# Patient Record
Sex: MALE | Race: OTHER | NOT HISPANIC OR LATINO | URBAN - METROPOLITAN AREA
[De-identification: names, ages, dates, MRNs, and addresses within clinical notes are randomized per-mention and may not be internally consistent; named-entity substitution may affect disease eponyms.]

---

## 2017-01-01 ENCOUNTER — INPATIENT (INPATIENT)
Facility: HOSPITAL | Age: 62
LOS: 29 days | DRG: 28 | End: 2017-10-18
Attending: SURGERY | Admitting: NEUROLOGICAL SURGERY
Payer: COMMERCIAL

## 2017-01-01 ENCOUNTER — EMERGENCY (EMERGENCY)
Facility: HOSPITAL | Age: 62
LOS: 1 days | End: 2017-01-01
Attending: EMERGENCY MEDICINE | Admitting: EMERGENCY MEDICINE
Payer: COMMERCIAL

## 2017-01-01 VITALS
RESPIRATION RATE: 20 BRPM | TEMPERATURE: 98 F | OXYGEN SATURATION: 92 % | SYSTOLIC BLOOD PRESSURE: 76 MMHG | DIASTOLIC BLOOD PRESSURE: 46 MMHG | HEART RATE: 88 BPM

## 2017-01-01 VITALS
WEIGHT: 175.05 LBS | RESPIRATION RATE: 15 BRPM | HEIGHT: 66 IN | HEART RATE: 89 BPM | OXYGEN SATURATION: 98 % | SYSTOLIC BLOOD PRESSURE: 150 MMHG | DIASTOLIC BLOOD PRESSURE: 75 MMHG | TEMPERATURE: 98 F

## 2017-01-01 VITALS
RESPIRATION RATE: 16 BRPM | SYSTOLIC BLOOD PRESSURE: 141 MMHG | DIASTOLIC BLOOD PRESSURE: 85 MMHG | OXYGEN SATURATION: 98 % | HEART RATE: 74 BPM | TEMPERATURE: 98 F

## 2017-01-01 DIAGNOSIS — C79.9 SECONDARY MALIGNANT NEOPLASM OF UNSPECIFIED SITE: ICD-10-CM

## 2017-01-01 DIAGNOSIS — C34.02 MALIGNANT NEOPLASM OF LEFT MAIN BRONCHUS: ICD-10-CM

## 2017-01-01 DIAGNOSIS — G95.20 UNSPECIFIED CORD COMPRESSION: ICD-10-CM

## 2017-01-01 DIAGNOSIS — I82.3 EMBOLISM AND THROMBOSIS OF RENAL VEIN: ICD-10-CM

## 2017-01-01 DIAGNOSIS — J96.01 ACUTE RESPIRATORY FAILURE WITH HYPOXIA: ICD-10-CM

## 2017-01-01 DIAGNOSIS — J98.59 OTHER DISEASES OF MEDIASTINUM, NOT ELSEWHERE CLASSIFIED: ICD-10-CM

## 2017-01-01 DIAGNOSIS — K91.89 OTHER POSTPROCEDURAL COMPLICATIONS AND DISORDERS OF DIGESTIVE SYSTEM: ICD-10-CM

## 2017-01-01 DIAGNOSIS — K63.1 PERFORATION OF INTESTINE (NONTRAUMATIC): ICD-10-CM

## 2017-01-01 DIAGNOSIS — J96.90 RESPIRATORY FAILURE, UNSPECIFIED, UNSPECIFIED WHETHER WITH HYPOXIA OR HYPERCAPNIA: ICD-10-CM

## 2017-01-01 DIAGNOSIS — N17.9 ACUTE KIDNEY FAILURE, UNSPECIFIED: ICD-10-CM

## 2017-01-01 DIAGNOSIS — Z51.5 ENCOUNTER FOR PALLIATIVE CARE: ICD-10-CM

## 2017-01-01 DIAGNOSIS — I26.99 OTHER PULMONARY EMBOLISM WITHOUT ACUTE COR PULMONALE: ICD-10-CM

## 2017-01-01 DIAGNOSIS — R06.00 DYSPNEA, UNSPECIFIED: ICD-10-CM

## 2017-01-01 DIAGNOSIS — J90 PLEURAL EFFUSION, NOT ELSEWHERE CLASSIFIED: ICD-10-CM

## 2017-01-01 DIAGNOSIS — J98.11 ATELECTASIS: ICD-10-CM

## 2017-01-01 DIAGNOSIS — Z71.89 OTHER SPECIFIED COUNSELING: ICD-10-CM

## 2017-01-01 DIAGNOSIS — G89.3 NEOPLASM RELATED PAIN (ACUTE) (CHRONIC): ICD-10-CM

## 2017-01-01 LAB
-  AMIKACIN: SIGNIFICANT CHANGE UP
-  AMPICILLIN/SULBACTAM: SIGNIFICANT CHANGE UP
-  AMPICILLIN: SIGNIFICANT CHANGE UP
-  AZTREONAM: SIGNIFICANT CHANGE UP
-  CEFAZOLIN: SIGNIFICANT CHANGE UP
-  CEFEPIME: SIGNIFICANT CHANGE UP
-  CEFOXITIN: SIGNIFICANT CHANGE UP
-  CEFTAZIDIME: SIGNIFICANT CHANGE UP
-  CEFTRIAXONE: SIGNIFICANT CHANGE UP
-  CIPROFLOXACIN: SIGNIFICANT CHANGE UP
-  ERTAPENEM: SIGNIFICANT CHANGE UP
-  ERYTHROMYCIN: SIGNIFICANT CHANGE UP
-  ERYTHROMYCIN: SIGNIFICANT CHANGE UP
-  GENTAMICIN: SIGNIFICANT CHANGE UP
-  IMIPENEM: SIGNIFICANT CHANGE UP
-  LEVOFLOXACIN: SIGNIFICANT CHANGE UP
-  MEROPENEM: SIGNIFICANT CHANGE UP
-  PIPERACILLIN/TAZOBACTAM: SIGNIFICANT CHANGE UP
-  TETRACYCLINE: SIGNIFICANT CHANGE UP
-  TETRACYCLINE: SIGNIFICANT CHANGE UP
-  TOBRAMYCIN: SIGNIFICANT CHANGE UP
-  TRIMETHOPRIM/SULFAMETHOXAZOLE: SIGNIFICANT CHANGE UP
-  VANCOMYCIN: SIGNIFICANT CHANGE UP
-  VANCOMYCIN: SIGNIFICANT CHANGE UP
ALBUMIN FLD-MCNC: 1.2 G/DL — SIGNIFICANT CHANGE UP
ALBUMIN SERPL ELPH-MCNC: 1.7 G/DL — LOW (ref 3.3–5)
ALBUMIN SERPL ELPH-MCNC: 1.7 G/DL — LOW (ref 3.3–5)
ALBUMIN SERPL ELPH-MCNC: 1.8 G/DL — LOW (ref 3.3–5)
ALBUMIN SERPL ELPH-MCNC: 1.8 G/DL — LOW (ref 3.3–5)
ALBUMIN SERPL ELPH-MCNC: 1.9 G/DL — LOW (ref 3.3–5)
ALBUMIN SERPL ELPH-MCNC: 1.9 G/DL — LOW (ref 3.3–5)
ALBUMIN SERPL ELPH-MCNC: 2 G/DL — LOW (ref 3.3–5)
ALBUMIN SERPL ELPH-MCNC: 2.1 G/DL — LOW (ref 3.3–5)
ALBUMIN SERPL ELPH-MCNC: 2.1 G/DL — LOW (ref 3.3–5)
ALBUMIN SERPL ELPH-MCNC: 2.2 G/DL — LOW (ref 3.3–5)
ALBUMIN SERPL ELPH-MCNC: 2.3 G/DL — LOW (ref 3.3–5)
ALBUMIN SERPL ELPH-MCNC: 2.3 G/DL — LOW (ref 3.3–5)
ALBUMIN SERPL ELPH-MCNC: 2.4 G/DL — LOW (ref 3.3–5)
ALBUMIN SERPL ELPH-MCNC: 2.5 G/DL — LOW (ref 3.3–5)
ALBUMIN SERPL ELPH-MCNC: 2.5 G/DL — LOW (ref 3.3–5)
ALBUMIN SERPL ELPH-MCNC: 2.6 G/DL — LOW (ref 3.3–5)
ALBUMIN SERPL ELPH-MCNC: 2.7 G/DL — LOW (ref 3.3–5)
ALBUMIN SERPL ELPH-MCNC: 3.8 G/DL — SIGNIFICANT CHANGE UP (ref 3.3–5)
ALBUMIN SERPL ELPH-MCNC: 4.3 G/DL — SIGNIFICANT CHANGE UP (ref 3.3–5)
ALP SERPL-CCNC: 100 U/L — SIGNIFICANT CHANGE UP (ref 40–120)
ALP SERPL-CCNC: 103 U/L — SIGNIFICANT CHANGE UP (ref 40–120)
ALP SERPL-CCNC: 107 U/L — SIGNIFICANT CHANGE UP (ref 40–120)
ALP SERPL-CCNC: 111 U/L — SIGNIFICANT CHANGE UP (ref 40–120)
ALP SERPL-CCNC: 115 U/L — SIGNIFICANT CHANGE UP (ref 40–120)
ALP SERPL-CCNC: 117 U/L — SIGNIFICANT CHANGE UP (ref 40–120)
ALP SERPL-CCNC: 118 U/L — SIGNIFICANT CHANGE UP (ref 40–120)
ALP SERPL-CCNC: 121 U/L — HIGH (ref 40–120)
ALP SERPL-CCNC: 122 U/L — HIGH (ref 40–120)
ALP SERPL-CCNC: 128 U/L — HIGH (ref 40–120)
ALP SERPL-CCNC: 132 U/L — HIGH (ref 40–120)
ALP SERPL-CCNC: 137 U/L — HIGH (ref 40–120)
ALP SERPL-CCNC: 138 U/L — HIGH (ref 40–120)
ALP SERPL-CCNC: 155 U/L — HIGH (ref 40–120)
ALP SERPL-CCNC: 172 U/L — HIGH (ref 40–120)
ALP SERPL-CCNC: 46 U/L — SIGNIFICANT CHANGE UP (ref 40–120)
ALP SERPL-CCNC: 54 U/L — SIGNIFICANT CHANGE UP (ref 40–120)
ALP SERPL-CCNC: 60 U/L — SIGNIFICANT CHANGE UP (ref 40–120)
ALP SERPL-CCNC: 77 U/L — SIGNIFICANT CHANGE UP (ref 40–120)
ALP SERPL-CCNC: 77 U/L — SIGNIFICANT CHANGE UP (ref 40–120)
ALP SERPL-CCNC: 86 U/L — SIGNIFICANT CHANGE UP (ref 40–120)
ALP SERPL-CCNC: 87 U/L — SIGNIFICANT CHANGE UP (ref 30–120)
ALP SERPL-CCNC: 91 U/L — SIGNIFICANT CHANGE UP (ref 40–120)
ALP SERPL-CCNC: 96 U/L — SIGNIFICANT CHANGE UP (ref 40–120)
ALT FLD-CCNC: 10 U/L RC — SIGNIFICANT CHANGE UP (ref 10–45)
ALT FLD-CCNC: 11 U/L RC — SIGNIFICANT CHANGE UP (ref 10–45)
ALT FLD-CCNC: 11 U/L RC — SIGNIFICANT CHANGE UP (ref 10–45)
ALT FLD-CCNC: 18 U/L RC — SIGNIFICANT CHANGE UP (ref 10–45)
ALT FLD-CCNC: 19 U/L RC — SIGNIFICANT CHANGE UP (ref 10–45)
ALT FLD-CCNC: 19 U/L RC — SIGNIFICANT CHANGE UP (ref 10–45)
ALT FLD-CCNC: 30 U/L RC — SIGNIFICANT CHANGE UP (ref 10–45)
ALT FLD-CCNC: 31 U/L RC — SIGNIFICANT CHANGE UP (ref 10–45)
ALT FLD-CCNC: 32 U/L RC — SIGNIFICANT CHANGE UP (ref 10–45)
ALT FLD-CCNC: 33 U/L RC — SIGNIFICANT CHANGE UP (ref 10–45)
ALT FLD-CCNC: 34 U/L RC — SIGNIFICANT CHANGE UP (ref 10–45)
ALT FLD-CCNC: 34 U/L RC — SIGNIFICANT CHANGE UP (ref 10–45)
ALT FLD-CCNC: 37 U/L RC — SIGNIFICANT CHANGE UP (ref 10–45)
ALT FLD-CCNC: 38 U/L RC — SIGNIFICANT CHANGE UP (ref 10–45)
ALT FLD-CCNC: 41 U/L RC — SIGNIFICANT CHANGE UP (ref 10–45)
ALT FLD-CCNC: 45 U/L RC — SIGNIFICANT CHANGE UP (ref 10–45)
ALT FLD-CCNC: 46 U/L DA — SIGNIFICANT CHANGE UP (ref 10–60)
ALT FLD-CCNC: 46 U/L RC — HIGH (ref 10–45)
ALT FLD-CCNC: 56 U/L RC — HIGH (ref 10–45)
ALT FLD-CCNC: 58 U/L RC — HIGH (ref 10–45)
ALT FLD-CCNC: 60 U/L RC — HIGH (ref 10–45)
ALT FLD-CCNC: 62 U/L RC — HIGH (ref 10–45)
ALT FLD-CCNC: 7 U/L RC — LOW (ref 10–45)
ALT FLD-CCNC: 8 U/L RC — LOW (ref 10–45)
AMMONIA BLD-MCNC: 27 UMOL/L — SIGNIFICANT CHANGE UP (ref 11–55)
AMMONIA BLD-MCNC: 29 UMOL/L — SIGNIFICANT CHANGE UP (ref 11–55)
AMMONIA BLD-MCNC: 41 UMOL/L — SIGNIFICANT CHANGE UP (ref 11–55)
ANION GAP SERPL CALC-SCNC: 10 MMOL/L — SIGNIFICANT CHANGE UP (ref 5–17)
ANION GAP SERPL CALC-SCNC: 11 MMOL/L — SIGNIFICANT CHANGE UP (ref 5–17)
ANION GAP SERPL CALC-SCNC: 12 MMOL/L — SIGNIFICANT CHANGE UP (ref 5–17)
ANION GAP SERPL CALC-SCNC: 13 MMOL/L — SIGNIFICANT CHANGE UP (ref 5–17)
ANION GAP SERPL CALC-SCNC: 14 MMOL/L — SIGNIFICANT CHANGE UP (ref 5–17)
ANION GAP SERPL CALC-SCNC: 15 MMOL/L — SIGNIFICANT CHANGE UP (ref 5–17)
ANION GAP SERPL CALC-SCNC: 16 MMOL/L — SIGNIFICANT CHANGE UP (ref 5–17)
ANION GAP SERPL CALC-SCNC: 17 MMOL/L — SIGNIFICANT CHANGE UP (ref 5–17)
ANION GAP SERPL CALC-SCNC: 20 MMOL/L — HIGH (ref 5–17)
ANION GAP SERPL CALC-SCNC: 9 MMOL/L — SIGNIFICANT CHANGE UP (ref 5–17)
ANION GAP SERPL CALC-SCNC: 9 MMOL/L — SIGNIFICANT CHANGE UP (ref 5–17)
ANISOCYTOSIS BLD QL: SLIGHT — SIGNIFICANT CHANGE UP
ANISOCYTOSIS BLD QL: SLIGHT — SIGNIFICANT CHANGE UP
APPEARANCE UR: ABNORMAL
APPEARANCE UR: CLEAR — SIGNIFICANT CHANGE UP
APTT BLD: 24.4 SEC — LOW (ref 27.5–37.4)
APTT BLD: 25.8 SEC — LOW (ref 27.5–37.4)
APTT BLD: 26.6 SEC — LOW (ref 27.5–37.4)
APTT BLD: 26.8 SEC — LOW (ref 27.5–37.4)
APTT BLD: 27.1 SEC — LOW (ref 27.5–37.4)
APTT BLD: 27.4 SEC — LOW (ref 27.5–37.4)
APTT BLD: 27.7 SEC — SIGNIFICANT CHANGE UP (ref 27.5–37.4)
APTT BLD: 28.4 SEC — SIGNIFICANT CHANGE UP (ref 27.5–37.4)
APTT BLD: 29.8 SEC — SIGNIFICANT CHANGE UP (ref 27.5–37.4)
APTT BLD: 31.3 SEC — SIGNIFICANT CHANGE UP (ref 27.5–37.4)
APTT BLD: 31.7 SEC — SIGNIFICANT CHANGE UP (ref 27.5–37.4)
APTT BLD: 31.7 SEC — SIGNIFICANT CHANGE UP (ref 27.5–37.4)
APTT BLD: 31.8 SEC — SIGNIFICANT CHANGE UP (ref 27.5–37.4)
APTT BLD: 31.8 SEC — SIGNIFICANT CHANGE UP (ref 27.5–37.4)
APTT BLD: 31.9 SEC — SIGNIFICANT CHANGE UP (ref 27.5–37.4)
APTT BLD: 32.6 SEC — SIGNIFICANT CHANGE UP (ref 27.5–37.4)
APTT BLD: 32.7 SEC — SIGNIFICANT CHANGE UP (ref 27.5–37.4)
APTT BLD: 34.4 SEC — SIGNIFICANT CHANGE UP (ref 27.5–37.4)
APTT BLD: 36.2 SEC — SIGNIFICANT CHANGE UP (ref 27.5–37.4)
APTT BLD: 40 SEC — HIGH (ref 27.5–37.4)
APTT BLD: 51 SEC — HIGH (ref 27.5–37.4)
APTT BLD: 56.3 SEC — HIGH (ref 27.5–37.4)
APTT BLD: 56.5 SEC — HIGH (ref 27.5–37.4)
APTT BLD: 56.5 SEC — HIGH (ref 27.5–37.4)
APTT BLD: 56.8 SEC — HIGH (ref 27.5–37.4)
APTT BLD: 59.6 SEC — HIGH (ref 27.5–37.4)
APTT BLD: 59.9 SEC — HIGH (ref 27.5–37.4)
APTT BLD: 62 SEC — HIGH (ref 27.5–37.4)
APTT BLD: 63.3 SEC — HIGH (ref 27.5–37.4)
APTT BLD: 73.8 SEC — HIGH (ref 27.5–37.4)
APTT BLD: 97.7 SEC — HIGH (ref 27.5–37.4)
APTT BLD: 98.4 SEC — HIGH (ref 27.5–37.4)
AST SERPL-CCNC: 30 U/L — SIGNIFICANT CHANGE UP (ref 10–40)
AST SERPL-CCNC: 30 U/L — SIGNIFICANT CHANGE UP (ref 10–40)
AST SERPL-CCNC: 31 U/L — SIGNIFICANT CHANGE UP (ref 10–40)
AST SERPL-CCNC: 32 U/L — SIGNIFICANT CHANGE UP (ref 10–40)
AST SERPL-CCNC: 33 U/L — SIGNIFICANT CHANGE UP (ref 10–40)
AST SERPL-CCNC: 34 U/L — SIGNIFICANT CHANGE UP (ref 10–40)
AST SERPL-CCNC: 35 U/L — SIGNIFICANT CHANGE UP (ref 10–40)
AST SERPL-CCNC: 43 U/L — HIGH (ref 10–40)
AST SERPL-CCNC: 53 U/L — HIGH (ref 10–40)
AST SERPL-CCNC: 54 U/L — HIGH (ref 10–40)
AST SERPL-CCNC: 57 U/L — HIGH (ref 10–40)
AST SERPL-CCNC: 57 U/L — HIGH (ref 10–40)
AST SERPL-CCNC: 59 U/L — HIGH (ref 10–40)
AST SERPL-CCNC: 60 U/L — HIGH (ref 10–40)
AST SERPL-CCNC: 60 U/L — HIGH (ref 10–40)
AST SERPL-CCNC: 69 U/L — HIGH (ref 10–40)
AST SERPL-CCNC: 70 U/L — HIGH (ref 10–40)
AST SERPL-CCNC: 77 U/L — HIGH (ref 10–40)
AST SERPL-CCNC: 77 U/L — HIGH (ref 10–40)
AST SERPL-CCNC: 78 U/L — HIGH (ref 10–40)
AST SERPL-CCNC: 79 U/L — HIGH (ref 10–40)
AST SERPL-CCNC: 88 U/L — HIGH (ref 10–40)
AST SERPL-CCNC: 94 U/L — HIGH (ref 10–40)
AST SERPL-CCNC: 97 U/L — HIGH (ref 10–40)
B PERT IGG+IGM PNL SER: ABNORMAL
BACTERIA # UR AUTO: ABNORMAL /HPF
BASE EXCESS BLDA CALC-SCNC: -6.5 MMOL/L — LOW (ref -2–2)
BASE EXCESS BLDV CALC-SCNC: -4.1 MMOL/L — LOW (ref -2–2)
BASO STIPL BLD QL SMEAR: SLIGHT — SIGNIFICANT CHANGE UP
BASOPHILS # BLD AUTO: 0 K/UL — SIGNIFICANT CHANGE UP (ref 0–0.2)
BASOPHILS NFR BLD AUTO: 0.1 % — SIGNIFICANT CHANGE UP (ref 0–2)
BASOPHILS NFR BLD AUTO: 0.2 % — SIGNIFICANT CHANGE UP (ref 0–2)
BILIRUB DIRECT SERPL-MCNC: 0.5 MG/DL — HIGH (ref 0–0.2)
BILIRUB DIRECT SERPL-MCNC: 0.6 MG/DL — HIGH (ref 0–0.2)
BILIRUB DIRECT SERPL-MCNC: 1 MG/DL — HIGH (ref 0–0.2)
BILIRUB DIRECT SERPL-MCNC: 1.1 MG/DL — HIGH (ref 0–0.2)
BILIRUB DIRECT SERPL-MCNC: 1.1 MG/DL — HIGH (ref 0–0.2)
BILIRUB DIRECT SERPL-MCNC: 1.4 MG/DL — HIGH (ref 0–0.2)
BILIRUB DIRECT SERPL-MCNC: 1.4 MG/DL — HIGH (ref 0–0.2)
BILIRUB DIRECT SERPL-MCNC: 1.8 MG/DL — HIGH (ref 0–0.2)
BILIRUB DIRECT SERPL-MCNC: 2.1 MG/DL — HIGH (ref 0–0.2)
BILIRUB DIRECT SERPL-MCNC: 2.2 MG/DL — HIGH (ref 0–0.2)
BILIRUB DIRECT SERPL-MCNC: 2.4 MG/DL — HIGH (ref 0–0.2)
BILIRUB DIRECT SERPL-MCNC: 2.6 MG/DL — HIGH (ref 0–0.2)
BILIRUB DIRECT SERPL-MCNC: 3 MG/DL — HIGH (ref 0–0.2)
BILIRUB DIRECT SERPL-MCNC: 3.4 MG/DL — HIGH (ref 0–0.2)
BILIRUB DIRECT SERPL-MCNC: 4.9 MG/DL — HIGH (ref 0–0.2)
BILIRUB DIRECT SERPL-MCNC: 6.6 MG/DL — HIGH (ref 0–0.2)
BILIRUB INDIRECT FLD-MCNC: 0.3 MG/DL — SIGNIFICANT CHANGE UP (ref 0.2–1)
BILIRUB INDIRECT FLD-MCNC: 0.3 MG/DL — SIGNIFICANT CHANGE UP (ref 0.2–1)
BILIRUB INDIRECT FLD-MCNC: 0.4 MG/DL — SIGNIFICANT CHANGE UP (ref 0.2–1)
BILIRUB INDIRECT FLD-MCNC: 0.4 MG/DL — SIGNIFICANT CHANGE UP (ref 0.2–1)
BILIRUB INDIRECT FLD-MCNC: 0.5 MG/DL — SIGNIFICANT CHANGE UP (ref 0.2–1)
BILIRUB INDIRECT FLD-MCNC: 0.6 MG/DL — SIGNIFICANT CHANGE UP (ref 0.2–1)
BILIRUB INDIRECT FLD-MCNC: 0.8 MG/DL — SIGNIFICANT CHANGE UP (ref 0.2–1)
BILIRUB INDIRECT FLD-MCNC: 0.9 MG/DL — SIGNIFICANT CHANGE UP (ref 0.2–1)
BILIRUB INDIRECT FLD-MCNC: 1 MG/DL — SIGNIFICANT CHANGE UP (ref 0.2–1)
BILIRUB INDIRECT FLD-MCNC: 1.2 MG/DL — HIGH (ref 0.2–1)
BILIRUB INDIRECT FLD-MCNC: 1.2 MG/DL — HIGH (ref 0.2–1)
BILIRUB SERPL-MCNC: 0.6 MG/DL — SIGNIFICANT CHANGE UP (ref 0.2–1.2)
BILIRUB SERPL-MCNC: 0.6 MG/DL — SIGNIFICANT CHANGE UP (ref 0.2–1.2)
BILIRUB SERPL-MCNC: 0.8 MG/DL — SIGNIFICANT CHANGE UP (ref 0.2–1.2)
BILIRUB SERPL-MCNC: 0.9 MG/DL — SIGNIFICANT CHANGE UP (ref 0.2–1.2)
BILIRUB SERPL-MCNC: 1 MG/DL — SIGNIFICANT CHANGE UP (ref 0.2–1.2)
BILIRUB SERPL-MCNC: 1.5 MG/DL — HIGH (ref 0.2–1.2)
BILIRUB SERPL-MCNC: 1.5 MG/DL — HIGH (ref 0.2–1.2)
BILIRUB SERPL-MCNC: 1.6 MG/DL — HIGH (ref 0.2–1.2)
BILIRUB SERPL-MCNC: 1.7 MG/DL — HIGH (ref 0.2–1.2)
BILIRUB SERPL-MCNC: 1.8 MG/DL — HIGH (ref 0.2–1.2)
BILIRUB SERPL-MCNC: 1.8 MG/DL — HIGH (ref 0.2–1.2)
BILIRUB SERPL-MCNC: 1.9 MG/DL — HIGH (ref 0.2–1.2)
BILIRUB SERPL-MCNC: 2 MG/DL — HIGH (ref 0.2–1.2)
BILIRUB SERPL-MCNC: 2.2 MG/DL — HIGH (ref 0.2–1.2)
BILIRUB SERPL-MCNC: 2.3 MG/DL — HIGH (ref 0.2–1.2)
BILIRUB SERPL-MCNC: 2.7 MG/DL — HIGH (ref 0.2–1.2)
BILIRUB SERPL-MCNC: 3 MG/DL — HIGH (ref 0.2–1.2)
BILIRUB SERPL-MCNC: 3 MG/DL — HIGH (ref 0.2–1.2)
BILIRUB SERPL-MCNC: 3.1 MG/DL — HIGH (ref 0.2–1.2)
BILIRUB SERPL-MCNC: 3.4 MG/DL — HIGH (ref 0.2–1.2)
BILIRUB SERPL-MCNC: 3.6 MG/DL — HIGH (ref 0.2–1.2)
BILIRUB SERPL-MCNC: 4.3 MG/DL — HIGH (ref 0.2–1.2)
BILIRUB SERPL-MCNC: 5 MG/DL — HIGH (ref 0.2–1.2)
BILIRUB SERPL-MCNC: 5.8 MG/DL — HIGH (ref 0.2–1.2)
BILIRUB SERPL-MCNC: 6.8 MG/DL — HIGH (ref 0.2–1.2)
BILIRUB SERPL-MCNC: 7.8 MG/DL — HIGH (ref 0.2–1.2)
BILIRUB UR-MCNC: ABNORMAL
BILIRUB UR-MCNC: ABNORMAL
BILIRUB UR-MCNC: NEGATIVE — SIGNIFICANT CHANGE UP
BILIRUB UR-MCNC: NEGATIVE — SIGNIFICANT CHANGE UP
BLD GP AB SCN SERPL QL: NEGATIVE — SIGNIFICANT CHANGE UP
BUN SERPL-MCNC: 18 MG/DL — SIGNIFICANT CHANGE UP (ref 7–23)
BUN SERPL-MCNC: 18 MG/DL — SIGNIFICANT CHANGE UP (ref 7–23)
BUN SERPL-MCNC: 24 MG/DL — HIGH (ref 7–23)
BUN SERPL-MCNC: 27 MG/DL — HIGH (ref 7–23)
BUN SERPL-MCNC: 28 MG/DL — HIGH (ref 7–23)
BUN SERPL-MCNC: 29 MG/DL — HIGH (ref 7–23)
BUN SERPL-MCNC: 29 MG/DL — HIGH (ref 7–23)
BUN SERPL-MCNC: 32 MG/DL — HIGH (ref 7–23)
BUN SERPL-MCNC: 33 MG/DL — HIGH (ref 7–23)
BUN SERPL-MCNC: 33 MG/DL — HIGH (ref 7–23)
BUN SERPL-MCNC: 34 MG/DL — HIGH (ref 7–23)
BUN SERPL-MCNC: 38 MG/DL — HIGH (ref 7–23)
BUN SERPL-MCNC: 39 MG/DL — HIGH (ref 7–23)
BUN SERPL-MCNC: 40 MG/DL — HIGH (ref 7–23)
BUN SERPL-MCNC: 42 MG/DL — HIGH (ref 7–23)
BUN SERPL-MCNC: 44 MG/DL — HIGH (ref 7–23)
BUN SERPL-MCNC: 45 MG/DL — HIGH (ref 7–23)
BUN SERPL-MCNC: 46 MG/DL — HIGH (ref 7–23)
BUN SERPL-MCNC: 48 MG/DL — HIGH (ref 7–23)
BUN SERPL-MCNC: 49 MG/DL — HIGH (ref 7–23)
BUN SERPL-MCNC: 50 MG/DL — HIGH (ref 7–23)
BUN SERPL-MCNC: 50 MG/DL — HIGH (ref 7–23)
BUN SERPL-MCNC: 52 MG/DL — HIGH (ref 7–23)
BUN SERPL-MCNC: 52 MG/DL — HIGH (ref 7–23)
BUN SERPL-MCNC: 53 MG/DL — HIGH (ref 7–23)
BUN SERPL-MCNC: 54 MG/DL — HIGH (ref 7–23)
BUN SERPL-MCNC: 57 MG/DL — HIGH (ref 7–23)
BUN SERPL-MCNC: 57 MG/DL — HIGH (ref 7–23)
BUN SERPL-MCNC: 58 MG/DL — HIGH (ref 7–23)
BUN SERPL-MCNC: 59 MG/DL — HIGH (ref 7–23)
BUN SERPL-MCNC: 60 MG/DL — HIGH (ref 7–23)
BUN SERPL-MCNC: 62 MG/DL — HIGH (ref 7–23)
BUN SERPL-MCNC: 63 MG/DL — HIGH (ref 7–23)
CA-I BLD-SCNC: 1.09 MMOL/L — LOW (ref 1.12–1.3)
CA-I BLD-SCNC: 1.11 MMOL/L — LOW (ref 1.12–1.3)
CA-I BLD-SCNC: 1.12 MMOL/L — SIGNIFICANT CHANGE UP (ref 1.12–1.3)
CA-I BLD-SCNC: 1.13 MMOL/L — SIGNIFICANT CHANGE UP (ref 1.12–1.3)
CA-I BLD-SCNC: 1.13 MMOL/L — SIGNIFICANT CHANGE UP (ref 1.12–1.3)
CA-I BLD-SCNC: 1.14 MMOL/L — SIGNIFICANT CHANGE UP (ref 1.12–1.3)
CA-I BLD-SCNC: 1.15 MMOL/L — SIGNIFICANT CHANGE UP (ref 1.12–1.3)
CA-I BLD-SCNC: 1.15 MMOL/L — SIGNIFICANT CHANGE UP (ref 1.12–1.3)
CA-I BLD-SCNC: 1.16 MMOL/L — SIGNIFICANT CHANGE UP (ref 1.12–1.3)
CA-I BLD-SCNC: 1.17 MMOL/L — SIGNIFICANT CHANGE UP (ref 1.12–1.3)
CA-I SERPL-SCNC: 1.01 MMOL/L — LOW (ref 1.12–1.3)
CALCIUM SERPL-MCNC: 7 MG/DL — LOW (ref 8.4–10.5)
CALCIUM SERPL-MCNC: 7.2 MG/DL — LOW (ref 8.4–10.5)
CALCIUM SERPL-MCNC: 7.3 MG/DL — LOW (ref 8.4–10.5)
CALCIUM SERPL-MCNC: 7.3 MG/DL — LOW (ref 8.4–10.5)
CALCIUM SERPL-MCNC: 7.4 MG/DL — LOW (ref 8.4–10.5)
CALCIUM SERPL-MCNC: 7.5 MG/DL — LOW (ref 8.4–10.5)
CALCIUM SERPL-MCNC: 7.6 MG/DL — LOW (ref 8.4–10.5)
CALCIUM SERPL-MCNC: 7.7 MG/DL — LOW (ref 8.4–10.5)
CALCIUM SERPL-MCNC: 7.8 MG/DL — LOW (ref 8.4–10.5)
CALCIUM SERPL-MCNC: 7.9 MG/DL — LOW (ref 8.4–10.5)
CALCIUM SERPL-MCNC: 8 MG/DL — LOW (ref 8.4–10.5)
CALCIUM SERPL-MCNC: 8.1 MG/DL — LOW (ref 8.4–10.5)
CALCIUM SERPL-MCNC: 8.2 MG/DL — LOW (ref 8.4–10.5)
CALCIUM SERPL-MCNC: 8.3 MG/DL — LOW (ref 8.4–10.5)
CALCIUM SERPL-MCNC: 8.7 MG/DL — SIGNIFICANT CHANGE UP (ref 8.4–10.5)
CALCIUM SERPL-MCNC: 8.9 MG/DL — SIGNIFICANT CHANGE UP (ref 8.4–10.5)
CALCIUM SERPL-MCNC: 9.6 MG/DL — SIGNIFICANT CHANGE UP (ref 8.4–10.5)
CALCIUM SERPL-MCNC: 9.8 MG/DL — SIGNIFICANT CHANGE UP (ref 8.4–10.5)
CEA SERPL-MCNC: 7.8 NG/ML — HIGH (ref 0–3.8)
CHLORIDE BLDV-SCNC: 113 MMOL/L — HIGH (ref 96–108)
CHLORIDE SERPL-SCNC: 100 MMOL/L — SIGNIFICANT CHANGE UP (ref 96–108)
CHLORIDE SERPL-SCNC: 101 MMOL/L — SIGNIFICANT CHANGE UP (ref 96–108)
CHLORIDE SERPL-SCNC: 102 MMOL/L — SIGNIFICANT CHANGE UP (ref 96–108)
CHLORIDE SERPL-SCNC: 103 MMOL/L — SIGNIFICANT CHANGE UP (ref 96–108)
CHLORIDE SERPL-SCNC: 104 MMOL/L — SIGNIFICANT CHANGE UP (ref 96–108)
CHLORIDE SERPL-SCNC: 106 MMOL/L — SIGNIFICANT CHANGE UP (ref 96–108)
CHLORIDE SERPL-SCNC: 107 MMOL/L — SIGNIFICANT CHANGE UP (ref 96–108)
CHLORIDE SERPL-SCNC: 107 MMOL/L — SIGNIFICANT CHANGE UP (ref 96–108)
CHLORIDE SERPL-SCNC: 108 MMOL/L — SIGNIFICANT CHANGE UP (ref 96–108)
CHLORIDE SERPL-SCNC: 108 MMOL/L — SIGNIFICANT CHANGE UP (ref 96–108)
CHLORIDE SERPL-SCNC: 109 MMOL/L — HIGH (ref 96–108)
CHLORIDE SERPL-SCNC: 109 MMOL/L — HIGH (ref 96–108)
CHLORIDE SERPL-SCNC: 110 MMOL/L — HIGH (ref 96–108)
CHLORIDE SERPL-SCNC: 97 MMOL/L — SIGNIFICANT CHANGE UP (ref 96–108)
CHLORIDE SERPL-SCNC: 98 MMOL/L — SIGNIFICANT CHANGE UP (ref 96–108)
CHLORIDE SERPL-SCNC: 99 MMOL/L — SIGNIFICANT CHANGE UP (ref 96–108)
CHLORIDE SERPL-SCNC: 99 MMOL/L — SIGNIFICANT CHANGE UP (ref 96–108)
CK MB BLD-MCNC: 1.1 % — SIGNIFICANT CHANGE UP (ref 0–3.5)
CK MB BLD-MCNC: 1.4 % — SIGNIFICANT CHANGE UP (ref 0–3.5)
CK MB BLD-MCNC: 1.4 % — SIGNIFICANT CHANGE UP (ref 0–3.5)
CK MB BLD-MCNC: 1.5 % — SIGNIFICANT CHANGE UP (ref 0–3.5)
CK MB BLD-MCNC: 1.6 % — SIGNIFICANT CHANGE UP (ref 0–3.5)
CK MB BLD-MCNC: 1.9 % — SIGNIFICANT CHANGE UP (ref 0–3.5)
CK MB CFR SERPL CALC: 3.1 NG/ML — SIGNIFICANT CHANGE UP (ref 0–6.7)
CK MB CFR SERPL CALC: 3.2 NG/ML — SIGNIFICANT CHANGE UP (ref 0–6.7)
CK MB CFR SERPL CALC: 3.5 NG/ML — SIGNIFICANT CHANGE UP (ref 0–6.7)
CK MB CFR SERPL CALC: 3.6 NG/ML — SIGNIFICANT CHANGE UP (ref 0–6.7)
CK MB CFR SERPL CALC: 4.1 NG/ML — SIGNIFICANT CHANGE UP (ref 0–6.7)
CK MB CFR SERPL CALC: 4.6 NG/ML — SIGNIFICANT CHANGE UP (ref 0–6.7)
CK SERPL-CCNC: 102 U/L — SIGNIFICANT CHANGE UP (ref 30–200)
CK SERPL-CCNC: 185 U/L — SIGNIFICANT CHANGE UP (ref 30–200)
CK SERPL-CCNC: 201 U/L — HIGH (ref 30–200)
CK SERPL-CCNC: 237 U/L — HIGH (ref 30–200)
CK SERPL-CCNC: 247 U/L — HIGH (ref 30–200)
CK SERPL-CCNC: 286 U/L — HIGH (ref 30–200)
CK SERPL-CCNC: 374 U/L — HIGH (ref 30–200)
CO2 BLDA-SCNC: 19 MMOL/L — LOW (ref 22–30)
CO2 BLDV-SCNC: 22 MMOL/L — SIGNIFICANT CHANGE UP (ref 22–30)
CO2 SERPL-SCNC: 15 MMOL/L — LOW (ref 22–31)
CO2 SERPL-SCNC: 16 MMOL/L — LOW (ref 22–31)
CO2 SERPL-SCNC: 16 MMOL/L — LOW (ref 22–31)
CO2 SERPL-SCNC: 17 MMOL/L — LOW (ref 22–31)
CO2 SERPL-SCNC: 19 MMOL/L — LOW (ref 22–31)
CO2 SERPL-SCNC: 20 MMOL/L — LOW (ref 22–31)
CO2 SERPL-SCNC: 21 MMOL/L — LOW (ref 22–31)
CO2 SERPL-SCNC: 22 MMOL/L — SIGNIFICANT CHANGE UP (ref 22–31)
CO2 SERPL-SCNC: 24 MMOL/L — SIGNIFICANT CHANGE UP (ref 22–31)
CO2 SERPL-SCNC: 24 MMOL/L — SIGNIFICANT CHANGE UP (ref 22–31)
CO2 SERPL-SCNC: 25 MMOL/L — SIGNIFICANT CHANGE UP (ref 22–31)
CO2 SERPL-SCNC: 26 MMOL/L — SIGNIFICANT CHANGE UP (ref 22–31)
CO2 SERPL-SCNC: 27 MMOL/L — SIGNIFICANT CHANGE UP (ref 22–31)
CO2 SERPL-SCNC: 28 MMOL/L — SIGNIFICANT CHANGE UP (ref 22–31)
CO2 SERPL-SCNC: 28 MMOL/L — SIGNIFICANT CHANGE UP (ref 22–31)
COLOR FLD: SIGNIFICANT CHANGE UP
COLOR SPEC: YELLOW — SIGNIFICANT CHANGE UP
COMMENT - FLUIDS: SIGNIFICANT CHANGE UP
CREAT ?TM UR-MCNC: 101 MG/DL — SIGNIFICANT CHANGE UP
CREAT ?TM UR-MCNC: 110 MG/DL — SIGNIFICANT CHANGE UP
CREAT ?TM UR-MCNC: 112 MG/DL — SIGNIFICANT CHANGE UP
CREAT ?TM UR-MCNC: 84 MG/DL — SIGNIFICANT CHANGE UP
CREAT SERPL-MCNC: 0.85 MG/DL — SIGNIFICANT CHANGE UP (ref 0.5–1.3)
CREAT SERPL-MCNC: 0.85 MG/DL — SIGNIFICANT CHANGE UP (ref 0.5–1.3)
CREAT SERPL-MCNC: 0.94 MG/DL — SIGNIFICANT CHANGE UP (ref 0.5–1.3)
CREAT SERPL-MCNC: 0.96 MG/DL — SIGNIFICANT CHANGE UP (ref 0.5–1.3)
CREAT SERPL-MCNC: 0.97 MG/DL — SIGNIFICANT CHANGE UP (ref 0.5–1.3)
CREAT SERPL-MCNC: 0.99 MG/DL — SIGNIFICANT CHANGE UP (ref 0.5–1.3)
CREAT SERPL-MCNC: 1.03 MG/DL — SIGNIFICANT CHANGE UP (ref 0.5–1.3)
CREAT SERPL-MCNC: 1.04 MG/DL — SIGNIFICANT CHANGE UP (ref 0.5–1.3)
CREAT SERPL-MCNC: 1.06 MG/DL — SIGNIFICANT CHANGE UP (ref 0.5–1.3)
CREAT SERPL-MCNC: 1.07 MG/DL — SIGNIFICANT CHANGE UP (ref 0.5–1.3)
CREAT SERPL-MCNC: 1.08 MG/DL — SIGNIFICANT CHANGE UP (ref 0.5–1.3)
CREAT SERPL-MCNC: 1.09 MG/DL — SIGNIFICANT CHANGE UP (ref 0.5–1.3)
CREAT SERPL-MCNC: 1.12 MG/DL — SIGNIFICANT CHANGE UP (ref 0.5–1.3)
CREAT SERPL-MCNC: 1.14 MG/DL — SIGNIFICANT CHANGE UP (ref 0.5–1.3)
CREAT SERPL-MCNC: 1.18 MG/DL — SIGNIFICANT CHANGE UP (ref 0.5–1.3)
CREAT SERPL-MCNC: 1.2 MG/DL — SIGNIFICANT CHANGE UP (ref 0.5–1.3)
CREAT SERPL-MCNC: 1.21 MG/DL — SIGNIFICANT CHANGE UP (ref 0.5–1.3)
CREAT SERPL-MCNC: 1.23 MG/DL — SIGNIFICANT CHANGE UP (ref 0.5–1.3)
CREAT SERPL-MCNC: 1.24 MG/DL — SIGNIFICANT CHANGE UP (ref 0.5–1.3)
CREAT SERPL-MCNC: 1.26 MG/DL — SIGNIFICANT CHANGE UP (ref 0.5–1.3)
CREAT SERPL-MCNC: 1.28 MG/DL — SIGNIFICANT CHANGE UP (ref 0.5–1.3)
CREAT SERPL-MCNC: 1.46 MG/DL — HIGH (ref 0.5–1.3)
CREAT SERPL-MCNC: 1.5 MG/DL — HIGH (ref 0.5–1.3)
CREAT SERPL-MCNC: 1.78 MG/DL — HIGH (ref 0.5–1.3)
CREAT SERPL-MCNC: 1.79 MG/DL — HIGH (ref 0.5–1.3)
CREAT SERPL-MCNC: 1.82 MG/DL — HIGH (ref 0.5–1.3)
CREAT SERPL-MCNC: 1.83 MG/DL — HIGH (ref 0.5–1.3)
CREAT SERPL-MCNC: 1.88 MG/DL — HIGH (ref 0.5–1.3)
CREAT SERPL-MCNC: 2 MG/DL — HIGH (ref 0.5–1.3)
CREAT SERPL-MCNC: 2.24 MG/DL — HIGH (ref 0.5–1.3)
CREAT SERPL-MCNC: 2.47 MG/DL — HIGH (ref 0.5–1.3)
CREAT SERPL-MCNC: 2.66 MG/DL — HIGH (ref 0.5–1.3)
CREAT SERPL-MCNC: 3.05 MG/DL — HIGH (ref 0.5–1.3)
CREAT SERPL-MCNC: 3.4 MG/DL — HIGH (ref 0.5–1.3)
CREAT SERPL-MCNC: 3.48 MG/DL — HIGH (ref 0.5–1.3)
CREAT SERPL-MCNC: 3.94 MG/DL — HIGH (ref 0.5–1.3)
CREAT SERPL-MCNC: 4.02 MG/DL — HIGH (ref 0.5–1.3)
CREAT SERPL-MCNC: 4.03 MG/DL — HIGH (ref 0.5–1.3)
CREAT SERPL-MCNC: 4.07 MG/DL — HIGH (ref 0.5–1.3)
CULTURE RESULTS: SIGNIFICANT CHANGE UP
DACRYOCYTES BLD QL SMEAR: SLIGHT — SIGNIFICANT CHANGE UP
DACRYOCYTES BLD QL SMEAR: SLIGHT — SIGNIFICANT CHANGE UP
DIFF PNL FLD: ABNORMAL
DIFF PNL FLD: NEGATIVE — SIGNIFICANT CHANGE UP
ELLIPTOCYTES BLD QL SMEAR: SLIGHT — SIGNIFICANT CHANGE UP
ELLIPTOCYTES BLD QL SMEAR: SLIGHT — SIGNIFICANT CHANGE UP
EOSINOPHIL # BLD AUTO: 0 K/UL — SIGNIFICANT CHANGE UP (ref 0–0.5)
EOSINOPHIL # BLD AUTO: 0.2 K/UL — SIGNIFICANT CHANGE UP (ref 0–0.5)
EOSINOPHIL # BLD AUTO: 0.3 K/UL — SIGNIFICANT CHANGE UP (ref 0–0.5)
EOSINOPHIL # BLD AUTO: 0.3 K/UL — SIGNIFICANT CHANGE UP (ref 0–0.5)
EOSINOPHIL # BLD AUTO: 0.9 K/UL — HIGH (ref 0–0.5)
EOSINOPHIL # BLD AUTO: 1 K/UL — HIGH (ref 0–0.5)
EOSINOPHIL NFR BLD AUTO: 0.1 % — SIGNIFICANT CHANGE UP (ref 0–6)
EOSINOPHIL NFR BLD AUTO: 1 % — SIGNIFICANT CHANGE UP (ref 0–6)
EOSINOPHIL NFR BLD AUTO: 1.4 % — SIGNIFICANT CHANGE UP (ref 0–6)
EOSINOPHIL NFR BLD AUTO: 3 % — SIGNIFICANT CHANGE UP (ref 0–6)
EOSINOPHIL NFR BLD AUTO: 4 % — SIGNIFICANT CHANGE UP (ref 0–6)
EPI CELLS # UR: SIGNIFICANT CHANGE UP /HPF
EPI CELLS # UR: SIGNIFICANT CHANGE UP /HPF
FLUID INTAKE SUBSTANCE CLASS: SIGNIFICANT CHANGE UP
FLUID SEGMENTED GRANULOCYTES: 27 % — SIGNIFICANT CHANGE UP
FUNGITELL: >500 PG/ML — HIGH (ref 0–59)
GAS PNL BLDA: SIGNIFICANT CHANGE UP
GAS PNL BLDV: 137 MMOL/L — SIGNIFICANT CHANGE UP (ref 136–145)
GAS PNL BLDV: SIGNIFICANT CHANGE UP
GIANT PLATELETS BLD QL SMEAR: PRESENT — SIGNIFICANT CHANGE UP
GLUCOSE BLDV-MCNC: 107 MG/DL — HIGH (ref 70–99)
GLUCOSE FLD-MCNC: 88 MG/DL — SIGNIFICANT CHANGE UP
GLUCOSE SERPL-MCNC: 100 MG/DL — HIGH (ref 70–99)
GLUCOSE SERPL-MCNC: 100 MG/DL — HIGH (ref 70–99)
GLUCOSE SERPL-MCNC: 102 MG/DL — HIGH (ref 70–99)
GLUCOSE SERPL-MCNC: 103 MG/DL — HIGH (ref 70–99)
GLUCOSE SERPL-MCNC: 103 MG/DL — HIGH (ref 70–99)
GLUCOSE SERPL-MCNC: 105 MG/DL — HIGH (ref 70–99)
GLUCOSE SERPL-MCNC: 106 MG/DL — HIGH (ref 70–99)
GLUCOSE SERPL-MCNC: 108 MG/DL — HIGH (ref 70–99)
GLUCOSE SERPL-MCNC: 109 MG/DL — HIGH (ref 70–99)
GLUCOSE SERPL-MCNC: 109 MG/DL — HIGH (ref 70–99)
GLUCOSE SERPL-MCNC: 110 MG/DL — HIGH (ref 70–99)
GLUCOSE SERPL-MCNC: 110 MG/DL — HIGH (ref 70–99)
GLUCOSE SERPL-MCNC: 113 MG/DL — HIGH (ref 70–99)
GLUCOSE SERPL-MCNC: 114 MG/DL — HIGH (ref 70–99)
GLUCOSE SERPL-MCNC: 117 MG/DL — HIGH (ref 70–99)
GLUCOSE SERPL-MCNC: 120 MG/DL — HIGH (ref 70–99)
GLUCOSE SERPL-MCNC: 121 MG/DL — HIGH (ref 70–99)
GLUCOSE SERPL-MCNC: 124 MG/DL — HIGH (ref 70–99)
GLUCOSE SERPL-MCNC: 126 MG/DL — HIGH (ref 70–99)
GLUCOSE SERPL-MCNC: 127 MG/DL — HIGH (ref 70–99)
GLUCOSE SERPL-MCNC: 129 MG/DL — HIGH (ref 70–99)
GLUCOSE SERPL-MCNC: 129 MG/DL — HIGH (ref 70–99)
GLUCOSE SERPL-MCNC: 132 MG/DL — HIGH (ref 70–99)
GLUCOSE SERPL-MCNC: 138 MG/DL — HIGH (ref 70–99)
GLUCOSE SERPL-MCNC: 143 MG/DL — HIGH (ref 70–99)
GLUCOSE SERPL-MCNC: 151 MG/DL — HIGH (ref 70–99)
GLUCOSE SERPL-MCNC: 152 MG/DL — HIGH (ref 70–99)
GLUCOSE SERPL-MCNC: 165 MG/DL — HIGH (ref 70–99)
GLUCOSE SERPL-MCNC: 86 MG/DL — SIGNIFICANT CHANGE UP (ref 70–99)
GLUCOSE SERPL-MCNC: 88 MG/DL — SIGNIFICANT CHANGE UP (ref 70–99)
GLUCOSE SERPL-MCNC: 93 MG/DL — SIGNIFICANT CHANGE UP (ref 70–99)
GLUCOSE SERPL-MCNC: 95 MG/DL — SIGNIFICANT CHANGE UP (ref 70–99)
GLUCOSE SERPL-MCNC: 96 MG/DL — SIGNIFICANT CHANGE UP (ref 70–99)
GLUCOSE SERPL-MCNC: 97 MG/DL — SIGNIFICANT CHANGE UP (ref 70–99)
GLUCOSE UR QL: NEGATIVE — SIGNIFICANT CHANGE UP
GRAM STN FLD: SIGNIFICANT CHANGE UP
GRAN CASTS # UR COMP ASSIST: ABNORMAL
GRAN CASTS # UR COMP ASSIST: ABNORMAL
HCO3 BLDA-SCNC: 18 MMOL/L — LOW (ref 21–29)
HCO3 BLDV-SCNC: 20 MMOL/L — LOW (ref 21–29)
HCT VFR BLD CALC: 19.2 % — CRITICAL LOW (ref 39–50)
HCT VFR BLD CALC: 21.2 % — LOW (ref 39–50)
HCT VFR BLD CALC: 22.2 % — LOW (ref 39–50)
HCT VFR BLD CALC: 22.2 % — LOW (ref 39–50)
HCT VFR BLD CALC: 22.4 % — LOW (ref 39–50)
HCT VFR BLD CALC: 22.5 % — LOW (ref 39–50)
HCT VFR BLD CALC: 22.6 % — LOW (ref 39–50)
HCT VFR BLD CALC: 22.9 % — LOW (ref 39–50)
HCT VFR BLD CALC: 23.2 % — LOW (ref 39–50)
HCT VFR BLD CALC: 23.3 % — LOW (ref 39–50)
HCT VFR BLD CALC: 23.5 % — LOW (ref 39–50)
HCT VFR BLD CALC: 23.5 % — LOW (ref 39–50)
HCT VFR BLD CALC: 23.6 % — LOW (ref 39–50)
HCT VFR BLD CALC: 24 % — LOW (ref 39–50)
HCT VFR BLD CALC: 24.3 % — LOW (ref 39–50)
HCT VFR BLD CALC: 24.3 % — LOW (ref 39–50)
HCT VFR BLD CALC: 24.5 % — LOW (ref 39–50)
HCT VFR BLD CALC: 25.7 % — LOW (ref 39–50)
HCT VFR BLD CALC: 25.7 % — LOW (ref 39–50)
HCT VFR BLD CALC: 25.9 % — LOW (ref 39–50)
HCT VFR BLD CALC: 26 % — LOW (ref 39–50)
HCT VFR BLD CALC: 27.1 % — LOW (ref 39–50)
HCT VFR BLD CALC: 27.4 % — LOW (ref 39–50)
HCT VFR BLD CALC: 27.8 % — LOW (ref 39–50)
HCT VFR BLD CALC: 30.1 % — LOW (ref 39–50)
HCT VFR BLD CALC: 30.3 % — LOW (ref 39–50)
HCT VFR BLD CALC: 31.1 % — LOW (ref 39–50)
HCT VFR BLD CALC: 31.2 % — LOW (ref 39–50)
HCT VFR BLD CALC: 31.6 % — LOW (ref 39–50)
HCT VFR BLD CALC: 31.9 % — LOW (ref 39–50)
HCT VFR BLD CALC: 32.6 % — LOW (ref 39–50)
HCT VFR BLD CALC: 32.6 % — LOW (ref 39–50)
HCT VFR BLD CALC: 32.8 % — LOW (ref 39–50)
HCT VFR BLD CALC: 33.6 % — LOW (ref 39–50)
HCT VFR BLD CALC: 37.6 % — LOW (ref 39–50)
HCT VFR BLD CALC: 38.5 % — LOW (ref 39–50)
HCT VFR BLD CALC: 39.2 % — SIGNIFICANT CHANGE UP (ref 39–50)
HCT VFR BLD CALC: 42.5 % — SIGNIFICANT CHANGE UP (ref 39–50)
HCT VFR BLD CALC: 43 % — SIGNIFICANT CHANGE UP (ref 39–50)
HCT VFR BLD CALC: 43.5 % — SIGNIFICANT CHANGE UP (ref 39–50)
HCT VFR BLDA CALC: 24 % — LOW (ref 39–50)
HGB BLD CALC-MCNC: 7.6 G/DL — LOW (ref 13–17)
HGB BLD-MCNC: 10 G/DL — LOW (ref 13–17)
HGB BLD-MCNC: 10.2 G/DL — LOW (ref 13–17)
HGB BLD-MCNC: 10.4 G/DL — LOW (ref 13–17)
HGB BLD-MCNC: 10.5 G/DL — LOW (ref 13–17)
HGB BLD-MCNC: 10.6 G/DL — LOW (ref 13–17)
HGB BLD-MCNC: 10.7 G/DL — LOW (ref 13–17)
HGB BLD-MCNC: 10.8 G/DL — LOW (ref 13–17)
HGB BLD-MCNC: 11 G/DL — LOW (ref 13–17)
HGB BLD-MCNC: 11.2 G/DL — LOW (ref 13–17)
HGB BLD-MCNC: 12.4 G/DL — LOW (ref 13–17)
HGB BLD-MCNC: 12.9 G/DL — LOW (ref 13–17)
HGB BLD-MCNC: 13 G/DL — SIGNIFICANT CHANGE UP (ref 13–17)
HGB BLD-MCNC: 14.1 G/DL — SIGNIFICANT CHANGE UP (ref 13–17)
HGB BLD-MCNC: 14.1 G/DL — SIGNIFICANT CHANGE UP (ref 13–17)
HGB BLD-MCNC: 14.5 G/DL — SIGNIFICANT CHANGE UP (ref 13–17)
HGB BLD-MCNC: 6.1 G/DL — CRITICAL LOW (ref 13–17)
HGB BLD-MCNC: 7.1 G/DL — LOW (ref 13–17)
HGB BLD-MCNC: 7.2 G/DL — LOW (ref 13–17)
HGB BLD-MCNC: 7.4 G/DL — LOW (ref 13–17)
HGB BLD-MCNC: 7.6 G/DL — LOW (ref 13–17)
HGB BLD-MCNC: 7.6 G/DL — LOW (ref 13–17)
HGB BLD-MCNC: 7.7 G/DL — LOW (ref 13–17)
HGB BLD-MCNC: 7.7 G/DL — LOW (ref 13–17)
HGB BLD-MCNC: 7.8 G/DL — LOW (ref 13–17)
HGB BLD-MCNC: 7.9 G/DL — LOW (ref 13–17)
HGB BLD-MCNC: 8 G/DL — LOW (ref 13–17)
HGB BLD-MCNC: 8.1 G/DL — LOW (ref 13–17)
HGB BLD-MCNC: 8.1 G/DL — LOW (ref 13–17)
HGB BLD-MCNC: 8.3 G/DL — LOW (ref 13–17)
HGB BLD-MCNC: 8.3 G/DL — LOW (ref 13–17)
HGB BLD-MCNC: 8.7 G/DL — LOW (ref 13–17)
HGB BLD-MCNC: 8.8 G/DL — LOW (ref 13–17)
HGB BLD-MCNC: 9 G/DL — LOW (ref 13–17)
HGB BLD-MCNC: 9 G/DL — LOW (ref 13–17)
HGB BLD-MCNC: 9.9 G/DL — LOW (ref 13–17)
HOROWITZ INDEX BLDA+IHG-RTO: 40 — SIGNIFICANT CHANGE UP
HOROWITZ INDEX BLDV+IHG-RTO: 40 — SIGNIFICANT CHANGE UP
HYALINE CASTS # UR AUTO: ABNORMAL
HYPOCHROMIA BLD QL: SLIGHT — SIGNIFICANT CHANGE UP
HYPOCHROMIA BLD QL: SLIGHT — SIGNIFICANT CHANGE UP
INR BLD: 1.23 RATIO — HIGH (ref 0.88–1.16)
INR BLD: 1.23 RATIO — HIGH (ref 0.88–1.16)
INR BLD: 1.27 RATIO — HIGH (ref 0.88–1.16)
INR BLD: 1.32 RATIO — HIGH (ref 0.88–1.16)
INR BLD: 1.34 RATIO — HIGH (ref 0.88–1.16)
INR BLD: 1.35 RATIO — HIGH (ref 0.88–1.16)
INR BLD: 1.37 RATIO — HIGH (ref 0.88–1.16)
INR BLD: 1.41 RATIO — HIGH (ref 0.88–1.16)
INR BLD: 1.43 RATIO — HIGH (ref 0.88–1.16)
INR BLD: 1.44 RATIO — HIGH (ref 0.88–1.16)
INR BLD: 1.46 RATIO — HIGH (ref 0.88–1.16)
INR BLD: 1.47 RATIO — HIGH (ref 0.88–1.16)
INR BLD: 1.47 RATIO — HIGH (ref 0.88–1.16)
INR BLD: 1.48 RATIO — HIGH (ref 0.88–1.16)
INR BLD: 1.49 RATIO — HIGH (ref 0.88–1.16)
INR BLD: 1.5 RATIO — HIGH (ref 0.88–1.16)
INR BLD: 1.51 RATIO — HIGH (ref 0.88–1.16)
INR BLD: 1.58 RATIO — HIGH (ref 0.88–1.16)
INR BLD: 1.59 RATIO — HIGH (ref 0.88–1.16)
INR BLD: 1.62 RATIO — HIGH (ref 0.88–1.16)
INR BLD: 1.63 RATIO — HIGH (ref 0.88–1.16)
INR BLD: 1.64 RATIO — HIGH (ref 0.88–1.16)
INR BLD: 1.66 RATIO — HIGH (ref 0.88–1.16)
INR BLD: 1.71 RATIO — HIGH (ref 0.88–1.16)
INR BLD: 1.83 RATIO — HIGH (ref 0.88–1.16)
KETONES UR-MCNC: ABNORMAL
KETONES UR-MCNC: NEGATIVE — SIGNIFICANT CHANGE UP
LACTATE BLDV-MCNC: 1.4 MMOL/L — SIGNIFICANT CHANGE UP (ref 0.7–2)
LACTATE BLDV-MCNC: 1.4 MMOL/L — SIGNIFICANT CHANGE UP (ref 0.7–2)
LDH SERPL L TO P-CCNC: 1648 U/L — SIGNIFICANT CHANGE UP
LEUKOCYTE ESTERASE UR-ACNC: ABNORMAL
LEUKOCYTE ESTERASE UR-ACNC: NEGATIVE — SIGNIFICANT CHANGE UP
LG PLATELETS BLD QL AUTO: SLIGHT — SIGNIFICANT CHANGE UP
LYMPHOCYTES # BLD AUTO: 0.7 K/UL — LOW (ref 1–3.3)
LYMPHOCYTES # BLD AUTO: 0.8 K/UL — LOW (ref 1–3.3)
LYMPHOCYTES # BLD AUTO: 1 K/UL — SIGNIFICANT CHANGE UP (ref 1–3.3)
LYMPHOCYTES # BLD AUTO: 10 % — LOW (ref 13–44)
LYMPHOCYTES # BLD AUTO: 2 % — LOW (ref 13–44)
LYMPHOCYTES # BLD AUTO: 2 % — LOW (ref 13–44)
LYMPHOCYTES # BLD AUTO: 2.5 K/UL — SIGNIFICANT CHANGE UP (ref 1–3.3)
LYMPHOCYTES # BLD AUTO: 3 % — LOW (ref 13–44)
LYMPHOCYTES # BLD AUTO: 4.5 % — LOW (ref 13–44)
LYMPHOCYTES # BLD AUTO: 4.5 % — LOW (ref 13–44)
LYMPHOCYTES # BLD AUTO: 8 % — LOW (ref 13–44)
LYMPHOCYTES # FLD: 37 % — SIGNIFICANT CHANGE UP
MAGNESIUM SERPL-MCNC: 2.1 MG/DL — SIGNIFICANT CHANGE UP (ref 1.6–2.6)
MAGNESIUM SERPL-MCNC: 2.1 MG/DL — SIGNIFICANT CHANGE UP (ref 1.6–2.6)
MAGNESIUM SERPL-MCNC: 2.2 MG/DL — SIGNIFICANT CHANGE UP (ref 1.6–2.6)
MAGNESIUM SERPL-MCNC: 2.3 MG/DL — SIGNIFICANT CHANGE UP (ref 1.6–2.6)
MAGNESIUM SERPL-MCNC: 2.4 MG/DL — SIGNIFICANT CHANGE UP (ref 1.6–2.6)
MAGNESIUM SERPL-MCNC: 2.5 MG/DL — SIGNIFICANT CHANGE UP (ref 1.6–2.6)
MAGNESIUM SERPL-MCNC: 2.6 MG/DL — SIGNIFICANT CHANGE UP (ref 1.6–2.6)
MAGNESIUM SERPL-MCNC: 2.7 MG/DL — HIGH (ref 1.6–2.6)
MAGNESIUM SERPL-MCNC: 2.8 MG/DL — HIGH (ref 1.6–2.6)
MAGNESIUM SERPL-MCNC: 3 MG/DL — HIGH (ref 1.6–2.6)
MAGNESIUM SERPL-MCNC: 3.3 MG/DL — HIGH (ref 1.6–2.6)
MAGNESIUM SERPL-MCNC: 3.3 MG/DL — HIGH (ref 1.6–2.6)
MAGNESIUM SERPL-MCNC: 3.5 MG/DL — HIGH (ref 1.6–2.6)
MAGNESIUM SERPL-MCNC: 3.6 MG/DL — HIGH (ref 1.6–2.6)
MAGNESIUM SERPL-MCNC: 3.8 MG/DL — HIGH (ref 1.6–2.6)
MAGNESIUM SERPL-MCNC: 3.8 MG/DL — HIGH (ref 1.6–2.6)
MAGNESIUM SERPL-MCNC: 4 MG/DL — HIGH (ref 1.6–2.6)
MANUAL DIF COMMENT BLD-IMP: SIGNIFICANT CHANGE UP
MANUAL SMEAR VERIFICATION: SIGNIFICANT CHANGE UP
MCHC RBC-ENTMCNC: 29.2 PG — SIGNIFICANT CHANGE UP (ref 27–34)
MCHC RBC-ENTMCNC: 29.4 PG — SIGNIFICANT CHANGE UP (ref 27–34)
MCHC RBC-ENTMCNC: 30.1 PG — SIGNIFICANT CHANGE UP (ref 27–34)
MCHC RBC-ENTMCNC: 30.5 GM/DL — LOW (ref 32–36)
MCHC RBC-ENTMCNC: 30.8 PG — SIGNIFICANT CHANGE UP (ref 27–34)
MCHC RBC-ENTMCNC: 30.8 PG — SIGNIFICANT CHANGE UP (ref 27–34)
MCHC RBC-ENTMCNC: 30.9 PG — SIGNIFICANT CHANGE UP (ref 27–34)
MCHC RBC-ENTMCNC: 31 PG — SIGNIFICANT CHANGE UP (ref 27–34)
MCHC RBC-ENTMCNC: 31.1 PG — SIGNIFICANT CHANGE UP (ref 27–34)
MCHC RBC-ENTMCNC: 31.2 PG — SIGNIFICANT CHANGE UP (ref 27–34)
MCHC RBC-ENTMCNC: 31.3 PG — SIGNIFICANT CHANGE UP (ref 27–34)
MCHC RBC-ENTMCNC: 31.4 PG — SIGNIFICANT CHANGE UP (ref 27–34)
MCHC RBC-ENTMCNC: 31.5 PG — SIGNIFICANT CHANGE UP (ref 27–34)
MCHC RBC-ENTMCNC: 31.5 PG — SIGNIFICANT CHANGE UP (ref 27–34)
MCHC RBC-ENTMCNC: 31.6 PG — SIGNIFICANT CHANGE UP (ref 27–34)
MCHC RBC-ENTMCNC: 31.7 PG — SIGNIFICANT CHANGE UP (ref 27–34)
MCHC RBC-ENTMCNC: 31.8 PG — SIGNIFICANT CHANGE UP (ref 27–34)
MCHC RBC-ENTMCNC: 31.9 GM/DL — LOW (ref 32–36)
MCHC RBC-ENTMCNC: 31.9 GM/DL — LOW (ref 32–36)
MCHC RBC-ENTMCNC: 31.9 PG — SIGNIFICANT CHANGE UP (ref 27–34)
MCHC RBC-ENTMCNC: 31.9 PG — SIGNIFICANT CHANGE UP (ref 27–34)
MCHC RBC-ENTMCNC: 32 PG — SIGNIFICANT CHANGE UP (ref 27–34)
MCHC RBC-ENTMCNC: 32 PG — SIGNIFICANT CHANGE UP (ref 27–34)
MCHC RBC-ENTMCNC: 32.1 PG — SIGNIFICANT CHANGE UP (ref 27–34)
MCHC RBC-ENTMCNC: 32.2 GM/DL — SIGNIFICANT CHANGE UP (ref 32–36)
MCHC RBC-ENTMCNC: 32.2 PG — SIGNIFICANT CHANGE UP (ref 27–34)
MCHC RBC-ENTMCNC: 32.4 GM/DL — SIGNIFICANT CHANGE UP (ref 32–36)
MCHC RBC-ENTMCNC: 32.4 PG — SIGNIFICANT CHANGE UP (ref 27–34)
MCHC RBC-ENTMCNC: 32.6 GM/DL — SIGNIFICANT CHANGE UP (ref 32–36)
MCHC RBC-ENTMCNC: 32.7 GM/DL — SIGNIFICANT CHANGE UP (ref 32–36)
MCHC RBC-ENTMCNC: 32.7 PG — SIGNIFICANT CHANGE UP (ref 27–34)
MCHC RBC-ENTMCNC: 32.8 GM/DL — SIGNIFICANT CHANGE UP (ref 32–36)
MCHC RBC-ENTMCNC: 32.8 GM/DL — SIGNIFICANT CHANGE UP (ref 32–36)
MCHC RBC-ENTMCNC: 32.9 GM/DL — SIGNIFICANT CHANGE UP (ref 32–36)
MCHC RBC-ENTMCNC: 33 GM/DL — SIGNIFICANT CHANGE UP (ref 32–36)
MCHC RBC-ENTMCNC: 33.1 GM/DL — SIGNIFICANT CHANGE UP (ref 32–36)
MCHC RBC-ENTMCNC: 33.2 GM/DL — SIGNIFICANT CHANGE UP (ref 32–36)
MCHC RBC-ENTMCNC: 33.3 GM/DL — SIGNIFICANT CHANGE UP (ref 32–36)
MCHC RBC-ENTMCNC: 33.4 GM/DL — SIGNIFICANT CHANGE UP (ref 32–36)
MCHC RBC-ENTMCNC: 33.5 GM/DL — SIGNIFICANT CHANGE UP (ref 32–36)
MCHC RBC-ENTMCNC: 33.6 GM/DL — SIGNIFICANT CHANGE UP (ref 32–36)
MCHC RBC-ENTMCNC: 33.6 GM/DL — SIGNIFICANT CHANGE UP (ref 32–36)
MCHC RBC-ENTMCNC: 33.7 GM/DL — SIGNIFICANT CHANGE UP (ref 32–36)
MCHC RBC-ENTMCNC: 33.8 GM/DL — SIGNIFICANT CHANGE UP (ref 32–36)
MCHC RBC-ENTMCNC: 34.5 GM/DL — SIGNIFICANT CHANGE UP (ref 32–36)
MCV RBC AUTO: 91.2 FL — SIGNIFICANT CHANGE UP (ref 80–100)
MCV RBC AUTO: 92 FL — SIGNIFICANT CHANGE UP (ref 80–100)
MCV RBC AUTO: 93.3 FL — SIGNIFICANT CHANGE UP (ref 80–100)
MCV RBC AUTO: 93.5 FL — SIGNIFICANT CHANGE UP (ref 80–100)
MCV RBC AUTO: 94.2 FL — SIGNIFICANT CHANGE UP (ref 80–100)
MCV RBC AUTO: 94.2 FL — SIGNIFICANT CHANGE UP (ref 80–100)
MCV RBC AUTO: 94.5 FL — SIGNIFICANT CHANGE UP (ref 80–100)
MCV RBC AUTO: 94.6 FL — SIGNIFICANT CHANGE UP (ref 80–100)
MCV RBC AUTO: 94.7 FL — SIGNIFICANT CHANGE UP (ref 80–100)
MCV RBC AUTO: 94.8 FL — SIGNIFICANT CHANGE UP (ref 80–100)
MCV RBC AUTO: 94.9 FL — SIGNIFICANT CHANGE UP (ref 80–100)
MCV RBC AUTO: 95 FL — SIGNIFICANT CHANGE UP (ref 80–100)
MCV RBC AUTO: 95.2 FL — SIGNIFICANT CHANGE UP (ref 80–100)
MCV RBC AUTO: 95.3 FL — SIGNIFICANT CHANGE UP (ref 80–100)
MCV RBC AUTO: 95.6 FL — SIGNIFICANT CHANGE UP (ref 80–100)
MCV RBC AUTO: 95.6 FL — SIGNIFICANT CHANGE UP (ref 80–100)
MCV RBC AUTO: 95.7 FL — SIGNIFICANT CHANGE UP (ref 80–100)
MCV RBC AUTO: 95.9 FL — SIGNIFICANT CHANGE UP (ref 80–100)
MCV RBC AUTO: 96.1 FL — SIGNIFICANT CHANGE UP (ref 80–100)
MCV RBC AUTO: 96.2 FL — SIGNIFICANT CHANGE UP (ref 80–100)
MCV RBC AUTO: 96.2 FL — SIGNIFICANT CHANGE UP (ref 80–100)
MCV RBC AUTO: 96.3 FL — SIGNIFICANT CHANGE UP (ref 80–100)
MCV RBC AUTO: 96.3 FL — SIGNIFICANT CHANGE UP (ref 80–100)
MCV RBC AUTO: 96.4 FL — SIGNIFICANT CHANGE UP (ref 80–100)
MCV RBC AUTO: 96.5 FL — SIGNIFICANT CHANGE UP (ref 80–100)
MCV RBC AUTO: 96.5 FL — SIGNIFICANT CHANGE UP (ref 80–100)
MCV RBC AUTO: 96.6 FL — SIGNIFICANT CHANGE UP (ref 80–100)
MCV RBC AUTO: 96.7 FL — SIGNIFICANT CHANGE UP (ref 80–100)
MCV RBC AUTO: 96.9 FL — SIGNIFICANT CHANGE UP (ref 80–100)
MCV RBC AUTO: 97 FL — SIGNIFICANT CHANGE UP (ref 80–100)
MCV RBC AUTO: 97 FL — SIGNIFICANT CHANGE UP (ref 80–100)
MCV RBC AUTO: 97.3 FL — SIGNIFICANT CHANGE UP (ref 80–100)
MCV RBC AUTO: 97.4 FL — SIGNIFICANT CHANGE UP (ref 80–100)
MCV RBC AUTO: 97.4 FL — SIGNIFICANT CHANGE UP (ref 80–100)
MESOTHL CELL # FLD: 4 % — SIGNIFICANT CHANGE UP
METAMYELOCYTES # FLD: 2 % — HIGH (ref 0–0)
METHOD TYPE: SIGNIFICANT CHANGE UP
MICROCYTES BLD QL: SLIGHT — SIGNIFICANT CHANGE UP
MONOCYTES # BLD AUTO: 0.5 K/UL — SIGNIFICANT CHANGE UP (ref 0–0.9)
MONOCYTES # BLD AUTO: 0.6 K/UL — SIGNIFICANT CHANGE UP (ref 0–0.9)
MONOCYTES # BLD AUTO: 1.2 K/UL — HIGH (ref 0–0.9)
MONOCYTES # BLD AUTO: 1.3 K/UL — HIGH (ref 0–0.9)
MONOCYTES # BLD AUTO: 1.4 K/UL — HIGH (ref 0–0.9)
MONOCYTES # BLD AUTO: 1.6 K/UL — HIGH (ref 0–0.9)
MONOCYTES NFR BLD AUTO: 3.1 % — SIGNIFICANT CHANGE UP (ref 2–14)
MONOCYTES NFR BLD AUTO: 3.2 % — SIGNIFICANT CHANGE UP (ref 2–14)
MONOCYTES NFR BLD AUTO: 4 % — SIGNIFICANT CHANGE UP (ref 2–14)
MONOCYTES NFR BLD AUTO: 4 % — SIGNIFICANT CHANGE UP (ref 2–14)
MONOCYTES NFR BLD AUTO: 5 % — SIGNIFICANT CHANGE UP (ref 2–14)
MONOCYTES NFR BLD AUTO: 6 % — SIGNIFICANT CHANGE UP (ref 2–14)
MONOCYTES NFR BLD AUTO: 9 % — SIGNIFICANT CHANGE UP (ref 2–14)
MONOS+MACROS # FLD: 2 % — SIGNIFICANT CHANGE UP
MYELOCYTES NFR BLD: 1 % — HIGH (ref 0–0)
NEUTROPHILS # BLD AUTO: 15.9 K/UL — HIGH (ref 1.8–7.4)
NEUTROPHILS # BLD AUTO: 15.9 K/UL — HIGH (ref 1.8–7.4)
NEUTROPHILS # BLD AUTO: 19 K/UL — HIGH (ref 1.8–7.4)
NEUTROPHILS # BLD AUTO: 24.2 K/UL — HIGH (ref 1.8–7.4)
NEUTROPHILS # BLD AUTO: 24.8 K/UL — HIGH (ref 1.8–7.4)
NEUTROPHILS # BLD AUTO: 26.7 K/UL — HIGH (ref 1.8–7.4)
NEUTROPHILS NFR BLD AUTO: 78 % — HIGH (ref 43–77)
NEUTROPHILS NFR BLD AUTO: 78 % — HIGH (ref 43–77)
NEUTROPHILS NFR BLD AUTO: 80 % — HIGH (ref 43–77)
NEUTROPHILS NFR BLD AUTO: 90.7 % — HIGH (ref 43–77)
NEUTROPHILS NFR BLD AUTO: 91 % — HIGH (ref 43–77)
NEUTROPHILS NFR BLD AUTO: 92.2 % — HIGH (ref 43–77)
NEUTROPHILS NFR BLD AUTO: 94 % — HIGH (ref 43–77)
NEUTS BAND # BLD: 3 % — SIGNIFICANT CHANGE UP (ref 0–8)
NEUTS BAND # BLD: 4 % — SIGNIFICANT CHANGE UP (ref 0–8)
NITRITE UR-MCNC: NEGATIVE — SIGNIFICANT CHANGE UP
NT-PROBNP SERPL-SCNC: 1037 PG/ML — HIGH (ref 0–300)
NT-PROBNP SERPL-SCNC: 1839 PG/ML — HIGH (ref 0–300)
NT-PROBNP SERPL-SCNC: 2025 PG/ML — HIGH (ref 0–300)
NT-PROBNP SERPL-SCNC: 3305 PG/ML — HIGH (ref 0–300)
NT-PROBNP SERPL-SCNC: 7042 PG/ML — HIGH (ref 0–300)
NT-PROBNP SERPL-SCNC: 7093 PG/ML — HIGH (ref 0–300)
NT-PROBNP SERPL-SCNC: 9049 PG/ML — HIGH (ref 0–300)
ORGANISM # SPEC MICROSCOPIC CNT: SIGNIFICANT CHANGE UP
OSMOLALITY UR: 286 MOS/KG — LOW (ref 300–900)
OSMOLALITY UR: 303 MOS/KG — SIGNIFICANT CHANGE UP (ref 300–900)
OSMOLALITY UR: 335 MOS/KG — SIGNIFICANT CHANGE UP (ref 300–900)
OSMOLALITY UR: 407 MOS/KG — SIGNIFICANT CHANGE UP (ref 300–900)
OTHER CELLS CSF MANUAL: 7 ML/DL — LOW (ref 18–22)
OTHER CELLS FLD MANUAL: 30 % — SIGNIFICANT CHANGE UP
PCO2 BLDA: 33 MMHG — SIGNIFICANT CHANGE UP (ref 32–46)
PCO2 BLDV: 37 MMHG — SIGNIFICANT CHANGE UP (ref 35–50)
PH BLDA: 7.36 — SIGNIFICANT CHANGE UP (ref 7.35–7.45)
PH BLDV: 7.36 — SIGNIFICANT CHANGE UP (ref 7.35–7.45)
PH FLD: 7.87 — SIGNIFICANT CHANGE UP
PH UR: 5 — SIGNIFICANT CHANGE UP (ref 5–8)
PH UR: 5 — SIGNIFICANT CHANGE UP (ref 5–8)
PH UR: 6 — SIGNIFICANT CHANGE UP (ref 5–8)
PH UR: 6 — SIGNIFICANT CHANGE UP (ref 5–8)
PHOSPHATE SERPL-MCNC: 2.5 MG/DL — SIGNIFICANT CHANGE UP (ref 2.5–4.5)
PHOSPHATE SERPL-MCNC: 2.7 MG/DL — SIGNIFICANT CHANGE UP (ref 2.5–4.5)
PHOSPHATE SERPL-MCNC: 2.8 MG/DL — SIGNIFICANT CHANGE UP (ref 2.5–4.5)
PHOSPHATE SERPL-MCNC: 2.9 MG/DL — SIGNIFICANT CHANGE UP (ref 2.5–4.5)
PHOSPHATE SERPL-MCNC: 3 MG/DL — SIGNIFICANT CHANGE UP (ref 2.5–4.5)
PHOSPHATE SERPL-MCNC: 3.1 MG/DL — SIGNIFICANT CHANGE UP (ref 2.5–4.5)
PHOSPHATE SERPL-MCNC: 3.2 MG/DL — SIGNIFICANT CHANGE UP (ref 2.5–4.5)
PHOSPHATE SERPL-MCNC: 3.2 MG/DL — SIGNIFICANT CHANGE UP (ref 2.5–4.5)
PHOSPHATE SERPL-MCNC: 3.3 MG/DL — SIGNIFICANT CHANGE UP (ref 2.5–4.5)
PHOSPHATE SERPL-MCNC: 3.5 MG/DL — SIGNIFICANT CHANGE UP (ref 2.5–4.5)
PHOSPHATE SERPL-MCNC: 3.5 MG/DL — SIGNIFICANT CHANGE UP (ref 2.5–4.5)
PHOSPHATE SERPL-MCNC: 3.9 MG/DL — SIGNIFICANT CHANGE UP (ref 2.5–4.5)
PHOSPHATE SERPL-MCNC: 4.1 MG/DL — SIGNIFICANT CHANGE UP (ref 2.5–4.5)
PHOSPHATE SERPL-MCNC: 4.1 MG/DL — SIGNIFICANT CHANGE UP (ref 2.5–4.5)
PHOSPHATE SERPL-MCNC: 4.2 MG/DL — SIGNIFICANT CHANGE UP (ref 2.5–4.5)
PHOSPHATE SERPL-MCNC: 4.3 MG/DL — SIGNIFICANT CHANGE UP (ref 2.5–4.5)
PHOSPHATE SERPL-MCNC: 4.3 MG/DL — SIGNIFICANT CHANGE UP (ref 2.5–4.5)
PHOSPHATE SERPL-MCNC: 4.4 MG/DL — SIGNIFICANT CHANGE UP (ref 2.5–4.5)
PHOSPHATE SERPL-MCNC: 4.4 MG/DL — SIGNIFICANT CHANGE UP (ref 2.5–4.5)
PHOSPHATE SERPL-MCNC: 4.7 MG/DL — HIGH (ref 2.5–4.5)
PHOSPHATE SERPL-MCNC: 4.9 MG/DL — HIGH (ref 2.5–4.5)
PHOSPHATE SERPL-MCNC: 5.1 MG/DL — HIGH (ref 2.5–4.5)
PHOSPHATE SERPL-MCNC: 5.2 MG/DL — HIGH (ref 2.5–4.5)
PHOSPHATE SERPL-MCNC: 5.2 MG/DL — HIGH (ref 2.5–4.5)
PHOSPHATE SERPL-MCNC: 5.4 MG/DL — HIGH (ref 2.5–4.5)
PHOSPHATE SERPL-MCNC: 6.1 MG/DL — HIGH (ref 2.5–4.5)
PLAT MORPH BLD: ABNORMAL
PLAT MORPH BLD: NORMAL — SIGNIFICANT CHANGE UP
PLAT MORPH BLD: NORMAL — SIGNIFICANT CHANGE UP
PLATELET # BLD AUTO: 103 K/UL — LOW (ref 150–400)
PLATELET # BLD AUTO: 110 K/UL — LOW (ref 150–400)
PLATELET # BLD AUTO: 127 K/UL — LOW (ref 150–400)
PLATELET # BLD AUTO: 130 K/UL — LOW (ref 150–400)
PLATELET # BLD AUTO: 150 K/UL — SIGNIFICANT CHANGE UP (ref 150–400)
PLATELET # BLD AUTO: 151 K/UL — SIGNIFICANT CHANGE UP (ref 150–400)
PLATELET # BLD AUTO: 152 K/UL — SIGNIFICANT CHANGE UP (ref 150–400)
PLATELET # BLD AUTO: 155 K/UL — SIGNIFICANT CHANGE UP (ref 150–400)
PLATELET # BLD AUTO: 176 K/UL — SIGNIFICANT CHANGE UP (ref 150–400)
PLATELET # BLD AUTO: 176 K/UL — SIGNIFICANT CHANGE UP (ref 150–400)
PLATELET # BLD AUTO: 188 K/UL — SIGNIFICANT CHANGE UP (ref 150–400)
PLATELET # BLD AUTO: 193 K/UL — SIGNIFICANT CHANGE UP (ref 150–400)
PLATELET # BLD AUTO: 193 K/UL — SIGNIFICANT CHANGE UP (ref 150–400)
PLATELET # BLD AUTO: 194 K/UL — SIGNIFICANT CHANGE UP (ref 150–400)
PLATELET # BLD AUTO: 195 K/UL — SIGNIFICANT CHANGE UP (ref 150–400)
PLATELET # BLD AUTO: 206 K/UL — SIGNIFICANT CHANGE UP (ref 150–400)
PLATELET # BLD AUTO: 207 K/UL — SIGNIFICANT CHANGE UP (ref 150–400)
PLATELET # BLD AUTO: 209 K/UL — SIGNIFICANT CHANGE UP (ref 150–400)
PLATELET # BLD AUTO: 213 K/UL — SIGNIFICANT CHANGE UP (ref 150–400)
PLATELET # BLD AUTO: 217 K/UL — SIGNIFICANT CHANGE UP (ref 150–400)
PLATELET # BLD AUTO: 224 K/UL — SIGNIFICANT CHANGE UP (ref 150–400)
PLATELET # BLD AUTO: 227 K/UL — SIGNIFICANT CHANGE UP (ref 150–400)
PLATELET # BLD AUTO: 228 K/UL — SIGNIFICANT CHANGE UP (ref 150–400)
PLATELET # BLD AUTO: 235 K/UL — SIGNIFICANT CHANGE UP (ref 150–400)
PLATELET # BLD AUTO: 247 K/UL — SIGNIFICANT CHANGE UP (ref 150–400)
PLATELET # BLD AUTO: 251 K/UL — SIGNIFICANT CHANGE UP (ref 150–400)
PLATELET # BLD AUTO: 262 K/UL — SIGNIFICANT CHANGE UP (ref 150–400)
PLATELET # BLD AUTO: 263 K/UL — SIGNIFICANT CHANGE UP (ref 150–400)
PLATELET # BLD AUTO: 273 K/UL — SIGNIFICANT CHANGE UP (ref 150–400)
PLATELET # BLD AUTO: 290 K/UL — SIGNIFICANT CHANGE UP (ref 150–400)
PLATELET # BLD AUTO: 303 K/UL — SIGNIFICANT CHANGE UP (ref 150–400)
PLATELET # BLD AUTO: 306 K/UL — SIGNIFICANT CHANGE UP (ref 150–400)
PLATELET # BLD AUTO: 315 K/UL — SIGNIFICANT CHANGE UP (ref 150–400)
PLATELET # BLD AUTO: 319 K/UL — SIGNIFICANT CHANGE UP (ref 150–400)
PLATELET # BLD AUTO: 334 K/UL — SIGNIFICANT CHANGE UP (ref 150–400)
PLATELET # BLD AUTO: 342 K/UL — SIGNIFICANT CHANGE UP (ref 150–400)
PLATELET # BLD AUTO: 83 K/UL — LOW (ref 150–400)
PLATELET # BLD AUTO: 85 K/UL — LOW (ref 150–400)
PLATELET # BLD AUTO: 88 K/UL — LOW (ref 150–400)
PLATELET # BLD AUTO: 89 K/UL — LOW (ref 150–400)
PLATELET COUNT - ESTIMATE: NORMAL — SIGNIFICANT CHANGE UP
PO2 BLDA: 149 MMHG — HIGH (ref 74–108)
PO2 BLDV: 39 MMHG — SIGNIFICANT CHANGE UP (ref 25–45)
POIKILOCYTOSIS BLD QL AUTO: SLIGHT — SIGNIFICANT CHANGE UP
POIKILOCYTOSIS BLD QL AUTO: SLIGHT — SIGNIFICANT CHANGE UP
POLYCHROMASIA BLD QL SMEAR: SLIGHT — SIGNIFICANT CHANGE UP
POLYCHROMASIA BLD QL SMEAR: SLIGHT — SIGNIFICANT CHANGE UP
POTASSIUM BLDV-SCNC: 4 MMOL/L — SIGNIFICANT CHANGE UP (ref 3.5–5)
POTASSIUM SERPL-MCNC: 3.9 MMOL/L — SIGNIFICANT CHANGE UP (ref 3.5–5.3)
POTASSIUM SERPL-MCNC: 4.1 MMOL/L — SIGNIFICANT CHANGE UP (ref 3.5–5.3)
POTASSIUM SERPL-MCNC: 4.2 MMOL/L — SIGNIFICANT CHANGE UP (ref 3.5–5.3)
POTASSIUM SERPL-MCNC: 4.4 MMOL/L — SIGNIFICANT CHANGE UP (ref 3.5–5.3)
POTASSIUM SERPL-MCNC: 4.5 MMOL/L — SIGNIFICANT CHANGE UP (ref 3.5–5.3)
POTASSIUM SERPL-MCNC: 4.7 MMOL/L — SIGNIFICANT CHANGE UP (ref 3.5–5.3)
POTASSIUM SERPL-MCNC: 4.8 MMOL/L — SIGNIFICANT CHANGE UP (ref 3.5–5.3)
POTASSIUM SERPL-MCNC: 4.9 MMOL/L — SIGNIFICANT CHANGE UP (ref 3.5–5.3)
POTASSIUM SERPL-MCNC: 5.1 MMOL/L — SIGNIFICANT CHANGE UP (ref 3.5–5.3)
POTASSIUM SERPL-MCNC: 5.2 MMOL/L — SIGNIFICANT CHANGE UP (ref 3.5–5.3)
POTASSIUM SERPL-MCNC: 5.3 MMOL/L — SIGNIFICANT CHANGE UP (ref 3.5–5.3)
POTASSIUM SERPL-MCNC: 5.4 MMOL/L — HIGH (ref 3.5–5.3)
POTASSIUM SERPL-MCNC: 5.4 MMOL/L — HIGH (ref 3.5–5.3)
POTASSIUM SERPL-MCNC: 5.6 MMOL/L — HIGH (ref 3.5–5.3)
POTASSIUM SERPL-MCNC: 5.7 MMOL/L — HIGH (ref 3.5–5.3)
POTASSIUM SERPL-MCNC: 6.1 MMOL/L — HIGH (ref 3.5–5.3)
POTASSIUM SERPL-SCNC: 3.9 MMOL/L — SIGNIFICANT CHANGE UP (ref 3.5–5.3)
POTASSIUM SERPL-SCNC: 4.1 MMOL/L — SIGNIFICANT CHANGE UP (ref 3.5–5.3)
POTASSIUM SERPL-SCNC: 4.2 MMOL/L — SIGNIFICANT CHANGE UP (ref 3.5–5.3)
POTASSIUM SERPL-SCNC: 4.4 MMOL/L — SIGNIFICANT CHANGE UP (ref 3.5–5.3)
POTASSIUM SERPL-SCNC: 4.5 MMOL/L — SIGNIFICANT CHANGE UP (ref 3.5–5.3)
POTASSIUM SERPL-SCNC: 4.7 MMOL/L — SIGNIFICANT CHANGE UP (ref 3.5–5.3)
POTASSIUM SERPL-SCNC: 4.8 MMOL/L — SIGNIFICANT CHANGE UP (ref 3.5–5.3)
POTASSIUM SERPL-SCNC: 4.9 MMOL/L — SIGNIFICANT CHANGE UP (ref 3.5–5.3)
POTASSIUM SERPL-SCNC: 5.1 MMOL/L — SIGNIFICANT CHANGE UP (ref 3.5–5.3)
POTASSIUM SERPL-SCNC: 5.2 MMOL/L — SIGNIFICANT CHANGE UP (ref 3.5–5.3)
POTASSIUM SERPL-SCNC: 5.3 MMOL/L — SIGNIFICANT CHANGE UP (ref 3.5–5.3)
POTASSIUM SERPL-SCNC: 5.4 MMOL/L — HIGH (ref 3.5–5.3)
POTASSIUM SERPL-SCNC: 5.4 MMOL/L — HIGH (ref 3.5–5.3)
POTASSIUM SERPL-SCNC: 5.6 MMOL/L — HIGH (ref 3.5–5.3)
POTASSIUM SERPL-SCNC: 5.7 MMOL/L — HIGH (ref 3.5–5.3)
POTASSIUM SERPL-SCNC: 6.1 MMOL/L — HIGH (ref 3.5–5.3)
POTASSIUM UR-SCNC: 35 MMOL/L — SIGNIFICANT CHANGE UP
POTASSIUM UR-SCNC: 35 MMOL/L — SIGNIFICANT CHANGE UP
PROCALCITONIN SERPL-MCNC: 1.53 NG/ML — HIGH (ref 0–0.04)
PROCALCITONIN SERPL-MCNC: 1.61 NG/ML — HIGH (ref 0–0.04)
PROCALCITONIN SERPL-MCNC: 1.74 NG/ML — HIGH (ref 0–0.04)
PROCALCITONIN SERPL-MCNC: 1.93 NG/ML — HIGH (ref 0–0.04)
PROCALCITONIN SERPL-MCNC: 11.18 NG/ML — HIGH (ref 0–0.04)
PROCALCITONIN SERPL-MCNC: 11.97 NG/ML — HIGH (ref 0–0.04)
PROCALCITONIN SERPL-MCNC: 2.41 NG/ML — HIGH (ref 0–0.04)
PROCALCITONIN SERPL-MCNC: 3.09 NG/ML — HIGH (ref 0–0.04)
PROCALCITONIN SERPL-MCNC: 3.45 NG/ML — HIGH (ref 0–0.04)
PROCALCITONIN SERPL-MCNC: 6.39 NG/ML — HIGH (ref 0–0.04)
PROMYELOCYTES # FLD: 2 % — HIGH (ref 0–0)
PROT FLD-MCNC: 2.3 G/DL — SIGNIFICANT CHANGE UP
PROT SERPL-MCNC: 4.5 G/DL — LOW (ref 6–8.3)
PROT SERPL-MCNC: 4.5 G/DL — LOW (ref 6–8.3)
PROT SERPL-MCNC: 4.7 G/DL — LOW (ref 6–8.3)
PROT SERPL-MCNC: 4.8 G/DL — LOW (ref 6–8.3)
PROT SERPL-MCNC: 4.9 G/DL — LOW (ref 6–8.3)
PROT SERPL-MCNC: 5 G/DL — LOW (ref 6–8.3)
PROT SERPL-MCNC: 5.1 G/DL — LOW (ref 6–8.3)
PROT SERPL-MCNC: 5.2 G/DL — LOW (ref 6–8.3)
PROT SERPL-MCNC: 5.2 G/DL — LOW (ref 6–8.3)
PROT SERPL-MCNC: 5.3 G/DL — LOW (ref 6–8.3)
PROT SERPL-MCNC: 5.5 G/DL — LOW (ref 6–8.3)
PROT SERPL-MCNC: 5.7 G/DL — LOW (ref 6–8.3)
PROT SERPL-MCNC: 7.3 G/DL — SIGNIFICANT CHANGE UP (ref 6–8.3)
PROT SERPL-MCNC: 7.7 G/DL — SIGNIFICANT CHANGE UP (ref 6–8.3)
PROT UR-MCNC: 30 MG/DL
PROT UR-MCNC: NEGATIVE — SIGNIFICANT CHANGE UP
PROT UR-MCNC: SIGNIFICANT CHANGE UP
PROT UR-MCNC: SIGNIFICANT CHANGE UP
PROTHROM AB SERPL-ACNC: 13.3 SEC — HIGH (ref 9.8–12.7)
PROTHROM AB SERPL-ACNC: 13.3 SEC — HIGH (ref 9.8–12.7)
PROTHROM AB SERPL-ACNC: 13.9 SEC — HIGH (ref 9.8–12.7)
PROTHROM AB SERPL-ACNC: 14.3 SEC — HIGH (ref 9.8–12.7)
PROTHROM AB SERPL-ACNC: 14.5 SEC — HIGH (ref 9.8–12.7)
PROTHROM AB SERPL-ACNC: 14.5 SEC — HIGH (ref 9.8–12.7)
PROTHROM AB SERPL-ACNC: 14.7 SEC — HIGH (ref 9.8–12.7)
PROTHROM AB SERPL-ACNC: 14.8 SEC — HIGH (ref 9.8–12.7)
PROTHROM AB SERPL-ACNC: 14.9 SEC — HIGH (ref 9.8–12.7)
PROTHROM AB SERPL-ACNC: 15.3 SEC — HIGH (ref 9.8–12.7)
PROTHROM AB SERPL-ACNC: 15.6 SEC — HIGH (ref 9.8–12.7)
PROTHROM AB SERPL-ACNC: 15.7 SEC — HIGH (ref 9.8–12.7)
PROTHROM AB SERPL-ACNC: 15.8 SEC — HIGH (ref 9.8–12.7)
PROTHROM AB SERPL-ACNC: 15.8 SEC — HIGH (ref 9.8–12.7)
PROTHROM AB SERPL-ACNC: 15.9 SEC — HIGH (ref 9.8–12.7)
PROTHROM AB SERPL-ACNC: 16.1 SEC — HIGH (ref 9.8–12.7)
PROTHROM AB SERPL-ACNC: 16.1 SEC — HIGH (ref 9.8–12.7)
PROTHROM AB SERPL-ACNC: 16.3 SEC — HIGH (ref 9.8–12.7)
PROTHROM AB SERPL-ACNC: 16.3 SEC — HIGH (ref 9.8–12.7)
PROTHROM AB SERPL-ACNC: 16.4 SEC — HIGH (ref 9.8–12.7)
PROTHROM AB SERPL-ACNC: 16.6 SEC — HIGH (ref 9.8–12.7)
PROTHROM AB SERPL-ACNC: 17.2 SEC — HIGH (ref 9.8–12.7)
PROTHROM AB SERPL-ACNC: 17.5 SEC — HIGH (ref 9.8–12.7)
PROTHROM AB SERPL-ACNC: 17.7 SEC — HIGH (ref 9.8–12.7)
PROTHROM AB SERPL-ACNC: 17.9 SEC — HIGH (ref 9.8–12.7)
PROTHROM AB SERPL-ACNC: 17.9 SEC — HIGH (ref 9.8–12.7)
PROTHROM AB SERPL-ACNC: 18.3 SEC — HIGH (ref 9.8–12.7)
PROTHROM AB SERPL-ACNC: 18.8 SEC — HIGH (ref 9.8–12.7)
PROTHROM AB SERPL-ACNC: 20.2 SEC — HIGH (ref 9.8–12.7)
RBC # BLD: 1.99 M/UL — LOW (ref 4.2–5.8)
RBC # BLD: 2.25 M/UL — LOW (ref 4.2–5.8)
RBC # BLD: 2.29 M/UL — LOW (ref 4.2–5.8)
RBC # BLD: 2.31 M/UL — LOW (ref 4.2–5.8)
RBC # BLD: 2.31 M/UL — LOW (ref 4.2–5.8)
RBC # BLD: 2.34 M/UL — LOW (ref 4.2–5.8)
RBC # BLD: 2.38 M/UL — LOW (ref 4.2–5.8)
RBC # BLD: 2.39 M/UL — LOW (ref 4.2–5.8)
RBC # BLD: 2.39 M/UL — LOW (ref 4.2–5.8)
RBC # BLD: 2.4 M/UL — LOW (ref 4.2–5.8)
RBC # BLD: 2.44 M/UL — LOW (ref 4.2–5.8)
RBC # BLD: 2.48 M/UL — LOW (ref 4.2–5.8)
RBC # BLD: 2.49 M/UL — LOW (ref 4.2–5.8)
RBC # BLD: 2.49 M/UL — LOW (ref 4.2–5.8)
RBC # BLD: 2.52 M/UL — LOW (ref 4.2–5.8)
RBC # BLD: 2.52 M/UL — LOW (ref 4.2–5.8)
RBC # BLD: 2.55 M/UL — LOW (ref 4.2–5.8)
RBC # BLD: 2.67 M/UL — LOW (ref 4.2–5.8)
RBC # BLD: 2.71 M/UL — LOW (ref 4.2–5.8)
RBC # BLD: 2.8 M/UL — LOW (ref 4.2–5.8)
RBC # BLD: 2.85 M/UL — LOW (ref 4.2–5.8)
RBC # BLD: 2.88 M/UL — LOW (ref 4.2–5.8)
RBC # BLD: 3.15 M/UL — LOW (ref 4.2–5.8)
RBC # BLD: 3.19 M/UL — LOW (ref 4.2–5.8)
RBC # BLD: 3.22 M/UL — LOW (ref 4.2–5.8)
RBC # BLD: 3.25 M/UL — LOW (ref 4.2–5.8)
RBC # BLD: 3.33 M/UL — LOW (ref 4.2–5.8)
RBC # BLD: 3.33 M/UL — LOW (ref 4.2–5.8)
RBC # BLD: 3.39 M/UL — LOW (ref 4.2–5.8)
RBC # BLD: 3.41 M/UL — LOW (ref 4.2–5.8)
RBC # BLD: 3.46 M/UL — LOW (ref 4.2–5.8)
RBC # BLD: 3.56 M/UL — LOW (ref 4.2–5.8)
RBC # BLD: 4.03 M/UL — LOW (ref 4.2–5.8)
RBC # BLD: 4.11 M/UL — LOW (ref 4.2–5.8)
RBC # BLD: 4.22 M/UL — SIGNIFICANT CHANGE UP (ref 4.2–5.8)
RBC # BLD: 4.51 M/UL — SIGNIFICANT CHANGE UP (ref 4.2–5.8)
RBC # BLD: 4.54 M/UL — SIGNIFICANT CHANGE UP (ref 4.2–5.8)
RBC # BLD: 4.67 M/UL — SIGNIFICANT CHANGE UP (ref 4.2–5.8)
RBC # FLD: 12.3 % — SIGNIFICANT CHANGE UP (ref 10.3–14.5)
RBC # FLD: 12.3 % — SIGNIFICANT CHANGE UP (ref 10.3–14.5)
RBC # FLD: 12.4 % — SIGNIFICANT CHANGE UP (ref 10.3–14.5)
RBC # FLD: 12.4 % — SIGNIFICANT CHANGE UP (ref 10.3–14.5)
RBC # FLD: 12.5 % — SIGNIFICANT CHANGE UP (ref 10.3–14.5)
RBC # FLD: 12.6 % — SIGNIFICANT CHANGE UP (ref 10.3–14.5)
RBC # FLD: 12.6 % — SIGNIFICANT CHANGE UP (ref 10.3–14.5)
RBC # FLD: 12.7 % — SIGNIFICANT CHANGE UP (ref 10.3–14.5)
RBC # FLD: 13 % — SIGNIFICANT CHANGE UP (ref 10.3–14.5)
RBC # FLD: 13.2 % — SIGNIFICANT CHANGE UP (ref 10.3–14.5)
RBC # FLD: 13.3 % — SIGNIFICANT CHANGE UP (ref 10.3–14.5)
RBC # FLD: 13.3 % — SIGNIFICANT CHANGE UP (ref 10.3–14.5)
RBC # FLD: 13.5 % — SIGNIFICANT CHANGE UP (ref 10.3–14.5)
RBC # FLD: 13.8 % — SIGNIFICANT CHANGE UP (ref 10.3–14.5)
RBC # FLD: 14.1 % — SIGNIFICANT CHANGE UP (ref 10.3–14.5)
RBC # FLD: 14.5 % — SIGNIFICANT CHANGE UP (ref 10.3–14.5)
RBC # FLD: 14.6 % — HIGH (ref 10.3–14.5)
RBC # FLD: 14.9 % — HIGH (ref 10.3–14.5)
RBC # FLD: 15 % — HIGH (ref 10.3–14.5)
RBC # FLD: 15.1 % — HIGH (ref 10.3–14.5)
RBC # FLD: 15.1 % — HIGH (ref 10.3–14.5)
RBC # FLD: 15.2 % — HIGH (ref 10.3–14.5)
RBC # FLD: 15.2 % — HIGH (ref 10.3–14.5)
RBC # FLD: 15.3 % — HIGH (ref 10.3–14.5)
RBC # FLD: 15.4 % — HIGH (ref 10.3–14.5)
RBC # FLD: 15.4 % — HIGH (ref 10.3–14.5)
RBC # FLD: 15.5 % — HIGH (ref 10.3–14.5)
RBC # FLD: 15.8 % — HIGH (ref 10.3–14.5)
RBC # FLD: 15.9 % — HIGH (ref 10.3–14.5)
RBC BLD AUTO: ABNORMAL
RBC BLD AUTO: NORMAL — SIGNIFICANT CHANGE UP
RBC BLD AUTO: SIGNIFICANT CHANGE UP
RBC CASTS # UR COMP ASSIST: ABNORMAL /HPF (ref 0–2)
RBC CASTS # UR COMP ASSIST: SIGNIFICANT CHANGE UP /HPF (ref 0–2)
RCV VOL RI: HIGH /UL (ref 0–5)
RH IG SCN BLD-IMP: POSITIVE — SIGNIFICANT CHANGE UP
SAO2 % BLDA: 100 % — HIGH (ref 92–96)
SAO2 % BLDV: 68 % — SIGNIFICANT CHANGE UP (ref 67–88)
SODIUM SERPL-SCNC: 131 MMOL/L — LOW (ref 135–145)
SODIUM SERPL-SCNC: 134 MMOL/L — LOW (ref 135–145)
SODIUM SERPL-SCNC: 134 MMOL/L — LOW (ref 135–145)
SODIUM SERPL-SCNC: 135 MMOL/L — SIGNIFICANT CHANGE UP (ref 135–145)
SODIUM SERPL-SCNC: 136 MMOL/L — SIGNIFICANT CHANGE UP (ref 135–145)
SODIUM SERPL-SCNC: 137 MMOL/L — SIGNIFICANT CHANGE UP (ref 135–145)
SODIUM SERPL-SCNC: 138 MMOL/L — SIGNIFICANT CHANGE UP (ref 135–145)
SODIUM SERPL-SCNC: 139 MMOL/L — SIGNIFICANT CHANGE UP (ref 135–145)
SODIUM SERPL-SCNC: 140 MMOL/L — SIGNIFICANT CHANGE UP (ref 135–145)
SODIUM SERPL-SCNC: 141 MMOL/L — SIGNIFICANT CHANGE UP (ref 135–145)
SODIUM SERPL-SCNC: 141 MMOL/L — SIGNIFICANT CHANGE UP (ref 135–145)
SODIUM SERPL-SCNC: 142 MMOL/L — SIGNIFICANT CHANGE UP (ref 135–145)
SODIUM SERPL-SCNC: 143 MMOL/L — SIGNIFICANT CHANGE UP (ref 135–145)
SODIUM UR-SCNC: 23 MMOL/L — SIGNIFICANT CHANGE UP
SODIUM UR-SCNC: <20 MMOL/L — SIGNIFICANT CHANGE UP
SP GR SPEC: 1.01 — SIGNIFICANT CHANGE UP (ref 1.01–1.02)
SP GR SPEC: 1.01 — SIGNIFICANT CHANGE UP (ref 1.01–1.02)
SP GR SPEC: 1.03 — HIGH (ref 1.01–1.02)
SP GR SPEC: >1.03 — HIGH (ref 1.01–1.02)
SPECIMEN SOURCE: SIGNIFICANT CHANGE UP
TARGETS BLD QL SMEAR: SLIGHT — SIGNIFICANT CHANGE UP
TOTAL NUCLEATED CELL COUNT, BODY FLUID: 256 /UL — HIGH (ref 0–5)
TOXIC GRANULES BLD QL SMEAR: PRESENT — SIGNIFICANT CHANGE UP
TROPONIN T SERPL-MCNC: 0.08 NG/ML — HIGH (ref 0–0.06)
TROPONIN T SERPL-MCNC: 0.34 NG/ML — HIGH (ref 0–0.06)
TROPONIN T SERPL-MCNC: 0.37 NG/ML — HIGH (ref 0–0.06)
TROPONIN T SERPL-MCNC: 0.38 NG/ML — HIGH (ref 0–0.06)
TROPONIN T SERPL-MCNC: 0.4 NG/ML — HIGH (ref 0–0.06)
TROPONIN T SERPL-MCNC: 0.43 NG/ML — HIGH (ref 0–0.06)
TROPONIN T SERPL-MCNC: 0.44 NG/ML — HIGH (ref 0–0.06)
TUBE TYPE: SIGNIFICANT CHANGE UP
URATE CRY FLD QL MICRO: ABNORMAL
UROBILINOGEN FLD QL: 2
UROBILINOGEN FLD QL: NEGATIVE — SIGNIFICANT CHANGE UP
UUN UR-MCNC: 416 MG/DL — SIGNIFICANT CHANGE UP
UUN UR-MCNC: 647 MG/DL — SIGNIFICANT CHANGE UP
VANCOMYCIN FLD-MCNC: 11.2 UG/ML — SIGNIFICANT CHANGE UP
VANCOMYCIN FLD-MCNC: 21.3 UG/ML — SIGNIFICANT CHANGE UP
VANCOMYCIN TROUGH SERPL-MCNC: 10.3 UG/ML — SIGNIFICANT CHANGE UP (ref 10–20)
VANCOMYCIN TROUGH SERPL-MCNC: 12.5 UG/ML — SIGNIFICANT CHANGE UP (ref 10–20)
VANCOMYCIN TROUGH SERPL-MCNC: 12.8 UG/ML — SIGNIFICANT CHANGE UP (ref 10–20)
VANCOMYCIN TROUGH SERPL-MCNC: 12.8 UG/ML — SIGNIFICANT CHANGE UP (ref 10–20)
VANCOMYCIN TROUGH SERPL-MCNC: 17.7 UG/ML — SIGNIFICANT CHANGE UP (ref 10–20)
VANCOMYCIN TROUGH SERPL-MCNC: 22 UG/ML — HIGH (ref 10–20)
WBC # BLD: 10.9 K/UL — HIGH (ref 3.8–10.5)
WBC # BLD: 13.2 K/UL — HIGH (ref 3.8–10.5)
WBC # BLD: 13.7 K/UL — HIGH (ref 3.8–10.5)
WBC # BLD: 14.2 K/UL — HIGH (ref 3.8–10.5)
WBC # BLD: 14.3 K/UL — HIGH (ref 3.8–10.5)
WBC # BLD: 15.8 K/UL — HIGH (ref 3.8–10.5)
WBC # BLD: 15.9 K/UL — HIGH (ref 3.8–10.5)
WBC # BLD: 16.3 K/UL — HIGH (ref 3.8–10.5)
WBC # BLD: 16.4 K/UL — HIGH (ref 3.8–10.5)
WBC # BLD: 17 K/UL — HIGH (ref 3.8–10.5)
WBC # BLD: 17.2 K/UL — HIGH (ref 3.8–10.5)
WBC # BLD: 17.6 K/UL — HIGH (ref 3.8–10.5)
WBC # BLD: 18 K/UL — HIGH (ref 3.8–10.5)
WBC # BLD: 18.1 K/UL — HIGH (ref 3.8–10.5)
WBC # BLD: 18.3 K/UL — HIGH (ref 3.8–10.5)
WBC # BLD: 18.7 K/UL — HIGH (ref 3.8–10.5)
WBC # BLD: 19 K/UL — HIGH (ref 3.8–10.5)
WBC # BLD: 19.7 K/UL — HIGH (ref 3.8–10.5)
WBC # BLD: 20 K/UL — HIGH (ref 3.8–10.5)
WBC # BLD: 20.1 K/UL — HIGH (ref 3.8–10.5)
WBC # BLD: 21.2 K/UL — HIGH (ref 3.8–10.5)
WBC # BLD: 21.8 K/UL — HIGH (ref 3.8–10.5)
WBC # BLD: 21.9 K/UL — HIGH (ref 3.8–10.5)
WBC # BLD: 22.5 K/UL — HIGH (ref 3.8–10.5)
WBC # BLD: 22.6 K/UL — HIGH (ref 3.8–10.5)
WBC # BLD: 23 K/UL — HIGH (ref 3.8–10.5)
WBC # BLD: 23.7 K/UL — HIGH (ref 3.8–10.5)
WBC # BLD: 25.3 K/UL — HIGH (ref 3.8–10.5)
WBC # BLD: 26.6 K/UL — HIGH (ref 3.8–10.5)
WBC # BLD: 27.5 K/UL — HIGH (ref 3.8–10.5)
WBC # BLD: 29.2 K/UL — HIGH (ref 3.8–10.5)
WBC # BLD: 31.1 K/UL — HIGH (ref 3.8–10.5)
WBC # BLD: 31.5 K/UL — HIGH (ref 3.8–10.5)
WBC # BLD: 31.8 K/UL — HIGH (ref 3.8–10.5)
WBC # BLD: 32.7 K/UL — HIGH (ref 3.8–10.5)
WBC # BLD: 32.7 K/UL — HIGH (ref 3.8–10.5)
WBC # BLD: 32.9 K/UL — HIGH (ref 3.8–10.5)
WBC # BLD: 6.25 K/UL — SIGNIFICANT CHANGE UP (ref 3.8–10.5)
WBC # BLD: 8.8 K/UL — SIGNIFICANT CHANGE UP (ref 3.8–10.5)
WBC # BLD: 9.3 K/UL — SIGNIFICANT CHANGE UP (ref 3.8–10.5)
WBC # FLD AUTO: 10.9 K/UL — HIGH (ref 3.8–10.5)
WBC # FLD AUTO: 13.2 K/UL — HIGH (ref 3.8–10.5)
WBC # FLD AUTO: 13.7 K/UL — HIGH (ref 3.8–10.5)
WBC # FLD AUTO: 14.2 K/UL — HIGH (ref 3.8–10.5)
WBC # FLD AUTO: 14.3 K/UL — HIGH (ref 3.8–10.5)
WBC # FLD AUTO: 15.8 K/UL — HIGH (ref 3.8–10.5)
WBC # FLD AUTO: 15.9 K/UL — HIGH (ref 3.8–10.5)
WBC # FLD AUTO: 16.3 K/UL — HIGH (ref 3.8–10.5)
WBC # FLD AUTO: 16.4 K/UL — HIGH (ref 3.8–10.5)
WBC # FLD AUTO: 17 K/UL — HIGH (ref 3.8–10.5)
WBC # FLD AUTO: 17.2 K/UL — HIGH (ref 3.8–10.5)
WBC # FLD AUTO: 17.6 K/UL — HIGH (ref 3.8–10.5)
WBC # FLD AUTO: 18 K/UL — HIGH (ref 3.8–10.5)
WBC # FLD AUTO: 18.1 K/UL — HIGH (ref 3.8–10.5)
WBC # FLD AUTO: 18.3 K/UL — HIGH (ref 3.8–10.5)
WBC # FLD AUTO: 18.7 K/UL — HIGH (ref 3.8–10.5)
WBC # FLD AUTO: 19 K/UL — HIGH (ref 3.8–10.5)
WBC # FLD AUTO: 19.7 K/UL — HIGH (ref 3.8–10.5)
WBC # FLD AUTO: 20 K/UL — HIGH (ref 3.8–10.5)
WBC # FLD AUTO: 20.1 K/UL — HIGH (ref 3.8–10.5)
WBC # FLD AUTO: 21.2 K/UL — HIGH (ref 3.8–10.5)
WBC # FLD AUTO: 21.8 K/UL — HIGH (ref 3.8–10.5)
WBC # FLD AUTO: 21.9 K/UL — HIGH (ref 3.8–10.5)
WBC # FLD AUTO: 22.5 K/UL — HIGH (ref 3.8–10.5)
WBC # FLD AUTO: 22.6 K/UL — HIGH (ref 3.8–10.5)
WBC # FLD AUTO: 23 K/UL — HIGH (ref 3.8–10.5)
WBC # FLD AUTO: 23.7 K/UL — HIGH (ref 3.8–10.5)
WBC # FLD AUTO: 25.3 K/UL — HIGH (ref 3.8–10.5)
WBC # FLD AUTO: 26.6 K/UL — HIGH (ref 3.8–10.5)
WBC # FLD AUTO: 27.5 K/UL — HIGH (ref 3.8–10.5)
WBC # FLD AUTO: 29.2 K/UL — HIGH (ref 3.8–10.5)
WBC # FLD AUTO: 31.1 K/UL — HIGH (ref 3.8–10.5)
WBC # FLD AUTO: 31.5 K/UL — HIGH (ref 3.8–10.5)
WBC # FLD AUTO: 31.8 K/UL — HIGH (ref 3.8–10.5)
WBC # FLD AUTO: 32.7 K/UL — HIGH (ref 3.8–10.5)
WBC # FLD AUTO: 32.7 K/UL — HIGH (ref 3.8–10.5)
WBC # FLD AUTO: 32.9 K/UL — HIGH (ref 3.8–10.5)
WBC # FLD AUTO: 6.25 K/UL — SIGNIFICANT CHANGE UP (ref 3.8–10.5)
WBC # FLD AUTO: 8.8 K/UL — SIGNIFICANT CHANGE UP (ref 3.8–10.5)
WBC # FLD AUTO: 9.3 K/UL — SIGNIFICANT CHANGE UP (ref 3.8–10.5)
WBC UR QL: ABNORMAL /HPF (ref 0–5)
WBC UR QL: SIGNIFICANT CHANGE UP /HPF (ref 0–5)

## 2017-01-01 PROCEDURE — 93010 ELECTROCARDIOGRAM REPORT: CPT

## 2017-01-01 PROCEDURE — 99291 CRITICAL CARE FIRST HOUR: CPT

## 2017-01-01 PROCEDURE — 99285 EMERGENCY DEPT VISIT HI MDM: CPT | Mod: 25

## 2017-01-01 PROCEDURE — 71010: CPT | Mod: 26

## 2017-01-01 PROCEDURE — 82330 ASSAY OF CALCIUM: CPT

## 2017-01-01 PROCEDURE — 71010: CPT | Mod: 26,77

## 2017-01-01 PROCEDURE — 72146 MRI CHEST SPINE W/O DYE: CPT

## 2017-01-01 PROCEDURE — 88311 DECALCIFY TISSUE: CPT | Mod: 26

## 2017-01-01 PROCEDURE — 99291 CRITICAL CARE FIRST HOUR: CPT | Mod: 25,24

## 2017-01-01 PROCEDURE — 71010: CPT | Mod: 26,76

## 2017-01-01 PROCEDURE — 80076 HEPATIC FUNCTION PANEL: CPT

## 2017-01-01 PROCEDURE — 99232 SBSQ HOSP IP/OBS MODERATE 35: CPT

## 2017-01-01 PROCEDURE — 74018 RADEX ABDOMEN 1 VIEW: CPT

## 2017-01-01 PROCEDURE — 84145 PROCALCITONIN (PCT): CPT

## 2017-01-01 PROCEDURE — 87075 CULTR BACTERIA EXCEPT BLOOD: CPT

## 2017-01-01 PROCEDURE — 36620 INSERTION CATHETER ARTERY: CPT

## 2017-01-01 PROCEDURE — 84295 ASSAY OF SERUM SODIUM: CPT

## 2017-01-01 PROCEDURE — 99233 SBSQ HOSP IP/OBS HIGH 50: CPT | Mod: GC

## 2017-01-01 PROCEDURE — 76000 FLUOROSCOPY <1 HR PHYS/QHP: CPT

## 2017-01-01 PROCEDURE — 32557 INSERT CATH PLEURA W/ IMAGE: CPT | Mod: LT

## 2017-01-01 PROCEDURE — 99253 IP/OBS CNSLTJ NEW/EST LOW 45: CPT | Mod: GC

## 2017-01-01 PROCEDURE — 31500 INSERT EMERGENCY AIRWAY: CPT

## 2017-01-01 PROCEDURE — 74177 CT ABD & PELVIS W/CONTRAST: CPT | Mod: 26

## 2017-01-01 PROCEDURE — 89051 BODY FLUID CELL COUNT: CPT

## 2017-01-01 PROCEDURE — 36500 INSERTION OF CATHETER VEIN: CPT

## 2017-01-01 PROCEDURE — 83880 ASSAY OF NATRIURETIC PEPTIDE: CPT

## 2017-01-01 PROCEDURE — P9047: CPT

## 2017-01-01 PROCEDURE — 94799 UNLISTED PULMONARY SVC/PX: CPT

## 2017-01-01 PROCEDURE — 97162 PT EVAL MOD COMPLEX 30 MIN: CPT

## 2017-01-01 PROCEDURE — 81479 UNLISTED MOLECULAR PATHOLOGY: CPT

## 2017-01-01 PROCEDURE — 63276 BX/EXC XDRL SPINE LESN THRC: CPT

## 2017-01-01 PROCEDURE — 84133 ASSAY OF URINE POTASSIUM: CPT

## 2017-01-01 PROCEDURE — 99233 SBSQ HOSP IP/OBS HIGH 50: CPT

## 2017-01-01 PROCEDURE — 71260 CT THORAX DX C+: CPT

## 2017-01-01 PROCEDURE — C1769: CPT

## 2017-01-01 PROCEDURE — 87070 CULTURE OTHR SPECIMN AEROBIC: CPT

## 2017-01-01 PROCEDURE — C1889: CPT

## 2017-01-01 PROCEDURE — 93970 EXTREMITY STUDY: CPT | Mod: 26

## 2017-01-01 PROCEDURE — 83615 LACTATE (LD) (LDH) ENZYME: CPT

## 2017-01-01 PROCEDURE — 87040 BLOOD CULTURE FOR BACTERIA: CPT

## 2017-01-01 PROCEDURE — P9041: CPT

## 2017-01-01 PROCEDURE — 99291 CRITICAL CARE FIRST HOUR: CPT | Mod: 25

## 2017-01-01 PROCEDURE — 84484 ASSAY OF TROPONIN QUANT: CPT

## 2017-01-01 PROCEDURE — 81404 MOPATH PROCEDURE LEVEL 5: CPT

## 2017-01-01 PROCEDURE — 32557 INSERT CATH PLEURA W/ IMAGE: CPT

## 2017-01-01 PROCEDURE — 72128 CT CHEST SPINE W/O DYE: CPT | Mod: 26

## 2017-01-01 PROCEDURE — 85610 PROTHROMBIN TIME: CPT

## 2017-01-01 PROCEDURE — 83935 ASSAY OF URINE OSMOLALITY: CPT

## 2017-01-01 PROCEDURE — P9040: CPT

## 2017-01-01 PROCEDURE — 87205 SMEAR GRAM STAIN: CPT

## 2017-01-01 PROCEDURE — 81275 KRAS GENE VARIANTS EXON 2: CPT

## 2017-01-01 PROCEDURE — 97112 NEUROMUSCULAR REEDUCATION: CPT

## 2017-01-01 PROCEDURE — 85027 COMPLETE CBC AUTOMATED: CPT

## 2017-01-01 PROCEDURE — 85730 THROMBOPLASTIN TIME PARTIAL: CPT

## 2017-01-01 PROCEDURE — 86900 BLOOD TYPING SEROLOGIC ABO: CPT

## 2017-01-01 PROCEDURE — 71045 X-RAY EXAM CHEST 1 VIEW: CPT

## 2017-01-01 PROCEDURE — 99292 CRITICAL CARE ADDL 30 MIN: CPT | Mod: 25

## 2017-01-01 PROCEDURE — 94003 VENT MGMT INPAT SUBQ DAY: CPT

## 2017-01-01 PROCEDURE — 74000: CPT | Mod: 26

## 2017-01-01 PROCEDURE — 99497 ADVNCD CARE PLAN 30 MIN: CPT | Mod: 25

## 2017-01-01 PROCEDURE — 97166 OT EVAL MOD COMPLEX 45 MIN: CPT

## 2017-01-01 PROCEDURE — 83735 ASSAY OF MAGNESIUM: CPT

## 2017-01-01 PROCEDURE — 88342 IMHCHEM/IMCYTCHM 1ST ANTB: CPT

## 2017-01-01 PROCEDURE — 99232 SBSQ HOSP IP/OBS MODERATE 35: CPT | Mod: GC

## 2017-01-01 PROCEDURE — 83605 ASSAY OF LACTIC ACID: CPT

## 2017-01-01 PROCEDURE — 71275 CT ANGIOGRAPHY CHEST: CPT | Mod: 26

## 2017-01-01 PROCEDURE — 76604 US EXAM CHEST: CPT | Mod: 26

## 2017-01-01 PROCEDURE — 93975 VASCULAR STUDY: CPT | Mod: 26

## 2017-01-01 PROCEDURE — 88311 DECALCIFY TISSUE: CPT

## 2017-01-01 PROCEDURE — 85014 HEMATOCRIT: CPT

## 2017-01-01 PROCEDURE — 80202 ASSAY OF VANCOMYCIN: CPT

## 2017-01-01 PROCEDURE — 93975 VASCULAR STUDY: CPT

## 2017-01-01 PROCEDURE — 93970 EXTREMITY STUDY: CPT

## 2017-01-01 PROCEDURE — 94660 CPAP INITIATION&MGMT: CPT

## 2017-01-01 PROCEDURE — 97760 ORTHOTIC MGMT&TRAING 1ST ENC: CPT

## 2017-01-01 PROCEDURE — C8929: CPT

## 2017-01-01 PROCEDURE — 88304 TISSUE EXAM BY PATHOLOGIST: CPT

## 2017-01-01 PROCEDURE — 82803 BLOOD GASES ANY COMBINATION: CPT

## 2017-01-01 PROCEDURE — 82565 ASSAY OF CREATININE: CPT

## 2017-01-01 PROCEDURE — 84100 ASSAY OF PHOSPHORUS: CPT

## 2017-01-01 PROCEDURE — 88305 TISSUE EXAM BY PATHOLOGIST: CPT

## 2017-01-01 PROCEDURE — 96374 THER/PROPH/DIAG INJ IV PUSH: CPT | Mod: XU

## 2017-01-01 PROCEDURE — 80053 COMPREHEN METABOLIC PANEL: CPT

## 2017-01-01 PROCEDURE — 84300 ASSAY OF URINE SODIUM: CPT

## 2017-01-01 PROCEDURE — 82042 OTHER SOURCE ALBUMIN QUAN EA: CPT

## 2017-01-01 PROCEDURE — P9016: CPT

## 2017-01-01 PROCEDURE — 84132 ASSAY OF SERUM POTASSIUM: CPT

## 2017-01-01 PROCEDURE — 88108 CYTOPATH CONCENTRATE TECH: CPT | Mod: 26

## 2017-01-01 PROCEDURE — 32551 INSERTION OF CHEST TUBE: CPT

## 2017-01-01 PROCEDURE — 74000: CPT | Mod: 26,76

## 2017-01-01 PROCEDURE — 81001 URINALYSIS AUTO W/SCOPE: CPT

## 2017-01-01 PROCEDURE — 88341 IMHCHEM/IMCYTCHM EA ADD ANTB: CPT | Mod: 26

## 2017-01-01 PROCEDURE — 88342 IMHCHEM/IMCYTCHM 1ST ANTB: CPT | Mod: 26

## 2017-01-01 PROCEDURE — 71260 CT THORAX DX C+: CPT | Mod: 26

## 2017-01-01 PROCEDURE — 88304 TISSUE EXAM BY PATHOLOGIST: CPT | Mod: 26

## 2017-01-01 PROCEDURE — 99223 1ST HOSP IP/OBS HIGH 75: CPT | Mod: GC

## 2017-01-01 PROCEDURE — 86923 COMPATIBILITY TEST ELECTRIC: CPT

## 2017-01-01 PROCEDURE — 76705 ECHO EXAM OF ABDOMEN: CPT

## 2017-01-01 PROCEDURE — C1729: CPT

## 2017-01-01 PROCEDURE — 72157 MRI CHEST SPINE W/O & W/DYE: CPT

## 2017-01-01 PROCEDURE — 76775 US EXAM ABDO BACK WALL LIM: CPT

## 2017-01-01 PROCEDURE — 99498 ADVNCD CARE PLAN ADDL 30 MIN: CPT

## 2017-01-01 PROCEDURE — 81405 MOPATH PROCEDURE LEVEL 6: CPT

## 2017-01-01 PROCEDURE — 88309 TISSUE EXAM BY PATHOLOGIST: CPT

## 2017-01-01 PROCEDURE — 82553 CREATINE MB FRACTION: CPT

## 2017-01-01 PROCEDURE — 99232 SBSQ HOSP IP/OBS MODERATE 35: CPT | Mod: 25

## 2017-01-01 PROCEDURE — 87449 NOS EACH ORGANISM AG IA: CPT

## 2017-01-01 PROCEDURE — 99254 IP/OBS CNSLTJ NEW/EST MOD 60: CPT

## 2017-01-01 PROCEDURE — 81210 BRAF GENE: CPT

## 2017-01-01 PROCEDURE — 80048 BASIC METABOLIC PNL TOTAL CA: CPT

## 2017-01-01 PROCEDURE — P9045: CPT

## 2017-01-01 PROCEDURE — 88342 IMHCHEM/IMCYTCHM 1ST ANTB: CPT | Mod: 26,59

## 2017-01-01 PROCEDURE — 44130 BOWEL TO BOWEL FUSION: CPT

## 2017-01-01 PROCEDURE — 96376 TX/PRO/DX INJ SAME DRUG ADON: CPT | Mod: XU

## 2017-01-01 PROCEDURE — 93005 ELECTROCARDIOGRAM TRACING: CPT | Mod: 76

## 2017-01-01 PROCEDURE — 76705 ECHO EXAM OF ABDOMEN: CPT | Mod: 26

## 2017-01-01 PROCEDURE — 76775 US EXAM ABDO BACK WALL LIM: CPT | Mod: 26

## 2017-01-01 PROCEDURE — 73620 X-RAY EXAM OF FOOT: CPT

## 2017-01-01 PROCEDURE — 82378 CARCINOEMBRYONIC ANTIGEN: CPT

## 2017-01-01 PROCEDURE — 94664 DEMO&/EVAL PT USE INHALER: CPT

## 2017-01-01 PROCEDURE — 88341 IMHCHEM/IMCYTCHM EA ADD ANTB: CPT

## 2017-01-01 PROCEDURE — 82248 BILIRUBIN DIRECT: CPT

## 2017-01-01 PROCEDURE — 36430 TRANSFUSION BLD/BLD COMPNT: CPT

## 2017-01-01 PROCEDURE — 94640 AIRWAY INHALATION TREATMENT: CPT

## 2017-01-01 PROCEDURE — 73620 X-RAY EXAM OF FOOT: CPT | Mod: 26,LT

## 2017-01-01 PROCEDURE — 71275 CT ANGIOGRAPHY CHEST: CPT

## 2017-01-01 PROCEDURE — 82947 ASSAY GLUCOSE BLOOD QUANT: CPT

## 2017-01-01 PROCEDURE — 82435 ASSAY OF BLOOD CHLORIDE: CPT

## 2017-01-01 PROCEDURE — 74177 CT ABD & PELVIS W/CONTRAST: CPT

## 2017-01-01 PROCEDURE — 88360 TUMOR IMMUNOHISTOCHEM/MANUAL: CPT | Mod: 26

## 2017-01-01 PROCEDURE — 82570 ASSAY OF URINE CREATININE: CPT

## 2017-01-01 PROCEDURE — 88305 TISSUE EXAM BY PATHOLOGIST: CPT | Mod: 26

## 2017-01-01 PROCEDURE — 97530 THERAPEUTIC ACTIVITIES: CPT

## 2017-01-01 PROCEDURE — 80051 ELECTROLYTE PANEL: CPT

## 2017-01-01 PROCEDURE — 76705 ECHO EXAM OF ABDOMEN: CPT | Mod: 26,RT,59

## 2017-01-01 PROCEDURE — 83986 ASSAY PH BODY FLUID NOS: CPT

## 2017-01-01 PROCEDURE — 86901 BLOOD TYPING SEROLOGIC RH(D): CPT

## 2017-01-01 PROCEDURE — 88309 TISSUE EXAM BY PATHOLOGIST: CPT | Mod: 26

## 2017-01-01 PROCEDURE — 88360 TUMOR IMMUNOHISTOCHEM/MANUAL: CPT

## 2017-01-01 PROCEDURE — 81235 EGFR GENE COM VARIANTS: CPT

## 2017-01-01 PROCEDURE — 81276 KRAS GENE ADDL VARIANTS: CPT

## 2017-01-01 PROCEDURE — 72146 MRI CHEST SPINE W/O DYE: CPT | Mod: 26

## 2017-01-01 PROCEDURE — 82550 ASSAY OF CK (CPK): CPT

## 2017-01-01 PROCEDURE — 87186 SC STD MICRODIL/AGAR DIL: CPT

## 2017-01-01 PROCEDURE — 84540 ASSAY OF URINE/UREA-N: CPT

## 2017-01-01 PROCEDURE — 72157 MRI CHEST SPINE W/O & W/DYE: CPT | Mod: 26

## 2017-01-01 PROCEDURE — 82945 GLUCOSE OTHER FLUID: CPT

## 2017-01-01 PROCEDURE — 94002 VENT MGMT INPAT INIT DAY: CPT

## 2017-01-01 PROCEDURE — 99253 IP/OBS CNSLTJ NEW/EST LOW 45: CPT

## 2017-01-01 PROCEDURE — 72131 CT LUMBAR SPINE W/O DYE: CPT | Mod: 26

## 2017-01-01 PROCEDURE — G0515: CPT

## 2017-01-01 PROCEDURE — 93306 TTE W/DOPPLER COMPLETE: CPT | Mod: 26

## 2017-01-01 PROCEDURE — 82140 ASSAY OF AMMONIA: CPT

## 2017-01-01 PROCEDURE — 97110 THERAPEUTIC EXERCISES: CPT

## 2017-01-01 PROCEDURE — 88341 IMHCHEM/IMCYTCHM EA ADD ANTB: CPT | Mod: 26,59

## 2017-01-01 PROCEDURE — 88377 M/PHMTRC ALYS ISHQUANT/SEMIQ: CPT

## 2017-01-01 PROCEDURE — 36600 WITHDRAWAL OF ARTERIAL BLOOD: CPT

## 2017-01-01 PROCEDURE — 86850 RBC ANTIBODY SCREEN: CPT

## 2017-01-01 PROCEDURE — 84157 ASSAY OF PROTEIN OTHER: CPT

## 2017-01-01 RX ORDER — ACETAMINOPHEN 500 MG
650 TABLET ORAL EVERY 6 HOURS
Qty: 0 | Refills: 0 | Status: DISCONTINUED | OUTPATIENT
Start: 2017-01-01 | End: 2017-01-01

## 2017-01-01 RX ORDER — SODIUM CHLORIDE 9 MG/ML
1000 INJECTION INTRAMUSCULAR; INTRAVENOUS; SUBCUTANEOUS ONCE
Qty: 0 | Refills: 0 | Status: COMPLETED | OUTPATIENT
Start: 2017-01-01 | End: 2017-01-01

## 2017-01-01 RX ORDER — MORPHINE SULFATE 50 MG/1
3 CAPSULE, EXTENDED RELEASE ORAL
Qty: 100 | Refills: 0 | Status: DISCONTINUED | OUTPATIENT
Start: 2017-01-01 | End: 2017-01-01

## 2017-01-01 RX ORDER — PHENYLEPHRINE HYDROCHLORIDE 10 MG/ML
0.1 INJECTION INTRAVENOUS
Qty: 80 | Refills: 0 | Status: DISCONTINUED | OUTPATIENT
Start: 2017-01-01 | End: 2017-01-01

## 2017-01-01 RX ORDER — PIPERACILLIN AND TAZOBACTAM 4; .5 G/20ML; G/20ML
3.38 INJECTION, POWDER, LYOPHILIZED, FOR SOLUTION INTRAVENOUS ONCE
Qty: 0 | Refills: 0 | Status: COMPLETED | OUTPATIENT
Start: 2017-01-01 | End: 2017-01-01

## 2017-01-01 RX ORDER — MORPHINE SULFATE 50 MG/1
4 CAPSULE, EXTENDED RELEASE ORAL
Qty: 0 | Refills: 0 | Status: DISCONTINUED | OUTPATIENT
Start: 2017-01-01 | End: 2017-01-01

## 2017-01-01 RX ORDER — ENOXAPARIN SODIUM 100 MG/ML
90 INJECTION SUBCUTANEOUS DAILY
Qty: 0 | Refills: 0 | Status: DISCONTINUED | OUTPATIENT
Start: 2017-01-01 | End: 2017-01-01

## 2017-01-01 RX ORDER — INFLUENZA VIRUS VACCINE 15; 15; 15; 15 UG/.5ML; UG/.5ML; UG/.5ML; UG/.5ML
0.5 SUSPENSION INTRAMUSCULAR ONCE
Qty: 0 | Refills: 0 | Status: DISCONTINUED | OUTPATIENT
Start: 2017-01-01 | End: 2017-01-01

## 2017-01-01 RX ORDER — SODIUM CHLORIDE 9 MG/ML
1000 INJECTION INTRAMUSCULAR; INTRAVENOUS; SUBCUTANEOUS
Qty: 0 | Refills: 0 | Status: DISCONTINUED | OUTPATIENT
Start: 2017-01-01 | End: 2017-01-01

## 2017-01-01 RX ORDER — MEROPENEM 1 G/30ML
INJECTION INTRAVENOUS
Qty: 0 | Refills: 0 | Status: DISCONTINUED | OUTPATIENT
Start: 2017-01-01 | End: 2017-01-01

## 2017-01-01 RX ORDER — VANCOMYCIN HCL 1 G
1000 VIAL (EA) INTRAVENOUS ONCE
Qty: 0 | Refills: 0 | Status: COMPLETED | OUTPATIENT
Start: 2017-01-01 | End: 2017-01-01

## 2017-01-01 RX ORDER — HEPARIN SODIUM 5000 [USP'U]/ML
1200 INJECTION INTRAVENOUS; SUBCUTANEOUS
Qty: 25000 | Refills: 0 | Status: DISCONTINUED | OUTPATIENT
Start: 2017-01-01 | End: 2017-01-01

## 2017-01-01 RX ORDER — MULTIVIT WITH MIN/MFOLATE/K2 340-15/3 G
300 POWDER (GRAM) ORAL ONCE
Qty: 0 | Refills: 0 | Status: DISCONTINUED | OUTPATIENT
Start: 2017-01-01 | End: 2017-01-01

## 2017-01-01 RX ORDER — FUROSEMIDE 40 MG
10 TABLET ORAL ONCE
Qty: 0 | Refills: 0 | Status: COMPLETED | OUTPATIENT
Start: 2017-01-01 | End: 2017-01-01

## 2017-01-01 RX ORDER — MICAFUNGIN SODIUM 100 MG/1
INJECTION, POWDER, LYOPHILIZED, FOR SOLUTION INTRAVENOUS
Qty: 0 | Refills: 0 | Status: DISCONTINUED | OUTPATIENT
Start: 2017-01-01 | End: 2017-01-01

## 2017-01-01 RX ORDER — PHENYLEPHRINE HYDROCHLORIDE 10 MG/ML
0.4 INJECTION INTRAVENOUS
Qty: 80 | Refills: 0 | Status: DISCONTINUED | OUTPATIENT
Start: 2017-01-01 | End: 2017-01-01

## 2017-01-01 RX ORDER — PIPERACILLIN AND TAZOBACTAM 4; .5 G/20ML; G/20ML
3.38 INJECTION, POWDER, LYOPHILIZED, FOR SOLUTION INTRAVENOUS EVERY 8 HOURS
Qty: 0 | Refills: 0 | Status: DISCONTINUED | OUTPATIENT
Start: 2017-01-01 | End: 2017-01-01

## 2017-01-01 RX ORDER — METOCLOPRAMIDE HCL 10 MG
5 TABLET ORAL EVERY 6 HOURS
Qty: 0 | Refills: 0 | Status: DISCONTINUED | OUTPATIENT
Start: 2017-01-01 | End: 2017-01-01

## 2017-01-01 RX ORDER — PANTOPRAZOLE SODIUM 20 MG/1
40 TABLET, DELAYED RELEASE ORAL DAILY
Qty: 0 | Refills: 0 | Status: DISCONTINUED | OUTPATIENT
Start: 2017-01-01 | End: 2017-01-01

## 2017-01-01 RX ORDER — SODIUM CHLORIDE 9 MG/ML
1000 INJECTION, SOLUTION INTRAVENOUS
Qty: 0 | Refills: 0 | Status: DISCONTINUED | OUTPATIENT
Start: 2017-01-01 | End: 2017-01-01

## 2017-01-01 RX ORDER — ALBUTEROL 90 UG/1
1 AEROSOL, METERED ORAL EVERY 4 HOURS
Qty: 0 | Refills: 0 | Status: DISCONTINUED | OUTPATIENT
Start: 2017-01-01 | End: 2017-01-01

## 2017-01-01 RX ORDER — ERYTHROMYCIN ETHYLSUCCINATE 400 MG
TABLET ORAL
Qty: 0 | Refills: 0 | Status: DISCONTINUED | OUTPATIENT
Start: 2017-01-01 | End: 2017-01-01

## 2017-01-01 RX ORDER — VANCOMYCIN HCL 1 G
750 VIAL (EA) INTRAVENOUS ONCE
Qty: 0 | Refills: 0 | Status: COMPLETED | OUTPATIENT
Start: 2017-01-01 | End: 2017-01-01

## 2017-01-01 RX ORDER — ACETAMINOPHEN 500 MG
1000 TABLET ORAL ONCE
Qty: 0 | Refills: 0 | Status: COMPLETED | OUTPATIENT
Start: 2017-01-01 | End: 2017-01-01

## 2017-01-01 RX ORDER — FUROSEMIDE 40 MG
20 TABLET ORAL EVERY 6 HOURS
Qty: 0 | Refills: 0 | Status: COMPLETED | OUTPATIENT
Start: 2017-01-01 | End: 2017-01-01

## 2017-01-01 RX ORDER — NALOXONE HYDROCHLORIDE 4 MG/.1ML
0.1 SPRAY NASAL
Qty: 0 | Refills: 0 | Status: DISCONTINUED | OUTPATIENT
Start: 2017-01-01 | End: 2017-01-01

## 2017-01-01 RX ORDER — HYDROMORPHONE HYDROCHLORIDE 2 MG/ML
0.5 INJECTION INTRAMUSCULAR; INTRAVENOUS; SUBCUTANEOUS
Qty: 0 | Refills: 0 | Status: DISCONTINUED | OUTPATIENT
Start: 2017-01-01 | End: 2017-01-01

## 2017-01-01 RX ORDER — LACTULOSE 10 G/15ML
20 SOLUTION ORAL
Qty: 0 | Refills: 0 | Status: DISCONTINUED | OUTPATIENT
Start: 2017-01-01 | End: 2017-01-01

## 2017-01-01 RX ORDER — HYDROMORPHONE HYDROCHLORIDE 2 MG/ML
2 INJECTION INTRAMUSCULAR; INTRAVENOUS; SUBCUTANEOUS
Qty: 0 | Refills: 0 | Status: DISCONTINUED | OUTPATIENT
Start: 2017-01-01 | End: 2017-01-01

## 2017-01-01 RX ORDER — HEPARIN SODIUM 5000 [USP'U]/ML
1500 INJECTION INTRAVENOUS; SUBCUTANEOUS
Qty: 25000 | Refills: 0 | Status: DISCONTINUED | OUTPATIENT
Start: 2017-01-01 | End: 2017-01-01

## 2017-01-01 RX ORDER — DEXAMETHASONE 0.5 MG/5ML
10 ELIXIR ORAL ONCE
Qty: 0 | Refills: 0 | Status: DISCONTINUED | OUTPATIENT
Start: 2017-01-01 | End: 2017-01-01

## 2017-01-01 RX ORDER — FENTANYL CITRATE 50 UG/ML
0.5 INJECTION INTRAVENOUS
Qty: 2500 | Refills: 0 | Status: DISCONTINUED | OUTPATIENT
Start: 2017-01-01 | End: 2017-01-01

## 2017-01-01 RX ORDER — SODIUM CHLORIDE 9 MG/ML
500 INJECTION INTRAMUSCULAR; INTRAVENOUS; SUBCUTANEOUS ONCE
Qty: 0 | Refills: 0 | Status: COMPLETED | OUTPATIENT
Start: 2017-01-01 | End: 2017-01-01

## 2017-01-01 RX ORDER — ROBINUL 0.2 MG/ML
0.4 INJECTION INTRAMUSCULAR; INTRAVENOUS EVERY 6 HOURS
Qty: 0 | Refills: 0 | Status: DISCONTINUED | OUTPATIENT
Start: 2017-01-01 | End: 2017-01-01

## 2017-01-01 RX ORDER — DRONABINOL 2.5 MG
2.5 CAPSULE ORAL
Qty: 0 | Refills: 0 | Status: DISCONTINUED | OUTPATIENT
Start: 2017-01-01 | End: 2017-01-01

## 2017-01-01 RX ORDER — DEXTROSE MONOHYDRATE, SODIUM CHLORIDE, AND POTASSIUM CHLORIDE 50; .745; 4.5 G/1000ML; G/1000ML; G/1000ML
1000 INJECTION, SOLUTION INTRAVENOUS
Qty: 0 | Refills: 0 | Status: DISCONTINUED | OUTPATIENT
Start: 2017-01-01 | End: 2017-01-01

## 2017-01-01 RX ORDER — SODIUM CHLORIDE 9 MG/ML
1000 INJECTION, SOLUTION INTRAVENOUS ONCE
Qty: 0 | Refills: 0 | Status: COMPLETED | OUTPATIENT
Start: 2017-01-01 | End: 2017-01-01

## 2017-01-01 RX ORDER — ONDANSETRON 8 MG/1
4 TABLET, FILM COATED ORAL EVERY 6 HOURS
Qty: 0 | Refills: 0 | Status: DISCONTINUED | OUTPATIENT
Start: 2017-01-01 | End: 2017-01-01

## 2017-01-01 RX ORDER — HYDROMORPHONE HYDROCHLORIDE 2 MG/ML
1 INJECTION INTRAMUSCULAR; INTRAVENOUS; SUBCUTANEOUS
Qty: 100 | Refills: 0 | Status: DISCONTINUED | OUTPATIENT
Start: 2017-01-01 | End: 2017-01-01

## 2017-01-01 RX ORDER — SENNA PLUS 8.6 MG/1
2 TABLET ORAL AT BEDTIME
Qty: 0 | Refills: 0 | Status: DISCONTINUED | OUTPATIENT
Start: 2017-01-01 | End: 2017-01-01

## 2017-01-01 RX ORDER — METOPROLOL TARTRATE 50 MG
5 TABLET ORAL EVERY 6 HOURS
Qty: 0 | Refills: 0 | Status: DISCONTINUED | OUTPATIENT
Start: 2017-01-01 | End: 2017-01-01

## 2017-01-01 RX ORDER — FLUCONAZOLE 150 MG/1
400 TABLET ORAL ONCE
Qty: 0 | Refills: 0 | Status: COMPLETED | OUTPATIENT
Start: 2017-01-01 | End: 2017-01-01

## 2017-01-01 RX ORDER — BACLOFEN 100 %
1 POWDER (GRAM) MISCELLANEOUS
Qty: 0 | Refills: 0 | COMMUNITY

## 2017-01-01 RX ORDER — PANTOPRAZOLE SODIUM 20 MG/1
40 TABLET, DELAYED RELEASE ORAL
Qty: 0 | Refills: 0 | Status: DISCONTINUED | OUTPATIENT
Start: 2017-01-01 | End: 2017-01-01

## 2017-01-01 RX ORDER — NALOXONE HYDROCHLORIDE 4 MG/.1ML
0.4 SPRAY NASAL ONCE
Qty: 0 | Refills: 0 | Status: COMPLETED | OUTPATIENT
Start: 2017-01-01 | End: 2017-01-01

## 2017-01-01 RX ORDER — MEROPENEM 1 G/30ML
1000 INJECTION INTRAVENOUS EVERY 8 HOURS
Qty: 0 | Refills: 0 | Status: DISCONTINUED | OUTPATIENT
Start: 2017-01-01 | End: 2017-01-01

## 2017-01-01 RX ORDER — PANTOPRAZOLE SODIUM 20 MG/1
40 TABLET, DELAYED RELEASE ORAL EVERY 24 HOURS
Qty: 0 | Refills: 0 | Status: DISCONTINUED | OUTPATIENT
Start: 2017-01-01 | End: 2017-01-01

## 2017-01-01 RX ORDER — GABAPENTIN 400 MG/1
200 CAPSULE ORAL EVERY 12 HOURS
Qty: 0 | Refills: 0 | Status: DISCONTINUED | OUTPATIENT
Start: 2017-01-01 | End: 2017-01-01

## 2017-01-01 RX ORDER — METOPROLOL TARTRATE 50 MG
5 TABLET ORAL EVERY 4 HOURS
Qty: 0 | Refills: 0 | Status: DISCONTINUED | OUTPATIENT
Start: 2017-01-01 | End: 2017-01-01

## 2017-01-01 RX ORDER — DEXAMETHASONE 0.5 MG/5ML
4 ELIXIR ORAL EVERY 12 HOURS
Qty: 0 | Refills: 0 | Status: COMPLETED | OUTPATIENT
Start: 2017-01-01 | End: 2017-01-01

## 2017-01-01 RX ORDER — OXYCODONE AND ACETAMINOPHEN 5; 325 MG/1; MG/1
1 TABLET ORAL EVERY 4 HOURS
Qty: 0 | Refills: 0 | Status: DISCONTINUED | OUTPATIENT
Start: 2017-01-01 | End: 2017-01-01

## 2017-01-01 RX ORDER — HYDROMORPHONE HYDROCHLORIDE 2 MG/ML
0.2 INJECTION INTRAMUSCULAR; INTRAVENOUS; SUBCUTANEOUS
Qty: 0 | Refills: 0 | Status: DISCONTINUED | OUTPATIENT
Start: 2017-01-01 | End: 2017-01-01

## 2017-01-01 RX ORDER — ENOXAPARIN SODIUM 100 MG/ML
40 INJECTION SUBCUTANEOUS AT BEDTIME
Qty: 0 | Refills: 0 | Status: DISCONTINUED | OUTPATIENT
Start: 2017-01-01 | End: 2017-01-01

## 2017-01-01 RX ORDER — CEFUROXIME AXETIL 250 MG
1 TABLET ORAL
Qty: 0 | Refills: 0 | COMMUNITY

## 2017-01-01 RX ORDER — FUROSEMIDE 40 MG
10 TABLET ORAL ONCE
Qty: 0 | Refills: 0 | Status: DISCONTINUED | OUTPATIENT
Start: 2017-01-01 | End: 2017-01-01

## 2017-01-01 RX ORDER — INSULIN HUMAN 100 [IU]/ML
10 INJECTION, SOLUTION SUBCUTANEOUS ONCE
Qty: 0 | Refills: 0 | Status: COMPLETED | OUTPATIENT
Start: 2017-01-01 | End: 2017-01-01

## 2017-01-01 RX ORDER — DEXAMETHASONE 0.5 MG/5ML
4 ELIXIR ORAL ONCE
Qty: 0 | Refills: 0 | Status: COMPLETED | OUTPATIENT
Start: 2017-01-01 | End: 2017-01-01

## 2017-01-01 RX ORDER — ONDANSETRON 8 MG/1
4 TABLET, FILM COATED ORAL ONCE
Qty: 0 | Refills: 0 | Status: DISCONTINUED | OUTPATIENT
Start: 2017-01-01 | End: 2017-01-01

## 2017-01-01 RX ORDER — DRONABINOL 2.5 MG
5 CAPSULE ORAL EVERY 12 HOURS
Qty: 0 | Refills: 0 | Status: DISCONTINUED | OUTPATIENT
Start: 2017-01-01 | End: 2017-01-01

## 2017-01-01 RX ORDER — MICAFUNGIN SODIUM 100 MG/1
100 INJECTION, POWDER, LYOPHILIZED, FOR SOLUTION INTRAVENOUS EVERY 24 HOURS
Qty: 0 | Refills: 0 | Status: DISCONTINUED | OUTPATIENT
Start: 2017-01-01 | End: 2017-01-01

## 2017-01-01 RX ORDER — ONDANSETRON 8 MG/1
4 TABLET, FILM COATED ORAL ONCE
Qty: 0 | Refills: 0 | Status: COMPLETED | OUTPATIENT
Start: 2017-01-01 | End: 2017-01-01

## 2017-01-01 RX ORDER — VANCOMYCIN HCL 1 G
VIAL (EA) INTRAVENOUS
Qty: 0 | Refills: 0 | Status: DISCONTINUED | OUTPATIENT
Start: 2017-01-01 | End: 2017-01-01

## 2017-01-01 RX ORDER — DEXAMETHASONE 0.5 MG/5ML
10 ELIXIR ORAL ONCE
Qty: 0 | Refills: 0 | Status: COMPLETED | OUTPATIENT
Start: 2017-01-01 | End: 2017-01-01

## 2017-01-01 RX ORDER — DEXAMETHASONE 0.5 MG/5ML
4 ELIXIR ORAL EVERY 6 HOURS
Qty: 0 | Refills: 0 | Status: DISCONTINUED | OUTPATIENT
Start: 2017-01-01 | End: 2017-01-01

## 2017-01-01 RX ORDER — CALCIUM GLUCONATE 100 MG/ML
1 VIAL (ML) INTRAVENOUS ONCE
Qty: 0 | Refills: 0 | Status: COMPLETED | OUTPATIENT
Start: 2017-01-01 | End: 2017-01-01

## 2017-01-01 RX ORDER — ALBUMIN HUMAN 25 %
250 VIAL (ML) INTRAVENOUS ONCE
Qty: 0 | Refills: 0 | Status: COMPLETED | OUTPATIENT
Start: 2017-01-01 | End: 2017-01-01

## 2017-01-01 RX ORDER — VANCOMYCIN HCL 1 G
1000 VIAL (EA) INTRAVENOUS EVERY 12 HOURS
Qty: 0 | Refills: 0 | Status: DISCONTINUED | OUTPATIENT
Start: 2017-01-01 | End: 2017-01-01

## 2017-01-01 RX ORDER — MORPHINE SULFATE 50 MG/1
2 CAPSULE, EXTENDED RELEASE ORAL
Qty: 0 | Refills: 0 | Status: DISCONTINUED | OUTPATIENT
Start: 2017-01-01 | End: 2017-01-01

## 2017-01-01 RX ORDER — HYDRALAZINE HCL 50 MG
10 TABLET ORAL ONCE
Qty: 0 | Refills: 0 | Status: COMPLETED | OUTPATIENT
Start: 2017-01-01 | End: 2017-01-01

## 2017-01-01 RX ORDER — ROBINUL 0.2 MG/ML
0.2 INJECTION INTRAMUSCULAR; INTRAVENOUS EVERY 6 HOURS
Qty: 0 | Refills: 0 | Status: DISCONTINUED | OUTPATIENT
Start: 2017-01-01 | End: 2017-01-01

## 2017-01-01 RX ORDER — MORPHINE SULFATE 50 MG/1
4 CAPSULE, EXTENDED RELEASE ORAL EVERY 4 HOURS
Qty: 0 | Refills: 0 | Status: DISCONTINUED | OUTPATIENT
Start: 2017-01-01 | End: 2017-01-01

## 2017-01-01 RX ORDER — OXYCODONE AND ACETAMINOPHEN 5; 325 MG/1; MG/1
2 TABLET ORAL EVERY 4 HOURS
Qty: 0 | Refills: 0 | Status: DISCONTINUED | OUTPATIENT
Start: 2017-01-01 | End: 2017-01-01

## 2017-01-01 RX ORDER — HYDROMORPHONE HYDROCHLORIDE 2 MG/ML
0.25 INJECTION INTRAMUSCULAR; INTRAVENOUS; SUBCUTANEOUS
Qty: 0 | Refills: 0 | Status: DISCONTINUED | OUTPATIENT
Start: 2017-01-01 | End: 2017-01-01

## 2017-01-01 RX ORDER — ALTEPLASE 100 MG
2 KIT INTRAVENOUS ONCE
Qty: 0 | Refills: 0 | Status: COMPLETED | OUTPATIENT
Start: 2017-01-01 | End: 2017-01-01

## 2017-01-01 RX ORDER — TIOTROPIUM BROMIDE 18 UG/1
1 CAPSULE ORAL; RESPIRATORY (INHALATION) DAILY
Qty: 0 | Refills: 0 | Status: DISCONTINUED | OUTPATIENT
Start: 2017-01-01 | End: 2017-01-01

## 2017-01-01 RX ORDER — ALBUMIN HUMAN 25 %
500 VIAL (ML) INTRAVENOUS ONCE
Qty: 0 | Refills: 0 | Status: COMPLETED | OUTPATIENT
Start: 2017-01-01 | End: 2017-01-01

## 2017-01-01 RX ORDER — ENOXAPARIN SODIUM 100 MG/ML
90 INJECTION SUBCUTANEOUS EVERY 12 HOURS
Qty: 0 | Refills: 0 | Status: DISCONTINUED | OUTPATIENT
Start: 2017-01-01 | End: 2017-01-01

## 2017-01-01 RX ORDER — MAGNESIUM HYDROXIDE 400 MG/1
0 TABLET, CHEWABLE ORAL
Qty: 0 | Refills: 0 | COMMUNITY

## 2017-01-01 RX ORDER — ACETAMINOPHEN 500 MG
1000 TABLET ORAL EVERY 6 HOURS
Qty: 0 | Refills: 0 | Status: COMPLETED | OUTPATIENT
Start: 2017-01-01 | End: 2017-01-01

## 2017-01-01 RX ORDER — PIPERACILLIN AND TAZOBACTAM 4; .5 G/20ML; G/20ML
INJECTION, POWDER, LYOPHILIZED, FOR SOLUTION INTRAVENOUS
Qty: 0 | Refills: 0 | Status: DISCONTINUED | OUTPATIENT
Start: 2017-01-01 | End: 2017-01-01

## 2017-01-01 RX ORDER — FUROSEMIDE 40 MG
5 TABLET ORAL ONCE
Qty: 0 | Refills: 0 | Status: COMPLETED | OUTPATIENT
Start: 2017-01-01 | End: 2017-01-01

## 2017-01-01 RX ORDER — ENOXAPARIN SODIUM 100 MG/ML
80 INJECTION SUBCUTANEOUS EVERY 12 HOURS
Qty: 0 | Refills: 0 | Status: COMPLETED | OUTPATIENT
Start: 2017-01-01 | End: 2017-01-01

## 2017-01-01 RX ORDER — ENOXAPARIN SODIUM 100 MG/ML
80 INJECTION SUBCUTANEOUS EVERY 12 HOURS
Qty: 0 | Refills: 0 | Status: DISCONTINUED | OUTPATIENT
Start: 2017-01-01 | End: 2017-01-01

## 2017-01-01 RX ORDER — HALOPERIDOL DECANOATE 100 MG/ML
2.5 INJECTION INTRAMUSCULAR ONCE
Qty: 0 | Refills: 0 | Status: COMPLETED | OUTPATIENT
Start: 2017-01-01 | End: 2017-01-01

## 2017-01-01 RX ORDER — FENTANYL CITRATE 50 UG/ML
18.73 INJECTION INTRAVENOUS
Qty: 2500 | Refills: 0 | Status: DISCONTINUED | OUTPATIENT
Start: 2017-01-01 | End: 2017-01-01

## 2017-01-01 RX ORDER — HYDROMORPHONE HYDROCHLORIDE 2 MG/ML
30 INJECTION INTRAMUSCULAR; INTRAVENOUS; SUBCUTANEOUS
Qty: 0 | Refills: 0 | Status: DISCONTINUED | OUTPATIENT
Start: 2017-01-01 | End: 2017-01-01

## 2017-01-01 RX ORDER — DEXTROSE 50 % IN WATER 50 %
25 SYRINGE (ML) INTRAVENOUS ONCE
Qty: 0 | Refills: 0 | Status: COMPLETED | OUTPATIENT
Start: 2017-01-01 | End: 2017-01-01

## 2017-01-01 RX ORDER — SPIRONOLACTONE 25 MG/1
25 TABLET, FILM COATED ORAL DAILY
Qty: 0 | Refills: 0 | Status: DISCONTINUED | OUTPATIENT
Start: 2017-01-01 | End: 2017-01-01

## 2017-01-01 RX ORDER — HYDROMORPHONE HYDROCHLORIDE 2 MG/ML
0.5 INJECTION INTRAMUSCULAR; INTRAVENOUS; SUBCUTANEOUS ONCE
Qty: 0 | Refills: 0 | Status: DISCONTINUED | OUTPATIENT
Start: 2017-01-01 | End: 2017-01-01

## 2017-01-01 RX ORDER — FAMOTIDINE 10 MG/ML
20 INJECTION INTRAVENOUS EVERY 12 HOURS
Qty: 0 | Refills: 0 | Status: DISCONTINUED | OUTPATIENT
Start: 2017-01-01 | End: 2017-01-01

## 2017-01-01 RX ORDER — LACTOBACILLUS ACIDOPHILUS 100MM CELL
1 CAPSULE ORAL
Qty: 0 | Refills: 0 | COMMUNITY

## 2017-01-01 RX ORDER — DOCUSATE SODIUM 100 MG
100 CAPSULE ORAL
Qty: 0 | Refills: 0 | Status: DISCONTINUED | OUTPATIENT
Start: 2017-01-01 | End: 2017-01-01

## 2017-01-01 RX ORDER — HEPARIN SODIUM 5000 [USP'U]/ML
1100 INJECTION INTRAVENOUS; SUBCUTANEOUS
Qty: 25000 | Refills: 0 | Status: DISCONTINUED | OUTPATIENT
Start: 2017-01-01 | End: 2017-01-01

## 2017-01-01 RX ORDER — METOCLOPRAMIDE HCL 10 MG
10 TABLET ORAL EVERY 6 HOURS
Qty: 0 | Refills: 0 | Status: DISCONTINUED | OUTPATIENT
Start: 2017-01-01 | End: 2017-01-01

## 2017-01-01 RX ORDER — ACETYLCYSTEINE 200 MG/ML
3 VIAL (ML) MISCELLANEOUS EVERY 6 HOURS
Qty: 0 | Refills: 0 | Status: COMPLETED | OUTPATIENT
Start: 2017-01-01 | End: 2017-01-01

## 2017-01-01 RX ORDER — FENTANYL CITRATE 50 UG/ML
7.49 INJECTION INTRAVENOUS
Qty: 5000 | Refills: 0 | Status: DISCONTINUED | OUTPATIENT
Start: 2017-01-01 | End: 2017-01-01

## 2017-01-01 RX ORDER — PIPERACILLIN AND TAZOBACTAM 4; .5 G/20ML; G/20ML
2.25 INJECTION, POWDER, LYOPHILIZED, FOR SOLUTION INTRAVENOUS ONCE
Qty: 0 | Refills: 0 | Status: COMPLETED | OUTPATIENT
Start: 2017-01-01 | End: 2017-01-01

## 2017-01-01 RX ORDER — FLUCONAZOLE 150 MG/1
200 TABLET ORAL EVERY 24 HOURS
Qty: 0 | Refills: 0 | Status: DISCONTINUED | OUTPATIENT
Start: 2017-01-01 | End: 2017-01-01

## 2017-01-01 RX ORDER — OXYCODONE HYDROCHLORIDE 5 MG/1
5 TABLET ORAL EVERY 4 HOURS
Qty: 0 | Refills: 0 | Status: DISCONTINUED | OUTPATIENT
Start: 2017-01-01 | End: 2017-01-01

## 2017-01-01 RX ORDER — ALBUMIN HUMAN 25 %
50 VIAL (ML) INTRAVENOUS ONCE
Qty: 0 | Refills: 0 | Status: COMPLETED | OUTPATIENT
Start: 2017-01-01 | End: 2017-01-01

## 2017-01-01 RX ORDER — HYDROMORPHONE HYDROCHLORIDE 2 MG/ML
1 INJECTION INTRAMUSCULAR; INTRAVENOUS; SUBCUTANEOUS
Qty: 0 | Refills: 0 | Status: DISCONTINUED | OUTPATIENT
Start: 2017-01-01 | End: 2017-01-01

## 2017-01-01 RX ORDER — HEPARIN SODIUM 5000 [USP'U]/ML
1000 INJECTION INTRAVENOUS; SUBCUTANEOUS
Qty: 25000 | Refills: 0 | Status: DISCONTINUED | OUTPATIENT
Start: 2017-01-01 | End: 2017-01-01

## 2017-01-01 RX ORDER — NOREPINEPHRINE BITARTRATE/D5W 8 MG/250ML
0.02 PLASTIC BAG, INJECTION (ML) INTRAVENOUS
Qty: 16 | Refills: 0 | Status: DISCONTINUED | OUTPATIENT
Start: 2017-01-01 | End: 2017-01-01

## 2017-01-01 RX ORDER — POLYETHYLENE GLYCOL 3350 17 G/17G
17 POWDER, FOR SOLUTION ORAL
Qty: 0 | Refills: 0 | Status: DISCONTINUED | OUTPATIENT
Start: 2017-01-01 | End: 2017-01-01

## 2017-01-01 RX ORDER — HYDROMORPHONE HYDROCHLORIDE 2 MG/ML
2 INJECTION INTRAMUSCULAR; INTRAVENOUS; SUBCUTANEOUS EVERY 6 HOURS
Qty: 0 | Refills: 0 | Status: DISCONTINUED | OUTPATIENT
Start: 2017-01-01 | End: 2017-01-01

## 2017-01-01 RX ORDER — MICAFUNGIN SODIUM 100 MG/1
100 INJECTION, POWDER, LYOPHILIZED, FOR SOLUTION INTRAVENOUS ONCE
Qty: 0 | Refills: 0 | Status: COMPLETED | OUTPATIENT
Start: 2017-01-01 | End: 2017-01-01

## 2017-01-01 RX ORDER — MIDAZOLAM HYDROCHLORIDE 1 MG/ML
2 INJECTION, SOLUTION INTRAMUSCULAR; INTRAVENOUS ONCE
Qty: 0 | Refills: 0 | Status: DISCONTINUED | OUTPATIENT
Start: 2017-01-01 | End: 2017-01-01

## 2017-01-01 RX ORDER — PHYTONADIONE (VIT K1) 5 MG
5 TABLET ORAL ONCE
Qty: 0 | Refills: 0 | Status: COMPLETED | OUTPATIENT
Start: 2017-01-01 | End: 2017-01-01

## 2017-01-01 RX ORDER — PHENYLEPHRINE HYDROCHLORIDE 10 MG/ML
1 INJECTION INTRAVENOUS
Qty: 80 | Refills: 0 | Status: DISCONTINUED | OUTPATIENT
Start: 2017-01-01 | End: 2017-01-01

## 2017-01-01 RX ORDER — ALBUTEROL 90 UG/1
2.5 AEROSOL, METERED ORAL ONCE
Qty: 0 | Refills: 0 | Status: COMPLETED | OUTPATIENT
Start: 2017-01-01 | End: 2017-01-01

## 2017-01-01 RX ORDER — POLYETHYLENE GLYCOL 3350 17 G/17G
17 POWDER, FOR SOLUTION ORAL DAILY
Qty: 0 | Refills: 0 | Status: DISCONTINUED | OUTPATIENT
Start: 2017-01-01 | End: 2017-01-01

## 2017-01-01 RX ORDER — DOCUSATE SODIUM 100 MG
100 CAPSULE ORAL THREE TIMES A DAY
Qty: 0 | Refills: 0 | Status: DISCONTINUED | OUTPATIENT
Start: 2017-01-01 | End: 2017-01-01

## 2017-01-01 RX ORDER — MULTIVIT WITH MIN/MFOLATE/K2 340-15/3 G
300 POWDER (GRAM) ORAL ONCE
Qty: 0 | Refills: 0 | Status: COMPLETED | OUTPATIENT
Start: 2017-01-01 | End: 2017-01-01

## 2017-01-01 RX ORDER — PHENYLEPHRINE HYDROCHLORIDE 10 MG/ML
0.5 INJECTION INTRAVENOUS
Qty: 80 | Refills: 0 | Status: DISCONTINUED | OUTPATIENT
Start: 2017-01-01 | End: 2017-01-01

## 2017-01-01 RX ORDER — MORPHINE SULFATE 50 MG/1
2 CAPSULE, EXTENDED RELEASE ORAL
Qty: 100 | Refills: 0 | Status: DISCONTINUED | OUTPATIENT
Start: 2017-01-01 | End: 2017-01-01

## 2017-01-01 RX ORDER — FUROSEMIDE 40 MG
20 TABLET ORAL ONCE
Qty: 0 | Refills: 0 | Status: COMPLETED | OUTPATIENT
Start: 2017-01-01 | End: 2017-01-01

## 2017-01-01 RX ORDER — INSULIN HUMAN 100 [IU]/ML
10 INJECTION, SOLUTION SUBCUTANEOUS ONCE
Qty: 0 | Refills: 0 | Status: DISCONTINUED | OUTPATIENT
Start: 2017-01-01 | End: 2017-01-01

## 2017-01-01 RX ORDER — OXYCODONE AND ACETAMINOPHEN 5; 325 MG/1; MG/1
2 TABLET ORAL EVERY 6 HOURS
Qty: 0 | Refills: 0 | Status: DISCONTINUED | OUTPATIENT
Start: 2017-01-01 | End: 2017-01-01

## 2017-01-01 RX ORDER — PANTOPRAZOLE SODIUM 20 MG/1
40 TABLET, DELAYED RELEASE ORAL EVERY 12 HOURS
Qty: 0 | Refills: 0 | Status: DISCONTINUED | OUTPATIENT
Start: 2017-01-01 | End: 2017-01-01

## 2017-01-01 RX ORDER — CEFAZOLIN SODIUM 1 G
2000 VIAL (EA) INJECTION EVERY 8 HOURS
Qty: 0 | Refills: 0 | Status: COMPLETED | OUTPATIENT
Start: 2017-01-01 | End: 2017-01-01

## 2017-01-01 RX ORDER — MORPHINE SULFATE 50 MG/1
4 CAPSULE, EXTENDED RELEASE ORAL ONCE
Qty: 0 | Refills: 0 | Status: DISCONTINUED | OUTPATIENT
Start: 2017-01-01 | End: 2017-01-01

## 2017-01-01 RX ORDER — PROPOFOL 10 MG/ML
10 INJECTION, EMULSION INTRAVENOUS
Qty: 1000 | Refills: 0 | Status: DISCONTINUED | OUTPATIENT
Start: 2017-01-01 | End: 2017-01-01

## 2017-01-01 RX ORDER — METOPROLOL TARTRATE 50 MG
25 TABLET ORAL EVERY 12 HOURS
Qty: 0 | Refills: 0 | Status: DISCONTINUED | OUTPATIENT
Start: 2017-01-01 | End: 2017-01-01

## 2017-01-01 RX ORDER — HYDROMORPHONE HYDROCHLORIDE 2 MG/ML
0.4 INJECTION INTRAMUSCULAR; INTRAVENOUS; SUBCUTANEOUS
Qty: 0 | Refills: 0 | Status: DISCONTINUED | OUTPATIENT
Start: 2017-01-01 | End: 2017-01-01

## 2017-01-01 RX ORDER — DIAZEPAM 5 MG
5 TABLET ORAL EVERY 8 HOURS
Qty: 0 | Refills: 0 | Status: DISCONTINUED | OUTPATIENT
Start: 2017-01-01 | End: 2017-01-01

## 2017-01-01 RX ORDER — DEXMEDETOMIDINE HYDROCHLORIDE IN 0.9% SODIUM CHLORIDE 4 UG/ML
0.2 INJECTION INTRAVENOUS
Qty: 200 | Refills: 0 | Status: DISCONTINUED | OUTPATIENT
Start: 2017-01-01 | End: 2017-01-01

## 2017-01-01 RX ORDER — METOPROLOL TARTRATE 50 MG
12.5 TABLET ORAL EVERY 12 HOURS
Qty: 0 | Refills: 0 | Status: DISCONTINUED | OUTPATIENT
Start: 2017-01-01 | End: 2017-01-01

## 2017-01-01 RX ORDER — FENTANYL CITRATE 50 UG/ML
1 INJECTION INTRAVENOUS
Qty: 5000 | Refills: 0 | Status: DISCONTINUED | OUTPATIENT
Start: 2017-01-01 | End: 2017-01-01

## 2017-01-01 RX ORDER — VASOPRESSIN 20 [USP'U]/ML
0.1 INJECTION INTRAVENOUS
Qty: 100 | Refills: 0 | Status: DISCONTINUED | OUTPATIENT
Start: 2017-01-01 | End: 2017-01-01

## 2017-01-01 RX ORDER — CHLORHEXIDINE GLUCONATE 213 G/1000ML
15 SOLUTION TOPICAL THREE TIMES A DAY
Qty: 0 | Refills: 0 | Status: DISCONTINUED | OUTPATIENT
Start: 2017-01-01 | End: 2017-01-01

## 2017-01-01 RX ORDER — VANCOMYCIN HCL 1 G
750 VIAL (EA) INTRAVENOUS EVERY 12 HOURS
Qty: 0 | Refills: 0 | Status: DISCONTINUED | OUTPATIENT
Start: 2017-01-01 | End: 2017-01-01

## 2017-01-01 RX ORDER — MEROPENEM 1 G/30ML
1000 INJECTION INTRAVENOUS ONCE
Qty: 0 | Refills: 0 | Status: COMPLETED | OUTPATIENT
Start: 2017-01-01 | End: 2017-01-01

## 2017-01-01 RX ORDER — FENTANYL CITRATE 50 UG/ML
1 INJECTION INTRAVENOUS
Qty: 0 | Refills: 0 | Status: DISCONTINUED | OUTPATIENT
Start: 2017-01-01 | End: 2017-01-01

## 2017-01-01 RX ORDER — MORPHINE SULFATE 50 MG/1
6 CAPSULE, EXTENDED RELEASE ORAL
Qty: 0 | Refills: 0 | Status: DISCONTINUED | OUTPATIENT
Start: 2017-01-01 | End: 2017-01-01

## 2017-01-01 RX ORDER — CALCIUM GLUCONATE 100 MG/ML
2 VIAL (ML) INTRAVENOUS ONCE
Qty: 0 | Refills: 0 | Status: COMPLETED | OUTPATIENT
Start: 2017-01-01 | End: 2017-01-01

## 2017-01-01 RX ORDER — ASCORBIC ACID 60 MG
1 TABLET,CHEWABLE ORAL
Qty: 0 | Refills: 0 | COMMUNITY

## 2017-01-01 RX ORDER — HYDROMORPHONE HYDROCHLORIDE 2 MG/ML
1 INJECTION INTRAMUSCULAR; INTRAVENOUS; SUBCUTANEOUS EVERY 4 HOURS
Qty: 0 | Refills: 0 | Status: DISCONTINUED | OUTPATIENT
Start: 2017-01-01 | End: 2017-01-01

## 2017-01-01 RX ORDER — METOPROLOL TARTRATE 50 MG
5 TABLET ORAL ONCE
Qty: 0 | Refills: 0 | Status: COMPLETED | OUTPATIENT
Start: 2017-01-01 | End: 2017-01-01

## 2017-01-01 RX ORDER — PHYTONADIONE (VIT K1) 5 MG
5 TABLET ORAL
Qty: 0 | Refills: 0 | Status: DISCONTINUED | OUTPATIENT
Start: 2017-01-01 | End: 2017-01-01

## 2017-01-01 RX ORDER — PIPERACILLIN AND TAZOBACTAM 4; .5 G/20ML; G/20ML
3.38 INJECTION, POWDER, LYOPHILIZED, FOR SOLUTION INTRAVENOUS EVERY 12 HOURS
Qty: 0 | Refills: 0 | Status: DISCONTINUED | OUTPATIENT
Start: 2017-01-01 | End: 2017-01-01

## 2017-01-01 RX ORDER — IPRATROPIUM/ALBUTEROL SULFATE 18-103MCG
3 AEROSOL WITH ADAPTER (GRAM) INHALATION EVERY 6 HOURS
Qty: 0 | Refills: 0 | Status: DISCONTINUED | OUTPATIENT
Start: 2017-01-01 | End: 2017-01-01

## 2017-01-01 RX ORDER — OXYCODONE HYDROCHLORIDE 5 MG/1
10 TABLET ORAL EVERY 4 HOURS
Qty: 0 | Refills: 0 | Status: DISCONTINUED | OUTPATIENT
Start: 2017-01-01 | End: 2017-01-01

## 2017-01-01 RX ORDER — POTASSIUM PHOSPHATE, MONOBASIC POTASSIUM PHOSPHATE, DIBASIC 236; 224 MG/ML; MG/ML
15 INJECTION, SOLUTION INTRAVENOUS ONCE
Qty: 0 | Refills: 0 | Status: COMPLETED | OUTPATIENT
Start: 2017-01-01 | End: 2017-01-01

## 2017-01-01 RX ORDER — DIAZEPAM 5 MG
5 TABLET ORAL ONCE
Qty: 0 | Refills: 0 | Status: DISCONTINUED | OUTPATIENT
Start: 2017-01-01 | End: 2017-01-01

## 2017-01-01 RX ORDER — NOREPINEPHRINE BITARTRATE/D5W 8 MG/250ML
0.02 PLASTIC BAG, INJECTION (ML) INTRAVENOUS
Qty: 8 | Refills: 0 | Status: DISCONTINUED | OUTPATIENT
Start: 2017-01-01 | End: 2017-01-01

## 2017-01-01 RX ORDER — DIPHENHYDRAMINE HCL 50 MG
25 CAPSULE ORAL ONCE
Qty: 0 | Refills: 0 | Status: COMPLETED | OUTPATIENT
Start: 2017-01-01 | End: 2017-01-01

## 2017-01-01 RX ORDER — DIPHENHYDRAMINE HCL 50 MG
25 CAPSULE ORAL EVERY 4 HOURS
Qty: 0 | Refills: 0 | Status: DISCONTINUED | OUTPATIENT
Start: 2017-01-01 | End: 2017-01-01

## 2017-01-01 RX ADMIN — Medication 3 MILLILITER(S): at 23:26

## 2017-01-01 RX ADMIN — Medication 2.5 MILLIGRAM(S): at 17:17

## 2017-01-01 RX ADMIN — SODIUM CHLORIDE 100 MILLILITER(S): 9 INJECTION, SOLUTION INTRAVENOUS at 00:25

## 2017-01-01 RX ADMIN — Medication 100 MILLIGRAM(S): at 05:07

## 2017-01-01 RX ADMIN — PANTOPRAZOLE SODIUM 40 MILLIGRAM(S): 20 TABLET, DELAYED RELEASE ORAL at 12:23

## 2017-01-01 RX ADMIN — HYDROMORPHONE HYDROCHLORIDE 0.5 MILLIGRAM(S): 2 INJECTION INTRAMUSCULAR; INTRAVENOUS; SUBCUTANEOUS at 01:16

## 2017-01-01 RX ADMIN — Medication 25 MILLIGRAM(S): at 06:14

## 2017-01-01 RX ADMIN — Medication 3 MILLILITER(S): at 17:38

## 2017-01-01 RX ADMIN — Medication 3 MILLILITER(S): at 05:26

## 2017-01-01 RX ADMIN — POTASSIUM PHOSPHATE, MONOBASIC POTASSIUM PHOSPHATE, DIBASIC 62.5 MILLIMOLE(S): 236; 224 INJECTION, SOLUTION INTRAVENOUS at 05:24

## 2017-01-01 RX ADMIN — Medication 102 MILLIGRAM(S): at 17:21

## 2017-01-01 RX ADMIN — POLYETHYLENE GLYCOL 3350 17 GRAM(S): 17 POWDER, FOR SOLUTION ORAL at 12:24

## 2017-01-01 RX ADMIN — Medication 3 MILLILITER(S): at 18:13

## 2017-01-01 RX ADMIN — OXYCODONE HYDROCHLORIDE 10 MILLIGRAM(S): 5 TABLET ORAL at 11:59

## 2017-01-01 RX ADMIN — SENNA PLUS 2 TABLET(S): 8.6 TABLET ORAL at 21:11

## 2017-01-01 RX ADMIN — HYDROMORPHONE HYDROCHLORIDE 30 MILLILITER(S): 2 INJECTION INTRAMUSCULAR; INTRAVENOUS; SUBCUTANEOUS at 20:13

## 2017-01-01 RX ADMIN — SODIUM CHLORIDE 30 MILLILITER(S): 9 INJECTION, SOLUTION INTRAVENOUS at 21:09

## 2017-01-01 RX ADMIN — ENOXAPARIN SODIUM 80 MILLIGRAM(S): 100 INJECTION SUBCUTANEOUS at 17:17

## 2017-01-01 RX ADMIN — FENTANYL CITRATE 30 MICROGRAM(S)/KG/HR: 50 INJECTION INTRAVENOUS at 19:54

## 2017-01-01 RX ADMIN — Medication 5 MILLIGRAM(S): at 17:22

## 2017-01-01 RX ADMIN — Medication 4 MILLIGRAM(S): at 17:17

## 2017-01-01 RX ADMIN — HYDROMORPHONE HYDROCHLORIDE 0.5 MILLIGRAM(S): 2 INJECTION INTRAMUSCULAR; INTRAVENOUS; SUBCUTANEOUS at 17:31

## 2017-01-01 RX ADMIN — HYDROMORPHONE HYDROCHLORIDE 0.5 MILLIGRAM(S): 2 INJECTION INTRAMUSCULAR; INTRAVENOUS; SUBCUTANEOUS at 19:30

## 2017-01-01 RX ADMIN — PIPERACILLIN AND TAZOBACTAM 25 GRAM(S): 4; .5 INJECTION, POWDER, LYOPHILIZED, FOR SOLUTION INTRAVENOUS at 03:50

## 2017-01-01 RX ADMIN — Medication 250 MILLIGRAM(S): at 05:02

## 2017-01-01 RX ADMIN — Medication 10 MILLIGRAM(S): at 19:20

## 2017-01-01 RX ADMIN — Medication 3 MILLILITER(S): at 17:17

## 2017-01-01 RX ADMIN — PANTOPRAZOLE SODIUM 40 MILLIGRAM(S): 20 TABLET, DELAYED RELEASE ORAL at 13:05

## 2017-01-01 RX ADMIN — HYDROMORPHONE HYDROCHLORIDE 0.2 MILLIGRAM(S): 2 INJECTION INTRAMUSCULAR; INTRAVENOUS; SUBCUTANEOUS at 10:50

## 2017-01-01 RX ADMIN — MEROPENEM 200 MILLIGRAM(S): 1 INJECTION INTRAVENOUS at 13:47

## 2017-01-01 RX ADMIN — SODIUM CHLORIDE 30 MILLILITER(S): 9 INJECTION, SOLUTION INTRAVENOUS at 19:47

## 2017-01-01 RX ADMIN — PANTOPRAZOLE SODIUM 40 MILLIGRAM(S): 20 TABLET, DELAYED RELEASE ORAL at 12:55

## 2017-01-01 RX ADMIN — HYDROMORPHONE HYDROCHLORIDE 0.5 MILLIGRAM(S): 2 INJECTION INTRAMUSCULAR; INTRAVENOUS; SUBCUTANEOUS at 10:45

## 2017-01-01 RX ADMIN — Medication 3 MILLILITER(S): at 11:02

## 2017-01-01 RX ADMIN — HYDROMORPHONE HYDROCHLORIDE 1 MILLIGRAM(S): 2 INJECTION INTRAMUSCULAR; INTRAVENOUS; SUBCUTANEOUS at 02:06

## 2017-01-01 RX ADMIN — NALOXONE HYDROCHLORIDE 0.4 MILLIGRAM(S): 4 SPRAY NASAL at 08:15

## 2017-01-01 RX ADMIN — MEROPENEM 200 MILLIGRAM(S): 1 INJECTION INTRAVENOUS at 21:04

## 2017-01-01 RX ADMIN — Medication 3 MILLILITER(S): at 17:19

## 2017-01-01 RX ADMIN — Medication 3 MILLILITER(S): at 23:39

## 2017-01-01 RX ADMIN — FLUCONAZOLE 100 MILLIGRAM(S): 150 TABLET ORAL at 21:51

## 2017-01-01 RX ADMIN — SODIUM CHLORIDE 50 MILLILITER(S): 9 INJECTION, SOLUTION INTRAVENOUS at 08:13

## 2017-01-01 RX ADMIN — HYDROMORPHONE HYDROCHLORIDE 0.2 MILLIGRAM(S): 2 INJECTION INTRAMUSCULAR; INTRAVENOUS; SUBCUTANEOUS at 08:55

## 2017-01-01 RX ADMIN — Medication 3 MILLILITER(S): at 23:54

## 2017-01-01 RX ADMIN — PANTOPRAZOLE SODIUM 40 MILLIGRAM(S): 20 TABLET, DELAYED RELEASE ORAL at 05:58

## 2017-01-01 RX ADMIN — MEROPENEM 200 MILLIGRAM(S): 1 INJECTION INTRAVENOUS at 05:00

## 2017-01-01 RX ADMIN — ENOXAPARIN SODIUM 90 MILLIGRAM(S): 100 INJECTION SUBCUTANEOUS at 12:23

## 2017-01-01 RX ADMIN — Medication 5 MILLIGRAM(S): at 17:15

## 2017-01-01 RX ADMIN — MORPHINE SULFATE 3 MG/HR: 50 CAPSULE, EXTENDED RELEASE ORAL at 13:26

## 2017-01-01 RX ADMIN — SODIUM CHLORIDE 2000 MILLILITER(S): 9 INJECTION INTRAMUSCULAR; INTRAVENOUS; SUBCUTANEOUS at 23:00

## 2017-01-01 RX ADMIN — SODIUM CHLORIDE 50 MILLILITER(S): 9 INJECTION, SOLUTION INTRAVENOUS at 22:23

## 2017-01-01 RX ADMIN — Medication 20 MILLIGRAM(S): at 08:12

## 2017-01-01 RX ADMIN — PIPERACILLIN AND TAZOBACTAM 25 GRAM(S): 4; .5 INJECTION, POWDER, LYOPHILIZED, FOR SOLUTION INTRAVENOUS at 18:01

## 2017-01-01 RX ADMIN — FENTANYL CITRATE 1 PATCH: 50 INJECTION INTRAVENOUS at 18:14

## 2017-01-01 RX ADMIN — Medication 3 MILLILITER(S): at 05:09

## 2017-01-01 RX ADMIN — ENOXAPARIN SODIUM 90 MILLIGRAM(S): 100 INJECTION SUBCUTANEOUS at 12:46

## 2017-01-01 RX ADMIN — Medication 250 MILLIGRAM(S): at 05:40

## 2017-01-01 RX ADMIN — Medication 100 MILLIGRAM(S): at 05:28

## 2017-01-01 RX ADMIN — OXYCODONE HYDROCHLORIDE 5 MILLIGRAM(S): 5 TABLET ORAL at 13:06

## 2017-01-01 RX ADMIN — Medication 25 MILLIGRAM(S): at 17:16

## 2017-01-01 RX ADMIN — Medication 1000 MILLIGRAM(S): at 01:15

## 2017-01-01 RX ADMIN — Medication 5 MILLIGRAM(S): at 21:10

## 2017-01-01 RX ADMIN — Medication 250 MILLIGRAM(S): at 05:57

## 2017-01-01 RX ADMIN — ROBINUL 0.4 MILLIGRAM(S): 0.2 INJECTION INTRAMUSCULAR; INTRAVENOUS at 18:14

## 2017-01-01 RX ADMIN — Medication 3 MILLILITER(S): at 23:42

## 2017-01-01 RX ADMIN — HEPARIN SODIUM 15 UNIT(S)/HR: 5000 INJECTION INTRAVENOUS; SUBCUTANEOUS at 18:03

## 2017-01-01 RX ADMIN — Medication 10 MILLIGRAM(S): at 10:24

## 2017-01-01 RX ADMIN — OXYCODONE HYDROCHLORIDE 10 MILLIGRAM(S): 5 TABLET ORAL at 08:00

## 2017-01-01 RX ADMIN — PIPERACILLIN AND TAZOBACTAM 25 GRAM(S): 4; .5 INJECTION, POWDER, LYOPHILIZED, FOR SOLUTION INTRAVENOUS at 05:18

## 2017-01-01 RX ADMIN — Medication 5 MILLIGRAM(S): at 11:30

## 2017-01-01 RX ADMIN — ALBUTEROL 2.5 MILLIGRAM(S): 90 AEROSOL, METERED ORAL at 04:34

## 2017-01-01 RX ADMIN — Medication 400 MILLIGRAM(S): at 22:05

## 2017-01-01 RX ADMIN — PIPERACILLIN AND TAZOBACTAM 25 GRAM(S): 4; .5 INJECTION, POWDER, LYOPHILIZED, FOR SOLUTION INTRAVENOUS at 05:10

## 2017-01-01 RX ADMIN — Medication 30 MILLILITER(S): at 03:15

## 2017-01-01 RX ADMIN — Medication 2.5 MILLIGRAM(S): at 06:09

## 2017-01-01 RX ADMIN — OXYCODONE HYDROCHLORIDE 10 MILLIGRAM(S): 5 TABLET ORAL at 18:15

## 2017-01-01 RX ADMIN — Medication 250 MILLIGRAM(S): at 05:24

## 2017-01-01 RX ADMIN — OXYCODONE HYDROCHLORIDE 5 MILLIGRAM(S): 5 TABLET ORAL at 07:15

## 2017-01-01 RX ADMIN — HYDROMORPHONE HYDROCHLORIDE 0.2 MILLIGRAM(S): 2 INJECTION INTRAMUSCULAR; INTRAVENOUS; SUBCUTANEOUS at 23:45

## 2017-01-01 RX ADMIN — Medication 3 MILLILITER(S): at 05:12

## 2017-01-01 RX ADMIN — Medication 200 GRAM(S): at 05:52

## 2017-01-01 RX ADMIN — Medication 3 MILLILITER(S): at 12:07

## 2017-01-01 RX ADMIN — OXYCODONE HYDROCHLORIDE 5 MILLIGRAM(S): 5 TABLET ORAL at 06:00

## 2017-01-01 RX ADMIN — HEPARIN SODIUM 10 UNIT(S)/HR: 5000 INJECTION INTRAVENOUS; SUBCUTANEOUS at 19:15

## 2017-01-01 RX ADMIN — OXYCODONE HYDROCHLORIDE 5 MILLIGRAM(S): 5 TABLET ORAL at 18:15

## 2017-01-01 RX ADMIN — Medication 5 MILLIGRAM(S): at 23:45

## 2017-01-01 RX ADMIN — SODIUM CHLORIDE 30 MILLILITER(S): 9 INJECTION, SOLUTION INTRAVENOUS at 17:51

## 2017-01-01 RX ADMIN — ENOXAPARIN SODIUM 90 MILLIGRAM(S): 100 INJECTION SUBCUTANEOUS at 05:58

## 2017-01-01 RX ADMIN — OXYCODONE HYDROCHLORIDE 10 MILLIGRAM(S): 5 TABLET ORAL at 22:00

## 2017-01-01 RX ADMIN — HYDROMORPHONE HYDROCHLORIDE 0.5 MILLIGRAM(S): 2 INJECTION INTRAMUSCULAR; INTRAVENOUS; SUBCUTANEOUS at 10:30

## 2017-01-01 RX ADMIN — Medication 1000 MILLIGRAM(S): at 16:00

## 2017-01-01 RX ADMIN — MIDAZOLAM HYDROCHLORIDE 2 MILLIGRAM(S): 1 INJECTION, SOLUTION INTRAMUSCULAR; INTRAVENOUS at 16:45

## 2017-01-01 RX ADMIN — HYDROMORPHONE HYDROCHLORIDE 30 MILLILITER(S): 2 INJECTION INTRAMUSCULAR; INTRAVENOUS; SUBCUTANEOUS at 18:03

## 2017-01-01 RX ADMIN — Medication 20 MILLIGRAM(S): at 08:30

## 2017-01-01 RX ADMIN — OXYCODONE HYDROCHLORIDE 10 MILLIGRAM(S): 5 TABLET ORAL at 02:25

## 2017-01-01 RX ADMIN — Medication 3 MILLILITER(S): at 05:29

## 2017-01-01 RX ADMIN — Medication 4 MILLIGRAM(S): at 18:28

## 2017-01-01 RX ADMIN — MORPHINE SULFATE 4 MILLIGRAM(S): 50 CAPSULE, EXTENDED RELEASE ORAL at 08:25

## 2017-01-01 RX ADMIN — SODIUM CHLORIDE 30 MILLILITER(S): 9 INJECTION, SOLUTION INTRAVENOUS at 21:41

## 2017-01-01 RX ADMIN — Medication 25 MILLIGRAM(S): at 05:53

## 2017-01-01 RX ADMIN — Medication 3 MILLILITER(S): at 17:06

## 2017-01-01 RX ADMIN — PANTOPRAZOLE SODIUM 40 MILLIGRAM(S): 20 TABLET, DELAYED RELEASE ORAL at 05:19

## 2017-01-01 RX ADMIN — Medication 400 GRAM(S): at 04:53

## 2017-01-01 RX ADMIN — FENTANYL CITRATE 4 MICROGRAM(S)/KG/HR: 50 INJECTION INTRAVENOUS at 10:30

## 2017-01-01 RX ADMIN — Medication 250 MILLIGRAM(S): at 18:49

## 2017-01-01 RX ADMIN — ONDANSETRON 4 MILLIGRAM(S): 8 TABLET, FILM COATED ORAL at 17:02

## 2017-01-01 RX ADMIN — HEPARIN SODIUM 14 UNIT(S)/HR: 5000 INJECTION INTRAVENOUS; SUBCUTANEOUS at 22:23

## 2017-01-01 RX ADMIN — HYDROMORPHONE HYDROCHLORIDE 0.5 MILLIGRAM(S): 2 INJECTION INTRAMUSCULAR; INTRAVENOUS; SUBCUTANEOUS at 06:45

## 2017-01-01 RX ADMIN — SODIUM CHLORIDE 30 MILLILITER(S): 9 INJECTION, SOLUTION INTRAVENOUS at 16:06

## 2017-01-01 RX ADMIN — ROBINUL 0.4 MILLIGRAM(S): 0.2 INJECTION INTRAMUSCULAR; INTRAVENOUS at 11:34

## 2017-01-01 RX ADMIN — Medication 3 MILLILITER(S): at 11:24

## 2017-01-01 RX ADMIN — Medication 5 MILLIGRAM(S): at 23:30

## 2017-01-01 RX ADMIN — Medication 5 MILLIGRAM(S): at 05:28

## 2017-01-01 RX ADMIN — Medication 5 MILLIGRAM(S): at 18:22

## 2017-01-01 RX ADMIN — Medication 1000 MILLIGRAM(S): at 04:22

## 2017-01-01 RX ADMIN — ENOXAPARIN SODIUM 90 MILLIGRAM(S): 100 INJECTION SUBCUTANEOUS at 17:56

## 2017-01-01 RX ADMIN — HYDROMORPHONE HYDROCHLORIDE 0.5 MILLIGRAM(S): 2 INJECTION INTRAMUSCULAR; INTRAVENOUS; SUBCUTANEOUS at 22:00

## 2017-01-01 RX ADMIN — PANTOPRAZOLE SODIUM 40 MILLIGRAM(S): 20 TABLET, DELAYED RELEASE ORAL at 11:46

## 2017-01-01 RX ADMIN — Medication 100 MILLIGRAM(S): at 18:12

## 2017-01-01 RX ADMIN — Medication 5 MILLIGRAM(S): at 05:25

## 2017-01-01 RX ADMIN — Medication 3 MILLILITER(S): at 00:10

## 2017-01-01 RX ADMIN — HYDROMORPHONE HYDROCHLORIDE 0.5 MILLIGRAM(S): 2 INJECTION INTRAMUSCULAR; INTRAVENOUS; SUBCUTANEOUS at 20:32

## 2017-01-01 RX ADMIN — PANTOPRAZOLE SODIUM 40 MILLIGRAM(S): 20 TABLET, DELAYED RELEASE ORAL at 17:56

## 2017-01-01 RX ADMIN — HYDROMORPHONE HYDROCHLORIDE 30 MILLILITER(S): 2 INJECTION INTRAMUSCULAR; INTRAVENOUS; SUBCUTANEOUS at 12:54

## 2017-01-01 RX ADMIN — SPIRONOLACTONE 25 MILLIGRAM(S): 25 TABLET, FILM COATED ORAL at 05:31

## 2017-01-01 RX ADMIN — MORPHINE SULFATE 4 MILLIGRAM(S): 50 CAPSULE, EXTENDED RELEASE ORAL at 01:56

## 2017-01-01 RX ADMIN — HYDROMORPHONE HYDROCHLORIDE 0.2 MILLIGRAM(S): 2 INJECTION INTRAMUSCULAR; INTRAVENOUS; SUBCUTANEOUS at 01:39

## 2017-01-01 RX ADMIN — MEROPENEM 200 MILLIGRAM(S): 1 INJECTION INTRAVENOUS at 05:19

## 2017-01-01 RX ADMIN — Medication 5 MILLIGRAM(S): at 23:34

## 2017-01-01 RX ADMIN — PIPERACILLIN AND TAZOBACTAM 25 GRAM(S): 4; .5 INJECTION, POWDER, LYOPHILIZED, FOR SOLUTION INTRAVENOUS at 10:41

## 2017-01-01 RX ADMIN — Medication 5 MILLIGRAM(S): at 05:01

## 2017-01-01 RX ADMIN — Medication 250 MILLIGRAM(S): at 17:21

## 2017-01-01 RX ADMIN — Medication 5 MILLIGRAM(S): at 19:59

## 2017-01-01 RX ADMIN — Medication 3 MILLILITER(S): at 17:41

## 2017-01-01 RX ADMIN — SODIUM CHLORIDE 30 MILLILITER(S): 9 INJECTION, SOLUTION INTRAVENOUS at 05:25

## 2017-01-01 RX ADMIN — Medication 3 MILLILITER(S): at 05:48

## 2017-01-01 RX ADMIN — PIPERACILLIN AND TAZOBACTAM 25 GRAM(S): 4; .5 INJECTION, POWDER, LYOPHILIZED, FOR SOLUTION INTRAVENOUS at 19:53

## 2017-01-01 RX ADMIN — OXYCODONE HYDROCHLORIDE 5 MILLIGRAM(S): 5 TABLET ORAL at 11:30

## 2017-01-01 RX ADMIN — OXYCODONE HYDROCHLORIDE 5 MILLIGRAM(S): 5 TABLET ORAL at 13:35

## 2017-01-01 RX ADMIN — Medication 3 MILLILITER(S): at 12:20

## 2017-01-01 RX ADMIN — SODIUM CHLORIDE 75 MILLILITER(S): 9 INJECTION, SOLUTION INTRAVENOUS at 18:10

## 2017-01-01 RX ADMIN — DEXTROSE MONOHYDRATE, SODIUM CHLORIDE, AND POTASSIUM CHLORIDE 75 MILLILITER(S): 50; .745; 4.5 INJECTION, SOLUTION INTRAVENOUS at 18:28

## 2017-01-01 RX ADMIN — PIPERACILLIN AND TAZOBACTAM 25 GRAM(S): 4; .5 INJECTION, POWDER, LYOPHILIZED, FOR SOLUTION INTRAVENOUS at 18:31

## 2017-01-01 RX ADMIN — MEROPENEM 200 MILLIGRAM(S): 1 INJECTION INTRAVENOUS at 14:01

## 2017-01-01 RX ADMIN — Medication 650 MILLIGRAM(S): at 10:39

## 2017-01-01 RX ADMIN — OXYCODONE HYDROCHLORIDE 10 MILLIGRAM(S): 5 TABLET ORAL at 19:39

## 2017-01-01 RX ADMIN — Medication 400 MILLIGRAM(S): at 22:41

## 2017-01-01 RX ADMIN — Medication 25 MILLILITER(S): at 15:22

## 2017-01-01 RX ADMIN — PANTOPRAZOLE SODIUM 40 MILLIGRAM(S): 20 TABLET, DELAYED RELEASE ORAL at 05:40

## 2017-01-01 RX ADMIN — HYDROMORPHONE HYDROCHLORIDE 30 MILLILITER(S): 2 INJECTION INTRAMUSCULAR; INTRAVENOUS; SUBCUTANEOUS at 19:26

## 2017-01-01 RX ADMIN — SODIUM CHLORIDE 125 MILLILITER(S): 9 INJECTION, SOLUTION INTRAVENOUS at 19:53

## 2017-01-01 RX ADMIN — Medication 5 MILLIGRAM(S): at 16:38

## 2017-01-01 RX ADMIN — Medication 3 MILLILITER(S): at 23:41

## 2017-01-01 RX ADMIN — ENOXAPARIN SODIUM 90 MILLIGRAM(S): 100 INJECTION SUBCUTANEOUS at 14:42

## 2017-01-01 RX ADMIN — Medication 5 MILLIGRAM(S): at 04:10

## 2017-01-01 RX ADMIN — SODIUM CHLORIDE 85 MILLILITER(S): 9 INJECTION INTRAMUSCULAR; INTRAVENOUS; SUBCUTANEOUS at 20:05

## 2017-01-01 RX ADMIN — Medication 250 MILLIGRAM(S): at 05:19

## 2017-01-01 RX ADMIN — HYDROMORPHONE HYDROCHLORIDE 0.5 MILLIGRAM(S): 2 INJECTION INTRAMUSCULAR; INTRAVENOUS; SUBCUTANEOUS at 14:48

## 2017-01-01 RX ADMIN — SENNA PLUS 2 TABLET(S): 8.6 TABLET ORAL at 21:57

## 2017-01-01 RX ADMIN — MEROPENEM 200 MILLIGRAM(S): 1 INJECTION INTRAVENOUS at 13:23

## 2017-01-01 RX ADMIN — OXYCODONE HYDROCHLORIDE 10 MILLIGRAM(S): 5 TABLET ORAL at 07:55

## 2017-01-01 RX ADMIN — SENNA PLUS 2 TABLET(S): 8.6 TABLET ORAL at 21:12

## 2017-01-01 RX ADMIN — HYDROMORPHONE HYDROCHLORIDE 0.2 MILLIGRAM(S): 2 INJECTION INTRAMUSCULAR; INTRAVENOUS; SUBCUTANEOUS at 00:51

## 2017-01-01 RX ADMIN — OXYCODONE HYDROCHLORIDE 10 MILLIGRAM(S): 5 TABLET ORAL at 16:46

## 2017-01-01 RX ADMIN — Medication 3 MILLILITER(S): at 00:04

## 2017-01-01 RX ADMIN — OXYCODONE HYDROCHLORIDE 10 MILLIGRAM(S): 5 TABLET ORAL at 21:11

## 2017-01-01 RX ADMIN — Medication 100 MILLIGRAM(S): at 17:34

## 2017-01-01 RX ADMIN — Medication 5 MILLIGRAM(S): at 14:15

## 2017-01-01 RX ADMIN — MORPHINE SULFATE 4 MILLIGRAM(S): 50 CAPSULE, EXTENDED RELEASE ORAL at 02:48

## 2017-01-01 RX ADMIN — Medication 4 MILLIGRAM(S): at 14:06

## 2017-01-01 RX ADMIN — Medication 3 MILLILITER(S): at 12:37

## 2017-01-01 RX ADMIN — PIPERACILLIN AND TAZOBACTAM 25 GRAM(S): 4; .5 INJECTION, POWDER, LYOPHILIZED, FOR SOLUTION INTRAVENOUS at 03:01

## 2017-01-01 RX ADMIN — Medication 250 MILLIGRAM(S): at 18:01

## 2017-01-01 RX ADMIN — PIPERACILLIN AND TAZOBACTAM 25 GRAM(S): 4; .5 INJECTION, POWDER, LYOPHILIZED, FOR SOLUTION INTRAVENOUS at 02:40

## 2017-01-01 RX ADMIN — SODIUM CHLORIDE 100 MILLILITER(S): 9 INJECTION, SOLUTION INTRAVENOUS at 10:30

## 2017-01-01 RX ADMIN — MICAFUNGIN SODIUM 105 MILLIGRAM(S): 100 INJECTION, POWDER, LYOPHILIZED, FOR SOLUTION INTRAVENOUS at 11:29

## 2017-01-01 RX ADMIN — MORPHINE SULFATE 2 MILLIGRAM(S): 50 CAPSULE, EXTENDED RELEASE ORAL at 10:05

## 2017-01-01 RX ADMIN — MORPHINE SULFATE 4 MILLIGRAM(S): 50 CAPSULE, EXTENDED RELEASE ORAL at 07:45

## 2017-01-01 RX ADMIN — HYDROMORPHONE HYDROCHLORIDE 30 MILLILITER(S): 2 INJECTION INTRAMUSCULAR; INTRAVENOUS; SUBCUTANEOUS at 07:16

## 2017-01-01 RX ADMIN — PHENYLEPHRINE HYDROCHLORIDE 12.71 MICROGRAM(S)/KG/MIN: 10 INJECTION INTRAVENOUS at 12:46

## 2017-01-01 RX ADMIN — MORPHINE SULFATE 4 MILLIGRAM(S): 50 CAPSULE, EXTENDED RELEASE ORAL at 14:00

## 2017-01-01 RX ADMIN — MEROPENEM 200 MILLIGRAM(S): 1 INJECTION INTRAVENOUS at 06:21

## 2017-01-01 RX ADMIN — Medication 3 MILLILITER(S): at 11:53

## 2017-01-01 RX ADMIN — Medication 1000 MILLIGRAM(S): at 22:30

## 2017-01-01 RX ADMIN — SODIUM CHLORIDE 75 MILLILITER(S): 9 INJECTION, SOLUTION INTRAVENOUS at 14:00

## 2017-01-01 RX ADMIN — Medication 3 MILLILITER(S): at 11:38

## 2017-01-01 RX ADMIN — SODIUM CHLORIDE 30 MILLILITER(S): 9 INJECTION, SOLUTION INTRAVENOUS at 23:35

## 2017-01-01 RX ADMIN — Medication 250 MILLIGRAM(S): at 05:20

## 2017-01-01 RX ADMIN — HYDROMORPHONE HYDROCHLORIDE 0.5 MILLIGRAM(S): 2 INJECTION INTRAMUSCULAR; INTRAVENOUS; SUBCUTANEOUS at 05:23

## 2017-01-01 RX ADMIN — Medication 50 MILLILITER(S): at 12:45

## 2017-01-01 RX ADMIN — Medication 3 MILLILITER(S): at 11:29

## 2017-01-01 RX ADMIN — Medication 150 MILLIGRAM(S): at 08:00

## 2017-01-01 RX ADMIN — Medication 3 MILLILITER(S): at 18:06

## 2017-01-01 RX ADMIN — Medication 100 MILLIGRAM(S): at 18:00

## 2017-01-01 RX ADMIN — Medication 1000 MILLIGRAM(S): at 09:30

## 2017-01-01 RX ADMIN — PIPERACILLIN AND TAZOBACTAM 25 GRAM(S): 4; .5 INJECTION, POWDER, LYOPHILIZED, FOR SOLUTION INTRAVENOUS at 19:47

## 2017-01-01 RX ADMIN — SODIUM CHLORIDE 1000 MILLILITER(S): 9 INJECTION, SOLUTION INTRAVENOUS at 19:10

## 2017-01-01 RX ADMIN — Medication 250 MILLIGRAM(S): at 17:25

## 2017-01-01 RX ADMIN — SODIUM CHLORIDE 30 MILLILITER(S): 9 INJECTION, SOLUTION INTRAVENOUS at 05:01

## 2017-01-01 RX ADMIN — Medication 4 MILLIGRAM(S): at 05:00

## 2017-01-01 RX ADMIN — Medication 3 MILLILITER(S): at 17:12

## 2017-01-01 RX ADMIN — MEROPENEM 200 MILLIGRAM(S): 1 INJECTION INTRAVENOUS at 16:06

## 2017-01-01 RX ADMIN — MORPHINE SULFATE 2 MG/HR: 50 CAPSULE, EXTENDED RELEASE ORAL at 11:33

## 2017-01-01 RX ADMIN — HYDROMORPHONE HYDROCHLORIDE 0.5 MILLIGRAM(S): 2 INJECTION INTRAMUSCULAR; INTRAVENOUS; SUBCUTANEOUS at 14:25

## 2017-01-01 RX ADMIN — POLYETHYLENE GLYCOL 3350 17 GRAM(S): 17 POWDER, FOR SOLUTION ORAL at 05:57

## 2017-01-01 RX ADMIN — HYDROMORPHONE HYDROCHLORIDE 0.5 MILLIGRAM(S): 2 INJECTION INTRAMUSCULAR; INTRAVENOUS; SUBCUTANEOUS at 14:09

## 2017-01-01 RX ADMIN — OXYCODONE HYDROCHLORIDE 10 MILLIGRAM(S): 5 TABLET ORAL at 09:22

## 2017-01-01 RX ADMIN — SODIUM CHLORIDE 125 MILLILITER(S): 9 INJECTION, SOLUTION INTRAVENOUS at 19:22

## 2017-01-01 RX ADMIN — SODIUM CHLORIDE 85 MILLILITER(S): 9 INJECTION INTRAMUSCULAR; INTRAVENOUS; SUBCUTANEOUS at 23:16

## 2017-01-01 RX ADMIN — GABAPENTIN 200 MILLIGRAM(S): 400 CAPSULE ORAL at 18:13

## 2017-01-01 RX ADMIN — MEROPENEM 200 MILLIGRAM(S): 1 INJECTION INTRAVENOUS at 13:38

## 2017-01-01 RX ADMIN — HYDROMORPHONE HYDROCHLORIDE 0.5 MILLIGRAM(S): 2 INJECTION INTRAMUSCULAR; INTRAVENOUS; SUBCUTANEOUS at 20:16

## 2017-01-01 RX ADMIN — MORPHINE SULFATE 2 MG/HR: 50 CAPSULE, EXTENDED RELEASE ORAL at 12:30

## 2017-01-01 RX ADMIN — HYDROMORPHONE HYDROCHLORIDE 2 MILLIGRAM(S): 2 INJECTION INTRAMUSCULAR; INTRAVENOUS; SUBCUTANEOUS at 23:26

## 2017-01-01 RX ADMIN — Medication 150 MILLIGRAM(S): at 05:24

## 2017-01-01 RX ADMIN — OXYCODONE HYDROCHLORIDE 5 MILLIGRAM(S): 5 TABLET ORAL at 20:39

## 2017-01-01 RX ADMIN — ONDANSETRON 4 MILLIGRAM(S): 8 TABLET, FILM COATED ORAL at 16:34

## 2017-01-01 RX ADMIN — ENOXAPARIN SODIUM 80 MILLIGRAM(S): 100 INJECTION SUBCUTANEOUS at 18:22

## 2017-01-01 RX ADMIN — OXYCODONE HYDROCHLORIDE 5 MILLIGRAM(S): 5 TABLET ORAL at 12:17

## 2017-01-01 RX ADMIN — Medication 400 MILLIGRAM(S): at 11:20

## 2017-01-01 RX ADMIN — OXYCODONE HYDROCHLORIDE 10 MILLIGRAM(S): 5 TABLET ORAL at 17:45

## 2017-01-01 RX ADMIN — HYDROMORPHONE HYDROCHLORIDE 30 MILLILITER(S): 2 INJECTION INTRAMUSCULAR; INTRAVENOUS; SUBCUTANEOUS at 19:18

## 2017-01-01 RX ADMIN — Medication 1000 MILLIGRAM(S): at 07:17

## 2017-01-01 RX ADMIN — HYDROMORPHONE HYDROCHLORIDE 0.5 MILLIGRAM(S): 2 INJECTION INTRAMUSCULAR; INTRAVENOUS; SUBCUTANEOUS at 02:14

## 2017-01-01 RX ADMIN — Medication 5 MILLIGRAM(S): at 23:22

## 2017-01-01 RX ADMIN — Medication 2.5 MILLIGRAM(S): at 06:12

## 2017-01-01 RX ADMIN — HYDROMORPHONE HYDROCHLORIDE 0.2 MILLIGRAM(S): 2 INJECTION INTRAMUSCULAR; INTRAVENOUS; SUBCUTANEOUS at 01:05

## 2017-01-01 RX ADMIN — Medication 1000 MILLIGRAM(S): at 21:29

## 2017-01-01 RX ADMIN — MEROPENEM 200 MILLIGRAM(S): 1 INJECTION INTRAVENOUS at 22:43

## 2017-01-01 RX ADMIN — Medication 1000 MILLIGRAM(S): at 10:45

## 2017-01-01 RX ADMIN — HYDROMORPHONE HYDROCHLORIDE 30 MILLILITER(S): 2 INJECTION INTRAMUSCULAR; INTRAVENOUS; SUBCUTANEOUS at 19:07

## 2017-01-01 RX ADMIN — OXYCODONE HYDROCHLORIDE 5 MILLIGRAM(S): 5 TABLET ORAL at 03:38

## 2017-01-01 RX ADMIN — OXYCODONE HYDROCHLORIDE 5 MILLIGRAM(S): 5 TABLET ORAL at 03:00

## 2017-01-01 RX ADMIN — PANTOPRAZOLE SODIUM 40 MILLIGRAM(S): 20 TABLET, DELAYED RELEASE ORAL at 13:29

## 2017-01-01 RX ADMIN — PANTOPRAZOLE SODIUM 40 MILLIGRAM(S): 20 TABLET, DELAYED RELEASE ORAL at 11:23

## 2017-01-01 RX ADMIN — MEROPENEM 200 MILLIGRAM(S): 1 INJECTION INTRAVENOUS at 21:06

## 2017-01-01 RX ADMIN — Medication 0.25 MILLIGRAM(S): at 02:17

## 2017-01-01 RX ADMIN — HYDROMORPHONE HYDROCHLORIDE 0.2 MILLIGRAM(S): 2 INJECTION INTRAMUSCULAR; INTRAVENOUS; SUBCUTANEOUS at 17:50

## 2017-01-01 RX ADMIN — Medication 3 MILLILITER(S): at 11:43

## 2017-01-01 RX ADMIN — Medication 25 MILLIGRAM(S): at 05:28

## 2017-01-01 RX ADMIN — Medication 3 MILLILITER(S): at 11:59

## 2017-01-01 RX ADMIN — Medication 5 MILLIGRAM(S): at 11:45

## 2017-01-01 RX ADMIN — Medication 3 MILLILITER(S): at 05:24

## 2017-01-01 RX ADMIN — Medication 100 MILLIGRAM(S): at 13:04

## 2017-01-01 RX ADMIN — SODIUM CHLORIDE 50 MILLILITER(S): 9 INJECTION, SOLUTION INTRAVENOUS at 17:56

## 2017-01-01 RX ADMIN — PIPERACILLIN AND TAZOBACTAM 200 GRAM(S): 4; .5 INJECTION, POWDER, LYOPHILIZED, FOR SOLUTION INTRAVENOUS at 11:31

## 2017-01-01 RX ADMIN — Medication 3 MILLILITER(S): at 00:25

## 2017-01-01 RX ADMIN — Medication 250 MILLIGRAM(S): at 18:19

## 2017-01-01 RX ADMIN — Medication 125 MILLILITER(S): at 14:31

## 2017-01-01 RX ADMIN — HYDROMORPHONE HYDROCHLORIDE 0.5 MILLIGRAM(S): 2 INJECTION INTRAMUSCULAR; INTRAVENOUS; SUBCUTANEOUS at 19:45

## 2017-01-01 RX ADMIN — Medication 5 MILLIGRAM(S): at 11:44

## 2017-01-01 RX ADMIN — Medication 3 MILLILITER(S): at 00:01

## 2017-01-01 RX ADMIN — Medication 3 MILLILITER(S): at 23:12

## 2017-01-01 RX ADMIN — HYDROMORPHONE HYDROCHLORIDE 0.5 MILLIGRAM(S): 2 INJECTION INTRAMUSCULAR; INTRAVENOUS; SUBCUTANEOUS at 13:20

## 2017-01-01 RX ADMIN — HYDROMORPHONE HYDROCHLORIDE 0.5 MILLIGRAM(S): 2 INJECTION INTRAMUSCULAR; INTRAVENOUS; SUBCUTANEOUS at 07:30

## 2017-01-01 RX ADMIN — SODIUM CHLORIDE 125 MILLILITER(S): 9 INJECTION INTRAMUSCULAR; INTRAVENOUS; SUBCUTANEOUS at 19:54

## 2017-01-01 RX ADMIN — HALOPERIDOL DECANOATE 2.5 MILLIGRAM(S): 100 INJECTION INTRAMUSCULAR at 22:32

## 2017-01-01 RX ADMIN — Medication 25 MILLIGRAM(S): at 18:13

## 2017-01-01 RX ADMIN — CHLORHEXIDINE GLUCONATE 15 MILLILITER(S): 213 SOLUTION TOPICAL at 11:25

## 2017-01-01 RX ADMIN — HYDROMORPHONE HYDROCHLORIDE 0.5 MILLIGRAM(S): 2 INJECTION INTRAMUSCULAR; INTRAVENOUS; SUBCUTANEOUS at 14:33

## 2017-01-01 RX ADMIN — MICAFUNGIN SODIUM 105 MILLIGRAM(S): 100 INJECTION, POWDER, LYOPHILIZED, FOR SOLUTION INTRAVENOUS at 12:12

## 2017-01-01 RX ADMIN — Medication 3 MILLILITER(S): at 17:53

## 2017-01-01 RX ADMIN — OXYCODONE HYDROCHLORIDE 10 MILLIGRAM(S): 5 TABLET ORAL at 20:00

## 2017-01-01 RX ADMIN — OXYCODONE HYDROCHLORIDE 5 MILLIGRAM(S): 5 TABLET ORAL at 11:01

## 2017-01-01 RX ADMIN — ENOXAPARIN SODIUM 90 MILLIGRAM(S): 100 INJECTION SUBCUTANEOUS at 11:23

## 2017-01-01 RX ADMIN — FLUCONAZOLE 100 MILLIGRAM(S): 150 TABLET ORAL at 22:54

## 2017-01-01 RX ADMIN — Medication 3 MILLILITER(S): at 00:23

## 2017-01-01 RX ADMIN — OXYCODONE HYDROCHLORIDE 10 MILLIGRAM(S): 5 TABLET ORAL at 10:50

## 2017-01-01 RX ADMIN — HYDROMORPHONE HYDROCHLORIDE 30 MILLILITER(S): 2 INJECTION INTRAMUSCULAR; INTRAVENOUS; SUBCUTANEOUS at 08:15

## 2017-01-01 RX ADMIN — Medication 3 MILLILITER(S): at 00:53

## 2017-01-01 RX ADMIN — OXYCODONE HYDROCHLORIDE 5 MILLIGRAM(S): 5 TABLET ORAL at 12:53

## 2017-01-01 RX ADMIN — Medication 100 MILLIGRAM(S): at 05:31

## 2017-01-01 RX ADMIN — MORPHINE SULFATE 2 MILLIGRAM(S): 50 CAPSULE, EXTENDED RELEASE ORAL at 07:34

## 2017-01-01 RX ADMIN — PIPERACILLIN AND TAZOBACTAM 25 GRAM(S): 4; .5 INJECTION, POWDER, LYOPHILIZED, FOR SOLUTION INTRAVENOUS at 12:55

## 2017-01-01 RX ADMIN — Medication 3 MILLILITER(S): at 17:39

## 2017-01-01 RX ADMIN — MEROPENEM 200 MILLIGRAM(S): 1 INJECTION INTRAVENOUS at 21:12

## 2017-01-01 RX ADMIN — PIPERACILLIN AND TAZOBACTAM 25 GRAM(S): 4; .5 INJECTION, POWDER, LYOPHILIZED, FOR SOLUTION INTRAVENOUS at 18:24

## 2017-01-01 RX ADMIN — Medication 3 MILLILITER(S): at 06:13

## 2017-01-01 RX ADMIN — PHENYLEPHRINE HYDROCHLORIDE 12.71 MICROGRAM(S)/KG/MIN: 10 INJECTION INTRAVENOUS at 17:56

## 2017-01-01 RX ADMIN — HYDROMORPHONE HYDROCHLORIDE 0.5 MILLIGRAM(S): 2 INJECTION INTRAMUSCULAR; INTRAVENOUS; SUBCUTANEOUS at 18:25

## 2017-01-01 RX ADMIN — Medication 2.5 MILLIGRAM(S): at 06:33

## 2017-01-01 RX ADMIN — SODIUM CHLORIDE 100 MILLILITER(S): 9 INJECTION, SOLUTION INTRAVENOUS at 18:50

## 2017-01-01 RX ADMIN — Medication 400 MILLIGRAM(S): at 10:18

## 2017-01-01 RX ADMIN — SODIUM CHLORIDE 50 MILLILITER(S): 9 INJECTION, SOLUTION INTRAVENOUS at 23:49

## 2017-01-01 RX ADMIN — Medication 4 MILLIGRAM(S): at 05:28

## 2017-01-01 RX ADMIN — Medication 100 MILLIGRAM(S): at 05:53

## 2017-01-01 RX ADMIN — Medication 400 MILLIGRAM(S): at 21:12

## 2017-01-01 RX ADMIN — Medication 25 MILLIGRAM(S): at 18:00

## 2017-01-01 RX ADMIN — MEROPENEM 200 MILLIGRAM(S): 1 INJECTION INTRAVENOUS at 14:58

## 2017-01-01 RX ADMIN — Medication 3 MILLILITER(S): at 06:42

## 2017-01-01 RX ADMIN — Medication 3 MILLILITER(S): at 23:15

## 2017-01-01 RX ADMIN — HYDROMORPHONE HYDROCHLORIDE 0.5 MILLIGRAM(S): 2 INJECTION INTRAMUSCULAR; INTRAVENOUS; SUBCUTANEOUS at 04:25

## 2017-01-01 RX ADMIN — Medication 3 MILLILITER(S): at 17:30

## 2017-01-01 RX ADMIN — OXYCODONE HYDROCHLORIDE 5 MILLIGRAM(S): 5 TABLET ORAL at 23:01

## 2017-01-01 RX ADMIN — Medication 1000 MILLIGRAM(S): at 17:15

## 2017-01-01 RX ADMIN — Medication 250 MILLIGRAM(S): at 05:49

## 2017-01-01 RX ADMIN — HYDROMORPHONE HYDROCHLORIDE 0.2 MILLIGRAM(S): 2 INJECTION INTRAMUSCULAR; INTRAVENOUS; SUBCUTANEOUS at 03:44

## 2017-01-01 RX ADMIN — Medication 3 MILLILITER(S): at 17:31

## 2017-01-01 RX ADMIN — Medication 1000 MILLIGRAM(S): at 07:22

## 2017-01-01 RX ADMIN — HYDROMORPHONE HYDROCHLORIDE 0.2 MILLIGRAM(S): 2 INJECTION INTRAMUSCULAR; INTRAVENOUS; SUBCUTANEOUS at 00:00

## 2017-01-01 RX ADMIN — Medication 3 MILLILITER(S): at 12:35

## 2017-01-01 RX ADMIN — HEPARIN SODIUM 11 UNIT(S)/HR: 5000 INJECTION INTRAVENOUS; SUBCUTANEOUS at 02:00

## 2017-01-01 RX ADMIN — DEXTROSE MONOHYDRATE, SODIUM CHLORIDE, AND POTASSIUM CHLORIDE 75 MILLILITER(S): 50; .745; 4.5 INJECTION, SOLUTION INTRAVENOUS at 16:15

## 2017-01-01 RX ADMIN — Medication 400 MILLIGRAM(S): at 15:20

## 2017-01-01 RX ADMIN — FLUCONAZOLE 100 MILLIGRAM(S): 150 TABLET ORAL at 22:00

## 2017-01-01 RX ADMIN — PANTOPRAZOLE SODIUM 40 MILLIGRAM(S): 20 TABLET, DELAYED RELEASE ORAL at 06:12

## 2017-01-01 RX ADMIN — SENNA PLUS 2 TABLET(S): 8.6 TABLET ORAL at 23:08

## 2017-01-01 RX ADMIN — Medication 1000 MILLIGRAM(S): at 11:35

## 2017-01-01 RX ADMIN — HYDROMORPHONE HYDROCHLORIDE 30 MILLILITER(S): 2 INJECTION INTRAMUSCULAR; INTRAVENOUS; SUBCUTANEOUS at 19:20

## 2017-01-01 RX ADMIN — SODIUM CHLORIDE 30 MILLILITER(S): 9 INJECTION, SOLUTION INTRAVENOUS at 05:58

## 2017-01-01 RX ADMIN — Medication 3 MILLILITER(S): at 12:13

## 2017-01-01 RX ADMIN — FENTANYL CITRATE 1 PATCH: 50 INJECTION INTRAVENOUS at 10:00

## 2017-01-01 RX ADMIN — Medication 3 MILLILITER(S): at 05:36

## 2017-01-01 RX ADMIN — Medication 4 MILLIGRAM(S): at 06:06

## 2017-01-01 RX ADMIN — MICAFUNGIN SODIUM 105 MILLIGRAM(S): 100 INJECTION, POWDER, LYOPHILIZED, FOR SOLUTION INTRAVENOUS at 11:15

## 2017-01-01 RX ADMIN — Medication 5 MILLIGRAM(S): at 05:35

## 2017-01-01 RX ADMIN — Medication 3 MILLILITER(S): at 23:18

## 2017-01-01 RX ADMIN — Medication 5 MILLIGRAM(S): at 12:54

## 2017-01-01 RX ADMIN — Medication 255 MILLIMOLE(S): at 18:58

## 2017-01-01 RX ADMIN — SENNA PLUS 2 TABLET(S): 8.6 TABLET ORAL at 21:27

## 2017-01-01 RX ADMIN — HYDROMORPHONE HYDROCHLORIDE 30 MILLILITER(S): 2 INJECTION INTRAMUSCULAR; INTRAVENOUS; SUBCUTANEOUS at 07:15

## 2017-01-01 RX ADMIN — Medication 3 MILLILITER(S): at 05:43

## 2017-01-01 RX ADMIN — HYDROMORPHONE HYDROCHLORIDE 0.5 MILLIGRAM(S): 2 INJECTION INTRAMUSCULAR; INTRAVENOUS; SUBCUTANEOUS at 01:25

## 2017-01-01 RX ADMIN — ENOXAPARIN SODIUM 40 MILLIGRAM(S): 100 INJECTION SUBCUTANEOUS at 23:11

## 2017-01-01 RX ADMIN — ENOXAPARIN SODIUM 80 MILLIGRAM(S): 100 INJECTION SUBCUTANEOUS at 05:42

## 2017-01-01 RX ADMIN — HYDROMORPHONE HYDROCHLORIDE 0.5 MILLIGRAM(S): 2 INJECTION INTRAMUSCULAR; INTRAVENOUS; SUBCUTANEOUS at 10:50

## 2017-01-01 RX ADMIN — Medication 5 MILLIGRAM(S): at 00:05

## 2017-01-01 RX ADMIN — Medication 3 MILLILITER(S): at 11:11

## 2017-01-01 RX ADMIN — ENOXAPARIN SODIUM 80 MILLIGRAM(S): 100 INJECTION SUBCUTANEOUS at 05:02

## 2017-01-01 RX ADMIN — HYDROMORPHONE HYDROCHLORIDE 0.2 MILLIGRAM(S): 2 INJECTION INTRAMUSCULAR; INTRAVENOUS; SUBCUTANEOUS at 18:05

## 2017-01-01 RX ADMIN — HYDROMORPHONE HYDROCHLORIDE 30 MILLILITER(S): 2 INJECTION INTRAMUSCULAR; INTRAVENOUS; SUBCUTANEOUS at 15:28

## 2017-01-01 RX ADMIN — Medication 5 MILLIGRAM(S): at 21:08

## 2017-01-01 RX ADMIN — PIPERACILLIN AND TAZOBACTAM 25 GRAM(S): 4; .5 INJECTION, POWDER, LYOPHILIZED, FOR SOLUTION INTRAVENOUS at 03:31

## 2017-01-01 RX ADMIN — FENTANYL CITRATE 1 PATCH: 50 INJECTION INTRAVENOUS at 10:50

## 2017-01-01 RX ADMIN — ENOXAPARIN SODIUM 90 MILLIGRAM(S): 100 INJECTION SUBCUTANEOUS at 23:06

## 2017-01-01 RX ADMIN — Medication 400 MILLIGRAM(S): at 07:02

## 2017-01-01 RX ADMIN — HYDROMORPHONE HYDROCHLORIDE 0.5 MILLIGRAM(S): 2 INJECTION INTRAMUSCULAR; INTRAVENOUS; SUBCUTANEOUS at 21:38

## 2017-01-01 RX ADMIN — MICAFUNGIN SODIUM 105 MILLIGRAM(S): 100 INJECTION, POWDER, LYOPHILIZED, FOR SOLUTION INTRAVENOUS at 11:11

## 2017-01-01 RX ADMIN — PANTOPRAZOLE SODIUM 40 MILLIGRAM(S): 20 TABLET, DELAYED RELEASE ORAL at 21:11

## 2017-01-01 RX ADMIN — SODIUM CHLORIDE 10 MILLILITER(S): 9 INJECTION, SOLUTION INTRAVENOUS at 11:43

## 2017-01-01 RX ADMIN — Medication 100 MILLIGRAM(S): at 06:21

## 2017-01-01 RX ADMIN — HYDROMORPHONE HYDROCHLORIDE 0.5 MILLIGRAM(S): 2 INJECTION INTRAMUSCULAR; INTRAVENOUS; SUBCUTANEOUS at 00:59

## 2017-01-01 RX ADMIN — Medication 250 MILLIGRAM(S): at 17:19

## 2017-01-01 RX ADMIN — HYDROMORPHONE HYDROCHLORIDE 0.5 MILLIGRAM(S): 2 INJECTION INTRAMUSCULAR; INTRAVENOUS; SUBCUTANEOUS at 08:15

## 2017-01-01 RX ADMIN — PANTOPRAZOLE SODIUM 40 MILLIGRAM(S): 20 TABLET, DELAYED RELEASE ORAL at 04:42

## 2017-01-01 RX ADMIN — Medication 3 MICROGRAM(S)/KG/MIN: at 19:54

## 2017-01-01 RX ADMIN — HYDROMORPHONE HYDROCHLORIDE 30 MILLILITER(S): 2 INJECTION INTRAMUSCULAR; INTRAVENOUS; SUBCUTANEOUS at 13:09

## 2017-01-01 RX ADMIN — Medication 3 MILLILITER(S): at 11:49

## 2017-01-01 RX ADMIN — Medication 10 MILLIGRAM(S): at 18:35

## 2017-01-01 RX ADMIN — HALOPERIDOL DECANOATE 2.5 MILLIGRAM(S): 100 INJECTION INTRAMUSCULAR at 05:53

## 2017-01-01 RX ADMIN — ENOXAPARIN SODIUM 80 MILLIGRAM(S): 100 INJECTION SUBCUTANEOUS at 18:19

## 2017-01-01 RX ADMIN — Medication 3 MILLILITER(S): at 17:11

## 2017-01-01 RX ADMIN — OXYCODONE HYDROCHLORIDE 5 MILLIGRAM(S): 5 TABLET ORAL at 08:33

## 2017-01-01 RX ADMIN — PANTOPRAZOLE SODIUM 40 MILLIGRAM(S): 20 TABLET, DELAYED RELEASE ORAL at 06:33

## 2017-01-01 RX ADMIN — Medication 3 MILLILITER(S): at 17:46

## 2017-01-01 RX ADMIN — MICAFUNGIN SODIUM 105 MILLIGRAM(S): 100 INJECTION, POWDER, LYOPHILIZED, FOR SOLUTION INTRAVENOUS at 14:15

## 2017-01-01 RX ADMIN — Medication 2.5 MILLIGRAM(S): at 18:12

## 2017-01-01 RX ADMIN — HEPARIN SODIUM 15 UNIT(S)/HR: 5000 INJECTION INTRAVENOUS; SUBCUTANEOUS at 05:20

## 2017-01-01 RX ADMIN — Medication 5 MILLIGRAM(S): at 03:21

## 2017-01-01 RX ADMIN — Medication 400 MILLIGRAM(S): at 09:30

## 2017-01-01 RX ADMIN — HYDROMORPHONE HYDROCHLORIDE 0.5 MILLIGRAM(S): 2 INJECTION INTRAMUSCULAR; INTRAVENOUS; SUBCUTANEOUS at 14:24

## 2017-01-01 RX ADMIN — OXYCODONE HYDROCHLORIDE 10 MILLIGRAM(S): 5 TABLET ORAL at 02:55

## 2017-01-01 RX ADMIN — Medication 400 MILLIGRAM(S): at 01:15

## 2017-01-01 RX ADMIN — HYDROMORPHONE HYDROCHLORIDE 0.2 MILLIGRAM(S): 2 INJECTION INTRAMUSCULAR; INTRAVENOUS; SUBCUTANEOUS at 08:40

## 2017-01-01 RX ADMIN — Medication 25 MILLIGRAM(S): at 05:31

## 2017-01-01 RX ADMIN — HYDROMORPHONE HYDROCHLORIDE 0.5 MILLIGRAM(S): 2 INJECTION INTRAMUSCULAR; INTRAVENOUS; SUBCUTANEOUS at 22:02

## 2017-01-01 RX ADMIN — SODIUM CHLORIDE 75 MILLILITER(S): 9 INJECTION, SOLUTION INTRAVENOUS at 05:57

## 2017-01-01 RX ADMIN — Medication 5 MILLIGRAM(S): at 23:14

## 2017-01-01 RX ADMIN — Medication 1000 MILLIGRAM(S): at 07:24

## 2017-01-01 RX ADMIN — PANTOPRAZOLE SODIUM 40 MILLIGRAM(S): 20 TABLET, DELAYED RELEASE ORAL at 05:25

## 2017-01-01 RX ADMIN — Medication 250 MILLIGRAM(S): at 12:47

## 2017-01-01 RX ADMIN — HYDROMORPHONE HYDROCHLORIDE 30 MILLILITER(S): 2 INJECTION INTRAMUSCULAR; INTRAVENOUS; SUBCUTANEOUS at 07:22

## 2017-01-01 RX ADMIN — SODIUM CHLORIDE 50 MILLILITER(S): 9 INJECTION, SOLUTION INTRAVENOUS at 05:02

## 2017-01-01 RX ADMIN — PHENYLEPHRINE HYDROCHLORIDE 30.04 MICROGRAM(S)/KG/MIN: 10 INJECTION INTRAVENOUS at 10:00

## 2017-01-01 RX ADMIN — Medication 25 MILLIGRAM(S): at 18:10

## 2017-01-01 RX ADMIN — Medication 3 MILLILITER(S): at 18:00

## 2017-01-01 RX ADMIN — HYDROMORPHONE HYDROCHLORIDE 0.2 MILLIGRAM(S): 2 INJECTION INTRAMUSCULAR; INTRAVENOUS; SUBCUTANEOUS at 23:38

## 2017-01-01 RX ADMIN — Medication 3 MILLILITER(S): at 05:56

## 2017-01-01 RX ADMIN — SODIUM CHLORIDE 85 MILLILITER(S): 9 INJECTION INTRAMUSCULAR; INTRAVENOUS; SUBCUTANEOUS at 13:11

## 2017-01-01 RX ADMIN — MICAFUNGIN SODIUM 105 MILLIGRAM(S): 100 INJECTION, POWDER, LYOPHILIZED, FOR SOLUTION INTRAVENOUS at 11:01

## 2017-01-01 RX ADMIN — Medication 3 MICROGRAM(S)/KG/MIN: at 11:51

## 2017-01-01 RX ADMIN — Medication 400 MILLIGRAM(S): at 06:52

## 2017-01-01 RX ADMIN — HYDROMORPHONE HYDROCHLORIDE 0.2 MILLIGRAM(S): 2 INJECTION INTRAMUSCULAR; INTRAVENOUS; SUBCUTANEOUS at 13:30

## 2017-01-01 RX ADMIN — Medication 3 MILLILITER(S): at 23:37

## 2017-01-01 RX ADMIN — Medication 100 MILLIGRAM(S): at 14:15

## 2017-01-01 RX ADMIN — HYDROMORPHONE HYDROCHLORIDE 0.5 MILLIGRAM(S): 2 INJECTION INTRAMUSCULAR; INTRAVENOUS; SUBCUTANEOUS at 14:30

## 2017-01-01 RX ADMIN — Medication 3 MICROGRAM(S)/KG/MIN: at 01:02

## 2017-01-01 RX ADMIN — SPIRONOLACTONE 25 MILLIGRAM(S): 25 TABLET, FILM COATED ORAL at 06:21

## 2017-01-01 RX ADMIN — Medication 20 MILLIGRAM(S): at 07:56

## 2017-01-01 RX ADMIN — HYDROMORPHONE HYDROCHLORIDE 0.5 MILLIGRAM(S): 2 INJECTION INTRAMUSCULAR; INTRAVENOUS; SUBCUTANEOUS at 01:30

## 2017-01-01 RX ADMIN — OXYCODONE HYDROCHLORIDE 10 MILLIGRAM(S): 5 TABLET ORAL at 08:30

## 2017-01-01 RX ADMIN — OXYCODONE HYDROCHLORIDE 10 MILLIGRAM(S): 5 TABLET ORAL at 12:30

## 2017-01-01 RX ADMIN — Medication 4 MILLIGRAM(S): at 12:30

## 2017-01-01 RX ADMIN — ENOXAPARIN SODIUM 90 MILLIGRAM(S): 100 INJECTION SUBCUTANEOUS at 17:17

## 2017-01-01 RX ADMIN — SODIUM CHLORIDE 10 MILLILITER(S): 9 INJECTION INTRAMUSCULAR; INTRAVENOUS; SUBCUTANEOUS at 22:03

## 2017-01-01 RX ADMIN — Medication 20 MILLIGRAM(S): at 14:33

## 2017-01-01 RX ADMIN — SENNA PLUS 2 TABLET(S): 8.6 TABLET ORAL at 22:43

## 2017-01-01 RX ADMIN — POLYETHYLENE GLYCOL 3350 17 GRAM(S): 17 POWDER, FOR SOLUTION ORAL at 13:29

## 2017-01-01 RX ADMIN — Medication 3 MILLILITER(S): at 06:03

## 2017-01-01 RX ADMIN — MORPHINE SULFATE 2 MILLIGRAM(S): 50 CAPSULE, EXTENDED RELEASE ORAL at 18:11

## 2017-01-01 RX ADMIN — ENOXAPARIN SODIUM 80 MILLIGRAM(S): 100 INJECTION SUBCUTANEOUS at 18:12

## 2017-01-01 RX ADMIN — Medication 250 MILLIGRAM(S): at 05:00

## 2017-01-01 RX ADMIN — PIPERACILLIN AND TAZOBACTAM 25 GRAM(S): 4; .5 INJECTION, POWDER, LYOPHILIZED, FOR SOLUTION INTRAVENOUS at 18:38

## 2017-01-01 RX ADMIN — DEXMEDETOMIDINE HYDROCHLORIDE IN 0.9% SODIUM CHLORIDE 4 MICROGRAM(S)/KG/HR: 4 INJECTION INTRAVENOUS at 19:53

## 2017-01-01 RX ADMIN — Medication 4 MILLIGRAM(S): at 13:00

## 2017-01-01 RX ADMIN — Medication 5 MILLIGRAM(S): at 07:00

## 2017-01-01 RX ADMIN — GABAPENTIN 200 MILLIGRAM(S): 400 CAPSULE ORAL at 05:07

## 2017-01-01 RX ADMIN — Medication 3 MILLILITER(S): at 17:07

## 2017-01-01 RX ADMIN — PIPERACILLIN AND TAZOBACTAM 25 GRAM(S): 4; .5 INJECTION, POWDER, LYOPHILIZED, FOR SOLUTION INTRAVENOUS at 02:07

## 2017-01-01 RX ADMIN — SPIRONOLACTONE 25 MILLIGRAM(S): 25 TABLET, FILM COATED ORAL at 11:30

## 2017-01-01 RX ADMIN — SODIUM CHLORIDE 4000 MILLILITER(S): 9 INJECTION, SOLUTION INTRAVENOUS at 18:57

## 2017-01-01 RX ADMIN — Medication 3 MILLILITER(S): at 06:06

## 2017-01-01 RX ADMIN — FLUCONAZOLE 100 MILLIGRAM(S): 150 TABLET ORAL at 21:59

## 2017-01-01 RX ADMIN — Medication 5 MILLIGRAM(S): at 05:46

## 2017-01-01 RX ADMIN — DEXMEDETOMIDINE HYDROCHLORIDE IN 0.9% SODIUM CHLORIDE 4 MICROGRAM(S)/KG/HR: 4 INJECTION INTRAVENOUS at 15:30

## 2017-01-01 RX ADMIN — Medication 30 MILLILITER(S): at 23:11

## 2017-01-01 RX ADMIN — FENTANYL CITRATE 30 MICROGRAM(S)/KG/HR: 50 INJECTION INTRAVENOUS at 15:30

## 2017-01-01 RX ADMIN — PANTOPRAZOLE SODIUM 40 MILLIGRAM(S): 20 TABLET, DELAYED RELEASE ORAL at 17:21

## 2017-01-01 RX ADMIN — Medication 3 MILLILITER(S): at 11:42

## 2017-01-01 RX ADMIN — Medication 25 MILLIGRAM(S): at 23:56

## 2017-01-01 RX ADMIN — MORPHINE SULFATE 2 MILLIGRAM(S): 50 CAPSULE, EXTENDED RELEASE ORAL at 07:50

## 2017-01-01 RX ADMIN — SENNA PLUS 2 TABLET(S): 8.6 TABLET ORAL at 00:54

## 2017-01-01 RX ADMIN — PIPERACILLIN AND TAZOBACTAM 25 GRAM(S): 4; .5 INJECTION, POWDER, LYOPHILIZED, FOR SOLUTION INTRAVENOUS at 17:53

## 2017-01-01 RX ADMIN — Medication 250 MILLIGRAM(S): at 20:49

## 2017-01-01 RX ADMIN — DEXTROSE MONOHYDRATE, SODIUM CHLORIDE, AND POTASSIUM CHLORIDE 75 MILLILITER(S): 50; .745; 4.5 INJECTION, SOLUTION INTRAVENOUS at 02:06

## 2017-01-01 RX ADMIN — Medication 125 MILLILITER(S): at 18:30

## 2017-01-01 RX ADMIN — ENOXAPARIN SODIUM 80 MILLIGRAM(S): 100 INJECTION SUBCUTANEOUS at 05:06

## 2017-01-01 RX ADMIN — HYDROMORPHONE HYDROCHLORIDE 1 MG/HR: 2 INJECTION INTRAMUSCULAR; INTRAVENOUS; SUBCUTANEOUS at 11:43

## 2017-01-01 RX ADMIN — MEROPENEM 200 MILLIGRAM(S): 1 INJECTION INTRAVENOUS at 05:42

## 2017-01-01 RX ADMIN — Medication 5 MILLIGRAM(S): at 18:49

## 2017-01-01 RX ADMIN — MICAFUNGIN SODIUM 105 MILLIGRAM(S): 100 INJECTION, POWDER, LYOPHILIZED, FOR SOLUTION INTRAVENOUS at 11:43

## 2017-01-01 RX ADMIN — ENOXAPARIN SODIUM 90 MILLIGRAM(S): 100 INJECTION SUBCUTANEOUS at 17:22

## 2017-01-01 RX ADMIN — Medication 3 MILLILITER(S): at 12:50

## 2017-01-01 RX ADMIN — SODIUM CHLORIDE 125 MILLILITER(S): 9 INJECTION, SOLUTION INTRAVENOUS at 00:30

## 2017-01-01 RX ADMIN — DEXMEDETOMIDINE HYDROCHLORIDE IN 0.9% SODIUM CHLORIDE 4 MICROGRAM(S)/KG/HR: 4 INJECTION INTRAVENOUS at 21:10

## 2017-01-01 RX ADMIN — MORPHINE SULFATE 3 MG/HR: 50 CAPSULE, EXTENDED RELEASE ORAL at 13:36

## 2017-01-01 RX ADMIN — Medication 4 MILLIGRAM(S): at 23:27

## 2017-01-01 RX ADMIN — HYDROMORPHONE HYDROCHLORIDE 1 MG/HR: 2 INJECTION INTRAMUSCULAR; INTRAVENOUS; SUBCUTANEOUS at 21:03

## 2017-01-01 RX ADMIN — MEROPENEM 200 MILLIGRAM(S): 1 INJECTION INTRAVENOUS at 21:01

## 2017-01-01 RX ADMIN — ROBINUL 0.4 MILLIGRAM(S): 0.2 INJECTION INTRAMUSCULAR; INTRAVENOUS at 00:37

## 2017-01-01 RX ADMIN — OXYCODONE HYDROCHLORIDE 5 MILLIGRAM(S): 5 TABLET ORAL at 00:50

## 2017-01-01 RX ADMIN — HYDROMORPHONE HYDROCHLORIDE 0.2 MILLIGRAM(S): 2 INJECTION INTRAMUSCULAR; INTRAVENOUS; SUBCUTANEOUS at 13:45

## 2017-01-01 RX ADMIN — Medication 3 MILLILITER(S): at 17:10

## 2017-01-01 RX ADMIN — Medication 400 MILLIGRAM(S): at 17:00

## 2017-01-01 RX ADMIN — SODIUM CHLORIDE 30 MILLILITER(S): 9 INJECTION, SOLUTION INTRAVENOUS at 07:47

## 2017-01-01 RX ADMIN — HYDROMORPHONE HYDROCHLORIDE 0.2 MILLIGRAM(S): 2 INJECTION INTRAMUSCULAR; INTRAVENOUS; SUBCUTANEOUS at 01:24

## 2017-01-01 RX ADMIN — Medication 4 MILLIGRAM(S): at 23:30

## 2017-01-01 RX ADMIN — MEROPENEM 200 MILLIGRAM(S): 1 INJECTION INTRAVENOUS at 21:07

## 2017-01-01 RX ADMIN — FENTANYL CITRATE 1 PATCH: 50 INJECTION INTRAVENOUS at 19:55

## 2017-01-01 RX ADMIN — PIPERACILLIN AND TAZOBACTAM 25 GRAM(S): 4; .5 INJECTION, POWDER, LYOPHILIZED, FOR SOLUTION INTRAVENOUS at 10:40

## 2017-01-01 RX ADMIN — PANTOPRAZOLE SODIUM 40 MILLIGRAM(S): 20 TABLET, DELAYED RELEASE ORAL at 05:20

## 2017-01-01 RX ADMIN — PANTOPRAZOLE SODIUM 40 MILLIGRAM(S): 20 TABLET, DELAYED RELEASE ORAL at 05:01

## 2017-01-01 RX ADMIN — OXYCODONE HYDROCHLORIDE 5 MILLIGRAM(S): 5 TABLET ORAL at 17:47

## 2017-01-01 RX ADMIN — SODIUM CHLORIDE 4000 MILLILITER(S): 9 INJECTION INTRAMUSCULAR; INTRAVENOUS; SUBCUTANEOUS at 15:29

## 2017-01-01 RX ADMIN — SODIUM CHLORIDE 75 MILLILITER(S): 9 INJECTION, SOLUTION INTRAVENOUS at 00:54

## 2017-01-01 RX ADMIN — MEROPENEM 200 MILLIGRAM(S): 1 INJECTION INTRAVENOUS at 15:23

## 2017-01-01 RX ADMIN — Medication 5 MILLIGRAM(S): at 03:57

## 2017-01-01 RX ADMIN — Medication 3 MILLILITER(S): at 18:19

## 2017-01-01 RX ADMIN — HYDROMORPHONE HYDROCHLORIDE 30 MILLILITER(S): 2 INJECTION INTRAMUSCULAR; INTRAVENOUS; SUBCUTANEOUS at 19:09

## 2017-01-01 RX ADMIN — Medication 3 MILLILITER(S): at 12:26

## 2017-01-01 RX ADMIN — MEROPENEM 200 MILLIGRAM(S): 1 INJECTION INTRAVENOUS at 13:26

## 2017-01-01 RX ADMIN — Medication 650 MILLIGRAM(S): at 10:09

## 2017-01-01 RX ADMIN — OXYCODONE HYDROCHLORIDE 10 MILLIGRAM(S): 5 TABLET ORAL at 08:55

## 2017-01-01 RX ADMIN — SODIUM CHLORIDE 100 MILLILITER(S): 9 INJECTION INTRAMUSCULAR; INTRAVENOUS; SUBCUTANEOUS at 12:09

## 2017-01-01 RX ADMIN — Medication 250 MILLIGRAM(S): at 17:18

## 2017-01-01 RX ADMIN — HYDROMORPHONE HYDROCHLORIDE 1 MILLIGRAM(S): 2 INJECTION INTRAMUSCULAR; INTRAVENOUS; SUBCUTANEOUS at 02:36

## 2017-01-01 RX ADMIN — HYDROMORPHONE HYDROCHLORIDE 0.5 MILLIGRAM(S): 2 INJECTION INTRAMUSCULAR; INTRAVENOUS; SUBCUTANEOUS at 15:00

## 2017-01-01 RX ADMIN — HYDROMORPHONE HYDROCHLORIDE 0.5 MILLIGRAM(S): 2 INJECTION INTRAMUSCULAR; INTRAVENOUS; SUBCUTANEOUS at 07:45

## 2017-01-01 RX ADMIN — HYDROMORPHONE HYDROCHLORIDE 1 MG/HR: 2 INJECTION INTRAMUSCULAR; INTRAVENOUS; SUBCUTANEOUS at 16:30

## 2017-01-01 RX ADMIN — GABAPENTIN 200 MILLIGRAM(S): 400 CAPSULE ORAL at 05:53

## 2017-01-01 RX ADMIN — Medication 100 MILLIGRAM(S): at 06:13

## 2017-01-01 RX ADMIN — ENOXAPARIN SODIUM 90 MILLIGRAM(S): 100 INJECTION SUBCUTANEOUS at 05:26

## 2017-01-01 RX ADMIN — Medication 3 MILLILITER(S): at 18:25

## 2017-01-01 RX ADMIN — ENOXAPARIN SODIUM 90 MILLIGRAM(S): 100 INJECTION SUBCUTANEOUS at 17:25

## 2017-01-01 RX ADMIN — Medication 3 MILLILITER(S): at 23:44

## 2017-01-01 RX ADMIN — Medication 400 MILLIGRAM(S): at 01:22

## 2017-01-01 RX ADMIN — SODIUM CHLORIDE 4000 MILLILITER(S): 9 INJECTION INTRAMUSCULAR; INTRAVENOUS; SUBCUTANEOUS at 09:20

## 2017-01-01 RX ADMIN — HYDROMORPHONE HYDROCHLORIDE 0.5 MILLIGRAM(S): 2 INJECTION INTRAMUSCULAR; INTRAVENOUS; SUBCUTANEOUS at 17:16

## 2017-01-01 RX ADMIN — OXYCODONE HYDROCHLORIDE 5 MILLIGRAM(S): 5 TABLET ORAL at 05:28

## 2017-01-01 RX ADMIN — SENNA PLUS 2 TABLET(S): 8.6 TABLET ORAL at 22:12

## 2017-01-01 RX ADMIN — OXYCODONE HYDROCHLORIDE 10 MILLIGRAM(S): 5 TABLET ORAL at 10:22

## 2017-01-01 RX ADMIN — FENTANYL CITRATE 30 MICROGRAM(S)/KG/HR: 50 INJECTION INTRAVENOUS at 19:10

## 2017-01-01 RX ADMIN — HYDROMORPHONE HYDROCHLORIDE 1 MG/HR: 2 INJECTION INTRAMUSCULAR; INTRAVENOUS; SUBCUTANEOUS at 12:50

## 2017-01-01 RX ADMIN — PIPERACILLIN AND TAZOBACTAM 25 GRAM(S): 4; .5 INJECTION, POWDER, LYOPHILIZED, FOR SOLUTION INTRAVENOUS at 11:47

## 2017-01-01 RX ADMIN — OXYCODONE HYDROCHLORIDE 5 MILLIGRAM(S): 5 TABLET ORAL at 02:40

## 2017-01-01 RX ADMIN — Medication 1000 MILLIGRAM(S): at 22:20

## 2017-01-01 RX ADMIN — Medication 4 MILLIGRAM(S): at 05:35

## 2017-01-01 RX ADMIN — HYDROMORPHONE HYDROCHLORIDE 0.5 MILLIGRAM(S): 2 INJECTION INTRAMUSCULAR; INTRAVENOUS; SUBCUTANEOUS at 07:00

## 2017-01-01 RX ADMIN — SODIUM CHLORIDE 1000 MILLILITER(S): 9 INJECTION INTRAMUSCULAR; INTRAVENOUS; SUBCUTANEOUS at 00:50

## 2017-01-01 RX ADMIN — ROBINUL 0.2 MILLIGRAM(S): 0.2 INJECTION INTRAMUSCULAR; INTRAVENOUS at 07:34

## 2017-01-01 RX ADMIN — MICAFUNGIN SODIUM 105 MILLIGRAM(S): 100 INJECTION, POWDER, LYOPHILIZED, FOR SOLUTION INTRAVENOUS at 10:54

## 2017-01-01 RX ADMIN — Medication 2.5 MILLIGRAM(S): at 16:48

## 2017-01-01 RX ADMIN — GABAPENTIN 200 MILLIGRAM(S): 400 CAPSULE ORAL at 17:34

## 2017-01-01 RX ADMIN — Medication 20 MILLIGRAM(S): at 19:46

## 2017-01-01 RX ADMIN — PROPOFOL 4.81 MICROGRAM(S)/KG/MIN: 10 INJECTION, EMULSION INTRAVENOUS at 10:00

## 2017-01-01 RX ADMIN — PIPERACILLIN AND TAZOBACTAM 25 GRAM(S): 4; .5 INJECTION, POWDER, LYOPHILIZED, FOR SOLUTION INTRAVENOUS at 05:40

## 2017-01-01 RX ADMIN — Medication 5 MILLIGRAM(S): at 23:11

## 2017-01-01 RX ADMIN — Medication 5 MILLIGRAM(S): at 05:56

## 2017-01-01 RX ADMIN — Medication 125 MILLILITER(S): at 11:47

## 2017-01-01 RX ADMIN — Medication 3 MILLILITER(S): at 00:34

## 2017-01-01 RX ADMIN — Medication 2 MILLIGRAM(S): at 19:42

## 2017-01-01 RX ADMIN — PIPERACILLIN AND TAZOBACTAM 25 GRAM(S): 4; .5 INJECTION, POWDER, LYOPHILIZED, FOR SOLUTION INTRAVENOUS at 11:52

## 2017-01-01 RX ADMIN — PANTOPRAZOLE SODIUM 40 MILLIGRAM(S): 20 TABLET, DELAYED RELEASE ORAL at 06:21

## 2017-01-01 RX ADMIN — ENOXAPARIN SODIUM 80 MILLIGRAM(S): 100 INJECTION SUBCUTANEOUS at 05:28

## 2017-01-01 RX ADMIN — Medication 400 MILLIGRAM(S): at 20:48

## 2017-01-01 RX ADMIN — PANTOPRAZOLE SODIUM 40 MILLIGRAM(S): 20 TABLET, DELAYED RELEASE ORAL at 06:10

## 2017-01-01 RX ADMIN — ENOXAPARIN SODIUM 90 MILLIGRAM(S): 100 INJECTION SUBCUTANEOUS at 11:35

## 2017-01-01 RX ADMIN — HYDROMORPHONE HYDROCHLORIDE 1 MG/HR: 2 INJECTION INTRAMUSCULAR; INTRAVENOUS; SUBCUTANEOUS at 16:13

## 2017-01-01 RX ADMIN — ENOXAPARIN SODIUM 80 MILLIGRAM(S): 100 INJECTION SUBCUTANEOUS at 17:59

## 2017-01-01 RX ADMIN — OXYCODONE HYDROCHLORIDE 5 MILLIGRAM(S): 5 TABLET ORAL at 09:05

## 2017-01-01 RX ADMIN — OXYCODONE HYDROCHLORIDE 10 MILLIGRAM(S): 5 TABLET ORAL at 10:19

## 2017-01-01 RX ADMIN — OXYCODONE HYDROCHLORIDE 5 MILLIGRAM(S): 5 TABLET ORAL at 06:43

## 2017-01-01 RX ADMIN — Medication 100 MILLIGRAM(S): at 05:34

## 2017-01-01 RX ADMIN — PANTOPRAZOLE SODIUM 40 MILLIGRAM(S): 20 TABLET, DELAYED RELEASE ORAL at 07:29

## 2017-01-01 RX ADMIN — Medication 400 MILLIGRAM(S): at 21:08

## 2017-01-01 RX ADMIN — SODIUM CHLORIDE 125 MILLILITER(S): 9 INJECTION, SOLUTION INTRAVENOUS at 15:30

## 2017-01-01 RX ADMIN — MEROPENEM 200 MILLIGRAM(S): 1 INJECTION INTRAVENOUS at 05:31

## 2017-01-01 RX ADMIN — PIPERACILLIN AND TAZOBACTAM 200 GRAM(S): 4; .5 INJECTION, POWDER, LYOPHILIZED, FOR SOLUTION INTRAVENOUS at 20:14

## 2017-01-01 RX ADMIN — SODIUM CHLORIDE 10 MILLILITER(S): 9 INJECTION, SOLUTION INTRAVENOUS at 13:26

## 2017-01-01 RX ADMIN — Medication 3 MILLILITER(S): at 00:35

## 2017-01-01 RX ADMIN — Medication 1000 MILLIGRAM(S): at 01:45

## 2017-01-01 RX ADMIN — HYDROMORPHONE HYDROCHLORIDE 0.2 MILLIGRAM(S): 2 INJECTION INTRAMUSCULAR; INTRAVENOUS; SUBCUTANEOUS at 03:29

## 2017-01-01 RX ADMIN — MORPHINE SULFATE 2 MILLIGRAM(S): 50 CAPSULE, EXTENDED RELEASE ORAL at 18:29

## 2017-01-01 RX ADMIN — Medication 25 MILLIGRAM(S): at 17:17

## 2017-01-01 RX ADMIN — Medication 5 MILLIGRAM(S): at 08:09

## 2017-01-01 RX ADMIN — Medication 3 MILLILITER(S): at 05:22

## 2017-01-01 RX ADMIN — SODIUM CHLORIDE 50 MILLILITER(S): 9 INJECTION, SOLUTION INTRAVENOUS at 18:03

## 2017-01-01 RX ADMIN — ROBINUL 0.2 MILLIGRAM(S): 0.2 INJECTION INTRAMUSCULAR; INTRAVENOUS at 14:30

## 2017-01-01 RX ADMIN — PIPERACILLIN AND TAZOBACTAM 25 GRAM(S): 4; .5 INJECTION, POWDER, LYOPHILIZED, FOR SOLUTION INTRAVENOUS at 18:20

## 2017-01-01 RX ADMIN — ROBINUL 0.4 MILLIGRAM(S): 0.2 INJECTION INTRAMUSCULAR; INTRAVENOUS at 13:25

## 2017-01-01 RX ADMIN — HYDROMORPHONE HYDROCHLORIDE 0.5 MILLIGRAM(S): 2 INJECTION INTRAMUSCULAR; INTRAVENOUS; SUBCUTANEOUS at 17:58

## 2017-01-01 RX ADMIN — ROBINUL 0.4 MILLIGRAM(S): 0.2 INJECTION INTRAMUSCULAR; INTRAVENOUS at 05:36

## 2017-01-01 RX ADMIN — MEROPENEM 200 MILLIGRAM(S): 1 INJECTION INTRAVENOUS at 05:25

## 2017-01-01 RX ADMIN — HYDROMORPHONE HYDROCHLORIDE 0.5 MILLIGRAM(S): 2 INJECTION INTRAMUSCULAR; INTRAVENOUS; SUBCUTANEOUS at 10:17

## 2017-01-01 RX ADMIN — Medication 250 MILLIGRAM(S): at 18:07

## 2017-01-01 RX ADMIN — MICAFUNGIN SODIUM 105 MILLIGRAM(S): 100 INJECTION, POWDER, LYOPHILIZED, FOR SOLUTION INTRAVENOUS at 11:02

## 2017-01-01 RX ADMIN — MEROPENEM 200 MILLIGRAM(S): 1 INJECTION INTRAVENOUS at 05:57

## 2017-01-01 RX ADMIN — Medication 400 MILLIGRAM(S): at 03:27

## 2017-01-01 RX ADMIN — Medication 3 MILLILITER(S): at 05:01

## 2017-01-01 RX ADMIN — Medication 100 MILLIGRAM(S): at 17:16

## 2017-01-01 RX ADMIN — Medication 3 MILLILITER(S): at 17:49

## 2017-01-01 RX ADMIN — SENNA PLUS 2 TABLET(S): 8.6 TABLET ORAL at 23:11

## 2017-01-01 RX ADMIN — Medication 100 MILLIGRAM(S): at 18:10

## 2017-01-01 RX ADMIN — SODIUM CHLORIDE 2000 MILLILITER(S): 9 INJECTION INTRAMUSCULAR; INTRAVENOUS; SUBCUTANEOUS at 04:05

## 2017-01-01 RX ADMIN — Medication 5 MILLIGRAM(S): at 13:03

## 2017-01-01 RX ADMIN — ENOXAPARIN SODIUM 80 MILLIGRAM(S): 100 INJECTION SUBCUTANEOUS at 06:14

## 2017-01-01 RX ADMIN — Medication 3 MILLILITER(S): at 12:01

## 2017-01-01 RX ADMIN — OXYCODONE HYDROCHLORIDE 10 MILLIGRAM(S): 5 TABLET ORAL at 21:32

## 2017-01-01 RX ADMIN — Medication 3 MILLILITER(S): at 23:17

## 2017-01-01 RX ADMIN — HYDROMORPHONE HYDROCHLORIDE 30 MILLILITER(S): 2 INJECTION INTRAMUSCULAR; INTRAVENOUS; SUBCUTANEOUS at 07:02

## 2017-01-01 RX ADMIN — PANTOPRAZOLE SODIUM 40 MILLIGRAM(S): 20 TABLET, DELAYED RELEASE ORAL at 17:18

## 2017-01-01 RX ADMIN — HYDROMORPHONE HYDROCHLORIDE 0.5 MILLIGRAM(S): 2 INJECTION INTRAMUSCULAR; INTRAVENOUS; SUBCUTANEOUS at 07:15

## 2017-01-01 RX ADMIN — OXYCODONE HYDROCHLORIDE 5 MILLIGRAM(S): 5 TABLET ORAL at 18:45

## 2017-01-01 RX ADMIN — Medication 3 MILLILITER(S): at 06:21

## 2017-01-01 RX ADMIN — Medication 3 MILLILITER(S): at 05:30

## 2017-01-01 RX ADMIN — Medication 250 MILLILITER(S): at 12:30

## 2017-01-01 RX ADMIN — Medication 3 MILLILITER(S): at 23:46

## 2017-01-01 RX ADMIN — Medication 400 MILLIGRAM(S): at 16:47

## 2017-01-01 RX ADMIN — PIPERACILLIN AND TAZOBACTAM 25 GRAM(S): 4; .5 INJECTION, POWDER, LYOPHILIZED, FOR SOLUTION INTRAVENOUS at 02:03

## 2017-01-01 RX ADMIN — Medication 250 MILLIGRAM(S): at 18:25

## 2017-01-01 RX ADMIN — ROBINUL 0.4 MILLIGRAM(S): 0.2 INJECTION INTRAMUSCULAR; INTRAVENOUS at 23:26

## 2017-01-01 RX ADMIN — Medication 5 MILLIGRAM(S): at 18:09

## 2017-01-01 RX ADMIN — Medication 3 MILLILITER(S): at 05:17

## 2017-01-01 RX ADMIN — Medication 150 MILLIGRAM(S): at 18:34

## 2017-01-01 RX ADMIN — HYDROMORPHONE HYDROCHLORIDE 0.2 MILLIGRAM(S): 2 INJECTION INTRAMUSCULAR; INTRAVENOUS; SUBCUTANEOUS at 10:34

## 2017-01-01 RX ADMIN — ALTEPLASE 2 MILLIGRAM(S): KIT at 18:48

## 2017-01-01 RX ADMIN — Medication 3 MILLILITER(S): at 11:40

## 2017-01-01 RX ADMIN — Medication 3 MILLILITER(S): at 12:02

## 2017-01-01 RX ADMIN — PANTOPRAZOLE SODIUM 40 MILLIGRAM(S): 20 TABLET, DELAYED RELEASE ORAL at 05:42

## 2017-01-01 RX ADMIN — SODIUM CHLORIDE 30 MILLILITER(S): 9 INJECTION, SOLUTION INTRAVENOUS at 18:05

## 2017-01-01 RX ADMIN — Medication 1000 MILLIGRAM(S): at 22:56

## 2017-01-01 RX ADMIN — SENNA PLUS 2 TABLET(S): 8.6 TABLET ORAL at 21:10

## 2017-01-01 RX ADMIN — Medication 1.5 MICROGRAM(S)/KG/MIN: at 10:00

## 2017-01-01 RX ADMIN — ENOXAPARIN SODIUM 90 MILLIGRAM(S): 100 INJECTION SUBCUTANEOUS at 05:19

## 2017-01-01 RX ADMIN — SODIUM CHLORIDE 100 MILLILITER(S): 9 INJECTION, SOLUTION INTRAVENOUS at 07:35

## 2017-01-01 RX ADMIN — OXYCODONE HYDROCHLORIDE 5 MILLIGRAM(S): 5 TABLET ORAL at 00:19

## 2017-01-01 RX ADMIN — Medication 100 MILLIGRAM(S): at 16:25

## 2017-01-01 RX ADMIN — PIPERACILLIN AND TAZOBACTAM 25 GRAM(S): 4; .5 INJECTION, POWDER, LYOPHILIZED, FOR SOLUTION INTRAVENOUS at 18:10

## 2017-01-01 RX ADMIN — PANTOPRAZOLE SODIUM 40 MILLIGRAM(S): 20 TABLET, DELAYED RELEASE ORAL at 12:46

## 2017-01-01 RX ADMIN — SODIUM CHLORIDE 75 MILLILITER(S): 9 INJECTION, SOLUTION INTRAVENOUS at 00:59

## 2017-01-01 RX ADMIN — Medication 100 MILLIGRAM(S): at 23:11

## 2017-01-01 RX ADMIN — SODIUM CHLORIDE 125 MILLILITER(S): 9 INJECTION, SOLUTION INTRAVENOUS at 08:30

## 2017-01-01 RX ADMIN — Medication 4 MILLIGRAM(S): at 23:11

## 2017-01-01 RX ADMIN — ENOXAPARIN SODIUM 90 MILLIGRAM(S): 100 INJECTION SUBCUTANEOUS at 22:43

## 2017-01-01 RX ADMIN — Medication 5 MILLIGRAM(S): at 09:55

## 2017-01-01 RX ADMIN — HEPARIN SODIUM 12 UNIT(S)/HR: 5000 INJECTION INTRAVENOUS; SUBCUTANEOUS at 09:05

## 2017-01-01 RX ADMIN — MORPHINE SULFATE 2 MILLIGRAM(S): 50 CAPSULE, EXTENDED RELEASE ORAL at 09:50

## 2017-01-01 RX ADMIN — Medication 250 MILLIGRAM(S): at 05:42

## 2017-01-01 RX ADMIN — DEXMEDETOMIDINE HYDROCHLORIDE IN 0.9% SODIUM CHLORIDE 4 MICROGRAM(S)/KG/HR: 4 INJECTION INTRAVENOUS at 18:58

## 2017-01-01 RX ADMIN — MICAFUNGIN SODIUM 105 MILLIGRAM(S): 100 INJECTION, POWDER, LYOPHILIZED, FOR SOLUTION INTRAVENOUS at 10:58

## 2017-01-01 RX ADMIN — Medication 25 MILLIGRAM(S): at 06:09

## 2017-01-01 RX ADMIN — HYDROMORPHONE HYDROCHLORIDE 0.5 MILLIGRAM(S): 2 INJECTION INTRAMUSCULAR; INTRAVENOUS; SUBCUTANEOUS at 13:05

## 2017-01-01 RX ADMIN — MICAFUNGIN SODIUM 105 MILLIGRAM(S): 100 INJECTION, POWDER, LYOPHILIZED, FOR SOLUTION INTRAVENOUS at 11:31

## 2017-01-01 RX ADMIN — Medication 5 MILLIGRAM(S): at 14:00

## 2017-01-01 RX ADMIN — HYDROMORPHONE HYDROCHLORIDE 0.2 MILLIGRAM(S): 2 INJECTION INTRAMUSCULAR; INTRAVENOUS; SUBCUTANEOUS at 23:23

## 2017-01-01 RX ADMIN — PIPERACILLIN AND TAZOBACTAM 25 GRAM(S): 4; .5 INJECTION, POWDER, LYOPHILIZED, FOR SOLUTION INTRAVENOUS at 11:16

## 2017-01-01 RX ADMIN — Medication 5 MILLIGRAM(S): at 00:53

## 2017-01-01 RX ADMIN — HYDROMORPHONE HYDROCHLORIDE 0.5 MILLIGRAM(S): 2 INJECTION INTRAMUSCULAR; INTRAVENOUS; SUBCUTANEOUS at 04:40

## 2017-01-01 RX ADMIN — OXYCODONE HYDROCHLORIDE 5 MILLIGRAM(S): 5 TABLET ORAL at 20:09

## 2017-01-01 RX ADMIN — Medication 5 MILLIGRAM(S): at 05:58

## 2017-01-01 RX ADMIN — Medication 1000 MILLIGRAM(S): at 21:18

## 2017-01-01 RX ADMIN — MEROPENEM 200 MILLIGRAM(S): 1 INJECTION INTRAVENOUS at 05:02

## 2017-01-01 RX ADMIN — Medication 300 MILLILITER(S): at 16:09

## 2017-01-01 RX ADMIN — HYDROMORPHONE HYDROCHLORIDE 0.5 MILLIGRAM(S): 2 INJECTION INTRAMUSCULAR; INTRAVENOUS; SUBCUTANEOUS at 01:10

## 2017-01-01 RX ADMIN — ENOXAPARIN SODIUM 40 MILLIGRAM(S): 100 INJECTION SUBCUTANEOUS at 22:27

## 2017-01-01 RX ADMIN — Medication 4 MILLIGRAM(S): at 08:12

## 2017-01-01 RX ADMIN — OXYCODONE HYDROCHLORIDE 10 MILLIGRAM(S): 5 TABLET ORAL at 17:15

## 2017-01-01 RX ADMIN — SODIUM CHLORIDE 3000 MILLILITER(S): 9 INJECTION INTRAMUSCULAR; INTRAVENOUS; SUBCUTANEOUS at 04:38

## 2017-01-01 RX ADMIN — OXYCODONE HYDROCHLORIDE 5 MILLIGRAM(S): 5 TABLET ORAL at 04:08

## 2017-01-01 RX ADMIN — Medication 3 MILLILITER(S): at 18:12

## 2017-01-01 RX ADMIN — INSULIN HUMAN 10 UNIT(S): 100 INJECTION, SOLUTION SUBCUTANEOUS at 15:21

## 2017-01-01 RX ADMIN — MEROPENEM 200 MILLIGRAM(S): 1 INJECTION INTRAVENOUS at 21:19

## 2017-01-01 RX ADMIN — Medication 10 MILLIGRAM(S): at 05:57

## 2017-01-01 RX ADMIN — HYDROMORPHONE HYDROCHLORIDE 0.5 MILLIGRAM(S): 2 INJECTION INTRAMUSCULAR; INTRAVENOUS; SUBCUTANEOUS at 14:10

## 2017-01-01 RX ADMIN — Medication 4 MILLIGRAM(S): at 18:57

## 2017-01-01 RX ADMIN — SODIUM CHLORIDE 75 MILLILITER(S): 9 INJECTION, SOLUTION INTRAVENOUS at 22:12

## 2017-01-01 RX ADMIN — HYDROMORPHONE HYDROCHLORIDE 0.5 MILLIGRAM(S): 2 INJECTION INTRAMUSCULAR; INTRAVENOUS; SUBCUTANEOUS at 21:48

## 2017-01-01 RX ADMIN — Medication 5 MILLIGRAM(S): at 16:07

## 2017-01-01 RX ADMIN — HYDROMORPHONE HYDROCHLORIDE 30 MILLILITER(S): 2 INJECTION INTRAMUSCULAR; INTRAVENOUS; SUBCUTANEOUS at 21:34

## 2017-01-01 RX ADMIN — Medication 20 MILLIGRAM(S): at 23:19

## 2017-01-01 RX ADMIN — HYDROMORPHONE HYDROCHLORIDE 0.5 MILLIGRAM(S): 2 INJECTION INTRAMUSCULAR; INTRAVENOUS; SUBCUTANEOUS at 18:40

## 2017-01-01 RX ADMIN — Medication 3 MILLILITER(S): at 23:08

## 2017-01-01 RX ADMIN — HYDROMORPHONE HYDROCHLORIDE 0.5 MILLIGRAM(S): 2 INJECTION INTRAMUSCULAR; INTRAVENOUS; SUBCUTANEOUS at 04:47

## 2017-01-01 RX ADMIN — ENOXAPARIN SODIUM 80 MILLIGRAM(S): 100 INJECTION SUBCUTANEOUS at 17:34

## 2017-01-01 RX ADMIN — Medication 3 MILLILITER(S): at 11:04

## 2017-01-01 RX ADMIN — PIPERACILLIN AND TAZOBACTAM 25 GRAM(S): 4; .5 INJECTION, POWDER, LYOPHILIZED, FOR SOLUTION INTRAVENOUS at 15:46

## 2017-01-01 RX ADMIN — Medication 5 MILLIGRAM(S): at 19:50

## 2017-01-01 RX ADMIN — PIPERACILLIN AND TAZOBACTAM 25 GRAM(S): 4; .5 INJECTION, POWDER, LYOPHILIZED, FOR SOLUTION INTRAVENOUS at 11:11

## 2017-01-01 RX ADMIN — Medication 3 MILLILITER(S): at 19:30

## 2017-01-01 RX ADMIN — Medication 1500 MILLILITER(S): at 22:39

## 2017-01-01 RX ADMIN — ENOXAPARIN SODIUM 90 MILLIGRAM(S): 100 INJECTION SUBCUTANEOUS at 05:00

## 2017-01-01 RX ADMIN — PANTOPRAZOLE SODIUM 40 MILLIGRAM(S): 20 TABLET, DELAYED RELEASE ORAL at 11:52

## 2017-01-01 RX ADMIN — PANTOPRAZOLE SODIUM 40 MILLIGRAM(S): 20 TABLET, DELAYED RELEASE ORAL at 06:09

## 2017-01-01 RX ADMIN — ROBINUL 0.4 MILLIGRAM(S): 0.2 INJECTION INTRAMUSCULAR; INTRAVENOUS at 11:43

## 2017-01-01 RX ADMIN — Medication 5 MILLIGRAM(S): at 21:01

## 2017-01-01 RX ADMIN — SODIUM CHLORIDE 30 MILLILITER(S): 9 INJECTION, SOLUTION INTRAVENOUS at 18:06

## 2017-01-01 RX ADMIN — Medication 3 MILLILITER(S): at 17:36

## 2017-01-01 RX ADMIN — Medication 250 MILLIGRAM(S): at 18:52

## 2017-01-01 RX ADMIN — Medication 100 MILLIGRAM(S): at 07:49

## 2017-01-01 RX ADMIN — OXYCODONE HYDROCHLORIDE 5 MILLIGRAM(S): 5 TABLET ORAL at 23:30

## 2017-01-01 RX ADMIN — Medication 100 MILLIGRAM(S): at 21:09

## 2017-01-01 RX ADMIN — Medication 100 MILLIGRAM(S): at 00:05

## 2017-01-01 RX ADMIN — HYDROMORPHONE HYDROCHLORIDE 0.5 MILLIGRAM(S): 2 INJECTION INTRAMUSCULAR; INTRAVENOUS; SUBCUTANEOUS at 07:57

## 2017-01-01 RX ADMIN — Medication 3 MILLILITER(S): at 05:23

## 2017-01-01 RX ADMIN — Medication 100 MILLIGRAM(S): at 17:17

## 2017-01-01 RX ADMIN — HYDROMORPHONE HYDROCHLORIDE 0.5 MILLIGRAM(S): 2 INJECTION INTRAMUSCULAR; INTRAVENOUS; SUBCUTANEOUS at 11:00

## 2017-01-01 RX ADMIN — Medication 400 MILLIGRAM(S): at 06:28

## 2017-01-01 RX ADMIN — HYDROMORPHONE HYDROCHLORIDE 0.5 MILLIGRAM(S): 2 INJECTION INTRAMUSCULAR; INTRAVENOUS; SUBCUTANEOUS at 00:14

## 2017-01-01 RX ADMIN — HYDROMORPHONE HYDROCHLORIDE 0.5 MILLIGRAM(S): 2 INJECTION INTRAMUSCULAR; INTRAVENOUS; SUBCUTANEOUS at 18:15

## 2017-01-01 RX ADMIN — Medication 5 MILLIGRAM(S): at 12:53

## 2017-01-01 RX ADMIN — Medication 3 MILLILITER(S): at 17:21

## 2017-01-01 RX ADMIN — ROBINUL 0.4 MILLIGRAM(S): 0.2 INJECTION INTRAMUSCULAR; INTRAVENOUS at 18:02

## 2017-01-01 RX ADMIN — MEROPENEM 200 MILLIGRAM(S): 1 INJECTION INTRAVENOUS at 21:41

## 2017-01-01 RX ADMIN — Medication 5 MILLIGRAM(S): at 11:21

## 2017-01-01 RX ADMIN — ENOXAPARIN SODIUM 90 MILLIGRAM(S): 100 INJECTION SUBCUTANEOUS at 18:09

## 2017-01-01 RX ADMIN — OXYCODONE HYDROCHLORIDE 5 MILLIGRAM(S): 5 TABLET ORAL at 17:17

## 2017-01-01 RX ADMIN — Medication 25 MILLIGRAM(S): at 06:21

## 2017-09-18 NOTE — ED PROVIDER NOTE - OBJECTIVE STATEMENT
62y male pmh neg, USOH until 3wk ago with gradual onset of low back pain, Gradually worsened, Seen today at [facility] ed with complains of "wobbley legs since  yesterday now unable to ambulate, No incontinence, No tramua, no fever chills,sob/chest pain., At [place] had ct showing cord compression transferred for neruosurg/spine eval. Now back pain mildly improved after steroids,

## 2017-09-18 NOTE — ED PROVIDER NOTE - PHYSICAL EXAMINATION
UMBERTO: +sphincter tone,  no saddle anesthesia  LEs motor 2-3/5 bilaterally  sensation intact  spine:no mlt, no stepoffs

## 2017-09-18 NOTE — CONSULT NOTE ADULT - ATTENDING COMMENTS
I reviewed the resident's note and agree. The patient is a 62-year-old male who presents with B/L lower extremity weakness. Recommend stat MRI of the thoracic and lumbar spine with and without contrast to r/o cord compression. Keep NPO for now. I saw and evaluated the patient. I reviewed the resident's note and agree. The patient is a 62-year-old male who presents with B/L lower extremity weakness. Recommend stat MRI of the thoracic and lumbar spine with and without contrast to r/o cord compression. Keep NPO for now. I saw and evaluated the patient. I reviewed the resident's note and agree. The patient is a 62-year-old male who presents with right (1/5-2/5) greater than left (2/5-3/5) lower extremity weakness, beginning this past Friday, and worsening to the point where the patient could not walk on Sunday. Recommend stat MRI of the thoracic and lumbar spine with and without contrast to r/o cord compression. Keep NPO for now.

## 2017-09-18 NOTE — ED ADULT NURSE NOTE - OBJECTIVE STATEMENT
pt c/o low back pain with pain radiating down right leg. denies injury pain started after coughing 2wks ago

## 2017-09-18 NOTE — ED PROVIDER NOTE - PROGRESS NOTE DETAILS
d/w neurologist dr lares.  aware of pt status, rec obtain mri. d/w transfer center d/w neurosurgeon dr Rose, aware of pt status, agrees with transfer and decadron, r/o cord compression/transverse myelitis  d/w pt and wife, agree with plan, informed need MRI, may require further testing/evaluation if MRI inconclusive.  d/w ed attending dr lu, agrees and accepts transfer d/w pmd dr terry, agrees with plan for MRI. d/w pmd dr terry [phone]), agrees with plan to transfer for MRI.

## 2017-09-18 NOTE — CONSULT NOTE ADULT - ASSESSMENT
62M with progressive lower extremity weakness for three weakness concerning for cord compression.     -No acute neurosurgical intervention at this time  -Urgent MRI T-spine, L-spine w/ contrast  -Neurochecks q4h  -Pain control  -Will follow up after MRI complete 62M with progressive lower extremity weakness for three weakness concerning for cord compression.     -Urgent MRI T-spine, L-spine w/ contrast  -Neurochecks   -Pain control  -Will follow up after MRI complete

## 2017-09-18 NOTE — ED PROVIDER NOTE - CHPI ED SYMPTOMS NEG
no fatigue/no constipation/no bladder dysfunction/no bowel dysfunction/no neck tenderness/no anorexia

## 2017-09-18 NOTE — CONSULT NOTE ADULT - SUBJECTIVE AND OBJECTIVE BOX
p (1480)     HPI: 62M with no significant PMH p/w worsening LBP associated with lower extremity  weakness for past 3 weeks. Over the weekend, he progressed to the point where he was unable to ambulate. He denies incontinence, saddle anesthesia, but complaining of significant weakness in legs, stating they feel like jelly. He was seen at OSH and transferred to [facility] for spine evaluation. He denies fevers, history of cancer, trauma.     PAST MEDICAL HISTORY     PAST SURGICAL HISTORY         MEDICATIONS:  Antibiotics:    Neuro:    Anticoagulation:    Other:      SOCIAL HISTORY:   Occupation:   Marital Status:     FAMILY HISTORY:      REVIEW OF SYSTEMS:  Check here if all are normal other than Neurological []  General:  Eyes:  ENT:  Cardiac:  Respiratory:  GI: distended, not painful  Musculoskeletal:   Skin:  Neurologic:   Psychiatric:     PHYSICAL EXAMINATION:   T(C): 36.8 (17 @ 21:28), Max: 36.8 (17 @ 15:30)  HR: 74 (17 @ 21:28) (74 - 89)  BP: 141/85 (17 @ 21:28) (141/85 - 150/75)  RR: 16 (17 @ 21:28) (15 - 16)  SpO2: 98% (17 @ 21:28) (98% - 98%)  Wt(kg): --Height (cm): 167.64 ( @ 15:30)  Weight (kg): 79.4 ( @ 15:30)    PHYSICAL EXAM:    Constitutional: No Acute Distress     Neurological: AOx3, Following Commands    Motor exam:          Upper extremity                         Delt     Bicep     Tricep    HG                                                 R         5/5        5/5        5/5       5/5                                               L          5/5        5/5        5/5       5/5          Lower extremity                        HF         KF        KE       DF         PF                                                  R        1/5        1/5        1/5       3/5         4/5                                               L         2/5        4/5       4/5       4/5          4+/5                                                 Sensation: [] intact to light touch  [x] decreased: T6-8 sensory level    No clonus, Patellar reflex R 2+, L 1+, otherwise 1+ throughout, no babinski     LABS:                        14.1   17.6  )-----------( 207      ( 18 Sep 2017 20:40 )             42.5     09-18    138  |  97  |  18  ----------------------------<  106<H>  4.9   |  24  |  0.85    Ca    9.8      18 Sep 2017 20:40    TPro  7.3  /  Alb  4.3  /  TBili  0.6  /  DBili  x   /  AST  53<H>  /  ALT  38  /  AlkPhos  86  09-18    PT/INR - ( 18 Sep 2017 20:40 )   PT: 16.4 sec;   INR: 1.49 ratio         PTT - ( 18 Sep 2017 20:40 )  PTT:25.8 sec  Urinalysis Basic - ( 18 Sep 2017 21:28 )    Color: Yellow / Appearance: Clear / S.012 / pH: x  Gluc: x / Ketone: Trace  / Bili: Negative / Urobili: Negative   Blood: x / Protein: Negative / Nitrite: Negative   Leuk Esterase: Negative / RBC: 0-2 /HPF / WBC 0-2 /HPF   Sq Epi: x / Non Sq Epi: x / Bacteria: Few /HPF        RADIOLOGY & ADDITIONAL STUDIES: p (1480)     HPI: 62M with no significant PMH p/w worsening LBP associated with lower extremity  weakness for past 3 weeks. Over the weekend, he progressed to the point where he was unable to ambulate. He denies incontinence, saddle anesthesia, but complaining of significant weakness in legs, stating they feel like jelly. He was seen at OSH and transferred to [facility] for spine evaluation. He denies fevers, history of cancer, trauma.     PAST MEDICAL HISTORY   none    PAST SURGICAL HISTORY   none      MEDICATIONS:  Antibiotics:    Neuro:    Anticoagulation:    Other:      SOCIAL HISTORY:   Occupation:   Marital Status:     FAMILY HISTORY:      REVIEW OF SYSTEMS:  Check here if all are normal other than Neurological []  General:  Eyes:  ENT:  Cardiac:  Respiratory:  GI: distended, not painful  Musculoskeletal:   Skin:  Neurologic:   Psychiatric:     PHYSICAL EXAMINATION:   T(C): 36.8 (17 @ 21:28), Max: 36.8 (17 @ 15:30)  HR: 74 (17 @ 21:28) (74 - 89)  BP: 141/85 (17 @ 21:28) (141/85 - 150/75)  RR: 16 (17 @ 21:28) (15 - 16)  SpO2: 98% (17 @ 21:28) (98% - 98%)  Wt(kg): --Height (cm): 167.64 ( @ 15:30)  Weight (kg): 79.4 ( @ 15:30)    PHYSICAL EXAM:    Constitutional: No Acute Distress     Neurological: AOx3, Following Commands    Motor exam:          Upper extremity                         Delt     Bicep     Tricep    HG                                                 R         5/5        5/5        5/5       5/5                                               L          5/5        5/5        5/5       5/5          Lower extremity                        HF         KF        KE       DF         PF                                                  R        1/5        1/5        1/5       3/5         4/5                                               L         2/5        4/5       4/5       4/5          4+/5                                                 Sensation: [] intact to light touch  [x] decreased: T6-8 sensory level    No clonus, Patellar reflex R 2+, L 1+, otherwise 1+ throughout, no babinski     LABS:                        14.1   17.6  )-----------( 207      ( 18 Sep 2017 20:40 )             42.5         138  |  97  |  18  ----------------------------<  106<H>  4.9   |  24  |  0.85    Ca    9.8      18 Sep 2017 20:40    TPro  7.3  /  Alb  4.3  /  TBili  0.6  /  DBili  x   /  AST  53<H>  /  ALT  38  /  AlkPhos  86      PT/INR - ( 18 Sep 2017 20:40 )   PT: 16.4 sec;   INR: 1.49 ratio         PTT - ( 18 Sep 2017 20:40 )  PTT:25.8 sec  Urinalysis Basic - ( 18 Sep 2017 21:28 )    Color: Yellow / Appearance: Clear / S.012 / pH: x  Gluc: x / Ketone: Trace  / Bili: Negative / Urobili: Negative   Blood: x / Protein: Negative / Nitrite: Negative   Leuk Esterase: Negative / RBC: 0-2 /HPF / WBC 0-2 /HPF   Sq Epi: x / Non Sq Epi: x / Bacteria: Few /HPF        RADIOLOGY & ADDITIONAL STUDIES:

## 2017-09-18 NOTE — ED PROVIDER NOTE - OBJECTIVE STATEMENT
61yo M c/o b/l lower back pain/spasms x 2 weeks s/p coughing repeatedly.  pt put on cefuroxime by PMD (dr. terry).  pt then put on baclofen, prednisone, percocet.  pt and wife st pt unable to walk x 2 days.  normal BM 2 days ago. no incontinence, cp, sob, abd pain, n/v/d, fever, chills, trauma, travel.  pt also c/o numbness in legs. pt st no pain radiating to legs.  no hx sciatica.

## 2017-09-18 NOTE — ED ADULT NURSE NOTE - OBJECTIVE STATEMENT
63 y/o male presenting to the ED VIA EMS transfer from [place] complaining of back pain/ weakness in bilaterally legs x 3weeks; Per EMS patient was seen at [facility] today and had CT scan; Per EMS patient coming here today to rule out cord compression with MRI; Per EMS patient received decadron in [place]; Patient denies headache, dizziness, chest pain, SOB, fevers; Patient denies recent trauma; Patient unable to lift lower extremities; positive sensation and pulses noted to extremities; a&ox3; safety and comfort measures provided

## 2017-09-18 NOTE — ED PROVIDER NOTE - MEDICAL DECISION MAKING DETAILS
61yo M lbp and LE weakness, labs, IV decadron, transfer for MRI/neurosurgical evaluation, suspect cord compression vs transverse myelitis

## 2017-09-18 NOTE — ED PROVIDER NOTE - PROGRESS NOTE DETAILS
Endorsed to Dr Army Dev Vargas MD, Facep ARMY:  Pt signed out pending MRI 0230 to r/o Cord Compression.  Told at signout that MRI would be 0230.  Pt now pending MRI still and MRI called and reports that there are multiple critical MRI's pending currently which has delayed pt's MRI. They are aware of need for urgent MRI and pt in line should be after next image.  Pt remains stable. ARMY:  Pt required morphine once only over the course of the night.  He has been updated re: pending MRI.  Pt finally taken to MRI.  Several sagital images obtained and pt refuses further scan.  Radiology has called ED who have asked if pt needs pain control or anxiolysis.  In conversation with radiology and pt and ED pt refusing pain medication/anxiolysis and refuses further imaging but doesn't want medication.  Radiology has spent 30 min discussing with pt need to get imaging.  Pt refusing further imaging.  Pt brought back to Ed and endorsing pain.  Now amenable to pain medication.  Spine called re: concern for evidence of cord compression on MRI.  Spine admitting pt to their service.  Pt dosed morphine for pain control.  Stable for admission to spine who are aware of prelim concern for compression on initial images. ATTD:  - patient admitted by prior team. Awaiting bed on NS floor. Radiology called ED to notify of results. limited imaging of MRI shows areas concerning for metastatic disease and concern for cord compression at T8-10 - preliminary report by radiology resident. I immediately called Neurosurg admitting team to notify of results.

## 2017-09-19 NOTE — H&P ADULT - NSHPPHYSICALEXAM_GEN_ALL_CORE
Constitutional: No Acute Distress     Neurological: AOx3, Following Commands    Motor exam:          Upper extremity                         Delt     Bicep     Tricep    HG                                                 R         5/5 5/5 5/5 5/5                                               L          5/5 5/5 5/5 5/5          Lower extremity                        HF         KF        KE       DF         PF                                                  R        1/5        1/5        1/5       3/5         4/5                                               L         2/5 4/5 4/5 4/5          4+/5                                                 Sensation: [] intact to light touch  [x] decreased: T6-8 sensory level    No clonus, Patellar reflex R 2+, L 1+, otherwise 1+ throughout, no babinski Constitutional: No Acute Distress     Neurological: AOx3, Following Commands    Motor exam:          Upper extremity                         Delt     Bicep     Tricep    HG                                                 R         5/5 5/5 5/5 5/5                                               L          5/5 5/5 5/5 5/5          Lower extremity                        HF         KF        KE       DF         PF                                                  R        1/5 1/5 1/5 2/5 2/5                                               L         2/5        3/5       3/5       3/5          3/5                                                 Sensation: [] intact to light touch  [x] decreased: ~T9 sensory level    No clonus, Patellar reflex R 2+, L 1+, otherwise 1+ throughout, no babinski

## 2017-09-19 NOTE — ED ADULT NURSE REASSESSMENT NOTE - COMFORT CARE
warm blanket provided/side rails up/repositioned
repositioned/wait time explained/side rails up/warm blanket provided

## 2017-09-19 NOTE — ED ADULT NURSE REASSESSMENT NOTE - NS ED NURSE REASSESS COMMENT FT1
0145 Patient complaining of back pain at this time; ED MD Zhao made aware; Patient medicated as per order; Patient denies chest pain, dizziness, numbness, tingling, SOB, n/v/d; Patient still unable to  lower extremities as per baseline per arrival; Patient awaiting MRI; MRI called scheduled at 230; a&ox3; safety and comfort measures provided
Patient appears to be resting comfortably in stretcher; patient denies pain at this time; Patient denies numbness, tingling, dizziness, SOB, a&ox3; safety and comfort measures provided
Patient still awaiting MRI; ED Nurse manager Radha notified; MRI called and per mri "One patient ahead of this patient"; Patient denies pain, dizziness, n/v/d, SOB; a&ox3; comfort measures provided; safety maintained
Patient still awaiting MRI; MRI now scheduled for 430; Patient made aware; ED MD Zhao made aware; Patient denies any chest pain, dizziness, n/v/d, SOB; a&ox3; safety and comfort measures provided
Per MRI patient refusing MRI at this time; ED KINDRA Magdaleno notified; ED MD Radha Zhao made aware; MRI will call back
Report given by primary RN , pt in MRI.
Pt back from MRI, awaiting bed placement. Comfort and safety rounding in place.
Close monitoring in place, pending bed placement.

## 2017-09-19 NOTE — CONSULT NOTE ADULT - SUBJECTIVE AND OBJECTIVE BOX
Patient with 3 day history of rapidly progressive paraplegia and underwent emergency T8-T11 decompression of metastatic lung cancer to spine with T10 lytic disease.  EOD with liver , mediastinal, bone and adrenal metastasis from a left lower lobe Lung mass. Serial  neurologic examinations and IV steroids continue to determine if paraplegia will be reversible. Oncology consult anticipated after final pathology is established. Medical follow up examinations will be provided to lessen complications associated with his malignancy.

## 2017-09-19 NOTE — H&P ADULT - ASSESSMENT
62M with progressive lower extremity weakness for three weakness concerning for cord compression.     -No acute neurosurgical intervention at this time  -Urgent MRI T-spine, L-spine w/ contrast-unable to tolerate full MRI scans, initial scans reviewed showing epidural cord compression T9-T12  -Neurochecks q4h  -Pain control  -NPO for now 62M with progressive lower extremity weakness for three weakness concerning for cord compression.     -Urgent MRI T-spine, L-spine w/ contrast-unable to tolerate full MRI scans, initial scans reviewed showing epidural cord compression T9-T12  - Complete MRI T-spine, L-spine w/ contrast  - CT C/A/P w/ contrast w/ T-spine, L-spine reconstructions  - Neurochecks  - Steroids  - Pain control  - NPO for likely surgery

## 2017-09-19 NOTE — CONSULT NOTE ADULT - SUBJECTIVE AND OBJECTIVE BOX
CONSULTING SERVICE:  Medicine.    REASON FOR CONSULTATION:  The patient is being examined on 09/19/2017 postop thoracic spine decompression of spinal cord obstruction.    HISTORY OF PRESENT ILLNESS:  A 62-year-old male with no significant past medical history.  He developed a dry hacking cough 4 weeks ago.  He is a positive smoker and also states that he had exposure to propane gas 9 months ago with significant chemical exposure.  He did not improve with antibiotics and hacking cough persisted.  Otherwise, he remained in good health until 4 days ago when he developed weakness into the left leg.  He awoke from sleep with loss of sensation in his left leg with numbness and tingling.  Overnight, he went to bedrest and the following day he awoke and had no strength in either leg.  He contacted his local physician who arranged for hospitalization in [place] yesterday and when he was brought to [place] was notably with no strength in either leg.  MRI machine was not available and the patient was transferred to [place] where he was found to have an obstruction of the spinal cord from T8-T11.  He also had CT scan of the chest, abdomen and pelvis and thoracic MRI all of which have pending results.  Because the patient had an acute spinal cord compression with paraplegia from the T8-T9 level down, he was taken immediately to emergency circumstances to the operating room for decompressive surgery.  Postoperatively, he notes significant decrease in his back pain as he had been experiencing for the past 48 hours.  He is on a PCA drip and IV Decadron.  He has been told verbally that there is a suspicion that this is metastatic lung carcinoma to his thoracic spine.     MEDICATIONS:  The patient's past medications were limited only to vitamins.  He had no prescription medications.  Medications at the present time include 4 mg of Decadron IV q.6h., Dilaudid PCA infusion, Valium 5 mg every 8 hours, famotidine 20 mg every 12 hours, Zofran as needed for nausea.    ALLERGIES:  HE HAS NO KNOWN ALLERGIES.    SOCIAL HISTORY:  He is a positive one pack a day smoker for the past 40 years.  He is a social drinker with no drug history.    PAST SURGICAL HISTORY:  Limited to a right shoulder replacement that was performed approximately 10 years ago with titanium he has had no other significant surgical history.    FAMILY HISTORY:  Normal.    His diet is normal.  His weight is 160 pounds and stable.  He works in a business industry generally doing office work.    REVIEW OF SYSTEMS:  He has no headaches or dizziness.  No changes in hearing or vision.  No sinusitis or dental disease.  He has no difficulty swallowing.  He has no shortness of breath.  He has positive cough with no phlegm.  No wheezing.  He has no chest pain, palpitations or arrhythmias.  No abdominal pain, change in bowel habits or GI bleeding.  He has vague left lower quadrant tenderness over the past 48 hours, which is nonspecific.  He has no dysuria, frequency or incontinence.  He has no rashes or skin disease.  He has no diabetes or thyroid disease.  He has no arthritis or limitation in function neurologically with the exception of paraplegia from the T8 level down, he has been well.    PHYSICAL EXAMINATION:  VITAL SIGNS:  Postop shows a blood pressure of 130/70, pulse of 68, respirations 16.  HEENT:  Head and neck examination is normal.  There is no palpable lymphadenopathy.  LUNGS:  Chest is clear with good breath sounds bilaterally.  CARDIAC:  Examination is unremarkable.  ABDOMEN:  Soft with no masses, tenderness, guarding or rebound.  EXTREMITIES:  He has got 0/5 strength in both legs but it is not determined if this is authentic because he is postoperative with anesthesia.    DIAGNOSTIC DATA:  On his chest x-ray, he appears to have a left lower lobe mass with left costophrenic angle blunting EKG is normal.    LABORATORY DATA:  Laboratories performed postop, hemoglobin is 13, hematocrit 37.6, white count is elevated to 20.1, platelet count is 195,000.  Elevated white count is consistent with IV steroids.  Sodium is 137, potassium is 4.9, chloride is 100, CO2 is 22, BUN is 32, creatinine is 1.04, blood sugars 129.    CT of the chest was obtained and the patient has necrotic mediastinal and left hilar lymph nodes measuring 4.2 x 2.5 cm in the mediastinum and to the left hilum.  The left pulmonic vein is narrowed encompassed by the tumor and the left lower lobe pulmonary artery is surrounded by possible tumor.  CT scan of the abdomen and pelvis confirmed liver metastasis.  He also has lytic disease at T10 consistent with osseous metastasis best seen on MRI of the thoracic spine.  He also has a determined lesion on the left adrenal gland suggestive of adrenal metastasis.    ASSESSMENT AND PLAN:  The impression at this time is that the patient has metastatic lung carcinoma at the mediastinum with lymphadenopathy, liver, adrenal gland and bone resulting in an acute cord compression at T8-T11 with immediate decompressive surgery performed on emergency basis.  The patient will be observed for return of neurological function from the T8 level down and supported both physically and emotionally.  Medical Oncology consultation will be necessary for treatment when final pathology is determined as to the tissue type of lung carcinoma.  The patient tolerated surgery well and has no medical complications postop.  His examination time was 70 minutes of which greater than 50% was necessary for bedside discussion and counseling.  He will continue to have medical followup postoperatively to handle medical complications that will be related to metastatic carcinoma.      _______________________    DICT:	TIA BECKMAN MD  ([id number]) 09/19/2017 09:10 PM  TRANS:	[id number] 09/19/2017 09:19 PM  JOB:	[id number]    [employer] Electronically Signed by:  TIA BECKMAN 09/20/2017 08:41:45 [id number]  [employer]ap[employer]ct

## 2017-09-19 NOTE — H&P ADULT - HISTORY OF PRESENT ILLNESS
62M with no significant PMH p/w worsening LBP associated with lower extremity  weakness for past 3 weeks. Over the weekend, he progressed to the point where he was unable to ambulate. He denies incontinence, saddle anesthesia, but complaining of significant weakness in legs, stating they "feel like jelly." He was seen at OSH and transferred to [facility] for spine evaluation. He denies fevers, history of cancer, trauma.

## 2017-09-19 NOTE — PROGRESS NOTE ADULT - ASSESSMENT
Cord compression, post-operative day 0  - MAP>80mmHg for cord perfusion  - Pain control  - Monitor drain output  - PT/OT/PMNR  - Oncology w/u    At risk of neurological deterioration or death due to: epidural hematoma

## 2017-09-19 NOTE — PROGRESS NOTE ADULT - SUBJECTIVE AND OBJECTIVE BOX
62M with no significant PMH p/w worsening LBP associated with lower extremity  weakness for past 3 weeks. He progressed to the point where he was unable to ambulate. He denies incontinence or saddle anesthesia. He does endorse significant weakness in legs, stating they "feel like jelly." He was seen at OSH and transferred to [facility] for spine evaluation. Imaging revealed T9-T11 cord compresion from osseous lesion. In addition, CT of the chest/abd/pelvis revealed T10 lytic lesion, necrotic mediastinal mass, possible liver lesion, possible adrenal mass and a left lung mass.    Review of Systems: "I can't move my legs," no chest pain, no shortness of breath    Examination: 62M with no significant PMH p/w worsening LBP associated with lower extremity  weakness for past 3 weeks. He progressed to the point where he was unable to ambulate. He denies incontinence or saddle anesthesia. He does endorse significant weakness in legs, stating they "feel like jelly." He was seen at OSH and transferred to [facility] for spine evaluation. Imaging revealed T9-T11 cord compression from osseous lesion. In addition, CT of the chest/abd/pelvis revealed T10 lytic lesion, necrotic mediastinal mass, possible liver lesion, possible adrenal mass and a left lung mass. He was taken for T8-T11 laminectomy/decompression on 9/18/17.    Review of Systems: "I can't move my legs," no chest pain, no shortness of breath    Examination:   Awake, alert, fully oriented, follows commands, b/l UE without drift and with full strength, B/l LE hip extension/flexion 0/5, knee extension on the right 3/5 left 2-/5, dorsiflexion on the right 4/5, 1-/5 on the right

## 2017-09-20 NOTE — PROGRESS NOTE ADULT - SUBJECTIVE AND OBJECTIVE BOX
62M with no significant PMH p/w worsening LBP associated with lower extremity  weakness for past 3 weeks. He progressed to the point where he was unable to ambulate. He denies incontinence or saddle anesthesia. He does endorse significant weakness in legs, stating they "feel like jelly." He was seen at OSH and transferred to [facility] for spine evaluation. Imaging revealed T9-T11 cord compression from osseous lesion. In addition, CT of the chest/abd/pelvis revealed T10 lytic lesion, necrotic mediastinal mass, possible liver lesion, possible adrenal mass and a left lung mass. He was taken for T8-T11 laminectomy/decompression on 9/18/17.    Overnight Events: none reported.    ROS: negative [x] unable to obtain as patient is comatose/intubated/aphasic []     VITALS:   T(C): 36.6 (09-20-17 @ 08:00), Max: 36.7 (09-19-17 @ 11:42)  HR: 66 (09-20-17 @ 09:00) (63 - 84)  BP: 124/67 (09-20-17 @ 08:00) (102/60 - 152/76)  RR: 16 (09-20-17 @ 08:00) (15 - 18)  SpO2: 99% (09-20-17 @ 09:00) (96% - 100%)    09-19-17 @ 07:01  -  09-20-17 @ 07:00  --------------------------------------------------------  IN: 1220 mL / OUT: 1805 mL / NET: -585 mL    09-20-17 @ 07:01  -  09-20-17 @ 11:41  --------------------------------------------------------  IN: 85 mL / OUT: 100 mL / NET: -15 mL      LABS:                          11.2   17.2  )-----------( 155      ( 20 Sep 2017 03:10 )             32.6     09-20    136  |  102  |  x   ----------------------------<  x   5.1   |  22  |  x     Ca    8.9      19 Sep 2017 20:09  Phos  4.1     09-19  Mg     2.5     09-19    TPro  7.3  /  Alb  4.3  /  TBili  0.6  /  DBili  x   /  AST  53<H>  /  ALT  38  /  AlkPhos  86  09-18    PT/INR - ( 18 Sep 2017 20:40 )   PT: 16.4 sec;   INR: 1.49 ratio         PTT - ( 18 Sep 2017 20:40 )  PTT:25.8 sec  MEDS:  MEDICATIONS  (STANDING):  influenza   Vaccine 0.5 milliLiter(s) IntraMuscular once  ceFAZolin   IVPB 2000 milliGRAM(s) IV Intermittent every 8 hours  dexamethasone  Injectable 4 milliGRAM(s) IV Push every 6 hours  docusate sodium 100 milliGRAM(s) Oral three times a day  senna 2 Tablet(s) Oral at bedtime  enoxaparin Injectable 40 milliGRAM(s) SubCutaneous at bedtime  acetaminophen  IVPB. 1000 milliGRAM(s) IV Intermittent once  HYDROmorphone PCA (1 mG/mL) 30 milliLiter(s) PCA Continuous PCA Continuous  sodium chloride 0.9%. 1000 milliLiter(s) (85 mL/Hr) IV Continuous <Continuous>  diazepam    Tablet 5 milliGRAM(s) Oral every 8 hours      [All pertinent recent Imaging/Reports reviewed]    DEVICES: [] Restraints [] OMAIRA/HMV []LD [] ET tube [] Trach [] A-line [] Torres [] NGT [] Rectal Tube       Examination:   Awake, alert, fully oriented, follows commands, b/l UE without drift and with full strength,   LLE hip extension/flexion 2/5, knee extension on the L 3/5, DF 4/5  RLE 1/5   Chest CTAB.  Regular S1S2  Abd soft, NT/ND.

## 2017-09-20 NOTE — PROGRESS NOTE ADULT - ASSESSMENT
Assessment:  T9-11 cord compression due to bone lesion, post-operative day 1    Neuro:  - MAP>80mmHg for cord perfusion  - Pain control  - Monitor drain output  - PT/OT/PMNR  - Oncology w/u  Activity: [x] mobilize as tolerated [] Bedrest [] PT [] OT [] PMNR    PULM:  incentive spirometry, Osat goal >93%    CV:  MAP goal >80     RENAL:  Fluids: IVL,  tolerates PO    GI:  Advance diet as tolerated  GI prophylaxis [x] not indicated [] PPI [] other:  Bowel regimen [] colace [x] senna [] other:    ENDO:   Goal euglycemia (-180)    HEME/ONC:  VTE prophylaxis: [x] SCDs [x] chemoprophylaxis Lovenox SQ [] hold chemoprophylaxis due to: [] high risk of DVT/PE on admission due to:  Onc w/up for the multiple bone, adrenal, lung lesions.    ID: afebrile    MISC:    SOCIAL/FAMILY:  []x awaiting [] updated at bedside [] family meeting    CODE STATUS:  [x] Full Code [] DNR [] DNI [] Palliative/Comfort Care    DISPOSITION:  [] ICU [] Stroke Unit [x] Floor [] EMU [] RCU [] PCU  Stable to be transferred to floors.    Time spent: 35 critical care minutes    Contact: [phone]

## 2017-09-20 NOTE — CONSULT NOTE ADULT - SUBJECTIVE AND OBJECTIVE BOX
Oncology Consult Note    HPI:  62M with no significant PMH p/w worsening LBP associated with lower extremity  weakness for past 3 weeks. Over the weekend, he progressed to the point where he was unable to ambulate. He denies incontinence, saddle anesthesia, but complaining of significant weakness in legs, stating they "feel like jelly." He was seen at OSH and transferred to [facility] for spine evaluation. He denies fevers, history of cancer, trauma.  On ROS, patient also endorses cough that intermittently productive and present for past 2-3 months. He denies chest pain or SOB. Had 10 lb weight loss in last 2 weeks.     Study is limited as patient refused to continue. Osseous   metastatic disease with evidence of epidural extension involving the   T9-T11 levels with probable cord compromise given the appearance.       Allergies    No Known Allergies    Intolerances        MEDICATIONS  (STANDING):  influenza   Vaccine 0.5 milliLiter(s) IntraMuscular once  ceFAZolin   IVPB 2000 milliGRAM(s) IV Intermittent every 8 hours  dexamethasone  Injectable 4 milliGRAM(s) IV Push every 6 hours  docusate sodium 100 milliGRAM(s) Oral three times a day  senna 2 Tablet(s) Oral at bedtime  enoxaparin Injectable 40 milliGRAM(s) SubCutaneous at bedtime  acetaminophen  IVPB. 1000 milliGRAM(s) IV Intermittent once  HYDROmorphone PCA (1 mG/mL) 30 milliLiter(s) PCA Continuous PCA Continuous  sodium chloride 0.9%. 1000 milliLiter(s) (85 mL/Hr) IV Continuous <Continuous>  diazepam    Tablet 5 milliGRAM(s) Oral every 8 hours    MEDICATIONS  (PRN):  acetaminophen   Tablet 650 milliGRAM(s) Oral every 6 hours PRN For Temp greater than 38 C (100.4 F)  acetaminophen   Tablet. 650 milliGRAM(s) Oral every 6 hours PRN Moderate Pain (4 - 6)  famotidine    Tablet 20 milliGRAM(s) Oral every 12 hours PRN Dyspepsia  ondansetron Injectable 4 milliGRAM(s) IV Push every 6 hours PRN Nausea  HYDROmorphone PCA (1 mG/mL) Rescue Clinician Bolus 0.5 milliGRAM(s) IV Push every 15 minutes PRN for Pain Scale GREATER THAN 6  naloxone Injectable 0.1 milliGRAM(s) IV Push every 3 minutes PRN For ANY of the following changes in patient status:  A. RR LESS THAN 10 breaths per minute, B. Oxygen saturation LESS THAN 90%, C. 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 Social alcohol consumption. No drug use.    REVIEW OF SYSTEMS:    CONSTITUTIONAL: + weakness, weight loss. no fevers or chills  EYES/ENT: No visual changes;  No vertigo or throat pain   NECK: No pain or stiffness  RESPIRATORY: + cough no wheezing, hemoptysis; No shortness of breath  CARDIOVASCULAR: No chest pain or palpitations  GASTROINTESTINAL: No abdominal or epigastric pain. No nausea, vomiting, or hematemesis; No diarrhea or constipation. No melena or hematochezia.  GENITOURINARY: No dysuria, frequency or hematuria  NEUROLOGICAL: weakness and numbness of lower extremities, R>L  SKIN: No itching, burning, rashes, or lesions   All other review of systems is negative unless indicated above.        T(F): 97.9 (09-20-17 @ 08:00), Max: 97.9 (09-20-17 @ 08:00)  HR: 78 (09-20-17 @ 13:00)  BP: 124/67 (09-20-17 @ 08:00)  RR: 16 (09-20-17 @ 08:00)  SpO2: 99% (09-20-17 @ 13:00)  Wt(kg): --    GENERAL: NAD, well-developed  HEAD:  Atraumatic, Normocephalic  EYES: EOMI, PERRLA, conjunctiva and sclera clear  NECK: Supple, No JVD  CHEST/LUNG: Clear to auscultation bilaterally; No wheeze  HEART: Regular rate and rhythm; No murmurs, rubs, or gallops  ABDOMEN: Soft, Nontender, Nondistended; Bowel sounds present  EXTREMITIES:  2+ Peripheral Pulses, No clubbing, cyanosis, or edema  NEUROLOGY: non-focal  SKIN: No rashes or lesions                          11.2   17.2  )-----------( 155      ( 20 Sep 2017 03:10 )             32.6       09-20    136  |  102  |  x   ----------------------------<  x   5.1   |  22  |  x     Ca    8.9      19 Sep 2017 20:09  Phos  4.1     09-19  Mg     2.5     09-19    TPro  7.3  /  Alb  4.3  /  TBili  0.6  /  DBili  x   /  AST  53<H>  /  ALT  38  /  AlkPhos  86  09-18      Magnesium, Serum: 2.5 mg/dL (09-19 @ 20:09)  Phosphorus Level, Serum: 4.1 mg/dL (09-19 @ 20:09)      EXAM:  MRI THORACIC SPINE WO W CONT                            PROCEDURE DATE:  09/19/2017            INTERPRETATION:  INDICATIONS:  Patient with low back pain leg weakness   for about a month      TECHNIQUE:  Sagittal T1 weighted and T2 weighted images of the thoracic   spine were obtained. The patient refused to continue    COMPARISON EXAMINATION: None.      FINDINGS:    VERTEBRAL BODIES AND DISCS: Evidence of abnormal signal most   conspicuously at the level of T10 and involving the T11 and T12 vertebral   bodies as well as the L1, and L2 vertebral bodies suggested. Lesion in   the T8 and T4 vertebral bodies as well all of which are consistent with   probable metastatic disease. In addition, beginning at the T8/T9 level   and extending through T 11 is evidence of epidural extension of disease   and cord compromise on this basis. Please note that the patient refused   to continue and axials and postcontrast imaging was not obtained. There   is involvement of the posterior elements at T10 as well as T7 and L2   suspected    Impression: Study is limited as patient refused to continue. Osseous   metastatic disease with evidence of epidural extension involving the   T9-T11 levels with probable cord compromise given the appearance.   Recommend complete evaluation when patient is able. When evaluating   patient's chest x-ray findings, underlying lung primary should be   considered. Further evaluation is necessary. Dr. Colin discussed   these findings with Dr. Kvng Monroy in the emergency room as well   as the neurosurgical resident on 9/19/2017 10:50 AM with read back. Oncology Consult Note    HPI:  62M with no significant PMH p/w worsening LBP associated with lower extremity  weakness for past 3 weeks. Over the weekend, he progressed to the point where he was unable to ambulate. He denies incontinence, saddle anesthesia, but complaining of significant weakness in legs, stating they "feel like jelly." He was seen at OSH and transferred to [facility] for spine evaluation. He denies fevers, history of cancer, trauma.  On ROS, patient also endorses cough that intermittently productive and present for past 2-3 months. He denies chest pain or SOB. Had 10 lb weight loss in last 2 weeks. MRI thoracic spine in [facility] ED showed osseous metastatic disease with evidence of epidural extension involving T9-T11 levels with probable cord compression given the appearance. Neurosurgery was consulted and patient is now s/p T8-T11 laminectomy with surgical decompression and tumor debulking. Biopsy of mass was taken. Dexamethasone IV was also started. Patient is doing well post-op. He acknowledges improvement in back pain and slightly improved weakness of left lower extremity.     Allergies    No Known Allergies    Intolerances    MEDICATIONS  (STANDING):  influenza   Vaccine 0.5 milliLiter(s) IntraMuscular once  ceFAZolin   IVPB 2000 milliGRAM(s) IV Intermittent every 8 hours  dexamethasone  Injectable 4 milliGRAM(s) IV Push every 6 hours  docusate sodium 100 milliGRAM(s) Oral three times a day  senna 2 Tablet(s) Oral at bedtime  enoxaparin Injectable 40 milliGRAM(s) SubCutaneous at bedtime  acetaminophen  IVPB. 1000 milliGRAM(s) IV Intermittent once  HYDROmorphone PCA (1 mG/mL) 30 milliLiter(s) PCA Continuous PCA Continuous  sodium chloride 0.9%. 1000 milliLiter(s) (85 mL/Hr) IV Continuous <Continuous>  diazepam    Tablet 5 milliGRAM(s) Oral every 8 hours    MEDICATIONS  (PRN):  acetaminophen   Tablet 650 milliGRAM(s) Oral every 6 hours PRN For Temp greater than 38 C (100.4 F)  acetaminophen   Tablet. 650 milliGRAM(s) Oral every 6 hours PRN Moderate Pain (4 - 6)  famotidine    Tablet 20 milliGRAM(s) Oral every 12 hours PRN Dyspepsia  ondansetron Injectable 4 milliGRAM(s) IV Push every 6 hours PRN Nausea  HYDROmorphone PCA (1 mG/mL) Rescue Clinician Bolus 0.5 milliGRAM(s) IV Push every 15 minutes PRN for Pain Scale GREATER THAN 6  naloxone Injectable 0.1 milliGRAM(s) IV Push every 3 minutes PRN For ANY of the following changes in patient status:  A. RR LESS THAN 10 breaths per minute, B. Oxygen saturation LESS THAN 90%, C. 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 Social alcohol consumption. No drug use.    REVIEW OF SYSTEMS:    CONSTITUTIONAL: + weakness, weight loss. no fevers or chills  EYES/ENT: No visual changes;  No vertigo or throat pain   NECK: No pain or stiffness  RESPIRATORY: + cough no wheezing, hemoptysis; No shortness of breath  CARDIOVASCULAR: No chest pain or palpitations  GASTROINTESTINAL: No abdominal or epigastric pain. No nausea, vomiting, or hematemesis; No diarrhea or constipation. No melena or hematochezia.  GENITOURINARY: No dysuria, frequency or hematuria  NEUROLOGICAL: weakness and numbness of lower extremities, R>L  SKIN: No itching, burning, rashes, or lesions   All other review of systems is negative unless indicated above.        T(F): 97.9 (09-20-17 @ 08:00), Max: 97.9 (09-20-17 @ 08:00)  HR: 78 (09-20-17 @ 13:00)  BP: 124/67 (09-20-17 @ 08:00)  RR: 16 (09-20-17 @ 08:00)  SpO2: 99% (09-20-17 @ 13:00)  Wt(kg): --    GENERAL: NAD, well-developed  HEAD:  Atraumatic, Normocephalic  EYES: EOMI, PERRLA, conjunctiva and sclera clear  NECK: Supple, No JVD  CHEST/LUNG: Clear to auscultation bilaterally; No wheeze  HEART: Regular rate and rhythm; No murmurs, rubs, or gallops  ABDOMEN: Soft, Nontender, Nondistended; Bowel sounds present  EXTREMITIES:  2+ Peripheral Pulses, No clubbing, cyanosis, or edema  NEUROLOGY: 2/5 strength of LLE, 1/5 strength of RLE  SKIN: No rashes or lesions                          11.2   17.2  )-----------( 155      ( 20 Sep 2017 03:10 )             32.6       09-20    136  |  102  |  x   ----------------------------<  x   5.1   |  22  |  x     Ca    8.9      19 Sep 2017 20:09  Phos  4.1     09-19  Mg     2.5     09-19    TPro  7.3  /  Alb  4.3  /  TBili  0.6  /  DBili  x   /  AST  53<H>  /  ALT  38  /  AlkPhos  86  09-18      Magnesium, Serum: 2.5 mg/dL (09-19 @ 20:09)  Phosphorus Level, Serum: 4.1 mg/dL (09-19 @ 20:09)      EXAM:  MRI THORACIC SPINE WO W CONT                            PROCEDURE DATE:  09/19/2017            INTERPRETATION:  INDICATIONS:  Patient with low back pain leg weakness   for about a month      TECHNIQUE:  Sagittal T1 weighted and T2 weighted images of the thoracic   spine were obtained. The patient refused to continue    COMPARISON EXAMINATION: None.      FINDINGS:    VERTEBRAL BODIES AND DISCS: Evidence of abnormal signal most   conspicuously at the level of T10 and involving the T11 and T12 vertebral   bodies as well as the L1, and L2 vertebral bodies suggested. Lesion in   the T8 and T4 vertebral bodies as well all of which are consistent with   probable metastatic disease. In addition, beginning at the T8/T9 level   and extending through T 11 is evidence of epidural extension of disease   and cord compromise on this basis. Please note that the patient refused   to continue and axials and postcontrast imaging was not obtained. There   is involvement of the posterior elements at T10 as well as T7 and L2   suspected    Impression: Study is limited as patient refused to continue. Osseous   metastatic disease with evidence of epidural extension involving the   T9-T11 levels with probable cord compromise given the appearance.   Recommend complete evaluation when patient is able. When evaluating   patient's chest x-ray findings, underlying lung primary should be   considered. Further evaluation is necessary. Dr. Colin discussed   these findings with Dr. Kvng Monroy in the emergency room as well   as the neurosurgical resident on 9/19/2017 10:50 AM with read back.

## 2017-09-20 NOTE — PHYSICAL THERAPY INITIAL EVALUATION ADULT - ACTIVE RANGE OF MOTION EXAMINATION, REHAB EVAL
Limited motion to B/L LE's secondary to paraparesis. L UE WNL R UE limited to 90 degrees shoulder flexion secondary to  prior injury Limited active motion to  B/L LE's secondary to paraparesis. L UE WNL R UE limited to 90 degrees shoulder flexion secondary to  prior injury

## 2017-09-20 NOTE — PROGRESS NOTE ADULT - SUBJECTIVE AND OBJECTIVE BOX
Patient is a 62y old  Male who presents with a chief complaint of cord compression .s/p spine surgery. seen with continue LE weakness        MEDICATIONS  (STANDING):  influenza   Vaccine 0.5 milliLiter(s) IntraMuscular once  dexamethasone  Injectable 4 milliGRAM(s) IV Push every 6 hours  docusate sodium 100 milliGRAM(s) Oral three times a day  senna 2 Tablet(s) Oral at bedtime  enoxaparin Injectable 40 milliGRAM(s) SubCutaneous at bedtime  acetaminophen  IVPB. 1000 milliGRAM(s) IV Intermittent once  HYDROmorphone PCA (1 mG/mL) 30 milliLiter(s) PCA Continuous PCA Continuous  sodium chloride 0.9%. 1000 milliLiter(s) (85 mL/Hr) IV Continuous <Continuous>  diazepam    Tablet 5 milliGRAM(s) Oral every 8 hours    MEDICATIONS  (PRN):  acetaminophen   Tablet 650 milliGRAM(s) Oral every 6 hours PRN For Temp greater than 38 C (100.4 F)  acetaminophen   Tablet. 650 milliGRAM(s) Oral every 6 hours PRN Moderate Pain (4 - 6)  famotidine    Tablet 20 milliGRAM(s) Oral every 12 hours PRN Dyspepsia  ondansetron Injectable 4 milliGRAM(s) IV Push every 6 hours PRN Nausea  HYDROmorphone PCA (1 mG/mL) Rescue Clinician Bolus 0.5 milliGRAM(s) IV Push every 15 minutes PRN for Pain Scale GREATER THAN 6  naloxone Injectable 0.1 milliGRAM(s) IV Push every 3 minutes PRN For ANY of the following changes in patient status:  A. RR LESS THAN 10 breaths per minute, B. Oxygen saturation LESS THAN 90%, C. Sedation score of 6  ondansetron Injectable 4 milliGRAM(s) IV Push every 6 hours PRN Nausea      REVIEW OF SYSTEMS:  He has no headaches or dizziness.  No changes in hearing or vision.  No sinusitis or dental disease.  He has no difficulty swallowing.  He has no shortness of breath.  He has positive cough with no phlegm.  No wheezing.  He has no chest pain, palpitations or arrhythmias.  No abdominal pain, change in bowel habits or GI bleeding.  He has vague left lower quadrant tenderness over the past 48 hours, which is nonspecific.  He has no dysuria, frequency or incontinence.  He has no rashes or skin disease.  He has no diabetes or thyroid disease.  He has no arthritis or limitation in function neurologically with the exception of paraplegia from the T8 level down, he has been well.    VITALS:   T(C): 36.9 (17 @ 18:15), Max: 37.2 (17 @ 14:00)  HR: 91 (17 @ 18:15) (63 - 94)  BP: 117/75 (17 @ 18:15) (102/60 - 146/103)  RR: 18 (17 @ 18:15) (15 - 19)  SpO2: 95% (17 @ 18:15) (92% - 100%)  Wt(kg): --      PHYSICAL EXAMINATION:  VITAL SIGNS:  Postop shows a blood pressure of 130/70, pulse of 68, respirations 16.  HEENT:  Head and neck examination is normal.  There is no palpable lymphadenopathy.  LUNGS:  Chest is clear with good breath sounds bilaterally.  CARDIAC:  Examination is unremarkable.  ABDOMEN:  Soft with no masses, tenderness, guarding or rebound.  EXTREMITIES:  He has got 0/5 strength in both legs but it is not determined if this is authentic because he is postoperative with anesthesia.      LABS:        CBC Full  -  ( 20 Sep 2017 03:10 )  WBC Count : 17.2 K/uL  Hemoglobin : 11.2 g/dL  Hematocrit : 32.6 %  Platelet Count - Automated : 155 K/uL  Mean Cell Volume : 94.2 fl  Mean Cell Hemoglobin : 32.4 pg  Mean Cell Hemoglobin Concentration : 34.5 gm/dL  Auto Neutrophil # : 15.9 K/uL  Auto Lymphocyte # : 0.8 K/uL  Auto Monocyte # : 0.5 K/uL  Auto Eosinophil # : 0.0 K/uL  Auto Basophil # : 0.0 K/uL  Auto Neutrophil % : 92.2 %  Auto Lymphocyte % : 4.5 %  Auto Monocyte % : 3.1 %  Auto Eosinophil % : 0.1 %  Auto Basophil % : 0.1 %        136  |  102  |  x   ----------------------------<  x   5.1   |  22  |  x     Ca    8.9      19 Sep 2017 20:09  Phos  4.1       Mg     2.5               Urinalysis Basic - ( 18 Sep 2017 21:28 )    Color: Yellow / Appearance: Clear / S.012 / pH: x  Gluc: x / Ketone: Trace  / Bili: Negative / Urobili: Negative   Blood: x / Protein: Negative / Nitrite: Negative   Leuk Esterase: Negative / RBC: 0-2 /HPF / WBC 0-2 /HPF   Sq Epi: x / Non Sq Epi: x / Bacteria: Few /HPF      CAPILLARY BLOOD GLUCOSE          RADIOLOGY & ADDITIONAL TESTS:

## 2017-09-20 NOTE — PROGRESS NOTE ADULT - ASSESSMENT
62M with no significant PMH p/w worsening LBP associated with lower extremity  weakness for past 3 weeks. He progressed to the point where he was unable to ambulate. He denies incontinence or saddle anesthesia. He does endorse significant weakness in legs, stating they "feel like jelly." He was seen at OSH and transferred to [facility] for spine evaluation. Imaging revealed T9-T11 cord compression from osseous lesion. In addition, CT of the chest/abd/pelvis revealed T10 lytic lesion, necrotic mediastinal mass, possible liver lesion, possible adrenal mass and a left lung mass. He was taken for T8-T11 laminectomy/decompression on 9/18/17.

## 2017-09-20 NOTE — PROGRESS NOTE ADULT - ATTENDING COMMENTS
I saw and evaluated the patient. I reviewed the resident's note and agree. 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 The patient notes improvement in his ~T9 sensory level, but the patient's exam is still notable for 2-3/5 left lower extremity weakness and 1-2/5 right lower extremity weakness. Continue steroids. Continue Hemovac drains. D/C Torres catheter and F/U trial of void. Begin mobilization with PT, OT, and PMR. Recommend Oncology and Radiation Oncology evaluations. Ok to begin chemotherapy and radiation from Neurosurgery perspective on POD#14.

## 2017-09-20 NOTE — CONSULT NOTE ADULT - ASSESSMENT
62M with no significant PMH presenting with spinal cord compression of thoracic spine s/p laminectomy with surgical decompression and biopsy of mass taken. Pan-CT concerning for possible lung primary. Suspicious lesions of liver and adrenal gland concerning for metastases.

## 2017-09-20 NOTE — PROGRESS NOTE ADULT - PROBLEM SELECTOR PLAN 1
continue steroids as per neurosurgery  Drains as per neurosurgery  pain meds as needed  DVT and GI prophylaxis

## 2017-09-20 NOTE — BRIEF OPERATIVE NOTE - PROCEDURE
<<-----Click on this checkbox to enter Procedure Thoracic spine surgery  09/20/2017    Active  DONOVAN

## 2017-09-20 NOTE — PROGRESS NOTE ADULT - SUBJECTIVE AND OBJECTIVE BOX
Day 1 of Anesthesia Pain Management Service    SUBJECTIVE: It's working well.    Pain Scale Score:	[X] Refer to charted pain scores    THERAPY:    [ ] IV PCA Morphine		[ ] 5 mg/mL	[ ] 1 mg/mL  [X] IV PCA Hydromorphone	[ ] 5 mg/mL	[X] 1 mg/mL  [ ] IV PCA Fentanyl		[ ] 50 micrograms/mL    Demand dose: 0.2 mg     Lockout: 6 minutes   Continuous Rate: 0 mg/hr  4 Hour Limit: 4 mg    MEDICATIONS  (STANDING):  influenza   Vaccine 0.5 milliLiter(s) IntraMuscular once  ceFAZolin   IVPB 2000 milliGRAM(s) IV Intermittent every 8 hours  dexamethasone  Injectable 4 milliGRAM(s) IV Push every 6 hours  diazepam    Tablet 5 milliGRAM(s) Oral every 8 hours  docusate sodium 100 milliGRAM(s) Oral three times a day  senna 2 Tablet(s) Oral at bedtime  enoxaparin Injectable 40 milliGRAM(s) SubCutaneous at bedtime  acetaminophen  IVPB. 1000 milliGRAM(s) IV Intermittent once  HYDROmorphone PCA (1 mG/mL) 30 milliLiter(s) PCA Continuous PCA Continuous  sodium chloride 0.9%. 1000 milliLiter(s) (85 mL/Hr) IV Continuous <Continuous>    MEDICATIONS  (PRN):  acetaminophen   Tablet 650 milliGRAM(s) Oral every 6 hours PRN For Temp greater than 38 C (100.4 F)  acetaminophen   Tablet. 650 milliGRAM(s) Oral every 6 hours PRN Moderate Pain (4 - 6)  oxyCODONE    5 mG/acetaminophen 325 mG 1 Tablet(s) Oral every 4 hours PRN Moderate Pain  oxyCODONE    5 mG/acetaminophen 325 mG 2 Tablet(s) Oral every 6 hours PRN Severe Pain  HYDROmorphone  Injectable 2 milliGRAM(s) IV Push every 6 hours PRN breakthrough  famotidine    Tablet 20 milliGRAM(s) Oral every 12 hours PRN Dyspepsia  ondansetron Injectable 4 milliGRAM(s) IV Push every 6 hours PRN Nausea  HYDROmorphone  Injectable 0.4 milliGRAM(s) IV Push every 10 minutes PRN Moderate Pain  ondansetron Injectable 4 milliGRAM(s) IV Push once PRN Nausea and/or Vomiting  HYDROmorphone PCA (1 mG/mL) Rescue Clinician Bolus 0.5 milliGRAM(s) IV Push every 15 minutes PRN for Pain Scale GREATER THAN 6  naloxone Injectable 0.1 milliGRAM(s) IV Push every 3 minutes PRN For ANY of the following changes in patient status:  A. RR LESS THAN 10 breaths per minute, B. Oxygen saturation LESS THAN 90%, C. Sedation score of 6  ondansetron Injectable 4 milliGRAM(s) IV Push every 6 hours PRN Nausea  diphenhydrAMINE   Injectable 25 milliGRAM(s) IV Push every 4 hours PRN Pruritus      OBJECTIVE:    Sedation Score:	[ X] Alert	[ ] Drowsy 	[ ] Arousable	[ ] Asleep	[ ] Unresponsive    Side Effects:	[X ] None	[ ] Nausea	[ ] Vomiting	[ ] Pruritus  		[ ] Other:    Vital Signs Last 24 Hrs  T(C): 36.5 (20 Sep 2017 02:00), Max: 36.7 (19 Sep 2017 11:42)  T(F): 97.7 (20 Sep 2017 02:00), Max: 98 (19 Sep 2017 11:42)  HR: 70 (20 Sep 2017 07:00) (63 - 84)  BP: 114/73 (20 Sep 2017 07:00) (102/60 - 152/76)  BP(mean): 88 (20 Sep 2017 07:00) (76 - 116)  RR: 18 (20 Sep 2017 07:00) (15 - 18)  SpO2: 99% (20 Sep 2017 07:00) (96% - 100%)    ASSESSMENT/ PLAN    Therapy to  be:               [X] Continued   [ ] Discontinued   [ ] Changed to PRN Analgesics    Documentation and Verification of current medications:   [X] Done	[ ] Not done, not eligible    Comments: Using 2-5x/hr. Reeducated to use.

## 2017-09-20 NOTE — PHYSICAL THERAPY INITIAL EVALUATION ADULT - ADDITIONAL COMMENTS
Pt reports he lives in an apartment building with his family member +20 steps to negotiate. +L HR. Pt reports he was ambulating independently with no AD and was independent with all ADL's prior. Over the past few weeks pt reports increasing B/L LE weakness. Pt states his legs buckled suddenly and fell. 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 CT abdomen/chest: Necrotic mediastinal and left hilar lymph nodes with associated left lateral lower lobe atelectasis with associated tumor obstructing the left lower lobe bronchus. +Lytic lesion involving the T10 vertebral body. +Indeterminant liver lesions +left adrenal lesion. Pt is now s/p T8-T11 laminectomy and decompression of tumor 9/19    social hx: Pt reports he lives in an apartment building with his family member +20 steps to negotiate. +L HR. Pt reports he was ambulating independently with no AD and was independent with all ADL's prior. Over the past few weeks pt reports increasing B/L LE weakness. Pt states his legs buckled suddenly and fell.

## 2017-09-21 NOTE — PROGRESS NOTE ADULT - SUBJECTIVE AND OBJECTIVE BOX
Day 2 of Anesthesia Pain Management Service    SUBJECTIVE: "Pain is okay I just feel my stomach is extended"    Pain Scale Score:	[X] Refer to charted pain scores    THERAPY:    [ ] IV PCA Morphine		[ ] 5 mg/mL	[ ] 1 mg/mL  [X] IV PCA Hydromorphone	[ ] 5 mg/mL	[X] 1 mg/mL  [ ] IV PCA Fentanyl		[ ] 50 micrograms/mL    Demand dose: 0.2 mg     Lockout: 6 minutes   Continuous Rate: 0 mg/hr  4 Hour Limit: 4 mg    MEDICATIONS  (STANDING):  influenza   Vaccine 0.5 milliLiter(s) IntraMuscular once  dexamethasone  Injectable 4 milliGRAM(s) IV Push every 6 hours  docusate sodium 100 milliGRAM(s) Oral three times a day  senna 2 Tablet(s) Oral at bedtime  enoxaparin Injectable 40 milliGRAM(s) SubCutaneous at bedtime  diazepam    Tablet 5 milliGRAM(s) Oral every 8 hours  lactulose Syrup 20 Gram(s) Oral every 3 hours    MEDICATIONS  (PRN):  acetaminophen   Tablet 650 milliGRAM(s) Oral every 6 hours PRN For Temp greater than 38 C (100.4 F)  acetaminophen   Tablet. 650 milliGRAM(s) Oral every 6 hours PRN Moderate Pain (4 - 6)  famotidine    Tablet 20 milliGRAM(s) Oral every 12 hours PRN Dyspepsia  ondansetron Injectable 4 milliGRAM(s) IV Push every 6 hours PRN Nausea  naloxone Injectable 0.1 milliGRAM(s) IV Push every 3 minutes PRN For ANY of the following changes in patient status:  A. RR LESS THAN 10 breaths per minute, B. Oxygen saturation LESS THAN 90%, C. Sedation score of 6  ondansetron Injectable 4 milliGRAM(s) IV Push every 6 hours PRN Nausea  aluminum hydroxide/magnesium hydroxide/simethicone Suspension 30 milliLiter(s) Oral every 4 hours PRN Dyspepsia  bisacodyl Suppository 10 milliGRAM(s) Rectal daily PRN Constipation  oxyCODONE    5 mG/acetaminophen 325 mG 1 Tablet(s) Oral every 4 hours PRN Moderate Pain (4 - 6)  oxyCODONE    5 mG/acetaminophen 325 mG 2 Tablet(s) Oral every 4 hours PRN Severe Pain (7 - 10)  HYDROmorphone  Injectable 1 milliGRAM(s) IV Push every 3 hours PRN breakthru pain      OBJECTIVE:    Sedation Score:	[ X] Alert	[ ] Drowsy 	[ ] Arousable	[ ] Asleep	[ ] Unresponsive    Side Effects:	[X ] None	[ ] Nausea	[ ] Vomiting	[ ] Pruritus  		[ ] Other:    Vital Signs Last 24 Hrs  T(C): 36.8 (21 Sep 2017 08:00), Max: 37.7 (21 Sep 2017 05:10)  T(F): 98.2 (21 Sep 2017 08:00), Max: 99.8 (21 Sep 2017 05:10)  HR: 90 (21 Sep 2017 08:00) (68 - 94)  BP: 113/68 (21 Sep 2017 08:00) (113/68 - 138/87)  BP(mean): 89 (20 Sep 2017 14:00) (89 - 89)  RR: 18 (21 Sep 2017 08:00) (18 - 19)  SpO2: 95% (21 Sep 2017 08:00) (92% - 100%)    ASSESSMENT/ PLAN    Therapy to  be:               [ ] Continued   [ X] Discontinued   [X ] Changed to PRN Analgesics    Documentation and Verification of current medications:   [X] Done	[ ] Not done, not eligible    Comments: PCA use limited. Reporting abdominal distention PCA D\C'd by surgical service. Patient to have abdominal xray. Bowel regimen.

## 2017-09-21 NOTE — CONSULT NOTE ADULT - ATTENDING COMMENTS
Seen and Examined.   Agree with above assessment and plan as outlined.    Temo Trinh MD, FACP, FACG  [facility]  Phone: [phone]  Fax: [phone]

## 2017-09-21 NOTE — PROGRESS NOTE ADULT - PROBLEM SELECTOR PLAN 2
Oncology consult appreciated  Radiation oncology consult-[facility]  Oncology consult appreciated  Radiation oncology consult-[facility]

## 2017-09-21 NOTE — OCCUPATIONAL THERAPY INITIAL EVALUATION ADULT - PERTINENT HX OF CURRENT PROBLEM, REHAB EVAL
62M with no significant PMH p/w worsening LBP associated with LE  weakness for past 3 weeks. Over the weekend, he progressed to the point where he was unable to ambulate. He denies incontinence, saddle anesthesia, but c/o significant weakness in legs, stating they "feel like jelly." He was seen at OSH and transferred to [facility] for spine evaluation. He denies fevers, history of cancer, trauma.

## 2017-09-21 NOTE — OCCUPATIONAL THERAPY INITIAL EVALUATION ADULT - ADDITIONAL COMMENTS
Pt s/p T8-T11 laminectomy and decompression of tumor 9/20/17.  CT Chest/Abdomen 9/19/17: Distended urinary bladder with Torres catheter presumed to be within the prostate. Focal wedgelike decreased enhancement involving the anteromedial R kidney with cortical involvement, suspicious for pyelonephritis, cannot exclude tumor infiltration. Necrotic mediastinal & L hilar lymph nodes with associated L lateral lower lobe collapse/atelectasis with associated tumor obstruction. Narrowed L hilar pulmonary vasculature as described above. Lytic lesion involving the T10 vertebral body, compatible with osseous metastasis seen on MR of the thoracic spine. Indeterminant liver lesions. CT Thoracic/Lumbar Spine 9/19/17: Destructive osseous lesion at T10 with left paraspinal mass. Compression from T8-9 through T10-11 seen on the MRI are not appreciated on this examination. Limited evaluation of the mass within the spinal canal. Full evaluation with contrast-enhanced MR is recommended.

## 2017-09-21 NOTE — PROGRESS NOTE ADULT - ATTENDING COMMENTS
I saw and evaluated the patient. I reviewed the PA's note and agree. 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 The patient is complaining of abdominal pain and distension, but is neurologically stable with 2-3/5 left lower extremity weakness and 1-2/5 right lower extremity weakness. The patient is found to have a post-operative ileus. Keep patient NPO, restart IVF, D/C narcotics, and F/U GI. Continue steroids, Hemovac drains, and Torres catheter. Continue mobilization with PT, OT, and PMR. Appreciate Oncology evaluation. Awaiting Radiation Oncology evaluation. Ok to begin chemotherapy and radiation from Neurosurgery perspective on POD#14.

## 2017-09-21 NOTE — PROGRESS NOTE ADULT - ASSESSMENT
62M with no significant PMH p/w worsening LBP associated with lower extremity  weakness for past 3 weeks. He progressed to the point where he was unable to ambulate. He denies incontinence or saddle anesthesia. He does endorse significant weakness in legs, stating they "feel like jelly." He was seen at OSH and transferred to [facility] for spine evaluation. Imaging revealed T9-T11 cord compression from osseous lesion. In addition, CT of the chest/abd/pelvis revealed T10 lytic lesion, necrotic mediastinal mass, possible liver lesion, possible adrenal mass and a left lung mass. He was taken for T8-T11 laminectomy/decompression on 9/18/17.

## 2017-09-21 NOTE — PROGRESS NOTE ADULT - SUBJECTIVE AND OBJECTIVE BOX
Post-op day # 2 S/P T8-11 LAMINECTOMY     Overnight event: None, patient c/o no BM since saturday, has been burping but is not passing any gas     Vital Signs Last 24 Hrs  T(C): 36.8 (21 Sep 2017 08:00), Max: 37.7 (21 Sep 2017 05:10)  T(F): 98.2 (21 Sep 2017 08:00), Max: 99.8 (21 Sep 2017 05:10)  HR: 90 (21 Sep 2017 08:00) (68 - 94)  BP: 113/68 (21 Sep 2017 08:00) (113/68 - 138/87)  BP(mean): 89 (20 Sep 2017 14:00) (89 - 89)  RR: 18 (21 Sep 2017 08:00) (18 - 19)  SpO2: 95% (21 Sep 2017 08:00) (92% - 99%)                          12.4   6.25  )-----------( 224      ( 21 Sep 2017 08:02 )             38.5    09-21    136  |  102  |  24<H>  ----------------------------<  138<H>  5.1   |  22  |  0.99    Ca    8.7      21 Sep 2017 08:02  Phos  4.1     09-19  Mg     2.5     09-19       Stroke Core Measures      DRAIN OUTPUT:     NEUROIMAGING:     PHYSICAL EXAM:    General: No Acute Distress     Neurological: Awake, alert oriented x3, Moving all extremities, Muscle Strength normal in all four extremities, No Drift, Sensation to Light Touch Intact    Pulmonary: Clear to Auscultation, No Rales, No Rhonchi, No Wheezes     Cardiovascular: S1, S2, Regular Rate and Rhythm     Gastrointestinal: Soft, Nontender, Nondistended     Extremities: No calf tenderness     Incision: posterior thoracic intact    MEDICATIONS:   Antibiotics:    Neuro:  acetaminophen   Tablet 650 milliGRAM(s) Oral every 6 hours PRN For Temp greater than 38 C (100.4 F)  ondansetron Injectable 4 milliGRAM(s) IV Push every 6 hours PRN Nausea  ondansetron Injectable 4 milliGRAM(s) IV Push every 6 hours PRN Nausea  diazepam    Tablet 5 milliGRAM(s) Oral every 8 hours  oxyCODONE    5 mG/acetaminophen 325 mG 1 Tablet(s) Oral every 4 hours PRN Moderate Pain (4 - 6)  oxyCODONE    5 mG/acetaminophen 325 mG 2 Tablet(s) Oral every 4 hours PRN Severe Pain (7 - 10)  HYDROmorphone  Injectable 1 milliGRAM(s) IV Push every 3 hours PRN breakthru pain  acetaminophen   Tablet. 650 milliGRAM(s) Oral every 6 hours PRN Mild Pain (1 - 3)    Anticoagulation:  enoxaparin Injectable 40 milliGRAM(s) SubCutaneous at bedtime    Cardiology:    Endo:   dexamethasone  Injectable 4 milliGRAM(s) IV Push every 6 hours    Pulm:    GI/:  famotidine    Tablet 20 milliGRAM(s) Oral every 12 hours PRN  docusate sodium 100 milliGRAM(s) Oral three times a day  senna 2 Tablet(s) Oral at bedtime  aluminum hydroxide/magnesium hydroxide/simethicone Suspension 30 milliLiter(s) Oral every 4 hours PRN  bisacodyl Suppository 10 milliGRAM(s) Rectal daily PRN  lactulose Syrup 20 Gram(s) Oral every 3 hours    Other:  influenza   Vaccine 0.5 milliLiter(s) IntraMuscular once  naloxone Injectable 0.1 milliGRAM(s) IV Push every 3 minutes PRN For ANY of the following changes in patient status:  A. RR LESS THAN 10 breaths per minute, B. Oxygen saturation LESS THAN 90%, C. Sedation score of 6 Post-op day # 2 S/P T8-11 LAMINECTOMY     Overnight event: None, patient c/o no BM since saturday, has been burping but is not passing any gas     Vital Signs Last 24 Hrs  T(C): 36.8 (21 Sep 2017 08:00), Max: 37.7 (21 Sep 2017 05:10)  T(F): 98.2 (21 Sep 2017 08:00), Max: 99.8 (21 Sep 2017 05:10)  HR: 90 (21 Sep 2017 08:00) (68 - 94)  BP: 113/68 (21 Sep 2017 08:00) (113/68 - 138/87)  BP(mean): 89 (20 Sep 2017 14:00) (89 - 89)  RR: 18 (21 Sep 2017 08:00) (18 - 19)  SpO2: 95% (21 Sep 2017 08:00) (92% - 99%)                          12.4   6.25  )-----------( 224      ( 21 Sep 2017 08:02 )             38.5    09-21    136  |  102  |  24<H>  ----------------------------<  138<H>  5.1   |  22  |  0.99    Ca    8.7      21 Sep 2017 08:02  Phos  4.1     09-19  Mg     2.5     09-19       Stroke Core Measures      DRAIN OUTPUT:     NEUROIMAGING:     PHYSICAL EXAM:    General: No Acute Distress     Neurological: Awake, alert oriented x3, Moving all extremities, LLE 2/5 proximally and 3/5 distally, RLE proximally 2/5 and distally 1/5, Sensation to Light Touch Intact    Pulmonary: Clear to Auscultation, No Rales, No Rhonchi, No Wheezes     Cardiovascular: S1, S2, Regular Rate and Rhythm     Gastrointestinal: Soft, + tenderness, + distension , hypoactive bowel sounds    Extremities: No calf tenderness     Incision: posterior thoracic intact    MEDICATIONS:   Antibiotics:    Neuro:  acetaminophen   Tablet 650 milliGRAM(s) Oral every 6 hours PRN For Temp greater than 38 C (100.4 F)  ondansetron Injectable 4 milliGRAM(s) IV Push every 6 hours PRN Nausea  ondansetron Injectable 4 milliGRAM(s) IV Push every 6 hours PRN Nausea  diazepam    Tablet 5 milliGRAM(s) Oral every 8 hours  oxyCODONE    5 mG/acetaminophen 325 mG 1 Tablet(s) Oral every 4 hours PRN Moderate Pain (4 - 6)  oxyCODONE    5 mG/acetaminophen 325 mG 2 Tablet(s) Oral every 4 hours PRN Severe Pain (7 - 10)  HYDROmorphone  Injectable 1 milliGRAM(s) IV Push every 3 hours PRN breakthru pain  acetaminophen   Tablet. 650 milliGRAM(s) Oral every 6 hours PRN Mild Pain (1 - 3)    Anticoagulation:  enoxaparin Injectable 40 milliGRAM(s) SubCutaneous at bedtime    Cardiology:    Endo:   dexamethasone  Injectable 4 milliGRAM(s) IV Push every 6 hours    Pulm:    GI/:  famotidine    Tablet 20 milliGRAM(s) Oral every 12 hours PRN  docusate sodium 100 milliGRAM(s) Oral three times a day  senna 2 Tablet(s) Oral at bedtime  aluminum hydroxide/magnesium hydroxide/simethicone Suspension 30 milliLiter(s) Oral every 4 hours PRN  bisacodyl Suppository 10 milliGRAM(s) Rectal daily PRN  lactulose Syrup 20 Gram(s) Oral every 3 hours    Other:  influenza   Vaccine 0.5 milliLiter(s) IntraMuscular once  naloxone Injectable 0.1 milliGRAM(s) IV Push every 3 minutes PRN For ANY of the following changes in patient status:  A. RR LESS THAN 10 breaths per minute, B. Oxygen saturation LESS THAN 90%, C. Sedation score of 6

## 2017-09-21 NOTE — OCCUPATIONAL THERAPY INITIAL EVALUATION ADULT - NS ASR FOLLOW COMMAND OT EVAL
able to follow single-step instructions/+pt confused throughout session, pt with increased fear of falling and decreased proprioception/75% of the time

## 2017-09-21 NOTE — CONSULT NOTE ADULT - SUBJECTIVE AND OBJECTIVE BOX
Patient is a 62y old  Male who presents with a chief complaint of cord compression (19 Sep 2017 09:44)      HPI:  62M with no significant PMH p/w worsening LBP associated with lower extremity  weakness for past 3 weeks. Over the weekend, he progressed to the point where he was unable to ambulate. He denies incontinence, saddle anesthesia, but complaining of significant weakness in legs, stating they "feel like jelly." He was seen at OSH and transferred to [facility] for spine evaluation. He denies fevers, history of cancer, trauma. (19 Sep 2017 09:44)      PAST MEDICAL & SURGICAL HISTORY:      Allergies    No Known Allergies    Intolerances        MEDICATIONS  (STANDING):  influenza   Vaccine 0.5 milliLiter(s) IntraMuscular once  dexamethasone  Injectable 4 milliGRAM(s) IV Push every 6 hours  docusate sodium 100 milliGRAM(s) Oral three times a day  senna 2 Tablet(s) Oral at bedtime  enoxaparin Injectable 40 milliGRAM(s) SubCutaneous at bedtime  diazepam    Tablet 5 milliGRAM(s) Oral every 8 hours    MEDICATIONS  (PRN):  acetaminophen   Tablet 650 milliGRAM(s) Oral every 6 hours PRN For Temp greater than 38 C (100.4 F)  famotidine    Tablet 20 milliGRAM(s) Oral every 12 hours PRN Dyspepsia  ondansetron Injectable 4 milliGRAM(s) IV Push every 6 hours PRN Nausea  naloxone Injectable 0.1 milliGRAM(s) IV Push every 3 minutes PRN For ANY of the following changes in patient status:  A. RR LESS THAN 10 breaths per minute, B. Oxygen saturation LESS THAN 90%, C. Sedation score of 6  ondansetron Injectable 4 milliGRAM(s) IV Push every 6 hours PRN Nausea  aluminum hydroxide/magnesium hydroxide/simethicone Suspension 30 milliLiter(s) Oral every 4 hours PRN Dyspepsia  bisacodyl Suppository 10 milliGRAM(s) Rectal daily PRN Constipation  oxyCODONE    5 mG/acetaminophen 325 mG 1 Tablet(s) Oral every 4 hours PRN Moderate Pain (4 - 6)  oxyCODONE    5 mG/acetaminophen 325 mG 2 Tablet(s) Oral every 4 hours PRN Severe Pain (7 - 10)  HYDROmorphone  Injectable 1 milliGRAM(s) IV Push every 3 hours PRN breakthru pain  acetaminophen   Tablet. 650 milliGRAM(s) Oral every 6 hours PRN Mild Pain (1 - 3)      Social History:    Marital Status:  (   )    (   ) Single    (   )    (  )   Occupation:   Lives with: (  ) alone  (  ) children   (  ) spouse   (  ) parents  (  ) other    Substance Use (street drugs): (  ) never used  (  ) other:  Tobacco Usage:  (   ) never smoked   (   ) former smoker   (   ) current smoker  (     ) pack year  (        ) last cigarette date  Alcohol Usage:  Sexual History:     Family History   IBD (  ) Yes   (  ) No  GI Malignancy (  )  Yes    (  ) No    Health Management     Last Colonoscopy -      (     ) Advanced Directives: (     ) None    (      ) DNR    (     ) DNI    (     ) Health Care Proxy:     Review of Systems:    General:  No wt loss, fevers, chills, night sweats,fatigue,   CV:  No pain, palpitatioins, hypo/hypertension  Resp:  No dyspnea, cough, tachypnea, wheezing  GI:  No pain, No nausea, No vomiting, No diarrhea, No constipatiion, No weight loss, No fever, No pruritis, No rectal bleeding, No tarry stools, No dysphagia,  :  No pain, bleeding, incontinence, nocturia  Muscle:  No pain, weakness  Neuro:  No weakness, tingling, memory problems  Psych:  No fatigue, insomnia, mood problems, depression  Endocrine:  No polyuria, polydypsia, cold/heat intolerance  Heme:  No petechiae, ecchymosis, easy bruisability  Skin:  No rash, tattoos, scars, edema      Vital Signs Last 24 Hrs  T(C): 36.8 (21 Sep 2017 08:00), Max: 37.7 (21 Sep 2017 05:10)  T(F): 98.2 (21 Sep 2017 08:00), Max: 99.8 (21 Sep 2017 05:10)  HR: 90 (21 Sep 2017 08:00) (75 - 94)  BP: 113/68 (21 Sep 2017 08:00) (113/68 - 138/87)  BP(mean): 89 (20 Sep 2017 14:00) (89 - 89)  RR: 18 (21 Sep 2017 08:00) (18 - 19)  SpO2: 95% (21 Sep 2017 08:00) (92% - 99%)    PHYSICAL EXAM:    Constitutional: NAD, well-developed  Neck: No LAD, supple  Respiratory: CTA and P  Cardiovascular: S1 and S2, RRR, no M  Gastrointestinal: BS+, soft, NT/ND, neg HSM,  Extremities: No peripheral edema, neg clubing, cyanosis  Vascular: 2+ peripheral pulses  Neurological: A/O x 3, no focal deficits  Psychiatric: Normal mood, normal affect  Skin: No rashes        LABS:                        12.4   6.25  )-----------( 224      ( 21 Sep 2017 08:02 )             38.5     09-21    136  |  102  |  24<H>  ----------------------------<  138<H>  5.1   |  22  |  0.99    Ca    8.7      21 Sep 2017 08:02  Phos  4.1     09-19  Mg     2.5     09-19          LIVER FUNCTIONS - ( 18 Sep 2017 20:40 )  Alb: 4.3 g/dL / Pro: 7.3 g/dL / ALK PHOS: 86 U/L / ALT: 38 U/L RC / AST: 53 U/L / GGT: x             RADIOLOGY & ADDITIONAL TESTS: Patient is a 62y old  Male who presents with a chief complaint of cord compression (19 Sep 2017 09:44)      HPI:  62M with no significant PMH p/w worsening LBP associated with lower extremity  weakness for past 3 weeks. Over the weekend, he progressed to the point where he was unable to ambulate. He denies incontinence, saddle anesthesia, but complaining of significant weakness in legs, stating they "feel like jelly." He was seen at OSH and transferred to [facility] for spine evaluation. He denies fevers, history of cancer, trauma.   On ROS, patient also endorses cough that intermittently productive and present for past 2-3 months. He denies chest pain or SOB. Had 10 lb weight loss in last 2 weeks. 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 Neurosurgery was consulted and patient is now s/p T8-T11 laminectomy with surgical decompression and tumor debulking (2 days ago). Biopsy of mass was taken. Dexamethasone IV was also started. He acknowledges improvement in back pain and slightly improved weakness of left lower extremity.     He has not had a BM since Saturday and has abdominal distension +belching. No nausea or vomiting. +flatus  Usual bowel movements daily at baseline. No prior colonoscopy. No FH of GI malignancy. +c/o abdominal pain and distension.  Pt. has been using his PCA pump for analgesia  no history of SBO or ileus +prior appy (open) during childhood        PAST MEDICAL & SURGICAL HISTORY:  s/p appy    Allergies    No Known Allergies    Intolerances        MEDICATIONS  (STANDING):  influenza   Vaccine 0.5 milliLiter(s) IntraMuscular once  dexamethasone  Injectable 4 milliGRAM(s) IV Push every 6 hours  docusate sodium 100 milliGRAM(s) Oral three times a day  senna 2 Tablet(s) Oral at bedtime  enoxaparin Injectable 40 milliGRAM(s) SubCutaneous at bedtime  diazepam    Tablet 5 milliGRAM(s) Oral every 8 hours    MEDICATIONS  (PRN):  acetaminophen   Tablet 650 milliGRAM(s) Oral every 6 hours PRN For Temp greater than 38 C (100.4 F)  famotidine    Tablet 20 milliGRAM(s) Oral every 12 hours PRN Dyspepsia  ondansetron Injectable 4 milliGRAM(s) IV Push every 6 hours PRN Nausea  naloxone Injectable 0.1 milliGRAM(s) IV Push every 3 minutes PRN For ANY of the following changes in patient status:  A. RR LESS THAN 10 breaths per minute, B. Oxygen saturation LESS THAN 90%, C. Sedation score of 6  ondansetron Injectable 4 milliGRAM(s) IV Push every 6 hours PRN Nausea  aluminum hydroxide/magnesium hydroxide/simethicone Suspension 30 milliLiter(s) Oral every 4 hours PRN Dyspepsia  bisacodyl Suppository 10 milliGRAM(s) Rectal daily PRN Constipation  oxyCODONE    5 mG/acetaminophen 325 mG 1 Tablet(s) Oral every 4 hours PRN Moderate Pain (4 - 6)  oxyCODONE    5 mG/acetaminophen 325 mG 2 Tablet(s) Oral every 4 hours PRN Severe Pain (7 - 10)  HYDROmorphone  Injectable 1 milliGRAM(s) IV Push every 3 hours PRN breakthru pain  acetaminophen   Tablet. 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 Has one son. Has smoked 1/2 PPD for past ~ 50 years. Social alcohol consumption. No drug use.      Family History - Mom (Ovarian Cancer), Maternal Aunt (unknown malignancy)  IBD (  ) Yes   ( X ) No  GI Malignancy unknown    Health Management  Last Colonoscopy - none      Advanced Directives: (X ) None    (      ) DNR    (     ) DNI    (     ) Health Care Proxy:     Review of Systems:    General:  No  fevers, chills, night sweats +10 pound weight loss  CV:  No pain, palpitations  No history of CAD  Resp: intermittent productive cough  GI:  see HPI  :  No pain, bleeding, hematuria  Muscle:  +back pain  Neuro:  back pain, improved weakness of LLE  Psych:  No fatigue, insomnia, mood problems, depression  Endocrine:  No history fo DM or thyroid disease  Skin:  No rash, tattoos, scars, edema      Vital Signs Last 24 Hrs  T(C): 36.8 (21 Sep 2017 08:00), Max: 37.7 (21 Sep 2017 05:10)  T(F): 98.2 (21 Sep 2017 08:00), Max: 99.8 (21 Sep 2017 05:10)  HR: 90 (21 Sep 2017 08:00) (75 - 94)  BP: 113/68 (21 Sep 2017 08:00) (113/68 - 138/87)  BP(mean): 89 (20 Sep 2017 14:00) (89 - 89)  RR: 18 (21 Sep 2017 08:00) (18 - 19)  SpO2: 95% (21 Sep 2017 08:00) (92% - 99%)    PHYSICAL EXAM:    Constitutional: Non-toxic appearing, sitting in bed, appears uncomfortable  Neck: No LAD, supple  Respiratory: decreased BS at bases bilaterally, no wheeze  Cardiovascular: S1 and S2, RRR, no Murmur  Gastrointestinal: softly distended +hypo-active BS, generalized tenderness to deep palpation - without rebound, guarding r rigidity. WH RLQ scar (c/w open appy) +michelle - clear  Rectal: normal tone, not impacted and no air-filled distension of rectal vault  small amount of very firm stool palpable at tip of gloved finger +flatus with UMBERTO. no palpable mass or lesion  Back: +dressing with blood staining +hemovac x 2 in place  Extremities: No peripheral edema, neg clubbing, cyanosis  Vascular: 2+ peripheral pulses  Neurological: A/O x 3, moves all extremities  Psychiatric:  normal affect, appears anxious  Skin: No rashes        LABS:                        12.4   6.25  )-----------( 224      ( 21 Sep 2017 08:02 )             38.5     09-21    136  |  102  |  24<H>  ----------------------------<  138<H>  5.1   |  22  |  0.99    Ca    8.7      21 Sep 2017 08:02  Phos  4.1     09-19  Mg     2.5     09-19      LIVER FUNCTIONS - ( 18 Sep 2017 20:40 )  Alb: 4.3 g/dL / Pro: 7.3 g/dL / ALK PHOS: 86 U/L / ALT: 38 U/L RC / AST: 53 U/L / GGT: x           Carcinoembryonic Antigen (09.21.17 @ 08:08)    Carcinoembryonic Antigen: 7.8: METHOD: Roche EIA   The CEA assay should not be used as a cancer screening test. Serum CEA  concentrations should only be used in conjunction with   information available from the clinical evaluation of the patien and  from other diagnostic procedures7.8: .   CEA Normal Ranges   _________________   Non-smoker: less than 3.9 ng/mL       Smoker: less than 5.5 ng/mL ng/mL        RADIOLOGY & ADDITIONAL TESTS:    EXAM:  CT ABDOMEN AND PELVIS OC IC                          EXAM:  CT CHEST IC                            PROCEDURE DATE:  09/19/2017            INTERPRETATION:  CLINICAL INFORMATION: 62-year-old male with spinal   metastases. Evaluate for malignancy.    COMPARISON: MR thoracic spine dated 9/19/2017.    PROCEDURE:   CT of the Chest, Abdomen and Pelvis was performed with intravenous   contrast.   Intravenous contrast: 90 ml Omnipaque 350. 10 ml discarded.  Oral contrast: positive contrast was administered.  Sagittal and coronal reformats were performed.    FINDINGS:    CHEST:     LUNGS AND LARGE AIRWAYS: Patent central airways. Left lower lobe   atelectasis/collapse. No pulmonary nodules.  PLEURA: No pleural effusion.  VESSELS: The inferior left pulmonary vein is narrowed/encompassed   possibly by tumor. This appearance is also noted at the superior left   pulmonary vein. This appearance is also noted to a lesser degree at the   left upper lobe pulmonary artery. The lower lobe pulmonary artery is   surrounded by possible tumor.  HEART: Heart size is normal. No pericardial effusion.  MEDIASTINUM AND NIRAV: Necrotic mediastinal and left hilar lymph nodes   measuring up to 4.2 x 2.5 cm in the mediastinum (2:20) and 2.9 x 2.4 cm   in the left hilar region (2:36).  CHEST WALL AND LOWER NECK: Within normal limits.    ABDOMEN AND PELVIS:    LIVER: Multiple indeterminate liver lesions, the largest in the left lobe   of the liver measuring 4.6 x 4.4 cm.  BILE DUCTS: Normal caliber.  GALLBLADDER: Within normal limits.  SPLEEN: Within normal limits.  PANCREAS: Within normal limits.  ADRENALS: Indeterminate 1.6 cm left adrenal lesion. Normal-appearing   right adrenal gland.  KIDNEYS/URETERS: Focal wedgelike decreased enhancement involvingthe   anteromedial right kidney with cortical involvement, suspicious for   pyelonephritis. Cannot entirely exclude tumor infiltration. Too small to   characterize hypodensities in the kidneys bilaterally.    BLADDER/PROSTATE: Distended urinary bladder with Michelle catheter presumed   to be within the prostate. Possible TURP defect.    BOWEL: No bowel obstruction. Colonic diverticulosis without CT evidence   of acute diverticulitis. Appendix not definitely visualized; however, no   secondary signs of acute appendicitis.  PERITONEUM: No ascites.  VESSELS: Calcified atherosclerotic disease noted of the distal abdominal   aorta and common iliac arterial vasculature.   RETROPERITONEUM: No lymphadenopathy.    ABDOMINAL WALL: Tiny fat filled umbilical hernia.  BONES: Lytic lesion involving the T10 vertebral body, compatible with   osseous metastasis seen on recent MR. Right shoulder arthroplasty.    IMPRESSION:     Distended urinary bladder with Michelle catheter presumed to be within the   prostate. Clinical correlation is recommended.    Focal wedgelike decreased enhancement involving the anteromedial right   kidney with cortical involvement, suspicious for pyelonephritis. Cannot   entirely exclude tumor infiltration.    Necrotic mediastinal and left hilar lymph nodes with associated left   lateral lower lobe collapse/atelectasis with associated tumor obstructing   the left lower lobe bronchus. Narrowed left hilar pulmonary vasculature   as described above.    Lytic lesion involving the T10 vertebral body, compatible with osseous   metastasis seen on MR of the thoracic spine.    Indeterminant liver lesions. Indeterminate 1.6 cm left adrenal lesion. MR   abdomen (liver protocol) with intravenous contrast and in/out of phase   imaging can be obtained for further evaluation.

## 2017-09-21 NOTE — OCCUPATIONAL THERAPY INITIAL EVALUATION ADULT - IMPAIRED TRANSFERS: SIT/STAND, REHAB EVAL
impaired motor control/impaired postural control/decreased strength/pain/cognition/decreased ROM/impaired balance

## 2017-09-21 NOTE — OCCUPATIONAL THERAPY INITIAL EVALUATION ADULT - ORIENTATION, REHAB EVAL
oriented to person, place, time and situation/initially oriented x2 , then reassessed later oriented x3

## 2017-09-21 NOTE — OCCUPATIONAL THERAPY INITIAL EVALUATION ADULT - PLANNED THERAPY INTERVENTIONS, OT EVAL
bed mobility training/balance training/cognitive, visual perceptual/neuromuscular re-education/fine motor coordination training/transfer training/ADL retraining/motor coordination training/ROM/strengthening

## 2017-09-21 NOTE — OCCUPATIONAL THERAPY INITIAL EVALUATION ADULT - LIVES WITH, PROFILE
lives with girlfriend in apartment +1 flight to enter, previously independent with all aDLs and ambulation

## 2017-09-21 NOTE — PROGRESS NOTE ADULT - SUBJECTIVE AND OBJECTIVE BOX
Pain Management Attending Addendum    SUBJECTIVE: c/o abdominal pain    Therapy:	  [x ] IV PCA	   [ ] Epidural           [ ] s/p Spinal Opoid              [ ] Postpartum infusion	  [ ] Patient controlled regional anesthesia (PCRA)    [ ] prn Analgesics    OBJECTIVE:   [x ] No new signs     [ ] Other:    Side Effects:  [x ] None			[ ] Other:    Assessment of Catheter Site:		[ ] Intact		[ ] Other:    ASSESSMENT/PLAN  [x ] Continue current therapy    [ ] Therapy changed to:    [ ] IV PCA       [ ] Epidural     [ ] prn Analgesics     [ ] post partum infusion    Comments: abdominal films pending

## 2017-09-21 NOTE — CONSULT NOTE ADULT - ASSESSMENT
62M with no significant PMH presenting with spinal cord compression of thoracic spine s/p laminectomy with surgical decompression and biopsy of mass taken. Pan-CT concerning for possible lung primary. Suspicious lesions of liver and adrenal gland concerning for metastases.   Now s/p Laminectomy and decompression POD  Abdominal Pain and distension - likely due to ileus    AXR - result pending (my viewing - no SBO, c/w ileus, large fecal burden in Right colon)    PLAN  NPO except meds  Magnesium Citrate PO x 1 dose  Limit narcotics and would avoid lactulose due to SE of abdominal distension    Further care per primary team  Hematology following    Thank you for the courtesy of this consult.    Bandar Baker PA-C    [facility]  [phone] 62M with no significant PMH presenting with spinal cord compression of thoracic spine s/p laminectomy with surgical decompression and biopsy of mass taken. Pan-CT concerning for possible lung primary. Suspicious lesions of liver and adrenal gland concerning for metastases.   Now s/p Laminectomy and decompression POD  Abdominal Pain and distension - likely due to ileus    AXR - result pending (my viewing - no SBO, c/w ileus, large fecal burden in Right colon)    PLAN  NPO except meds  Magnesium Citrate PO x 1 dose  Limit narcotics and would avoid lactulose due to SE of abdominal distension    Further care per primary team  Hematology following    Thank you for the courtesy of this consult.        Bandar Baker PA-C    [facility]  [phone]

## 2017-09-21 NOTE — PROGRESS NOTE ADULT - PROBLEM SELECTOR PLAN 1
Post-op day # 2 S/P T8-11 LAMINECTOMY   Neurologically stable  Continue decadron 4mg q 6  PM&R consult-Pend  Await acute rehab placement once medically cleared

## 2017-09-21 NOTE — PROGRESS NOTE ADULT - SUBJECTIVE AND OBJECTIVE BOX
Patient is a 62y old  Male who presents with a chief complaint of cord compression . Patient s/p thoracic spine surgery. Patient complaining of abdominal pain and back pain        MEDICATIONS  (STANDING):  influenza   Vaccine 0.5 milliLiter(s) IntraMuscular once  dexamethasone  Injectable 4 milliGRAM(s) IV Push every 6 hours  docusate sodium 100 milliGRAM(s) Oral three times a day  senna 2 Tablet(s) Oral at bedtime  enoxaparin Injectable 40 milliGRAM(s) SubCutaneous at bedtime  sodium chloride 0.9% with potassium chloride 20 mEq/L 1000 milliLiter(s) (75 mL/Hr) IV Continuous <Continuous>  diazepam  Injectable 1 milliGRAM(s) IV Push every 8 hours    MEDICATIONS  (PRN):  acetaminophen   Tablet 650 milliGRAM(s) Oral every 6 hours PRN For Temp greater than 38 C (100.4 F)  famotidine    Tablet 20 milliGRAM(s) Oral every 12 hours PRN Dyspepsia  ondansetron Injectable 4 milliGRAM(s) IV Push every 6 hours PRN Nausea  naloxone Injectable 0.1 milliGRAM(s) IV Push every 3 minutes PRN For ANY of the following changes in patient status:  A. RR LESS THAN 10 breaths per minute, B. Oxygen saturation LESS THAN 90%, C. Sedation score of 6  ondansetron Injectable 4 milliGRAM(s) IV Push every 6 hours PRN Nausea  aluminum hydroxide/magnesium hydroxide/simethicone Suspension 30 milliLiter(s) Oral every 4 hours PRN Dyspepsia  bisacodyl Suppository 10 milliGRAM(s) Rectal daily PRN Constipation  HYDROmorphone  Injectable 1 milliGRAM(s) IV Push every 3 hours PRN breakthru pain  acetaminophen   Tablet. 650 milliGRAM(s) Oral every 6 hours PRN Mild Pain (1 - 3)  artificial  tears Solution 1 Drop(s) Both EYES two times a day PRN Dry Eyes  morphine  - Injectable 4 milliGRAM(s) IV Push every 4 hours PRN Moderate Pain (4 - 6)  HYDROmorphone  Injectable 1 milliGRAM(s) IV Push every 4 hours PRN Severe Pain (7 - 10)      REVIEW OF SYSTEMS:  CONSTITUTIONAL: No fever, change in weight, or fatigue  HEAD: No headache, dizziness or recent trauma  EYES: No eye pain, visual disturbances, or discharge  ENT:  No difficulty hearing, tinnitus, vertigo, No sinus or throat pain  NECK: No pain or stiffness  BREASTS: No pain, masses, or nipple discharge  RESPIRATORY: No cough, wheezing, chills or hemoptysis, No shortness of breath at rest or exertional shortness of breath  CARDIOVASCULAR: No chest pain, palpitations, dizziness, CHF, arrhythmia, cardiomegaly or leg swelling  GASTROINTESTINAL: abdominal pain constipation  GENITOURINARY: No dysuria, frequency, hematuria, or incontinence  SKIN: No itching, burning, rashes, or lesions   LYMPH NODES: No history of enlarged glands  ENDOCRINE: No heat or cold intolerance, No hair loss. No osteoporosis or thyroid disease  MUSCULOSKELETAL: No joint pain or swelling, No muscle, back, or extremity pain  PSYCHIATRIC: No depression, anxiety, mood swings, or difficulty sleeping  HEME/LYMPH: No easy bruising, anticoagulants, bleeding disorder or bleeding gums  ALLERGY AND IMMUNOLOGIC: No hives or eczema  NEUROLOGICAL: No memory loss, loss of strength, numbness, or tremors          VITALS:   T(C): 36.6 (09-21-17 @ 21:05), Max: 37.7 (09-21-17 @ 05:10)  HR: 99 (09-21-17 @ 21:05) (75 - 117)  BP: 149/91 (09-21-17 @ 21:05) (112/80 - 149/91)  RR: 18 (09-21-17 @ 21:05) (18 - 20)  SpO2: 87% (09-21-17 @ 21:05) (87% - 96%)  Wt(kg): --      General: WN/WD NAD  Neurology: A&Ox3, nonfocal, HOWELL x 4  Eyes: PERRLA/ EOMI, Gross vision intact  ENT/Neck: Neck supple, trachea midline, No JVD, Gross hearing intact  Respiratory: CTA B/L, No wheezing, rales, rhonchi  CV: RRR, S1S2, no murmurs, rubs or gallops  Abdominal: + tenderness decreased BS   Extremities: No edema, + peripheral pulses  Skin: No Rashes, Hematoma, Ecchymosis        LABS:        CBC Full  -  ( 21 Sep 2017 08:02 )  WBC Count : 6.25 K/uL  Hemoglobin : 12.4 g/dL  Hematocrit : 38.5 %  Platelet Count - Automated : 224 K/uL  Mean Cell Volume : 91.2 fl  Mean Cell Hemoglobin : 29.4 pg  Mean Cell Hemoglobin Concentration : 32.2 gm/dL  Auto Neutrophil # : x  Auto Lymphocyte # : x  Auto Monocyte # : x  Auto Eosinophil # : x  Auto Basophil # : x  Auto Neutrophil % : x  Auto Lymphocyte % : x  Auto Monocyte % : x  Auto Eosinophil % : x  Auto Basophil % : x    09-21    136  |  102  |  24<H>  ----------------------------<  138<H>  5.1   |  22  |  0.99    Ca    8.7      21 Sep 2017 08:02            CAPILLARY BLOOD GLUCOSE          RADIOLOGY & ADDITIONAL TESTS:

## 2017-09-21 NOTE — OCCUPATIONAL THERAPY INITIAL EVALUATION ADULT - MANUAL MUSCLE TESTING RESULTS, REHAB EVAL
B shoulder 3-/5, B elbow, wrist, digits 3/5, RLE grossly 1+/5, LLE grossly 2-/5/grossly assessed due to

## 2017-09-22 NOTE — CONSULT NOTE ADULT - ATTENDING COMMENTS
I have seen and examined this patient and agree with above. 62 year old male with respiratory failure today who went to the MICU. Patient with abdominal distention. CT scan shows bowel perforation. Patient consent for ex lap, bowel resection. I have explained the risks and benefits of ptient.

## 2017-09-22 NOTE — PROGRESS NOTE ADULT - SUBJECTIVE AND OBJECTIVE BOX
62M with no significant PMH p/w worsening LBP associated with lower extremity  weakness for past 3 weeks. He progressed to the point where he was unable to ambulate. He denies incontinence or saddle anesthesia. He does endorse significant weakness in legs, stating they "feel like jelly." He was seen at OSH and transferred to [facility] for spine evaluation. Imaging revealed T9-T11 cord compression from osseous lesion. In addition, CT of the chest/abd/pelvis revealed T10 lytic lesion, necrotic mediastinal mass, possible liver lesion, possible adrenal mass and a left lung mass. He was taken for T8-T11 laminectomy/decompression on 9/18/17. Tx to Fl on 9/20.    Overnight Events: Pt became hypoxic overnight, O2 sat in high 80's, low 90's on 2L NC; evaluated early am, Pt in resp distress, tachypneic, tachycardic, became hypotensive; emergently intubated and tx to NSCU. Pt also had been complaining of severe abd pain, GI was consulted yesterday for ileus on XRAY, Pt have NGT to wall suction o/n.     ROS: negative [] unable to obtain as patient is comatose/intubated/aphasic [x]   VITALS:   T(C): 36.6 (09-22-17 @ 11:00), Max: 37.7 (09-22-17 @ 08:15)  HR: 101 (09-22-17 @ 11:30) (92 - 137)  BP: 177/71 (09-22-17 @ 09:30) (78/66 - 192/113)  RR: 16 (09-22-17 @ 11:30) (16 - 38)  SpO2: 100% (09-22-17 @ 11:30) (87% - 100%)    09-21-17 @ 07:01  -  09-22-17 @ 07:00  --------------------------------------------------------  IN: 1820 mL / OUT: 2255 mL / NET: -435 mL    09-22-17 @ 07:01  -  09-22-17 @ 11:34  --------------------------------------------------------  IN: 2081 mL / OUT: 40 mL / NET: 2041 mL      LABS:  ABG - ( 22 Sep 2017 09:04 )  pH: 7.21  /  pCO2: 43    /  pO2: 314   / HCO3: 16    / Base Excess: -10.7 /  SaO2: 100                           14.5   8.8   )-----------( 176      ( 22 Sep 2017 06:57 )             43.5     140  |  101  |  54<H>  ----------------------------<  152<H>  5.7<H>   |  19<L>  |  1.79<H>    Ca    8.2<L>      22 Sep 2017 06:57  Mg     4.0     09-22    MEDICATIONS  (STANDING):  influenza   Vaccine 0.5 milliLiter(s) IntraMuscular once  dexamethasone  Injectable 4 milliGRAM(s) IV Push every 6 hours  senna 2 Tablet(s) Oral at bedtime  enoxaparin Injectable 40 milliGRAM(s) SubCutaneous at bedtime  propofol Infusion 10 MICROgram(s)/kG/Min (4.806 mL/Hr) IV Continuous <Continuous>  phenylephrine    Infusion 1 MICROgram(s)/kG/Min (30.038 mL/Hr) IV Continuous <Continuous>  norepinephrine Infusion 0.02 MICROgram(s)/kG/Min (1.502 mL/Hr) IV Continuous <Continuous>  fentaNYL   Infusion 1 MICROgram(s)/kG/Hr (4.005 mL/Hr) IV Continuous <Continuous>  piperacillin/tazobactam IVPB. 3.375 Gram(s) IV Intermittent every 8 hours  sodium chloride 0.9%. 1000 milliLiter(s) (75 mL/Hr) IV Continuous <Continuous>  sodium chloride 0.9% Bolus 1000 milliLiter(s) IV Bolus once  piperacillin/tazobactam IVPB. 2.25 Gram(s) IV Intermittent once    [All pertinent recent Imaging/Reports reviewed]    DEVICES: [x] Restraints [] L IJ TLC []LD [x] ET tube [] Trach [x] A-line [x] Torres [x] OGT [] Rectal Tube   Mode: AC/ CMV (Assist Control/ Continuous Mandatory Ventilation)  RR (machine): 14  TV (machine): 500  FiO2: 100  PEEP: 5  ITime: 1  MAP: 14  PIP: 34    Examination:   Intubated, sedated  PERRL, +gag/cough, BUE spontaneously moves, purposeful  decrease BS on L  hypoactive bowel sound, distended abd, severe tenderness  tachycardic, no murmurs  no edema

## 2017-09-22 NOTE — PROGRESS NOTE ADULT - ASSESSMENT
62M with no significant PMH presenting with spinal cord compression of thoracic spine s/p laminectomy with surgical decompression and biopsy of mass taken. Pan-CT concerning for possible lung primary. Suspicious lesions of liver and adrenal gland concerning for metastases.   Now s/p Laminectomy and decompression POD  Abdominal Pain and distension - likely due to ileus  AXR - 9/21 c/w ileus, (stool and contrast in Right colon)  s/p RRT due to respiratory distress 9/22 am- s/p intubation, sedation, pressor support and new ALEC with hyperkalemia    PLAN  NPO, NGT  AXR from this am c/w ileus, no free air apparent (await official read)  Surgery input  CT A/P as ordered  Monitor exam, check upright CXR r/o free air  Empiric Abx as ordered  Pressors per ICU/Neurosurgery  Consider Renal input  Discussed with NSCU fellow and team        Bandar Baker PA-C    [facility]  [phone]

## 2017-09-22 NOTE — PROGRESS NOTE ADULT - SUBJECTIVE AND OBJECTIVE BOX
Oncology Follow-up    INTERVAL HPI/OVERNIGHT EVENTS:  Patient S&E at bedside. Patient intubated and sedated arriving in NSICU. S/p CT A/P that showed bowel perforation. Pt's life partner and son at bedside.    VITAL SIGNS:  T(F): 98.4 (09-22-17 @ 12:45)  HR: 109 (09-22-17 @ 15:45)  BP: 177/71 (09-22-17 @ 09:30)  RR: 16 (09-22-17 @ 15:45)  SpO2: 100% (09-22-17 @ 15:45)  Wt(kg): --    PHYSICAL EXAM:    Constitutional: intubated and sedated  Eyes: PERRL, EOMI, sclera non-icteric  Neck: supple, no masses, no JVD  Respiratory: CTA b/l, good air entry b/l, no wheezing, rhonchi, rales, with normal respiratory effort and no intercostal retractions  Cardiovascular: RRR, normal S1S2, no M/R/G  Gastrointestinal: soft, NTND, no masses palpable, BS normal in all four quadrants, no HSM  Extremities:  no c/c/e  Neurological: could not be assessed  Skin: Normal temperature    MEDICATIONS  (STANDING):  influenza   Vaccine 0.5 milliLiter(s) IntraMuscular once  dexamethasone  Injectable 4 milliGRAM(s) IV Push every 6 hours  senna 2 Tablet(s) Oral at bedtime  propofol Infusion 10 MICROgram(s)/kG/Min (4.806 mL/Hr) IV Continuous <Continuous>  phenylephrine    Infusion 1 MICROgram(s)/kG/Min (30.038 mL/Hr) IV Continuous <Continuous>  norepinephrine Infusion 0.02 MICROgram(s)/kG/Min (1.502 mL/Hr) IV Continuous <Continuous>  fentaNYL   Infusion 1 MICROgram(s)/kG/Hr (4.005 mL/Hr) IV Continuous <Continuous>  piperacillin/tazobactam IVPB. 3.375 Gram(s) IV Intermittent every 8 hours  sodium chloride 0.9%. 1000 milliLiter(s) (75 mL/Hr) IV Continuous <Continuous>  vasopressin Infusion 0.1 Unit(s)/Min (6 mL/Hr) IV Continuous <Continuous>    MEDICATIONS  (PRN):  ondansetron Injectable 4 milliGRAM(s) IV Push every 6 hours PRN Nausea  naloxone Injectable 0.1 milliGRAM(s) IV Push every 3 minutes PRN For ANY of the following changes in patient status:  A. RR LESS THAN 10 breaths per minute, B. Oxygen saturation LESS THAN 90%, C. Sedation score of 6  ondansetron Injectable 4 milliGRAM(s) IV Push every 6 hours PRN Nausea  bisacodyl Suppository 10 milliGRAM(s) Rectal daily PRN Constipation  acetaminophen   Tablet. 650 milliGRAM(s) Oral every 6 hours PRN Mild Pain (1 - 3)  artificial  tears Solution 1 Drop(s) Both EYES two times a day PRN Dry Eyes      No Known Allergies      LABS:                        12.9   13.7  )-----------( 193      ( 22 Sep 2017 13:31 )             39.2     09-22    135  |  103  |  59<H>  ----------------------------<  165<H>  5.6<H>   |  17<L>  |  1.88<H>    Ca    7.3<L>      22 Sep 2017 13:31  Phos  4.9     09-22  Mg     3.8     09-22      PT/INR - ( 22 Sep 2017 13:31 )   PT: 17.9 sec;   INR: 1.63 ratio         PTT - ( 22 Sep 2017 13:31 )  PTT:27.7 sec       RADIOLOGY & ADDITIONAL TESTS:  Studies reviewed.

## 2017-09-22 NOTE — PROGRESS NOTE ADULT - ASSESSMENT
The patient continues to require around the clock cardiopulmonary and neurologic monitoring for critical illness, as cited above.

## 2017-09-22 NOTE — CONSULT NOTE ADULT - ASSESSMENT
62 year old man with possible metastatic lung ca, spinal cord compression s/p laminectomy and decompression with postoperative ileus, possible intra abdominal pathology.  - Recommend CT abdomen/pelvis to evaluate for pathology  - Plan discussed with Dr. Valerie LAURENT [facility] General Surgery Resident PGY3 Contact # [phone]

## 2017-09-22 NOTE — CONSULT NOTE ADULT - SUBJECTIVE AND OBJECTIVE BOX
[facility]  [phone]  NEPHROLOGY      INITIAL CONSULT NOTE  --------------------------------------------------------------------------------  HPI:  62M with no significant PMH p/w worsening LBP associated with lower extremity  weakness for past 3 weeks.  he was unable to ambulate. complaining of significant weakness in legs.  He was seen at OSH and transferred to [facility] for spine evaluation.   Imaging revealed T9-T11 cord compression from osseous lesion. In addition, CT of the chest/abd/pelvis revealed T10 lytic lesion, necrotic mediastinal mass, possible liver lesion, possible adrenal mass and a left lung mass. He was taken for T8-T11 laminectomy/decompression on 9/18/17. Tx to Fl on 9/20.  Overnight Events: Pt became hypoxic overnight, O2 sat in high 80's, low 90's on 2L NC; Pt in resp distress, tachypneic, tachycardic, became hypotensive; emergently intubated and tx to NSCU. also had abd pain. seen by GI---> ileus. pt developed rising creatinine. also had cta chest as well. he also had ct abd iv contrast on 9/19. creatinine increased to 1.7 and k 5.7 renal consult called.     PAST HISTORY  --------------------------------------------------------------------------------  PAST MEDICAL & SURGICAL HISTORY:  abd mass newly dx  FAMILY HISTORY:  pt unable  PAST SOCIAL HISTORY:  pt unable  ALLERGIES & MEDICATIONS  --------------------------------------------------------------------------------  Allergies    No Known Allergies    Intolerances      Standing Inpatient Medications  influenza   Vaccine 0.5 milliLiter(s) IntraMuscular once  dexamethasone  Injectable 4 milliGRAM(s) IV Push every 6 hours  senna 2 Tablet(s) Oral at bedtime  enoxaparin Injectable 40 milliGRAM(s) SubCutaneous at bedtime  propofol Infusion 10 MICROgram(s)/kG/Min IV Continuous <Continuous>  phenylephrine    Infusion 1 MICROgram(s)/kG/Min IV Continuous <Continuous>  norepinephrine Infusion 0.02 MICROgram(s)/kG/Min IV Continuous <Continuous>  fentaNYL   Infusion 1 MICROgram(s)/kG/Hr IV Continuous <Continuous>  piperacillin/tazobactam IVPB. 3.375 Gram(s) IV Intermittent every 8 hours  sodium chloride 0.9%. 1000 milliLiter(s) IV Continuous <Continuous>  sodium chloride 0.9% Bolus 1000 milliLiter(s) IV Bolus once    PRN Inpatient Medications  ondansetron Injectable 4 milliGRAM(s) IV Push every 6 hours PRN  naloxone Injectable 0.1 milliGRAM(s) IV Push every 3 minutes PRN  ondansetron Injectable 4 milliGRAM(s) IV Push every 6 hours PRN  bisacodyl Suppository 10 milliGRAM(s) Rectal daily PRN  acetaminophen   Tablet. 650 milliGRAM(s) Oral every 6 hours PRN  artificial  tears Solution 1 Drop(s) Both EYES two times a day PRN      REVIEW OF SYSTEMS  --------------------------------------------------------------------------------  pt intubated    VITALS/PHYSICAL EXAM  --------------------------------------------------------------------------------  T(C): 36.9 (09-22-17 @ 12:45), Max: 37.7 (09-22-17 @ 08:15)  HR: 102 (09-22-17 @ 13:15) (92 - 137)  BP: 177/71 (09-22-17 @ 09:30) (78/66 - 192/113)  RR: 15 (09-22-17 @ 13:15) (15 - 38)  SpO2: 98% (09-22-17 @ 13:15) (87% - 100%)  Wt(kg): --  Height (cm): 180.34 (09-22-17 @ 08:00)  Weight (kg): 80.1 (09-22-17 @ 08:00)  BMI (kg/m2): 24.6 (09-22-17 @ 08:00)  BSA (m2): 2 (09-22-17 @ 08:00)      09-21-17 @ 07:01  -  09-22-17 @ 07:00  --------------------------------------------------------  IN: 1820 mL / OUT: 2255 mL / NET: -435 mL    09-22-17 @ 07:01  -  09-22-17 @ 13:47  --------------------------------------------------------  IN: 2284 mL / OUT: 215 mL / NET: 2069 mL      Physical Exam:  	Gen: intubated  	no jvd  	Pulm: decrease bs no rales or ronchi  	CV: RRR, S1S2; no rub  	Abd: no BS, distended. unable to assess tenderness given pt is intubated  	: No suprapubic tenderness  	LE:  + feet cold. left 2-4 toes and arch with ecchymosis. no edema  	  LABS/STUDIES  --------------------------------------------------------------------------------              14.5   8.8   >-----------<  176      [09-22-17 @ 06:57]              43.5     140  |  101  |  54  ----------------------------<  152      [09-22-17 @ 06:57]  5.7   |  19  |  1.79        Ca     8.2     [09-22-17 @ 06:57]      Mg     4.0     [09-22-17 @ 06:57]            Creatinine Trend:  SCr 1.79 [09-22 @ 06:57]  SCr 0.99 [09-21 @ 08:02]  SCr 1.04 [09-19 @ 20:09]  SCr 0.85 [09-18 @ 20:40]  SCr 0.85 [09-18 @ 17:24]    Urinalysis - [09-18-17 @ 21:28]      Color Yellow / Appearance Clear / SG 1.012 / pH 6.0      Gluc Negative / Ketone Trace  / Bili Negative / Urobili Negative       Blood Negative / Protein Negative / Leuk Est Negative / Nitrite Negative      RBC 0-2 / WBC 0-2 / Hyaline  / Gran  / Sq Epi  / Non Sq Epi  / Bacteria Few

## 2017-09-22 NOTE — PROGRESS NOTE ADULT - SUBJECTIVE AND OBJECTIVE BOX
INTERVAL HPI / OVERNIGHT EVENTS:  no vomiting  no BM  s/p RRT this morning for tachypnea, respiratory distress - required emergent intubation  NGT placed, dark bilious output (not large volume output)  now in ICU on pressor support    MEDICATIONS  (STANDING):  influenza   Vaccine 0.5 milliLiter(s) IntraMuscular once  dexamethasone  Injectable 4 milliGRAM(s) IV Push every 6 hours  senna 2 Tablet(s) Oral at bedtime  enoxaparin Injectable 40 milliGRAM(s) SubCutaneous at bedtime  sodium chloride 0.9% with potassium chloride 20 mEq/L 1000 milliLiter(s) (75 mL/Hr) IV Continuous <Continuous>  propofol Infusion 10 MICROgram(s)/kG/Min (4.806 mL/Hr) IV Continuous <Continuous>  phenylephrine    Infusion 1 MICROgram(s)/kG/Min (30.038 mL/Hr) IV Continuous <Continuous>  norepinephrine Infusion 0.02 MICROgram(s)/kG/Min (1.502 mL/Hr) IV Continuous <Continuous>  piperacillin/tazobactam IVPB.      sodium chloride 0.9% Bolus 1000 milliLiter(s) IV Bolus once    MEDICATIONS  (PRN):  ondansetron Injectable 4 milliGRAM(s) IV Push every 6 hours PRN Nausea  naloxone Injectable 0.1 milliGRAM(s) IV Push every 3 minutes PRN For ANY of the following changes in patient status:  A. RR LESS THAN 10 breaths per minute, B. Oxygen saturation LESS THAN 90%, C. Sedation score of 6  ondansetron Injectable 4 milliGRAM(s) IV Push every 6 hours PRN Nausea  bisacodyl Suppository 10 milliGRAM(s) Rectal daily PRN Constipation  acetaminophen   Tablet. 650 milliGRAM(s) Oral every 6 hours PRN Mild Pain (1 - 3)  artificial  tears Solution 1 Drop(s) Both EYES two times a day PRN Dry Eyes      Allergies    No Known Allergies    Intolerances      Review of Systems:  General:  No  fevers, chills  CV:  No pain, palpitations  No history of CAD  Resp: +resp distress this am, requiring intubation  :  +michelle  Muscle: s/p T8-T11 laminectomy with surgical decompression and tumor debulking  Neuro:  s/p T8-T11 laminectomy with surgical decompression and tumor debulking  Psych:  sedated      Vital Signs Last 24 Hrs  T(C): 37.7 (22 Sep 2017 08:15), Max: 37.7 (22 Sep 2017 08:15)  T(F): 99.9 (22 Sep 2017 08:15), Max: 99.9 (22 Sep 2017 08:15)  HR: 107 (22 Sep 2017 10:00) (92 - 137)  BP: 177/71 (22 Sep 2017 09:30) (78/66 - 192/113)  BP(mean): 102 (22 Sep 2017 09:30) (68 - 132)  RR: 23 (22 Sep 2017 10:00) (18 - 38)  SpO2: 100% (22 Sep 2017 10:00) (87% - 100%)    PHYSICAL EXAM:    Constitutional: orally intubated and sedated in ICU, on pressor support  Neck: + left neck TLC  Respiratory: orally intubated on vent  Cardiovascular: S1 and S2, tachy  Gastrointestinal: distended, no BS appreciated +scar (c/w open appy) +michelle - clear  Back: +dressing with blood staining +hemovac x 2 in place  Extremities: No peripheral edema, neg clubbing Left 2nd toe with purplish coloration  Vascular: 2+ peripheral pulses  Neurological: sedated  Skin: No rashes      LABS:    Blood Gas Arterial, Lactate (09.22.17 @ 09:04)    Blood Gas Arterial, Lactate: 5.0                          14.5   8.8   )-----------( 176      ( 22 Sep 2017 06:57 )             43.5     09-22    140  |  101  |  54<H>  ----------------------------<  152<H>  5.7<H>   |  19<L>  |  1.79<H>    Ca    8.2<L>      22 Sep 2017 06:57  Mg     4.0     09-22      LIVER FUNCTIONS - ( 18 Sep 2017 20:40 )  Alb: 4.3 g/dL / Pro: 7.3 g/dL / ALK PHOS: 86 U/L / ALT: 38 U/L RC / AST: 53 U/L / GGT: x             RADIOLOGY & ADDITIONAL TESTS:    EXAM:  ABD-PORTABLE URGENT                        PROCEDURE DATE:  09/21/2017      INTERPRETATION:  CLINICAL INFORMATION: Abdominal pain    TECHNIQUE: Frontal view abdominal x-ray.    COMPARISON: CT chest abdomen and pelvis 9/19/2017    FINDINGS:     Postsurgical skin staples overlying the mid abdomen. There is a   radiopaque tube overlying the patient, probably external to the patient.    Distended loops of large bowel not seen on CT chest, abdomen and pelvis   from 9/19/2017. And stool in the right colon likely represents ingested   oral contrast. Findings are consistent with a postoperative ileus.    Bowel gas and stool are identified throughout the colon.    IMPRESSION:     Distended loops of large bowel, likely postoperative ileus.    MICHELL CAI M.D., RADIOLOGY RESIDENT  This document has been electronically signed.  SALLY PETTIT M.D., ATTENDING RADIOLOGIST  This document has been electronically signed. Sep 21 2017  4:17PM

## 2017-09-22 NOTE — PROGRESS NOTE ADULT - ASSESSMENT
Assessment:  Acute resp failure, hypotension w/ distended/tender abd; r/o septic shock 2/2 abd infection, PE; T9-11 cord compression due to bone lesion, post-operative day 3    Neuro:    - Adequate sedation for vent synchrony  - Oncology w/u  Activity: [] mobilize as tolerated [x] Bedrest [] PT [] OT [] PMNR    PULM:      CV:  MAP goal >80     RENAL:  Fluids: IVL,  tolerates PO    GI:  Advance diet as tolerated  GI prophylaxis [x] not indicated [] PPI [] other:  Bowel regimen [] colace [x] senna [] other:    ENDO:   Goal euglycemia (-180)    HEME/ONC:  VTE prophylaxis: [x] SCDs [x] chemoprophylaxis Lovenox SQ [] hold chemoprophylaxis due to: [] high risk of DVT/PE on admission due to:  Onc w/up for the multiple bone, adrenal, lung lesions.    ID: afebrile    MISC:    SOCIAL/FAMILY:  []x awaiting [] updated at bedside [] family meeting    CODE STATUS:  [x] Full Code [] DNR [] DNI [] Palliative/Comfort Care    DISPOSITION:  [] ICU [] Stroke Unit [x] Floor [] EMU [] RCU [] PCU  Stable to be transferred to floors.    Time spent: 35 critical care minutes    Contact: [phone] Assessment:  Acute resp failure, hypotension w/ distended/tender abd; r/o acute abdomen, PE; T9-11 cord compression due to bone lesion, post-operative day 3    Neuro:  - Adequate sedation for vent synchrony  - Oncology w/u  Activity: [] mobilize as tolerated [x] Bedrest [] PT [] OT [] PMNR    PULM: on PRVC, f/u CTA chest    CV:  MAP goal >65, on norepinephrine gtt    RENAL:  ALEC, s/p 2L bolus, IVF@125cc/hr, f/u BUN/Cr    GI:  NPO, CT A/P, gen surg consulted  GI prophylaxis [] not indicated [x] PPI [] other:  Bowel regimen [] colace [x] senna [] other:    ENDO:   Goal euglycemia (-180)    HEME/ONC:  VTE prophylaxis: [x] SCDs, hold chemoppx, possible OR    ID: started on zosyn for septic shock 2/2 ? abd infection    SOCIAL/FAMILY:  Common law wife [place], sister Chantel, and son Moisés updated via phone and at bedside     CODE STATUS:  [x] Full Code [] DNR [] DNI [] Palliative/Comfort Care    DISPOSITION:  [x] ICU [] Stroke Unit [x] Floor [] EMU [] RCU [] PCU  Stable to be transferred to floors.    Time spent: 75 critical care minutes    Contact: [phone]

## 2017-09-22 NOTE — CONSULT NOTE ADULT - SUBJECTIVE AND OBJECTIVE BOX
SICU Consultation Note  =====================================================  HPI: 62y male admitted to neurosurgery for bilateral leg weakness found to have newly diagnosed metastatic disease s/p T8-T11 laminectomy presented today with deterioration in respiratory status and acute onset abdominal pain. Patient was intubated and found to have b/l DVTs, right upper lobe PE, and a bowel perforation.  Patient went to the OR and underwent an exploratory laparotomy, where they found a large amount of succus aas well as lettuce in the abdomen along with a 1cm perforation in the small bowel 110 cm distal to the ligament of treitz. Approximately 10cm of the small bowel was resected and the patient was left in discontinuity with an abthera vac.   On arrival in the SICU, patient was hypertensive and hypoxic. Minute ventilation increased, fentanyl started for pain, will continue to closely monitor.     Surgery Information    Case Duration: 	EBL: 10	IV Fluids: 1500	Blood Products: 10	Urine Output: 60  Complications: none    HPI:  62M with no significant PMH p/w worsening LBP associated with lower extremity  weakness for past 3 weeks. Over the weekend, he progressed to the point where he was unable to ambulate. He denies incontinence, saddle anesthesia, but complaining of significant weakness in legs, stating they "feel like jelly." He was seen at OSH and transferred to [facility] for spine evaluation. He denies fevers, history of cancer, trauma. (19 Sep 2017 09:44)    Allergies:   PAST MEDICAL & SURGICAL HISTORY:    FAMILY HISTORY:      SOCIAL HISTORY:      ADVANCE DIRECTIVES: Presumed Full Code      REVIEW OF SYSTEMS:   General: Non-Contributory  Skin/Breast: Non-Contributory  Ophthalmologic: Non-Contributory  ENMT: Non-Contributory  Respiratory and Thorax: Non-Contributory  Cardiovascular: Non-Contributory  Gastrointestinal: Non-Contributory  Genitourinary: Non-Contributory  Musculoskeletal: Non-Contributory  Neurological: Non-Contributory  Psychiatric: Non-Contributory  Hematology/Lymphatics: Non-Contributory  Endocrine: Non-Contributory  Allergic/Immunologic: Non-Contributory    HOME MEDICATIONS:      CURRENT MEDICATIONS:   --------------------------------------------------------------------------------------  Neurologic Medications  fentaNYL   Infusion 7.491 MICROgram(s)/kG/Hr IV Continuous <Continuous>    Respiratory Medications    Cardiovascular Medications    Gastrointestinal Medications  lactated ringers Bolus 1000 milliLiter(s) IV Bolus once  sodium chloride 0.9%. 1000 milliLiter(s) IV Continuous <Continuous>    Genitourinary Medications    Hematologic/Oncologic Medications  heparin  Infusion 1000 Unit(s)/Hr IV Continuous <Continuous>    Antimicrobial/Immunologic Medications  piperacillin/tazobactam IVPB. 3.375 Gram(s) IV Intermittent once  piperacillin/tazobactam IVPB. 3.375 Gram(s) IV Intermittent every 8 hours  vancomycin  IVPB 1000 milliGRAM(s) IV Intermittent once  fluconAZOLE IVPB 400 milliGRAM(s) IV Intermittent once    Endocrine/Metabolic Medications    Topical/Other Medications    --------------------------------------------------------------------------------------    VITAL SIGNS, INS/OUTS (last 24 hours):  --------------------------------------------------------------------------------------  ((Insert SICU Vitals / Is+Os here)) ***  --------------------------------------------------------------------------------------    EXAM  NEUROLOGY  RASS:   	GCS:    Exam: Normal, NAD, alert, oriented x 3, no focal deficits.   HEENT  Exam: Normocephalic, atraumatic.  EOMI   RESPIRATORY  Exam: Lungs clear to auscultation, Normal expansion/effort.    Mechanical Ventilation: Mode: AC/ CMV (Assist Control/ Continuous Mandatory Ventilation), RR (machine): 14, TV (machine): 500, FiO2: 100, PEEP: 5, ITime: 1, MAP: 10, PIP: 26  CARDIOVASCULAR  Exam: S1, S2.  Regular rate and rhythm.  Peripheral edema    GI/NUTRITION  Exam: Abdomen soft, Non-tender, Non-distended.    Wound:     Current Diet:  NPO   VASCULAR  Exam: Extremities warm, pink, well-perfused.    MUSCULOSKELETAL  Exam: All extremities moving spontaneously without limitations.    SKIN:  Exam: Good skin turgor, no skin breakdown.     METABOLIC/FLUIDS/ELECTROLYTES  lactated ringers Bolus 1000 milliLiter(s) IV Bolus once  sodium chloride 0.9%. 1000 milliLiter(s) IV Continuous <Continuous>      HEMATOLOGIC  [x] DVT Prophylaxis: heparin  Infusion 1000 Unit(s)/Hr IV Continuous <Continuous>    Transfusions:	[] PRBC	[] Platelets		[] FFP	[] Cryoprecipitate    INFECTIOUS DISEASE  Antimicrobials/Immunologic Medications:  piperacillin/tazobactam IVPB. 3.375 Gram(s) IV Intermittent once  piperacillin/tazobactam IVPB. 3.375 Gram(s) IV Intermittent every 8 hours  vancomycin  IVPB 1000 milliGRAM(s) IV Intermittent once  fluconAZOLE IVPB 400 milliGRAM(s) IV Intermittent once    Day #  	of    ***    Tubes/Lines/Drains  ***  [x] Peripheral IV  [] Central Venous Line     	[] R	[] L	[] IJ	[] Fem	[] SC	Date Placed:   [] Arterial Line		[] R	[] L	[] Fem	[] Rad	[] Ax	Date Placed:   [] PICC:         	[] Midline		[] Mediport  [] Urinary Catheter		Date Placed:     LABS  --------------------------------------------------------------------------------------  ((Insert SICU Labs here))***  --------------------------------------------------------------------------------------    OTHER LABS    IMAGING RESULTS  Echo:   CT:   Xray: SICU Consultation Note  =====================================================  HPI: 62y male admitted to neurosurgery for bilateral leg weakness found to have newly diagnosed metastatic disease s/p T8-T11 laminectomy presented today with deterioration in respiratory status and acute onset abdominal pain. Patient was intubated and found to have b/l DVTs, right upper lobe PE, and a bowel perforation.  Patient went to the OR and underwent an exploratory laparotomy, where they found a large amount of succus aas well as lettuce in the abdomen along with a 1cm perforation in the small bowel 110 cm distal to the ligament of treitz. Approximately 10cm of the small bowel was resected and the patient was left in discontinuity with an abthera vac.   On arrival in the SICU, patient was hypertensive and hypoxic. Minute ventilation increased, fentanyl started for pain, will continue to closely monitor.     Surgery Information    Case Duration: 	EBL: 10	IV Fluids: 1500	Blood Products: 10	Urine Output: 60  Complications: none    HPI:  62M with no significant PMH p/w worsening LBP associated with lower extremity  weakness for past 3 weeks. Over the weekend, he progressed to the point where he was unable to ambulate. He denies incontinence, saddle anesthesia, but complaining of significant weakness in legs, stating they "feel like jelly." He was seen at OSH and transferred to [facility] for spine evaluation. He denies fevers, history of cancer, trauma. (19 Sep 2017 09:44)    Allergies:   PAST MEDICAL & SURGICAL HISTORY:    FAMILY HISTORY:      SOCIAL HISTORY:      ADVANCE DIRECTIVES: Presumed Full Code      REVIEW OF SYSTEMS:   General: Non-Contributory  Skin/Breast: Non-Contributory  Ophthalmologic: Non-Contributory  ENMT: Non-Contributory  Respiratory and Thorax: Non-Contributory  Cardiovascular: Non-Contributory  Gastrointestinal: Non-Contributory  Genitourinary: Non-Contributory  Musculoskeletal: Non-Contributory  Neurological: Non-Contributory  Psychiatric: Non-Contributory  Hematology/Lymphatics: Non-Contributory  Endocrine: Non-Contributory  Allergic/Immunologic: Non-Contributory    HOME MEDICATIONS:      CURRENT MEDICATIONS:   --------------------------------------------------------------------------------------  Neurologic Medications  fentaNYL   Infusion 7.491 MICROgram(s)/kG/Hr IV Continuous <Continuous>    Respiratory Medications    Cardiovascular Medications    Gastrointestinal Medications  lactated ringers Bolus 1000 milliLiter(s) IV Bolus once  sodium chloride 0.9%. 1000 milliLiter(s) IV Continuous <Continuous>    Genitourinary Medications    Hematologic/Oncologic Medications  heparin  Infusion 1000 Unit(s)/Hr IV Continuous <Continuous>    Antimicrobial/Immunologic Medications  piperacillin/tazobactam IVPB. 3.375 Gram(s) IV Intermittent once  piperacillin/tazobactam IVPB. 3.375 Gram(s) IV Intermittent every 8 hours  vancomycin  IVPB 1000 milliGRAM(s) IV Intermittent once  fluconAZOLE IVPB 400 milliGRAM(s) IV Intermittent once    Endocrine/Metabolic Medications    Topical/Other Medications    --------------------------------------------------------------------------------------    VITAL SIGNS, INS/OUTS (last 24 hours):  --------------------------------------------------------------------------------------  T(C): 36.6 (09-22-17 @ 20:00), Max: 37.7 (09-22-17 @ 08:15)  HR: 121 (09-22-17 @ 20:30) (95 - 137)  BP: 133/80 (09-22-17 @ 20:30) (78/66 - 192/113)  BP(mean): 99 (09-22-17 @ 20:30) (68 - 132)  ABP: 144/77 (09-22-17 @ 20:30) (62/49 - 155/79)  ABP(mean): 101 (09-22-17 @ 20:30) (55 - 331)  RR: 16 (09-22-17 @ 20:30) (14 - 38)  SpO2: 99% (09-22-17 @ 20:30) (87% - 100%)  Wt(kg): --  CVP(mm Hg): --  CI: --  CAPILLARY BLOOD GLUCOSE  163 (22 Sep 2017 07:40)  163 (22 Sep 2017 07:37)       N/A      09-21 @ 07:01  -  09-22 @ 07:00  --------------------------------------------------------  IN:    IV PiggyBack: 500 mL    Oral Fluid: 480 mL    sodium chloride 0.9% with potassium chloride 20 mEq/L: 840 mL  Total IN: 1820 mL    OUT:    Accordian: 130 mL    Accordian: 125 mL    Indwelling Catheter - Urethral: 950 mL    Nasoenteral Tube: 1050 mL  Total OUT: 2255 mL    Total NET: -435 mL      09-22 @ 07:01  -  09-22 @ 20:33  --------------------------------------------------------  IN:    Enteral Tube Flush: 1650 mL    fentaNYL  Infusion: 28 mL    fentaNYL  Infusion: 8 mL    heparin Infusion: 20 mL    IV PiggyBack: 200 mL    Lactated Ringers IV Bolus: 1000 mL    norepinephrine Infusion: 275 mL    propofol Infusion: 144 mL    sodium chloride 0.9%: 775 mL    Sodium Chloride 0.9% IV Bolus: 1000 mL    sodium chloride 0.9%.: 250 mL  Total IN: 5350 mL    OUT:    Indwelling Catheter - Urethral: 670 mL  Total OUT: 670 mL    Total NET: 4680 mL        --------------------------------------------------------------------------------------    EXAM  NEUROLOGY  RASS:  -2 	GCS:    Exam: Intubated and sedated  HEENT  Exam: Normocephalic, atraumatic.  EOMI   RESPIRATORY  Exam: Lungs clear to auscultation, Normal expansion/effort.    Mechanical Ventilation: Mode: AC/ CMV (Assist Control/ Continuous Mandatory Ventilation), RR (machine): 14, TV (machine): 500, FiO2: 100, PEEP: 5, ITime: 1, MAP: 10, PIP: 26  CARDIOVASCULAR  Exam: S1, S2.  Regular rate and rhythm.  Peripheral edema    GI/NUTRITION  Exam: Abdomen soft, not obviously tender, abthera vac in place    Current Diet:  NPO   VASCULAR  Exam: Extremities warm, pink, well-perfused.    MUSCULOSKELETAL  Exam: Impossible to examine, no obvious defects   SKIN:  Exam: Good skin turgor, no skin breakdown.     METABOLIC/FLUIDS/ELECTROLYTES  lactated ringers Bolus 1000 milliLiter(s) IV Bolus once  sodium chloride 0.9%. 1000 milliLiter(s) IV Continuous <Continuous>      HEMATOLOGIC  [x] DVT Prophylaxis: heparin  Infusion 1000 Unit(s)/Hr IV Continuous <Continuous>    Transfusions:	[] PRBC	[] Platelets		[] FFP	[] Cryoprecipitate    INFECTIOUS DISEASE  Antimicrobials/Immunologic Medications:  piperacillin/tazobactam IVPB. 3.375 Gram(s) IV Intermittent once  piperacillin/tazobactam IVPB. 3.375 Gram(s) IV Intermittent every 8 hours  vancomycin  IVPB 1000 milliGRAM(s) IV Intermittent once  fluconAZOLE IVPB 400 milliGRAM(s) IV Intermittent once    Day #  	of    ***    Tubes/Lines/Drains  ***  [x] Peripheral IV  [] Central Venous Line     	[] R	[] L	[] IJ	[] Fem	[] SC	Date Placed:   [] Arterial Line		[] R	[] L	[] Fem	[] Rad	[] Ax	Date Placed:   [] PICC:         	[] Midline		[] Mediport  [] Urinary Catheter		Date Placed:     LABS  --------------------------------------------------------------------------------------  CBC Full  -  ( 22 Sep 2017 19:22 )  WBC Count : 9.3 K/uL  Hemoglobin : 10.6 g/dL  Hematocrit : 32.8 %  Platelet Count - Automated : 130 K/uL  Mean Cell Volume : 96.7 fl  Mean Cell Hemoglobin : 31.4 pg  Mean Cell Hemoglobin Concentration : 32.4 gm/dL      09-22    139  |  107  |  53<H>  ----------------------------<  132<H>  5.4<H>   |  19<L>  |  1.50<H>    Ca    7.4<L>      22 Sep 2017 19:22  Phos  4.8     09-22  Mg     3.7     09-22        CAPILLARY BLOOD GLUCOSE  163 (22 Sep 2017 07:40)  163 (22 Sep 2017 07:37)          PT/INR - ( 22 Sep 2017 19:22 )   PT: 20.2 sec;   INR: 1.83 ratio         PTT - ( 22 Sep 2017 19:22 )  PTT:31.8 sec  --------------------------------------------------------------------------------------    OTHER LABS    IMAGING RESULTS  Echo:   CT:   Xray:

## 2017-09-22 NOTE — PROGRESS NOTE ADULT - SUBJECTIVE AND OBJECTIVE BOX
I saw and evaluated the patient. The patient is a 62-year-old male with newly diagnosed metastatic disease s/p T8-T11 laminectomies for resection of dorsal epidural tumor for spinal cord compression, POD#3. Postoperatively, the patient neurologically improved, but his exam was still notable for 2-3/5 left lower extremity weakness and 1-2/5 right lower extremity weakness. Overnight, the patient began complaining of abdominal pain and distension, before he developed tachypnea, tachycardia, poor oxygen saturation, and hypotension this morning, requiring intubation and pressors. On work-up, the patient is found to have a right upper lobe pulmonary embolus, B/L lower extremity DVTs, and a bowel perforation. No Neurosurgical contraindication for OR with General Surgery. No Neurosurgical contraindication to anticoagulation, as the risks of not anticoagulating in this case seem to outweigh the risks of a post-operative hematoma. Continue Hemovac drains.

## 2017-09-22 NOTE — BRIEF OPERATIVE NOTE - PROCEDURE
<<-----Click on this checkbox to enter Procedure Exploratory laparotomy  09/22/2017  Ex-lap, SBR x1 (left in discontinuity), abdomianl washout, abthera VAC application  Active  CBEHR

## 2017-09-22 NOTE — PROGRESS NOTE ADULT - PROBLEM SELECTOR PLAN 2
Secondary to malignancy and recent surgery. Patient coagulopathic at baseline likely due to liver dysfunction from metastatic disease burden.   - Ok to hold anti-coagulation acutely as patient going for surgery, but then recommend AC for therapeutic goal of 2-3 which patient is not off by far due to coagulopathic process  - check fibrinogen, check coags daily

## 2017-09-22 NOTE — PROGRESS NOTE ADULT - ATTENDING COMMENTS
Seen and Examined.   Agree with above assessment and plan as outlined.    Temo Trinh MD, FACP, FACG  [facility]  Phone: [phone]  Fax: [phone]

## 2017-09-22 NOTE — PROGRESS NOTE ADULT - SUBJECTIVE AND OBJECTIVE BOX
Called on STAT pager for emergency intubation.  On arrival, patient awake on Non-rebreather with 100% FiO2.  History briefly reviewed with housestaff. Preoxygenated X5 min 100% FIO2, Etomidate 10mg IV, Succinnycholine 40mg IV. Cricoid pressure throughout.  DL x 1  with MAC 4 blade, grade 2 view, 7.5 cuffed ETT passed without trauma, + ETCO2 via EasyCap, + b/l BS, taped at 23 cm, teeth intact, no complications.  Pt to be tranferred to MICU with attending MD and respiratory care team.   Dr. G. Palleschi at bedside.

## 2017-09-22 NOTE — PROGRESS NOTE ADULT - ASSESSMENT
62M with no significant PMH presenting with spinal cord compression of thoracic spine s/p laminectomy with surgical decompression and biopsy of mass taken. Pan-CT concerning for possible lung primary. Suspicious lesions of liver and adrenal gland concerning for metastases. Hospital course complicated by bowel perforation, hypoxic respiratory failure requiring intubation, septic shock, and DVT/PE. Pt currently in the NS ICU, pending surgical intervention.

## 2017-09-22 NOTE — PROCEDURE NOTE - PROCEDURE
<<-----Click on this checkbox to enter Procedure Arterial line placement  09/22/2017    Active  BIRDIE

## 2017-09-22 NOTE — CONSULT NOTE ADULT - ASSESSMENT
62y Male s/p OR for     PLAN:  ***  Neurologic:   Respiratory:   Cardiovascular:   Gastrointestinal/Nutrition:   Renal/Genitourinary:   Hematologic:   Infectious Disease:   Tubes/Lines/Drains:   Endocrine:   Disposition:     --------------------------------------------------------------------------------------    Critical Care Diagnoses: 62y Male s/p OR for small bowel perforation, resection, left in discontinuity with abthera     PLAN:    Neurologic: Trial off sedation in the AM, precedex for sedation currently on pressors, Currently tapering decadron as per neurosurgery, will obtain full neuro exam and compare to baseline when off sedation   Respiratory: Continue with ventilator support for now, patient acidotic will increase minute ventilation and reassess  Cardiovascular: currently requiring levo, will wean as tolerated and continually assess need for pressors vs fluid ressucitation  Gastrointestinal/Nutrition: NPO for now, with protonix, abdominal exams, leave abthera in place, will follow up further plans for OR  Renal/Genitourinary: CKD stage III with ALEC stage II will closely monitor Is&Os and trend creatinine and electorlytes  Hematologic: Post operative drop in HCT, will obtain repeat labs and trend   Infectious Disease: patient started on renally dosed vanc, zosyn, and diflucan for gisselle intra abdominal spillage  Tubes/Lines/Drains: IJ, A-line, PIV  Endocrine: decadron taper, no longer requiring steroids as per neurosurgery  Disposition: SICU with plans to return to OR over the weekend

## 2017-09-22 NOTE — CONSULT NOTE ADULT - ASSESSMENT
63 yo M with metastatic disease new on CT scans with   T9-T11 cord compression from osseous lesion. In addition, CT of the chest/abd/pelvis revealed T10 lytic lesion, necrotic mediastinal mass, possible liver lesion, possible adrenal mass and a left lung mass. He was taken for T8-T11 laminectomy/decompression on 9/18/17 now Samantha and hyperkalemia with hypotension with suspected abd perforation   1- SAMANTHA  2- hypotension  3- hyperkalemia  4- metastatic disease    samantha likely due to hypotension vs contrast induced nephropathy [ had ct with iv contrast on 9/19 as well]  strict I/O  levophed drip  respiratory failure vent support  check official ct report from today   surgery consult  LE venous dopplers  repeat k is better.   NS ivf hydration   d/w family at bedside  d/w icu team

## 2017-09-22 NOTE — BRIEF OPERATIVE NOTE - POST-OP DX
Small bowel perforation  09/22/2017    Active  Behr, Christopher  Thoracic spine tumor  09/20/2017    Active  Yahaira Torres
Thoracic spine tumor  09/20/2017    Active  Yahaira Torres

## 2017-09-22 NOTE — PROGRESS NOTE ADULT - PROBLEM SELECTOR PLAN 1
Biopsy of spinal mass pending. s/p laminectomy and spinal decompression. Pan imaging concerning for pervasive metastatic disease.  - awaiting biopsy results. If lung primary, will request molecular lung panel.  - continue dexamethasone, taper as per neurosurgery  - pt clinical state guarded due to acute complications, but on initial assessment after spinal surgery, appeared to be good treatment candidate depending on path results.

## 2017-09-22 NOTE — CONSULT NOTE ADULT - SUBJECTIVE AND OBJECTIVE BOX
General Surgery Consult Note  Pager [phone]    HPI: 62M who presented this admission with lower extremity weakness concerning for spinal cord compression.  Imaging was concerning for metastatic lung cancer.  He underwent laminectomy for decompression (9/20), pathology pending, and developed abdominal distension thought to be postoperative ileus.  He was reportedly passing flatus yesterday with normal bowel movements but felt distended and was burping.  This morning, he developed respiratory distress, hypotension, and complained of abdominal pain.  Per Neurosurgery team, his abdomen was very tender.  He was emergently intubated and brought to the MICU.    PAST MEDICAL & SURGICAL HISTORY:  open appendectomy in childhood    ALLERGIES: NKDA    SOCIAL HISTORY: unable to obtain secondary to patient mental status    FAMILY HISTORY: unable to obtain secondary to patient mental status    ___________________________________________  REVIEW OF SYSTEMS: unable to obtain secondary to patient mental status  __________________________________________  PHYSICAL EXAM:  Vital Signs Last 24 Hrs  T(C): 37.7 (22 Sep 2017 08:15), Max: 37.7 (22 Sep 2017 08:15)  T(F): 99.9 (22 Sep 2017 08:15), Max: 99.9 (22 Sep 2017 08:15)  HR: 106 (22 Sep 2017 10:45) (92 - 137)  BP: 177/71 (22 Sep 2017 09:30) (78/66 - 192/113)  BP(mean): 102 (22 Sep 2017 09:30) (68 - 132)  RR: 17 (22 Sep 2017 10:45) (17 - 38)  SpO2: 98% (22 Sep 2017 10:45) (87% - 100%)CAPILLARY BLOOD GLUCOSE  163 (22 Sep 2017 07:40)    General: Sedated, RASS -1  Abdomen: soft, mildly distended  Pulm: ETT in place, bilateral chest rise  Cardiovascular: regular rate and rhythm  Extremities: Nonedematous  ____________________________________________  LABS:  CBC Full  -  ( 22 Sep 2017 06:57 )  WBC Count : 8.8 K/uL  Hemoglobin : 14.5 g/dL  Hematocrit : 43.5 %  Platelet Count - Automated : 176 K/uL  Mean Cell Volume : 96.3 fl  Mean Cell Hemoglobin : 32.2 pg  Mean Cell Hemoglobin Concentration : 33.4 gm/dL    09-22    140  |  101  |  54<H>  ----------------------------<  152<H>  5.7<H>   |  19<L>  |  1.79<H>    Ca    8.2<L>      22 Sep 2017 06:57  Mg     4.0     09-22    ____________________________________________  RADIOLOGY:  CT A/P pending

## 2017-09-22 NOTE — BRIEF OPERATIVE NOTE - OPERATION/FINDINGS
Large amount of succus a Large amount of succus an lettuce in abdomen. 1 cm perforation found in small bowel 110 cm distal to the ligament of treitz. Approx 10 cm of SB resected, left in discontinuity. Washed out and abthera vac applied.

## 2017-09-22 NOTE — BRIEF OPERATIVE NOTE - PRE-OP DX
Thoracic spine tumor  09/20/2017    Active  Yahaira Torres
Perforated viscus  09/22/2017    Active  Behr, Christopher  Thoracic spine tumor  09/20/2017    Active  Yahaira Torres

## 2017-09-22 NOTE — PROGRESS NOTE ADULT - ATTENDING COMMENTS
I saw and evaluated the patient. I reviewed the PA's note and agree. 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 The patient is complaining of abdominal pain and distension, but is neurologically stable with 2-3/5 left lower extremity weakness and 1-2/5 right lower extremity weakness. The patient is found to have a post-operative ileus. Keep patient NPO, restart IVF,     The patient has developed acute abdomen  with free air and will be  operated upon later tonight. I saw and evaluated the patient. I reviewed the PA's note and agree. 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 The patient is complaining of abdominal pain and distension, but is neurologically stable with 2-3/5 left lower extremity weakness and 1-2/5 right lower extremity weakness. The patient is found to have a post-operative ileus. Keep patient NPO, restart IVF,     The patient has developed acute abdomen  with free air.  He is now scheduled for emergency surgery and is en route to the O.R.

## 2017-09-22 NOTE — CONSULT NOTE ADULT - ATTENDING COMMENTS
61y/o man presented with bilateral lower extremity weakness, found to have likely lung CA with mets to liver, bone (including spine), and adrenal. SC compression from dorsal epidural tumor s/p T8-11 laminectomy (9/20) and tumor biopsy. On steroids. Pt developed abdominal distension and hypoxic respiratory failure. Had CT c/a/p which demonstrated RUL PE (also had LE duplex with B/L soleal DVT), and small bowel perforation. Went to OR for ex lap, small bowel resection, left in discontinuity with ABThera placement.     ALEC 2: good urine output, avoid nephrotoxic meds  Steroids: d/w Surgical team, who noted ok to d/c. Will rapid taper (decadron 4mg q12 x2, then 4mg q24 x1) given likely effects on planned anastomosis (SBR only 110cm from LOT). Vitamin A.  Mixed respiratory and metabolic acidosis: improved with vent changes (increased RR).     Postoperative hypotension developed around 2AM: levophed started. Bicarbonate gtt started to improve acidosis in order to achieve better effects of pressors. SVV 17, will give fluid bolus. Continue antibiotics.      CC time: 60 minutes 9/22    PE/DVT: heparin gtt (d/w Gen Surg and Spine teams)

## 2017-09-22 NOTE — PROGRESS NOTE ADULT - ATTENDING COMMENTS
I saw and evaluated the patient. The patient is a 62-year-old male with newly diagnosed metastatic disease s/p T8-T11 laminectomies for resection of dorsal epidural tumor for spinal cord compression, POD#3. Postoperatively, the patient neurologically improved, but his exam was still notable for 2-3/5 left lower extremity weakness and 1-2/5 right lower extremity weakness. Overnight, the patient began complaining of abdominal pain and distension, before he developed tachypnea, tachycardia, poor oxygen saturation, and hypotension this morning, requiring intubation and pressors. On work-up, the patient is found to have a right upper lobe pulmonary embolus, B/L lower extremity DVTs, and a bowel perforation. No Neurosurgical contraindication for OR with General Surgery. No Neurosurgical contraindication to anticoagulation, as the risks of not anticoagulating in this case seem to outweigh the risks of a post-operative hematoma. Continue Hemovac drains. Continue steroids. Continue antibiotic.

## 2017-09-22 NOTE — PROGRESS NOTE ADULT - SUBJECTIVE AND OBJECTIVE BOX
Post-op day # 2 S/P T8-11 LAMINECTOMY     Overnight event: None, patient c/o no BM since saturday, has been burping but is not passing any gas     Vital Signs Last 24 Hrs  T(C): 36.8 (21 Sep 2017 08:00), Max: 37.7 (21 Sep 2017 05:10)  T(F): 98.2 (21 Sep 2017 08:00), Max: 99.8 (21 Sep 2017 05:10)  HR: 90 (21 Sep 2017 08:00) (68 - 94)  BP: 113/68 (21 Sep 2017 08:00) (113/68 - 138/87)  BP(mean): 89 (20 Sep 2017 14:00) (89 - 89)  RR: 18 (21 Sep 2017 08:00) (18 - 19)  SpO2: 95% (21 Sep 2017 08:00) (92% - 99%)                          12.4   6.25  )-----------( 224      ( 21 Sep 2017 08:02 )             38.5    09-21    136  |  102  |  24<H>  ----------------------------<  138<H>  5.1   |  22  |  0.99    Ca    8.7      21 Sep 2017 08:02  Phos  4.1     09-19  Mg     2.5     09-19       Stroke Core Measures      DRAIN OUTPUT:     NEUROIMAGING:     PHYSICAL EXAM:    General: No Acute Distress     Neurological: Awake, alert oriented x3, Moving all extremities, LLE 2/5 proximally and 3/5 distally, RLE proximally 2/5 and distally 1/5, Sensation to Light Touch Intact    Pulmonary: Clear to Auscultation, No Rales, No Rhonchi, No Wheezes     Cardiovascular: S1, S2, Regular Rate and Rhythm     Gastrointestinal: Soft, + tenderness, + distension , hypoactive bowel sounds    Extremities: No calf tenderness     Incision: posterior thoracic intact    MEDICATIONS:   Antibiotics:    Neuro:  acetaminophen   Tablet 650 milliGRAM(s) Oral every 6 hours PRN For Temp greater than 38 C (100.4 F)  ondansetron Injectable 4 milliGRAM(s) IV Push every 6 hours PRN Nausea  ondansetron Injectable 4 milliGRAM(s) IV Push every 6 hours PRN Nausea  diazepam    Tablet 5 milliGRAM(s) Oral every 8 hours  oxyCODONE    5 mG/acetaminophen 325 mG 1 Tablet(s) Oral every 4 hours PRN Moderate Pain (4 - 6)  oxyCODONE    5 mG/acetaminophen 325 mG 2 Tablet(s) Oral every 4 hours PRN Severe Pain (7 - 10)  HYDROmorphone  Injectable 1 milliGRAM(s) IV Push every 3 hours PRN breakthru pain  acetaminophen   Tablet. 650 milliGRAM(s) Oral every 6 hours PRN Mild Pain (1 - 3)    Anticoagulation:  enoxaparin Injectable 40 milliGRAM(s) SubCutaneous at bedtime    Cardiology:    Endo:   dexamethasone  Injectable 4 milliGRAM(s) IV Push every 6 hours    Pulm:    GI/:  famotidine    Tablet 20 milliGRAM(s) Oral every 12 hours PRN  docusate sodium 100 milliGRAM(s) Oral three times a day  senna 2 Tablet(s) Oral at bedtime  aluminum hydroxide/magnesium hydroxide/simethicone Suspension 30 milliLiter(s) Oral every 4 hours PRN  bisacodyl Suppository 10 milliGRAM(s) Rectal daily PRN  lactulose Syrup 20 Gram(s) Oral every 3 hours    Other:  influenza   Vaccine 0.5 milliLiter(s) IntraMuscular once  naloxone Injectable 0.1 milliGRAM(s) IV Push every 3 minutes PRN For ANY of the following changes in patient status:  A. RR LESS THAN 10 breaths per minute, B. Oxygen saturation LESS THAN 90%, C. Sedation score of 6 Post-op day # 2 S/P T8-11 LAMINECTOMY     Acute event:  Patient c/o no BM since saturday, has been burping but is not passing any gas. Abdomen is rigid and the patient just had an x-ray with free air present.  Diagnosis -  acute perforation..  Plan -  immediate surgical intervention    Vital Signs Last 24 Hrs  T(C): 36.8 (21 Sep 2017 08:00), Max: 37.7 (21 Sep 2017 05:10)  T(F): 98.2 (21 Sep 2017 08:00), Max: 99.8 (21 Sep 2017 05:10)  HR: 90 (21 Sep 2017 08:00) (68 - 94)  BP: 113/68 (21 Sep 2017 08:00) (113/68 - 138/87)  BP(mean): 89 (20 Sep 2017 14:00) (89 - 89)  RR: 18 (21 Sep 2017 08:00) (18 - 19)  SpO2: 95% (21 Sep 2017 08:00) (92% - 99%)                          12.4   6.25  )-----------( 224      ( 21 Sep 2017 08:02 )             38.5    09-21    136  |  102  |  24<H>  ----------------------------<  138<H>  5.1   |  22  |  0.99    Ca    8.7      21 Sep 2017 08:02  Phos  4.1     09-19  Mg     2.5     09-19       Stroke Core Measures      DRAIN OUTPUT:     NEUROIMAGING:     PHYSICAL EXAM:    General: No Acute Distress     Neurological: Awake, alert oriented x3, Moving all extremities, LLE 2/5 proximally and 3/5 distally, RLE proximally 2/5 and distally 1/5, Sensation to Light Touch Intact    Pulmonary: Clear to Auscultation, No Rales, No Rhonchi, No Wheezes     Cardiovascular: S1, S2, Regular Rate and Rhythm     Gastrointestinal: Soft, + tenderness, + distension , hypoactive bowel sounds    Extremities: No calf tenderness     Incision: posterior thoracic intact    MEDICATIONS:   Antibiotics:    Neuro:  acetaminophen   Tablet 650 milliGRAM(s) Oral every 6 hours PRN For Temp greater than 38 C (100.4 F)  ondansetron Injectable 4 milliGRAM(s) IV Push every 6 hours PRN Nausea  ondansetron Injectable 4 milliGRAM(s) IV Push every 6 hours PRN Nausea  diazepam    Tablet 5 milliGRAM(s) Oral every 8 hours  oxyCODONE    5 mG/acetaminophen 325 mG 1 Tablet(s) Oral every 4 hours PRN Moderate Pain (4 - 6)  oxyCODONE    5 mG/acetaminophen 325 mG 2 Tablet(s) Oral every 4 hours PRN Severe Pain (7 - 10)  HYDROmorphone  Injectable 1 milliGRAM(s) IV Push every 3 hours PRN breakthru pain  acetaminophen   Tablet. 650 milliGRAM(s) Oral every 6 hours PRN Mild Pain (1 - 3)    Anticoagulation:  enoxaparin Injectable 40 milliGRAM(s) SubCutaneous at bedtime    Cardiology:    Endo:   dexamethasone  Injectable 4 milliGRAM(s) IV Push every 6 hours    Pulm:    GI/:  famotidine    Tablet 20 milliGRAM(s) Oral every 12 hours PRN  docusate sodium 100 milliGRAM(s) Oral three times a day  senna 2 Tablet(s) Oral at bedtime  aluminum hydroxide/magnesium hydroxide/simethicone Suspension 30 milliLiter(s) Oral every 4 hours PRN  bisacodyl Suppository 10 milliGRAM(s) Rectal daily PRN  lactulose Syrup 20 Gram(s) Oral every 3 hours    Other:  influenza   Vaccine 0.5 milliLiter(s) IntraMuscular once  naloxone Injectable 0.1 milliGRAM(s) IV Push every 3 minutes PRN For ANY of the following changes in patient status:  A. RR LESS THAN 10 breaths per minute, B. Oxygen saturation LESS THAN 90%, C. Sedation score of 6

## 2017-09-23 NOTE — PROGRESS NOTE ADULT - SUBJECTIVE AND OBJECTIVE BOX
Daily Progress Note:    Subjective: s/p Exploratory laparotomy, SBR x1 (left in discontinuity), abdominal washout, abthera VAC application. Patient seen in the morning during round then again in the afternoon. Patient is off pressor with PEEP 5, O2 40%. Patient complains that he couldn't move his legs in the afternoon, likely due to enlarging epidural hematoma. More so in left than right. Patient may not be stable enough for MRI, heparin drip is stopped and IVC might be placed during the second look laparotomy for the DVT.     Vital Signs Last 24 Hrs  T(C): 36.4 (22 Sep 2017 23:00), Max: 37.7 (22 Sep 2017 08:15)  T(F): 97.5 (22 Sep 2017 23:00), Max: 99.9 (22 Sep 2017 08:15)  HR: 96 (23 Sep 2017 02:00) (93 - 137)  BP: 133/80 (22 Sep 2017 20:30) (78/66 - 192/113)  BP(mean): 99 (22 Sep 2017 20:30) (68 - 132)  RR: 22 (23 Sep 2017 02:00) (14 - 38)  SpO2: 100% (23 Sep 2017 02:00) (90% - 100%)  I&O's Summary    21 Sep 2017 07:01  -  22 Sep 2017 07:00  --------------------------------------------------------  IN: 1820 mL / OUT: 2255 mL / NET: -435 mL    22 Sep 2017 07:01  -  23 Sep 2017 02:08  --------------------------------------------------------  IN: 7197 mL / OUT: 1085 mL / NET: 6112 mL                            10.6   9.3   )-----------( 130      ( 22 Sep 2017 19:22 )             32.8     09-22    139  |  107  |  53<H>  ----------------------------<  132<H>  5.4<H>   |  19<L>  |  1.50<H>    Ca    7.4<L>      22 Sep 2017 19:22  Phos  4.8     09-22  Mg     3.7     09-22     PT/INR - ( 22 Sep 2017 19:22 )   PT: 20.2 sec;   INR: 1.83 ratio         PTT - ( 23 Sep 2017 01:15 )  PTT:56.5 sec    PHYSICAL EXAM:  Gen: Intubated, sedated  Pulm: intubated  GI: abd VAC with good suction; draining moderate amount of serous-sanginous fluid

## 2017-09-23 NOTE — PROGRESS NOTE ADULT - ASSESSMENT
62y male admitted to neurosurgery for bilateral leg weakness found to have newly diagnosed metastatic disease s/p T8-T11 laminectomy presented today with deterioration in respiratory status and acute onset abdominal pain who was subsequently intubated and w/u revealed b/l DVTs, right upper lobe PE, and a bowel perforation.  Pt is now s/p Exploratory laparotomy, SBR x1 (left in discontinuity), abdominal washout, abthera VAC application (9/22) with intraop findings of d a large amount of succus as well as lettuce in the abdomen along with a 1cm perforation in the small bowel 110 cm distal to the ligament of treitz.     -Continue excellent SICU care  -Fluid resuscitation  -Vac to abdomen  -Fentanyl for analgesia  -RTOR tomorrow, booked and consented

## 2017-09-23 NOTE — PROGRESS NOTE ADULT - ASSESSMENT
62M s/p T8-11 laminectomy for resection of epidural tumor causing cord compression now s/p emergent ex-lap, SBR for perforated bowel, intubated in SICU on Levophed and sedated. Patient gives poor effort, likely secondary to sedation. Asymmetric weakness of lower extremities likely a reflection of asymmetric weakness present on admission.     -May taper decadron   -Wean sedation in AM for formal neurologic examination  -C/w SICU care  -Will continue to follow

## 2017-09-23 NOTE — PROGRESS NOTE ADULT - ASSESSMENT
ASSESSMENT: 62yMale s/p OR for small bowel perforation, resection, left in discontinuity with abthera     Neurologic: Trial off sedation in the AM, precedex for sedation currently on pressors, will obtain full neuro exam and compare to baseline when off sedation   Respiratory: Continue with ventilator support for now, patient Acid base disorder improving with current vent settings and bicarb drip.  continue current settings.  SBT today   Cardiovascular: currently requiring levo, will wean as tolerated and continually assess need for pressors vs fluid resuscitation  Gastrointestinal/Nutrition: NPO for now, with protonix, abdominal exams, leave abthera in place, will follow up further plans for OR  Renal/Genitourinary: CKD, Torres,  will closely monitor Is&Os and trend creatinine and electrolytes  Hematologic: Post operative drop in HCT, will obtain repeat labs and trend, possible Unit today of PRBC's with crit drop  Infectious Disease: patient started on renally dosed vanc, zosyn, and diflucan for gisselle intra abdominal spillage  Tubes/Lines/Drains: IJ, A-line, PIV, Torres

## 2017-09-23 NOTE — PROGRESS NOTE ADULT - SUBJECTIVE AND OBJECTIVE BOX
Patient was seen and examined at approximately 4:45 PM     At that point, he was intubated, opening, eyes, and followed commands briskly w/ sedation (fentanyl and precedex) held   moving upper extremities briskly  only able to wiggle left toes - after repeated asking and noxious stimulation got slight 1-2/5 movement at the knee, not clearly reproducible   NO MOVEMENT in right lower extremity  when asked if patient could feel LLE he nods yet  when asked about RLE he shakes his head no   normal tone in both lower extremities  unable to elicit patellar reflex  toes mute on attempted Babinski   no clonus     Given his neurological decline I called chief resident Dr. Whelan and SICU attending Dr. Su over to evaluate patient and after discussion w/ nsx attending Dr. Rose we decided on following plan  -hold heparin GTT despite known PE and DVT given suspicion for epidural or subdural hematoma in light of recent spine surgery   -coags near normal now   -MRI shortly. will get sag T2 to eval for hematoma/compression and decide on operative plan from there Patient was seen and examined at approximately 4:45 PM     At that point, he was intubated, opening, eyes, and followed commands briskly w/ sedation (fentanyl and precedex) held   moving upper extremities briskly  only able to wiggle left toes - after repeated asking and noxious stimulation got slight 1-2/5 movement at the knee, not clearly reproducible   NO MOVEMENT in right lower extremity  when asked if patient could feel LLE he nods yet  when asked about RLE he shakes his head no   normal tone in both lower extremities  unable to elicit patellar reflex  toes mute on attempted Babinski   no clonus     Given his neurological decline I called chief resident Dr. Whelan and SICU attending Dr. Su over to evaluate patient and after discussion w/ nsx attending Dr. Rose we decided on following plan  -hold heparin GTT despite known PE and DVT given suspicion for epidural or subdural hematoma in light of recent spine surgery   -coags near normal now   -MRI shortly. will get sag T2 to eval for hematoma/compression and decide on operative plan from there   -discussed positioning w/ Dr. Su and Dr. Varma. Patient is OK for prone position and we will call Dr. Varma/SICU team to evaluate   -SICU team to clamp vac while pt in MRI  -MRI safety form completed and in patient's chart

## 2017-09-23 NOTE — PROGRESS NOTE ADULT - SUBJECTIVE AND OBJECTIVE BOX
[facility]   [phone]  RENAL FOLLOW UP NOTE  --------------------------------------------------------------------------------  Chief Complaint:    24 hour events/subjective:    s/p OR intubated on pressors and ivf    PAST HISTORY  --------------------------------------------------------------------------------  No significant changes to PMH, PSH, FHx, SHx, unless otherwise noted    ALLERGIES & MEDICATIONS  --------------------------------------------------------------------------------  Allergies    No Known Allergies    Intolerances      Standing Inpatient Medications  piperacillin/tazobactam IVPB. 3.375 Gram(s) IV Intermittent every 8 hours  fluconAZOLE IVPB 200 milliGRAM(s) IV Intermittent every 24 hours  fentaNYL   Infusion 7.491 MICROgram(s)/kG/Hr IV Continuous <Continuous>  dexamethasone  Injectable 4 milliGRAM(s) IV Push every 12 hours  pantoprazole  Injectable 40 milliGRAM(s) IV Push daily  dexmedetomidine Infusion 0.2 MICROgram(s)/kG/Hr IV Continuous <Continuous>  norepinephrine Infusion 0.02 MICROgram(s)/kG/Min IV Continuous <Continuous>  heparin  Infusion 1100 Unit(s)/Hr IV Continuous <Continuous>  lactated ringers. 1000 milliLiter(s) IV Continuous <Continuous>    PRN Inpatient Medications      REVIEW OF SYSTEMS  --------------------------------------------------------------------------------  intubated  VITALS/PHYSICAL EXAM  --------------------------------------------------------------------------------  T(C): 36.2 (09-23-17 @ 07:00), Max: 36.9 (09-22-17 @ 12:45)  HR: 86 (09-23-17 @ 11:00) (84 - 124)  BP: 108/71 (09-23-17 @ 07:30) (108/71 - 133/80)  RR: 22 (09-23-17 @ 11:00) (14 - 25)  SpO2: 98% (09-23-17 @ 11:00) (91% - 100%)  Wt(kg): --  Height (cm): 180.34 (09-22-17 @ 08:00)  Weight (kg): 80.1 (09-22-17 @ 08:00)  BMI (kg/m2): 24.6 (09-22-17 @ 08:00)  BSA (m2): 2 (09-22-17 @ 08:00)      09-22-17 @ 07:01  -  09-23-17 @ 07:00  --------------------------------------------------------  IN: 8654.4 mL / OUT: 2495 mL / NET: 6159.4 mL    09-23-17 @ 07:01  -  09-23-17 @ 11:13  --------------------------------------------------------  IN: 524.4 mL / OUT: 145 mL / NET: 379.4 mL      Physical Exam:  	    Physical Exam:  	Gen: intubated  	Pulm: Decreased breath sounds b/l bases. no rales or ronchi or wheezing  	CV: RRR, S1/S2. no rub  	Abd: no bs, distended  	Extremity: No cyanosis, + edema no clubbing  	      LABS/STUDIES  --------------------------------------------------------------------------------              10.7   14.2  >-----------<  152      [09-23-17 @ 04:18]              31.9     138  |  109  |  50  ----------------------------<  151      [09-23-17 @ 04:18]  5.6   |  16  |  1.21        Ca     7.2     [09-23-17 @ 04:18]      Mg     3.3     [09-23-17 @ 04:18]      Phos  3.5     [09-23-17 @ 04:18]      PT/INR: PT 16.3 , INR 1.48       [09-23-17 @ 07:52]  PTT: 62.0       [09-23-17 @ 07:52]    Troponin 0.08      [09-22-17 @ 13:31]        [09-22-17 @ 13:31]    Creatinine Trend:  SCr 1.21 [09-23 @ 04:18]  SCr 1.50 [09-22 @ 19:22]  SCr 1.88 [09-22 @ 13:31]  SCr 1.79 [09-22 @ 06:57]  SCr 0.99 [09-21 @ 08:02]              Urinalysis - [09-18-17 @ 21:28]      Color Yellow / Appearance Clear / SG 1.012 / pH 6.0      Gluc Negative / Ketone Trace  / Bili Negative / Urobili Negative       Blood Negative / Protein Negative / Leuk Est Negative / Nitrite Negative      RBC 0-2 / WBC 0-2 / Hyaline  / Gran  / Sq Epi  / Non Sq Epi  / Bacteria Few

## 2017-09-23 NOTE — PROGRESS NOTE ADULT - SUBJECTIVE AND OBJECTIVE BOX
HISTORY  62y male admitted to neurosurgery for bilateral leg weakness found to have newly diagnosed metastatic disease s/p T8-T11 laminectomy 9/18 presented today with deterioration in respiratory status and acute onset abdominal pain who was subsequently intubated and w/u revealed b/l DVTs, right upper lobe PE, and a bowel perforation.  Pt is now s/p Exploratory laparotomy, SBR x1 (left in discontinuity), abdominal washout, abthera VAC application (9/22) with intraop findings of  a large amount of succus as well as lettuce in the abdomen along with a 1cm perforation in the small bowel 110 cm distal to the ligament of treitz.       24 HOUR EVENTS:    Upon arrival to SICU, patient was taken off pressors but then resumed on levo due to hypotension and started on Vineet Trac monitoring for which he was found to have an elevated SVV that has since decreased due to the following resuscitation: 1L as well as a 500cc NS fluid challenge to which he responded, switched maintenance fluid to dextrose 5% w/ sodium bicarbonate @125.  patients presenting metabolic acidosis improved from 7.13 to 7.26 from changing vent settings: rate:16 to 20, TV: 550 to 600, FiO2: 100 to 60    SUBJECTIVE/ROS:  [ ] A ten-point review of systems was otherwise negative except as noted.  [ ] Due to altered mental status/intubation, subjective information were not able to be obtained from the patient. History was obtained, to the extent possible, from review of the chart and collateral sources of information.      NEURO  RASS:     GCS:     [facility] ICU:  Exam:   Meds: fentaNYL   Infusion 7.491 MICROgram(s)/kG/Hr IV Continuous <Continuous>  dexmedetomidine Infusion 0.2 MICROgram(s)/kG/Hr IV Continuous <Continuous>    [x] Adequacy of sedation and pain control has been assessed and adjusted      RESPIRATORY  RR: 22 (09-23-17 @ 03:30) (14 - 38)  SpO2: 100% (09-23-17 @ 03:30) (90% - 100%)  Wt(kg): --  Exam:   Mechanical Ventilation: Mode: AC/ CMV (Assist Control/ Continuous Mandatory Ventilation), RR (machine): 16, RR (patient): 21, TV (machine): 600, FiO2: 60, PEEP: 5, ITime: 1, MAP: 10, PIP: 21  ABG - ( 23 Sep 2017 00:09 )  pH: 7.26  /  pCO2: 36    /  pO2: 111   / HCO3: 16    / Base Excess: -9.9  /  SaO2: 99      Lactate: x                [ ] Extubation Readiness Assessed  Meds:       CARDIOVASCULAR  HR: 95 (09-23-17 @ 03:30) (93 - 137)  BP: 133/80 (09-22-17 @ 20:30) (78/66 - 192/113)  BP(mean): 99 (09-22-17 @ 20:30) (68 - 132)  ABP: 85/55 (09-23-17 @ 03:30) (62/49 - 155/79)  ABP(mean): 67 (09-23-17 @ 03:30) (55 - 331)  Wt(kg): --  CVP(cm H2O): --      Exam:  Cardiac Rhythm:  Perfusion     [ ]Adequate   [ ]Inadequate  Mentation   [ ]Normal       [ ]Reduced  Extremities  [ ]Warm         [ ]Cool  Volume Status [ ]Hypervolemic [ ]Euvolemic [ ]Hypovolemic  Meds: norepinephrine Infusion 0.02 MICROgram(s)/kG/Min IV Continuous <Continuous>        GI/NUTRITION  Exam:  Diet:  Meds: pantoprazole  Injectable 40 milliGRAM(s) IV Push daily      GENITOURINARY  I&O's Detail    09-21 @ 07:01  -  09-22 @ 07:00  --------------------------------------------------------  IN:    IV PiggyBack: 500 mL    Oral Fluid: 480 mL    sodium chloride 0.9% with potassium chloride 20 mEq/L: 840 mL  Total IN: 1820 mL    OUT:    Accordian: 130 mL    Accordian: 125 mL    Indwelling Catheter - Urethral: 950 mL    Nasoenteral Tube: 1050 mL  Total OUT: 2255 mL    Total NET: -435 mL      09-22 @ 07:01  -  09-23 @ 03:49  --------------------------------------------------------  IN:    dexmedetomidine Infusion: 28 mL    dextrose 5%: 500 mL    Enteral Tube Flush: 1650 mL    fentaNYL  Infusion: 28 mL    fentaNYL  Infusion: 40.8 mL    heparin Infusion: 70 mL    heparin Infusion: 22 mL    IV PiggyBack: 400 mL    Lactated Ringers IV Bolus: 1000 mL    norepinephrine Infusion: 275 mL    norepinephrine Infusion: 45 mL    propofol Infusion: 144 mL    sodium chloride 0.9%: 625 mL    sodium chloride 0.9%: 1275 mL    Sodium Chloride 0.9% IV Bolus: 1000 mL    Solution: 250 mL  Total IN: 7352.8 mL    OUT:    Indwelling Catheter - Urethral: 1085 mL  Total OUT: 1085 mL    Total NET: 6267.8 mL        Weight (kg): 80.1 (09-22 @ 08:00)  09-22    139  |  107  |  53<H>  ----------------------------<  132<H>  5.4<H>   |  19<L>  |  1.50<H>    Ca    7.4<L>      22 Sep 2017 19:22  Phos  4.8     09-22  Mg     3.7     09-22      [ ] Torres catheter, indication:   Meds: dextrose 5% 1000 milliLiter(s) IV Continuous <Continuous>        HEMATOLOGIC  Meds: heparin  Infusion 1100 Unit(s)/Hr IV Continuous <Continuous>    [x] VTE Prophylaxis                        10.6   9.3   )-----------( 130      ( 22 Sep 2017 19:22 )             32.8     PT/INR - ( 22 Sep 2017 19:22 )   PT: 20.2 sec;   INR: 1.83 ratio         PTT - ( 23 Sep 2017 01:15 )  PTT:56.5 sec  Transfusion     [ ] PRBC   [ ] Platelets   [ ] FFP   [ ] Cryoprecipitate      INFECTIOUS DISEASES  T(C): 36.4 (09-22-17 @ 23:00), Max: 37.7 (09-22-17 @ 08:15)  Wt(kg): --  WBC Count: 9.3 K/uL (09-22 @ 19:22)  WBC Count: 13.7 K/uL (09-22 @ 13:31)  WBC Count: 8.8 K/uL (09-22 @ 06:57)    Recent Cultures:    Meds: piperacillin/tazobactam IVPB. 3.375 Gram(s) IV Intermittent every 8 hours  fluconAZOLE IVPB 200 milliGRAM(s) IV Intermittent every 24 hours        ENDOCRINE  Capillary Blood Glucose  163 (22 Sep 2017 07:40)  163 (22 Sep 2017 07:37)    Meds: dexamethasone  Injectable 4 milliGRAM(s) IV Push every 12 hours        ACCESS DEVICES:  [ ] Peripheral IV  [ ] Central Venous Line	[ ] R	[ ] L	[ ] IJ	[ ] Fem	[ ] SC	Placed:   [ ] Arterial Line		[ ] R	[ ] L	[ ] Fem	[ ] Rad	[ ] Ax	Placed:   [ ] PICC:					[ ] Mediport  [ ] Urinary Catheter, Date Placed:   [ ] Necessity of urinary, arterial, and venous catheters discussed    OTHER MEDICATIONS:      CODE STATUS:     IMAGING: HISTORY  62y male admitted to neurosurgery for bilateral leg weakness found to have newly diagnosed metastatic disease s/p T8-T11 laminectomy 9/18 presented today with deterioration in respiratory status and acute onset abdominal pain who was subsequently intubated and w/u revealed b/l DVTs, right upper lobe PE, and a bowel perforation.  Pt is now s/p Exploratory laparotomy, SBR x1 (left in discontinuity), abdominal washout, abthera VAC application (9/22) with intraop findings of  a large amount of succus as well as lettuce in the abdomen along with a 1cm perforation in the small bowel 110 cm distal to the ligament of treitz.       24 HOUR EVENTS:    Upon arrival to SICU, patient was taken off pressors but then resumed on levo due to hypotension. Vineet Trac monitoring for volume status for which he was found to have an elevated SVV that has since decreased due to the following resuscitation: 1L as well as a 500cc NS fluid challenge to which he responded, switched maintenance fluid to dextrose 5% w/ sodium bicarbonate @125. Pt's presenting metabolic acidosis improved from 7.13 to 7.26 from changing vent settings: rate:16 to 20, TV: 550 to 600, FiO2: 100 to 60    SUBJECTIVE/ROS:  [ ] A ten-point review of systems was otherwise negative except as noted.  [x] Due to altered mental status/intubation, subjective information were not able to be obtained from the patient. History was obtained, to the extent possible, from review of the chart and collateral sources of information.      NEURO  RASS:     GCS:     [facility] ICU:  Exam:   Meds: fentaNYL   Infusion 7.491 MICROgram(s)/kG/Hr IV Continuous <Continuous>  dexmedetomidine Infusion 0.2 MICROgram(s)/kG/Hr IV Continuous <Continuous>    [x] Adequacy of sedation and pain control has been assessed and adjusted      RESPIRATORY  RR: 22 (09-23-17 @ 03:30) (14 - 38)  SpO2: 100% (09-23-17 @ 03:30) (90% - 100%)  Wt(kg): --  Exam:   Mechanical Ventilation: Mode: AC/ CMV (Assist Control/ Continuous Mandatory Ventilation), RR (machine): 16, RR (patient): 21, TV (machine): 600, FiO2: 60, PEEP: 5, ITime: 1, MAP: 10, PIP: 21  ABG - ( 23 Sep 2017 00:09 )  pH: 7.26  /  pCO2: 36    /  pO2: 111   / HCO3: 16    / Base Excess: -9.9  /  SaO2: 99      Lactate: x                [ ] Extubation Readiness Assessed  Meds:       CARDIOVASCULAR  HR: 95 (09-23-17 @ 03:30) (93 - 137)  BP: 133/80 (09-22-17 @ 20:30) (78/66 - 192/113)  BP(mean): 99 (09-22-17 @ 20:30) (68 - 132)  ABP: 85/55 (09-23-17 @ 03:30) (62/49 - 155/79)  ABP(mean): 67 (09-23-17 @ 03:30) (55 - 331)  Wt(kg): --  CVP(cm H2O): --      Exam:  Cardiac Rhythm:  Perfusion     [ ]Adequate   [ ]Inadequate  Mentation   [ ]Normal       [ ]Reduced  Extremities  [ ]Warm         [ ]Cool  Volume Status [ ]Hypervolemic [ ]Euvolemic [ ]Hypovolemic  Meds: norepinephrine Infusion 0.02 MICROgram(s)/kG/Min IV Continuous <Continuous>        GI/NUTRITION  Exam:  Diet:  Meds: pantoprazole  Injectable 40 milliGRAM(s) IV Push daily      GENITOURINARY  I&O's Detail    09-21 @ 07:01  -  09-22 @ 07:00  --------------------------------------------------------  IN:    IV PiggyBack: 500 mL    Oral Fluid: 480 mL    sodium chloride 0.9% with potassium chloride 20 mEq/L: 840 mL  Total IN: 1820 mL    OUT:    Accordian: 130 mL    Accordian: 125 mL    Indwelling Catheter - Urethral: 950 mL    Nasoenteral Tube: 1050 mL  Total OUT: 2255 mL    Total NET: -435 mL      09-22 @ 07:01  -  09-23 @ 03:49  --------------------------------------------------------  IN:    dexmedetomidine Infusion: 28 mL    dextrose 5%: 500 mL    Enteral Tube Flush: 1650 mL    fentaNYL  Infusion: 28 mL    fentaNYL  Infusion: 40.8 mL    heparin Infusion: 70 mL    heparin Infusion: 22 mL    IV PiggyBack: 400 mL    Lactated Ringers IV Bolus: 1000 mL    norepinephrine Infusion: 275 mL    norepinephrine Infusion: 45 mL    propofol Infusion: 144 mL    sodium chloride 0.9%: 625 mL    sodium chloride 0.9%: 1275 mL    Sodium Chloride 0.9% IV Bolus: 1000 mL    Solution: 250 mL  Total IN: 7352.8 mL    OUT:    Indwelling Catheter - Urethral: 1085 mL  Total OUT: 1085 mL    Total NET: 6267.8 mL        Weight (kg): 80.1 (09-22 @ 08:00)  09-22    139  |  107  |  53<H>  ----------------------------<  132<H>  5.4<H>   |  19<L>  |  1.50<H>    Ca    7.4<L>      22 Sep 2017 19:22  Phos  4.8     09-22  Mg     3.7     09-22      [ ] Torres catheter, indication:   Meds: dextrose 5% 1000 milliLiter(s) IV Continuous <Continuous>        HEMATOLOGIC  Meds: heparin  Infusion 1100 Unit(s)/Hr IV Continuous <Continuous>    [x] VTE Prophylaxis                        10.6   9.3   )-----------( 130      ( 22 Sep 2017 19:22 )             32.8     PT/INR - ( 22 Sep 2017 19:22 )   PT: 20.2 sec;   INR: 1.83 ratio         PTT - ( 23 Sep 2017 01:15 )  PTT:56.5 sec  Transfusion     [ ] PRBC   [ ] Platelets   [ ] FFP   [ ] Cryoprecipitate      INFECTIOUS DISEASES  T(C): 36.4 (09-22-17 @ 23:00), Max: 37.7 (09-22-17 @ 08:15)  Wt(kg): --  WBC Count: 9.3 K/uL (09-22 @ 19:22)  WBC Count: 13.7 K/uL (09-22 @ 13:31)  WBC Count: 8.8 K/uL (09-22 @ 06:57)    Recent Cultures:    Meds: piperacillin/tazobactam IVPB. 3.375 Gram(s) IV Intermittent every 8 hours  fluconAZOLE IVPB 200 milliGRAM(s) IV Intermittent every 24 hours        ENDOCRINE  Capillary Blood Glucose  163 (22 Sep 2017 07:40)  163 (22 Sep 2017 07:37)    Meds: dexamethasone  Injectable 4 milliGRAM(s) IV Push every 12 hours        ACCESS DEVICES:  [ ] Peripheral IV  [ ] Central Venous Line	[ ] R	[ ] L	[ ] IJ	[ ] Fem	[ ] SC	Placed:   [ ] Arterial Line		[ ] R	[ ] L	[ ] Fem	[ ] Rad	[ ] Ax	Placed:   [ ] PICC:					[ ] Mediport  [ ] Urinary Catheter, Date Placed:   [ ] Necessity of urinary, arterial, and venous catheters discussed    OTHER MEDICATIONS:      CODE STATUS:     IMAGING: HISTORY  62y male admitted to neurosurgery for bilateral leg weakness found to have newly diagnosed metastatic disease s/p T8-T11 laminectomy 9/18 presented today with deterioration in respiratory status and acute onset abdominal pain who was subsequently intubated and w/u revealed b/l DVTs, right upper lobe PE, and a bowel perforation.  Pt is now s/p Exploratory laparotomy, SBR x1 (left in discontinuity), abdominal washout, abthera VAC application (9/22) with intraop findings of  a large amount of succus as well as lettuce in the abdomen along with a 1cm perforation in the small bowel 110 cm distal to the ligament of treitz.       24 HOUR EVENTS:    Upon arrival to SICU, patient was taken off pressors but then resumed on levo due to hypotension. Vineet Trac monitoring for volume status for which he was found to have an elevated SVV that has since decreased due to the following resuscitation: 1L as well as a 500cc NS fluid challenge to which he responded, switched maintenance fluid to dextrose 5% w/ sodium bicarbonate @125. Pt's presenting metabolic acidosis improved from 7.13 to 7.26 from changing vent settings: rate:16 to 20, TV: 550 to 600, FiO2: 100 to 60    SUBJECTIVE/ROS:  [ ] A ten-point review of systems was otherwise negative except as noted.  [x] Due to altered mental status/intubation, subjective information were not able to be obtained from the patient. History was obtained, to the extent possible, from review of the chart and collateral sources of information.      NEURO  RASS:  0 to -1  Exam: intubated, sedated, arousable, follows commands   Meds: fentaNYL   Infusion 7.491 MICROgram(s)/kG/Hr IV Continuous <Continuous>  dexmedetomidine Infusion 0.2 MICROgram(s)/kG/Hr IV Continuous <Continuous>    [x] Adequacy of sedation and pain control has been assessed and adjusted      RESPIRATORY  RR: 22 (09-23-17 @ 03:30) (14 - 38)  SpO2: 100% (09-23-17 @ 03:30) (90% - 100%)  Exam: Mechanical breath sounds heard b/l  Mechanical Ventilation: Mode: AC/ CMV (Assist Control/ Continuous Mandatory Ventilation), RR (machine): 16, RR (patient): 21, TV (machine): 600, FiO2: 60, PEEP: 5, ITime: 1, MAP: 10, PIP: 21  ABG - ( 23 Sep 2017 00:09 )  pH: 7.26  /  pCO2: 36    /  pO2: 111   / HCO3: 16    / Base Excess: -9.9  /  SaO2: 99      Lactate: x        [x ] Extubation Readiness Assessed        CARDIOVASCULAR  HR: 95 (09-23-17 @ 03:30) (93 - 137)  BP: 133/80 (09-22-17 @ 20:30) (78/66 - 192/113)  BP(mean): 99 (09-22-17 @ 20:30) (68 - 132)  ABP: 85/55 (09-23-17 @ 03:30) (62/49 - 155/79)  ABP(mean): 67 (09-23-17 @ 03:30) (55 - 331)        Exam: RRR, S1, S2  Cardiac Rhythm: RRR  Perfusion     [x ]Adequate   [ ]Inadequate  Mentation   [x ]Normal       [ ]Reduced  Extremities  [x ]Warm         [ ]Cool  Volume Status [ ]Hypervolemic [x ]Euvolemic [ ]Hypovolemic  Meds: norepinephrine Infusion 0.02 MICROgram(s)/kG/Min IV Continuous <Continuous>        GI/NUTRITION  Exam: Soft, ND, apthera vac in place and functioning, draining serosanguinous fluid   Diet: NPO  Meds: pantoprazole  Injectable 40 milliGRAM(s) IV Push daily      GENITOURINARY  I&O's Detail    21 Sep 2017 07:01  -  22 Sep 2017 07:00  --------------------------------------------------------  IN:    IV PiggyBack: 500 mL    Oral Fluid: 480 mL    sodium chloride 0.9% with potassium chloride 20 mEq/L: 840 mL  Total IN: 1820 mL    OUT:    Accordian: 130 mL    Accordian: 125 mL    Indwelling Catheter - Urethral: 950 mL    Nasoenteral Tube: 1050 mL  Total OUT: 2255 mL    Total NET: -435 mL      22 Sep 2017 07:01  -  23 Sep 2017 06:59  --------------------------------------------------------  IN:    dexmedetomidine Infusion: 40 mL    dextrose 5%: 875 mL    Enteral Tube Flush: 1650 mL    fentaNYL  Infusion: 28 mL    fentaNYL  Infusion: 57.6 mL    heparin Infusion: 55 mL    heparin Infusion: 70 mL    IV PiggyBack: 500 mL    Lactated Ringers IV Bolus: 1000 mL    norepinephrine Infusion: 275 mL    norepinephrine Infusion: 142.5 mL    propofol Infusion: 144 mL    sodium chloride 0.9%: 625 mL    sodium chloride 0.9%: 1275 mL    Sodium Chloride 0.9% IV Bolus: 1500 mL    Solution: 250 mL  Total IN: 8487.1 mL    OUT:    Accordian: 50 mL    Accordian: 110 mL    Indwelling Catheter - Urethral: 1250 mL    Nasoenteral Tube: 240 mL  Total OUT: 1650 mL    Total NET: 6837.1 mL        Weight (kg): 80.1 (09-22 @ 08:00)  09-22 09-23    138  |  109<H>  |  50<H>  ----------------------------<  151<H>  5.6<H>   |  16<L>  |  1.21    Ca    7.2<L>      23 Sep 2017 04:18  Phos  3.5     09-23  Mg     3.3     09-23      [x ] Torres catheter, indication:   Meds: dextrose 5% 1000 milliLiter(s) IV Continuous <Continuous>        HEMATOLOGIC  Meds: heparin  Infusion 1100 Unit(s)/Hr IV Continuous <Continuous>    [x] VTE Prophylaxis                                        10.7   14.2  )-----------( 152      ( 23 Sep 2017 04:18 )             31.9   PT/INR - ( 22 Sep 2017 19:22 )   PT: 20.2 sec;   INR: 1.83 ratio         PTT - ( 23 Sep 2017 01:15 )  PTT:56.5 sec      INFECTIOUS DISEASES  T(C): 36.4 (09-22-17 @ 23:00), Max: 37.7 (09-22-17 @ 08:15)  WBC Count: 9.3 K/uL (09-22 @ 19:22)  WBC Count: 13.7 K/uL (09-22 @ 13:31)  WBC Count: 8.8 K/uL (09-22 @ 06:57)    Recent Cultures: 9/23 Peritoneal Cx- gram + cocci in pairs and chains     Meds: piperacillin/tazobactam IVPB. 3.375 Gram(s) IV Intermittent every 8 hours  fluconAZOLE IVPB 200 milliGRAM(s) IV Intermittent every 24 hours        ENDOCRINE  Capillary Blood Glucose  163 (22 Sep 2017 07:40)  163 (22 Sep 2017 07:37)    Meds: dexamethasone  Injectable 4 milliGRAM(s) IV Push every 12 hours        ACCESS DEVICES:  [x ] Peripheral IV  [x ] Central Venous Line	[ ] R	[x ] L	[x ] IJ	[ ] Fem	[ ] SC	Placed:   [x ] Arterial Line		[ ] R	[ x] L	[ ] Fem	[x ] Rad	[ ] Ax	Placed:   [ ] PICC:					[ ] Mediport  [x ] Urinary Catheter, Date Placed:   [x ] Necessity of urinary, arterial, and venous catheters discussed HISTORY  62y male admitted to neurosurgery for bilateral leg weakness found to have newly diagnosed metastatic disease s/p T8-T11 laminectomy 9/18 presented today with deterioration in respiratory status and acute onset abdominal pain who was subsequently intubated and w/u revealed b/l DVTs, right upper lobe PE, and a bowel perforation.  Pt is now s/p Exploratory laparotomy, SBR x1 (left in discontinuity), abdominal washout, abthera VAC application (9/22) with intraop findings of  a large amount of succus as well as lettuce in the abdomen along with a 1cm perforation in the small bowel 110 cm distal to the ligament of treitz.       24 HOUR EVENTS:    Upon arrival to SICU, patient was taken off pressors but then resumed on levo due to hypotension. Vineet Trac monitoring for volume status for which he was found to have an elevated SVV that has since decreased due to the following resuscitation: 1L as well as a 500cc NS fluid challenge to which he responded, switched maintenance fluid to dextrose 5% w/ sodium bicarbonate @125 to aide in buffering low pH and hypotension. Pt's presenting metabolic acidosis improved from 7.13 to 7.26 from changing vent settings: rate:16 to 20, TV: 550 to 600, FiO2: 100 to 60.    SUBJECTIVE/ROS:  [ ] A ten-point review of systems was otherwise negative except as noted.  [x] Due to altered mental status/intubation, subjective information were not able to be obtained from the patient. History was obtained, to the extent possible, from review of the chart and collateral sources of information.      NEURO  RASS:  0 to -1  Exam: intubated, sedated, arousable, follows commands   Meds: fentaNYL   Infusion 7.491 MICROgram(s)/kG/Hr IV Continuous <Continuous>  dexmedetomidine Infusion 0.2 MICROgram(s)/kG/Hr IV Continuous <Continuous>    [x] Adequacy of sedation and pain control has been assessed and adjusted      RESPIRATORY  RR: 22 (09-23-17 @ 03:30) (14 - 38)  SpO2: 100% (09-23-17 @ 03:30) (90% - 100%)  Exam: Mechanical breath sounds heard b/l  Mechanical Ventilation: Mode: AC/ CMV (Assist Control/ Continuous Mandatory Ventilation), RR (machine): 16, RR (patient): 21, TV (machine): 600, FiO2: 60, PEEP: 5, ITime: 1, MAP: 10, PIP: 21  ABG - ( 23 Sep 2017 00:09 )  pH: 7.26  /  pCO2: 36    /  pO2: 111   / HCO3: 16    / Base Excess: -9.9  /  SaO2: 99      Lactate: x        [x ] Extubation Readiness Assessed        CARDIOVASCULAR  HR: 95 (09-23-17 @ 03:30) (93 - 137)  BP: 133/80 (09-22-17 @ 20:30) (78/66 - 192/113)  BP(mean): 99 (09-22-17 @ 20:30) (68 - 132)  ABP: 85/55 (09-23-17 @ 03:30) (62/49 - 155/79)  ABP(mean): 67 (09-23-17 @ 03:30) (55 - 331)        Exam: RRR, S1, S2  Cardiac Rhythm: RRR  Perfusion     [x ]Adequate   [ ]Inadequate  Mentation   [x ]Normal       [ ]Reduced  Extremities  [x ]Warm         [ ]Cool  Volume Status [ ]Hypervolemic [x ]Euvolemic [ ]Hypovolemic  Meds: norepinephrine Infusion 0.02 MICROgram(s)/kG/Min IV Continuous <Continuous>        GI/NUTRITION  Exam: Soft, ND, apthera vac in place and functioning, draining serosanguinous fluid   Diet: NPO  Meds: pantoprazole  Injectable 40 milliGRAM(s) IV Push daily      GENITOURINARY  I&O's Detail    21 Sep 2017 07:01  -  22 Sep 2017 07:00  --------------------------------------------------------  IN:    IV PiggyBack: 500 mL    Oral Fluid: 480 mL    sodium chloride 0.9% with potassium chloride 20 mEq/L: 840 mL  Total IN: 1820 mL    OUT:    Accordian: 130 mL    Accordian: 125 mL    Indwelling Catheter - Urethral: 950 mL    Nasoenteral Tube: 1050 mL  Total OUT: 2255 mL    Total NET: -435 mL      22 Sep 2017 07:01  -  23 Sep 2017 06:59  --------------------------------------------------------  IN:    dexmedetomidine Infusion: 40 mL    dextrose 5%: 875 mL    Enteral Tube Flush: 1650 mL    fentaNYL  Infusion: 28 mL    fentaNYL  Infusion: 57.6 mL    heparin Infusion: 55 mL    heparin Infusion: 70 mL    IV PiggyBack: 500 mL    Lactated Ringers IV Bolus: 1000 mL    norepinephrine Infusion: 275 mL    norepinephrine Infusion: 142.5 mL    propofol Infusion: 144 mL    sodium chloride 0.9%: 625 mL    sodium chloride 0.9%: 1275 mL    Sodium Chloride 0.9% IV Bolus: 1500 mL    Solution: 250 mL  Total IN: 8487.1 mL    OUT:    Accordian: 50 mL    Accordian: 110 mL    Indwelling Catheter - Urethral: 1250 mL    Nasoenteral Tube: 240 mL  Total OUT: 1650 mL    Total NET: 6837.1 mL        Weight (kg): 80.1 (09-22 @ 08:00)  09-22 09-23    138  |  109<H>  |  50<H>  ----------------------------<  151<H>  5.6<H>   |  16<L>  |  1.21    Ca    7.2<L>      23 Sep 2017 04:18  Phos  3.5     09-23  Mg     3.3     09-23      [x ] Torres catheter, indication:   Meds: dextrose 5% 1000 milliLiter(s) IV Continuous <Continuous>        HEMATOLOGIC  Meds: heparin  Infusion 1100 Unit(s)/Hr IV Continuous <Continuous>    [x] VTE Prophylaxis                                        10.7   14.2  )-----------( 152      ( 23 Sep 2017 04:18 )             31.9   PT/INR - ( 22 Sep 2017 19:22 )   PT: 20.2 sec;   INR: 1.83 ratio         PTT - ( 23 Sep 2017 01:15 )  PTT:56.5 sec      INFECTIOUS DISEASES  T(C): 36.4 (09-22-17 @ 23:00), Max: 37.7 (09-22-17 @ 08:15)  WBC Count: 9.3 K/uL (09-22 @ 19:22)  WBC Count: 13.7 K/uL (09-22 @ 13:31)  WBC Count: 8.8 K/uL (09-22 @ 06:57)    Recent Cultures: 9/23 Peritoneal Cx- gram + cocci in pairs and chains     Meds: piperacillin/tazobactam IVPB. 3.375 Gram(s) IV Intermittent every 8 hours  fluconAZOLE IVPB 200 milliGRAM(s) IV Intermittent every 24 hours        ENDOCRINE  Capillary Blood Glucose  163 (22 Sep 2017 07:40)  163 (22 Sep 2017 07:37)    Meds: dexamethasone  Injectable 4 milliGRAM(s) IV Push every 12 hours        ACCESS DEVICES:  [x ] Peripheral IV  [x ] Central Venous Line	[ ] R	[x ] L	[x ] IJ	[ ] Fem	[ ] SC	Placed:   [x ] Arterial Line		[ ] R	[ x] L	[ ] Fem	[x ] Rad	[ ] Ax	Placed:   [ ] PICC:					[ ] Mediport  [x ] Urinary Catheter, Date Placed:   [x ] Necessity of urinary, arterial, and venous catheters discussed

## 2017-09-23 NOTE — PROGRESS NOTE ADULT - SUBJECTIVE AND OBJECTIVE BOX
SURGICAL POST-OP CHECK NOTE:    Procedure: s/p Exploratory laparotomy, SBR x1 (left in discontinuity), abdominal washout, abthera VAC application     Subjective: Pt in bed intubated and sedated    Vital Signs Last 24 Hrs  T(C): 36.4 (22 Sep 2017 23:00), Max: 37.7 (22 Sep 2017 08:15)  T(F): 97.5 (22 Sep 2017 23:00), Max: 99.9 (22 Sep 2017 08:15)  HR: 96 (23 Sep 2017 02:00) (93 - 137)  BP: 133/80 (22 Sep 2017 20:30) (78/66 - 192/113)  BP(mean): 99 (22 Sep 2017 20:30) (68 - 132)  RR: 22 (23 Sep 2017 02:00) (14 - 38)  SpO2: 100% (23 Sep 2017 02:00) (90% - 100%)  I&O's Summary    21 Sep 2017 07:01  -  22 Sep 2017 07:00  --------------------------------------------------------  IN: 1820 mL / OUT: 2255 mL / NET: -435 mL    22 Sep 2017 07:01  -  23 Sep 2017 02:08  --------------------------------------------------------  IN: 7197 mL / OUT: 1085 mL / NET: 6112 mL                            10.6   9.3   )-----------( 130      ( 22 Sep 2017 19:22 )             32.8     09-22    139  |  107  |  53<H>  ----------------------------<  132<H>  5.4<H>   |  19<L>  |  1.50<H>    Ca    7.4<L>      22 Sep 2017 19:22  Phos  4.8     09-22  Mg     3.7     09-22     PT/INR - ( 22 Sep 2017 19:22 )   PT: 20.2 sec;   INR: 1.83 ratio         PTT - ( 23 Sep 2017 01:15 )  PTT:56.5 sec    PHYSICAL EXAM:  Gen: Intubated, sedated  Neuro:  Sedated, pupils pinpoint  CV: S1S2, r/r/r, (-)m/r/g  Pulm: Rhonchi to anterior chest wall, wheezing appreciated  GI: abd VAC with good suction; draining moderate amount of serous-sanginous fluid; hypoactive bowel sounds  Ext: 2+ pulses to b/l UE's, (+)signal to b/L DP; Cap refill < 3secs

## 2017-09-23 NOTE — PROGRESS NOTE ADULT - SUBJECTIVE AND OBJECTIVE BOX
Patient seen and examined at bedside. Taken for emergent ex-lap for bowel perf s/p SBR and abdominal washout. Patient now in SICU, intubated, sedation with wound vac in place, on Levo.     Sedation held for 2 minutes for neuro exam. Intubated, FC, able to wiggle toes on left, not wiggling toes on right. Unable to give more effort.

## 2017-09-23 NOTE — PROGRESS NOTE ADULT - ASSESSMENT
63 yo M with metastatic disease new on CT scans with   T9-T11 cord compression from osseous lesion. In addition, CT of the chest/abd/pelvis revealed T10 lytic lesion, necrotic mediastinal mass, possible liver lesion, possible adrenal mass and a left lung mass. He was taken for T8-T11 laminectomy/decompression on 9/18/17 now Samantha and hyperkalemia with hypotension with suspected abd perforation   1- SAMANTHA  2- hypotension  3- hyperkalemia  4- metastatic disease    repeat k is better at 4.8 on blood gas  ivf   if k rises again then change LR to NS  cont with pressor support  vent support  creatinine improving

## 2017-09-23 NOTE — PROGRESS NOTE ADULT - SUBJECTIVE AND OBJECTIVE BOX
Procedure: s/p Exploratory laparotomy, SBR x1 (left in discontinuity), abdominal washout, abthera VAC application     Subjective: Pt in bed intubated and sedated    Vital Signs Last 24 Hrs  T(C): 36.4 (22 Sep 2017 23:00), Max: 37.7 (22 Sep 2017 08:15)  T(F): 97.5 (22 Sep 2017 23:00), Max: 99.9 (22 Sep 2017 08:15)  HR: 96 (23 Sep 2017 02:00) (93 - 137)  BP: 133/80 (22 Sep 2017 20:30) (78/66 - 192/113)  BP(mean): 99 (22 Sep 2017 20:30) (68 - 132)  RR: 22 (23 Sep 2017 02:00) (14 - 38)  SpO2: 100% (23 Sep 2017 02:00) (90% - 100%)  I&O's Summary    21 Sep 2017 07:01  -  22 Sep 2017 07:00  --------------------------------------------------------  IN: 1820 mL / OUT: 2255 mL / NET: -435 mL    22 Sep 2017 07:01  -  23 Sep 2017 02:08  --------------------------------------------------------  IN: 7197 mL / OUT: 1085 mL / NET: 6112 mL                            10.6   9.3   )-----------( 130      ( 22 Sep 2017 19:22 )             32.8     09-22    139  |  107  |  53<H>  ----------------------------<  132<H>  5.4<H>   |  19<L>  |  1.50<H>    Ca    7.4<L>      22 Sep 2017 19:22  Phos  4.8     09-22  Mg     3.7     09-22     PT/INR - ( 22 Sep 2017 19:22 )   PT: 20.2 sec;   INR: 1.83 ratio         PTT - ( 23 Sep 2017 01:15 )  PTT:56.5 sec    PHYSICAL EXAM:  Gen: Intubated, sedated  Neuro:  Sedated, pupils pinpoint  CV: S1S2, r/r/r, (-)m/r/g  Pulm: Rhonchi to anterior chest wall, wheezing appreciated  GI: abd VAC with good suction; draining moderate amount of serous-sanginous fluid; hypoactive bowel sounds  Ext: 2+ pulses to b/l UE's, (+)signal to b/L DP; Cap refill < 3secs

## 2017-09-23 NOTE — PROGRESS NOTE ADULT - ASSESSMENT
ThisHPI: 62y male admitted to neurosurgery for bilateral leg weakness found to have newly diagnosed metastatic disease s/p T8-T11 laminectomy presented today with deterioration in respiratory status and acute onset abdominal pain who was subsequently intubated and w/u revealed b/l DVTs, right upper lobe PE, and a bowel perforation.  Pt is now s/p Exploratory laparotomy, SBR x1 (left in discontinuity), abdominal washout, abthera VAC application (9/22) with intraop findings of d a large amount of succus as well as lettuce in the abdomen along with a 1cm perforation in the small bowel 110 cm distal to the ligament of treitz.     -Continue excellent SICU care  -Fluid resuscitation  -Heparin gtt  -Vac to abdomen  -Fentanyl for analgesia  -Management as per CC team

## 2017-09-24 NOTE — PROGRESS NOTE ADULT - ASSESSMENT
62y male admitted to neurosurgery for bilateral leg weakness found to have newly diagnosed metastatic disease s/p T8-T11 laminectomy presented today with deterioration in respiratory status and acute onset abdominal pain who was subsequently intubated and w/u revealed b/l DVTs, right upper lobe PE, and a bowel perforation.  Pt is now s/p Exploratory laparotomy, SBR x1 (left in discontinuity), abdominal washout, abthera VAC application (9/22) with intraop findings of d a large amount of succus as well as lettuce in the abdomen along with a 1cm perforation in the small bowel 110 cm distal to the ligament of treitz. Pt with change in neuro status overnight night but imaging with no suggestion of epidural hematoma and cleared for RTOR today by neurosurgery.    -Continue excellent SICU care  -Fluid resuscitation  -Vac to abdomen  -Fentanyl for analgesia  -RTOR today 62y male admitted to neurosurgery for bilateral leg weakness found to have newly diagnosed metastatic disease s/p T8-T11 laminectomy presented today with deterioration in respiratory status and acute onset abdominal pain who was subsequently intubated and w/u revealed b/l DVTs, right upper lobe PE, and a bowel perforation.  Pt is now s/p Exploratory laparotomy, SBR x1 (left in discontinuity), abdominal washout, abthera VAC application (9/22) with intraop findings of d a large amount of succus as well as lettuce in the abdomen along with a 1cm perforation in the small bowel 110 cm distal to the ligament of treitz. Pt with change in neuro status overnight night but imaging with no suggestion of epidural hematoma and cleared for RTOR today by neurosurgery.    -Continue acute care per SICU  -NPO/IVF  -Fentanyl for analgesia  -RTOR today  -IV abx  -After OR, wean sedation, wean vent as tolerated  -Dispo planning    Hong Coleman MD

## 2017-09-24 NOTE — PROGRESS NOTE ADULT - SUBJECTIVE AND OBJECTIVE BOX
Procedure: s/p Exploratory laparotomy, SBR x1 (left in discontinuity), abdominal washout, abthera VAC application     Subjective: Pt in bed intubated and sedated    Vital Signs Last 24 Hrs  T(C): 36.4 (22 Sep 2017 23:00), Max: 37.7 (22 Sep 2017 08:15)  T(F): 97.5 (22 Sep 2017 23:00), Max: 99.9 (22 Sep 2017 08:15)  HR: 96 (23 Sep 2017 02:00) (93 - 137)  BP: 133/80 (22 Sep 2017 20:30) (78/66 - 192/113)  BP(mean): 99 (22 Sep 2017 20:30) (68 - 132)  RR: 22 (23 Sep 2017 02:00) (14 - 38)  SpO2: 100% (23 Sep 2017 02:00) (90% - 100%)  I&O's Summary    21 Sep 2017 07:01  -  22 Sep 2017 07:00  --------------------------------------------------------  IN: 1820 mL / OUT: 2255 mL / NET: -435 mL    22 Sep 2017 07:01  -  23 Sep 2017 02:08  --------------------------------------------------------  IN: 7197 mL / OUT: 1085 mL / NET: 6112 mL                            10.6   9.3   )-----------( 130      ( 22 Sep 2017 19:22 )             32.8     09-22    139  |  107  |  53<H>  ----------------------------<  132<H>  5.4<H>   |  19<L>  |  1.50<H>    Ca    7.4<L>      22 Sep 2017 19:22  Phos  4.8     09-22  Mg     3.7     09-22     PT/INR - ( 22 Sep 2017 19:22 )   PT: 20.2 sec;   INR: 1.83 ratio         PTT - ( 23 Sep 2017 01:15 )  PTT:56.5 sec    PHYSICAL EXAM:  Gen: Intubated, sedated  Neuro:  Sedated, pupils pinpoint  CV: S1S2, r/r/r, (-)m/r/g  Pulm: Rhonchi to anterior chest wall, wheezing appreciated  GI: abd VAC with good suction; draining moderate amount of serous-sanginous fluid; hypoactive bowel sounds  Ext: 2+ pulses to b/l UE's, (+)signal to b/L DP; Cap refill < 3secs Procedure: s/p Exploratory laparotomy, SBR x1 (left in discontinuity), abdominal washout, abthera VAC application 2 days ago.   S/P laparotomy today for irrigation and erterostomy performed    Subjective: Pt in bed intubated and sedated    ICU Vital Signs Last 24 Hrs  T(C): 36.3 (24 Sep 2017 07:00), Max: 37.2 (23 Sep 2017 19:00)  T(F): 97.3 (24 Sep 2017 07:00), Max: 98.9 (23 Sep 2017 19:00)  HR: 66 (24 Sep 2017 11:41) (62 - 94)  BP: 91/64 (24 Sep 2017 11:30) (82/58 - 139/80)  BP(mean): 73 (24 Sep 2017 11:30) (64 - 104)  ABP: 108/100 (24 Sep 2017 05:00) (69/63 - 124/82)  ABP(mean): 103 (24 Sep 2017 05:00) (64 - 117)  RR: 20 (24 Sep 2017 11:30) (10 - 29)  SpO2: 100% (24 Sep 2017 11:41) (92% - 100%)      PHYSICAL EXAM:  Gen: Intubated, sedated  Neuro:  Sedated, pupils pinpoint on medications.  Right leg weakness is apparent and related to spinal cord compression by metastatic lung cancer. Patient appears to be awake and cooperative but neurologic exam at present is unreliable with intubation and necessary sedation.  CV: S1S2, r/r/r, (-)m/r/g  Pulm: Rhonchi to anterior chest wall, wheezing appreciated  GI: abd VAC with good suction; draining moderate amount of serous-sanginous fluid; hypoactive bowel sounds  Ext: 2+ pulses to b/l UE's, (+)signal to b/L DP; Cap refill < 3secs    ABG - ( 24 Sep 2017 14:58 )  pH: 7.34  /  pCO2: 40    /  pO2: 262   / HCO3: 21    / Base Excess: -3.9  /  SaO2: 100           CBC Full  -  ( 24 Sep 2017 05:05 )  WBC Count : 15.9 K/uL  Hemoglobin : 8.7 g/dL  Hematocrit : 26.0 %  Platelet Count - Automated : 110 K/uL  Mean Cell Volume : 95.7 fl  Mean Cell Hemoglobin : 32.2 pg  Mean Cell Hemoglobin Concentration : 33.6 gm/dL  Auto Neutrophil # : x  Auto Lymphocyte # : x  Auto Monocyte # : x  Auto Eosinophil # : x  Auto Basophil # : x  Auto Neutrophil % : x  Auto Lymphocyte % : x  Auto Monocyte % : x  Auto Eosinophil % : x  Auto Basophil % : x    09-24    139  |  108  |  57<H>  ----------------------------<  126<H>  5.1   |  21<L>  |  1.23    Ca    7.5<L>      24 Sep 2017 05:05  Phos  3.9     09-24  Mg     3.6     09-24    TPro  4.5<L>  /  Alb  2.4<L>  /  TBili  0.8  /  DBili  0.5<H>  /  AST  35  /  ALT  18  /  AlkPhos  54  09-24    LIVER FUNCTIONS - ( 24 Sep 2017 05:05 )  Alb: 2.4 g/dL / Pro: 4.5 g/dL / ALK PHOS: 54 U/L / ALT: 18 U/L RC / AST: 35 U/L / GGT: x           PT/INR - ( 24 Sep 2017 05:05 )   PT: 14.5 sec;   INR: 1.32 ratio         PTT - ( 24 Sep 2017 05:05 )  PTT:27.1 sec

## 2017-09-24 NOTE — PROGRESS NOTE ADULT - SUBJECTIVE AND OBJECTIVE BOX
HISTORY  62y male admitted to neurosurgery for bilateral leg weakness found to have newly diagnosed metastatic disease s/p T8-T11 laminectomy 9/18 presented today with deterioration in respiratory status and acute onset abdominal pain who was subsequently intubated and w/u revealed b/l DVTs, right upper lobe PE, and a bowel perforation.  Pt is now s/p Exploratory laparotomy, SBR x1 (left in discontinuity), abdominal washout, abthera VAC application (9/22) with intraop findings of  a large amount of succus as well as lettuce in the abdomen along with a 1cm perforation in the small bowel 110 cm distal to the ligament of treitz.     24 HOUR EVENTS:  Patient had bicarb drip D/C'ed and switched to LR maintenance.  Had a change in neuro exam, patient was not able to move RLE and only had 2-3/5 in LLE, which was changed from post surgical where patient had 2-3/5 b/l.  Epidural hematoma was suspected, Hep drip was d/c'ed.  Patient went down for MRI of T and L spine w/o contrast.  Wet read demonstrates no signs of hematoma or cord compression.  Patient Peritoneal cx's are growing Enterococcus Faecium.    SUBJECTIVE/ROS:  [ ] A ten-point review of systems was otherwise negative except as noted.  [x ] Due to altered mental status/intubation, subjective information were not able to be obtained from the patient. History was obtained, to the extent possible, from review of the chart and collateral sources of information.      NEURO  RASS: -1     Exam: intubated and sedated, arousable but lethargic, able to somewhat follow commands   Meds: fentaNYL   Infusion 7.491 MICROgram(s)/kG/Hr IV Continuous <Continuous>  dexmedetomidine Infusion 0.2 MICROgram(s)/kG/Hr IV Continuous <Continuous>    [x] Adequacy of sedation and pain control has been assessed and adjusted      RESPIRATORY  RR: 22 (09-24-17 @ 01:00) (17 - 29)  SpO2: 96% (09-24-17 @ 01:00) (93% - 100%)  Exam: unlabored, clear to auscultation bilaterally, mechanical breath sounds heard b/l  Mechanical Ventilation: Mode: AC/ CMV (Assist Control/ Continuous Mandatory Ventilation), RR (machine): 20, RR (patient): 23, TV (machine): 600, FiO2: 40, PEEP: 5, ITime: 1, MAP: 12, PIP: 21  ABG - ( 23 Sep 2017 19:19 )  pH: 7.40  /  pCO2: 36    /  pO2: 64    / HCO3: 22    / Base Excess: -2.0  /  SaO2: 93      Lactate: x        [x ] Extubation Readiness Assessed        CARDIOVASCULAR  HR: 74 (09-24-17 @ 01:00) (74 - 109)  BP: 87/56 (09-24-17 @ 01:00) (86/54 - 139/80)  BP(mean): 66 (09-24-17 @ 01:00) (64 - 104)  ABP: 106/101 (09-24-17 @ 01:00) (76/58 - 134/70)  ABP(mean): 103 (09-24-17 @ 01:00) (62 - 117)    Exam: RRR, S1, S2  Cardiac Rhythm: NSR  Perfusion     [x ]Adequate   [ ]Inadequate  Mentation   [ ]Normal       [x ]Reduced  Extremities  [x ]Warm         [ ]Cool  Volume Status [ ]Hypervolemic [x ]Euvolemic [ ]Hypovolemic  Meds: norepinephrine Infusion 0.02 MICROgram(s)/kG/Min IV Continuous <Continuous>    GI/NUTRITION  Exam: soft, apthera vac in place draining sero-sanguinous fluid, ND  Diet: NPO  Meds: pantoprazole  Injectable 40 milliGRAM(s) IV Push daily      GENITOURINARY  I&O's Detail    09-22 @ 07:01  -  09-23 @ 07:00  --------------------------------------------------------  IN:    dexmedetomidine Infusion: 44 mL    dextrose 5%: 1000 mL    Enteral Tube Flush: 1650 mL    fentaNYL  Infusion: 62.4 mL    fentaNYL  Infusion: 28 mL    heparin Infusion: 70 mL    heparin Infusion: 66 mL    IV PiggyBack: 500 mL    Lactated Ringers IV Bolus: 1000 mL    norepinephrine Infusion: 165 mL    norepinephrine Infusion: 275 mL    propofol Infusion: 144 mL    sodium chloride 0.9%: 1275 mL    sodium chloride 0.9%: 625 mL    Sodium Chloride 0.9% IV Bolus: 1500 mL    Solution: 250 mL  Total IN: 8654.4 mL    OUT:    Accordian: 50 mL    Accordian: 110 mL    Indwelling Catheter - Urethral: 1295 mL    Nasoenteral Tube: 240 mL    VAC (Vacuum Assisted Closure) System: 800 mL  Total OUT: 2495 mL    Total NET: 6159.4 mL      09-23 @ 07:01  -  09-24 @ 01:14  --------------------------------------------------------  IN:    dexmedetomidine Infusion: 72 mL    dextrose 5%: 125 mL    fentaNYL  Infusion: 86.4 mL    heparin Infusion: 110 mL    IV PiggyBack: 200 mL    Lactated Ringers IV Bolus: 1000 mL    lactated ringers.: 2000 mL    norepinephrine Infusion: 350 mL    Solution: 350 mL  Total IN: 4293.4 mL    OUT:    Accordian: 50 mL    Accordian: 60 mL    Indwelling Catheter - Urethral: 1010 mL    Nasoenteral Tube: 300 mL    VAC (Vacuum Assisted Closure) System: 350 mL  Total OUT: 1770 mL    Total NET: 2523.4 mL      09-23    140  |  110<H>  |  50<H>  ----------------------------<  117<H>  4.9   |  20<L>  |  1.14    Ca    7.0<L>      23 Sep 2017 22:13  Phos  3.0     09-23  Mg     3.3     09-23    TPro  4.9<L>  /  Alb  2.5<L>  /  TBili  0.9  /  DBili  0.6<H>  /  AST  32  /  ALT  18  /  AlkPhos  46  09-23    [x ] Torres catheter, indication: N/A  Meds: lactated ringers. 1000 milliLiter(s) IV Continuous <Continuous>      HEMATOLOGIC  Meds: none   [x] VTE Prophylaxis                        10.2   18.0  )-----------( 150      ( 23 Sep 2017 12:07 )             30.3     PT/INR - ( 23 Sep 2017 19:20 )   PT: 14.3 sec;   INR: 1.32 ratio         PTT - ( 23 Sep 2017 19:20 )  PTT:32.6 sec  Transfusion     [ ] PRBC   [ ] Platelets   [ ] FFP   [ ] Cryoprecipitate      INFECTIOUS DISEASES  T(C): 37.2 (09-23-17 @ 19:00), Max: 37.2 (09-23-17 @ 19:00)  Wt(kg): --  WBC Count: 18.0 K/uL (09-23 @ 12:07)  WBC Count: 14.2 K/uL (09-23 @ 04:18)    Recent Cultures: Peritoneal Fluid (9/23)- E. Faecium     Meds: piperacillin/tazobactam IVPB. 3.375 Gram(s) IV Intermittent every 8 hours  fluconAZOLE IVPB 200 milliGRAM(s) IV Intermittent every 24 hours      ENDOCRINE  Capillary Blood Glucose    Meds: dexamethasone  Injectable 4 milliGRAM(s) IV Push once        ACCESS DEVICES:  [x ] Peripheral IV  [x ] Central Venous Line	[ ] R	[x ] L	[x ] IJ	[ ] Fem	[ ] SC	Placed:   [x ] Arterial Line		[ ] R	[x ] L	[ ] Fem	[x ] Rad	[ ] Ax	Placed:   [ ] PICC:					[ ] Mediport  [x ] Urinary Catheter, Date Placed:   [ x] Necessity of urinary, arterial, and venous catheters discussed      IMAGING: MRI T and L spine w/o contrast- official read pending

## 2017-09-24 NOTE — CHART NOTE - NSCHARTNOTEFT_GEN_A_CORE
procedure: Re-Opening of recent laparotomy, abdominal washout, enteroenterostomy, abdominal closure  POD 0    S: Pt seen and examined at bedside.    O:  ICU Vital Signs Last 24 Hrs  T(C): 36 (24 Sep 2017 15:30), Max: 37.1 (24 Sep 2017 03:00)  T(F): 96.8 (24 Sep 2017 15:30), Max: 98.7 (24 Sep 2017 03:00)  HR: 83 (24 Sep 2017 19:00) (62 - 90)  BP: 117/76 (24 Sep 2017 16:30) (82/58 - 139/80)  BP(mean): 92 (24 Sep 2017 16:30) (64 - 104)  ABP: 174/81 (24 Sep 2017 19:00) (69/63 - 188/79)  ABP(mean): 112 (24 Sep 2017 19:00) (64 - 135)  RR: 32 (24 Sep 2017 19:00) (10 - 32)  SpO2: 96% (24 Sep 2017 19:00) (92% - 100%)    I&O's Detail    23 Sep 2017 07:01  -  24 Sep 2017 07:00  --------------------------------------------------------  IN:    dexmedetomidine Infusion: 96 mL    dextrose 5%: 125 mL    fentaNYL  Infusion: 112.8 mL    heparin Infusion: 110 mL    IV PiggyBack: 325 mL    Lactated Ringers IV Bolus: 1000 mL    lactated ringers.: 2750 mL    norepinephrine Infusion: 387.5 mL    Solution: 350 mL  Total IN: 5256.3 mL    OUT:    Accordian: 110 mL    Accordian: 85 mL    Indwelling Catheter - Urethral: 1225 mL    Nasoenteral Tube: 375 mL    VAC (Vacuum Assisted Closure) System: 650 mL  Total OUT: 2445 mL    Total NET: 2811.3 mL      24 Sep 2017 07:01  -  24 Sep 2017 19:27  --------------------------------------------------------  IN:    dexmedetomidine Infusion: 20 mL    dexmedetomidine Infusion: 6 mL    fentaNYL  Infusion: 20 mL    fentaNYL  Infusion: 6 mL    IV PiggyBack: 300 mL    lactated ringers.: 625 mL    lactated ringers.: 450 mL    norepinephrine Infusion: 9 mL  Total IN: 1436 mL    OUT:    Accordian: 60 mL    Accordian: 10 mL    Indwelling Catheter - Urethral: 640 mL    Nasoenteral Tube: 100 mL    VAC (Vacuum Assisted Closure) System: 100 mL  Total OUT: 910 mL    Total NET: 526 mL    Gen:  Abd:      Labs:                        9.9    14.3  )-----------( 89       ( 24 Sep 2017 16:07 )             30.1    09-24    142  |  109<H>  |  60<H>  ----------------------------<  120<H>  5.2   |  21<L>  |  1.18    Ca    7.4<L>      24 Sep 2017 16:14  Phos  4.7     09-24  Mg     3.8     09-24    TPro  4.7<L>  /  Alb  2.4<L>  /  TBili  1.0  /  DBili  x   /  AST  43<H>  /  ALT  19  /  AlkPhos  60  09-24    A/P: 62M POD 0 s/p   - ok to restart Hep gtt  - cont Vanco, Zosyn, Fluconazole  - pain control  - NPO/NGT  - monitor UOP  - AM labs  - cont care as per SICU procedure: Re-Opening of recent laparotomy, abdominal washout, enteroenterostomy, abdominal closure  POD 0    S: Pt seen and examined at bedside. Pt was extubated at 630pm. Nurse states patient was SOB, pt non-rebreather mask on. Pt states he feels ok, he's thankful to be alive and out of surgery. Denies any complaints cp, palpitations, pain, n/v, fever, chills.     O:  ICU Vital Signs Last 24 Hrs  T(C): 36 (24 Sep 2017 15:30), Max: 37.1 (24 Sep 2017 03:00)  T(F): 96.8 (24 Sep 2017 15:30), Max: 98.7 (24 Sep 2017 03:00)  HR: 83 (24 Sep 2017 19:00) (62 - 90)  BP: 117/76 (24 Sep 2017 16:30) (82/58 - 139/80)  BP(mean): 92 (24 Sep 2017 16:30) (64 - 104)  ABP: 174/81 (24 Sep 2017 19:00) (69/63 - 188/79)  ABP(mean): 112 (24 Sep 2017 19:00) (64 - 135)  RR: 32 (24 Sep 2017 19:00) (10 - 32)  SpO2: 96% (24 Sep 2017 19:00) (92% - 100%)    I&O's Detail    23 Sep 2017 07:01  -  24 Sep 2017 07:00  --------------------------------------------------------  IN:    dexmedetomidine Infusion: 96 mL    dextrose 5%: 125 mL    fentaNYL  Infusion: 112.8 mL    heparin Infusion: 110 mL    IV PiggyBack: 325 mL    Lactated Ringers IV Bolus: 1000 mL    lactated ringers.: 2750 mL    norepinephrine Infusion: 387.5 mL    Solution: 350 mL  Total IN: 5256.3 mL    OUT:    Accordian: 110 mL    Accordian: 85 mL    Indwelling Catheter - Urethral: 1225 mL    Nasoenteral Tube: 375 mL    VAC (Vacuum Assisted Closure) System: 650 mL  Total OUT: 2445 mL    Total NET: 2811.3 mL      24 Sep 2017 07:01  -  24 Sep 2017 19:27  --------------------------------------------------------  IN:    dexmedetomidine Infusion: 20 mL    dexmedetomidine Infusion: 6 mL    fentaNYL  Infusion: 20 mL    fentaNYL  Infusion: 6 mL    IV PiggyBack: 300 mL    lactated ringers.: 625 mL    lactated ringers.: 450 mL    norepinephrine Infusion: 9 mL  Total IN: 1436 mL    OUT:    Accordian: 60 mL    Accordian: 10 mL    Indwelling Catheter - Urethral: 640 mL    Nasoenteral Tube: 100 mL    VAC (Vacuum Assisted Closure) System: 100 mL  Total OUT: 910 mL    Total NET: 526 mL    Gen: NAD, A& O  Abd: softly distended, NT  dressings: serosangunious saturated -outlined with marker, intact.      Labs:                        9.9    14.3  )-----------( 89       ( 24 Sep 2017 16:07 )             30.1    09-24    142  |  109<H>  |  60<H>  ----------------------------<  120<H>  5.2   |  21<L>  |  1.18    Ca    7.4<L>      24 Sep 2017 16:14  Phos  4.7     09-24  Mg     3.8     09-24    TPro  4.7<L>  /  Alb  2.4<L>  /  TBili  1.0  /  DBili  x   /  AST  43<H>  /  ALT  19  /  AlkPhos  60  09-24    A/P: 62M POD 0 s/p   - as per Dr. Reaves, restart Hep gtt in 24-48 hours  - cont Vanco, Zosyn, Fluconazole  - pain control  - NPO/IVF  - monitor UOP  - monitor resp status  - AM labs  - cont care as per SICU

## 2017-09-24 NOTE — PROGRESS NOTE ADULT - ASSESSMENT
ASSESSMENT: 62yMale s/p OR for small bowel perforation, resection, left in discontinuity with abthera     Neurologic: precedex for sedation currently on pressors, Fentanyl for pain control, continue q-1 nuero checks, FU MRI official read, start to wean sedation to better appreciate neuro status  Respiratory: Continue with ventilator support for now, continue current settings.  SBT today   Cardiovascular: currently requiring levo, will wean as tolerated and continually assess need for pressors vs fluid resuscitation  Gastrointestinal/Nutrition: NPO for now, with protonix, abdominal exams, leave abthera in place, will follow up further plans for OR  Renal/Genitourinary: Torres,  will closely monitor Is&Os and trend creatinine and electrolytes  Hematologic: H&H stable, will obtain repeat labs and trend, will transfuse as needed  Infectious Disease: patient started on renally dosed vanc, zosyn, and diflucan for gisselle intra abdominal spillage, E. Faecium growing from peritoneal cultures, will FU sensitivities   Tubes/Lines/Drains: IJ, A-line, PIV, Torres  Disposition: SICU, Full code

## 2017-09-24 NOTE — PROGRESS NOTE ADULT - ASSESSMENT
ThisHPI: 62y male admitted to neurosurgery for bilateral leg weakness found to have newly diagnosed metastatic disease s/p T8-T11 laminectomy presented today with deterioration in respiratory status and acute onset abdominal pain who was subsequently intubated and w/u revealed b/l DVTs, right upper lobe PE, and a bowel perforation.  Pt is now s/p Exploratory laparotomy, SBR x1 (left in discontinuity), abdominal washout, abthera VAC application (9/22) with intraop findings of d a large amount of succus as well as lettuce in the abdomen along with a 1cm perforation in the small bowel 110 cm distal to the ligament of treitz.     -Continue excellent SICU care  -Fluid resuscitation  -Heparin gtt  -Vac to abdomen  -Fentanyl for analgesia  -Management as per CC team ThisHPI: 62y male admitted to neurosurgery for bilateral leg weakness found to have newly diagnosed metastatic disease s/p T8-T11 laminectomy presented today with deterioration in respiratory status and acute onset abdominal pain who was subsequently intubated and w/u revealed b/l DVTs, right upper lobe PE, and a bowel perforation.  Pt is now s/p Exploratory laparotomy #1, SBR x1 (left in discontinuity), abdominal washout, abthera VAC application (9/22) with intraop findings of d a large amount of succus as well as lettuce in the abdomen along with a 1cm perforation in the small bowel 110 cm distal to the ligament of treitz. )(/24/17 Exploratory OR #2 with abdominal irrigation and enterostomy. Right leg motor function being monitored by neurosurgery after emergency admission spinal cord decompression of metastatic tumor T8-!1. Still awaiting tissue type of metastatic lung cancer.    -Continues excellent SICU care  -Fluid resuscitation  -Heparin gtt stopped with right leg weakness  -Vac to abdomen  -Fentanyl for analgesia  -Management as per CC team

## 2017-09-24 NOTE — PROGRESS NOTE ADULT - SUBJECTIVE AND OBJECTIVE BOX
[facility] Surgery Progress Note     Subjective/24hour Events: Change in neuro exam with R hemiparesis c/f epidural hematoma; Heparin gtt dc'd; Peritoneal cx's growing E. faecium; MRI wet read with no signs of hematoma or cord compression    Vital Signs:  Vital Signs Last 24 Hrs  T(C): 36.3 (24 Sep 2017 07:00), Max: 37.2 (23 Sep 2017 19:00)  T(F): 97.3 (24 Sep 2017 07:00), Max: 98.9 (23 Sep 2017 19:00)  HR: 66 (24 Sep 2017 11:41) (62 - 96)  BP: 91/64 (24 Sep 2017 11:30) (82/58 - 139/80)  BP(mean): 73 (24 Sep 2017 11:30) (64 - 104)  RR: 20 (24 Sep 2017 11:30) (10 - 29)  SpO2: 100% (24 Sep 2017 11:41) (92% - 100%)    CAPILLARY BLOOD GLUCOSE          I&O's Detail    23 Sep 2017 07:01  -  24 Sep 2017 07:00  --------------------------------------------------------  IN:    dexmedetomidine Infusion: 96 mL    dextrose 5%: 125 mL    fentaNYL  Infusion: 112.8 mL    heparin Infusion: 110 mL    IV PiggyBack: 325 mL    Lactated Ringers IV Bolus: 1000 mL    lactated ringers.: 2750 mL    norepinephrine Infusion: 387.5 mL    Solution: 350 mL  Total IN: 5256.3 mL    OUT:    Accordian: 110 mL    Accordian: 85 mL    Indwelling Catheter - Urethral: 1225 mL    Nasoenteral Tube: 375 mL    VAC (Vacuum Assisted Closure) System: 650 mL  Total OUT: 2445 mL    Total NET: 2811.3 mL      24 Sep 2017 07:01  -  24 Sep 2017 13:49  --------------------------------------------------------  IN:    dexmedetomidine Infusion: 20 mL    fentaNYL  Infusion: 20 mL    IV PiggyBack: 50 mL    lactated ringers.: 625 mL    norepinephrine Infusion: 9 mL  Total IN: 724 mL    OUT:    Indwelling Catheter - Urethral: 255 mL  Total OUT: 255 mL    Total NET: 469 mL            MEDICATIONS  (STANDING):    MEDICATIONS  (PRN):        Physical Exam:  Gen: NAD  LS:  Card:  GI:  Ext:       Labs:    09-24    139  |  108  |  57<H>  ----------------------------<  126<H>  5.1   |  21<L>  |  1.23    Ca    7.5<L>      24 Sep 2017 05:05  Phos  3.9     09-24  Mg     3.6     09-24    TPro  4.5<L>  /  Alb  2.4<L>  /  TBili  0.8  /  DBili  0.5<H>  /  AST  35  /  ALT  18  /  AlkPhos  54  09-24    LIVER FUNCTIONS - ( 24 Sep 2017 05:05 )  Alb: 2.4 g/dL / Pro: 4.5 g/dL / ALK PHOS: 54 U/L / ALT: 18 U/L RC / AST: 35 U/L / GGT: x                                 8.7    15.9  )-----------( 110      ( 24 Sep 2017 05:05 )             26.0     PT/INR - ( 24 Sep 2017 05:05 )   PT: 14.5 sec;   INR: 1.32 ratio         PTT - ( 24 Sep 2017 05:05 )  PTT:27.1 sec          Imaging: [facility] Surgery Progress Note     Subjective/24hour Events: Change in neuro exam with R hemiparesis c/f epidural hematoma; Heparin gtt dc'd; Peritoneal cx's growing E. faecium; MRI prelim read with no signs of hematoma or cord compression    Vital Signs:  Vital Signs Last 24 Hrs  T(C): 36.3 (24 Sep 2017 07:00), Max: 37.2 (23 Sep 2017 19:00)  T(F): 97.3 (24 Sep 2017 07:00), Max: 98.9 (23 Sep 2017 19:00)  HR: 66 (24 Sep 2017 11:41) (62 - 96)  BP: 91/64 (24 Sep 2017 11:30) (82/58 - 139/80)  BP(mean): 73 (24 Sep 2017 11:30) (64 - 104)  RR: 20 (24 Sep 2017 11:30) (10 - 29)  SpO2: 100% (24 Sep 2017 11:41) (92% - 100%)    I&O's Detail    23 Sep 2017 07:01  -  24 Sep 2017 07:00  --------------------------------------------------------  IN:    dexmedetomidine Infusion: 96 mL    dextrose 5%: 125 mL    fentaNYL  Infusion: 112.8 mL    heparin Infusion: 110 mL    IV PiggyBack: 325 mL    Lactated Ringers IV Bolus: 1000 mL    lactated ringers.: 2750 mL    norepinephrine Infusion: 387.5 mL    Solution: 350 mL  Total IN: 5256.3 mL    OUT:    Accordian: 110 mL    Accordian: 85 mL    Indwelling Catheter - Urethral: 1225 mL    Nasoenteral Tube: 375 mL    VAC (Vacuum Assisted Closure) System: 650 mL  Total OUT: 2445 mL    Total NET: 2811.3 mL    24 Sep 2017 07:01  -  24 Sep 2017 13:49  --------------------------------------------------------  IN:    dexmedetomidine Infusion: 20 mL    fentaNYL  Infusion: 20 mL    IV PiggyBack: 50 mL    lactated ringers.: 625 mL    norepinephrine Infusion: 9 mL  Total IN: 724 mL    OUT:    Indwelling Catheter - Urethral: 255 mL  Total OUT: 255 mL    Total NET: 469 mL    Physical Exam:  Gen: NAD, intubated, following commands, off pressors  Card: RRR  GI: Athera wound vac in place  Ext: decreased strength in RLE    Labs:    09-24    139  |  108  |  57<H>  ----------------------------<  126<H>  5.1   |  21<L>  |  1.23    Ca    7.5<L>      24 Sep 2017 05:05  Phos  3.9     09-24  Mg     3.6     09-24    TPro  4.5<L>  /  Alb  2.4<L>  /  TBili  0.8  /  DBili  0.5<H>  /  AST  35  /  ALT  18  /  AlkPhos  54  09-24    LIVER FUNCTIONS - ( 24 Sep 2017 05:05 )  Alb: 2.4 g/dL / Pro: 4.5 g/dL / ALK PHOS: 54 U/L / ALT: 18 U/L RC / AST: 35 U/L / GGT: x                      8.7    15.9  )-----------( 110      ( 24 Sep 2017 05:05 )             26.0     PT/INR - ( 24 Sep 2017 05:05 )   PT: 14.5 sec;   INR: 1.32 ratio      PTT - ( 24 Sep 2017 05:05 )  PTT:27.1 sec

## 2017-09-24 NOTE — PROGRESS NOTE ADULT - SUBJECTIVE AND OBJECTIVE BOX
MRI reviewed, no obvious epidural hematoma visualized. F/U official read, and continue with q. 1 hour neuro checks, however no need for emergent OR from Neurosurgery at this time. Pending official MRI report, no absolute contraindication to anticoagulation for his PE.

## 2017-09-24 NOTE — BRIEF OPERATIVE NOTE - OPERATION/FINDINGS
Re-Opening of recent laparotomy, abdominal washout, enteroenterostomy, abdominal closure    There was minimal pus in the pus. Abdomen was copiously irrigated

## 2017-09-25 NOTE — DIETITIAN INITIAL EVALUATION ADULT. - NS AS NUTRI INTERV MEDICAL AND FOOD SUPPLEMENTS
Pending diet advancement, recommend add Ensure Enlive (provides 350cal, 20Gm protein per 8oz serving) bid to help meet increased nutrient needs/Commercial beverage

## 2017-09-25 NOTE — DIETITIAN INITIAL EVALUATION ADULT. - FACTORS AFF FOOD INTAKE
Pt NPO on Bipap. NGT to suction, 24-hour output = 160ml (as of 9/25), 375ml (as of 9/24). No BM noted in-house.

## 2017-09-25 NOTE — PROGRESS NOTE ADULT - PROBLEM SELECTOR PLAN 1
s/p thoracic spine surgery   continue significant weakness in the lower extremities  DVT and GI prophylaxis

## 2017-09-25 NOTE — PROGRESS NOTE ADULT - ASSESSMENT
ThisHPI: 62y male admitted to neurosurgery for bilateral leg weakness found to have newly diagnosed metastatic disease s/p T8-T11 laminectomy presented today with deterioration in respiratory status and acute onset abdominal pain who was subsequently intubated and w/u revealed b/l DVTs, right upper lobe PE, and a bowel perforation.  Pt is now s/p Exploratory laparotomy #1, SBR x1 (left in discontinuity), abdominal washout, abthera VAC application (9/22) with intraop findings of d a large amount of succus as well as lettuce in the abdomen along with a 1cm perforation in the small bowel 110 cm distal to the ligament of treitz. )(/24/17 Exploratory OR #2 with abdominal irrigation and enterostomy. Right leg motor function being monitored by neurosurgery after emergency admission spinal cord decompression of metastatic tumor T8-!1. Still awaiting tissue type of metastatic lung cancer.

## 2017-09-25 NOTE — PROGRESS NOTE ADULT - SUBJECTIVE AND OBJECTIVE BOX
Patient is a 62y old  Male who presented with a chief complaint of cord compression (19 Sep 2017 09:44)      INTERVAL HPI/OVERNIGHT EVENTS:  Prior events noted    Pt is now s/p Exploratory laparotomy 9/22/17, SBR x1 (left in discontinuity), abdominal washout, abthera VAC application (9/22) with intraop findings of a large amount of succus as well as lettuce in the abdomen along with a 1cm perforation in the small bowel 110 cm distal to the ligament of treitz.   -RTOR 9/24/17; ex-lap, washout, re-anastomosis, abdominal closure  -extubated late in the afternoon, in SICU with NGT in place    MEDICATIONS  (STANDING):  pantoprazole  Injectable 40 milliGRAM(s) IV Push daily  fluconAZOLE IVPB 200 milliGRAM(s) IV Intermittent every 24 hours  lactated ringers. 1000 milliLiter(s) (30 mL/Hr) IV Continuous <Continuous>  piperacillin/tazobactam IVPB. 3.375 Gram(s) IV Intermittent every 8 hours  vancomycin  IVPB 1000 milliGRAM(s) IV Intermittent every 12 hours  vancomycin  IVPB      ALBUTerol/ipratropium for Nebulization 3 milliLiter(s) Nebulizer every 6 hours  tiotropium 18 MICROgram(s) Capsule 1 Capsule(s) Inhalation daily  metoprolol Injectable 5 milliGRAM(s) IV Push every 4 hours  metolazone 5 milliGRAM(s) Oral once  furosemide   Injectable 20 milliGRAM(s) IV Push every 6 hours  acetylcysteine 20% Inhalation 3 milliLiter(s) Inhalation every 6 hours  heparin  Infusion 1200 Unit(s)/Hr (12 mL/Hr) IV Continuous <Continuous>    MEDICATIONS  (PRN):  HYDROmorphone  Injectable 0.5 milliGRAM(s) IV Push every 3 hours PRN Moderate Pain (4 - 6)  HYDROmorphone  Injectable 1 milliGRAM(s) IV Push every 3 hours PRN Severe Pain (7 - 10)      Allergies    No Known Allergies    Intolerances        Review of Systems:  Gen: no fever or chills  CV: Denies CP  Resp: episode of hypoxia, currently on Bipap  Unable to obtain further ROS as pt. is lethargic but responsive    Vital Signs Last 24 Hrs  T(C): 36.4 (25 Sep 2017 07:00), Max: 36.6 (24 Sep 2017 23:00)  T(F): 97.6 (25 Sep 2017 07:00), Max: 97.8 (24 Sep 2017 23:00)  HR: 91 (25 Sep 2017 08:03) (64 - 102)  BP: 160/84 (25 Sep 2017 07:00) (86/56 - 160/84)  BP(mean): 116 (25 Sep 2017 07:00) (67 - 116)  RR: 31 (25 Sep 2017 08:00) (10 - 31)  SpO2: 97% (25 Sep 2017 08:03) (91% - 100%)    PHYSICAL EXAM:  Constitutional: +NGT - green, bilious, on Bipap, drowsy but awake and responsive, occasionally confused  Neck: + left neck TLC  Respiratory: decreased BS bases  Cardiovascular: S1 and S2, regular  Gastrointestinal: distended, no BS appreciated incision with dressing, serosanguinous staining(marked) +michelle  Back:  +hemovac x 2 in place  Extremities: +edema b/l  Vascular: 2+ peripheral pulses  Neurological: lethargic, occ confuse but arousable  Skin: No rashes    LABS:                        10.4   22.6  )-----------( 85       ( 25 Sep 2017 02:28 )             31.1     Hemoglobin: 9.9 g/dL <L> [13.0 - 17.0] (09-24 @ 16:07)  Hemoglobin: 8.7 g/dL <L> [13.0 - 17.0] (09-24 @ 05:05)  Hemoglobin: 10.2 g/dL <L> [13.0 - 17.0] (09-23 @ 12:07)  Hemoglobin: 10.7 g/dL <L> [13.0 - 17.0] (09-23 @ 04:18)  Hemoglobin: 10.6 g/dL <L> [13.0 - 17.0] (09-22 @ 19:22)  Hemoglobin: 12.9 g/dL <L> [13.0 - 17.0] (09-22 @ 13:31)      09-25    143  |  108  |  60<H>  ----------------------------<  124<H>  5.2   |  21<L>  |  1.28    Ca    7.3<L>      25 Sep 2017 02:28  Phos  4.4     09-25  Mg     3.5     09-25    TPro  4.7<L>  /  Alb  2.4<L>  /  TBili  1.8<H>  /  DBili  1.4<H>  /  AST  69<H>  /  ALT  32  /  AlkPhos  77  09-25    PT/INR - ( 25 Sep 2017 02:28 )   PT: 15.8 sec;   INR: 1.44 ratio         PTT - ( 25 Sep 2017 02:28 )  PTT:24.4 sec    LIVER FUNCTIONS - ( 25 Sep 2017 02:28 )  Alb: 2.4 g/dL / Pro: 4.7 g/dL / ALK PHOS: 77 U/L / ALT: 32 U/L RC / AST: 69 U/L / GGT: x           Culture - Body Fluid with Gram Stain (09.23.17 @ 00:35)    Gram Stain:   polymorphonuclear leukocytes seen  Gram Positive Cocci in Pairs and Chains seen  Gram Variable Rods seen  by cytocentrifuge    -  Ampicillin: R >8    -  Ciprofloxacin: I 2    -  Erythromycin: I 4    -  Tetra/Doxy: R >8    -  Vancomycin: S 1    Specimen Source: Peritoneal 2 peritoneal fluid for culture    Culture Results:   Numerous Enterococcus faecium  Few Yeast like cells    Organism Identification: Enterococcus faecium    Organism: Enterococcus faecium    Method Type: [facility]        RADIOLOGY & ADDITIONAL TESTS:    EXAM:  CT ABDOMEN AND PELVIS OC IC                        EXAM:  CT ANGIO CHEST (W)AW IC       IMPRESSION:   Chest:  1.  Right upper lobe pulmonary embolism as described above.  2.  New patchy ground glass opacities in the right upper lobe may be due   to infection or or be inflammatory in origin.  3.  Increased consolidation in the left lower lobe with   occlusion/narrowing of the left lower lobe bronchus and segmental bronchi   is concerning for malignancy and postobstructive consolidation.  4.  Necrotic mediastinal left hilar lymphadenopathy are concerning for   metastasis  5.  Metastatic osseous disease.    Abdomen:  1.  Extraluminal air and enhancing ascites, compatible with perforation.  2.  Wall thickening of small bowel loops in the left hemiabdomen can be   secondary to infection or ischemia.  3.  Right renal vein thrombosis  4.  Ill-defined area of enhancement within the right kidney could be   secondary to malignancy or alternatively renal infarction.  5.  Metastatic hepatic disease.    < end of copied text >

## 2017-09-25 NOTE — PROGRESS NOTE ADULT - ASSESSMENT
62y male admitted to neurosurgery for bilateral leg weakness found to have newly diagnosed metastatic disease s/p T8-T11 laminectomy presented today with deterioration in respiratory status and acute onset abdominal pain who was subsequently intubated and w/u revealed b/l DVTs, right upper lobe PE, and a bowel perforation.  Pt is now s/p Exploratory laparotomy, SBR x1 (left in discontinuity), abdominal washout, abthera VAC application (9/22) with intraop findings of d a large amount of succus as well as lettuce in the abdomen along with a 1cm perforation in the small bowel 110 cm distal to the ligament of treitz. Pt with change in neuro status overnight night but imaging with no suggestion of epidural hematoma and cleared for RTOR today by neurosurgery.    -Continue acute care per SICU  -NPO/IVF  -Pain control  - DVT ppx: Hep gtt  -c/w IV abx

## 2017-09-25 NOTE — PROGRESS NOTE ADULT - ASSESSMENT
62M s/p OR for small bowel perforation, resection, left in discontinuity with abthera (9/22), s/p ex-lap, washout, re-anatomosis, abdominal closure (9/24). Now extubated, hypoxic 2/2 fluid overload on HFNC, diuresing.     Neurologic: post-operative pain  -dilaudid PRN    Respiratory: hypoxic respiratory failure 2/2 fluid overload  -con't HFNC, wean as tolerated  -diuresis w/ IV lasix  -duonebs  -albuterol  -spiriva    Cardiovascular: hypertensive post-extubation; no prior history  -metoprolol 5mg q4 w/ hold parameters    Gastrointestinal/Nutrition: awaiting return of bowel function  -NPO   -protonix,   -NGT to intermittent suction    Renal/Genitourinary: ALEC, resolving  -Torres  -LR @30/h in setting of volume overload and HTN    Hematologic: b/l DVT w/ b/l PE  -holding heparin gtt for 24h post-op in setting of recent surgery; restart per primary team attending    Infectious Disease: gisselle intra abdominal spillage, E. Faecium growing from peritoneal cultures (vanc sensitive)    Tubes/Lines/Drains:   -L-IJ,TLC (9/22)  -R rad A-line (9/24, placed in OR)  -Torres (9/19)  -NGT  -drains x2 (back, ortho)    Disposition: SICU, FULL Code        Please contact SICU resident [phone] with questions/concerns.

## 2017-09-25 NOTE — PROGRESS NOTE ADULT - ATTENDING COMMENTS
62 year old male s/p exploratory laparotomy for a small bowel perforation and his abdomen was closed yesterday.  Patient was diuresed overnight.  This morning he was hypoxic and appears to have worsening lung collapse.   Current management includes:    1) Hypoxemic respiratory failure- patient on Cpap in which the settings were just increased due to worsening CXR     CXR with white out of his left lung     -patient will likely need to be intubated for respiratory failure      -will repeat CXR     -patient diuresed this morning as well and that is to be continued     - will get pulmonary involved as patient has lung CA        2) Increasing leukocytosis today  - E faecalis and few yeast cells   -will continue zosyn, vancomycin and fluconazole    3) Right segmental and subsegmental right upper lobe- restarted heparin drip this morning  - 1200 units / hour  -PTT at 1500    4) Keep NPO with IVF at 30 cc/hour     Patient remains critically ill and will monitor his respiratory status very closely

## 2017-09-25 NOTE — CONSULT NOTE ADULT - PROBLEM SELECTOR RECOMMENDATION 2
with bilateral soleal DVT  -Provoked 2nd malignancy   -AC with heparin given ongoing surgical issues, recent spinal surgery  -Underlying coagulopathy 2nd liver disease   -Heme on board

## 2017-09-25 NOTE — CONSULT NOTE ADULT - SUBJECTIVE AND OBJECTIVE BOX
PULMONARY CONSULT      HPI: 62M with no significant PMH p/w worsening LBP associated with lower extremity  weakness for past 3 weeks. Over the weekend, he progressed to the point where he was unable to ambulate. He denies incontinence, saddle anesthesia, but complaining of significant weakness in legs, stating they "feel like jelly." He was seen at OSH and transferred to [facility] for spine evaluation. He denies fevers, history of cancer, trauma. (19 Sep 2017 09:44)      BRIEF HOSPITAL COURSE:     PAST MEDICAL & SURGICAL HISTORY:  found to have newly diagnosed metastatic disease s/p T8-T11 laminectomy 9/18, with deterioration in respiratory status and acute onset abdominal pain who was subsequently intubated and w/u revealed b/l DVTs, right upper lobe PE, and a bowel perforation.  Pt is now s/p Exploratory laparotomy, SBR x1 (left in discontinuity), abdominal washout, abthera VAC application (9/22) with intraop findings of a large amount of succus as well as lettuce in the abdomen along with a 1cm perforation in the small bowel 110 cm distal to the ligament of treitz. RTOR 9/24 for x-lap, washout, re-anastomosis, abdominal closure, extubated late in the afternoon. Now with noted L lung atelectasis. Patient is currently awake, on CPAP. Uncooperative with ROS    Allergies    No Known Allergies    Intolerances      FAMILY HISTORY: Non-contributory    Social history: Former Smoker    Review of Systems: Unable to obtain  CONSTITUTIONAL:   EYES:   ENMT:    NECK:   RESPIRATORY:   CARDIOVASCULAR:   GASTROINTESTINAL: .  GENITOURINARY:   NEUROLOGICAL:   SKIN:   MUSCULOSKELETAL:  PSYCHIATRIC:     Medications:  MEDICATIONS  (STANDING):  pantoprazole  Injectable 40 milliGRAM(s) IV Push daily  fluconAZOLE IVPB 200 milliGRAM(s) IV Intermittent every 24 hours  lactated ringers. 1000 milliLiter(s) (30 mL/Hr) IV Continuous <Continuous>  piperacillin/tazobactam IVPB. 3.375 Gram(s) IV Intermittent every 8 hours  vancomycin  IVPB 1000 milliGRAM(s) IV Intermittent every 12 hours  vancomycin  IVPB      ALBUTerol/ipratropium for Nebulization 3 milliLiter(s) Nebulizer every 6 hours  tiotropium 18 MICROgram(s) Capsule 1 Capsule(s) Inhalation daily  metoprolol Injectable 5 milliGRAM(s) IV Push every 4 hours  furosemide   Injectable 20 milliGRAM(s) IV Push every 6 hours  acetylcysteine 20% Inhalation 3 milliLiter(s) Inhalation every 6 hours  heparin  Infusion 1200 Unit(s)/Hr (12 mL/Hr) IV Continuous <Continuous>    MEDICATIONS  (PRN):  HYDROmorphone  Injectable 0.5 milliGRAM(s) IV Push every 3 hours PRN Moderate Pain (4 - 6)  HYDROmorphone  Injectable 1 milliGRAM(s) IV Push every 3 hours PRN Severe Pain (7 - 10)        Mode: CPAP with PS  FiO2: 40  PEEP: 5  PS: 5  MAP: 7  PIP: 12      Vital Signs Last 24 Hrs  T(C): 36.4 (25 Sep 2017 07:00), Max: 36.6 (24 Sep 2017 23:00)  T(F): 97.6 (25 Sep 2017 07:00), Max: 97.8 (24 Sep 2017 23:00)  HR: 91 (25 Sep 2017 13:47) (66 - 102)  BP: 160/84 (25 Sep 2017 07:00) (117/76 - 160/84)  BP(mean): 116 (25 Sep 2017 07:00) (92 - 116)  RR: 30 (25 Sep 2017 12:00) (14 - 31)  SpO2: 99% (25 Sep 2017 13:47) (85% - 100%) on CPAP 15    ABG - ( 25 Sep 2017 12:16 )  pH: 7.43  /  pCO2: 36    /  pO2: 85    / HCO3: 24    / Base Excess: x     /  SaO2: 97                VBG pH -- 09-24 @ 11:56  VBG pCO2 -- 09-24 @ 11:56  VBG O2 sat -- 09-24 @ 11:56  VBG lactate 1.4 09-24 @ 11:56  VBG pH 7.36 09-24 @ 07:23  VBG pCO2 37 09-24 @ 07:23  VBG O2 sat 68 09-24 @ 07:23  VBG lactate 1.4 09-24 @ 07:23        09-24 @ 07:01  -  09-25 @ 07:00  --------------------------------------------------------  IN: 2451 mL / OUT: 2860 mL / NET: -409 mL          LABS:                        10.4   22.6  )-----------( 85       ( 25 Sep 2017 02:28 )             31.1     09-25    143  |  108  |  60<H>  ----------------------------<  124<H>  5.2   |  21<L>  |  1.28    Ca    7.3<L>      25 Sep 2017 02:28  Phos  4.4     09-25  Mg     3.5     09-25    TPro  4.7<L>  /  Alb  2.4<L>  /  TBili  1.8<H>  /  DBili  1.4<H>  /  AST  69<H>  /  ALT  32  /  AlkPhos  77  09-25          CAPILLARY BLOOD GLUCOSE        PT/INR - ( 25 Sep 2017 02:28 )   PT: 15.8 sec;   INR: 1.44 ratio         PTT - ( 25 Sep 2017 02:28 )  PTT:24.4 sec    Procalcitonin, Serum: 11.97 ng/mL (25 Sep 2017 02:28)  Procalcitonin, Serum: 11.18 ng/mL (24 Sep 2017 15:52)    Serum Pro-Brain Natriuretic Peptide: 7042 pg/mL (25 Sep 2017 02:28)              CULTURES: (if applicable)        Physical Examination:    General: Tachypneic, mild resp distress    HEENT: Pupils equal, reactive to light.  Symmetric.    PULM: Absent BS L lung     CVS: S1, S2 RRR    ABD: distended, tender, drains noted, no BS    EXT: No edema, nontender    SKIN: Warm and well perfused, no rashes noted.    NEURO: Alert, oriented, interactive    RADIOLOGY REVIEWED  CXR: L lung atelectasis     CTA chest: < from: CT Angio Chest w/ IV Cont (09.22.17 @ 12:48) >  1.  Right upper lobe pulmonary embolism as described above.  2.  New patchy ground glass opacities in the right upper lobe may be due   to infection or or be inflammatory in origin.  3.  Increased consolidation in the left lower lobe with   occlusion/narrowing of the left lower lobe bronchus and segmental bronchi   is concerning for malignancy and postobstructive consolidation.  4.  Necrotic mediastinal left hilar lymphadenopathy are concerning for   metastasis  5.  Metastatic osseous disease.    < end of copied text >

## 2017-09-25 NOTE — DIETITIAN INITIAL EVALUATION ADULT. - NS AS NUTRI INTERV MEALS SNACK
Pending medical course and diet advancement, recommend clear liquids; advance as tolerated to Low Fiber diet/Fiber - modified diet

## 2017-09-25 NOTE — PROGRESS NOTE ADULT - ASSESSMENT
62M with no significant PMH presenting with spinal cord compression of thoracic spine s/p laminectomy with surgical decompression and biopsy of mass taken. Pan-CT concerning for possible lung primary. Suspicious lesions of liver and adrenal gland concerning for metastases. Hospital course complicated by bowel perforation, s/p OR for small bowel perforation, resection, left in discontinuity with abthera (9/22), s/p ex-lap, washout, re-anatomosis, abdominal closure (9/24). Now extubated, septic shock, and DVT/PE.

## 2017-09-25 NOTE — PROGRESS NOTE ADULT - SUBJECTIVE AND OBJECTIVE BOX
[facility] Surgery Progress Note     Subjective/24hour Events: RTOR yesterday mid-day; ex-lap, washout, re-anastomosis, abdominal closure. extubated late in the afternoon. hypertensive requiring hydralazine & metorpolol; decrease IVF to 30/h. hypoxic, requiring HFNC. 2x 20IV w/ vigorous urinary output; remains net + ~10L past 72hr in setting of sepsis. OR cultures: enterococcus faecium sensitive to vancomycin. started on vancomycin. Hep gtt started      Physical Exam:  Gen: NAD, extubated,, following commands, off pressors  Card: RRR  GI: Abthera wound vac in place  Ext: decreased strength in RLE    Labs:                          10.4   22.6  )-----------( 85       ( 25 Sep 2017 02:28 )             31.1     09-25    143  |  108  |  60<H>  ----------------------------<  124<H>  5.2   |  21<L>  |  1.28    Ca    7.3<L>      25 Sep 2017 02:28  Phos  4.4     09-25  Mg     3.5     09-25    TPro  4.7<L>  /  Alb  2.4<L>  /  TBili  1.8<H>  /  DBili  1.4<H>  /  AST  69<H>  /  ALT  32  /  AlkPhos  77  09-25

## 2017-09-25 NOTE — PROGRESS NOTE ADULT - PROBLEM SELECTOR PLAN 1
Biopsy of spinal mass pending. s/p laminectomy and spinal decompression. Pan imaging concerning for metastatic disease.  - awaiting biopsy results. If lung primary, will request molecular lung panel.  - continue dexamethasone, taper as per neurosurgery  - pt clinical state guarded due to acute complications, but on initial assessment after spinal surgery, appeared to be good treatment candidate depending on path results. Biopsy of spinal mass pending. s/p laminectomy and spinal decompression. Pan imaging concerning for metastatic disease.  - Biopsy consistent with Lung adenocarcinoma, will request molecular lung panel.  - continue dexamethasone, taper as per neurosurgery  - pt clinical state guarded due to acute complications, but on initial assessment after spinal surgery, appeared to be good treatment candidate depending on path results.

## 2017-09-25 NOTE — DIETITIAN INITIAL EVALUATION ADULT. - ETIOLOGY
inability to meet increased protein/energy needs in setting of small bowel resection and metastatic disease

## 2017-09-25 NOTE — CONSULT NOTE ADULT - ASSESSMENT
61 y/o M with no significant PMH presents 9/19 with bilateral LE weakness/paraplegia x several weeks. 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 CT evidence of L endobronchial lesion with LLL collapse, necrotic mediastinal/hilar LN, liver mets, ?adrenal mets. Hospital course c/b RUL segmental PE, bilateral soleal DVT. Further c/b SB perforation s/p SBR, primary anastomosis on 9/22, RTOR 9/24 for ex-lap, washout, re-anastomosis and abdominal closure. Abdominal cultures with E. Faecium. Extubated 9/24. Now with L lung atelectasis.

## 2017-09-25 NOTE — PROGRESS NOTE ADULT - ATTENDING COMMENTS
Pt seen and examined.  Chart reviewed.  Resident note confirmed.  Pt is a 62 year old male admitted to neurosurgery for bilateral leg weakness found to have newly diagnosed metastatic disease.  Pt is s/p T8-T11 laminectomy with subsequent deterioration in respiratory status/acute onset abdominal pain.  Work up revealed bilateral DVTs, right upper lobe PE, and small intestinal perforation.  Pt is s/p Exploratory laparotomy, SBR x1 (left in discontinuity), abdominal washout, abthera VAC application (9/22) with intraop findings of an intestinal perforation and a large amount of succus in the abdomen. Pt s/p return to OR on 9/24 for intestinal reconstruction and abdominal closure.  No acute events overnight.  Continue pain control.  Echocardiogram to evaluate right heart in setting of PE.  Left lung atelectasis.  Pulm service consulted for bronch.  Keep NPO and await return of bowel function.  Hold heparin x24hrs post closure per operating surgeon.  Continue supportive care.

## 2017-09-25 NOTE — PROGRESS NOTE ADULT - MINUTES
50 Ears: no ear pain and no hearing problems.Nose: no nasal congestion and no nasal drainage.Mouth/Throat: no dysphagia, no hoarseness and no throat pain.Neck: no lumps, no pain, no stiffness and no swollen glands.

## 2017-09-25 NOTE — DIETITIAN INITIAL EVALUATION ADULT. - ENERGY NEEDS
ht: 5 feet 11 inches, wt: 177 pounds, BMI: 24.6 Kg/m2, IBW:172 pounds (+/- 10%), 103% IBW. Edema: 2+ generalized; Skin: no pressure injuries.

## 2017-09-25 NOTE — PROGRESS NOTE ADULT - SUBJECTIVE AND OBJECTIVE BOX
62M admitted to neurosurgery for b/l leg weakness found to have newly diagnosed metastatic disease s/p T8-T11 laminectomy 9/18 presented today with deterioration in respiratory status and acute onset abdominal pain who was subsequently intubated and w/u revealed b/l DVTs, right upper lobe PE, and a bowel perforation.  Pt is now s/p Exploratory laparotomy, SBR x1 (left in discontinuity), abdominal washout, abthera VAC application (9/22) with intraop findings of a large amount of succus as well as lettuce in the abdomen along with a 1cm perforation in the small bowel 110 cm distal to the ligament of treitz.     24hr Events:  -RTOR yesterday mid-day; ex-lap, washout, re-anastomosis, abdominal closure  -extubated late in the afternoon  -hypertensive requiring hydralazine & metorpolol; decrease IVF to 30/h  -hypoxic, requiring HFNC  -2x 20IV w/ vigorous urinary output; remains net + ~10L past 72hr in setting of sepsis  -OR cultures: enterococcus faecium sensitive to vancomycin  -started on vancomycin 62M admitted to neurosurgery for b/l leg weakness found to have newly diagnosed metastatic disease s/p T8-T11 laminectomy 9/18 presented today with deterioration in respiratory status and acute onset abdominal pain who was subsequently intubated and w/u revealed b/l DVTs, right upper lobe PE, and a bowel perforation.  Pt is now s/p Exploratory laparotomy, SBR x1 (left in discontinuity), abdominal washout, abthera VAC application (9/22) with intraop findings of a large amount of succus as well as lettuce in the abdomen along with a 1cm perforation in the small bowel 110 cm distal to the ligament of treitz.     24hr Events:  -RTOR yesterday mid-day; ex-lap, washout, re-anastomosis, abdominal closure  -extubated late in the afternoon  -hypertensive requiring hydralazine & metorpolol; decrease IVF to 30/h  -hypoxic, requiring HFNC  -2x 20IV w/ vigorous urinary output; remains net + ~10L past 72hr in setting of sepsis  -OR cultures: enterococcus faecium sensitive to vancomycin  -started on vancomycin    SUBJECTIVE/ROS:  [ ] A ten-point review of systems was otherwise negative except as noted.  [x ] Due to altered mental status/intubation, subjective information were not able to be obtained from the patient. History was obtained, to the extent possible, from review of the chart and collateral sources of information.      NEURO  RASS:  0  Exam: lethargic; answers appropriately  Meds: HYDROmorphone  Injectable 0.5 milliGRAM(s) IV Push every 3 hours PRN Moderate Pain (4 - 6)  HYDROmorphone  Injectable 1 milliGRAM(s) IV Push every 3 hours PRN Severe Pain (7 - 10)    [x] Adequacy of sedation and pain control has been assessed and adjusted      RESPIRATORY  RR: 19 (09-25-17 @ 07:00) (10 - 27)  SpO2: 94% (09-25-17 @ 07:00) (92% - 100%)  Wt(kg): --  Exam: labored, on HFNC  Mechanical Ventilation: Mode: CPAP with PS, RR (patient): 20, FiO2: 40, PEEP: 5, PS: 5, MAP: 7, PIP: 12  ABG - ( 25 Sep 2017 02:24 )  pH: 7.35  /  pCO2: 42    /  pO2: 145   / HCO3: 22    / Base Excess: -2.4  /  SaO2: 99      Lactate: x            [n/a ] Extubation Readiness Assessed  Meds: ALBUTerol/ipratropium for Nebulization 3 milliLiter(s) Nebulizer every 6 hours  tiotropium 18 MICROgram(s) Capsule 1 Capsule(s) Inhalation daily  acetylcysteine 20% Inhalation 3 milliLiter(s) Inhalation every 6 hours        CARDIOVASCULAR  HR: 80 (09-25-17 @ 07:00) (64 - 102)  BP: 117/76 (09-24-17 @ 16:30) (86/56 - 125/75)  BP(mean): 92 (09-24-17 @ 16:30) (67 - 95)  ABP: 158/76 (09-25-17 @ 07:00) (106/51 - 188/79)  ABP(mean): 104 (09-25-17 @ 07:00) (65 - 135)  Wt(kg): --  CVP(cm H2O): --  VBG - ( 24 Sep 2017 11:56 )  pH: x     /  pCO2: x     /  pO2: x     / HCO3: x     / Base Excess: x     /  SaO2: x      Lactate: 1.4          Exam: regular  Cardiac Rhythm: sinus  Perfusion     [x ]Adequate   [ ]Inadequate  Mentation   [ ]Normal       [x ]Reduced  Extremities  [x ]Warm         [ ]Cool  Volume Status [total body ]Hypervolemic [ ]Euvolemic [ ]Hypovolemic  Meds: metoprolol Injectable 5 milliGRAM(s) IV Push every 4 hours  metolazone 5 milliGRAM(s) Oral once  furosemide   Injectable 20 milliGRAM(s) IV Push every 6 hours        GI/NUTRITION  Exam: incision w/ dressing in place, soft, ND  Diet: NPO; NGT to suction  Meds: pantoprazole  Injectable 40 milliGRAM(s) IV Push daily      GENITOURINARY  I&O's Detail    09-24 @ 07:01  -  09-25 @ 07:00  --------------------------------------------------------  IN:    dexmedetomidine Infusion: 20 mL    dexmedetomidine Infusion: 6 mL    fentaNYL  Infusion: 6 mL    fentaNYL  Infusion: 20 mL    IV PiggyBack: 500 mL    lactated ringers.: 625 mL    lactated ringers.: 715 mL    norepinephrine Infusion: 9 mL    Solution: 550 mL  Total IN: 2451 mL    OUT:    Accordian: 30 mL    Accordian: 110 mL    Indwelling Catheter - Urethral: 2460 mL    Nasoenteral Tube: 160 mL    VAC (Vacuum Assisted Closure) System: 100 mL  Total OUT: 2860 mL    Total NET: -409 mL          09-25    143  |  108  |  60<H>  ----------------------------<  124<H>  5.2   |  21<L>  |  1.28    Ca    7.3<L>      25 Sep 2017 02:28  Phos  4.4     09-25  Mg     3.5     09-25    TPro  4.7<L>  /  Alb  2.4<L>  /  TBili  1.8<H>  /  DBili  1.4<H>  /  AST  69<H>  /  ALT  32  /  AlkPhos  77  09-25    [x ] Torres catheter, indication: strict I&O  Meds: lactated ringers. 1000 milliLiter(s) IV Continuous <Continuous>        HEMATOLOGIC  Meds: holding heparin gtt post-op  [x] VTE Prophylaxis                        10.4   22.6  )-----------( 85       ( 25 Sep 2017 02:28 )             31.1     PT/INR - ( 25 Sep 2017 02:28 )   PT: 15.8 sec;   INR: 1.44 ratio         PTT - ( 25 Sep 2017 02:28 )  PTT:24.4 sec  Transfusion: none past-24h     [ ] PRBC   [ ] Platelets   [ ] FFP   [ ] Cryoprecipitate      INFECTIOUS DISEASES  T(C): 36.5 (09-25-17 @ 03:00), Max: 36.6 (09-24-17 @ 23:00)  Wt(kg): --  WBC Count: 22.6 K/uL (09-25 @ 02:28)  WBC Count: 14.3 K/uL (09-24 @ 16:07)    Recent Cultures:     Culture - Body Fluid with Gram Stain (09.23.17 @ 00:35)    Gram Stain:   polymorphonuclear leukocytes seen  Gram Positive Cocci in Pairs and Chains seen  Gram Variable Rods seen  by cytocentrifuge    -  Ampicillin: R >8    -  Ciprofloxacin: I 2    -  Erythromycin: I 4    -  Tetra/Doxy: R >8    -  Vancomycin: S 1    Specimen Source: Peritoneal 2 peritoneal fluid for culture    Culture Results:   Numerous Enterococcus faecium  Few Yeast like cells    Organism Identification: Enterococcus faecium    Organism: Enterococcus faecium    Method Type: CARLY        Meds: fluconAZOLE IVPB 200 milliGRAM(s) IV Intermittent every 24 hours  piperacillin/tazobactam IVPB. 3.375 Gram(s) IV Intermittent every 8 hours  vancomycin  IVPB 1000 milliGRAM(s) IV Intermittent every 12 hours  vancomycin  IVPB            ENDOCRINE  Capillary Blood Glucose    Meds: none      ACCESS DEVICES:  [ ] Peripheral IV  [x ] Central Venous Line	[ ] R	[x ] L	[x ] IJ	[ ] Fem	[ ] SC	Placed: 9/22/17  [x ] Arterial Line		[x ] R	[ ] L	[ ] Fem	[x ] Rad	[ ] Ax	Placed: 9/24/17  [ ] PICC:					[ ] Mediport  [x ] Urinary Catheter, Date Placed: 9/19/17  [x ] Necessity of urinary, arterial, and venous catheters discussed    OTHER MEDICATIONS: none      CODE STATUS: FULL    IMAGING: < from: CT Angio Chest w/ IV Cont (09.22.17 @ 12:48) >  09/22/2017            INTERPRETATION:  INDICATION: 62-year-old man with metastatic disease,   cord compression, now with abdominal tenderness, distention, ileus.   Hypoxia, hypotensive, postoperative T8-T11 laminectomy and compression of   tumor on 9/20/2017. Evaluate for pulmonary embolism.    TECHNIQUE: CT Angiography of the Chest was performed followed by portal   venous phase imaging of the Abdomen and Pelvis. Coronal and sagittal   images were reconstructed.  Images were obtained after the uneventful   administration of 90 cc of nonionic intravenous contrast (Omnipaque 350).    10 cc of nonionic intravenous contrast (Omnipaque 350) was discarded.   Maximum intensity projection images were generated.    COMPARISON: CT chest, abdomen, pelvis from 9/19/2017.    FINDINGS:     CHEST:    Pulmonary Artery: There are pulmonary emboli involving the segmentaland   subsegmental branches of the right upper lobe. There is narrowing of the   left upper lobar pulmonary artery and its branches and severe   narrowing/occlusion of the lingular and left lower lobe pulmonary   arteries and left inferior pulmonary vein due to surrounding mediastinal   adenopathy.    Tubes/Lines: Endotracheal tube tip is above the judson. Enteric tube tip   terminates in the stomach. Right IJ approach central venous catheter tip   terminates in the SVC.    Lungs And Airways: Theleft mainstem bronchus and lower lobe segmental   bronchi are occluded/obstructed centrally, a finding which is unchanged.   There is near complete consolidation of the left lower lobe, increased   from prior study.     There are patchy groundglass opacities in the right upper lobe, new from   prior study. Subsegmental atelectasis in the right lower lobe.     Pleura: No pneumothorax.  Small right pleural effusion.    Mediastinum: Necrotic mediastinal left hilar adenopathy. A reference   right paratracheal node measures up to 2.5 cm in short axis, unchanged.   In addition, there are nonenlarged left supraclavicular lymph nodes and   enlarged upper and lower paratracheal, prevascular, AP window and   subcarinal lymph nodes. The visualized portion of the thyroid gland is   unremarkable.   The esophagus is unremarkable.    Heart and Vasculature: The heart is normal in size. There is no   pericardial effusion. Left-sided aortic arch and left-sided descending   thoracic aorta. No aneurysm.      ABDOMEN AND PELVIS:    LIVER: Multiple hepatic metastasis, the largest in the left lobe   measuring 4.3 x 4.2 cm, unchanged allowing for differences in technique.  BILE DUCTS: Normal caliber.  GALLBLADDER: Within normal limits.  SPLEEN: Within normallimits.  PANCREAS: Within normal limits.  ADRENALS: Indeterminant 1.2 cm left adrenal nodule.  KIDNEYS/URETERS: Centered in the midpole the right kidney is an   ill-defined, heterogeneously enhancing right renal mass which measures   2.1 x 2.4 cm andis unchanged. There is thrombus in the proximal right   renal vein.    There are additional hypodense bilateral renal lesions are too small to   characterize. The remainder of the kidneys are unremarkable. No   collecting system dilatation.    BLADDER: Collapsed around Torres catheter.  REPRODUCTIVE ORGANS: The prostate is within normal limits.    BOWEL: The stomach is distended. There are several loops of small bowel   (likely jejunum) in the left hemiabdomen with wall thickening and   infiltration of the surrounding fat. Sigmoid diverticulosis. No bowel   obstruction. Appendix is not identified.  PERITONEUM: There is extraluminal air in the abdomen and pelvis. Moderate   volume ascites with enhancement of the peritoneal fluid in the pelvis.  VESSELS:  The aorta is normal in caliber. The major branches are   proximally patent. Atherosclerotic disease. Right renal vein thrombosis.   The IVC is flattened suggesting low intravascular volume status.  RETROPERITONEUM: There are enlarged, likely necrotic aortocaval lymph   nodes which are unchanged. The index lymph node measures 1.6 x 1.3 cm   (series 2 image 161).  ABDOMINAL WALL: Within normal limits.  BONES: Status post right shoulder replacement. Status post T8-T11   laminectomy with associated postoperative changes and a surgical drain in   place. Lytic lesion involving the T10 vertebral body, unchanged.    IMPRESSION:   Chest:  1.  Right upper lobe pulmonary embolism as described above.  2.  New patchy ground glass opacities in the right upper lobe may be due   to infection or or be inflammatory in origin.  3.  Increased consolidation in the left lower lobe with   occlusion/narrowing of the left lower lobe bronchus and segmental bronchi   is concerning for malignancy and postobstructive consolidation.  4.  Necrotic mediastinal left hilar lymphadenopathy are concerning for   metastasis  5.  Metastatic osseous disease.    Abdomen:  1.  Extraluminal air and enhancing ascites, compatible with perforation.  2.  Wall thickening of small bowel loops in the left hemiabdomen can be   secondary to infection or ischemia.  3.  Right renal vein thrombosis  4.  Ill-defined area of enhancement within the right kidney could be   secondary to malignancy or alternatively renal infarction.  5.  Metastatic hepatic disease.    < end of copied text >

## 2017-09-25 NOTE — PROGRESS NOTE ADULT - ASSESSMENT
62M with no significant PMH presenting with spinal cord compression of thoracic spine s/p laminectomy with surgical decompression and biopsy of mass taken. Pan-CT concerning for possible lung primary. Suspicious lesions of liver and adrenal gland concerning for metastases.   Now s/p Laminectomy and decompression POD  Abdominal Pain and distension - likely due to ileus  AXR - 9/21 c/w ileus, (stool and contrast in Right colon)  s/p RRT due to respiratory distress 9/22 am- s/p intubation, sedation, pressor support and new ALEC with hyperkalemia  CT 9/22 - SB perforation and PE  s/p Exploratory laparotomy 9/22/17, SBR x1 (left in discontinuity), abdominal washout, abthera VAC application (9/22)  RTOR 9/24/17; ex-lap, washout, re-anastomosis, abdominal closure    PLAN  NPO, NGT  IV PPI  Post-op care per SICU  Await GI function  On IV Anti-microbials - Zosyn, Vanco, Diflucan (E. faecium in peritoneal culture)  AC per Hem/Onc (PE)  Monitor Hgb (currently stable      Bandar Baker PA-C    [facility]  [phone]

## 2017-09-25 NOTE — DIETITIAN INITIAL EVALUATION ADULT. - OTHER INFO
Nutrition Assessment warranted for length of stay in SICU. Pt with newly diagnosed metastatic disease s/p T8-T11 laminectomy 9/18; now S/P OR for small bowel perforation, resection, left in discontinuity with abthera (9/22), S/P ex-lap, washout, re-anatomosis, abdominal closure (9/24). Now extubated, hypoxic 2/2 fluid overload on HFNC, diuresing.

## 2017-09-25 NOTE — PROGRESS NOTE ADULT - SUBJECTIVE AND OBJECTIVE BOX
Patient is a 62y old  Male who presents with a chief complaint of cord compression (19 Sep 2017 09:44)        MEDICATIONS  (STANDING):  pantoprazole  Injectable 40 milliGRAM(s) IV Push daily  fluconAZOLE IVPB 200 milliGRAM(s) IV Intermittent every 24 hours  lactated ringers. 1000 milliLiter(s) (50 mL/Hr) IV Continuous <Continuous>  piperacillin/tazobactam IVPB. 3.375 Gram(s) IV Intermittent every 8 hours  vancomycin  IVPB 1000 milliGRAM(s) IV Intermittent every 12 hours  vancomycin  IVPB      ALBUTerol/ipratropium for Nebulization 3 milliLiter(s) Nebulizer every 6 hours  metoprolol Injectable 5 milliGRAM(s) IV Push every 4 hours  acetylcysteine 20% Inhalation 3 milliLiter(s) Inhalation every 6 hours  heparin  Infusion 1200 Unit(s)/Hr (14 mL/Hr) IV Continuous <Continuous>    MEDICATIONS  (PRN):  HYDROmorphone  Injectable 0.5 milliGRAM(s) IV Push every 3 hours PRN Moderate Pain (4 - 6)    ROS:  On Bipap and unable      VITALS:   T(C): 36.7 (09-25-17 @ 23:00), Max: 36.8 (09-25-17 @ 11:00)  HR: 69 (09-25-17 @ 23:00) (68 - 102)  BP: 126/81 (09-25-17 @ 19:00) (126/81 - 160/84)  RR: 13 (09-25-17 @ 23:00) (13 - 32)  SpO2: 100% (09-25-17 @ 23:00) (85% - 100%)  Wt(kg): --      PHYSICAL EXAM:  Gen: Intubated, sedated  Neuro:  Sedated, pupils pinpoint on medications.  Right leg weakness is apparent and related to spinal cord compression by metastatic lung cancer. Patient appears to be awake and cooperative but neurologic exam at present is unreliable with intubation and necessary sedation.  CV: S1S2, r/r/r, (-)m/r/g  Pulm: Rhonchi to anterior chest wall, wheezing appreciated  GI: abd VAC with good suction; draining moderate amount of serous-sanginous fluid; hypoactive bowel sounds  Ext: 2+ pulses to b/l UE's, (+)signal to b/L DP; Cap refill < 3secs    LABS:  ABG - ( 25 Sep 2017 12:16 )  pH: 7.43  /  pCO2: 36    /  pO2: 85    / HCO3: 24    / Base Excess: x     /  SaO2: 97                    CBC Full  -  ( 25 Sep 2017 15:48 )  WBC Count : 31.5 K/uL  Hemoglobin : 11.0 g/dL  Hematocrit : 33.6 %  Platelet Count - Automated : 83 K/uL  Mean Cell Volume : 94.6 fl  Mean Cell Hemoglobin : 30.9 pg  Mean Cell Hemoglobin Concentration : 32.6 gm/dL  Auto Neutrophil # : x  Auto Lymphocyte # : x  Auto Monocyte # : x  Auto Eosinophil # : x  Auto Basophil # : x  Auto Neutrophil % : x  Auto Lymphocyte % : x  Auto Monocyte % : x  Auto Eosinophil % : x  Auto Basophil % : x    09-25    143  |  108  |  60<H>  ----------------------------<  124<H>  5.2   |  21<L>  |  1.28    Ca    7.3<L>      25 Sep 2017 02:28  Phos  4.4     09-25  Mg     3.5     09-25    TPro  4.7<L>  /  Alb  2.4<L>  /  TBili  1.8<H>  /  DBili  1.4<H>  /  AST  69<H>  /  ALT  32  /  AlkPhos  77  09-25    LIVER FUNCTIONS - ( 25 Sep 2017 02:28 )  Alb: 2.4 g/dL / Pro: 4.7 g/dL / ALK PHOS: 77 U/L / ALT: 32 U/L RC / AST: 69 U/L / GGT: x           PT/INR - ( 25 Sep 2017 02:28 )   PT: 15.8 sec;   INR: 1.44 ratio         PTT - ( 25 Sep 2017 21:58 )  PTT:51.0 sec    CAPILLARY BLOOD GLUCOSE          RADIOLOGY & ADDITIONAL TESTS:

## 2017-09-26 NOTE — PROGRESS NOTE ADULT - ASSESSMENT
62y male admitted to neurosurgery for bilateral leg weakness found to have newly diagnosed metastatic disease s/p T8-T11 laminectomy presented today with deterioration in respiratory status and acute onset abdominal pain who was subsequently intubated and w/u revealed b/l DVTs, right upper lobe PE, and a bowel perforation.  Pt is now s/p Exploratory laparotomy, SBR x1 (left in discontinuity), abdominal washout, abthera VAC application (9/22) with intraop findings of d a large amount of succus as well as lettuce in the abdomen along with a 1cm perforation in the small bowel 110 cm distal to the ligament of treitz. RTOR 9/24 for primary closure.   - CXR: left pleural effusion, LLL pneumonia and atelectasis  - Wean 02 as tolerated  -NPO/IVF  -Pain control  - DVT ppx: Hep gtt  - c/w IV abx  - Continue acute care per SICU

## 2017-09-26 NOTE — PROGRESS NOTE ADULT - SUBJECTIVE AND OBJECTIVE BOX
24 hr events: Pt had left lung white out on CXR in AM. He was placed on CPAP and then switched to BiPAP, then back to CPAP which he remained on overnight. He was placed on KVO fluids and given Lasix. HISTORY  62 year old male admitted to neurosurgery for b/l leg weakness found to have newly diagnosed metastatic disease with spinal met causing spinal cord compression s/p T8-T11 laminectomy on 9/20, post-op course complicated by deterioration in respiratory status and acute onset abdominal pain requiring intubation and transfer to NSCU. CT chest/abdomen/pelvis demonstrated b/l DVTs, right upper lobe PE, and a bowel perforation with free air. Pt was taken emergently to the OR and is now s/p exploratory laparotomy, SBR x1 (left in discontinuity), abdominal washout, abthera VAC application on 9/22, after which he was transferred to SICU under ACS. He was taken back to the OR on 9/24 for a washout, primary anastomosis, and abdominal closure.     24 HOUR EVENTS: Pt had left lung white out on CXR in AM. He was placed on CPAP and then switched to BiPAP, then back to CPAP which he remained on overnight. He was placed on KVO fluids and given Lasix.      SUBJECTIVE/ROS: Pt reports back pain.  [ x] A ten-point review of systems was otherwise negative except as noted.  [ ] Due to altered mental status/intubation, subjective information were not able to be obtained from the patient. History was obtained, to the extent possible, from review of the chart and collateral sources of information.      NEURO  RASS: 0      Exam: lethargic but arousable, following commands, oriented   Meds: HYDROmorphone  Injectable 0.5 milliGRAM(s) IV Push every 3 hours PRN Moderate Pain (4 - 6)    [x] Adequacy of sedation and pain control has been assessed and adjusted      RESPIRATORY  RR: 18 (09-26-17 @ 04:00) (13 - 32)  SpO2: 100% (09-26-17 @ 04:00) (85% - 100%)    Exam: clear to auscultation bilaterally on CPAP  Mechanical Ventilation:  CPAP  ABG - ( 26 Sep 2017 01:04 )  pH: 7.42  /  pCO2: 41    /  pO2: 223   / HCO3: 26    / Base Excess: 1.8   /  SaO2: 100     Blood Gas Arterial, Lactate: 1.4 (09.26.17 @ 01:04)    [n/a ] Extubation Readiness Assessed  Meds: ALBUTerol/ipratropium for Nebulization 3 milliLiter(s) Nebulizer every 6 hours  acetylcysteine 20% Inhalation 3 milliLiter(s) Inhalation every 6 hours      CARDIOVASCULAR  HR: 74 (09-26-17 @ 04:00) (65 - 100)  BP: 126/81 (09-25-17 @ 19:00) (126/81 - 160/84)  BP(mean): 99 (09-25-17 @ 19:00) (99 - 116)  ABP: 162/89 (09-26-17 @ 04:00) (119/67 - 189/88)  ABP(mean): 117 (09-26-17 @ 04:00) (84 - 123)    VBG - ( 24 Sep 2017 11:56 )  pH: x     /  pCO2: x     /  pO2: x     / HCO3: x     / Base Excess: x     /  SaO2: x      Lactate: 1.4      Exam: regular rate and rhythm  Cardiac Rhythm: sinus rhythm  Perfusion     [x ]Adequate   [ ]Inadequate  Mentation   [x ]Normal       [ ]Reduced  Extremities  [x ]Warm         [ ]Cool  Volume Status [ ]Hypervolemic [ x]Euvolemic [ ]Hypovolemic  Meds: metoprolol Injectable 5 milliGRAM(s) IV Push every 4 hours        GI/NUTRITION  Exam: distended, nontender, midline dressing with minimal sanguinous staining, otherwise dry and intact  Diet: NPO  Meds: pantoprazole  Injectable 40 milliGRAM(s) IV Push daily      GENITOURINARY  I&O's Detail    09-24 @ 07:01  -  09-25 @ 07:00  --------------------------------------------------------  IN:    dexmedetomidine Infusion: 20 mL    dexmedetomidine Infusion: 6 mL    fentaNYL  Infusion: 6 mL    fentaNYL  Infusion: 20 mL    IV PiggyBack: 500 mL    lactated ringers.: 625 mL    lactated ringers.: 715 mL    norepinephrine Infusion: 9 mL    Solution: 550 mL  Total IN: 2451 mL    OUT:    Accordian: 30 mL    Accordian: 110 mL    Indwelling Catheter - Urethral: 2460 mL    Nasoenteral Tube: 160 mL    VAC (Vacuum Assisted Closure) System: 100 mL  Total OUT: 2860 mL    Total NET: -409 mL      09-25 @ 07:01  -  09-26 @ 04:24  --------------------------------------------------------  IN:    heparin Infusion: 246 mL    IV PiggyBack: 250 mL    lactated ringers.: 750 mL    Solution: 200 mL  Total IN: 1446 mL    OUT:    Accordian: 30 mL    Accordian: 50 mL    Indwelling Catheter - Urethral: 5510 mL    Nasoenteral Tube: 150 mL  Total OUT: 5740 mL    Total NET: -4294 mL          09-26    141  |  104  |  58<H>  ----------------------------<  109<H>  5.2   |  25  |  1.24    Ca    8.3<L>      26 Sep 2017 01:09  Phos  4.7     09-26  Mg     3.5     09-26    TPro  5.3<L>  /  Alb  2.6<L>  /  TBili  3.0<H>  /  DBili  2.2<H>  /  AST  77<H>  /  ALT  41  /  AlkPhos  100  09-26    [ x] Torres catheter, indication: urine output monitoring in critically ill  Meds: lactated ringers. 1000 milliLiter(s) IV Continuous <Continuous>        HEMATOLOGIC  Meds: heparin  Infusion 1200 Unit(s)/Hr IV Continuous <Continuous>    [x] VTE Prophylaxis                        10.5   31.1  )-----------( 88       ( 26 Sep 2017 01:08 )             31.6     PT/INR - ( 26 Sep 2017 01:08 )   PT: 14.9 sec;   INR: 1.37 ratio         PTT - ( 26 Sep 2017 03:51 )  PTT:63.3 sec  Transfusion: none     [ ] PRBC   [ ] Platelets   [ ] FFP   [ ] Cryoprecipitate      INFECTIOUS DISEASES  T(C): 36.3 (09-26-17 @ 03:00), Max: 36.8 (09-25-17 @ 11:00)    WBC Count: 31.1 K/uL (09-26 @ 01:08)  WBC Count: 31.5 K/uL (09-25 @ 15:48)    Recent Cultures: Culture - Body Fluid with Gram Stain (09.23.17 @ 00:35)    Gram Stain: polymorphonuclear leukocytes seen  Gram Positive Cocci in Pairs and Chains seen  Gram Variable Rods seen  by cytocentrifuge    -  Ampicillin: R >8    -  Ciprofloxacin: I 2    -  Erythromycin: I 4    -  Tetra/Doxy: R >8    -  Vancomycin: S 1    Specimen Source: Peritoneal 2 peritoneal fluid for culture    Culture Results:   Numerous Enterococcus faecium  Few Candida albicans  Growth in fluid media only Gram Negative Rods    Organism Identification: Enterococcus faecium    Organism: Enterococcus faecium    Method Type: CARLY    Culture - Blood (09.22.17 @ 14:58)    Specimen Source: .Blood Blood-Venous    Culture Results: No growth to date.      Meds: fluconAZOLE IVPB 200 milliGRAM(s) IV Intermittent every 24 hours  piperacillin/tazobactam IVPB. 3.375 Gram(s) IV Intermittent every 8 hours  vancomycin  IVPB 1000 milliGRAM(s) IV Intermittent every 12 hours  vancomycin  IVPB          ENDOCRINE  Capillary Blood Glucose:WNL    Meds: x      ACCESS DEVICES:  [x ] Peripheral IV  [x ] Central Venous Line	[ ] R	[ x] L	[x ] IJ	[ ] Fem	[ ] SC	Placed:   [x ] Arterial Line		[x ] R	[ ] L	[ ] Fem	[ x] Rad	[ ] Ax	Placed:   [ ] PICC:					[ ] Mediport  [x ] Urinary Catheter, Date Placed:   [ x] Necessity of urinary, arterial, and venous catheters discussed    OTHER MEDICATIONS: x      CODE STATUS: Full code    IMAGING: < from: Xray Chest 1 View AP -PORTABLE-Routine (09.25.17 @ 12:08) >  Findings: Enteric tube and left internal jugular vein central venous   catheter persist unchanged. The right lung is clear. There is persistent   complete opacification of the left hemithorax.    Impression: Persistent complete opacification of the left hemithorax.    < end of copied text > HISTORY  62 year old male admitted to neurosurgery for b/l leg weakness found to have newly diagnosed metastatic disease with spinal met causing spinal cord compression s/p T8-T11 laminectomy on 9/20, post-op course complicated by deterioration in respiratory status and acute onset abdominal pain requiring intubation and transfer to NSCU. CT chest/abdomen/pelvis demonstrated b/l DVTs, right upper lobe PE, and a bowel perforation with free air. Pt was taken emergently to the OR and is now s/p exploratory laparotomy, SBR x1 (left in discontinuity), abdominal washout, abthera VAC application on 9/22, after which he was transferred to SICU under ACS. He was taken back to the OR on 9/24 for a washout, primary anastomosis, and abdominal closure.     24 HOUR EVENTS: Pt had left lung white out on CXR in AM. He was placed on CPAP and then switched to BiPAP, then back to CPAP which he remained on overnight. He was placed on KVO fluids and given Lasix. Overnight, pt complained of right hand numbness and tingling and his right radial arterial line was D/Carlos A.      SUBJECTIVE/ROS: Pt reports back pain.  [ x] A ten-point review of systems was otherwise negative except as noted.  [ ] Due to altered mental status/intubation, subjective information were not able to be obtained from the patient. History was obtained, to the extent possible, from review of the chart and collateral sources of information.      NEURO  RASS: 0      Exam: lethargic but arousable, following commands, oriented   Meds: HYDROmorphone  Injectable 0.5 milliGRAM(s) IV Push every 3 hours PRN Moderate Pain (4 - 6)    [x] Adequacy of sedation and pain control has been assessed and adjusted      RESPIRATORY  RR: 18 (09-26-17 @ 04:00) (13 - 32)  SpO2: 100% (09-26-17 @ 04:00) (85% - 100%)    Exam: clear to auscultation bilaterally on CPAP  Mechanical Ventilation:  CPAP  ABG - ( 26 Sep 2017 01:04 )  pH: 7.42  /  pCO2: 41    /  pO2: 223   / HCO3: 26    / Base Excess: 1.8   /  SaO2: 100     Blood Gas Arterial, Lactate: 1.4 (09.26.17 @ 01:04)    [n/a ] Extubation Readiness Assessed  Meds: ALBUTerol/ipratropium for Nebulization 3 milliLiter(s) Nebulizer every 6 hours  acetylcysteine 20% Inhalation 3 milliLiter(s) Inhalation every 6 hours      CARDIOVASCULAR  HR: 74 (09-26-17 @ 04:00) (65 - 100)  BP: 126/81 (09-25-17 @ 19:00) (126/81 - 160/84)  BP(mean): 99 (09-25-17 @ 19:00) (99 - 116)  ABP: 162/89 (09-26-17 @ 04:00) (119/67 - 189/88)  ABP(mean): 117 (09-26-17 @ 04:00) (84 - 123)    VBG - ( 24 Sep 2017 11:56 )  pH: x     /  pCO2: x     /  pO2: x     / HCO3: x     / Base Excess: x     /  SaO2: x      Lactate: 1.4      Exam: regular rate and rhythm  Cardiac Rhythm: sinus rhythm  Perfusion     [x ]Adequate   [ ]Inadequate  Mentation   [x ]Normal       [ ]Reduced  Extremities  [x ]Warm         [ ]Cool  Volume Status [ ]Hypervolemic [ x]Euvolemic [ ]Hypovolemic  Meds: metoprolol Injectable 5 milliGRAM(s) IV Push every 4 hours        GI/NUTRITION  Exam: distended, nontender, midline dressing with minimal sanguinous staining, otherwise dry and intact  Diet: NPO  Meds: pantoprazole  Injectable 40 milliGRAM(s) IV Push daily      GENITOURINARY  I&O's Detail    09-24 @ 07:01  -  09-25 @ 07:00  --------------------------------------------------------  IN:    dexmedetomidine Infusion: 20 mL    dexmedetomidine Infusion: 6 mL    fentaNYL  Infusion: 6 mL    fentaNYL  Infusion: 20 mL    IV PiggyBack: 500 mL    lactated ringers.: 625 mL    lactated ringers.: 715 mL    norepinephrine Infusion: 9 mL    Solution: 550 mL  Total IN: 2451 mL    OUT:    Accordian: 30 mL    Accordian: 110 mL    Indwelling Catheter - Urethral: 2460 mL    Nasoenteral Tube: 160 mL    VAC (Vacuum Assisted Closure) System: 100 mL  Total OUT: 2860 mL    Total NET: -409 mL      09-25 @ 07:01  -  09-26 @ 04:24  --------------------------------------------------------  IN:    heparin Infusion: 246 mL    IV PiggyBack: 250 mL    lactated ringers.: 750 mL    Solution: 200 mL  Total IN: 1446 mL    OUT:    Accordian: 30 mL    Accordian: 50 mL    Indwelling Catheter - Urethral: 5510 mL    Nasoenteral Tube: 150 mL  Total OUT: 5740 mL    Total NET: -4294 mL          09-26    141  |  104  |  58<H>  ----------------------------<  109<H>  5.2   |  25  |  1.24    Ca    8.3<L>      26 Sep 2017 01:09  Phos  4.7     09-26  Mg     3.5     09-26    TPro  5.3<L>  /  Alb  2.6<L>  /  TBili  3.0<H>  /  DBili  2.2<H>  /  AST  77<H>  /  ALT  41  /  AlkPhos  100  09-26    [ x] Torres catheter, indication: urine output monitoring in critically ill  Meds: lactated ringers. 1000 milliLiter(s) IV Continuous <Continuous>        HEMATOLOGIC  Meds: heparin  Infusion 1200 Unit(s)/Hr IV Continuous <Continuous>    [x] VTE Prophylaxis                        10.5   31.1  )-----------( 88       ( 26 Sep 2017 01:08 )             31.6     PT/INR - ( 26 Sep 2017 01:08 )   PT: 14.9 sec;   INR: 1.37 ratio         PTT - ( 26 Sep 2017 03:51 )  PTT:63.3 sec  Transfusion: none     [ ] PRBC   [ ] Platelets   [ ] FFP   [ ] Cryoprecipitate      INFECTIOUS DISEASES  T(C): 36.3 (09-26-17 @ 03:00), Max: 36.8 (09-25-17 @ 11:00)    WBC Count: 31.1 K/uL (09-26 @ 01:08)  WBC Count: 31.5 K/uL (09-25 @ 15:48)    Recent Cultures: Culture - Body Fluid with Gram Stain (09.23.17 @ 00:35)    Gram Stain: polymorphonuclear leukocytes seen  Gram Positive Cocci in Pairs and Chains seen  Gram Variable Rods seen  by cytocentrifuge    -  Ampicillin: R >8    -  Ciprofloxacin: I 2    -  Erythromycin: I 4    -  Tetra/Doxy: R >8    -  Vancomycin: S 1    Specimen Source: Peritoneal 2 peritoneal fluid for culture    Culture Results:   Numerous Enterococcus faecium  Few Candida albicans  Growth in fluid media only Gram Negative Rods    Organism Identification: Enterococcus faecium    Organism: Enterococcus faecium    Method Type: CARLY    Culture - Blood (09.22.17 @ 14:58)    Specimen Source: .Blood Blood-Venous    Culture Results: No growth to date.      Meds: fluconAZOLE IVPB 200 milliGRAM(s) IV Intermittent every 24 hours  piperacillin/tazobactam IVPB. 3.375 Gram(s) IV Intermittent every 8 hours  vancomycin  IVPB 1000 milliGRAM(s) IV Intermittent every 12 hours  vancomycin  IVPB          ENDOCRINE  Capillary Blood Glucose:WNL    Meds: x      ACCESS DEVICES:  [x ] Peripheral IV  [x ] Central Venous Line	[ ] R	[ x] L	[x ] IJ	[ ] Fem	[ ] SC	Placed:   [ ] Arterial Line		[ ] R	[ ] L	[ ] Fem	[ ] Rad	[ ] Ax	Placed:   [ ] PICC:					[ ] Mediport  [x ] Urinary Catheter, Date Placed:   [ x] Necessity of urinary, arterial, and venous catheters discussed    OTHER MEDICATIONS: x      CODE STATUS: Full code    IMAGING: < from: Xray Chest 1 View AP -PORTABLE-Routine (09.25.17 @ 12:08) >  Findings: Enteric tube and left internal jugular vein central venous   catheter persist unchanged. The right lung is clear. There is persistent   complete opacification of the left hemithorax.    Impression: Persistent complete opacification of the left hemithorax.    < end of copied text > HISTORY  62 year old male admitted to neurosurgery for b/l leg weakness found to have newly diagnosed metastatic disease with spinal met causing spinal cord compression s/p T8-T11 laminectomy on 9/20, post-op course complicated by deterioration in respiratory status and acute onset abdominal pain requiring intubation and transfer to NSCU. CT chest/abdomen/pelvis demonstrated b/l DVTs, right upper lobe PE, and a bowel perforation with free air. Pt was taken emergently to the OR and is now s/p exploratory laparotomy, SBR x1 (left in discontinuity), abdominal washout, abthera VAC application on 9/22, after which he was transferred to SICU under ACS. He was taken back to the OR on 9/24 for a washout, primary anastomosis, and abdominal closure.     24 HOUR EVENTS: Pt had left lung white out on CXR in AM. He was placed on CPAP and then switched to BiPAP, then back to CPAP which he remained on overnight. He was placed on KVO fluids and given Lasix. Overnight, pt complained of right hand numbness and tingling and his right radial arterial line was D/Carlos A.      SUBJECTIVE/ROS: Pt reports back pain.  [ x] A ten-point review of systems was otherwise negative except as noted.  [ ] Due to altered mental status/intubation, subjective information were not able to be obtained from the patient. History was obtained, to the extent possible, from review of the chart and collateral sources of information.      NEURO  RASS: 0      Exam: lethargic but arousable, following commands, oriented   Meds: HYDROmorphone  Injectable 0.5 milliGRAM(s) IV Push every 3 hours PRN Moderate Pain (4 - 6)    [x] Adequacy of sedation and pain control has been assessed and adjusted      RESPIRATORY  RR: 18 (09-26-17 @ 04:00) (13 - 32)  SpO2: 100% (09-26-17 @ 04:00) (85% - 100%)    Exam: clear to auscultation bilaterally on CPAP  Mechanical Ventilation:  CPAP  ABG - ( 26 Sep 2017 01:04 )  pH: 7.42  /  pCO2: 41    /  pO2: 223   / HCO3: 26    / Base Excess: 1.8   /  SaO2: 100     Blood Gas Arterial, Lactate: 1.4 (09.26.17 @ 01:04)    [n/a ] Extubation Readiness Assessed  Meds: ALBUTerol/ipratropium for Nebulization 3 milliLiter(s) Nebulizer every 6 hours  acetylcysteine 20% Inhalation 3 milliLiter(s) Inhalation every 6 hours      CARDIOVASCULAR  HR: 74 (09-26-17 @ 04:00) (65 - 100)  BP: 126/81 (09-25-17 @ 19:00) (126/81 - 160/84)  BP(mean): 99 (09-25-17 @ 19:00) (99 - 116)  ABP: 162/89 (09-26-17 @ 04:00) (119/67 - 189/88)  ABP(mean): 117 (09-26-17 @ 04:00) (84 - 123)    VBG - ( 24 Sep 2017 11:56 )  pH: x     /  pCO2: x     /  pO2: x     / HCO3: x     / Base Excess: x     /  SaO2: x      Lactate: 1.4      Exam: regular rate and rhythm  Cardiac Rhythm: sinus rhythm  Perfusion     [x ]Adequate   [ ]Inadequate  Mentation   [x ]Normal       [ ]Reduced  Extremities  [x ]Warm         [ ]Cool  Volume Status [ ]Hypervolemic [ x]Euvolemic [ ]Hypovolemic  Meds: metoprolol Injectable 5 milliGRAM(s) IV Push every 4 hours        GI/NUTRITION  Exam: distended, nontender, midline dressing with minimal sanguinous staining, otherwise dry and intact  Diet: NPO  Meds: pantoprazole  Injectable 40 milliGRAM(s) IV Push daily      GENITOURINARY  I&O's Detail      25 Sep 2017 07:01  -  26 Sep 2017 07:00  --------------------------------------------------------  IN:    heparin Infusion: 288 mL    IV PiggyBack: 300 mL    lactated ringers.: 900 mL    Solution: 200 mL  Total IN: 1688 mL    OUT:    Accordian: 50 mL    Accordian: 105 mL    Indwelling Catheter - Urethral: 5945 mL    Nasoenteral Tube: 250 mL  Total OUT: 6350 mL    Total NET: -4662 mL      26 Sep 2017 07:01  -  26 Sep 2017 08:28  --------------------------------------------------------  IN:    heparin Infusion: 28 mL    lactated ringers.: 100 mL  Total IN: 128 mL    OUT:    Indwelling Catheter - Urethral: 60 mL  Total OUT: 60 mL    Total NET: 68 mL        09-26    141  |  104  |  58<H>  ----------------------------<  109<H>  5.2   |  25  |  1.24    Ca    8.3<L>      26 Sep 2017 01:09  Phos  4.7     09-26  Mg     3.5     09-26    TPro  5.3<L>  /  Alb  2.6<L>  /  TBili  3.0<H>  /  DBili  2.2<H>  /  AST  77<H>  /  ALT  41  /  AlkPhos  100  09-26    [ x] Torres catheter, indication: urine output monitoring in critically ill  Meds: lactated ringers. 1000 milliLiter(s) IV Continuous <Continuous>        HEMATOLOGIC  Meds: heparin  Infusion 1200 Unit(s)/Hr IV Continuous <Continuous>    [x] VTE Prophylaxis                        10.5   31.1  )-----------( 88       ( 26 Sep 2017 01:08 )             31.6     PT/INR - ( 26 Sep 2017 01:08 )   PT: 14.9 sec;   INR: 1.37 ratio         PTT - ( 26 Sep 2017 03:51 )  PTT:63.3 sec  Transfusion: none     [ ] PRBC   [ ] Platelets   [ ] FFP   [ ] Cryoprecipitate      INFECTIOUS DISEASES  T(C): 36.3 (09-26-17 @ 03:00), Max: 36.8 (09-25-17 @ 11:00)    WBC Count: 31.1 K/uL (09-26 @ 01:08)  WBC Count: 31.5 K/uL (09-25 @ 15:48)    Recent Cultures: Culture - Body Fluid with Gram Stain (09.23.17 @ 00:35)    Gram Stain: polymorphonuclear leukocytes seen  Gram Positive Cocci in Pairs and Chains seen  Gram Variable Rods seen  by cytocentrifuge    -  Ampicillin: R >8    -  Ciprofloxacin: I 2    -  Erythromycin: I 4    -  Tetra/Doxy: R >8    -  Vancomycin: S 1    Specimen Source: Peritoneal 2 peritoneal fluid for culture    Culture Results:   Numerous Enterococcus faecium  Few Candida albicans  Growth in fluid media only Gram Negative Rods    Organism Identification: Enterococcus faecium    Organism: Enterococcus faecium    Method Type: CARLY    Culture - Blood (09.22.17 @ 14:58)    Specimen Source: .Blood Blood-Venous    Culture Results: No growth to date.      Meds: fluconAZOLE IVPB 200 milliGRAM(s) IV Intermittent every 24 hours  piperacillin/tazobactam IVPB. 3.375 Gram(s) IV Intermittent every 8 hours  vancomycin  IVPB 1000 milliGRAM(s) IV Intermittent every 12 hours  vancomycin  IVPB          ENDOCRINE  Capillary Blood Glucose:WNL    Meds: x      ACCESS DEVICES:  [x ] Peripheral IV  [x ] Central Venous Line	[ ] R	[ x] L	[x ] IJ	[ ] Fem	[ ] SC	Placed:   [ ] Arterial Line		[ ] R	[ ] L	[ ] Fem	[ ] Rad	[ ] Ax	Placed:   [ ] PICC:					[ ] Mediport  [x ] Urinary Catheter, Date Placed:   [ x] Necessity of urinary, arterial, and venous catheters discussed    OTHER MEDICATIONS: x      CODE STATUS: Full code    IMAGING: < from: Xray Chest 1 View AP -PORTABLE-Routine (09.25.17 @ 12:08) >  Findings: Enteric tube and left internal jugular vein central venous   catheter persist unchanged. The right lung is clear. There is persistent   complete opacification of the left hemithorax.    Impression: Persistent complete opacification of the left hemithorax.    < end of copied text >

## 2017-09-26 NOTE — PHYSICAL THERAPY INITIAL EVALUATION ADULT - IMPAIRMENTS CONTRIBUTING IMPAIRED BED MOBILITY, REHAB EVAL
pain/impaired postural control/impaired motor control/decreased ROM/impaired balance/decreased strength
impaired motor control/pain/decreased ROM/decreased strength/impaired balance

## 2017-09-26 NOTE — PHYSICAL THERAPY INITIAL EVALUATION ADULT - PERTINENT HX OF CURRENT PROBLEM, REHAB EVAL
Pt is a 62M with no significant PMH p/w worsening LBP associated with lower extremity  weakness for 3 weeks. Pt reports he progressed to the point where he was unable to ambulate.  He was seen at OSH and transferred to [facility] for spine evaluation. At [place] pt had ct showing cord compression transferred for neruosurg/spine eval. CONTINUED
Pt is a 62M with no significant PMH p/w worsening LBP associated with lower extremity  weakness for 3 weeks. Pt reports he progressed to the point where he was unable to ambulate.  He was seen at OSH and transferred to [facility] for spine evaluation. At [place] pt had ct showing cord compression transferred for neruosurg/spine eval See additional comments below

## 2017-09-26 NOTE — PROGRESS NOTE ADULT - SUBJECTIVE AND OBJECTIVE BOX
ATP Surgery Progress Note     Subjective/24hour Events: Pt had left lung white out on CXR in AM. He was placed on CPAP and then switched to BiPAP, then back to CPAP which he remained on overnight. He was placed on KVO fluids and given Lasix. Overnight, pt complained of right hand numbness and tingling and his right radial arterial line was D/Carlos A.    T(C): 36.5 (09-26-17 @ 08:00), Max: 36.8 (09-25-17 @ 11:00)  HR: 72 (09-26-17 @ 09:00) (65 - 100)  BP: 126/68 (09-26-17 @ 09:00) (120/75 - 157/86)  RR: 15 (09-26-17 @ 09:00) (12 - 32)  SpO2: 100% (09-26-17 @ 09:00) (85% - 100%)  Wt(kg): --    09-25 @ 07:01  -  09-26 @ 07:00  --------------------------------------------------------  IN:    heparin Infusion: 288 mL    IV PiggyBack: 300 mL    lactated ringers.: 900 mL    Solution: 200 mL  Total IN: 1688 mL    OUT:    Accordian: 50 mL    Accordian: 105 mL    Indwelling Catheter - Urethral: 5945 mL    Nasoenteral Tube: 250 mL  Total OUT: 6350 mL    Total NET: -4662 mL      09-26 @ 07:01  -  09-26 @ 10:18  --------------------------------------------------------  IN:    heparin Infusion: 28 mL    lactated ringers.: 100 mL  Total IN: 128 mL    OUT:    Indwelling Catheter - Urethral: 60 mL  Total OUT: 60 mL    Total NET: 68 mL        Physical Exam:  Gen: NAD,, following commands  Resp: on CPAP, no resp distress  Card: RRR  GI: Abthera wound vac in place  Ext: decreased strength in RLE    Labs:                          10.5   31.1  )-----------( 88       ( 26 Sep 2017 01:08 )             31.6     09-26    141  |  104  |  58<H>  ----------------------------<  109<H>  5.2   |  25  |  1.24    Ca    8.3<L>      26 Sep 2017 01:09  Phos  4.7     09-26  Mg     3.5     09-26    TPro  5.3<L>  /  Alb  2.6<L>  /  TBili  3.0<H>  /  DBili  2.2<H>  /  AST  77<H>  /  ALT  41  /  AlkPhos  100  09-26    < from: Xray Chest 1 View AP -PORTABLE-Routine (09.26.17 @ 07:11) >    Impression:    The heart isnormal in size. Left pleural effusion. Left lower lobe   pneumonia and atelectasis. The right lung is clear. Mild pulmonary   vascular congestion. Life supporting devices are in good position and   unchanged when compared to previous study done 9/25/2017.    < end of copied text >

## 2017-09-26 NOTE — PHYSICAL THERAPY INITIAL EVALUATION ADULT - MANUAL MUSCLE TESTING RESULTS, REHAB EVAL
not tested due to/B shoulder 3-/5, B elbow, wrist, digits 3/5, RLE grossly 1+/5, LLE grossly 2-/5
R LE grossly 1+/5 L LE grossly 2-/5. B/L UE's grossly 4/5/grossly assessed due to

## 2017-09-26 NOTE — PHYSICAL THERAPY INITIAL EVALUATION ADULT - GENERAL OBSERVATIONS, REHAB EVAL
Pt rec'd supine in bed in NAD +VSS +A line +PCA +hemovac x2 +venodynes
Pt rec'd supine in bed in NAD +VSS +A line +PCA +hemovac x2 +venodynes

## 2017-09-26 NOTE — PROGRESS NOTE ADULT - SUBJECTIVE AND OBJECTIVE BOX
Patient is a 62y old  Male who presents with a chief complaint of cord compression (19 Sep 2017 09:44)        MEDICATIONS  (STANDING):  pantoprazole  Injectable 40 milliGRAM(s) IV Push daily  fluconAZOLE IVPB 200 milliGRAM(s) IV Intermittent every 24 hours  lactated ringers. 1000 milliLiter(s) (50 mL/Hr) IV Continuous <Continuous>  piperacillin/tazobactam IVPB. 3.375 Gram(s) IV Intermittent every 8 hours  vancomycin  IVPB 1000 milliGRAM(s) IV Intermittent every 12 hours  vancomycin  IVPB      ALBUTerol/ipratropium for Nebulization 3 milliLiter(s) Nebulizer every 6 hours  metoprolol Injectable 5 milliGRAM(s) IV Push every 4 hours  acetylcysteine 20% Inhalation 3 milliLiter(s) Inhalation every 6 hours  heparin  Infusion 1200 Unit(s)/Hr (14 mL/Hr) IV Continuous <Continuous>    MEDICATIONS  (PRN):  HYDROmorphone  Injectable 0.5 milliGRAM(s) IV Push every 3 hours PRN Moderate Pain (4 - 6)    ROS:  On Bipap and unable      VITALS:   T(C): 36.7 (09-25-17 @ 23:00), Max: 36.8 (09-25-17 @ 11:00)  HR: 69 (09-25-17 @ 23:00) (68 - 102)  BP: 126/81 (09-25-17 @ 19:00) (126/81 - 160/84)  RR: 13 (09-25-17 @ 23:00) (13 - 32)  SpO2: 100% (09-25-17 @ 23:00) (85% - 100%)  Wt(kg): --      PHYSICAL EXAM:  Gen: Intubated, sedated  Neuro:  Sedated, pupils pinpoint on medications.  Right leg weakness is apparent and related to spinal cord compression by metastatic lung cancer. Patient appears to be awake and cooperative but neurologic exam at present is unreliable with intubation and necessary sedation.  CV: S1S2, r/r/r, (-)m/r/g  Pulm: Rhonchi to anterior chest wall, wheezing appreciated  GI: abd VAC with good suction; draining moderate amount of serous-sanginous fluid; hypoactive bowel sounds  Ext: 2+ pulses to b/l UE's, (+)signal to b/L DP; Cap refill < 3secs    LABS:  ABG - ( 25 Sep 2017 12:16 )  pH: 7.43  /  pCO2: 36    /  pO2: 85    / HCO3: 24    / Base Excess: x     /  SaO2: 97                    CBC Full  -  ( 25 Sep 2017 15:48 )  WBC Count : 31.5 K/uL  Hemoglobin : 11.0 g/dL  Hematocrit : 33.6 %  Platelet Count - Automated : 83 K/uL  Mean Cell Volume : 94.6 fl  Mean Cell Hemoglobin : 30.9 pg  Mean Cell Hemoglobin Concentration : 32.6 gm/dL  Auto Neutrophil # : x  Auto Lymphocyte # : x  Auto Monocyte # : x  Auto Eosinophil # : x  Auto Basophil # : x  Auto Neutrophil % : x  Auto Lymphocyte % : x  Auto Monocyte % : x  Auto Eosinophil % : x  Auto Basophil % : x    09-25    143  |  108  |  60<H>  ----------------------------<  124<H>  5.2   |  21<L>  |  1.28    Ca    7.3<L>      25 Sep 2017 02:28  Phos  4.4     09-25  Mg     3.5     09-25    TPro  4.7<L>  /  Alb  2.4<L>  /  TBili  1.8<H>  /  DBili  1.4<H>  /  AST  69<H>  /  ALT  32  /  AlkPhos  77  09-25    LIVER FUNCTIONS - ( 25 Sep 2017 02:28 )  Alb: 2.4 g/dL / Pro: 4.7 g/dL / ALK PHOS: 77 U/L / ALT: 32 U/L RC / AST: 69 U/L / GGT: x           PT/INR - ( 25 Sep 2017 02:28 )   PT: 15.8 sec;   INR: 1.44 ratio         PTT - ( 25 Sep 2017 21:58 )  PTT:51.0 sec    CAPILLARY BLOOD GLUCOSE          RADIOLOGY & ADDITIONAL TESTS: Patient is a 62y old  Male who presents with a chief complaint of cord compression'  Post op in SICU after small bowel perforation, unrelated to metastatic cancer with 2 explorations to close and clean peritonitis with enterostomy.   He is now extubated and oriented X3.   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          MEDICATIONS  (STANDING):  pantoprazole  Injectable 40 milliGRAM(s) IV Push daily  fluconAZOLE IVPB 200 milliGRAM(s) IV Intermittent every 24 hours  lactated ringers. 1000 milliLiter(s) (50 mL/Hr) IV Continuous <Continuous>  piperacillin/tazobactam IVPB. 3.375 Gram(s) IV Intermittent every 8 hours  vancomycin  IVPB 1000 milliGRAM(s) IV Intermittent every 12 hours  vancomycin  IVPB      ALBUTerol/ipratropium for Nebulization 3 milliLiter(s) Nebulizer every 6 hours  metoprolol Injectable 5 milliGRAM(s) IV Push every 4 hours  acetylcysteine 20% Inhalation 3 milliLiter(s) Inhalation every 6 hours  heparin  Infusion 1200 Unit(s)/Hr (14 mL/Hr) IV Continuous <Continuous>    MEDICATIONS  (PRN):  HYDROmorphone  Injectable 0.5 milliGRAM(s) IV Push every 3 hours PRN Moderate Pain (4 - 6)    ROS:  On Bipap and unable      VITALS:   T(C): 36.7 (09-25-17 @ 23:00), Max: 36.8 (09-25-17 @ 11:00)  HR: 69 (09-25-17 @ 23:00) (68 - 102)  BP: 126/81 (09-25-17 @ 19:00) (126/81 - 160/84)  RR: 13 (09-25-17 @ 23:00) (13 - 32)  SpO2: 100% (09-25-17 @ 23:00) (85% - 100%)  Wt(kg): --      PHYSICAL EXAM:  Gen: Intubated, sedated  Neuro:  Sedated, pupils pinpoint on medications.  Right leg weakness is apparent and related to spinal cord compression by metastatic lung cancer. Patient appears to be awake and cooperative but neurologic exam at present is unreliable with intubation and necessary sedation.  CV: S1S2, r/r/r, (-)m/r/g  Pulm: Rhonchi to anterior chest wall, wheezing appreciated  GI: abd VAC with good suction; draining moderate amount of serous-sanginous fluid; hypoactive bowel sounds  Ext: 2+ pulses to b/l UE's, (+)signal to b/L DP; Cap refill < 3secs    LABS:  ABG - ( 25 Sep 2017 12:16 )  pH: 7.43  /  pCO2: 36    /  pO2: 85    / HCO3: 24    / Base Excess: x     /  SaO2: 97        CBC Full  -  ( 25 Sep 2017 15:48 )  WBC Count : 31.5 K/uL  Hemoglobin : 11.0 g/dL  Hematocrit : 33.6 %  Platelet Count - Automated : 83 K/uL  Mean Cell Volume : 94.6 fl  Mean Cell Hemoglobin : 30.9 pg  Mean Cell Hemoglobin Concentration : 32.6 gm/dL  Auto Neutrophil # : x  Auto Lymphocyte # : x  Auto Monocyte # : x  Auto Eosinophil # : x  Auto Basophil # : x  Auto Neutrophil % : x  Auto Lymphocyte % : x  Auto Monocyte % : x  Auto Eosinophil % : x  Auto Basophil % : x    09-25    143  |  108  |  60<H>  ----------------------------<  124<H>  5.2   |  21<L>  |  1.28    Ca    7.3<L>      25 Sep 2017 02:28  Phos  4.4     09-25  Mg     3.5     09-25    TPro  4.7<L>  /  Alb  2.4<L>  /  TBili  1.8<H>  /  DBili  1.4<H>  /  AST  69<H>  /  ALT  32  /  AlkPhos  77  09-25    LIVER FUNCTIONS - ( 25 Sep 2017 02:28 )  Alb: 2.4 g/dL / Pro: 4.7 g/dL / ALK PHOS: 77 U/L / ALT: 32 U/L RC / AST: 69 U/L / GGT: x           PT/INR - ( 25 Sep 2017 02:28 )   PT: 15.8 sec;   INR: 1.44 ratio         PTT - ( 25 Sep 2017 21:58 )  PTT:51.0 sec    CAPILLARY BLOOD GLUCOSE          RADIOLOGY & ADDITIONAL TESTS:

## 2017-09-26 NOTE — PHYSICAL THERAPY INITIAL EVALUATION ADULT - PRECAUTIONS/LIMITATIONS, REHAB EVAL
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 CT abdomen/chest: Necrotic mediastinal and left hilar lymph nodes with associated left lateral lower lobe atelectasis with associated tumor obstructing the left lower lobe bronchus. +Lytic lesion involving the T10 vertebral body. +Indeterminant liver lesions +left adrenal lesion. 9/20: OR w/ ortho: T8-T-11 laminectomy & decompression of tumor. 9/22: OR: ex-lap, washout, SBRx1, left in discontinuity; succus, perf 110cm from LOT, 10cm resected, left open w/ abthera VAC. Admitted to SICU, intubated. 9/24: RTOR: re-anastomose, washout, closure; Extubated evening, HTN to 180-200's SBP. 9/25: PPV increased to 15 f/u ABG repeat CXR and consult pulm, 20 lasix AM and PM, metoprolol PRN if SBP >170, OR cultures E. Faecium senstive  to Vanc./surgical precautions/spinal precautions
surgical precautions/spinal precautions

## 2017-09-26 NOTE — CONSULT NOTE ADULT - SUBJECTIVE AND OBJECTIVE BOX
HPI:  Mr. Ferguson is a 63 y/o M with no significant PMHx who presented to [facility] with complaints of worsening low back pain with associated with lower extremity weakness for the last 3 weeks. The weekend prior to admission, his weakness progressed to the point where he was unable to ambulate after he awoke from sleep. Prior to these 3 weeks, he was fully independent in ambulation, ADL's, and transfers. He was transferred to [facility]  on 9/19 as MRI was not available at [place]. MRI thoracic/lumbar spine revealed osseous lesions in the T4, T8, T10, T11, T12, L1, L2 with epidural extension involving the T9-T11 levels with probable cord compromise (was not full study as patient was not able to tolerate). Patient was also noted to have left lower lung hazy opacity. Underwent emergent T8-T11 laminectomy, decompression, and tumor debulking on 9/20 by Dr. Rose with EBL = 400cc. Biopsy was also taken of the lesion, but pan CT showed possible primary lung cancer with suspicious lesions in the liver and adrenal glands and necrotic mediastinal mass. Final pathology from spinal biopsy revealed metatstatic poorly differentiated non-small cell carcinoma consistent with lung origin.     Post-operative course notable for respiratory failure on 9/22 requiring intubation. CT scans were completed and he was found to have right upper lobe pulmonary embolism, increased left lower lobe consolidation, necrotic mediastinal hilar lymphadenopathy, metastatic ossesous disease, extraluminal air and enhancing ascites compatible with perforation, right renal vein thrombosis, right kidney enhancement, and metastatic hepatic disease. He underwent emergent exploratory laparotomy on 9/22 by Dr. Padilla. He was found to have small bowel perforation and 10cm of small bowel was resected with abdominal washout and abthera VAC placement. He then underwent re-opening of previous laparotomy on 9/24 and enteroenterostomy by Dr. Reaves with EBL = 20cc. Extubated on 9/24. Course also notable for being found with bilateral acute soleal vein DVT's. Started on Heparin drip with plans to bridge.     Today,     REVIEW OF SYSTEMS  Constitutional - No fever, No fatigue  HEENT - No visual disturbances, No difficulty hearing,  No neck pain  Respiratory - No cough, No wheezing, No shortness of breath  Cardiovascular - No chest pain, No palpitations  Gastrointestinal - No abdominal pain, No nausea, No vomiting, No diarrhea, No constipation  Genitourinary - No dysuria, No frequency, No hematuria, No incontinence  Neurological - No headaches, No loss of strength, No numbness  Skin - No itching, No rashes  Endocrine - No temperature intolerance  Musculoskeletal - No joint pain, No joint swelling, No muscle pain  Psychiatric - No depression, No anxiety  All other review of systems negative    PAST MEDICAL & SURGICAL HISTORY  None    SOCIAL HISTORY  Single  Works as salesman  Smoking - Denied  EtOH - Denied   Drugs - Denied    FUNCTIONAL HISTORY  _______ hand dominant  Lives with  girlfriend in an apartment with 20 stairs to negotiate with left sided handrail  Independent in ambulation, ADL's, transfers prior to hospitalization    CURRENT FUNCTIONAL STATUS  Bed mobility - Max A x 2  Transfers - Max A x 2   Gait - EVAL  ADL's - LE dressing Max A    FAMILY HISTORY   Reviewed and non-contributory    ALLERGIES  No Known Allergies    VITALS  T(C): 36.6 (09-26-17 @ 12:00)  T(F): 97.9 (09-26-17 @ 12:00), Max: 98 (09-25-17 @ 23:00)  HR: 75 (09-26-17 @ 15:00) (65 - 88)  BP: 110/67 (09-26-17 @ 15:00) (109/63 - 172/90)  RR:  (11 - 28)  SpO2:  (97% - 100%)  Wt(kg): --    PHYSICAL EXAM  Constitutional - NAD, Comfortable  HEENT - NCAT, EOMI  Neck - Supple  Chest - CTA bilaterally  Cardiovascular - RRR, S1S2  Abdomen - BS+, Soft, NTND  Extremities - No C/C/E, No calf tenderness   Neurologic Exam -                    Cognitive - Awake, Alert, AAO to self, place, date, year, situation     Communication - Fluent, No dysarthria     Cranial Nerves - CN 2-12 intact     Motor -                     LEFT    UE - ShAB 5/5, EF 5/5, EE 5/5, WE 5/5,  5/5                    RIGHT UE - ShAB 5/5, EF 5/5, EE 5/5, WE 5/5,  5/5                    LEFT    LE - HF 5/5, KE 5/5, DF 5/5, PF 5/5                    RIGHT LE - HF 5/5, KE 5/5, DF 5/5, PF 5/5        Sensory - Intact to light touch     Reflexes - DTR Intact, No primitive reflexive     Coordination - FTN intact     OculoVestibular - No saccades, No nystagmus, VOR         Balance - WNL Static  Psychiatric - Affect WNL    RECENT LABS/IMAGING             10.5   31.1  )-----------( 88       ( 26 Sep 2017 01:08 )             31.6     141  |  104  |  58<H>  ----------------------------<  109    ( 26 Sep 2017 )  5.2   |  25  |  1.24    Ca    8.3<L>      26 Sep 2017 01:09  Phos  4.7     09-26  Mg     3.5     09-26    TPro  5.3<L>  /  Alb  2.6<L>  /  TBili  3.0<H>  /  DBili  2.2<H>  /  AST  77<H>  /  ALT  41  /  AlkPhos  100  09-26    PT/INR - ( 26 Sep 2017 01:08 )   PT: 14.9 sec;   INR: 1.37 ratio    PTT - ( 26 Sep 2017 08:21 )  PTT:56.8 sec    Procalcitonin (9/26) - 6.39    Blood cx (9/22) - No growth to date  Peritoneal cx (9/23) - E. faecium x 2 bottles    Pro-BNP - 7093  CEA (9/21) - 7.8    MEDICATIONS   MEDICATIONS  (STANDING):  pantoprazole  Injectable 40 milliGRAM(s) IV Push daily  lactated ringers. 1000 milliLiter(s) (50 mL/Hr) IV Continuous <Continuous>  piperacillin/tazobactam IVPB. 3.375 Gram(s) IV Intermittent every 8 hours  vancomycin  IVPB 1000 milliGRAM(s) IV Intermittent every 12 hours  vancomycin  IVPB      ALBUTerol/ipratropium for Nebulization 3 milliLiter(s) Nebulizer every 6 hours  metoprolol Injectable 5 milliGRAM(s) IV Push every 4 hours  acetylcysteine 20% Inhalation 3 milliLiter(s) Inhalation every 6 hours  heparin  Infusion 1200 Unit(s)/Hr (15 mL/Hr) IV Continuous <Continuous>  acetaminophen  IVPB. 1000 milliGRAM(s) IV Intermittent once  acetaminophen  IVPB. 1000 milliGRAM(s) IV Intermittent once  micafungin IVPB        MEDICATIONS  (PRN):  HYDROmorphone  Injectable 0.5 milliGRAM(s) IV Push every 3 hours PRN Moderate Pain (4 - 6)      ASSESSMENT/PLAN  63 y/o M with worsening back pain and bilateral lower extremity weakness found to have metastatic non-small cell lung cancer s/p T8-T11 decompression and tumor debulking. Course complicated with respiratory distress found to have right upper lobe PE and small bowel perforation s/p exploratory laparotomy and enteroenterostomy. He is now with functional, gait, ADL impairments.    Disposition -  PT - ROM, Bed Mob, Transfers, Amb with AD   OT - ADLs, ROM  SLP - Dysphagia eval and treat  Precautions - Falls, Cardiac, Aspiration  DVT Prophylaxis - Heparin drip, SCD's  Weight bearing status - Full weight bearing  Skin - Turn Q2hrs  Diet - NPO HPI:  Mr. Ferguson is a 63 y/o M with no significant PMHx who presented to [facility] with complaints of worsening low back pain with associated with lower extremity weakness for the last 3 weeks. The weekend prior to admission, his weakness progressed to the point where he was unable to ambulate after he awoke from sleep. Prior to these 3 weeks, he was fully independent in ambulation, ADL's, and transfers. He was transferred to [facility]  on 9/19 as MRI was not available at [place]. MRI thoracic/lumbar spine revealed osseous lesions in the T4, T8, T10, T11, T12, L1, L2 with epidural extension involving the T9-T11 levels with probable cord compromise (was not full study as patient was not able to tolerate). Patient was also noted to have left lower lung hazy opacity. Underwent emergent T8-T11 laminectomy, decompression, and tumor debulking on 9/20 by Dr. Rose with EBL = 400cc. Biopsy was also taken of the lesion, but pan CT showed possible primary lung cancer with suspicious lesions in the liver and adrenal glands and necrotic mediastinal mass. Final pathology from spinal biopsy revealed metatstatic poorly differentiated non-small cell carcinoma consistent with lung origin.     Post-operative course notable for respiratory failure on 9/22 requiring intubation. CT scans were completed and he was found to have right upper lobe pulmonary embolism, increased left lower lobe consolidation, necrotic mediastinal hilar lymphadenopathy, metastatic ossesous disease, extraluminal air and enhancing ascites compatible with perforation, right renal vein thrombosis, right kidney enhancement, and metastatic hepatic disease. He underwent emergent exploratory laparotomy on 9/22 by Dr. Padilla. He was found to have small bowel perforation and 10cm of small bowel was resected with abdominal washout and abthera VAC placement. He then underwent re-opening of previous laparotomy on 9/24 and enteroenterostomy by Dr. Reaves with EBL = 20cc. Extubated on 9/24. Course also notable for being found with bilateral acute soleal vein DVT's. Started on Heparin drip with plans to bridge.     Today, he reports feeling fatigued. Still with no movement in his bilateral lower extremities and with mild numbness in the right > left lower extremity. Has intermittent shortness of breathe, requiring use of supplemental O2.    REVIEW OF SYSTEMS  Constitutional - No fever, +fatigue  HEENT - No visual disturbances, No difficulty hearing,  No neck pain  Respiratory - No cough, No wheezing, +shortness of breath  Cardiovascular - No chest pain, No palpitations  Gastrointestinal - No abdominal pain, No nausea, No vomiting, No diarrhea, No constipation  Genitourinary - No dysuria, No frequency, No hematuria, No incontinence  Neurological - No headaches, +loss of strength, +numbness  Skin - No itching, No rashes  Endocrine - No temperature intolerance  Musculoskeletal - No joint pain, No joint swelling, No muscle pain  Psychiatric - No depression, No anxiety  All other review of systems negative    PAST MEDICAL & SURGICAL HISTORY  States he has metal in right shoulder    SOCIAL 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 Was using SAC for few weeks prior to admission secondary to pain/weakness    CURRENT FUNCTIONAL STATUS  Bed mobility - Max A x 2  Transfers - Max A x 2   Gait - EVAL  ADL's - LE dressing Max A    FAMILY HISTORY   Reviewed and non-contributory    ALLERGIES  No Known Allergies    VITALS  T(C): 36.6 (09-26-17 @ 12:00)  T(F): 97.9 (09-26-17 @ 12:00), Max: 98 (09-25-17 @ 23:00)  HR: 75 (09-26-17 @ 15:00) (65 - 88)  BP: 110/67 (09-26-17 @ 15:00) (109/63 - 172/90)  RR:  (11 - 28)  SpO2:  (97% - 100%)  Wt(kg): --    PHYSICAL EXAM  Constitutional - NAD, Fatigued  HEENT - EOMI, +Supplemental O2, +NGT, +Triple lumen catheter  Neck - Supple  Chest - +Supplemental O2  Cardiovascular - S1S2  Abdomen - Distended, +wound vac  Extremities - Right hand edema   - +Torres  Neurologic Exam -                    Cognitive - Awake, Alert, AAO to self, place, month, and year     Communication - Fluent     Cranial Nerves - CN 2-12 intact     Motor -                     LEFT    UE - ShAB 5/5, EF 5/5, EE 4/5, WE 5/5,  5/5                    RIGHT UE - ShAB 2/5, EF 5/5, EE 4/5, WE 4/5,  5/5                    LEFT    LE - HF 0/5, KE 1/5, DF 1/5, PF 1/5 (EXAM LIMITED AS PATIENT SITTING IN CHAIR)                    RIGHT LE - HF 0/5, KE 1/5, DF 1/5, PF 1/5 (EXAM LIMITED AS PATIENT SITTING IN CHAIR)     Sensory - Impaired to light touch     Reflexes - DTR 2+/4 in bilateral biceps, brachioradialis, triceps, Livingston's, 2+/4 in left patella, 0/4 in right patella, No primitive reflexive  Psychiatric - Affect WNL    RECENT LABS/IMAGING             10.5   31.1  )-----------( 88       ( 26 Sep 2017 01:08 )             31.6     141  |  104  |  58<H>  ----------------------------<  109    ( 26 Sep 2017 )  5.2   |  25  |  1.24    Ca    8.3<L>      26 Sep 2017 01:09  Phos  4.7     09-26  Mg     3.5     09-26    TPro  5.3<L>  /  Alb  2.6<L>  /  TBili  3.0<H>  /  DBili  2.2<H>  /  AST  77<H>  /  ALT  41  /  AlkPhos  100  09-26    PT/INR - ( 26 Sep 2017 01:08 )   PT: 14.9 sec;   INR: 1.37 ratio    PTT - ( 26 Sep 2017 08:21 )  PTT:56.8 sec    Procalcitonin (9/26) - 6.39    Blood cx (9/22) - No growth to date  Peritoneal cx (9/23) - E. faecium x 2 bottles    Pro-BNP - 7093  CEA (9/21) - 7.8    MEDICATIONS   MEDICATIONS  (STANDING):  pantoprazole  Injectable 40 milliGRAM(s) IV Push daily  lactated ringers. 1000 milliLiter(s) (50 mL/Hr) IV Continuous <Continuous>  piperacillin/tazobactam IVPB. 3.375 Gram(s) IV Intermittent every 8 hours  vancomycin  IVPB 1000 milliGRAM(s) IV Intermittent every 12 hours   ALBUTerol/ipratropium for Nebulization 3 milliLiter(s) Nebulizer every 6 hours  metoprolol Injectable 5 milliGRAM(s) IV Push every 4 hours  acetylcysteine 20% Inhalation 3 milliLiter(s) Inhalation every 6 hours  heparin  Infusion 1200 Unit(s)/Hr (15 mL/Hr) IV Continuous <Continuous>  acetaminophen  IVPB. 1000 milliGRAM(s) IV Intermittent once  acetaminophen  IVPB. 1000 milliGRAM(s) IV Intermittent once  micafungin IVPB        MEDICATIONS  (PRN):  HYDROmorphone  Injectable 0.5 milliGRAM(s) IV Push every 3 hours PRN Moderate Pain (4 - 6)    ASSESSMENT/PLAN  63 y/o M with worsening back pain and bilateral lower extremity weakness found to have metastatic non-small cell lung cancer s/p T8-T11 decompression and tumor debulking. Course complicated with respiratory distress found to have right upper lobe PE and small bowel perforation s/p exploratory laparotomy and enteroenterostomy. He is now with functional, gait, ADL impairments.    Disposition - Will continue to follow patient to make final rehabilitation determination when medically stable and cleared, but will likely require acute inpatient spinal cord injury rehabilitation for functional impairments consisting of 3 hours of therapy/day & 24 hour RN/daily PMR physician for comorbid medical management.   PT - ROM, Bed Mob, Transfers, Amb with AD   OT - ADLs, ROM  SLP - Dysphagia eval and treat  Precautions - Falls, Cardiac, Aspiration  Pain control - Dilaudid PRN, Tylenol DELISA  DVT Prophylaxis - Heparin drip, SCD's  Weight bearing status - Full weight bearing  Skin - Turn Q2hrs  Diet - NPO HPI:  Mr. Ferguson is a 61 y/o M with no significant PMHx who presented to [facility] with complaints of worsening low back pain with associated with lower extremity weakness for the last 3 weeks. The weekend prior to admission, his weakness progressed to the point where he was unable to ambulate after he awoke from sleep. Prior to these 3 weeks, he was fully independent in ambulation, ADL's, and transfers. He was transferred to [facility]  on 9/19 as MRI was not available at [place]. MRI thoracic/lumbar spine revealed osseous lesions in the T4, T8, T10, T11, T12, L1, L2 with epidural extension involving the T9-T11 levels with probable cord compromise (was not full study as patient was not able to tolerate). Patient was also noted to have left lower lung hazy opacity. Underwent emergent T8-T11 laminectomy, decompression, and tumor debulking on 9/20 by Dr. Rose with EBL = 400cc. Biopsy was also taken of the lesion, but pan CT showed possible primary lung cancer with suspicious lesions in the liver and adrenal glands and necrotic mediastinal mass. Final pathology from spinal biopsy revealed metatstatic poorly differentiated non-small cell carcinoma consistent with lung origin.     Post-operative course notable for respiratory failure on 9/22 requiring intubation. CT scans were completed and he was found to have right upper lobe pulmonary embolism, increased left lower lobe consolidation, necrotic mediastinal hilar lymphadenopathy, metastatic ossesous disease, extraluminal air and enhancing ascites compatible with perforation, right renal vein thrombosis, right kidney enhancement, and metastatic hepatic disease. He underwent emergent exploratory laparotomy on 9/22 by Dr. Padilla. He was found to have small bowel perforation and 10cm of small bowel was resected with abdominal washout and abthera VAC placement. He then underwent re-opening of previous laparotomy on 9/24 and enteroenterostomy by Dr. Reaves with EBL = 20cc. Extubated on 9/24. Course also notable for being found with bilateral acute soleal vein DVT's. Started on Heparin drip with plans to bridge.     Today, he reports feeling fatigued. Still with no movement in his bilateral lower extremities and with mild numbness in the right > left lower extremity. Has intermittent shortness of breathe, requiring use of supplemental O2.    REVIEW OF SYSTEMS  Constitutional - No fever, +fatigue  HEENT - No visual disturbances, No difficulty hearing,  No neck pain  Respiratory - No cough, No wheezing, +shortness of breath  Cardiovascular - No chest pain, No palpitations  Gastrointestinal - No abdominal pain, No nausea, No vomiting, No diarrhea, No constipation  Genitourinary - No dysuria, No frequency, No hematuria, No incontinence  Neurological - No headaches, +loss of strength, +numbness  Skin - No itching, No rashes  Endocrine - No temperature intolerance  Musculoskeletal - No joint pain, No joint swelling, No muscle pain  Psychiatric - No depression, No anxiety  All other review of systems negative    PAST MEDICAL & SURGICAL HISTORY  States he has metal in right shoulder    SOCIAL 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 Was using SAC for few weeks prior to admission secondary to pain/weakness    CURRENT FUNCTIONAL STATUS  Bed mobility - Max A x 2  Transfers - Max A x 2   Gait - EVAL  ADL's - LE dressing Max A    FAMILY HISTORY   Reviewed and non-contributory    ALLERGIES  No Known Allergies    VITALS  T(C): 36.6 (09-26-17 @ 12:00)  T(F): 97.9 (09-26-17 @ 12:00), Max: 98 (09-25-17 @ 23:00)  HR: 75 (09-26-17 @ 15:00) (65 - 88)  BP: 110/67 (09-26-17 @ 15:00) (109/63 - 172/90)  RR:  (11 - 28)  SpO2:  (97% - 100%)  Wt(kg): --    PHYSICAL EXAM  Constitutional - NAD, Fatigued  HEENT - EOMI, +Supplemental O2, +NGT, +Triple lumen catheter  Neck - Supple  Chest - +Supplemental O2  Cardiovascular - S1S2  Abdomen - Distended, +wound vac  Extremities - Right hand edema   - +Torres  Neurologic Exam -                    Cognitive - Awake, Alert, AAO to self, place, month, and year     Communication - Fluent     Cranial Nerves - CN 2-12 intact     Motor -                     LEFT    UE - ShAB 5/5, EF 5/5, EE 4/5, WE 5/5,  5/5                    RIGHT UE - ShAB 2/5, EF 5/5, EE 4/5, WE 4/5,  5/5                    LEFT    LE - HF 0/5, KE 1/5, DF 1/5, PF 1/5 (EXAM LIMITED AS PATIENT SITTING IN CHAIR)                    RIGHT LE - HF 0/5, KE 1/5, DF 1/5, PF 1/5 (EXAM LIMITED AS PATIENT SITTING IN CHAIR)     Sensory - Impaired to light touch     Reflexes - DTR 2+/4 in bilateral biceps, brachioradialis, triceps, Felicity's, 2+/4 in left patella, 0/4 in right patella, No primitive reflexive  Psychiatric - Affect WNL    RECENT LABS/IMAGING             10.5   31.1  )-----------( 88       ( 26 Sep 2017 01:08 )             31.6     141  |  104  |  58<H>  ----------------------------<  109    ( 26 Sep 2017 )  5.2   |  25  |  1.24    Ca    8.3<L>      26 Sep 2017 01:09  Phos  4.7     09-26  Mg     3.5     09-26    TPro  5.3<L>  /  Alb  2.6<L>  /  TBili  3.0<H>  /  DBili  2.2<H>  /  AST  77<H>  /  ALT  41  /  AlkPhos  100  09-26    PT/INR - ( 26 Sep 2017 01:08 )   PT: 14.9 sec;   INR: 1.37 ratio    PTT - ( 26 Sep 2017 08:21 )  PTT:56.8 sec    Procalcitonin (9/26) - 6.39    Blood cx (9/22) - No growth to date  Peritoneal cx (9/23) - E. faecium x 2 bottles    Pro-BNP - 7093  CEA (9/21) - 7.8    MEDICATIONS   MEDICATIONS  (STANDING):  pantoprazole  Injectable 40 milliGRAM(s) IV Push daily  lactated ringers. 1000 milliLiter(s) (50 mL/Hr) IV Continuous <Continuous>  piperacillin/tazobactam IVPB. 3.375 Gram(s) IV Intermittent every 8 hours  vancomycin  IVPB 1000 milliGRAM(s) IV Intermittent every 12 hours   ALBUTerol/ipratropium for Nebulization 3 milliLiter(s) Nebulizer every 6 hours  metoprolol Injectable 5 milliGRAM(s) IV Push every 4 hours  acetylcysteine 20% Inhalation 3 milliLiter(s) Inhalation every 6 hours  heparin  Infusion 1200 Unit(s)/Hr (15 mL/Hr) IV Continuous <Continuous>  acetaminophen  IVPB. 1000 milliGRAM(s) IV Intermittent once  acetaminophen  IVPB. 1000 milliGRAM(s) IV Intermittent once  micafungin IVPB        MEDICATIONS  (PRN):  HYDROmorphone  Injectable 0.5 milliGRAM(s) IV Push every 3 hours PRN Moderate Pain (4 - 6)    ASSESSMENT/PLAN  61 y/o M with worsening back pain and bilateral lower extremity weakness found to have metastatic non-small cell lung cancer s/p T8-T11 decompression and tumor debulking. Course complicated with respiratory distress found to have right upper lobe PE and small bowel perforation s/p exploratory laparotomy and enteroenterostomy. He is now with functional, gait, ADL impairments.    Disposition - Will continue to follow patient to make final rehabilitation determination when medically stable and cleared, but will likely require acute inpatient spinal cord injury rehabilitation for functional impairments consisting of 3 hours of therapy/day & 24 hour RN/daily PMR physician for comorbid medical management.   PT - ROM, Bed Mob, Transfers, Amb with AD   OT - ADLs, ROM  SLP - Dysphagia eval and treat  Precautions - Falls, Cardiac, Aspiration  Pain control - Dilaudid PRN, Tylenol DELISA  DVT Prophylaxis - Heparin drip, SCD's  Weight bearing status - Full weight bearing  Skin - Turn Q2hrs  Diet - NPO

## 2017-09-26 NOTE — PROGRESS NOTE ADULT - ASSESSMENT
63 y/o M with no significant PMH presents 9/19 with bilateral LE weakness/paraplegia x several weeks. 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 CT evidence of L bronchial obstruction 2nd lesion with LLL collapse, necrotic mediastinal/hilar LN, liver mets, ?adrenal mets. Hospital course c/b RUL segmental PE, bilateral soleal DVT. Further c/b SB perforation s/p SBR, primary anastomosis on 9/22, RTOR 9/24 for ex-lap, washout, re-anastomosis and abdominal closure. Abdominal cultures with E. Faecium. Extubated 9/24. Now with L lung atelectasis.

## 2017-09-26 NOTE — PROGRESS NOTE ADULT - ASSESSMENT
62M with no significant PMH presenting with spinal cord compression of thoracic spine s/p laminectomy with surgical decompression and biopsy of mass taken. Pan-CT concerning for possible lung primary. Suspicious lesions of liver and adrenal gland concerning for metastases.   Now s/p Laminectomy and decompression POD  Abdominal Pain and distension - likely due to ileus  AXR - 9/21 c/w ileus, (stool and contrast in Right colon)  s/p RRT due to respiratory distress 9/22 am- s/p intubation, sedation, pressor support and new ALEC with hyperkalemia  CT 9/22 - SB perforation and PE  s/p Exploratory laparotomy 9/22/17, SBR x1 (left in discontinuity), abdominal washout, abthera VAC application (9/22)  RTOR 9/24/17; ex-lap, washout, re-anastomosis, abdominal closure    PLAN  NPO, NGT  IV PPI  Post-op care per SICU  Await GI function  On IV Anti-microbials - Zosyn, Vanco, Diflucan (E. faecium in peritoneal culture)  AC per Hem/Onc (PE)  Monitor Hgb (currently stable)      Bandar Baker PA-C    [facility]  [phone] 62M with no significant PMH presenting with spinal cord compression of thoracic spine s/p laminectomy with surgical decompression and biopsy of mass taken. Pan-CT concerning for possible lung primary.  Suspicious lesions of liver and adrenal gland concerning for metastases.   Now s/p Laminectomy and decompression.  Abdominal Pain and distension - likely due to ileus  AXR - 9/21 c/w ileus, (stool and contrast in Right colon)  s/p RRT due to respiratory distress 9/22 am- s/p intubation, sedation, pressor support and new ALEC with hyperkalemia  CT 9/22 - SB perforation and PE  s/p Exploratory laparotomy 9/22/17, SBR x1 (left in discontinuity), abdominal washout, abthera VAC application (9/22)  RTOR 9/24/17; ex-lap, washout, re-anastomosis, abdominal closure    PLAN  NPO, NGT  IV PPI  Post-op care per SICU  Await GI function  On IV Anti-microbials - Zosyn, Vanco, Diflucan (E. faecium in peritoneal culture)  AC per Hem/Onc (PE)  Monitor Hgb (currently stable)    Bandar Baker PA-C    [facility]  [phone]

## 2017-09-26 NOTE — PROGRESS NOTE ADULT - SUBJECTIVE AND OBJECTIVE BOX
Patient is a 62y old  Male who presents with a chief complaint of cord compression (19 Sep 2017 09:44)      INTERVAL HPI/OVERNIGHT EVENTS:  events reviewed. now on CPAP  no BM or flatus, NGT remains in place  no fever or chills    MEDICATIONS  (STANDING):  pantoprazole  Injectable 40 milliGRAM(s) IV Push daily  fluconAZOLE IVPB 200 milliGRAM(s) IV Intermittent every 24 hours  lactated ringers. 1000 milliLiter(s) (50 mL/Hr) IV Continuous <Continuous>  piperacillin/tazobactam IVPB. 3.375 Gram(s) IV Intermittent every 8 hours  vancomycin  IVPB 1000 milliGRAM(s) IV Intermittent every 12 hours  vancomycin  IVPB      ALBUTerol/ipratropium for Nebulization 3 milliLiter(s) Nebulizer every 6 hours  metoprolol Injectable 5 milliGRAM(s) IV Push every 4 hours  acetylcysteine 20% Inhalation 3 milliLiter(s) Inhalation every 6 hours  heparin  Infusion 1200 Unit(s)/Hr (15 mL/Hr) IV Continuous <Continuous>  acetaminophen  IVPB. 1000 milliGRAM(s) IV Intermittent once  acetaminophen  IVPB. 1000 milliGRAM(s) IV Intermittent once    MEDICATIONS  (PRN):  HYDROmorphone  Injectable 0.5 milliGRAM(s) IV Push every 3 hours PRN Moderate Pain (4 - 6)      Allergies    No Known Allergies    Intolerances        Review of Systems:  Gen: no fever or chills  CV: Denies CP  Resp: episode of hypoxia, currently on Bipap  Unable to obtain further ROS as pt. is lethargic but responsive    Vital Signs Last 24 Hrs  T(C): 36.5 (26 Sep 2017 08:00), Max: 36.8 (25 Sep 2017 15:00)  T(F): 97.7 (26 Sep 2017 08:00), Max: 98.3 (25 Sep 2017 15:00)  HR: 67 (26 Sep 2017 10:35) (65 - 100)  BP: 141/74 (26 Sep 2017 10:00) (120/75 - 157/86)  BP(mean): 100 (26 Sep 2017 10:00) (92 - 112)  RR: 11 (26 Sep 2017 10:35) (11 - 32)  SpO2: 100% (26 Sep 2017 10:35) (85% - 100%)    PHYSICAL EXAM:  Constitutional: +NGT - green, bilious, on nasal CPAP, drowsy but awake and responsive, occasionally confused  Neck: + left neck TLC  Respiratory: decreased BS bases  Cardiovascular: S1 and S2, regular  Gastrointestinal: distended, nontender, midline dressing with minimal sanguinous staining, otherwise dry and intact +michelle  Back:  +hemovac x 2 in place  Extremities: +edema b/l  Vascular: 2+ peripheral pulses  Neurological: lethargic, occ confused but arousable  Skin: No rashes    LABS:                        10.5   31.1  )-----------( 88       ( 26 Sep 2017 01:08 )             31.6     Hemoglobin: 11.0 g/dL <L> [13.0 - 17.0] (09-25 @ 15:48)  Hemoglobin: 10.4 g/dL <L> [13.0 - 17.0] (09-25 @ 02:28)  Hemoglobin: 9.9 g/dL <L> [13.0 - 17.0] (09-24 @ 16:07)  Hemoglobin: 8.7 g/dL <L> [13.0 - 17.0] (09-24 @ 05:05)  Hemoglobin: 10.2 g/dL <L> [13.0 - 17.0] (09-23 @ 12:07)        09-26    141  |  104  |  58<H>  ----------------------------<  109<H>  5.2   |  25  |  1.24    Ca    8.3<L>      26 Sep 2017 01:09  Phos  4.7     09-26  Mg     3.5     09-26    TPro  5.3<L>  /  Alb  2.6<L>  /  TBili  3.0<H>  /  DBili  2.2<H>  /  AST  77<H>  /  ALT  41  /  AlkPhos  100  09-26    PT/INR - ( 26 Sep 2017 01:08 )   PT: 14.9 sec;   INR: 1.37 ratio         PTT - ( 26 Sep 2017 08:21 )  PTT:56.8 sec          RADIOLOGY & ADDITIONAL TESTS:      Surgical Pathology Report:   ACCESSION No:  [id number]  KATHY YU                 2  Surgical Final Report    Final Diagnosis    1. 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 Spine, designated "thoracic lamina", resection:  - Metastatic poorly differentiated non-small cell carcinoma  involving bone and soft tissue, consistent with lung origin  (See  comment) Patient is a 62y old  Male who presents with a chief complaint of cord compression (19 Sep 2017 09:44)    INTERVAL HPI/OVERNIGHT EVENTS:  events reviewed. now on CPAP  no BM or flatus, NGT remains in place  no fever or chills    MEDICATIONS  (STANDING):  pantoprazole  Injectable 40 milliGRAM(s) IV Push daily  fluconAZOLE IVPB 200 milliGRAM(s) IV Intermittent every 24 hours  lactated ringers. 1000 milliLiter(s) (50 mL/Hr) IV Continuous <Continuous>  piperacillin/tazobactam IVPB. 3.375 Gram(s) IV Intermittent every 8 hours  vancomycin  IVPB 1000 milliGRAM(s) IV Intermittent every 12 hours  vancomycin  IVPB      ALBUTerol/ipratropium for Nebulization 3 milliLiter(s) Nebulizer every 6 hours  metoprolol Injectable 5 milliGRAM(s) IV Push every 4 hours  acetylcysteine 20% Inhalation 3 milliLiter(s) Inhalation every 6 hours  heparin  Infusion 1200 Unit(s)/Hr (15 mL/Hr) IV Continuous <Continuous>  acetaminophen  IVPB. 1000 milliGRAM(s) IV Intermittent once  acetaminophen  IVPB. 1000 milliGRAM(s) IV Intermittent once    MEDICATIONS  (PRN):  HYDROmorphone  Injectable 0.5 milliGRAM(s) IV Push every 3 hours PRN Moderate Pain (4 - 6)    Allergies  No Known Allergies    Review of Systems:  Gen: no fever or chills  CV: Denies CP  Resp: episode of hypoxia, currently on Bipap  Unable to obtain further ROS as pt. is lethargic but responsive    Vital Signs Last 24 Hrs  T(C): 36.5 (26 Sep 2017 08:00), Max: 36.8 (25 Sep 2017 15:00)  T(F): 97.7 (26 Sep 2017 08:00), Max: 98.3 (25 Sep 2017 15:00)  HR: 67 (26 Sep 2017 10:35) (65 - 100)  BP: 141/74 (26 Sep 2017 10:00) (120/75 - 157/86)  BP(mean): 100 (26 Sep 2017 10:00) (92 - 112)  RR: 11 (26 Sep 2017 10:35) (11 - 32)  SpO2: 100% (26 Sep 2017 10:35) (85% - 100%)    PHYSICAL EXAM:  Constitutional: +NGT - green, bilious, on nasal CPAP, drowsy but awake and responsive, occasionally confused  Neck: + left neck TLC  Respiratory: decreased BS bases  Cardiovascular: S1 and S2, regular  Gastrointestinal: distended, nontender, midline dressing with minimal sanguinous staining, otherwise dry and intact +michelle  Back:  +hemovac x 2 in place  Extremities: +edema b/l  Vascular: 2+ peripheral pulses  Neurological: lethargic, occ confused but arousable  Skin: No rashes    LABS:                      10.5   31.1  )-----------( 88       ( 26 Sep 2017 01:08 )             31.6     Hemoglobin: 11.0 g/dL <L> [13.0 - 17.0] (09-25 @ 15:48)  Hemoglobin: 10.4 g/dL <L> [13.0 - 17.0] (09-25 @ 02:28)  Hemoglobin: 9.9 g/dL <L> [13.0 - 17.0] (09-24 @ 16:07)  Hemoglobin: 8.7 g/dL <L> [13.0 - 17.0] (09-24 @ 05:05)  Hemoglobin: 10.2 g/dL <L> [13.0 - 17.0] (09-23 @ 12:07)    09-26    141  |  104  |  58<H>  ----------------------------<  109<H>  5.2   |  25  |  1.24    Ca    8.3<L>      26 Sep 2017 01:09  Phos  4.7     09-26  Mg     3.5     09-26    TPro  5.3<L>  /  Alb  2.6<L>  /  TBili  3.0<H>  /  DBili  2.2<H>  /  AST  77<H>  /  ALT  41  /  AlkPhos  100  09-26    PT/INR - ( 26 Sep 2017 01:08 )   PT: 14.9 sec;   INR: 1.37 ratio         PTT - ( 26 Sep 2017 08:21 )  PTT:56.8 sec          RADIOLOGY & ADDITIONAL TESTS:      Surgical Pathology Report:   ACCESSION No:  10 [id number]  KATHY YU                 2  Surgical Final Report    Final Diagnosis    1. 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

## 2017-09-26 NOTE — PROGRESS NOTE ADULT - ATTENDING COMMENTS
Pt seen and examined.  Chart reviewed.  Resident note confirmed.  Pt is a 62 year old male admitted to neurosurgery for bilateral leg weakness found to have newly diagnosed metastatic disease.  Pt is s/p T8-T11 laminectomy with subsequent deterioration in respiratory status/acute onset abdominal pain.  Work up revealed bilateral DVTs, right upper lobe PE, and small intestinal perforation.  Pt is s/p Exploratory laparotomy, SBR x1 (left in discontinuity), abdominal washout, abthera VAC application (9/22) with intraop findings of an intestinal perforation and a large amount of succus in the abdomen. Pt s/p return to OR on 9/24 for intestinal reconstruction and abdominal closure.  No acute events overnight.  Continue pain control.  Echocardiogram is without evidence of right heart strain.  Left lung atelectasis, improved with CPAP.  Keep NPO and await return of bowel function. Marked leukocytosis noted.  Cultures sent. Broadening abx discussed with Dr. Padilla. Continue heparin for tx of PE.  Continue supportive care.

## 2017-09-26 NOTE — PHYSICAL THERAPY INITIAL EVALUATION ADULT - IMPAIRMENTS FOUND, PT EVAL
gait, locomotion, and balance/sensory integrity/ROM/ventilation and respiration/gas exchange/muscle strength/decreased midline orientation/ergonomics and body mechanics
gait, locomotion, and balance/ROM/aerobic capacity/endurance/muscle strength

## 2017-09-26 NOTE — PHYSICAL THERAPY INITIAL EVALUATION ADULT - DIAGNOSIS, PT EVAL
Decreased functional mobility Pt with decreased strength, endurance and balance leading to significant impairment in functional mobility.

## 2017-09-26 NOTE — PROGRESS NOTE ADULT - ASSESSMENT
ASSESSMENT: 62 year old male s/p small bowel perforation, resection, left in discontinuity with abthera (9/22), s/p ex-lap, washout, re-anastomosis, abdominal closure (9/24). Complicated by acute hypoxemic respiratory failure requiring CPAP.     PLAN:  Neurologic: post-operative pain  -Dilaudid PRN    Respiratory: acute hypoxemic respiratory failure   -CPAP, wean as tolerated  -Diuresis w/ IV Lasix  -Duonebs, Mucomyst    Cardiovascular: HTN  -Metoprolol 5mg q4 w/ hold parameters    Gastrointestinal/Nutrition: small bowel perforation s/p SBR, anastomosis   -NPO/NGT  -Await GI function  -Protonix    Renal/Genitourinary: ALEC, resolving  -Torres  -LR @ 50    Hematologic: b/l DVT; b/l PE  -Heparin gtt     Infectious Disease: gisselle intra abdominal spillage, E. Faecium growing from peritoneal cultures (vanc sensitive)  -Continue vancomycin, Zosyn, fluconazole  -Reculture for fevers    Disposition: SICU, Full Code, consider palliative care consult      -Unique Mcarthur PA-C  [id number]

## 2017-09-26 NOTE — PHYSICAL THERAPY INITIAL EVALUATION ADULT - BALANCE DISTURBANCE, IDENTIFIED IMPAIRMENT CONTRIBUTE, REHAB EVAL
decreased strength/impaired postural control/decreased ROM/decreased sensation/impaired motor control/impaired sensory feedback
impaired postural control/decreased ROM/decreased strength/impaired motor control/pain

## 2017-09-26 NOTE — PHYSICAL THERAPY INITIAL EVALUATION ADULT - DID THE PATIENT HAVE SURGERY?
yes/T8-T11 laminectomy and decompression of tumor; 9/22: OR: ex-lap, washout, SBRx1, placement of abthera, 9/24: RTOR: re-anastomose, washout, closure
yes/T8-T11 laminectomy and decompression of tumor

## 2017-09-26 NOTE — PHYSICAL THERAPY INITIAL EVALUATION ADULT - BED MOBILITY LIMITATIONS, REHAB EVAL
decreased ability to use legs for bridging/pushing/impaired ability to control trunk for mobility
decreased ability to use legs for bridging/pushing/decreased ability to use arms for pushing/pulling

## 2017-09-26 NOTE — PROGRESS NOTE ADULT - ATTENDING COMMENTS
The patient continues to require around the clock cardiopulmonary and neurologic monitoring for critical illness, as cited above. Patient is a 62y old  Male who presents with a chief complaint of cord compression'  Post op in SICU after small bowel perforation, unrelated to metastatic cancer with 2 explorations to close and clean peritonitis with enterostomy.   He is now extubated and oriented X3.   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        The patient continues to require around the clock cardiopulmonary and neurologic monitoring for critical illness, as cited above.

## 2017-09-26 NOTE — PHYSICAL THERAPY INITIAL EVALUATION ADULT - PLANNED THERAPY INTERVENTIONS, PT EVAL
bed mobility training/gait training/neuromuscular re-education/transfer training/balance training/strengthening
gait training/transfer training/strengthening/balance training/bed mobility training

## 2017-09-26 NOTE — PROGRESS NOTE ADULT - PROBLEM SELECTOR PLAN 1
re-expanded with NIV  -Keep sp02>92%, attempt to downgrade to nasal cannula  -c/w Bipap 12/5 qHS   -Worsened leukocytosis likely reactive 2nd post obstructive PNA, c/w vanco, zosyn. -Obtain sputum culture  -Duoneb q6  -Chest PT, OOB, Acapella valve for mucous clearance re-expanded with Bipap 15/5 now on hi antonio 40/40  -Keep sp02>92%,  -c/w Bipap 15/5 qHS, chest pt, nebs/mucomyst  cxr in am  -Worsened leukocytosis likely reactive 2nd post obstructive PNA, c/w vanco, zosyn. -Obtain sputum culture  -Duoneb q6  -Chest PT, OOB, Acapella valve for mucous clearance

## 2017-09-26 NOTE — PROGRESS NOTE ADULT - ASSESSMENT
ThisHPI: 62y male admitted to neurosurgery for bilateral leg weakness found to have newly diagnosed metastatic disease s/p T8-T11 laminectomy presented today with deterioration in respiratory status and acute onset abdominal pain who was subsequently intubated and w/u revealed b/l DVTs, right upper lobe PE, and a bowel perforation.  Pt is now s/p Exploratory laparotomy #1, SBR x1 (left in discontinuity), abdominal washout, abthera VAC application (9/22) with intraop findings of d a large amount of succus as well as lettuce in the abdomen along with a 1cm perforation in the small bowel 110 cm distal to the ligament of treitz. )(/24/17 Exploratory OR #2 with abdominal irrigation and enterostomy. Right leg motor function being monitored by neurosurgery after emergency admission spinal cord decompression of metastatic tumor T8-!1. Still awaiting tissue type of metastatic lung cancer. ThisHPI: 62y male admitted to neurosurgery for bilateral leg weakness found to have newly diagnosed metastatic disease s/p T8-T11 laminectomy presented today with deterioration in respiratory status and acute onset abdominal pain who was subsequently intubated and w/u revealed b/l DVTs, right upper lobe PE, and a bowel perforation.  Pt is now s/p Exploratory laparotomy #1, SBR x1 (left in discontinuity), abdominal washout, abthera VAC application (9/22) with intraop findings of d a large amount of succus as well as lettuce in the abdomen along with a 1cm perforation in the small bowel 110 cm distal to the ligament of treitz. )(/24/17 Exploratory OR #2 with abdominal irrigation and enterostomy. Right leg motor function being monitored by neurosurgery after emergency admission spinal cord decompression of metastatic tumor T8-!1. Lung cancer pathology is non small cell.

## 2017-09-26 NOTE — PROGRESS NOTE ADULT - ATTENDING COMMENTS
62 year old male s/p exploratory laparotomy for a small bowel perforation and his abdomen was closed.  Patient was diuresed -4.5 liters.   His respiratory status has improved.   Current management includes:    1) Hypoxemic respiratory failure-improved and now on high flow nasal cannula 40 liters and 70%      CXR- improved and left lung reexpanded, small left sided pleural effusion    -patient to be on nocturnal cpap      2) leukocytosis - stable today  - E faecalis and few yeast cells   -will continue zosyn, vancomycin and fluconazole  -will send blood cultures, urinalysis as leukocytosis remains elevated    3) Right segmental and subsegmental right upper lobe- restarted heparin drip this morning  - 1500 units / hour  -PTT to be monitored with goal 60-90    4) Keep NPO with IVF at 30 cc/hour

## 2017-09-26 NOTE — PROGRESS NOTE ADULT - SUBJECTIVE AND OBJECTIVE BOX
Follow-up Pulm Progress Note    L lung re-expanded with NIV  Currently on minimal hi antonio settings, alert and awake  +yellow sputum, difficult to expectorate    Medications:  MEDICATIONS  (STANDING):  pantoprazole  Injectable 40 milliGRAM(s) IV Push daily  lactated ringers. 1000 milliLiter(s) (50 mL/Hr) IV Continuous <Continuous>  piperacillin/tazobactam IVPB. 3.375 Gram(s) IV Intermittent every 8 hours  vancomycin  IVPB 1000 milliGRAM(s) IV Intermittent every 12 hours  vancomycin  IVPB      ALBUTerol/ipratropium for Nebulization 3 milliLiter(s) Nebulizer every 6 hours  metoprolol Injectable 5 milliGRAM(s) IV Push every 4 hours  acetylcysteine 20% Inhalation 3 milliLiter(s) Inhalation every 6 hours  heparin  Infusion 1200 Unit(s)/Hr (15 mL/Hr) IV Continuous <Continuous>  acetaminophen  IVPB. 1000 milliGRAM(s) IV Intermittent once  acetaminophen  IVPB. 1000 milliGRAM(s) IV Intermittent once  micafungin IVPB      micafungin IVPB 100 milliGRAM(s) IV Intermittent once    MEDICATIONS  (PRN):  HYDROmorphone  Injectable 0.5 milliGRAM(s) IV Push every 3 hours PRN Moderate Pain (4 - 6)          Vital Signs Last 24 Hrs  T(C): 36.5 (26 Sep 2017 08:00), Max: 36.8 (25 Sep 2017 15:00)  T(F): 97.7 (26 Sep 2017 08:00), Max: 98.3 (25 Sep 2017 15:00)  HR: 79 (26 Sep 2017 11:48) (65 - 96)  BP: 172/90 (26 Sep 2017 11:00) (120/75 - 172/90)  BP(mean): 105 (26 Sep 2017 11:00) (92 - 112)  RR: 17 (26 Sep 2017 11:00) (11 - 32)  SpO2: 98% (26 Sep 2017 11:48) (96% - 100%) on Hi Antonio 40L/min 40%    ABG - ( 26 Sep 2017 01:04 )  pH: 7.42  /  pCO2: 41    /  pO2: 223   / HCO3: 26    / Base Excess: 1.8   /  SaO2: 100                   09-25 @ 07:01  -  09-26 @ 07:00  --------------------------------------------------------  IN: 1688 mL / OUT: 6350 mL / NET: -4662 mL          LABS:                        10.5   31.1  )-----------( 88       ( 26 Sep 2017 01:08 )             31.6     09-26    141  |  104  |  58<H>  ----------------------------<  109<H>  5.2   |  25  |  1.24    Ca    8.3<L>      26 Sep 2017 01:09  Phos  4.7     09-26  Mg     3.5     09-26    TPro  5.3<L>  /  Alb  2.6<L>  /  TBili  3.0<H>  /  DBili  2.2<H>  /  AST  77<H>  /  ALT  41  /  AlkPhos  100  09-26          CAPILLARY BLOOD GLUCOSE        PT/INR - ( 26 Sep 2017 01:08 )   PT: 14.9 sec;   INR: 1.37 ratio         PTT - ( 26 Sep 2017 08:21 )  PTT:56.8 sec    Procalcitonin, Serum: 6.39 ng/mL (26 Sep 2017 01:09)  Procalcitonin, Serum: 11.97 ng/mL (25 Sep 2017 02:28)  Procalcitonin, Serum: 11.18 ng/mL (24 Sep 2017 15:52)    Serum Pro-Brain Natriuretic Peptide: 7093 pg/mL (26 Sep 2017 01:09)  Serum Pro-Brain Natriuretic Peptide: 7042 pg/mL (25 Sep 2017 02:28)                CULTURES: (if applicable)          Physical Examination:  PULM: Decreased BS L base  CVS: S1, S2 RRR    RADIOLOGY REVIEWED  CXR: L pleural effusion, L basilar opacity

## 2017-09-26 NOTE — PROGRESS NOTE ADULT - PROBLEM SELECTOR PLAN 2
with bilateral soleal DVT  -Provoked 2nd malignancy   -AC with heparin given ongoing surgical issues, recent spinal surgery  -Underlying coagulopathy 2nd liver disease   -Heme following

## 2017-09-26 NOTE — PHYSICAL THERAPY INITIAL EVALUATION ADULT - IMPAIRED TRANSFERS: SIT/STAND, REHAB EVAL
impaired motor control/pain/impaired postural control/impaired balance/decreased ROM/decreased strength/cognition

## 2017-09-27 NOTE — PROGRESS NOTE ADULT - ASSESSMENT
ASSESSMENT: 62 year old male s/p small bowel perforation, resection, left in discontinuity with abthera (9/22), s/p ex-lap, washout, re-anastomosis, abdominal closure (9/24). Complicated by acute hypoxemic respiratory failure requiring CPAP.     PLAN:  Neurologic: post-operative pain  -Dilaudid PRN  -Standing IV Tylenol    Respiratory: acute hypoxemic respiratory failure   -High flow during day, CPAP at night to stent L lung open; wean as tolerated  -Diuresis w/ IV Lasix as needed  -Duonebs, Mucomyst    Cardiovascular: HTN  -Metoprolol 5mg q4 w/ hold parameters    Gastrointestinal/Nutrition: small bowel perforation s/p SBR, anastomosis   -NPO/NGT  -Await GI function  -Protonix    Renal/Genitourinary: ALEC, resolving  -Torres  -LR @ 50    Hematologic: b/l DVT; b/l PE  -Heparin gtt     Infectious Disease: gisselle intra abdominal spillage, E. Faecium growing from peritoneal cultures (vanc sensitive); leukocytosis  -Continue vancomycin, Zosyn, micafungin  -Follow up cultures from yesterday  -Send C.diff specimen if pt has BM    Disposition: SICU, Full Code, consider palliative care consult to help manage symptoms       -FRANCES PateC  [id number]

## 2017-09-27 NOTE — PROGRESS NOTE ADULT - SUBJECTIVE AND OBJECTIVE BOX
Visit Summary: Patient seen and evaluated at bedside. No new complaints    Overnight Events: none  Exam:  T(C): 36.6 (09-27-17 @ 15:00), Max: 36.8 (09-27-17 @ 11:00)  HR: 101 (09-27-17 @ 17:00) (73 - 101)  BP: 106/62 (09-27-17 @ 17:00) (105/67 - 138/80)  RR: 28 (09-27-17 @ 17:00) (12 - 34)  SpO2: 95% (09-27-17 @ 17:00) (95% - 100%)  Wt(kg): --    PHYSICAL EXAM:    Constitutional: No Acute Distress     Neurological: AOx3, Following Commands    Motor exam:          Upper extremity                         Delt     Bicep     Tricep    HG                                                 R         5/5        5/5        5/5       5/5                                               L          5/5        5/5        5/5       5/5          Lower extremity                        HF         KF        KE       DF         PF                                                  R        0/5        0/5        0/5      0/5        0/5                                               L         1/5        0/5       0/5       2/5        2/5                                                 Sensation: [x] intact to light touch  [] decreased:     No clonus    Incision was clean dry and intact                   10.8   32.9  )-----------( 103      ( 27 Sep 2017 00:45 )             32.6     09-27    140  |  102  |  45<H>  ----------------------------<  86  4.8   |  27  |  1.06    Ca    8.0<L>      27 Sep 2017 00:45  Phos  4.7     09-27  Mg     3.0     09-27    TPro  4.8<L>  /  Alb  2.4<L>  /  TBili  3.1<H>  /  DBili  2.6<H>  /  AST  60<H>  /  ALT  33  /  AlkPhos  91  09-27  PT/INR - ( 27 Sep 2017 00:45 )   PT: 14.5 sec;   INR: 1.32 ratio         PTT - ( 27 Sep 2017 00:45 )  PTT:73.8 sec

## 2017-09-27 NOTE — PROGRESS NOTE ADULT - ATTENDING COMMENTS
Pt seen and examined.  Chart reviewed.  Resident note confirmed.  Pt is a 62 year old male admitted to neurosurgery for bilateral leg weakness found to have newly diagnosed metastatic disease.  Pt is s/p T8-T11 laminectomy with subsequent deterioration in respiratory status/acute onset abdominal pain.  Work up revealed bilateral DVTs, right upper lobe PE, and small intestinal perforation.  Pt is s/p Exploratory laparotomy, SBR x1 (left in discontinuity), abdominal washout, abthera VAC application (9/22) with intraop findings of an intestinal perforation and a large amount of succus in the abdomen. Pt s/p return to OR on 9/24 for intestinal reconstruction and abdominal closure.  No acute events overnight.  Continue pain control.  Echocardiogram is without evidence of right heart strain.  Left lung atelectasis recurred and  CPAP has been reinstituted.  Start diuresis for hypervolemia.  Keep NPO and await return of bowel function. Suggest portal vein duplex to rule out thrombosis in setting of DVT/PE/elevated LFT’s.  Marked leukocytosis noted.  Cultures sent. Continue antimicrobials per SICU team. Continue heparin for tx of PE.  Continue supportive care.

## 2017-09-27 NOTE — PROGRESS NOTE ADULT - SUBJECTIVE AND OBJECTIVE BOX
24 hr events: Pt remained on high flow throughout the day. His WBC count was >30 and he was pan cultured (blood, UA, Fungitell) with the intent of sending C. diff if pt had BM (which he did not). His antibiotics were broadened to Zosyn, vancomycin, and micafungin. Overnight he was placed on CPAP 10/50%. He was noted to have some bloody NGT output, although his H/H remained stable and he remained hemodynamically stable. His Protonix was changed to BID and his NGT was changed to intermittent suction from continuous. HISTORY  62 year old male admitted to neurosurgery for b/l leg weakness found to have newly diagnosed metastatic disease with spinal met causing spinal cord compression s/p T8-T11 laminectomy on 9/20, post-op course complicated by deterioration in respiratory status and acute onset abdominal pain requiring intubation and transfer to NSCU. CT chest/abdomen/pelvis demonstrated b/l DVTs, right upper lobe PE, and a bowel perforation with free air. Pt was taken emergently to the OR and is now s/p exploratory laparotomy, SBR x1 (left in discontinuity), abdominal washout, abthera VAC application on 9/22, after which he was transferred to SICU under ACS. He was taken back to the OR on 9/24 for a washout, primary anastomosis, and abdominal closure.     24 HOUR EVENTS: Pt remained on high flow throughout the day. His WBC count was >30 and he was pan cultured (blood, UA, Fungitell) with the intent of sending C. diff if pt had BM (which he did not). His antibiotics were broadened to Zosyn, vancomycin, and micafungin. Overnight he was placed on CPAP 10/50%. He was noted to have some bloody NGT output, although his H/H remained stable and he remained hemodynamically stable. His Protonix was changed to BID and his NGT was changed to intermittent suction from continuous.    SUBJECTIVE/ROS:  [ ] A ten-point review of systems was otherwise negative except as noted.  [ ] Due to altered mental status/intubation, subjective information were not able to be obtained from the patient. History was obtained, to the extent possible, from review of the chart and collateral sources of information.      NEURO  RASS:  0    Exam: alert and oriented x3, NAD  Meds: HYDROmorphone  Injectable 0.5 milliGRAM(s) IV Push every 3 hours PRN Moderate Pain (4 - 6)    [x] Adequacy of sedation and pain control has been assessed and adjusted      RESPIRATORY  RR: 16 (09-27-17 @ 04:00) (11 - 27)  SpO2: 100% (09-27-17 @ 04:00) (92% - 100%)    Exam: unlabored, clear to auscultation bilaterally  Mechanical Ventilation: n/a  ABG - ( 26 Sep 2017 01:04 )  pH: 7.42  /  pCO2: 41    /  pO2: 223   / HCO3: 26    / Base Excess: 1.8   /  SaO2: 100     Lactate: x        [n/a ] Extubation Readiness Assessed  Meds: ALBUTerol/ipratropium for Nebulization 3 milliLiter(s) Nebulizer every 6 hours  acetylcysteine 20% Inhalation 3 milliLiter(s) Inhalation every 6 hours        CARDIOVASCULAR  HR: 86 (09-27-17 @ 04:00) (66 - 86)  BP: 114/70 (09-27-17 @ 04:00) (102/62 - 172/90)  BP(mean): 87 (09-27-17 @ 04:00) (76 - 112)    VBG - ( 27 Sep 2017 01:17 )  pH: 7.38  /  pCO2: 50    /  pO2: 40    / HCO3: 29    / Base Excess: 3.3   /  SaO2: 68     Lactate: 1.5      Exam: regular rate and rhythm  Cardiac Rhythm: sinus rhythm with intermittent episodes of bigeminy  Perfusion     [x ]Adequate   [ ]Inadequate  Mentation   [ x]Normal       [ ]Reduced  Extremities  [ x]Warm         [ ]Cool  Volume Status [ ]Hypervolemic [ x]Euvolemic [ ]Hypovolemic  Meds: metoprolol Injectable 5 milliGRAM(s) IV Push every 4 hours      GI/NUTRITION  Exam: distended, nontender, midline dressing with minimal sanguinous staining, otherwise dry and intact   Diet: NPO  Meds: pantoprazole  Injectable 40 milliGRAM(s) IV Push every 12 hours      GENITOURINARY  I&O's Detail    09-25 @ 07:01  -  09-26 @ 07:00  --------------------------------------------------------  IN:    heparin Infusion: 288 mL    IV PiggyBack: 300 mL    lactated ringers.: 900 mL    Solution: 200 mL  Total IN: 1688 mL    OUT:    Accordian: 50 mL    Accordian: 105 mL    Indwelling Catheter - Urethral: 5945 mL    Nasoenteral Tube: 250 mL  Total OUT: 6350 mL    Total NET: -4662 mL      09-26 @ 07:01  -  09-27 @ 04:36  --------------------------------------------------------  IN:    heparin Infusion: 313 mL    IV PiggyBack: 775 mL    lactated ringers.: 1050 mL    Solution: 75 mL  Total IN: 2213 mL    OUT:    Accordian: 60 mL    Indwelling Catheter - Urethral: 2310 mL    Nasoenteral Tube: 100 mL  Total OUT: 2470 mL    Total NET: -257 mL          09-27    140  |  102  |  45<H>  ----------------------------<  86  4.8   |  27  |  1.06    Ca    8.0<L>      27 Sep 2017 00:45  Phos  4.7     09-27  Mg     3.0     09-27    TPro  4.8<L>  /  Alb  2.4<L>  /  TBili  3.1<H>  /  DBili  2.6<H>  /  AST  60<H>  /  ALT  33  /  AlkPhos  91  09-27    [x ] Torres catheter, indication: urine output monitoring in critically ill  Meds: lactated ringers. 1000 milliLiter(s) IV Continuous <Continuous>        HEMATOLOGIC  Meds: heparin  Infusion 1200 Unit(s)/Hr IV Continuous <Continuous>    [x] VTE Prophylaxis                        10.8   32.9  )-----------( 103      ( 27 Sep 2017 00:45 )             32.6     PT/INR - ( 27 Sep 2017 00:45 )   PT: 14.5 sec;   INR: 1.32 ratio         PTT - ( 27 Sep 2017 00:45 )  PTT:73.8 sec  Transfusion     [ ] PRBC   [ ] Platelets   [ ] FFP   [ ] Cryoprecipitate      INFECTIOUS DISEASES  T(C): 36.6 (09-27-17 @ 03:00), Max: 36.7 (09-26-17 @ 19:00)    WBC Count: 32.9 K/uL (09-27 @ 00:45)    Recent Cultures: none    Meds: piperacillin/tazobactam IVPB. 3.375 Gram(s) IV Intermittent every 8 hours  vancomycin  IVPB 1000 milliGRAM(s) IV Intermittent every 12 hours  vancomycin  IVPB      micafungin IVPB      micafungin IVPB 100 milliGRAM(s) IV Intermittent every 24 hours        ENDOCRINE  Capillary Blood Glucose: WNL    Meds: x      ACCESS DEVICES:  [x ] Peripheral IV  [ x] Central Venous Line	[ ] R	[x ] L	[x ] IJ	[ ] Fem	[ ] SC	Placed:   [ ] Arterial Line		[ ] R	[ ] L	[ ] Fem	[ ] Rad	[ ] Ax	Placed:   [ ] PICC:					[ ] Mediport  [x ] Urinary Catheter, Date Placed:   [x ] Necessity of urinary, arterial, and venous catheters discussed    OTHER MEDICATIONS:x      CODE STATUS:  Full code    IMAGING: < from: Xray Chest 1 View AP -PORTABLE-Routine (09.26.17 @ 07:11) >  Impression:    The heart isnormal in size. Left pleural effusion. Left lower lobe   pneumonia and atelectasis. The right lung is clear. Mild pulmonary   vascular congestion. Life supporting devices are in good position and   unchanged when compared to previous study done 9/25/2017. HISTORY  62 year old male admitted to neurosurgery for b/l leg weakness found to have newly diagnosed metastatic disease with spinal met causing spinal cord compression s/p T8-T11 laminectomy on 9/20, post-op course complicated by deterioration in respiratory status and acute onset abdominal pain requiring intubation and transfer to NSCU. CT chest/abdomen/pelvis demonstrated b/l DVTs, right upper lobe PE, and a bowel perforation with free air. Pt was taken emergently to the OR and is now s/p exploratory laparotomy, SBR x1 (left in discontinuity), abdominal washout, abthera VAC application on 9/22, after which he was transferred to SICU under ACS. He was taken back to the OR on 9/24 for a washout, primary anastomosis, and abdominal closure.     24 HOUR EVENTS: Pt remained on high flow throughout the day. His WBC count was >30 and he was pan cultured (blood, UA, Fungitell) with the intent of sending C. diff if pt had BM (which he did not). His antibiotics were broadened to Zosyn, vancomycin, and micafungin. Overnight he was placed on CPAP 10/50%. He was noted to have some bloody NGT output, although his H/H remained stable and he remained hemodynamically stable. His Protonix was changed to BID and his NGT was changed to intermittent suction from continuous.    SUBJECTIVE/ROS:  [ X] A ten-point review of systems was otherwise negative except as noted.  [ ] Due to altered mental status/intubation, subjective information were not able to be obtained from the patient. History was obtained, to the extent possible, from review of the chart and collateral sources of information.      NEURO  RASS:  0    Exam: alert and oriented x3, NAD  Meds: HYDROmorphone  Injectable 0.5 milliGRAM(s) IV Push every 3 hours PRN Moderate Pain (4 - 6)    [x] Adequacy of sedation and pain control has been assessed and adjusted      RESPIRATORY  RR: 16 (09-27-17 @ 04:00) (11 - 27)  SpO2: 100% (09-27-17 @ 04:00) (92% - 100%)    Exam: unlabored, clear to auscultation bilaterally  Mechanical Ventilation: n/a  ABG - ( 26 Sep 2017 01:04 )  pH: 7.42  /  pCO2: 41    /  pO2: 223   / HCO3: 26    / Base Excess: 1.8   /  SaO2: 100     Lactate: x        [n/a ] Extubation Readiness Assessed  Meds: ALBUTerol/ipratropium for Nebulization 3 milliLiter(s) Nebulizer every 6 hours  acetylcysteine 20% Inhalation 3 milliLiter(s) Inhalation every 6 hours        CARDIOVASCULAR  HR: 86 (09-27-17 @ 04:00) (66 - 86)  BP: 114/70 (09-27-17 @ 04:00) (102/62 - 172/90)  BP(mean): 87 (09-27-17 @ 04:00) (76 - 112)    VBG - ( 27 Sep 2017 01:17 )  pH: 7.38  /  pCO2: 50    /  pO2: 40    / HCO3: 29    / Base Excess: 3.3   /  SaO2: 68     Lactate: 1.5      Exam: regular rate and rhythm  Cardiac Rhythm: sinus rhythm with intermittent episodes of bigeminy  Perfusion     [x ]Adequate   [ ]Inadequate  Mentation   [ x]Normal       [ ]Reduced  Extremities  [ x]Warm         [ ]Cool  Volume Status [ ]Hypervolemic [ x]Euvolemic [ ]Hypovolemic  Meds: metoprolol Injectable 5 milliGRAM(s) IV Push every 4 hours      GI/NUTRITION  Exam: distended, nontender, midline dressing with minimal sanguinous staining, otherwise dry and intact   Diet: NPO  Meds: pantoprazole  Injectable 40 milliGRAM(s) IV Push every 12 hours      GENITOURINARY  I&O's Detail    09-25 @ 07:01  -  09-26 @ 07:00  --------------------------------------------------------  IN:    heparin Infusion: 288 mL    IV PiggyBack: 300 mL    lactated ringers.: 900 mL    Solution: 200 mL  Total IN: 1688 mL    OUT:    Accordian: 50 mL    Accordian: 105 mL    Indwelling Catheter - Urethral: 5945 mL    Nasoenteral Tube: 250 mL  Total OUT: 6350 mL    Total NET: -4662 mL      09-26 @ 07:01  -  09-27 @ 04:36  --------------------------------------------------------  IN:    heparin Infusion: 313 mL    IV PiggyBack: 775 mL    lactated ringers.: 1050 mL    Solution: 75 mL  Total IN: 2213 mL    OUT:    Accordian: 60 mL    Indwelling Catheter - Urethral: 2310 mL    Nasoenteral Tube: 100 mL  Total OUT: 2470 mL    Total NET: -257 mL          09-27    140  |  102  |  45<H>  ----------------------------<  86  4.8   |  27  |  1.06    Ca    8.0<L>      27 Sep 2017 00:45  Phos  4.7     09-27  Mg     3.0     09-27    TPro  4.8<L>  /  Alb  2.4<L>  /  TBili  3.1<H>  /  DBili  2.6<H>  /  AST  60<H>  /  ALT  33  /  AlkPhos  91  09-27    [x ] Torres catheter, indication: urine output monitoring in critically ill  Meds: lactated ringers. 1000 milliLiter(s) IV Continuous <Continuous>        HEMATOLOGIC  Meds: heparin  Infusion 1200 Unit(s)/Hr IV Continuous <Continuous>    [x] VTE Prophylaxis                        10.8   32.9  )-----------( 103      ( 27 Sep 2017 00:45 )             32.6     PT/INR - ( 27 Sep 2017 00:45 )   PT: 14.5 sec;   INR: 1.32 ratio         PTT - ( 27 Sep 2017 00:45 )  PTT:73.8 sec  Transfusion     [ ] PRBC   [ ] Platelets   [ ] FFP   [ ] Cryoprecipitate      INFECTIOUS DISEASES  T(C): 36.6 (09-27-17 @ 03:00), Max: 36.7 (09-26-17 @ 19:00)    WBC Count: 32.9 K/uL (09-27 @ 00:45)    Recent Cultures: none    Meds: piperacillin/tazobactam IVPB. 3.375 Gram(s) IV Intermittent every 8 hours  vancomycin  IVPB 1000 milliGRAM(s) IV Intermittent every 12 hours  vancomycin  IVPB      micafungin IVPB      micafungin IVPB 100 milliGRAM(s) IV Intermittent every 24 hours        ENDOCRINE  Capillary Blood Glucose: WNL    Meds: x      ACCESS DEVICES:  [x ] Peripheral IV  [ x] Central Venous Line	[ ] R	[x ] L	[x ] IJ	[ ] Fem	[ ] SC	Placed:   [ ] Arterial Line		[ ] R	[ ] L	[ ] Fem	[ ] Rad	[ ] Ax	Placed:   [ ] PICC:					[ ] Mediport  [x ] Urinary Catheter, Date Placed:   [x ] Necessity of urinary, arterial, and venous catheters discussed    OTHER MEDICATIONS:x      CODE STATUS:  Full code    IMAGING: < from: Xray Chest 1 View AP -PORTABLE-Routine (09.26.17 @ 07:11) >  Impression:    The heart isnormal in size. Left pleural effusion. Left lower lobe   pneumonia and atelectasis. The right lung is clear. Mild pulmonary   vascular congestion. Life supporting devices are in good position and   unchanged when compared to previous study done 9/25/2017.

## 2017-09-27 NOTE — PROGRESS NOTE ADULT - SUBJECTIVE AND OBJECTIVE BOX
[facility] Surgery Progress Note     Subjective/24hour Events: Pt remained on high flow throughout the day. His WBC count was >30 and he was pan cultured (blood, UA, Fungitell) with the intent of sending C. diff if pt had BM (which he did not). His antibiotics were broadened to Zosyn, vancomycin, and micafungin. Overnight he was placed on CPAP 10/50%. He was noted to have some bloody NGT output, although his H/H remained stable and he remained hemodynamically stable. His Protonix was changed to BID and his NGT was changed to intermittent suction from continuous.    T(C): 36.6 (09-27-17 @ 03:00), Max: 36.7 (09-26-17 @ 19:00)  HR: 81 (09-27-17 @ 07:00) (67 - 86)  BP: 122/74 (09-27-17 @ 07:00) (102/62 - 172/90)  RR: 20 (09-27-17 @ 07:00) (11 - 27)  SpO2: 100% (09-27-17 @ 07:00) (92% - 100%)  Wt(kg): --    09-26 @ 07:01  -  09-27 @ 07:00  --------------------------------------------------------  IN:    heparin Infusion: 358 mL    IV PiggyBack: 1025 mL    lactated ringers.: 1200 mL    Solution: 100 mL  Total IN: 2683 mL    OUT:    Accordian: 110 mL    Indwelling Catheter - Urethral: 2445 mL    Nasoenteral Tube: 200 mL  Total OUT: 2755 mL    Total NET: -72 mL      Physical Exam:  Gen: NAD,, following commands  Resp: on CPAP, no resp distress  Card: RRR  GI: Abthera wound vac in place  Ext: decreased strength in RLE    Labs:                        10.8   32.9  )-----------( 103      ( 27 Sep 2017 00:45 )             32.6     09-27    140  |  102  |  45<H>  ----------------------------<  86  4.8   |  27  |  1.06    Ca    8.0<L>      27 Sep 2017 00:45  Phos  4.7     09-27  Mg     3.0     09-27    TPro  4.8<L>  /  Alb  2.4<L>  /  TBili  3.1<H>  /  DBili  2.6<H>  /  AST  60<H>  /  ALT  33  /  AlkPhos  91  09-27      < from: Xray Foot AP + Lateral, Left (09.26.17 @ 10:56) >  Impression:    No acute fracture or dislocations involving the left foot. There is soft   tissue swelling around the lateral malleolus. Dedicated ankle radiograph   can be performed if clinically warranted.

## 2017-09-27 NOTE — PROGRESS NOTE ADULT - ASSESSMENT
62y male admitted to neurosurgery for bilateral leg weakness found to have newly diagnosed metastatic disease s/p T8-T11 laminectomy presented today with deterioration in respiratory status and acute onset abdominal pain who was subsequently intubated and w/u revealed b/l DVTs, right upper lobe PE, and a bowel perforation.  Pt is now s/p Exploratory laparotomy, SBR x1 (left in discontinuity), abdominal washout, abthera VAC application (9/22) with intraop findings of d a large amount of succus as well as lettuce in the abdomen along with a 1cm perforation in the small bowel 110 cm distal to the ligament of treitz. RTOR 9/24 for primary closure.   -  Left lung atelectasis, improved with CPAP.   - Wean 02 as tolerated  -NPO/IVF - awaiting GI fxn  -Pain control  - DVT ppx: Hep gtt  - c/w IV abx  - Continue acute care per SICU 62y male admitted to neurosurgery for bilateral leg weakness found to have newly diagnosed metastatic disease s/p T8-T11 laminectomy presented today with deterioration in respiratory status and acute onset abdominal pain who was subsequently intubated and w/u revealed b/l DVTs, right upper lobe PE, and a bowel perforation.  Pt is now s/p Exploratory laparotomy, SBR x1 (left in discontinuity), abdominal washout, abthera VAC application (9/22) with intraop findings of d a large amount of succus as well as lettuce in the abdomen along with a 1cm perforation in the small bowel 110 cm distal to the ligament of treitz. RTOR 9/24 for primary closure.     - Left lung atelectasis, improved with CPAP.   - Wean 02 as tolerated, recommend diuresis as appropriate  - Leukocytosis; f/u Fungitell  - NPO/IVF; awaiting GI fxn  - Pain control  - DVT ppx: Hep gtt  - c/w IV abx  - Continue acute care per SICU 62y male admitted to neurosurgery for bilateral leg weakness found to have newly diagnosed metastatic disease s/p T8-T11 laminectomy presented today with deterioration in respiratory status and acute onset abdominal pain who was subsequently intubated and w/u revealed b/l DVTs, right upper lobe PE, and a bowel perforation.  Pt is now s/p Exploratory laparotomy, SBR x1 (left in discontinuity), abdominal washout, abthera VAC application (9/22) with intraop findings of d a large amount of succus as well as lettuce in the abdomen along with a 1cm perforation in the small bowel 110 cm distal to the ligament of treitz. RTOR 9/24 for primary closure.     - Left lung atelectasis, improved with CPAP.   - Wean 02 as tolerated, recommend diuresis as appropriate  - Leukocytosis; f/u Fungitell  - Elevated bilirubin and LFTs; consider portal vein duplex r/o thrombosis  - NPO/IVF; awaiting GI fxn  - Pain control  - DVT ppx: Hep gtt  - c/w IV abx  - Continue acute care per SICU

## 2017-09-27 NOTE — PROGRESS NOTE ADULT - SUBJECTIVE AND OBJECTIVE BOX
Patient is a 62y old  Male who presented with a chief complaint of cord compression (19 Sep 2017 09:44)      INTERVAL HPI/OVERNIGHT EVENTS:  had some blood tinged NGT output - changed from continuous to intermittent suction and increased PPI to BID  Now with bilious green/ light brown output (scant in tubing)  no BM or flatus  Pain well controlled  "i feel so much better"    MEDICATIONS  (STANDING):  lactated ringers. 1000 milliLiter(s) (50 mL/Hr) IV Continuous <Continuous>  piperacillin/tazobactam IVPB. 3.375 Gram(s) IV Intermittent every 8 hours  vancomycin  IVPB 1000 milliGRAM(s) IV Intermittent every 12 hours  vancomycin  IVPB      ALBUTerol/ipratropium for Nebulization 3 milliLiter(s) Nebulizer every 6 hours  metoprolol Injectable 5 milliGRAM(s) IV Push every 4 hours  acetylcysteine 20% Inhalation 3 milliLiter(s) Inhalation every 6 hours  heparin  Infusion 1200 Unit(s)/Hr (15 mL/Hr) IV Continuous <Continuous>  micafungin IVPB      micafungin IVPB 100 milliGRAM(s) IV Intermittent every 24 hours  pantoprazole  Injectable 40 milliGRAM(s) IV Push every 12 hours  acetaminophen  IVPB. 1000 milliGRAM(s) IV Intermittent once  acetaminophen  IVPB. 1000 milliGRAM(s) IV Intermittent once  acetaminophen  IVPB. 1000 milliGRAM(s) IV Intermittent once  HYDROmorphone PCA (1 mG/mL) 30 milliLiter(s) PCA Continuous PCA Continuous    MEDICATIONS  (PRN):  HYDROmorphone PCA (1 mG/mL) Rescue Clinician Bolus 0.5 milliGRAM(s) IV Push every 15 minutes PRN for Pain Scale GREATER THAN 6  naloxone Injectable 0.1 milliGRAM(s) IV Push every 3 minutes PRN For ANY of the following changes in patient status:  A. RR LESS THAN 10 breaths per minute, B. Oxygen saturation LESS THAN 90%, C. Sedation score of 6  ondansetron Injectable 4 milliGRAM(s) IV Push every 6 hours PRN Nausea      Allergies    No Known Allergies    Intolerances    Review of Systems:  Gen: no fever or chills  CV: denies CP  Resp: denies dyspnea, on CPAP (nasal)  Neuro: no focal weakness    Vital Signs Last 24 Hrs  T(C): 36.8 (27 Sep 2017 11:00), Max: 36.8 (27 Sep 2017 11:00)  T(F): 98.3 (27 Sep 2017 11:00), Max: 98.3 (27 Sep 2017 11:00)  HR: 93 (27 Sep 2017 11:00) (73 - 93)  BP: 110/74 (27 Sep 2017 11:00) (102/62 - 138/80)  BP(mean): 87 (27 Sep 2017 11:00) (76 - 103)  RR: 26 (27 Sep 2017 11:00) (12 - 27)  SpO2: 100% (27 Sep 2017 11:00) (92% - 100%)    PHYSICAL EXAM:  Constitutional: +NGT - green, bilious, on nasal CPAP, alert, awake and responsive  Neck: supple  Respiratory: decreased BS bases  Cardiovascular: S1 and S2, regular  Gastrointestinal: distended, nontender, midline dressing with minimal sanguinous staining, otherwise dry and intact +michelle  Back:  +hemovac x 2 in place  Extremities: +edema b/l  Vascular: 2+ peripheral pulses  Neurological: alert and appropriate  Skin: No rashes    LABS:                        10.8   32.9  )-----------( 103      ( 27 Sep 2017 00:45 )             32.6     09-27    140  |  102  |  45<H>  ----------------------------<  86  4.8   |  27  |  1.06    Ca    8.0<L>      27 Sep 2017 00:45  Phos  4.7     09-  Mg     3.0     09-27    TPro  4.8<L>  /  Alb  2.4<L>  /  TBili  3.1<H>  /  DBili  2.6<H>  /  AST  60<H>  /  ALT  33  /  AlkPhos  91  09-27    PT/INR - ( 27 Sep 2017 00:45 )   PT: 14.5 sec;   INR: 1.32 ratio         PTT - ( 27 Sep 2017 00:45 )  PTT:73.8 sec  Urinalysis Basic - ( 26 Sep 2017 15:51 )    Color: Yellow / Appearance: x / S.026 / pH: x  Gluc: x / Ketone: Negative  / Bili: Negative / Urobili: Negative   Blood: x / Protein: Trace / Nitrite: Negative   Leuk Esterase: Negative / RBC: 3-5 /HPF / WBC 3-5 /HPF   Sq Epi: x / Non Sq Epi: OCC /HPF / Bacteria: Many /HPF      LIVER FUNCTIONS - ( 27 Sep 2017 00:45 )  Alb: 2.4 g/dL / Pro: 4.8 g/dL / ALK PHOS: 91 U/L / ALT: 33 U/L RC / AST: 60 U/L / GGT: x             RADIOLOGY & ADDITIONAL TESTS:

## 2017-09-27 NOTE — PROGRESS NOTE ADULT - ASSESSMENT
62M with no significant PMH presenting with spinal cord compression of thoracic spine s/p laminectomy with surgical decompression and biopsy of mass taken. Pan-CT concerning for possible lung primary.  Suspicious lesions of liver and adrenal gland concerning for metastases.   Now s/p Laminectomy and decompression.  Abdominal Pain and distension - likely due to ileus  AXR - 9/21 c/w ileus, (stool and contrast in Right colon)  s/p RRT due to respiratory distress 9/22 am- s/p intubation, sedation, pressor support and new ALEC with hyperkalemia  CT 9/22 - SB perforation and PE  s/p Exploratory laparotomy 9/22/17, SBR x1 (left in discontinuity), abdominal washout, abthera VAC application (9/22)  RTOR 9/24/17; ex-lap, washout, re-anastomosis, abdominal closure  Spinal mass biopsy c/w Lung Ca - Heme/Onc following    PLAN  NPO, NGT  IV PPI  Post-op care per SICU  Await GI function  On IV Anti-microbials - Zosyn, Vanco, Diflucan (E. faecium in peritoneal culture)  follow-up cultures  AC per Hem/Onc (PE)  Monitor Hgb (currently stable)    Bandar Baker PA-C    [facility]  [phone]

## 2017-09-27 NOTE — PROGRESS NOTE ADULT - PROBLEM SELECTOR PLAN 1
LLL atelectasis on CXR today  -Would place on BIPAP 15/5 qHS  -Hi Vineet during daytime   -Keep sp02>92%  -Worsened leukocytosis likely reactive 2nd post obstructive PNA, c/w vanco, zosyn. -Obtain sputum culture  -Duoneb q6  -Chest PT, OOB, Acapella valve, chest vest for mucous clearance LLL atelectasis on CXR today  -Would consider Bilevel 18/15 or 20/15  -Hi Vineet during daytime   -Keep sp02>92%  -Worsened leukocytosis likely reactive 2nd post obstructive PNA, c/w vanco, zosyn. -Obtain sputum culture  -Duoneb q6  -Chest PT, OOB, Acapella valve, chest vest for mucous clearance

## 2017-09-27 NOTE — PROGRESS NOTE ADULT - SUBJECTIVE AND OBJECTIVE BOX
Patient is a 62y old  Male who presents with a chief complaint of cord compression , Lung ca wth mets, respiratory failure, perforated intestine with bowel resection      MEDICATIONS  (STANDING):  piperacillin/tazobactam IVPB. 3.375 Gram(s) IV Intermittent every 8 hours  vancomycin  IVPB 1000 milliGRAM(s) IV Intermittent every 12 hours  vancomycin  IVPB      ALBUTerol/ipratropium for Nebulization 3 milliLiter(s) Nebulizer every 6 hours  metoprolol Injectable 5 milliGRAM(s) IV Push every 4 hours  acetylcysteine 20% Inhalation 3 milliLiter(s) Inhalation every 6 hours  heparin  Infusion 1200 Unit(s)/Hr (15 mL/Hr) IV Continuous <Continuous>  micafungin IVPB      micafungin IVPB 100 milliGRAM(s) IV Intermittent every 24 hours  pantoprazole  Injectable 40 milliGRAM(s) IV Push every 12 hours  HYDROmorphone PCA (1 mG/mL) 30 milliLiter(s) PCA Continuous PCA Continuous  dextrose 5% + sodium chloride 0.45%. 1000 milliLiter(s) (50 mL/Hr) IV Continuous <Continuous>    MEDICATIONS  (PRN):  HYDROmorphone PCA (1 mG/mL) Rescue Clinician Bolus 0.5 milliGRAM(s) IV Push every 15 minutes PRN for Pain Scale GREATER THAN 6  naloxone Injectable 0.1 milliGRAM(s) IV Push every 3 minutes PRN For ANY of the following changes in patient status:  A. RR LESS THAN 10 breaths per minute, B. Oxygen saturation LESS THAN 90%, C. Sedation score of 6  ondansetron Injectable 4 milliGRAM(s) IV Push every 6 hours PRN Nausea      ROS:  On Bipap and unable      VITALS:   T(C): 36.9 (17 @ 19:00), Max: 36.9 (17 @ 19:00)  HR: 83 (17 @ 22:00) (73 - 101)  BP: 99/65 (17 @ 22:00) (81/55 - 138/80)  RR: 15 (17 @ 22:00) (12 - 34)  SpO2: 100% (17 @ 22:00) (95% - 100%)  Wt(kg): --    General: afebrile   Neurology: A&Ox3, LE 1/5 b/l  Eyes: PERRLA/ EOMI, Gross vision intact  ENT/Neck: Neck supple, trachea midline, No JVD, Gross hearing intact  Respiratory: CTA B/L, No wheezing, rales, rhonchi  CV: RRR, S1S2, no murmurs, rubs or gallops  Abdominal: minimal BS  Extremities: No edema, + peripheral pulses  Skin: No Rashes, Hematoma, Ecchymosis        LABS:  ABG - ( 26 Sep 2017 01:04 )  pH: 7.42  /  pCO2: 41    /  pO2: 223   / HCO3: 26    / Base Excess: 1.8   /  SaO2: 100               CARDIAC MARKERS ( 27 Sep 2017 21:43 )  x     / 0.43 ng/mL / 237 U/L / x     / 3.2 ng/mL  CARDIAC MARKERS ( 27 Sep 2017 13:21 )  x     / 0.37 ng/mL / 374 U/L / x     / 4.1 ng/mL  CARDIAC MARKERS ( 27 Sep 2017 05:26 )  x     / 0.38 ng/mL / 185 U/L / x     / 3.6 ng/mL  CARDIAC MARKERS ( 27 Sep 2017 00:45 )  x     / 0.34 ng/mL / 286 U/L / x     / 4.6 ng/mL      CBC Full  -  ( 27 Sep 2017 00:45 )  WBC Count : 32.9 K/uL  Hemoglobin : 10.8 g/dL  Hematocrit : 32.6 %  Platelet Count - Automated : 103 K/uL  Mean Cell Volume : 95.7 fl  Mean Cell Hemoglobin : 31.6 pg  Mean Cell Hemoglobin Concentration : 33.1 gm/dL  Auto Neutrophil # : x  Auto Lymphocyte # : x  Auto Monocyte # : x  Auto Eosinophil # : x  Auto Basophil # : x  Auto Neutrophil % : x  Auto Lymphocyte % : x  Auto Monocyte % : x  Auto Eosinophil % : x  Auto Basophil % : x        140  |  102  |  45<H>  ----------------------------<  86  4.8   |  27  |  1.06    Ca    8.0<L>      27 Sep 2017 00:45  Phos  4.7     09-  Mg     3.0     09-    TPro  4.8<L>  /  Alb  2.4<L>  /  TBili  3.1<H>  /  DBili  2.6<H>  /  AST  60<H>  /  ALT  33  /  AlkPhos  91  09-27    LIVER FUNCTIONS - ( 27 Sep 2017 00:45 )  Alb: 2.4 g/dL / Pro: 4.8 g/dL / ALK PHOS: 91 U/L / ALT: 33 U/L RC / AST: 60 U/L / GGT: x           PT/INR - ( 27 Sep 2017 00:45 )   PT: 14.5 sec;   INR: 1.32 ratio         PTT - ( 27 Sep 2017 00:45 )  PTT:73.8 sec  Urinalysis Basic - ( 26 Sep 2017 15:51 )    Color: Yellow / Appearance: x / S.026 / pH: x  Gluc: x / Ketone: Negative  / Bili: Negative / Urobili: Negative   Blood: x / Protein: Trace / Nitrite: Negative   Leuk Esterase: Negative / RBC: 3-5 /HPF / WBC 3-5 /HPF   Sq Epi: x / Non Sq Epi: OCC /HPF / Bacteria: Many /HPF      CAPILLARY BLOOD GLUCOSE          RADIOLOGY & ADDITIONAL TESTS:

## 2017-09-27 NOTE — PROGRESS NOTE ADULT - ATTENDING COMMENTS
Agree with above  CXR opened up 9/26 but LLL closed off 9/27  Options are to continue CPAP 15 at night or consider 18/15 or 20/15 Bilevel  d/w SICU att and PA

## 2017-09-27 NOTE — PROGRESS NOTE ADULT - ATTENDING COMMENTS
Patient is a 62y old  Male who presents with a chief complaint of cord compression'  Post op in SICU after small bowel perforation, unrelated to metastatic cancer with 2 explorations to close and clean peritonitis with enterostomy.   He is now extubated and oriented X3.   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        The patient continues to require around the clock cardiopulmonary and neurologic monitoring for critical illness, as cited above.

## 2017-09-27 NOTE — PROGRESS NOTE ADULT - ATTENDING COMMENTS
62 year old male s/p exploratory laparotomy for a small bowel perforation and his abdomen was closed.  His respiratory status continues to improve. Patient complains of pain in his bad. He has been afebrile.  He denies any nausea or emesis.     Current management includes:    1) Hypoxemic respiratory failure-improved and now on high flow nasal cannula 40 liters and 60% sightly improved from yesterday     CXR- appears to be collapsing again in LLL small left sided pleural effusion    -will nocturnal cpap    -will order chest PT      2) leukocytosis - wbc 32, continues to be elevated  - E faecalis and Candida albicans cells from intraoperative peritoneal fluid  -will continue zosyn, vancomycin and micafungin   -fungitell is pending  -blood cultures pending, urinalysis negative    3) Right segmental and subsegmental right upper lobe- continue heparin drip this morning  - 1500 units / hour  -PTT to be monitored with goal 60-90    4) Keep NPO with IVF at 30 cc/hour; awaiting bowel function    5) hyperbilirubinemia- will obtain portal venous duplex to rule out thrombus

## 2017-09-27 NOTE — PROGRESS NOTE ADULT - SUBJECTIVE AND OBJECTIVE BOX
Called to evaluate re: PCA    Patient know to service.   62 year old male admitted to neurosurgery for b/l leg weakness found to have newly diagnosed metastatic disease with spinal met causing spinal cord compression s/p T8-T11 laminectomy on 9/20, post-op course complicated by deterioration in respiratory status and acute onset abdominal pain requiring intubation and transfer to NSCU. CT chest/abdomen/pelvis demonstrated b/l DVTs, right upper lobe PE, and a bowel perforation with free air. Pt was taken emergently to the OR and is now s/p exploratory laparotomy, SBR x1 (left in discontinuity), abdominal washout, abthera VAC application on 9/22, after which he was transferred to SICU under ACS. He was taken back to the OR on 9/24 for a washout, primary anastomosis, and abdominal closure.   Currently on IVP Dilaudid. Reports effective analgesia but not lasting long enough.    Will initiate PCA Dilaudid 0.2mg/ 6 minute delay / no basal. pain service to follow. Called to evaluate re: PCA    Patient know to service. Chart reviewed. A+Ox3.  62 year old male admitted to neurosurgery for b/l leg weakness found to have newly diagnosed metastatic disease with spinal met causing spinal cord compression s/p T8-T11 laminectomy on 9/20, post-op course complicated by deterioration in respiratory status and acute onset abdominal pain requiring intubation and transfer to NSCU. CT chest/abdomen/pelvis demonstrated b/l DVTs, right upper lobe PE, and a bowel perforation with free air. Pt was taken emergently to the OR and is now s/p exploratory laparotomy, SBR x1 (left in discontinuity), abdominal washout, abthera VAC application on 9/22, after which he was transferred to SICU under ACS. He was taken back to the OR on 9/24 for a washout, primary anastomosis, and abdominal closure.   Currently on IVP Dilaudid. Reports effective analgesia but not lasting long enough.    Will initiate PCA Dilaudid 0.2mg/ 6 minute delay / no basal. pain service to follow.

## 2017-09-27 NOTE — PROGRESS NOTE ADULT - ASSESSMENT
62M s/p T8-11 laminectomy for resection of epidural tumor causing cord compression now s/p emergent ex-lap, SBR for perforated bowel. Neurologically patient has had a consistent exam since the ex lap.   - Continue with q1hr neurochecks  - Continue care as per primary team  - Patient continues to have a HMV drain that will stay in place until drainage subsides. Once drainage decreased and drain is able to be removed will need to hold heparin for 4 hours prior to drain removal.

## 2017-09-27 NOTE — PROGRESS NOTE ADULT - ASSESSMENT
ThisHPI: 62y male admitted to neurosurgery for bilateral leg weakness found to have newly diagnosed metastatic disease s/p T8-T11 laminectomy presented today with deterioration in respiratory status and acute onset abdominal pain who was subsequently intubated and w/u revealed b/l DVTs, right upper lobe PE, and a bowel perforation.  Pt is now s/p Exploratory laparotomy #1, SBR x1 (left in discontinuity), abdominal washout, abthera VAC application (9/22) with intraop findings of d a large amount of succus as well as lettuce in the abdomen along with a 1cm perforation in the small bowel 110 cm distal to the ligament of treitz. )(/24/17 Exploratory OR #2 with abdominal irrigation and enterostomy. Right leg motor function being monitored by neurosurgery after emergency admission spinal cord decompression of metastatic tumor T8-!1. Lung cancer pathology is non small cell.

## 2017-09-28 NOTE — PROGRESS NOTE ADULT - ATTENDING COMMENTS
I saw and evaluated the patient. The patient is a 62-year-old male with newly diagnosed metastatic disease s/p T8-T11 laminectomies for resection of dorsal epidural tumor for spinal cord compression on 9/19/17. His hospital course was complicated by a right upper lobe pulmonary embolus, B/L lower extremity DVTs, and a bowel perforation, requiring two surgeries for repair with General Surgery. The patient's exam is notable for 2-3/5 left lower extremity weakness and 0-1/5 right lower extremity weakness. Continue Hemovac drain. Continue mobilization with PT, OT and PMR, and disposition planning to spinal cord injury rehab when medically ready. Ok to begin chemotherapy and radiation from Neurosurgery perspective on POD#14. I saw and evaluated the patient. The patient is a 62-year-old male with newly diagnosed metastatic disease s/p T8-T11 laminectomies for resection of dorsal epidural tumor for spinal cord compression on 9/19/17. His hospital course was complicated by a right upper lobe pulmonary embolus, B/L lower extremity DVTs, and a bowel perforation, requiring two surgeries for repair with General Surgery. The patient's exam is notable for 2-3/5 left lower extremity weakness and 0-1/5 right lower extremity weakness. Continue Hemovac drain. Continue mobilization with PT, OT and PMR, and disposition planning to spinal cord injury rehab when medically ready. Ok to begin chemotherapy and radiation from Neurosurgery perspective on POD#14. Appreciate SICU support.

## 2017-09-28 NOTE — PROGRESS NOTE ADULT - ASSESSMENT
ASSESSMENT: 62 year old male s/p small bowel perforation, resection, left in discontinuity with abthera (9/22), s/p ex-lap, washout, re-anastomosis, abdominal closure (9/24). Complicated by acute hypoxemic respiratory failure requiring CPAP.     PLAN:  Neurologic: post-operative pain  -Dilaudid PRN  -Standing IV Tylenol    Respiratory: acute hypoxemic respiratory failure   -High flow during day, CPAP at night to stent L lung open; wean as tolerated  -Diuresis w/ IV Lasix as needed  -Duonebs, Mucomyst    Cardiovascular: HTN  -Metoprolol 5mg q4 w/ hold parameters    Gastrointestinal/Nutrition: small bowel perforation s/p SBR, anastomosis   -NPO  -Await GI function  -Protonix    Renal/Genitourinary: ALEC, resolving  -Torres  -LR @ 50    Hematologic: b/l DVT; b/l PE  -Heparin gtt     Infectious Disease: gisselle intra abdominal spillage, E. Faecium growing from peritoneal cultures (vanc sensitive); leukocytosis  -Continue vancomycin, Zosyn, micafungin  -Follow up cultures from yesterday  -Send C.diff specimen if pt has BM  -trend leukocytosis    Disposition: SICU, Full Code, consider palliative care consult to help manage symptoms     JUJU VALLES

## 2017-09-28 NOTE — PROGRESS NOTE ADULT - ATTENDING COMMENTS
Still with LLL atelactasis. Can consider Bilevel 18/15 or 20/15 at night instead of CPAP 15  f/u ct chest report

## 2017-09-28 NOTE — PROGRESS NOTE ADULT - SUBJECTIVE AND OBJECTIVE BOX
Patient is a 62y old  Male who presentrd with a chief complaint of cord compression (19 Sep 2017 09:44)      INTERVAL HPI/OVERNIGHT EVENTS:  No GI events overnight  no flatus or BM yet  NGT output green/bilious    MEDICATIONS  (STANDING):  piperacillin/tazobactam IVPB. 3.375 Gram(s) IV Intermittent every 8 hours  vancomycin  IVPB 1000 milliGRAM(s) IV Intermittent every 12 hours  vancomycin  IVPB      ALBUTerol/ipratropium for Nebulization 3 milliLiter(s) Nebulizer every 6 hours  metoprolol Injectable 5 milliGRAM(s) IV Push every 4 hours  heparin  Infusion 1200 Unit(s)/Hr (15 mL/Hr) IV Continuous <Continuous>  micafungin IVPB      micafungin IVPB 100 milliGRAM(s) IV Intermittent every 24 hours  pantoprazole  Injectable 40 milliGRAM(s) IV Push every 12 hours  HYDROmorphone PCA (1 mG/mL) 30 milliLiter(s) PCA Continuous PCA Continuous  dextrose 5% + sodium chloride 0.45%. 1000 milliLiter(s) (50 mL/Hr) IV Continuous <Continuous>    MEDICATIONS  (PRN):  HYDROmorphone PCA (1 mG/mL) Rescue Clinician Bolus 0.5 milliGRAM(s) IV Push every 15 minutes PRN for Pain Scale GREATER THAN 6  naloxone Injectable 0.1 milliGRAM(s) IV Push every 3 minutes PRN For ANY of the following changes in patient status:  A. RR LESS THAN 10 breaths per minute, B. Oxygen saturation LESS THAN 90%, C. Sedation score of 6  ondansetron Injectable 4 milliGRAM(s) IV Push every 6 hours PRN Nausea      Allergies    No Known Allergies    Intolerances        Review of Systems:  Gen: no fever or chills  CV: denies CP  Resp: denies dyspnea at this time, on high flow nasal O2 and night time CPAP  Neuro: no focal weakness    Vital Signs Last 24 Hrs  T(C): 36.6 (28 Sep 2017 07:00), Max: 36.9 (27 Sep 2017 19:00)  T(F): 97.9 (28 Sep 2017 07:00), Max: 98.4 (27 Sep 2017 19:00)  HR: 88 (28 Sep 2017 10:00) (76 - 101)  BP: 103/61 (28 Sep 2017 10:00) (81/55 - 120/70)  BP(mean): 75 (28 Sep 2017 10:00) (61 - 90)  RR: 18 (28 Sep 2017 10:00) (11 - 34)  SpO2: 97% (28 Sep 2017 10:00) (95% - 100%)    PHYSICAL EXAM:  Constitutional: +NGT - green, bilious, on nasal high flow, OOB to chair, drowsy but arousable and  responsive  Neck: supple  Respiratory: decreased BS bases  Cardiovascular: S1 and S2, regular  Gastrointestinal: distended, nontender, midline dressing with minimal sanguinous staining, otherwise dry and intact +michelle  Back:  +hemovac x 2 in place  Extremities: +edema b/l  Vascular: 2+ peripheral pulses  Neurological: alert and appropriate  Skin: No rashes      LABS:                        10.0   31.8  )-----------( 127      ( 28 Sep 2017 04:04 )             31.2     -    139  |  101  |  39<H>  ----------------------------<  109<H>  4.7   |  27  |  1.07    Ca    7.7<L>      28 Sep 2017 04:04  Phos  4.2     -  Mg     2.8     -    TPro  4.8<L>  /  Alb  2.2<L>  /  TBili  3.6<H>  /  DBili  3.0<H>  /  AST  54<H>  /  ALT  31  /  AlkPhos  77  -    PT/INR - ( 28 Sep 2017 04:04 )   PT: 15.6 sec;   INR: 1.43 ratio         PTT - ( 28 Sep 2017 04:04 )  PTT:97.7 sec  Urinalysis Basic - ( 26 Sep 2017 15:51 )    Color: Yellow / Appearance: x / S.026 / pH: x  Gluc: x / Ketone: Negative  / Bili: Negative / Urobili: Negative   Blood: x / Protein: Trace / Nitrite: Negative   Leuk Esterase: Negative / RBC: 3-5 /HPF / WBC 3-5 /HPF   Sq Epi: x / Non Sq Epi: OCC /HPF / Bacteria: Many /HPF      LIVER FUNCTIONS - ( 28 Sep 2017 04:04 )  Alb: 2.2 g/dL / Pro: 4.8 g/dL / ALK PHOS: 77 U/L / ALT: 31 U/L RC / AST: 54 U/L / GGT: x             RADIOLOGY & ADDITIONAL TESTS: Patient is a 62y old  Male admitted with cord compression (19 Sep 2017 09:44)    INTERVAL HPI/OVERNIGHT EVENTS:  No GI events overnight  no flatus or BM yet  NGT output green/bilious    MEDICATIONS  (STANDING):  piperacillin/tazobactam IVPB. 3.375 Gram(s) IV Intermittent every 8 hours  vancomycin  IVPB 1000 milliGRAM(s) IV Intermittent every 12 hours  vancomycin  IVPB      ALBUTerol/ipratropium for Nebulization 3 milliLiter(s) Nebulizer every 6 hours  metoprolol Injectable 5 milliGRAM(s) IV Push every 4 hours  heparin  Infusion 1200 Unit(s)/Hr (15 mL/Hr) IV Continuous <Continuous>  micafungin IVPB      micafungin IVPB 100 milliGRAM(s) IV Intermittent every 24 hours  pantoprazole  Injectable 40 milliGRAM(s) IV Push every 12 hours  HYDROmorphone PCA (1 mG/mL) 30 milliLiter(s) PCA Continuous PCA Continuous  dextrose 5% + sodium chloride 0.45%. 1000 milliLiter(s) (50 mL/Hr) IV Continuous <Continuous>    MEDICATIONS  (PRN):  HYDROmorphone PCA (1 mG/mL) Rescue Clinician Bolus 0.5 milliGRAM(s) IV Push every 15 minutes PRN for Pain Scale GREATER THAN 6  naloxone Injectable 0.1 milliGRAM(s) IV Push every 3 minutes PRN For ANY of the following changes in patient status:  A. RR LESS THAN 10 breaths per minute, B. Oxygen saturation LESS THAN 90%, C. Sedation score of 6  ondansetron Injectable 4 milliGRAM(s) IV Push every 6 hours PRN Nausea    Allergies  No Known Allergies    Review of Systems:  Gen: no fever or chills  CV: denies CP  Resp: denies dyspnea at this time, on high flow nasal O2 and night time CPAP  Neuro: no focal weakness    Vital Signs Last 24 Hrs  T(C): 36.6 (28 Sep 2017 07:00), Max: 36.9 (27 Sep 2017 19:00)  T(F): 97.9 (28 Sep 2017 07:00), Max: 98.4 (27 Sep 2017 19:00)  HR: 88 (28 Sep 2017 10:00) (76 - 101)  BP: 103/61 (28 Sep 2017 10:00) (81/55 - 120/70)  BP(mean): 75 (28 Sep 2017 10:00) (61 - 90)  RR: 18 (28 Sep 2017 10:00) (11 - 34)  SpO2: 97% (28 Sep 2017 10:00) (95% - 100%)    PHYSICAL EXAM:  Constitutional: +NGT - green, bilious, on nasal high flow, OOB to chair, drowsy but arousable and  responsive  Neck: supple  Respiratory: decreased BS bases  Cardiovascular: S1 and S2, regular  Gastrointestinal: distended, nontender, midline dressing with minimal sanguinous staining, otherwise dry and intact +michelle  Back:  +hemovac x 2 in place  Extremities: +edema b/l  Vascular: 2+ peripheral pulses  Neurological: alert and appropriate  Skin: No rashes    LABS:                     10.0   31.8  )-----------( 127      ( 28 Sep 2017 04:04 )             31.2     -    139  |  101  |  39<H>  ----------------------------<  109<H>  4.7   |  27  |  1.07    Ca    7.7<L>      28 Sep 2017 04:04  Phos  4.2       Mg     2.8     -    TPro  4.8<L>  /  Alb  2.2<L>  /  TBili  3.6<H>  /  DBili  3.0<H>  /  AST  54<H>  /  ALT  31  /  AlkPhos  77  -    PT/INR - ( 28 Sep 2017 04:04 )   PT: 15.6 sec;   INR: 1.43 ratio    PTT - ( 28 Sep 2017 04:04 )  PTT:97.7 sec    Urinalysis Basic - ( 26 Sep 2017 15:51 )  Color: Yellow / Appearance: x / S.026 / pH: x  Gluc: x / Ketone: Negative  / Bili: Negative / Urobili: Negative   Blood: x / Protein: Trace / Nitrite: Negative   Leuk Esterase: Negative / RBC: 3-5 /HPF / WBC 3-5 /HPF   Sq Epi: x / Non Sq Epi: OCC /HPF / Bacteria: Many /HPF    LIVER FUNCTIONS - ( 28 Sep 2017 04:04 )  Alb: 2.2 g/dL / Pro: 4.8 g/dL / ALK PHOS: 77 U/L / ALT: 31 U/L RC / AST: 54 U/L / GGT: x           RADIOLOGY & ADDITIONAL TESTS:

## 2017-09-28 NOTE — PROGRESS NOTE ADULT - PROBLEM SELECTOR PLAN 1
s/p thoracic spine surgery  continue significant weakness in the lower extremities  DVT and GI prophylaxis  continue neuro checks

## 2017-09-28 NOTE — PROGRESS NOTE ADULT - SUBJECTIVE AND OBJECTIVE BOX
Day 4\1 of Anesthesia Pain Management Service    SUBJECTIVE: Patient is doing well with IV PCA    Pain Scale Score:	[X] Refer to charted pain scores    THERAPY:    [ ] IV PCA Morphine		[ ] 5 mg/mL	[ ] 1 mg/mL  [X] IV PCA Hydromorphone	[ ] 5 mg/mL	[X] 1 mg/mL  [ ] IV PCA Fentanyl		[ ] 50 micrograms/mL    Demand dose: 0.2 mg     Lockout: 6 minutes   Continuous Rate: 0 mg/hr  4 Hour Limit: 4 mg    MEDICATIONS  (STANDING):  piperacillin/tazobactam IVPB. 3.375 Gram(s) IV Intermittent every 8 hours  vancomycin  IVPB 1000 milliGRAM(s) IV Intermittent every 12 hours  vancomycin  IVPB      ALBUTerol/ipratropium for Nebulization 3 milliLiter(s) Nebulizer every 6 hours  metoprolol Injectable 5 milliGRAM(s) IV Push every 4 hours  heparin  Infusion 1200 Unit(s)/Hr (15 mL/Hr) IV Continuous <Continuous>  micafungin IVPB      micafungin IVPB 100 milliGRAM(s) IV Intermittent every 24 hours  pantoprazole  Injectable 40 milliGRAM(s) IV Push every 12 hours  HYDROmorphone PCA (1 mG/mL) 30 milliLiter(s) PCA Continuous PCA Continuous  dextrose 5% + sodium chloride 0.45%. 1000 milliLiter(s) (50 mL/Hr) IV Continuous <Continuous>    MEDICATIONS  (PRN):  HYDROmorphone PCA (1 mG/mL) Rescue Clinician Bolus 0.5 milliGRAM(s) IV Push every 15 minutes PRN for Pain Scale GREATER THAN 6  naloxone Injectable 0.1 milliGRAM(s) IV Push every 3 minutes PRN For ANY of the following changes in patient status:  A. RR LESS THAN 10 breaths per minute, B. Oxygen saturation LESS THAN 90%, C. Sedation score of 6  ondansetron Injectable 4 milliGRAM(s) IV Push every 6 hours PRN Nausea      OBJECTIVE:    Sedation Score:	[ X] Alert	[ ] Drowsy 	[ ] Arousable	[ ] Asleep	[ ] Unresponsive    Side Effects:	[X ] None	[ ] Nausea	[ ] Vomiting	[ ] Pruritus  		[ ] Other:    Vital Signs Last 24 Hrs  T(C): 36.6 (28 Sep 2017 07:00), Max: 36.9 (27 Sep 2017 19:00)  T(F): 97.9 (28 Sep 2017 07:00), Max: 98.4 (27 Sep 2017 19:00)  HR: 86 (28 Sep 2017 09:00) (76 - 101)  BP: 104/61 (28 Sep 2017 09:00) (81/55 - 120/70)  BP(mean): 77 (28 Sep 2017 09:00) (61 - 90)  RR: 20 (28 Sep 2017 09:00) (11 - 34)  SpO2: 99% (28 Sep 2017 09:00) (95% - 100%)    ASSESSMENT/ PLAN    Therapy to  be:               [X] Continued   [ ] Discontinued   [ ] Changed to PRN Analgesics    Documentation and Verification of current medications:   [X] Done	[ ] Not done, not eligible    Comments: OOB in chair. Using 2-3x/hr. Reeducated to use

## 2017-09-28 NOTE — PROGRESS NOTE ADULT - ASSESSMENT
62y male admitted to neurosurgery for bilateral leg weakness found to have newly diagnosed metastatic disease s/p T8-T11 laminectomy presented today with deterioration in respiratory status and acute onset abdominal pain who was subsequently intubated and w/u revealed b/l DVTs, right upper lobe PE, and a bowel perforation.  Pt is now s/p Exploratory laparotomy, SBR x1 (left in discontinuity), abdominal washout, abthera VAC application (9/22) with intraop findings of d a large amount of succus as well as lettuce in the abdomen along with a 1cm perforation in the small bowel 110 cm distal to the ligament of treitz. RTOR 9/24 for primary closure.     - Left lung atelectasis, improved with BiPAP.   - Wean 02 as tolerated, recommend diuresis as appropriate  - Leukocytosis; f/u Fungitell  - Elevated bilirubin and LFTs; portal vein duplex: no thrombosis  - NPO/IVF; awaiting GI fxn  - Pain control  - DVT ppx: Hep gtt  - c/w IV abx  - Continue acute care per SICU

## 2017-09-28 NOTE — PROGRESS NOTE ADULT - SUBJECTIVE AND OBJECTIVE BOX
Patient is a 62y old  Male who presents with a chief complaint of cord compression , Lung ca wth mets, respiratory failure, perforated intestine with bowel resection        MEDICATIONS  (STANDING):  piperacillin/tazobactam IVPB. 3.375 Gram(s) IV Intermittent every 8 hours  vancomycin  IVPB 1000 milliGRAM(s) IV Intermittent every 12 hours  vancomycin  IVPB      ALBUTerol/ipratropium for Nebulization 3 milliLiter(s) Nebulizer every 6 hours  metoprolol Injectable 5 milliGRAM(s) IV Push every 4 hours  heparin  Infusion 1200 Unit(s)/Hr (15 mL/Hr) IV Continuous <Continuous>  micafungin IVPB      micafungin IVPB 100 milliGRAM(s) IV Intermittent every 24 hours  pantoprazole  Injectable 40 milliGRAM(s) IV Push every 12 hours  HYDROmorphone PCA (1 mG/mL) 30 milliLiter(s) PCA Continuous PCA Continuous  dextrose 5% + sodium chloride 0.45%. 1000 milliLiter(s) (50 mL/Hr) IV Continuous <Continuous>    MEDICATIONS  (PRN):  HYDROmorphone PCA (1 mG/mL) Rescue Clinician Bolus 0.5 milliGRAM(s) IV Push every 15 minutes PRN for Pain Scale GREATER THAN 6  naloxone Injectable 0.1 milliGRAM(s) IV Push every 3 minutes PRN For ANY of the following changes in patient status:  A. RR LESS THAN 10 breaths per minute, B. Oxygen saturation LESS THAN 90%, C. Sedation score of 6  ondansetron Injectable 4 milliGRAM(s) IV Push every 6 hours PRN Nausea      ROS:  more awake and alert but still confused    VITALS:   T(C): 36.6 (09-28-17 @ 19:00), Max: 36.9 (09-27-17 @ 23:00)  HR: 88 (09-28-17 @ 22:00) (76 - 95)  BP: 122/70 (09-28-17 @ 22:00) (97/61 - 126/68)  RR: 40 (09-28-17 @ 22:00) (11 - 40)  SpO2: 99% (09-28-17 @ 22:00) (95% - 100%)  Wt(kg): --    General: afebrile   Neurology: A&Ox3, LE 1/5 b/l  Eyes: PERRLA/ EOMI, Gross vision intact  ENT/Neck: Neck supple, trachea midline, No JVD, Gross hearing intact  Respiratory: CTA B/L, No wheezing, rales, rhonchi  CV: RRR, S1S2, no murmurs, rubs or gallops  Abdominal: minimal BS  Extremities: No edema, + peripheral pulses  Skin: No Rashes, Hematoma, Ecchymosis    LABS:  ABG - ( 28 Sep 2017 03:54 )  pH: 7.39  /  pCO2: 48    /  pO2: 149   / HCO3: 29    / Base Excess: 3.7   /  SaO2: 99                CARDIAC MARKERS ( 28 Sep 2017 14:22 )  x     / 0.40 ng/mL / 201 U/L / x     / 3.1 ng/mL  CARDIAC MARKERS ( 28 Sep 2017 04:04 )  x     / 0.44 ng/mL / 247 U/L / x     / 3.5 ng/mL  CARDIAC MARKERS ( 27 Sep 2017 21:43 )  x     / 0.43 ng/mL / 237 U/L / x     / 3.2 ng/mL  CARDIAC MARKERS ( 27 Sep 2017 13:21 )  x     / 0.37 ng/mL / 374 U/L / x     / 4.1 ng/mL  CARDIAC MARKERS ( 27 Sep 2017 05:26 )  x     / 0.38 ng/mL / 185 U/L / x     / 3.6 ng/mL  CARDIAC MARKERS ( 27 Sep 2017 00:45 )  x     / 0.34 ng/mL / 286 U/L / x     / 4.6 ng/mL      CBC Full  -  ( 28 Sep 2017 04:04 )  WBC Count : 31.8 K/uL  Hemoglobin : 10.0 g/dL  Hematocrit : 31.2 %  Platelet Count - Automated : 127 K/uL  Mean Cell Volume : 96.9 fl  Mean Cell Hemoglobin : 31.2 pg  Mean Cell Hemoglobin Concentration : 32.2 gm/dL  Auto Neutrophil # : 26.7 K/uL  Auto Lymphocyte # : 2.5 K/uL  Auto Monocyte # : 1.6 K/uL  Auto Eosinophil # : 1.0 K/uL  Auto Basophil # : 0.0 K/uL  Auto Neutrophil % : 78.0 %  Auto Lymphocyte % : 8.0 %  Auto Monocyte % : 5.0 %  Auto Eosinophil % : 3.0 %  Auto Basophil % : x    09-28    139  |  101  |  39<H>  ----------------------------<  109<H>  4.7   |  27  |  1.07    Ca    7.7<L>      28 Sep 2017 04:04  Phos  4.2     09-28  Mg     2.8     09-28    TPro  4.8<L>  /  Alb  2.2<L>  /  TBili  3.6<H>  /  DBili  3.0<H>  /  AST  54<H>  /  ALT  31  /  AlkPhos  77  09-28    LIVER FUNCTIONS - ( 28 Sep 2017 04:04 )  Alb: 2.2 g/dL / Pro: 4.8 g/dL / ALK PHOS: 77 U/L / ALT: 31 U/L RC / AST: 54 U/L / GGT: x           PT/INR - ( 28 Sep 2017 04:04 )   PT: 15.6 sec;   INR: 1.43 ratio         PTT - ( 28 Sep 2017 04:04 )  PTT:97.7 sec    CAPILLARY BLOOD GLUCOSE          RADIOLOGY & ADDITIONAL TESTS:

## 2017-09-28 NOTE — PROGRESS NOTE ADULT - SUBJECTIVE AND OBJECTIVE BOX
ATP Surgery Progress Note     Subjective/24hour Events: Patient collapsed his L lung again yesterday and was PEEPed open with BiPAP. Patient tolerated BiPAP overnight with chest PT during the day.  He received 20 IVL with adequate response and an improved CXR.  His SBP dropped in the early evening to systolic in the 70s for which he was given a 500cc Albumin bolus with improvement in hypotension.     T(C): 36.6 (09-28-17 @ 07:00), Max: 36.9 (09-27-17 @ 19:00)  HR: 88 (09-28-17 @ 10:00) (76 - 101)  BP: 103/61 (09-28-17 @ 10:00) (81/55 - 120/70)  RR: 18 (09-28-17 @ 10:00) (11 - 34)  SpO2: 97% (09-28-17 @ 10:00) (95% - 100%)  Wt(kg): --    09-27 @ 07:01  -  09-28 @ 07:00  --------------------------------------------------------  IN:    dextrose 5% + sodium chloride 0.45%.: 750 mL    heparin Infusion: 360 mL    lactated ringers.: 400 mL  Total IN: 1510 mL    OUT:    Accordian: 110 mL    Indwelling Catheter - Urethral: 2400 mL    Nasoenteral Tube: 200 mL  Total OUT: 2710 mL    Total NET: -1200 mL      09-28 @ 07:01  -  09-28 @ 10:24  --------------------------------------------------------  IN:    dextrose 5% + sodium chloride 0.45%.: 100 mL    heparin Infusion: 30 mL  Total IN: 130 mL    OUT:    Indwelling Catheter - Urethral: 90 mL  Total OUT: 90 mL    Total NET: 40 mL      Physical Exam:  Gen: NAD,, following commands  Resp: on CPAP, no resp distress  Card: RRR  GI: Abthera wound vac in place  Ext: decreased strength in RLE    Labs:                                   10.0   31.8  )-----------( 127      ( 28 Sep 2017 04:04 )             31.2     09-28    139  |  101  |  39<H>  ----------------------------<  109<H>  4.7   |  27  |  1.07    Ca    7.7<L>      28 Sep 2017 04:04  Phos  4.2     09-28  Mg     2.8     09-28    TPro  4.8<L>  /  Alb  2.2<L>  /  TBili  3.6<H>  /  DBili  3.0<H>  /  AST  54<H>  /  ALT  31  /  AlkPhos  77  09-28      < from: US Abdomen Doppler (09.27.17 @ 16:32) >  IMPRESSION:     Multiple hepatic metastases without evidence of biliary obstruction.    No duplex evidence of portal or hepatic venous thrombosis.    Bilateral pleural effusions and mild ascites.

## 2017-09-28 NOTE — PROGRESS NOTE ADULT - SUBJECTIVE AND OBJECTIVE BOX
Follow-up Pulm Progress Note    Seen on 3L NC 97%   Comfortable, no complaints  LLL still collapsed       Medications:  MEDICATIONS  (STANDING):  piperacillin/tazobactam IVPB. 3.375 Gram(s) IV Intermittent every 8 hours  vancomycin  IVPB 1000 milliGRAM(s) IV Intermittent every 12 hours  vancomycin  IVPB      ALBUTerol/ipratropium for Nebulization 3 milliLiter(s) Nebulizer every 6 hours  metoprolol Injectable 5 milliGRAM(s) IV Push every 4 hours  heparin  Infusion 1200 Unit(s)/Hr (15 mL/Hr) IV Continuous <Continuous>  micafungin IVPB      micafungin IVPB 100 milliGRAM(s) IV Intermittent every 24 hours  pantoprazole  Injectable 40 milliGRAM(s) IV Push every 12 hours  HYDROmorphone PCA (1 mG/mL) 30 milliLiter(s) PCA Continuous PCA Continuous  dextrose 5% + sodium chloride 0.45%. 1000 milliLiter(s) (50 mL/Hr) IV Continuous <Continuous>    MEDICATIONS  (PRN):  HYDROmorphone PCA (1 mG/mL) Rescue Clinician Bolus 0.5 milliGRAM(s) IV Push every 15 minutes PRN for Pain Scale GREATER THAN 6  naloxone Injectable 0.1 milliGRAM(s) IV Push every 3 minutes PRN For ANY of the following changes in patient status:  A. RR LESS THAN 10 breaths per minute, B. Oxygen saturation LESS THAN 90%, C. Sedation score of 6  ondansetron Injectable 4 milliGRAM(s) IV Push every 6 hours PRN Nausea          Vital Signs Last 24 Hrs  T(C): 36.8 (28 Sep 2017 15:00), Max: 36.9 (27 Sep 2017 19:00)  T(F): 98.2 (28 Sep 2017 15:00), Max: 98.4 (27 Sep 2017 19:00)  HR: 93 (28 Sep 2017 15:00) (76 - 101)  BP: 106/64 (28 Sep 2017 15:00) (81/55 - 118/56)  BP(mean): 80 (28 Sep 2017 15:00) (61 - 86)  RR: 23 (28 Sep 2017 15:00) (11 - 29)  SpO2: 97% (28 Sep 2017 15:00) (95% - 100%) on 3L NC    ABG - ( 28 Sep 2017 03:54 )  pH: 7.39  /  pCO2: 48    /  pO2: 149   / HCO3: 29    / Base Excess: 3.7   /  SaO2: 99                VBG pH 7.38  @ 01:17    VBG pCO2 50  @ 01:17    VBG O2 sat 68  @ 01:17    VBG lactate 1.5  @ 01:17       @ 07:01  -   @ 07:00  --------------------------------------------------------  IN: 1510 mL / OUT: 2710 mL / NET: -1200 mL          LABS:                        10.0   31.8  )-----------( 127      ( 28 Sep 2017 04:04 )             31.2         139  |  101  |  39<H>  ----------------------------<  109<H>  4.7   |  27  |  1.07    Ca    7.7<L>      28 Sep 2017 04:04  Phos  4.2       Mg     2.8         TPro  4.8<L>  /  Alb  2.2<L>  /  TBili  3.6<H>  /  DBili  3.0<H>  /  AST  54<H>  /  ALT  31  /  AlkPhos  77        CARDIAC MARKERS ( 28 Sep 2017 04:04 )  x     / 0.44 ng/mL / 247 U/L / x     / 3.5 ng/mL  CARDIAC MARKERS ( 27 Sep 2017 21:43 )  x     / 0.43 ng/mL / 237 U/L / x     / 3.2 ng/mL  CARDIAC MARKERS ( 27 Sep 2017 13:21 )  x     / 0.37 ng/mL / 374 U/L / x     / 4.1 ng/mL  CARDIAC MARKERS ( 27 Sep 2017 05:26 )  x     / 0.38 ng/mL / 185 U/L / x     / 3.6 ng/mL  CARDIAC MARKERS ( 27 Sep 2017 00:45 )  x     / 0.34 ng/mL / 286 U/L / x     / 4.6 ng/mL      CAPILLARY BLOOD GLUCOSE        PT/INR - ( 28 Sep 2017 04:04 )   PT: 15.6 sec;   INR: 1.43 ratio         PTT - ( 28 Sep 2017 04:04 )  PTT:97.7 sec  Urinalysis Basic - ( 26 Sep 2017 15:51 )    Color: Yellow / Appearance: x / S.026 / pH: x  Gluc: x / Ketone: Negative  / Bili: Negative / Urobili: Negative   Blood: x / Protein: Trace / Nitrite: Negative   Leuk Esterase: Negative / RBC: 3-5 /HPF / WBC 3-5 /HPF   Sq Epi: x / Non Sq Epi: OCC /HPF / Bacteria: Many /HPF      Procalcitonin, Serum: 3.09 ng/mL (28 Sep 2017 04:04)  Procalcitonin, Serum: 3.45 ng/mL (27 Sep 2017 11:07)  Procalcitonin, Serum: 6.39 ng/mL (26 Sep 2017 01:09)    Serum Pro-Brain Natriuretic Peptide: 7093 pg/mL (26 Sep 2017 01:09)                CULTURES: (if applicable)  Blood cultures negative         Physical Examination:  PULM: Bronchial BS L base  CVS: S1, S2 RRR    RADIOLOGY REVIEWED    CXR: LLL collapse Follow-up Pulm Progress Note    Seen on 3L NC 97%   Comfortable, no complaints  LLL still collapsed       Medications:  MEDICATIONS  (STANDING):  piperacillin/tazobactam IVPB. 3.375 Gram(s) IV Intermittent every 8 hours  vancomycin  IVPB 1000 milliGRAM(s) IV Intermittent every 12 hours  vancomycin  IVPB      ALBUTerol/ipratropium for Nebulization 3 milliLiter(s) Nebulizer every 6 hours  metoprolol Injectable 5 milliGRAM(s) IV Push every 4 hours  heparin  Infusion 1200 Unit(s)/Hr (15 mL/Hr) IV Continuous <Continuous>  micafungin IVPB      micafungin IVPB 100 milliGRAM(s) IV Intermittent every 24 hours  pantoprazole  Injectable 40 milliGRAM(s) IV Push every 12 hours  HYDROmorphone PCA (1 mG/mL) 30 milliLiter(s) PCA Continuous PCA Continuous  dextrose 5% + sodium chloride 0.45%. 1000 milliLiter(s) (50 mL/Hr) IV Continuous <Continuous>    MEDICATIONS  (PRN):  HYDROmorphone PCA (1 mG/mL) Rescue Clinician Bolus 0.5 milliGRAM(s) IV Push every 15 minutes PRN for Pain Scale GREATER THAN 6  naloxone Injectable 0.1 milliGRAM(s) IV Push every 3 minutes PRN For ANY of the following changes in patient status:  A. RR LESS THAN 10 breaths per minute, B. Oxygen saturation LESS THAN 90%, C. Sedation score of 6  ondansetron Injectable 4 milliGRAM(s) IV Push every 6 hours PRN Nausea          Vital Signs Last 24 Hrs  T(C): 36.8 (28 Sep 2017 15:00), Max: 36.9 (27 Sep 2017 19:00)  T(F): 98.2 (28 Sep 2017 15:00), Max: 98.4 (27 Sep 2017 19:00)  HR: 93 (28 Sep 2017 15:00) (76 - 101)  BP: 106/64 (28 Sep 2017 15:00) (81/55 - 118/56)  BP(mean): 80 (28 Sep 2017 15:00) (61 - 86)  RR: 23 (28 Sep 2017 15:00) (11 - 29)  SpO2: 97% (28 Sep 2017 15:00) (95% - 100%) on 3L NC    ABG - ( 28 Sep 2017 03:54 )  pH: 7.39  /  pCO2: 48    /  pO2: 149   / HCO3: 29    / Base Excess: 3.7   /  SaO2: 99                VBG pH 7.38  @ 01:17    VBG pCO2 50  @ 01:17    VBG O2 sat 68  @ 01:17    VBG lactate 1.5  @ 01:17       @ 07:01  -   @ 07:00  --------------------------------------------------------  IN: 1510 mL / OUT: 2710 mL / NET: -1200 mL    LABS:                        10.0   31.8  )-----------( 127      ( 28 Sep 2017 04:04 )             31.2         139  |  101  |  39<H>  ----------------------------<  109<H>  4.7   |  27  |  1.07    Ca    7.7<L>      28 Sep 2017 04:04  Phos  4.2       Mg     2.8         TPro  4.8<L>  /  Alb  2.2<L>  /  TBili  3.6<H>  /  DBili  3.0<H>  /  AST  54<H>  /  ALT  31  /  AlkPhos  77        CARDIAC MARKERS ( 28 Sep 2017 04:04 )  x     / 0.44 ng/mL / 247 U/L / x     / 3.5 ng/mL  CARDIAC MARKERS ( 27 Sep 2017 21:43 )  x     / 0.43 ng/mL / 237 U/L / x     / 3.2 ng/mL  CARDIAC MARKERS ( 27 Sep 2017 13:21 )  x     / 0.37 ng/mL / 374 U/L / x     / 4.1 ng/mL  CARDIAC MARKERS ( 27 Sep 2017 05:26 )  x     / 0.38 ng/mL / 185 U/L / x     / 3.6 ng/mL  CARDIAC MARKERS ( 27 Sep 2017 00:45 )  x     / 0.34 ng/mL / 286 U/L / x     / 4.6 ng/mL      CAPILLARY BLOOD GLUCOSE        PT/INR - ( 28 Sep 2017 04:04 )   PT: 15.6 sec;   INR: 1.43 ratio         PTT - ( 28 Sep 2017 04:04 )  PTT:97.7 sec  Urinalysis Basic - ( 26 Sep 2017 15:51 )    Color: Yellow / Appearance: x / S.026 / pH: x  Gluc: x / Ketone: Negative  / Bili: Negative / Urobili: Negative   Blood: x / Protein: Trace / Nitrite: Negative   Leuk Esterase: Negative / RBC: 3-5 /HPF / WBC 3-5 /HPF   Sq Epi: x / Non Sq Epi: OCC /HPF / Bacteria: Many /HPF      Procalcitonin, Serum: 3.09 ng/mL (28 Sep 2017 04:04)  Procalcitonin, Serum: 3.45 ng/mL (27 Sep 2017 11:07)  Procalcitonin, Serum: 6.39 ng/mL (26 Sep 2017 01:09)    Serum Pro-Brain Natriuretic Peptide: 7093 pg/mL (26 Sep 2017 01:09)                CULTURES: (if applicable)  Blood cultures negative         Physical Examination:  PULM: Bronchial BS L base  CVS: S1, S2 RRR    RADIOLOGY REVIEWED    CXR: LLL collapse

## 2017-09-28 NOTE — PROGRESS NOTE ADULT - ATTENDING COMMENTS
Pt seen and examined.  Chart reviewed.  Resident note confirmed.  Pt is a 62 year old male admitted to neurosurgery for bilateral leg weakness found to have newly diagnosed metastatic disease.  Pt is s/p T8-T11 laminectomy with subsequent deterioration in respiratory status/acute onset abdominal pain.  Work up revealed bilateral DVTs, right upper lobe PE, and small intestinal perforation.  Pt is s/p Exploratory laparotomy, SBR x1 (left in discontinuity), abdominal washout, abthera VAC application (9/22) with intraop findings of an intestinal perforation and a large amount of succus in the abdomen. Pt s/p return to OR on 9/24 for intestinal reconstruction and abdominal closure.  No acute events overnight.  Continue pain control.  Echocardiogram is without evidence of right heart strain.  Left lung atelectasis has improved.  Continue diuresis for hypervolemia.  Keep NPO and await return of bowel function. Portal vein duplex negative for thrombosis.  Marked leukocytosis noted.  Cultures sent. Continue antimicrobials per SICU team. Continue heparin for tx of PE.  Continue supportive care.

## 2017-09-28 NOTE — PROGRESS NOTE ADULT - ASSESSMENT
62M with no significant PMH presenting with spinal cord compression of thoracic spine s/p laminectomy with surgical decompression and biopsy of mass taken. Pan-CT concerning for possible lung primary.  Suspicious lesions of liver and adrenal gland concerning for metastases.   Now s/p Laminectomy and decompression.    Abdominal Pain and distension - likely due to ileus  AXR - 9/21 c/w ileus, (stool and contrast in Right colon)  s/p RRT due to respiratory distress 9/22 am- s/p intubation, sedation, pressor support and new ALEC with hyperkalemia  CT 9/22 - SB perforation and PE  s/p Exploratory laparotomy 9/22/17, SBR x1 (left in discontinuity), abdominal washout, abthera VAC application (9/22)  RTOR 9/24/17; ex-lap, washout, re-anastomosis, abdominal closure  Spinal mass biopsy c/w Lung Ca - Heme/Onc following    PLAN  NPO, NGT  IV PPI  Post-op care per SICU  Await GI function  On IV Anti-microbials - Zosyn, Vanco, Diflucan (E. faecium in peritoneal culture)  follow-up cultures  AC per Hem/Onc (PE)  Monitor Hgb (currently stable)    Bandar Baker PA-C    [facility]  [phone]

## 2017-09-28 NOTE — PROGRESS NOTE ADULT - PROBLEM SELECTOR PLAN 1
LLL still collapsed  -Pending CT chest  -Hi Vineet during daytime for PEEP  -Bipap qHS  -Keep sp02>92%  -Worsened leukocytosis likely reactive 2nd post obstructive PNA, c/w vanco, zosyn. -Obtain sputum culture  -Duoneb q6  -Chest PT, OOB, Acapella valve, chest vest for mucous clearance

## 2017-09-28 NOTE — PROGRESS NOTE ADULT - ATTENDING COMMENTS
62 year old male s/p exploratory laparotomy for a small bowel perforation and his abdomen was closed.  His respiratory status continues to improve. He has been afebrile.  He denies any nausea or emesis.     Current management includes:    1) Left sided lung collapse and atelectasis-improved; continue high flow 40 L and 50%     CXR- is improved from yesterday     -will nocturnal cpap    -continue incentive spirometer and acapella      2) leukocytosis - wbc 31.8, continues to be elevated  - E faecalis and Candida albicans cells from intraoperative peritoneal fluid  -will continue zosyn, vancomycin and micafungin   -fungitell is pending  -blood cultures NGTD, urinalysis negative  -patient to get ct scan of chest/a/pelvis    3) Right segmental and subsegmental right upper lobe- continue heparin drip this morning  - 1500 units / hour  -PTT to be monitored with goal 60-90    4) Keep NPO with IVF at 30 cc/hour; awaiting bowel function    5) hyperbilirubinemia- will obtain portal venous duplex- negative  -gallbladder normal     CC time: 42 minutes

## 2017-09-28 NOTE — PROGRESS NOTE ADULT - SUBJECTIVE AND OBJECTIVE BOX
HISTORY    24 HOUR EVENTS:  Patient collapsed his L lung again yesterday and was PEEPed open with BiPAP. Patient tolerated BiPAP overnight with chest PT during the day.  He received 20 IVL with adequate response and an improved CXR.  His SBP dropped in the early evening to systolic in the 70s for which he was given a 500cc Albumin bolus with improvement in hypotension.     SUBJECTIVE/ROS:  [x] A ten-point review of systems was otherwise negative except as noted.  [ ] Due to altered mental status/intubation, subjective information were not able to be obtained from the patient. History was obtained, to the extent possible, from review of the chart and collateral sources of information.      NEURO  RASS:  0    Exam: alert and oriented x3, NAD  Meds: HYDROmorphone  Injectable 0.5 milliGRAM(s) IV Push every 3 hours PRN Moderate Pain (4 - 6)    [x] Adequacy of sedation and pain control has been assessed and adjusted        RESPIRATORY  RR: 18 (09-28-17 @ 04:00) (11 - 34)  SpO2: 100% (09-28-17 @ 04:00) (95% - 100%)  Exam: unlabored, clear to auscultation bilaterally  Mechanical Ventilation: n/a  ABG - ( 28 Sep 2017 03:54 )  pH: 7.39  /  pCO2: 48    /  pO2: 149   / HCO3: 29    / Base Excess: 3.7   /  SaO2: 99      Lactate: x                [ ] Extubation Readiness Assessed  Meds: ALBUTerol/ipratropium for Nebulization 3 milliLiter(s) Nebulizer every 6 hours  acetylcysteine 20% Inhalation 3 milliLiter(s) Inhalation every 6 hours        CARDIOVASCULAR  HR: 82 (09-28-17 @ 04:00) (76 - 101)  BP: 114/65 (09-28-17 @ 04:00) (81/55 - 138/80)  BP(mean): 84 (09-28-17 @ 04:00) (61 - 103)  VBG - ( 27 Sep 2017 01:17 )  pH: 7.38  /  pCO2: 50    /  pO2: 40    / HCO3: 29    / Base Excess: 3.3   /  SaO2: 68     Lactate: 1.5                Exam: regular rate and rhythm  Cardiac Rhythm: sinus rhythm with intermittent episodes of bigeminy  Perfusion     [x ]Adequate   [ ]Inadequate  Mentation   [ x]Normal       [ ]Reduced  Extremities  [ x]Warm         [ ]Cool  Volume Status [ ]Hypervolemic [ x]Euvolemic [ ]Hypovolemic  Meds: metoprolol Injectable 5 milliGRAM(s) IV Push every 4 hours      GI/NUTRITION  Exam: distended, nontender, midline dressing with minimal sanguinous staining, otherwise dry and intact   Diet: NPO  Meds: pantoprazole  Injectable 40 milliGRAM(s) IV Push every 12 hours      GENITOURINARY  I&O's Detail    09-26 @ 07:01  -  09-27 @ 07:00  --------------------------------------------------------  IN:    heparin Infusion: 358 mL    IV PiggyBack: 1025 mL    lactated ringers.: 1200 mL    Solution: 100 mL  Total IN: 2683 mL    OUT:    Accordian: 110 mL    Indwelling Catheter - Urethral: 2475 mL    Nasoenteral Tube: 200 mL  Total OUT: 2785 mL    Total NET: -102 mL      09-27 @ 07:01 - 09-28 @ 05:23  --------------------------------------------------------  IN:    dextrose 5% + sodium chloride 0.45%.: 600 mL    heparin Infusion: 315 mL    lactated ringers.: 400 mL  Total IN: 1315 mL    OUT:    Accordian: 110 mL    Indwelling Catheter - Urethral: 2175 mL    Nasoenteral Tube: 150 mL  Total OUT: 2435 mL    Total NET: -1120 mL          09-28    139  |  101  |  39<H>  ----------------------------<  109<H>  4.7   |  27  |  1.07    Ca    7.7<L>      28 Sep 2017 04:04  Phos  4.2     09-28  Mg     2.8     09-28    TPro  4.8<L>  /  Alb  2.2<L>  /  TBili  3.6<H>  /  DBili  3.0<H>  /  AST  54<H>  /  ALT  31  /  AlkPhos  77  09-28    [ ] Torres catheter, indication: N/A  Meds: dextrose 5% + sodium chloride 0.45%. 1000 milliLiter(s) IV Continuous <Continuous>        HEMATOLOGIC  Meds: heparin  Infusion 1200 Unit(s)/Hr IV Continuous <Continuous>    [x] VTE Prophylaxis                        10.0   31.8  )-----------( 127      ( 28 Sep 2017 04:04 )             31.2     PT/INR - ( 28 Sep 2017 04:04 )   PT: 15.6 sec;   INR: 1.43 ratio         PTT - ( 28 Sep 2017 04:04 )  PTT:97.7 sec  Transfusion     [ ] PRBC   [ ] Platelets   [ ] FFP   [ ] Cryoprecipitate      INFECTIOUS DISEASES  T(C): 36.6 (09-28-17 @ 03:00), Max: 36.9 (09-27-17 @ 19:00)  Wt(kg): --  WBC Count: 31.8 K/uL (09-28 @ 04:04)    Recent Cultures:    Meds: piperacillin/tazobactam IVPB. 3.375 Gram(s) IV Intermittent every 8 hours  vancomycin  IVPB 1000 milliGRAM(s) IV Intermittent every 12 hours  vancomycin  IVPB      micafungin IVPB      micafungin IVPB 100 milliGRAM(s) IV Intermittent every 24 hours        ENDOCRINE  Capillary Blood Glucose    Meds:     ACCESS DEVICES:  [x ] Peripheral IV  [ x] Central Venous Line	[ ] R	[x ] L	[x ] IJ	[ ] Fem	[ ] SC	Placed:   [ ] Arterial Line		[ ] R	[ ] L	[ ] Fem	[ ] Rad	[ ] Ax	Placed:   [ ] PICC:					[ ] Mediport  [x ] Urinary Catheter, Date Placed:   [x ] Necessity of urinary, arterial, and venous catheters discussed    OTHER MEDICATIONS:  naloxone Injectable 0.1 milliGRAM(s) IV Push every 3 minutes PRN      CODE STATUS: Full code    IMAGING: HISTORY    24 HOUR EVENTS:  Patient collapsed his L lung again yesterday and was PEEPed open with BiPAP. Patient tolerated BiPAP overnight with chest PT during the day.  He received 20 IVL with adequate response and an improved CXR.  His SBP dropped in the early evening to systolic in the 70s for which he was given a 500cc Albumin bolus with improvement in hypotension.     SUBJECTIVE/ROS:  [x] A ten-point review of systems was otherwise negative except as noted.  [ ] Due to altered mental status/intubation, subjective information were not able to be obtained from the patient. History was obtained, to the extent possible, from review of the chart and collateral sources of information.      NEURO  RASS:  0    Exam: alert and oriented x3, NAD  Meds: HYDROmorphone  Injectable 0.5 milliGRAM(s) IV Push every 3 hours PRN Moderate Pain (4 - 6)    [x] Adequacy of sedation and pain control has been assessed and adjusted        RESPIRATORY  RR: 18 (09-28-17 @ 04:00) (11 - 34)  SpO2: 100% (09-28-17 @ 04:00) (95% - 100%)  Exam: unlabored, clear to auscultation bilaterally  Mechanical Ventilation: n/a  ABG - ( 28 Sep 2017 03:54 )  pH: 7.39  /  pCO2: 48    /  pO2: 149   / HCO3: 29    / Base Excess: 3.7   /  SaO2: 99      Lactate: x                [ ] Extubation Readiness Assessed  Meds: ALBUTerol/ipratropium for Nebulization 3 milliLiter(s) Nebulizer every 6 hours  acetylcysteine 20% Inhalation 3 milliLiter(s) Inhalation every 6 hours        CARDIOVASCULAR  HR: 82 (09-28-17 @ 04:00) (76 - 101)  BP: 114/65 (09-28-17 @ 04:00) (81/55 - 138/80)  BP(mean): 84 (09-28-17 @ 04:00) (61 - 103)  VBG - ( 27 Sep 2017 01:17 )  pH: 7.38  /  pCO2: 50    /  pO2: 40    / HCO3: 29    / Base Excess: 3.3   /  SaO2: 68     Lactate: 1.5                Exam: regular rate and rhythm  Cardiac Rhythm: sinus rhythm with intermittent episodes of bigeminy  Perfusion     [x ]Adequate   [ ]Inadequate  Mentation   [ x]Normal       [ ]Reduced  Extremities  [ x]Warm         [ ]Cool  Volume Status [ ]Hypervolemic [ x]Euvolemic [ ]Hypovolemic  Meds: metoprolol Injectable 5 milliGRAM(s) IV Push every 4 hours      GI/NUTRITION  Exam: distended, nontender, midline dressing with minimal sanguinous staining, otherwise dry and intact   Diet: NPO  Meds: pantoprazole  Injectable 40 milliGRAM(s) IV Push every 12 hours      GENITOURINARY  I&O's Detail    09-26 @ 07:01  -  09-27 @ 07:00  --------------------------------------------------------  IN:    heparin Infusion: 358 mL    IV PiggyBack: 1025 mL    lactated ringers.: 1200 mL    Solution: 100 mL  Total IN: 2683 mL    OUT:    Accordian: 110 mL    Indwelling Catheter - Urethral: 2475 mL    Nasoenteral Tube: 200 mL  Total OUT: 2785 mL    Total NET: -102 mL        27 Sep 2017 07:01  -  28 Sep 2017 07:00  --------------------------------------------------------  IN:    dextrose 5% + sodium chloride 0.45%.: 750 mL    heparin Infusion: 360 mL    lactated ringers.: 400 mL  Total IN: 1510 mL    OUT:    Accordian: 110 mL    Indwelling Catheter - Urethral: 2400 mL    Nasoenteral Tube: 200 mL  Total OUT: 2710 mL    Total NET: -1200 mL          09-28    139  |  101  |  39<H>  ----------------------------<  109<H>  4.7   |  27  |  1.07    Ca    7.7<L>      28 Sep 2017 04:04  Phos  4.2     09-28  Mg     2.8     09-28    TPro  4.8<L>  /  Alb  2.2<L>  /  TBili  3.6<H>  /  DBili  3.0<H>  /  AST  54<H>  /  ALT  31  /  AlkPhos  77  09-28    [ ] Torres catheter, indication: N/A  Meds: dextrose 5% + sodium chloride 0.45%. 1000 milliLiter(s) IV Continuous <Continuous>        HEMATOLOGIC  Meds: heparin  Infusion 1200 Unit(s)/Hr IV Continuous <Continuous>    [x] VTE Prophylaxis                        10.0   31.8  )-----------( 127      ( 28 Sep 2017 04:04 )             31.2     PT/INR - ( 28 Sep 2017 04:04 )   PT: 15.6 sec;   INR: 1.43 ratio         PTT - ( 28 Sep 2017 04:04 )  PTT:97.7 sec  Transfusion     [ ] PRBC   [ ] Platelets   [ ] FFP   [ ] Cryoprecipitate      INFECTIOUS DISEASES  T(C): 36.6 (09-28-17 @ 03:00), Max: 36.9 (09-27-17 @ 19:00)  Wt(kg): --  WBC Count: 31.8 K/uL (09-28 @ 04:04)    Recent Cultures:    Meds: piperacillin/tazobactam IVPB. 3.375 Gram(s) IV Intermittent every 8 hours  vancomycin  IVPB 1000 milliGRAM(s) IV Intermittent every 12 hours  vancomycin  IVPB      micafungin IVPB      micafungin IVPB 100 milliGRAM(s) IV Intermittent every 24 hours        ENDOCRINE  Capillary Blood Glucose    Meds:     ACCESS DEVICES:  [x ] Peripheral IV  [ x] Central Venous Line	[ ] R	[x ] L	[x ] IJ	[ ] Fem	[ ] SC	Placed:   [ ] Arterial Line		[ ] R	[ ] L	[ ] Fem	[ ] Rad	[ ] Ax	Placed:   [ ] PICC:					[ ] Mediport  [x ] Urinary Catheter, Date Placed:   [x ] Necessity of urinary, arterial, and venous catheters discussed    OTHER MEDICATIONS:  naloxone Injectable 0.1 milliGRAM(s) IV Push every 3 minutes PRN      CODE STATUS: Full code    IMAGING:

## 2017-09-29 NOTE — PROGRESS NOTE ADULT - SUBJECTIVE AND OBJECTIVE BOX
Follow-up Pulm Progress Note    Off NIV overnight  96% on 3L NC  Denies dyspnea  Minimally productive cough with thick sputum    Medications:  MEDICATIONS  (STANDING):  vancomycin  IVPB 1000 milliGRAM(s) IV Intermittent every 12 hours  vancomycin  IVPB      ALBUTerol/ipratropium for Nebulization 3 milliLiter(s) Nebulizer every 6 hours  metoprolol Injectable 5 milliGRAM(s) IV Push every 4 hours  micafungin IVPB      micafungin IVPB 100 milliGRAM(s) IV Intermittent every 24 hours  HYDROmorphone PCA (1 mG/mL) 30 milliLiter(s) PCA Continuous PCA Continuous  dextrose 5% + sodium chloride 0.45%. 1000 milliLiter(s) (50 mL/Hr) IV Continuous <Continuous>  pantoprazole  Injectable 40 milliGRAM(s) IV Push every 24 hours  enoxaparin Injectable 80 milliGRAM(s) SubCutaneous every 12 hours  meropenem IVPB      meropenem IVPB 1000 milliGRAM(s) IV Intermittent every 8 hours    MEDICATIONS  (PRN):  HYDROmorphone PCA (1 mG/mL) Rescue Clinician Bolus 0.5 milliGRAM(s) IV Push every 15 minutes PRN for Pain Scale GREATER THAN 6  naloxone Injectable 0.1 milliGRAM(s) IV Push every 3 minutes PRN For ANY of the following changes in patient status:  A. RR LESS THAN 10 breaths per minute, B. Oxygen saturation LESS THAN 90%, C. Sedation score of 6  ondansetron Injectable 4 milliGRAM(s) IV Push every 6 hours PRN Nausea          Vital Signs Last 24 Hrs  T(C): 37 (29 Sep 2017 15:00), Max: 37 (29 Sep 2017 07:00)  T(F): 98.6 (29 Sep 2017 15:00), Max: 98.6 (29 Sep 2017 07:00)  HR: 80 (29 Sep 2017 15:00) (72 - 95)  BP: 110/66 (29 Sep 2017 15:00) (91/69 - 131/68)  BP(mean): 82 (29 Sep 2017 15:00) (73 - 95)  RR: 19 (29 Sep 2017 15:00) (13 - 40)  SpO2: 97% (29 Sep 2017 15:00) (89% - 100%) on 3L NC    ABG - ( 28 Sep 2017 03:54 )  pH: 7.39  /  pCO2: 48    /  pO2: 149   / HCO3: 29    / Base Excess: 3.7   /  SaO2: 99                    09-28 @ 07:01  -  09-29 @ 07:00  --------------------------------------------------------  IN: 2510 mL / OUT: 2475 mL / NET: 35 mL          LABS:                        9.0    32.7  )-----------( 151      ( 29 Sep 2017 02:57 )             27.8     09-29    137  |  98  |  34<H>  ----------------------------<  106<H>  4.7   |  27  |  1.03    Ca    7.9<L>      29 Sep 2017 02:57  Phos  3.3     09-29  Mg     2.7     09-29    TPro  4.8<L>  /  Alb  2.2<L>  /  TBili  3.6<H>  /  DBili  3.0<H>  /  AST  54<H>  /  ALT  31  /  AlkPhos  77  09-28      CARDIAC MARKERS ( 28 Sep 2017 14:22 )  x     / 0.40 ng/mL / 201 U/L / x     / 3.1 ng/mL  CARDIAC MARKERS ( 28 Sep 2017 04:04 )  x     / 0.44 ng/mL / 247 U/L / x     / 3.5 ng/mL  CARDIAC MARKERS ( 27 Sep 2017 21:43 )  x     / 0.43 ng/mL / 237 U/L / x     / 3.2 ng/mL      CAPILLARY BLOOD GLUCOSE        PT/INR - ( 29 Sep 2017 02:57 )   PT: 15.8 sec;   INR: 1.44 ratio         PTT - ( 29 Sep 2017 02:57 )  PTT:98.4 sec  Urinalysis Basic - ( 29 Sep 2017 11:52 )    Color: Yellow / Appearance: x / SG: >1.030 / pH: x  Gluc: x / Ketone: Negative  / Bili: Small / Urobili: Negative   Blood: x / Protein: Trace / Nitrite: Negative   Leuk Esterase: Negative / RBC: 5-10 /HPF / WBC 5-10 /HPF   Sq Epi: x / Non Sq Epi: x / Bacteria: x      Procalcitonin, Serum: 2.41 ng/mL (29 Sep 2017 03:31)  Procalcitonin, Serum: 3.09 ng/mL (28 Sep 2017 04:04)  Procalcitonin, Serum: 3.45 ng/mL (27 Sep 2017 11:07)    Serum Pro-Brain Natriuretic Peptide: 1037 pg/mL (29 Sep 2017 02:57)                CULTURES: (if applicable)          Physical Examination:  PULM: Absent BS L Base  CVS: S1, S2 RRR    RADIOLOGY REVIEWED  CT chest: < from: CT Chest w/ IV Cont (09.28.17 @ 16:45) >    LUNGS AND LARGE AIRWAYS: Mild mucous in the right mainstem bronchus. The   left lower lobe bronchusand segmental bronchi are occluded/obstructed,   unchanged. Complete consolidation of the left lower lobe slightly   increased compared to prior exam compatible with patient's known lung   malignancy. Previously noted right upper lobe airspace disease has   resolved. Minimal consolidation the right lower lobe may represent   atelectasis or pneumonia.   PLEURA: Small right and moderate left pleural effusions. Left pleural   nodularity compatible with metastatic disease.  VESSELS: The previously noted right upper lobe pulmonary emboli are   poorly visualized on the current exam. Left central venous catheter   terminates in the superior vena cava.  HEART: Heart size is normal. No pericardial effusion.  MEDIASTINUM AND NIRAV: Extensive mediastinaland left hilar adenopathy as   at recent imaging.   CHEST WALL AND LOWER NECK: Within normal limits.    < end of copied text > Follow-up Pulm Progress Note    Off NIV overnight  96% on 3L NC  Denies dyspnea  Minimally productive cough with thick sputum    Medications:  MEDICATIONS  (STANDING):  vancomycin  IVPB 1000 milliGRAM(s) IV Intermittent every 12 hours  vancomycin  IVPB      ALBUTerol/ipratropium for Nebulization 3 milliLiter(s) Nebulizer every 6 hours  metoprolol Injectable 5 milliGRAM(s) IV Push every 4 hours  micafungin IVPB      micafungin IVPB 100 milliGRAM(s) IV Intermittent every 24 hours  HYDROmorphone PCA (1 mG/mL) 30 milliLiter(s) PCA Continuous PCA Continuous  dextrose 5% + sodium chloride 0.45%. 1000 milliLiter(s) (50 mL/Hr) IV Continuous <Continuous>  pantoprazole  Injectable 40 milliGRAM(s) IV Push every 24 hours  enoxaparin Injectable 80 milliGRAM(s) SubCutaneous every 12 hours  meropenem IVPB      meropenem IVPB 1000 milliGRAM(s) IV Intermittent every 8 hours    MEDICATIONS  (PRN):  HYDROmorphone PCA (1 mG/mL) Rescue Clinician Bolus 0.5 milliGRAM(s) IV Push every 15 minutes PRN for Pain Scale GREATER THAN 6  naloxone Injectable 0.1 milliGRAM(s) IV Push every 3 minutes PRN For ANY of the following changes in patient status:  A. RR LESS THAN 10 breaths per minute, B. Oxygen saturation LESS THAN 90%, C. Sedation score of 6  ondansetron Injectable 4 milliGRAM(s) IV Push every 6 hours PRN Nausea    Vital Signs Last 24 Hrs  T(C): 37 (29 Sep 2017 15:00), Max: 37 (29 Sep 2017 07:00)  T(F): 98.6 (29 Sep 2017 15:00), Max: 98.6 (29 Sep 2017 07:00)  HR: 80 (29 Sep 2017 15:00) (72 - 95)  BP: 110/66 (29 Sep 2017 15:00) (91/69 - 131/68)  BP(mean): 82 (29 Sep 2017 15:00) (73 - 95)  RR: 19 (29 Sep 2017 15:00) (13 - 40)  SpO2: 97% (29 Sep 2017 15:00) (89% - 100%) on 3L NC    ABG - ( 28 Sep 2017 03:54 )  pH: 7.39  /  pCO2: 48    /  pO2: 149   / HCO3: 29    / Base Excess: 3.7   /  SaO2: 99                    09-28 @ 07:01  -  09-29 @ 07:00  --------------------------------------------------------  IN: 2510 mL / OUT: 2475 mL / NET: 35 mL          LABS:                        9.0    32.7  )-----------( 151      ( 29 Sep 2017 02:57 )             27.8     09-29    137  |  98  |  34<H>  ----------------------------<  106<H>  4.7   |  27  |  1.03    Ca    7.9<L>      29 Sep 2017 02:57  Phos  3.3     09-29  Mg     2.7     09-29    TPro  4.8<L>  /  Alb  2.2<L>  /  TBili  3.6<H>  /  DBili  3.0<H>  /  AST  54<H>  /  ALT  31  /  AlkPhos  77  09-28      CARDIAC MARKERS ( 28 Sep 2017 14:22 )  x     / 0.40 ng/mL / 201 U/L / x     / 3.1 ng/mL  CARDIAC MARKERS ( 28 Sep 2017 04:04 )  x     / 0.44 ng/mL / 247 U/L / x     / 3.5 ng/mL  CARDIAC MARKERS ( 27 Sep 2017 21:43 )  x     / 0.43 ng/mL / 237 U/L / x     / 3.2 ng/mL      CAPILLARY BLOOD GLUCOSE        PT/INR - ( 29 Sep 2017 02:57 )   PT: 15.8 sec;   INR: 1.44 ratio         PTT - ( 29 Sep 2017 02:57 )  PTT:98.4 sec  Urinalysis Basic - ( 29 Sep 2017 11:52 )    Color: Yellow / Appearance: x / SG: >1.030 / pH: x  Gluc: x / Ketone: Negative  / Bili: Small / Urobili: Negative   Blood: x / Protein: Trace / Nitrite: Negative   Leuk Esterase: Negative / RBC: 5-10 /HPF / WBC 5-10 /HPF   Sq Epi: x / Non Sq Epi: x / Bacteria: x      Procalcitonin, Serum: 2.41 ng/mL (29 Sep 2017 03:31)  Procalcitonin, Serum: 3.09 ng/mL (28 Sep 2017 04:04)  Procalcitonin, Serum: 3.45 ng/mL (27 Sep 2017 11:07)    Serum Pro-Brain Natriuretic Peptide: 1037 pg/mL (29 Sep 2017 02:57)                CULTURES: (if applicable)          Physical Examination:  PULM: Absent BS L Base  CVS: S1, S2 RRR    RADIOLOGY REVIEWED  CT chest: < from: CT Chest w/ IV Cont (09.28.17 @ 16:45) >    LUNGS AND LARGE AIRWAYS: Mild mucous in the right mainstem bronchus. The   left lower lobe bronchusand segmental bronchi are occluded/obstructed,   unchanged. Complete consolidation of the left lower lobe slightly   increased compared to prior exam compatible with patient's known lung   malignancy. Previously noted right upper lobe airspace disease has   resolved. Minimal consolidation the right lower lobe may represent   atelectasis or pneumonia.   PLEURA: Small right and moderate left pleural effusions. Left pleural   nodularity compatible with metastatic disease.  VESSELS: The previously noted right upper lobe pulmonary emboli are   poorly visualized on the current exam. Left central venous catheter   terminates in the superior vena cava.  HEART: Heart size is normal. No pericardial effusion.  MEDIASTINUM AND NIRAV: Extensive mediastinaland left hilar adenopathy as   at recent imaging.   CHEST WALL AND LOWER NECK: Within normal limits.    < end of copied text >

## 2017-09-29 NOTE — PROGRESS NOTE ADULT - SUBJECTIVE AND OBJECTIVE BOX
Patient is a 62y old  Male who presents with a chief complaint of cord compression (19 Sep 2017 09:44)      HPI:  Patient is critically ill int  SICU. Respiratory failure infection wither from graft or from GI. or Urinary source.. 62M admitted to neurosurgery for bilateral leg weakness found to have newly diagnosed metastatic disease s/p T8-T11 laminectomy presented today with deterioration in respiratory status and acute onset abdominal pain who was subsequently intubated. W/u revealed b/l DVTs, right upper lobe PE, and a bowel perforation.  Pt is now s/p Exploratory laparotomy, SBR x1 (left in discontinuity), abdominal washout, abthera VAC application (9/22); 1cm perforation in the small bowel 110 cm distal to the ligament of treitz. RTOR 9/24 for intestinal reconstruction and abdominal closure. Awaiting ROBF. Found to have right renal vein thrombosis on CT chest, Vascular does nto feel tthat this is the source of the leudo  MEDICATIONS  (STANDING):  vancomycin  IVPB 1000 milliGRAM(s) IV Intermittent every 12 hours  vancomycin  IVPB      ALBUTerol/ipratropium for Nebulization 3 milliLiter(s) Nebulizer every 6 hours  metoprolol Injectable 5 milliGRAM(s) IV Push every 4 hours  micafungin IVPB      micafungin IVPB 100 milliGRAM(s) IV Intermittent every 24 hours  HYDROmorphone PCA (1 mG/mL) 30 milliLiter(s) PCA Continuous PCA Continuous  dextrose 5% + sodium chloride 0.45%. 1000 milliLiter(s) (50 mL/Hr) IV Continuous <Continuous>  pantoprazole  Injectable 40 milliGRAM(s) IV Push every 24 hours  enoxaparin Injectable 80 milliGRAM(s) SubCutaneous every 12 hours  meropenem IVPB      meropenem IVPB 1000 milliGRAM(s) IV Intermittent every 8 hours    MEDICATIONS  (PRN):  HYDROmorphone PCA (1 mG/mL) Rescue Clinician Bolus 0.5 milliGRAM(s) IV Push every 15 minutes PRN for Pain Scale GREATER THAN 6  naloxone Injectable 0.1 milliGRAM(s) IV Push every 3 minutes PRN For ANY of the following changes in patient status:  A. RR LESS THAN 10 breaths per minute, B. Oxygen saturation LESS THAN 90%, C. Sedation score of 6  ondansetron Injectable 4 milliGRAM(s) IV Push every 6 hours PRN Nausea      Allergies    No Known Allergies    Intolerances        REVIEW OF SYSTEM:  CONSTITUTIONAL: No fever, No change in weight, No fatigue  HEAD: No headache, No dizziness, No recent trauma  EYES: No eye pain, No visual disturbances, No discharge  ENT:  No difficulty hearing, No tinnitus, No vertigo, No sinus pain, No throat pain  NECK: No pain, No stiffness  BREASTS: No pain, No masses, No nipple discharge  RESPIRATORY: No cough, No wheezing, No chills, No hemoptysis, No shortness of breath at rest or exertional shortness of breath  CARDIOVASCULAR: No chest pain, No palpitations, No dizziness, No CHF, No arrhythmia, No cardiomegaly, No leg swelling  GASTROINTESTINAL: No abdominal, No epigastric pain. No nausea, No vomiting, No hematemesis, No diarrhea, No constipation. No melena, No hematochezia. No GERD  GENITOURINARY: No dysuria, No frequency, No hematuria, No incontinence, No nocturia, No hesitancy,  SKIN: No itching, No burning, No rashes, No lesions   LYMPH NODES: No history of enlarged glands  ENDOCRINE: No heat or cold intolerance, No hair loss. No osteoporosis, No thyroid disease  MUSCULOSKELETAL: No joint pain or swelling, No muscle, back, or extremity pain  PSYCHIATRIC: No depression, No anxiety, No mood swings, No difficulty sleeping  HEME/LYMPH: No easy bruising, No anticoagulants, No bleeding disorder, No bleeding gums  ALLERGY AND IMMUNOLOGIC: No hives, No eczema  NEUROLOGICAL: No memory loss, No loss of strength, No numbness, No tremors        VITALS:   T(C): 36.5 (09-29-17 @ 19:30), Max: 37 (09-29-17 @ 07:00)  HR: 76 (09-29-17 @ 22:00) (72 - 93)  BP: 100/60 (09-29-17 @ 22:00) (91/69 - 135/72)  RR: 20 (09-29-17 @ 22:00) (15 - 31)  SpO2: 100% (09-29-17 @ 22:00) (89% - 100%)  Wt(kg): --    09-28 @ 07:01  -  09-29 @ 07:00  --------------------------------------------------------  IN: 2510 mL / OUT: 2475 mL / NET: 35 mL    09-29 @ 07:01  -  09-29 @ 22:06  --------------------------------------------------------  IN: 1430 mL / OUT: 865 mL / NET: 565 mL        PHYSICAL EXAM:  Constitutional: +NGT - green, bilious, on nasal high flow, sitting in bed, responsive  Neck: supple  Respiratory: decreased BS bases, rhonchi (+) PE  Echo no strain  Cardiovascular: S1 and S2, regular  Gastrointestinal: distended, nontender, midline dressing with minimal sanguinous staining, otherwise dry and intact +michelle  Back:  +hemovac x 2 in place  Extremities: +edema b/l  Vascular: 2+ peripheral pulses DVT  Neurological: alert and appropriate lower extremity weakness  Skin: No rashes    LABS:                          9.0    32.7  )-----------( 151      ( 29 Sep 2017 02:57 )             27.8     09-29    137  |  98  |  34<H>  ----------------------------<  106<H>  4.7   |  27  |  1.03  09-28    139  |  101  |  39<H>  ----------------------------<  109<H>  4.7   |  27  |  1.07  09-27    140  |  102  |  45<H>  ----------------------------<  86  4.8   |  27  |  1.06    Ca    7.9<L>      29 Sep 2017 02:57  Ca    7.7<L>      28 Sep 2017 04:04  Ca    8.0<L>      27 Sep 2017 00:45  Phos  3.3     09-29  Phos  4.2     09-28  Phos  4.7     09-27  Mg     2.7     09-29  Mg     2.8     09-28  Mg     3.0     09-27    TPro  4.8<L>  /  Alb  2.2<L>  /  TBili  3.6<H>  /  DBili  3.0<H>  /  AST  54<H>  /  ALT  31  /  AlkPhos  77  09-28  TPro  4.8<L>  /  Alb  2.4<L>  /  TBili  3.1<H>  /  DBili  2.6<H>  /  AST  60<H>  /  ALT  33  /  AlkPhos  91  09-27    CAPILLARY BLOOD GLUCOSE  121 (29 Sep 2017 16:00)          RADIOLOGY & ADDITIONAL TESTS:      Consultant(s):    Care Discussed with Consultants/Other Providers [ ] YES  [ ] NO

## 2017-09-29 NOTE — CONSULT NOTE ADULT - NSHPATTENDINGPLANDISCUSS_GEN_ALL_CORE
the patient and neurosurgical resident.
the patient and the neurosurgical resident
SICU team
surg ho

## 2017-09-29 NOTE — CHART NOTE - NSCHARTNOTEFT_GEN_A_CORE
Pt seen for nutrition follow up, as per department protocol    Hospital Course: Pt with newly diagnosed metastatic disease S/P T8-T11 laminectomy 9/18; now S/P OR for small bowel perforation, resection, left in discontinuity with abthera (9/22), S/P ex-lap, washout, re-anatomosis, abdominal closure (9/24).     SICU RD f/u: Pt NPO x 11 days, awaiting return of bowel function with NGT to suction. Pt observed with physical therapy, appears well developed, however at high risk for malnutrition.    Source: Patient [ ]    Family [ ]     other [X ]; RN, medical record    Diet : NPO    NGT output x 24-hours = 375ml (as of 9/29), 200ml (as of 9/28)       Enteral /Parenteral Nutrition: n/a    Current Weight: 88.1Kg (9/29), 80.1Kg (9/28)  1+ generalized, 2+ scrotum    Pertinent Medications: MEDICATIONS  (STANDING):  piperacillin/tazobactam IVPB. 3.375 Gram(s) IV Intermittent every 8 hours  vancomycin  IVPB 1000 milliGRAM(s) IV Intermittent every 12 hours  vancomycin  IVPB      ALBUTerol/ipratropium for Nebulization 3 milliLiter(s) Nebulizer every 6 hours  metoprolol Injectable 5 milliGRAM(s) IV Push every 4 hours  micafungin IVPB      micafungin IVPB 100 milliGRAM(s) IV Intermittent every 24 hours  HYDROmorphone PCA (1 mG/mL) 30 milliLiter(s) PCA Continuous PCA Continuous  dextrose 5% + sodium chloride 0.45%. 1000 milliLiter(s) (50 mL/Hr) IV Continuous <Continuous>  pantoprazole  Injectable 40 milliGRAM(s) IV Push every 24 hours  enoxaparin Injectable 80 milliGRAM(s) SubCutaneous every 12 hours    MEDICATIONS  (PRN):  HYDROmorphone PCA (1 mG/mL) Rescue Clinician Bolus 0.5 milliGRAM(s) IV Push every 15 minutes PRN for Pain Scale GREATER THAN 6  naloxone Injectable 0.1 milliGRAM(s) IV Push every 3 minutes PRN For ANY of the following changes in patient status:  A. RR LESS THAN 10 breaths per minute, B. Oxygen saturation LESS THAN 90%, C. Sedation score of 6  ondansetron Injectable 4 milliGRAM(s) IV Push every 6 hours PRN Nausea    Pertinent Labs:  09-29 Na137 mmol/L Glu 106 mg/dL<H> K+ 4.7 mmol/L Cr  1.03 mg/dL BUN 34 mg/dL<H> Phos 3.3 mg/dL     Skin: no pressure injuries noted    Estimated Needs:   [X ] no change since previous assessment  [ ] recalculated:       Previous Nutrition Diagnosis:     [ X] Increased Nutrient Needs      Nutrition Diagnosis is [X ] ongoing     New Nutrition Diagnosis: [ ] not applicable    [X ] Inadequate Protein- Energy Intake     Related to: altered GI function     As evidenced by: pt NPO x 11 days with NGT output S/P small bowel resection     Interventions:    Recommend:  1) pending tolerance to clear liquids, advance to Low Fiber diet; recommend add Ensure Enlive tid  2) Monitor weight, lab values, skin, po intake and GI tolerance  3) Provide therapeutic diet education as able    Monitoring and Evaluation:   Follow up per protocol  RD to remain available for further nutritional interventions as indicated.   Falguni Denise, MS RD N [facility], #[phone].

## 2017-09-29 NOTE — PROGRESS NOTE ADULT - ATTENDING COMMENTS
I saw and evaluated the patient. The patient is a 62-year-old male with newly diagnosed metastatic disease s/p T8-T11 laminectomies for resection of dorsal epidural tumor for spinal cord compression on 9/19/17. His hospital course was complicated by a right upper lobe pulmonary embolus, B/L lower extremity DVTs, and a bowel perforation, requiring two surgeries for repair with General Surgery. The patient's exam is notable for 2-3/5 left lower extremity weakness and 0-1/5 right lower extremity weakness. Continue Hemovac drain. Continue mobilization with PT, OT and PMR, and disposition planning to spinal cord injury rehab when medically ready. Ok to begin chemotherapy and radiation from Neurosurgery perspective on POD#14. Appreciate SICU support.

## 2017-09-29 NOTE — PROGRESS NOTE ADULT - ASSESSMENT
62M with no significant PMH presenting with spinal cord compression of thoracic spine s/p laminectomy with surgical decompression and biopsy of mass taken. Pan-CT concerning for possible lung primary.  Suspicious lesions of liver and adrenal gland concerning for metastases.   Now s/p Laminectomy and decompression.    Abdominal Pain and distension - likely due to ileus  AXR - 9/21 c/w ileus, (stool and contrast in Right colon)  s/p RRT due to respiratory distress 9/22 am- s/p intubation, sedation, pressor support and new ALEC with hyperkalemia  CT 9/22 - SB perforation and PE  s/p Exploratory laparotomy 9/22/17, SBR x1 (left in discontinuity), abdominal washout, abthera VAC application (9/22)  RTOR 9/24/17; ex-lap, washout, re-anastomosis, abdominal closure  Spinal mass biopsy c/w Lung Ca - Heme/Onc following    PLAN  NPO, NGT  IV PPI  Post-op care per SICU  Await GI function  On IV Anti-microbials - Zosyn, Vanco, Micafungin (E. faecium in peritoneal culture)  follow-up cultures  AC per Hem/Onc (PE)  Monitor Hgb (currently stable)    Please call over weekend prn with GI concerns    Bandar Baker PA-C    [facility]  [phone]

## 2017-09-29 NOTE — CONSULT NOTE ADULT - SUBJECTIVE AND OBJECTIVE BOX
HPI:  62M with no significant PMH p/w worsening LBP and BL LE weakness. Patient was found to have T9-T11 metastases with epidural extension, underwent removal and biospy, s/p laminectomy which was shown to be non-small cell lung carcinoma on pathology. Patient then developed abdominal pain and was found to have perforated small bowel, underwent emergent laparotomy with resection and then returned to OR for primary anastomosis. Pathology showed perforation 2/2 lung metastasis. On CT patient was found to have right renal vein thrombus which then showed progressive invasion into IVC. Patient has had persistent leukocytosis. CT A/P showed no intra-abdominal source, however peritoneal cultures significant for enterococcus, klebsiella. Blood cultures negative. Vascular surgery was consulted over concerns of infected right renal vein thrombus.  Patient has been afebrile.    Allergies: NKDA    Social        Physical Exam  T(C): 37 (09-29-17 @ 15:00), Max: 37 (09-29-17 @ 07:00)  HR: 80 (09-29-17 @ 15:00) (72 - 92)  BP: 110/66 (09-29-17 @ 15:00) (91/69 - 131/68)  RR: 19 (09-29-17 @ 15:00) (13 - 40)  SpO2: 97% (09-29-17 @ 15:00) (89% - 100%)  Wt(kg): --  Tmax: T(C): , Max: 37 (09-29-17 @ 07:00)  Wt(kg): --    Gen: NAD  HEENT: normocephalic, atraumatic, no scleral icterus  CV: RRR  Pulm: Non-labored  Abd: Soft, ND, incision C/D/I  Ext: WWP, Palp dp/pt    09-28-17  -  09-29-17  --------------------------------------------------------  IN:    dextrose 5% + sodium chloride 0.45%.: 1150 mL    heparin Infusion: 360 mL    IV PiggyBack: 350 mL    Oral Fluid: 500 mL    Solution: 150 mL  Total IN: 2510 mL    OUT:    Accordian: 150 mL    Indwelling Catheter - Urethral: 1950 mL    Nasoenteral Tube: 375 mL  Total OUT: 2475 mL    Total NET: 35 mL      09-29-17  -  09-29-17  --------------------------------------------------------  IN:    dextrose 5% + sodium chloride 0.45%.: 350 mL    heparin Infusion: 30 mL    IV PiggyBack: 100 mL    Solution: 100 mL  Total IN: 580 mL    OUT:    Indwelling Catheter - Urethral: 425 mL  Total OUT: 425 mL    Total NET: 155 mL          Labs:                        9.0    32.7  )-----------( 151      ( 29 Sep 2017 02:57 )             27.8     09-29    137  |  98  |  34<H>  ----------------------------<  106<H>  4.7   |  27  |  1.03    Ca    7.9<L>      29 Sep 2017 02:57  Phos  3.3     09-29  Mg     2.7     09-29    TPro  4.8<L>  /  Alb  2.2<L>  /  TBili  3.6<H>  /  DBili  3.0<H>  /  AST  54<H>  /  ALT  31  /  AlkPhos  77  09-28    PT/INR - ( 29 Sep 2017 02:57 )   PT: 15.8 sec;   INR: 1.44 ratio         PTT - ( 29 Sep 2017 02:57 )  PTT:98.4 sec  Urinalysis Basic - ( 29 Sep 2017 11:52 )    Color: Yellow / Appearance: x / SG: >1.030 / pH: x  Gluc: x / Ketone: Negative  / Bili: Small / Urobili: Negative   Blood: x / Protein: Trace / Nitrite: Negative   Leuk Esterase: Negative / RBC: 5-10 /HPF / WBC 5-10 /HPF   Sq Epi: x / Non Sq Epi: x / Bacteria: x      ABG - ( 28 Sep 2017 03:54 )  pH: 7.39  /  pCO2: 48    /  pO2: 149   / HCO3: 29    / Base Excess: 3.7   /  SaO2: 99        Imaging  CT C/A/P: Moderate volume ascites with pelvic peritoneal inflammation. No evidence of  discrete abscess.    Progression of wedge-shaped areas of decreased perfusion within the kidneys  which may be related to infarct or infection.    Thrombus within the right renal vein, with new extension into the inferior  vena cava.    Left lower lobe consolidation with occlusion of the left lower lobe airways  compatible with patient's known malignancy.    Previously noted right upper lobe pulmonary emboli are poorly visualized on  the current examination. No new emboli.    Additional metastatic disease in the chest, abdomen and pelvis without  significant change from recent prior imaging.

## 2017-09-29 NOTE — PROGRESS NOTE ADULT - SUBJECTIVE AND OBJECTIVE BOX
ATP Progress Note    S:  Kept patient off CPAP, patient SpO2 > 90% all night on 2-3L NC. No acute events overnight.     O:  T(C): 37 (09-29-17 @ 07:00), Max: 37 (09-29-17 @ 07:00)  HR: 72 (09-29-17 @ 09:00) (72 - 95)  BP: 119/72 (09-29-17 @ 09:00) (91/69 - 131/68)  RR: 28 (09-29-17 @ 09:00) (13 - 40)  SpO2: 97% (09-29-17 @ 09:00) (89% - 100%)  Wt(kg): --    09-28 @ 07:01  -  09-29 @ 07:00  --------------------------------------------------------  IN:    dextrose 5% + sodium chloride 0.45%.: 1150 mL    heparin Infusion: 360 mL    IV PiggyBack: 350 mL    Oral Fluid: 500 mL    Solution: 150 mL  Total IN: 2510 mL    OUT:    Accordian: 150 mL    Indwelling Catheter - Urethral: 1950 mL    Nasoenteral Tube: 375 mL  Total OUT: 2475 mL    Total NET: 35 mL      09-29 @ 07:01  -  09-29 @ 10:55  --------------------------------------------------------  IN:    dextrose 5% + sodium chloride 0.45%.: 100 mL    heparin Infusion: 30 mL  Total IN: 130 mL    OUT:    Indwelling Catheter - Urethral: 125 mL  Total OUT: 125 mL    Total NET: 5 mL        CBC Full  -  ( 29 Sep 2017 02:57 )  WBC Count : 32.7 K/uL  Hemoglobin : 9.0 g/dL  Hematocrit : 27.8 %  Platelet Count - Automated : 151 K/uL  Mean Cell Volume : 96.6 fl  Mean Cell Hemoglobin : 31.1 pg  Mean Cell Hemoglobin Concentration : 32.2 gm/dL        Physical Exam:  Gen: NAD, following commands  Resp: on CPAP, no resp distress  GI: Abthera wound vac in place  Ext: decreased strength in RLE

## 2017-09-29 NOTE — PROGRESS NOTE ADULT - PROBLEM SELECTOR PLAN 1
persistent, complete LLL collapse   -Unclear how much of this is extrinsic compression 2nd tumor vs. mucous plugging  but his lower lobe was up on CXR 9/26 after bipap overnight  -Please place on Bipap 15/5 qHS    -Duoneb q6  -Worsened leukocytosis likely reactive 2nd post obstructive PNA, c/w vanco, zosyn. -Obtain sputum culture  -Chest PT, OOB, Acapella valve, chest vest for mucous clearance  -d/w SICU team persistent, complete LLL collapse   -Unclear how much of this is extrinsic compression 2nd tumor vs. mucous plugging  but his lower lobe was up on CXR 9/26 after bipap overnight  -Please place on Bipap 18/15 or 20/15 qHS    -Duoneb q6  -Worsened leukocytosis likely reactive 2nd post obstructive PNA, c/w vanco, zosyn. -Obtain sputum culture  -Chest PT, OOB, Acapella valve, chest vest for mucous clearance  -d/w SICU team

## 2017-09-29 NOTE — PROGRESS NOTE ADULT - SUBJECTIVE AND OBJECTIVE BOX
Patient is a 62y old  Male who presented with a chief complaint of cord compression (19 Sep 2017 09:44)      INTERVAL HPI/OVERNIGHT EVENTS:  remained off CPAP at night  no flatus or BM  no fever or chills    MEDICATIONS  (STANDING):  piperacillin/tazobactam IVPB. 3.375 Gram(s) IV Intermittent every 8 hours  vancomycin  IVPB 1000 milliGRAM(s) IV Intermittent every 12 hours  vancomycin  IVPB      ALBUTerol/ipratropium for Nebulization 3 milliLiter(s) Nebulizer every 6 hours  metoprolol Injectable 5 milliGRAM(s) IV Push every 4 hours  micafungin IVPB      micafungin IVPB 100 milliGRAM(s) IV Intermittent every 24 hours  HYDROmorphone PCA (1 mG/mL) 30 milliLiter(s) PCA Continuous PCA Continuous  dextrose 5% + sodium chloride 0.45%. 1000 milliLiter(s) (50 mL/Hr) IV Continuous <Continuous>  pantoprazole  Injectable 40 milliGRAM(s) IV Push every 24 hours  enoxaparin Injectable 80 milliGRAM(s) SubCutaneous every 12 hours    MEDICATIONS  (PRN):  HYDROmorphone PCA (1 mG/mL) Rescue Clinician Bolus 0.5 milliGRAM(s) IV Push every 15 minutes PRN for Pain Scale GREATER THAN 6  naloxone Injectable 0.1 milliGRAM(s) IV Push every 3 minutes PRN For ANY of the following changes in patient status:  A. RR LESS THAN 10 breaths per minute, B. Oxygen saturation LESS THAN 90%, C. Sedation score of 6  ondansetron Injectable 4 milliGRAM(s) IV Push every 6 hours PRN Nausea      Allergies    No Known Allergies    Intolerances        Review of Systems:  Gen: no fever or chills  CV: denies CP  Resp: denies dyspnea at this time, on high flow nasal O2 and night time CPAP  Neuro: no focal weakness    Vital Signs Last 24 Hrs  T(C): 37 (29 Sep 2017 07:00), Max: 37 (29 Sep 2017 07:00)  T(F): 98.6 (29 Sep 2017 07:00), Max: 98.6 (29 Sep 2017 07:00)  HR: 72 (29 Sep 2017 09:00) (72 - 95)  BP: 119/72 (29 Sep 2017 09:00) (91/69 - 131/68)  BP(mean): 87 (29 Sep 2017 09:00) (73 - 95)  RR: 28 (29 Sep 2017 09:00) (13 - 40)  SpO2: 97% (29 Sep 2017 09:00) (89% - 100%)    PHYSICAL EXAM:  Constitutional: +NGT - green, bilious, on nasal high flow, sitting in bed, responsive  Neck: supple  Respiratory: decreased BS bases, rhonchi  Cardiovascular: S1 and S2, regular  Gastrointestinal: distended, nontender, midline dressing with minimal sanguinous staining, otherwise dry and intact +michelle  Back:  +hemovac x 2 in place  Extremities: +edema b/l  Vascular: 2+ peripheral pulses  Neurological: alert and appropriate  Skin: No rashes    LABS:                        9.0    32.7  )-----------( 151      ( 29 Sep 2017 02:57 )             27.8     09-29    137  |  98  |  34<H>  ----------------------------<  106<H>  4.7   |  27  |  1.03    Ca    7.9<L>      29 Sep 2017 02:57  Phos  3.3     09-29  Mg     2.7     09-29    TPro  4.8<L>  /  Alb  2.2<L>  /  TBili  3.6<H>  /  DBili  3.0<H>  /  AST  54<H>  /  ALT  31  /  AlkPhos  77  09-28    PT/INR - ( 29 Sep 2017 02:57 )   PT: 15.8 sec;   INR: 1.44 ratio         PTT - ( 29 Sep 2017 02:57 )  PTT:98.4 sec    LIVER FUNCTIONS - ( 28 Sep 2017 04:04 )  Alb: 2.2 g/dL / Pro: 4.8 g/dL / ALK PHOS: 77 U/L / ALT: 31 U/L RC / AST: 54 U/L / GGT: x             RADIOLOGY & ADDITIONAL TESTS: Patient is a 62y old  Male who presented with a chief complaint of cord compression (19 Sep 2017 09:44)    INTERVAL HPI/OVERNIGHT EVENTS:  remained off CPAP at night  no flatus or BM  no fever or chills    MEDICATIONS  (STANDING):  piperacillin/tazobactam IVPB. 3.375 Gram(s) IV Intermittent every 8 hours  vancomycin  IVPB 1000 milliGRAM(s) IV Intermittent every 12 hours  vancomycin  IVPB      ALBUTerol/ipratropium for Nebulization 3 milliLiter(s) Nebulizer every 6 hours  metoprolol Injectable 5 milliGRAM(s) IV Push every 4 hours  micafungin IVPB      micafungin IVPB 100 milliGRAM(s) IV Intermittent every 24 hours  HYDROmorphone PCA (1 mG/mL) 30 milliLiter(s) PCA Continuous PCA Continuous  dextrose 5% + sodium chloride 0.45%. 1000 milliLiter(s) (50 mL/Hr) IV Continuous <Continuous>  pantoprazole  Injectable 40 milliGRAM(s) IV Push every 24 hours  enoxaparin Injectable 80 milliGRAM(s) SubCutaneous every 12 hours    MEDICATIONS  (PRN):  HYDROmorphone PCA (1 mG/mL) Rescue Clinician Bolus 0.5 milliGRAM(s) IV Push every 15 minutes PRN for Pain Scale GREATER THAN 6  naloxone Injectable 0.1 milliGRAM(s) IV Push every 3 minutes PRN For ANY of the following changes in patient status:  A. RR LESS THAN 10 breaths per minute, B. Oxygen saturation LESS THAN 90%, C. Sedation score of 6  ondansetron Injectable 4 milliGRAM(s) IV Push every 6 hours PRN Nausea    Allergies  No Known Allergies    Review of Systems:  Gen: no fever or chills  CV: denies CP  Resp: denies dyspnea at this time, on high flow nasal O2 and night time CPAP  Neuro: no focal weakness    Vital Signs Last 24 Hrs  T(C): 37 (29 Sep 2017 07:00), Max: 37 (29 Sep 2017 07:00)  T(F): 98.6 (29 Sep 2017 07:00), Max: 98.6 (29 Sep 2017 07:00)  HR: 72 (29 Sep 2017 09:00) (72 - 95)  BP: 119/72 (29 Sep 2017 09:00) (91/69 - 131/68)  BP(mean): 87 (29 Sep 2017 09:00) (73 - 95)  RR: 28 (29 Sep 2017 09:00) (13 - 40)  SpO2: 97% (29 Sep 2017 09:00) (89% - 100%)    PHYSICAL EXAM:  Constitutional: +NGT - green, bilious, on nasal high flow, sitting in bed, responsive  Neck: supple  Respiratory: decreased BS bases, rhonchi  Cardiovascular: S1 and S2, regular  Gastrointestinal: distended, nontender, midline dressing with minimal sanguinous staining, otherwise dry and intact +michelle  Back:  +hemovac x 2 in place  Extremities: +edema b/l  Vascular: 2+ peripheral pulses  Neurological: alert and appropriate  Skin: No rashes    LABS:                      9.0    32.7  )-----------( 151      ( 29 Sep 2017 02:57 )             27.8     137  |  98  |  34<H>  ----------------------------<  106<H>  4.7   |  27  |  1.03    Ca    7.9<L>      29 Sep 2017 02:57  Phos  3.3     09-29  Mg     2.7     09-29    TPro  4.8<L>  /  Alb  2.2<L>  /  TBili  3.6<H>  /  DBili  3.0<H>  /  AST  54<H>  /  ALT  31  /  AlkPhos  77  09-28    PT/INR - ( 29 Sep 2017 02:57 )   PT: 15.8 sec;   INR: 1.44 ratio    PTT - ( 29 Sep 2017 02:57 )  PTT:98.4 sec    RADIOLOGY & ADDITIONAL TESTS:

## 2017-09-29 NOTE — PROGRESS NOTE ADULT - ATTENDING COMMENTS
Pt seen and examined.  Chart reviewed.  Resident note confirmed.  Pt is a 62 year old male admitted to neurosurgery for bilateral leg weakness found to have newly diagnosed metastatic disease.  Pt is s/p T8-T11 laminectomy with subsequent deterioration in respiratory status/acute onset abdominal pain.  Work up revealed bilateral DVTs, right upper lobe PE, and small intestinal perforation.  Pt is s/p Exploratory laparotomy, SBR x1 (left in discontinuity), abdominal washout, abthera VAC application (9/22) with intraop findings of an intestinal perforation and a large amount of succus in the abdomen. Pt s/p return to OR on 9/24 for intestinal reconstruction and abdominal closure.  No acute events overnight.  Continue pain control.  Echocardiogram is without evidence of right heart strain.  Left lung atelectasis has improved.  Continue diuresis for hypervolemia.  Keep NPO and await return of bowel function.  Marked leukocytosis noted.  Fungitel is positive and the Pt continues on micafungin.  Renal vein thrombus noted on CT.  Therapeutic lovenox continues.  Suggest vasc surg consult to evaluate renal vein thrombus.  Continue supportive care.

## 2017-09-29 NOTE — CONSULT NOTE ADULT - SUBJECTIVE AND OBJECTIVE BOX
HPI:  62M with no significant PMH p/w worsening LBP associated with lower extremity  weakness for past 3 weeks. Over the weekend, he progressed to the point where he was unable to ambulate. He denies incontinence, saddle anesthesia, but complaining of significant weakness in legs, stating they "feel like jelly." He was seen at OSH and transferred to [facility] for spine evaluation. He denies fevers, history of cancer, trauma.  For the weakness of the lower extremities,  he had an mri done, which showed, t9 to t11 mets with epidural extension, underwent removal and biospy, s/p laminectomy, finally diagnosed as adenocarcinoma   on the 22nd he developed Abdominal pain, was found to have small bowerl perforation sec to ischemia, or infection, and was taken to the or, exlap- SBR, abd wash out, 10 cm resection and jailyn ton 24th again- abdominal washout, enteroenterostomy and abdominal closure.           vitals- no fever, on oxygen, and bp stable.   labs- pt has high white count, and increased to 30 000,   micro- Blood cx negative so far, and Peritoneal fluid- Ent faecium and cadida, species unknown   Fungi tell more than 500 a  radiology as below - ct showed lung consolidation, and endobronchial spread, and extensive mets   abx the pt was started on micafungin on the 27th,   Vancomycin and zosyn was started on 21st         PAST MEDICAL & SURGICAL HISTORY:      Allergies  No Known Allergies        ANTIMICROBIALS:  piperacillin/tazobactam IVPB. 3.375 every 8 hours  vancomycin  IVPB 1000 every 12 hours  vancomycin  IVPB    micafungin IVPB    micafungin IVPB 100 every 24 hours      OTHER MEDS:  ALBUTerol/ipratropium for Nebulization 3 milliLiter(s) Nebulizer every 6 hours  metoprolol Injectable 5 milliGRAM(s) IV Push every 4 hours  HYDROmorphone PCA (1 mG/mL) 30 milliLiter(s) PCA Continuous PCA Continuous  HYDROmorphone PCA (1 mG/mL) Rescue Clinician Bolus 0.5 milliGRAM(s) IV Push every 15 minutes PRN  naloxone Injectable 0.1 milliGRAM(s) IV Push every 3 minutes PRN  ondansetron Injectable 4 milliGRAM(s) IV Push every 6 hours PRN  dextrose 5% + sodium chloride 0.45%. 1000 milliLiter(s) IV Continuous <Continuous>  pantoprazole  Injectable 40 milliGRAM(s) IV Push every 24 hours  enoxaparin Injectable 80 milliGRAM(s) SubCutaneous every 12 hours      SOCIAL HISTORY: could not obtain, inconsistent with his answers.     FAMILY HISTORY:      REVIEW OF SYSTEMS  [x  ] ROS unobtainable because:  pt is mildly confused.   [  ] All other systems negative except as noted below:	    Constitutional:  [ ] fever [ ] weight loss  Skin:  [ ] rash [ ] phlebitis	  Eyes: [ ] icterus [ ] inflammation	  ENMT: [ ] discharge [ ] thrush [ ] ulcers [ ] exudates  Respiratory: [ ] dyspnea [ ] hemoptysis [ ] cough [ ] sputum	  Cardiovascular:  [ ] chest pain [ ] palpitations [ ] edema	  Gastrointestinal:  [ ] nausea [ ] vomiting [ ] diarrhea [ ] constipation [ ] pain	  Genitourinary:  [ ] dysuria [ ] frequency [ ] hematuria [ ] discharge [ ] flank pain  Musculoskeletal:  [ ] myalgias [ ] arthralgias [ ] arthritis	  Neurological:  [ ] headache [ ] seizures	  Psychiatric:  [ ] anxiety [ ] depression	  Hematology/Lymphatics:  [ ] lymphadenopathy  Endocrine:  [ ] adrenal [ ] thyroid  Allergic/Immunologic:	 [ ] transplant [ ] seasonal    Vital Signs Last 24 Hrs  T(F): 98.4 (09-29-17 @ 11:00), Max: 98.9 (09-23-17 @ 19:00)    Vital Signs Last 24 Hrs  HR: 75 (09-29-17 @ 11:39) (72 - 95)  BP: 107/66 (09-29-17 @ 11:00) (91/69 - 131/68)  RR: 21 (09-29-17 @ 11:00)  SpO2: 100% (09-29-17 @ 11:39) (89% - 100%)  Wt(kg): --    PHYSICAL EXAM:  General: non-toxic, lethargic, pt is on nasal cannula, and pt is suctioning himself.   HEAD/EYES: anicteric, PERRL  ENT:  supple  Cardiovascular:  s1s2 normal,   Respiratory:  decreased breath sounds, and mainly on the left side   GI:  incision site looks clean and mild tenderness, no discharge   :  no CVA tenderness   Musculoskeletal:  no synovitis  Neurologic:  grossly non-focal  Skin: no redness or erythema at any site   Lymph: no lymphadenopathy  Psychiatric:  appropriate affect  Vascular:  no phlebitis                                    9.0    32.7  )-----------( 151      ( 29 Sep 2017 02:57 )             27.8       09-29    137  |  98  |  34<H>  ----------------------------<  106<H>  4.7   |  27  |  1.03    Ca    7.9<L>      29 Sep 2017 02:57  Phos  3.3     09-29  Mg     2.7     09-29    TPro  4.8<L>  /  Alb  2.2<L>  /  TBili  3.6<H>  /  DBili  3.0<H>  /  AST  54<H>  /  ALT  31  /  AlkPhos  77  09-28      Urinalysis Basic - ( 29 Sep 2017 11:52 )    Color: Yellow / Appearance: x / SG: >1.030 / pH: x  Gluc: x / Ketone: Negative  / Bili: Small / Urobili: Negative   Blood: x / Protein: Trace / Nitrite: Negative   Leuk Esterase: Negative / RBC: 5-10 /HPF / WBC 5-10 /HPF   Sq Epi: x / Non Sq Epi: x / Bacteria: x      micro- Blood cx negative so far, and Peritoneal fluid- Ent faecium and cadida, species unknown   Fungi tell more than 500 a  radiology as below - ct showed lung consolidation, and endobronchial spread, and extensive mets   < from: CT Chest w/ IV Cont (09.28.17 @ 16:45) >  Moderate volume ascites with pelvic peritoneal inflammation. No evidence   of discrete abscess.    Progression of wedge-shaped areas of decreased perfusion within the   kidneys which may be related to infarct or infection.    Thrombus within the right renal vein, with new extension into the   inferior vena cava.    Left lower lobe consolidation with occlusion of the left lower lobe   airways compatible with patient's known malignancy.    Previously noted right upper lobe pulmonary emboli are poorly visualized   on the current examination. No new emboli.    Additional metastatic disease in the chest, abdomen and pelvis without   significant change from recent prior imaging.    < end of copied text >

## 2017-09-29 NOTE — PROGRESS NOTE ADULT - ASSESSMENT
A/P: 62M admitted to neurosurgery for bilateral leg weakness found to have newly diagnosed metastatic disease s/p T8-T11 laminectomy presented today with deterioration in respiratory status and acute onset abdominal pain who was subsequently intubated. W/u revealed b/l DVTs, right upper lobe PE, and a bowel perforation.  Pt is now s/p Exploratory laparotomy, SBR x1 (left in discontinuity), abdominal washout, abthera VAC application (9/22); 1cm perforation in the small bowel 110 cm distal to the ligament of treitz. RTOR 9/24 for intestinal reconstruction and abdominal closure. Awaiting ROBF. 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    - F/u ID consult  - Consider vascular surgery consult for concerns of infected thrombus  - Consider glycerin suppository  - Left lung atelectasis; wean 02 as tolerated, recommend diuresis as appropriate  - Leukocytosis; f/u Fungitell +  - NPO/IVF; awaiting GI fxn  - Pain control  - DVT ppx: Hep gtt  - Continue acute care per SICU    Hong Cisneros M.D.

## 2017-09-29 NOTE — PROGRESS NOTE ADULT - PROBLEM SELECTOR PLAN 2
with bilateral soleal DVT  -Provoked 2nd malignancy   -Full dose Lovenox 1mg/kg BID  -Underlying coagulopathy 2nd liver disease   -Heme following

## 2017-09-29 NOTE — PROGRESS NOTE ADULT - ASSESSMENT
ThisHPI: 62y male admitted to neurosurgery for bilateral leg weakness found to have newly diagnosed metastatic disease s/p T8-T11 laminectomy presented today with deterioration in respiratory status and acute onset abdominal pain who was subsequently intubated and w/u revealed b/l DVTs, right upper lobe PE, and a bowel perforation.  Pt is now s/p Exploratory laparotomy #1, SBR x1 (left in discontinuity), abdominal washout, abthera VAC application (9/22) with intraop findings of d a large amount of succus as well as lettuce in the abdomen along with a 1cm perforation in the small bowel 110 cm distal to the ligament of treitz. )(/24/17 Exploratory OR #2 with abdominal irrigation and enterostomy. Right leg motor function being monitored by neurosurgery after emergency admission spinal cord decompression of metastatic tumor T8-!1. Lung cancer pathology is non small cell.    DVT/ PE  needs to be anticoagulated with clot going up into the right renal vein thrombosis

## 2017-09-29 NOTE — PROGRESS NOTE ADULT - ASSESSMENT
61 y/o M with no significant PMH presents 9/19 with bilateral LE weakness/paraplegia x several weeks. 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 CT evidence of L bronchial obstruction 2nd lesion with LLL collapse, necrotic mediastinal/hilar LN, liver mets, ?adrenal mets. Hospital course c/b RUL segmental PE, bilateral soleal DVT. Further c/b SB perforation s/p SBR, primary anastomosis on 9/22, RTOR 9/24 for ex-lap, washout, re-anastomosis and abdominal closure. Abdominal cultures with E. Faecium. Extubated 9/24. Now with L lung atelectasis.

## 2017-09-29 NOTE — CONSULT NOTE ADULT - ASSESSMENT
62M w/ metastatic lung ca s/p laminectomy s/p ex-lap, SBR w/ primary anastomosis w/ right renal vein thrombus  -Unlikely infected right renal vein thrombus as blood cultures negative. Patient has multiple other sources of infection, including intra-abdominal vs pulmonary  -No role of renal vein thrombectomy in this moribund patient  -Would continue with anticoagulation for thrombus and PE. Recommend goals of care discussion  -Discussed with vascular fellow

## 2017-09-29 NOTE — PROGRESS NOTE ADULT - SUBJECTIVE AND OBJECTIVE BOX
Day 5\2 of Anesthesia Pain Management Service    SUBJECTIVE: Patient is doing well with IV PCA    Pain Scale Score:	[X] Refer to charted pain scores    THERAPY:    [ ] IV PCA Morphine		[ ] 5 mg/mL	[ ] 1 mg/mL  [X] IV PCA Hydromorphone	[ ] 5 mg/mL	[X] 1 mg/mL  [ ] IV PCA Fentanyl		[ ] 50 micrograms/mL    Demand dose: 0.2 mg     Lockout: 6 minutes   Continuous Rate: 0 mg/hr  4 Hour Limit: 4 mg    MEDICATIONS  (STANDING):  piperacillin/tazobactam IVPB. 3.375 Gram(s) IV Intermittent every 8 hours  vancomycin  IVPB 1000 milliGRAM(s) IV Intermittent every 12 hours  vancomycin  IVPB      ALBUTerol/ipratropium for Nebulization 3 milliLiter(s) Nebulizer every 6 hours  metoprolol Injectable 5 milliGRAM(s) IV Push every 4 hours  micafungin IVPB      micafungin IVPB 100 milliGRAM(s) IV Intermittent every 24 hours  HYDROmorphone PCA (1 mG/mL) 30 milliLiter(s) PCA Continuous PCA Continuous  dextrose 5% + sodium chloride 0.45%. 1000 milliLiter(s) (50 mL/Hr) IV Continuous <Continuous>  pantoprazole  Injectable 40 milliGRAM(s) IV Push every 24 hours  enoxaparin Injectable 80 milliGRAM(s) SubCutaneous every 12 hours    MEDICATIONS  (PRN):  HYDROmorphone PCA (1 mG/mL) Rescue Clinician Bolus 0.5 milliGRAM(s) IV Push every 15 minutes PRN for Pain Scale GREATER THAN 6  naloxone Injectable 0.1 milliGRAM(s) IV Push every 3 minutes PRN For ANY of the following changes in patient status:  A. RR LESS THAN 10 breaths per minute, B. Oxygen saturation LESS THAN 90%, C. Sedation score of 6  ondansetron Injectable 4 milliGRAM(s) IV Push every 6 hours PRN Nausea      OBJECTIVE:    Sedation Score:	[  ] Alert	[ ] Drowsy 	[ ] Arousable	[X ] Asleep	[ ] Unresponsive    Side Effects:	[X ] None	[ ] Nausea	[ ] Vomiting	[ ] Pruritus  		[ ] Other:    Vital Signs Last 24 Hrs  T(C): 37 (29 Sep 2017 07:00), Max: 37 (29 Sep 2017 07:00)  T(F): 98.6 (29 Sep 2017 07:00), Max: 98.6 (29 Sep 2017 07:00)  HR: 72 (29 Sep 2017 09:00) (72 - 95)  BP: 119/72 (29 Sep 2017 09:00) (91/69 - 131/68)  BP(mean): 87 (29 Sep 2017 09:00) (73 - 95)  RR: 28 (29 Sep 2017 09:00) (13 - 40)  SpO2: 97% (29 Sep 2017 09:00) (89% - 100%)    ASSESSMENT/ PLAN    Therapy to  be:               [X] Continued   [ ] Discontinued   [ ] Changed to PRN Analgesics    Documentation and Verification of current medications:   [X] Done	[ ] Not done, not eligible    Comments: Patient resting comfortably. Using 1-2x/hr. Continue PCA

## 2017-09-29 NOTE — PROGRESS NOTE ADULT - SUBJECTIVE AND OBJECTIVE BOX
HISTORY  62y Male initially admitted to neurosurgery for b/l leg weakness found to have newly diagnosed metastatic disease with spinal met causing spinal cord compression s/p T8-T11 laminectomy on 9/20, post-op course complicated by deterioration in respiratory status and acute onset abdominal pain requiring intubation and transfer to NSCU. CT chest/abdomen/pelvis demonstrated b/l DVTs, right upper lobe PE, and a bowel perforation with free air. Pt was taken emergently to the OR and is now s/p exploratory laparotomy, SBR x1 (left in discontinuity), abdominal washout, abthera VAC application on 9/22, after which he was transferred to SICU under ACS. He was taken back to the OR on 9/24 for a washout, primary anastomosis, and abdominal closure. Post-operative course complicated by Acute hypoxemic respiratory failure secondary to Acute Pulmonary Vascular congestion/ Left Lung Collapse.      24 HOUR EVENTS: Kept patient off CPAP last night, patient SpO2 > 90% all night on 2-3L NC.    SUBJECTIVE/ROS:  [ x] A ten-point review of systems was otherwise negative except as noted.  [ ] Due to altered mental status/intubation, subjective information were not able to be obtained from the patient. History was obtained, to the extent possible, from review of the chart and collateral sources of information.      NEURO  CAM ICU: Negative  Exam: awake, alert, oriented x 4  Meds: HYDROmorphone PCA (1 mG/mL) 30 milliLiter(s) PCA Continuous PCA Continuous  HYDROmorphone PCA (1 mG/mL) Rescue Clinician Bolus 0.5 milliGRAM(s) IV Push every 15 minutes PRN for Pain Scale GREATER THAN 6  ondansetron Injectable 4 milliGRAM(s) IV Push every 6 hours PRN Nausea    [x] Adequacy of sedation and pain control has been assessed and adjusted      RESPIRATORY  RR: 16 (09-29-17 @ 03:00) (13 - 40)  SpO2: 100% (09-29-17 @ 03:00) (95% - 100%)  Wt(kg): --  Exam: unlabored, Coarse breath sounds bilaterally, decreased at left base. +Rhonchi, No wheeze  Mechanical Ventilation:   ABG - ( 28 Sep 2017 03:54 )  pH: 7.39  /  pCO2: 48    /  pO2: 149   / HCO3: 29    / Base Excess: 3.7   /  SaO2: 99      Lactate: x      [N/A] Extubation Readiness Assessed  Meds: ALBUTerol/ipratropium for Nebulization 3 milliLiter(s) Nebulizer every 6 hours    CARDIOVASCULAR  HR: 87 (09-29-17 @ 03:00) (76 - 95)  BP: 105/65 (09-29-17 @ 03:00) (96/62 - 126/68)  BP(mean): 80 (09-29-17 @ 03:00) (73 - 91)  ABP: --  ABP(mean): --  Wt(kg): --  CVP(cm H2O): --      Exam: regular rate and rhythm  Cardiac Rhythm: sinus  Perfusion     [x]Adequate   [ ]Inadequate  Mentation   [x]Normal       [ ]Reduced  Extremities  [x]Warm         [ ]Cool  Volume Status [ ]Hypervolemic [x]Euvolemic [ ]Hypovolemic  Meds: metoprolol Injectable 5 milliGRAM(s) IV Push every 4 hours        GI/NUTRITION  Exam: soft, nontender, nondistended, incision C/D/I  Diet: NPO  Meds: pantoprazole  Injectable 40 milliGRAM(s) IV Push every 24 hours      GENITOURINARY  I&O's Detail    09-27 @ 07:01  -  09-28 @ 07:00  --------------------------------------------------------  IN:    dextrose 5% + sodium chloride 0.45%.: 750 mL    heparin Infusion: 360 mL    lactated ringers.: 400 mL  Total IN: 1510 mL    OUT:    Accordian: 110 mL    Indwelling Catheter - Urethral: 2400 mL    Nasoenteral Tube: 200 mL  Total OUT: 2710 mL    Total NET: -1200 mL      09-28 @ 07:01  -  09-29 @ 03:55  --------------------------------------------------------  IN:    dextrose 5% + sodium chloride 0.45%.: 950 mL    heparin Infusion: 300 mL    IV PiggyBack: 350 mL    Oral Fluid: 500 mL    Solution: 150 mL  Total IN: 2250 mL    OUT:    Accordian: 90 mL    Indwelling Catheter - Urethral: 1615 mL    Nasoenteral Tube: 275 mL  Total OUT: 1980 mL    Total NET: 270 mL          09-29    137  |  98  |  34<H>  ----------------------------<  106<H>  4.7   |  27  |  1.03    Ca    7.9<L>      29 Sep 2017 02:57  Phos  3.3     09-29  Mg     2.7     09-29    TPro  4.8<L>  /  Alb  2.2<L>  /  TBili  3.6<H>  /  DBili  3.0<H>  /  AST  54<H>  /  ALT  31  /  AlkPhos  77  09-28    [x ] Torres catheter, indication: Will attempt to remove today  Meds: dextrose 5% + sodium chloride 0.45%. 1000 milliLiter(s) IV Continuous <Continuous>  calcium gluconate IVPB 2 Gram(s) IV Intermittent once        HEMATOLOGIC  Meds: heparin  Infusion 1200 Unit(s)/Hr IV Continuous <Continuous>    [x] VTE Prophylaxis                        9.0    32.7  )-----------( 151      ( 29 Sep 2017 02:57 )             27.8     PT/INR - ( 29 Sep 2017 02:57 )   PT: 15.8 sec;   INR: 1.44 ratio         PTT - ( 29 Sep 2017 02:57 )  PTT:98.4 sec  Transfusion     [ ] PRBC   [ ] Platelets   [ ] FFP   [ ] Cryoprecipitate      INFECTIOUS DISEASES  WBC Count: 32.7 K/uL (09-29 @ 02:57)  WBC Count: 31.8 K/uL (09-28 @ 04:04)    RECENT CULTURES:  Culture - Body Fluid with Gram Stain (09.23.17 @ 00:35)    -  Erythromycin: I 4    -  Gentamicin: S <=4    -  Imipenem: S <=1    -  Levofloxacin: S <=2    -  Meropenem: S <=1    -  Piperacillin/Tazobactam: S <=16    -  Ciprofloxacin: I 2    -  Ciprofloxacin: S <=1    -  Ertapenem: S <=1    -  Ampicillin: R >8    -  Ampicillin: R >16    -  Ampicillin/Sulbactam: S <=8/4    -  Aztreonam: S <=4    -  Cefazolin: S <=8    -  Cefepime: S <=4    -  Cefoxitin: S <=8    -  Ceftazidime: S <=1    -  Ceftriaxone: S <=1    -  Vancomycin: S 1    Gram Stain:   polymorphonuclear leukocytes seen  Gram Positive Cocci in Pairs and Chains seen  Gram Variable Rods seen  by cytocentrifuge    -  Amikacin: S <=16    -  Tetra/Doxy: R >8    -  Tobramycin: S <=4    -  Trimethoprim/Sulfamethoxazole: S <=2/38    Specimen Source: Peritoneal 2 peritoneal fluid for culture    Culture Results:   Numerous Enterococcus faecium  Few Candida albicans  Growth in fluid media only Klebsiella pneumoniae    Organism Identification: Enterococcus faecium  Klebsiella pneumoniae    Organism: Enterococcus faecium    Organism: Klebsiella pneumoniae    Method Type: CARLY    Method Type: CARLY      Meds: piperacillin/tazobactam IVPB. 3.375 Gram(s) IV Intermittent every 8 hours  vancomycin  IVPB 1000 milliGRAM(s) IV Intermittent every 12 hours  vancomycin  IVPB      micafungin IVPB      micafungin IVPB 100 milliGRAM(s) IV Intermittent every 24 hours        ENDOCRINE  CAPILLARY BLOOD GLUCOSE        Meds:       ACCESS DEVICES:  [ ] Peripheral IV  [x ] Central Venous Line	[ ] R	[x ] L	[x ] IJ	[ ] Fem	[ ] SC	Placed:   [ ] Arterial Line		[ ] R	[ ] L	[ ] Fem	[ ] Rad	[ ] Ax	Placed:   [ ] PICC:					[ ] Mediport  [ ] Urinary Catheter, Date Placed:   [x] Necessity of urinary, arterial, and venous catheters discussed    OTHER MEDICATIONS:  naloxone Injectable 0.1 milliGRAM(s) IV Push every 3 minutes PRN      CODE STATUS: Full Code      IMAGING: HISTORY  62y Male initially admitted to neurosurgery for b/l leg weakness found to have newly diagnosed metastatic disease with spinal met causing spinal cord compression s/p T8-T11 laminectomy on 9/20, post-op course complicated by deterioration in respiratory status and acute onset abdominal pain requiring intubation and transfer to NSCU. CT chest/abdomen/pelvis demonstrated b/l DVTs, right upper lobe PE, and a bowel perforation with free air. Pt was taken emergently to the OR and is now s/p exploratory laparotomy, SBR x1 (left in discontinuity), abdominal washout, abthera VAC application on 9/22, after which he was transferred to SICU under ACS. He was taken back to the OR on 9/24 for a washout, primary anastomosis, and abdominal closure. Post-operative course complicated by Acute hypoxemic respiratory failure secondary to Acute Pulmonary Vascular congestion/ Left Lung Collapse.      24 HOUR EVENTS: Kept patient off CPAP last night, patient SpO2 > 90% all night on 2-3L NC.    SUBJECTIVE/ROS:  [ x] A ten-point review of systems was otherwise negative except as noted.  [ ] Due to altered mental status/intubation, subjective information were not able to be obtained from the patient. History was obtained, to the extent possible, from review of the chart and collateral sources of information.      NEURO  CAM ICU: Negative  Exam: awake, alert, oriented x 4  Meds: HYDROmorphone PCA (1 mG/mL) 30 milliLiter(s) PCA Continuous PCA Continuous  HYDROmorphone PCA (1 mG/mL) Rescue Clinician Bolus 0.5 milliGRAM(s) IV Push every 15 minutes PRN for Pain Scale GREATER THAN 6  ondansetron Injectable 4 milliGRAM(s) IV Push every 6 hours PRN Nausea    [x] Adequacy of sedation and pain control has been assessed and adjusted      RESPIRATORY  RR: 16 (09-29-17 @ 03:00) (13 - 40)  SpO2: 100% (09-29-17 @ 03:00) (95% - 100%)  Wt(kg): --  Exam: unlabored, Coarse breath sounds bilaterally, decreased at left base. +Rhonchi, No wheeze  Mechanical Ventilation:   ABG - ( 28 Sep 2017 03:54 )  pH: 7.39  /  pCO2: 48    /  pO2: 149   / HCO3: 29    / Base Excess: 3.7   /  SaO2: 99      Lactate: x      [N/A] Extubation Readiness Assessed  Meds: ALBUTerol/ipratropium for Nebulization 3 milliLiter(s) Nebulizer every 6 hours    CARDIOVASCULAR  HR: 87 (09-29-17 @ 03:00) (76 - 95)  BP: 105/65 (09-29-17 @ 03:00) (96/62 - 126/68)  BP(mean): 80 (09-29-17 @ 03:00) (73 - 91)  ABP: --  ABP(mean): --  Wt(kg): --  CVP(cm H2O): --      Exam: regular rate and rhythm  Cardiac Rhythm: sinus  Perfusion     [x]Adequate   [ ]Inadequate  Mentation   [x]Normal       [ ]Reduced  Extremities  [x]Warm         [ ]Cool  Volume Status [ ]Hypervolemic [x]Euvolemic [ ]Hypovolemic  Meds: metoprolol Injectable 5 milliGRAM(s) IV Push every 4 hours        GI/NUTRITION  Exam: soft, nontender, nondistended, incision C/D/I  Diet: NPO  Meds: pantoprazole  Injectable 40 milliGRAM(s) IV Push every 24 hours      GENITOURINARY  I&O's Detail    09-27 @ 07:01  -  09-28 @ 07:00  --------------------------------------------------------  IN:    dextrose 5% + sodium chloride 0.45%.: 750 mL    heparin Infusion: 360 mL    lactated ringers.: 400 mL  Total IN: 1510 mL    OUT:    Accordian: 110 mL    Indwelling Catheter - Urethral: 2400 mL    Nasoenteral Tube: 200 mL  Total OUT: 2710 mL    Total NET: -1200 mL      09-28 @ 07:01  -  09-29 @ 03:55  --------------------------------------------------------  IN:    dextrose 5% + sodium chloride 0.45%.: 950 mL    heparin Infusion: 300 mL    IV PiggyBack: 350 mL    Oral Fluid: 500 mL    Solution: 150 mL  Total IN: 2250 mL    OUT:    Accordian: 90 mL    Indwelling Catheter - Urethral: 1615 mL    Nasoenteral Tube: 275 mL  Total OUT: 1980 mL    Total NET: 270 mL          29 Sep 2017 02:57    137    |  98     |  34     ----------------------------<  106    4.7     |  27     |  1.03     Ca    7.9        29 Sep 2017 02:57  Phos  3.3       29 Sep 2017 02:57  Mg     2.7       29 Sep 2017 02:57          [x ] Torres catheter, indication: Will attempt to remove today  Meds: dextrose 5% + sodium chloride 0.45%. 1000 milliLiter(s) IV Continuous <Continuous>  calcium gluconate IVPB 2 Gram(s) IV Intermittent once        HEMATOLOGIC  Meds: heparin  Infusion 1200 Unit(s)/Hr IV Continuous <Continuous>    [x] VTE Prophylaxis                                   9.0    32.7  )-----------( 151      ( 29 Sep 2017 02:57 )             27.8     PT/INR - ( 29 Sep 2017 02:57 )   PT: 15.8 sec;   INR: 1.44 ratio         PTT - ( 29 Sep 2017 02:57 )  PTT:98.4 sec       PTT - ( 29 Sep 2017 02:57 )  PTT:98.4 sec  Transfusion     [ ] PRBC   [ ] Platelets   [ ] FFP   [ ] Cryoprecipitate      INFECTIOUS DISEASES  WBC Count: 32.7 K/uL (09-29 @ 02:57)  WBC Count: 31.8 K/uL (09-28 @ 04:04)    RECENT CULTURES:  Culture - Body Fluid with Gram Stain (09.23.17 @ 00:35)    -  Erythromycin: I 4    -  Gentamicin: S <=4    -  Imipenem: S <=1    -  Levofloxacin: S <=2    -  Meropenem: S <=1    -  Piperacillin/Tazobactam: S <=16    -  Ciprofloxacin: I 2    -  Ciprofloxacin: S <=1    -  Ertapenem: S <=1    -  Ampicillin: R >8    -  Ampicillin: R >16    -  Ampicillin/Sulbactam: S <=8/4    -  Aztreonam: S <=4    -  Cefazolin: S <=8    -  Cefepime: S <=4    -  Cefoxitin: S <=8    -  Ceftazidime: S <=1    -  Ceftriaxone: S <=1    -  Vancomycin: S 1    Gram Stain:   polymorphonuclear leukocytes seen  Gram Positive Cocci in Pairs and Chains seen  Gram Variable Rods seen  by cytocentrifuge    -  Amikacin: S <=16    -  Tetra/Doxy: R >8    -  Tobramycin: S <=4    -  Trimethoprim/Sulfamethoxazole: S <=2/38    Specimen Source: Peritoneal 2 peritoneal fluid for culture    Culture Results:   Numerous Enterococcus faecium  Few Candida albicans  Growth in fluid media only Klebsiella pneumoniae    Organism Identification: Enterococcus faecium  Klebsiella pneumoniae    Organism: Enterococcus faecium    Organism: Klebsiella pneumoniae    Method Type: CARLY    Method Type: CARLY      Meds: piperacillin/tazobactam IVPB. 3.375 Gram(s) IV Intermittent every 8 hours  vancomycin  IVPB 1000 milliGRAM(s) IV Intermittent every 12 hours  vancomycin  IVPB      micafungin IVPB      micafungin IVPB 100 milliGRAM(s) IV Intermittent every 24 hours        ENDOCRINE  CAPILLARY BLOOD GLUCOSE        Meds:       ACCESS DEVICES:  [ ] Peripheral IV  [x ] Central Venous Line	[ ] R	[x ] L	[x ] IJ	[ ] Fem	[ ] SC	Placed:   [ ] Arterial Line		[ ] R	[ ] L	[ ] Fem	[ ] Rad	[ ] Ax	Placed:   [ ] PICC:					[ ] Mediport  [ ] Urinary Catheter, Date Placed:   [x] Necessity of urinary, arterial, and venous catheters discussed    OTHER MEDICATIONS:  naloxone Injectable 0.1 milliGRAM(s) IV Push every 3 minutes PRN      CODE STATUS: Full Code      IMAGING: HISTORY  62y Male initially admitted to neurosurgery for b/l leg weakness found to have newly diagnosed metastatic disease with spinal met causing spinal cord compression s/p T8-T11 laminectomy on 9/20, post-op course complicated by deterioration in respiratory status and acute onset abdominal pain requiring intubation and transfer to NSCU. CT chest/abdomen/pelvis demonstrated b/l DVTs, right upper lobe PE, and a bowel perforation with free air. Pt was taken emergently to the OR and is now s/p exploratory laparotomy, SBR x1 (left in discontinuity), abdominal washout, abthera VAC application on 9/22, after which he was transferred to SICU under ACS. He was taken back to the OR on 9/24 for a washout, primary anastomosis, and abdominal closure. Post-operative course complicated by Acute hypoxemic respiratory failure secondary to Acute Pulmonary Vascular congestion/ Left Lung Collapse.      24 HOUR EVENTS: Kept patient off CPAP last night, patient SpO2 > 90% all night on 2-3L NC.    SUBJECTIVE/ROS:  [ x] A ten-point review of systems was otherwise negative except as noted.  [ ] Due to altered mental status/intubation, subjective information were not able to be obtained from the patient. History was obtained, to the extent possible, from review of the chart and collateral sources of information.      NEURO  CAM ICU: Negative  Exam: awake, alert, oriented x 4  Meds: HYDROmorphone PCA (1 mG/mL) 30 milliLiter(s) PCA Continuous PCA Continuous  HYDROmorphone PCA (1 mG/mL) Rescue Clinician Bolus 0.5 milliGRAM(s) IV Push every 15 minutes PRN for Pain Scale GREATER THAN 6  ondansetron Injectable 4 milliGRAM(s) IV Push every 6 hours PRN Nausea    [x] Adequacy of sedation and pain control has been assessed and adjusted      RESPIRATORY  Vital Signs Last 24 Hrs  RR: 21 (29 Sep 2017 07:00) (13 - 40)  SpO2: 100% (29 Sep 2017 07:00) (95% - 100%)  Exam: unlabored, Coarse breath sounds bilaterally, decreased at left base. +Rhonchi, No wheeze  Mechanical Ventilation:   ABG - ( 28 Sep 2017 03:54 )  pH: 7.39  /  pCO2: 48    /  pO2: 149   / HCO3: 29    / Base Excess: 3.7   /  SaO2: 99      Lactate: x      [N/A] Extubation Readiness Assessed  Meds: ALBUTerol/ipratropium for Nebulization 3 milliLiter(s) Nebulizer every 6 hours    CARDIOVASCULAR  ICU Vital Signs Last 24 Hrs  HR: 84 (29 Sep 2017 07:00) (76 - 95)  BP: 119/66 (29 Sep 2017 07:00) (91/69 - 126/68)  BP(mean): 85 (29 Sep 2017 07:00) (73 - 91)        Exam: regular rate and rhythm  Cardiac Rhythm: sinus  Perfusion     [x]Adequate   [ ]Inadequate  Mentation   [x]Normal       [ ]Reduced  Extremities  [x]Warm         [ ]Cool  Volume Status [ ]Hypervolemic [x]Euvolemic [ ]Hypovolemic  Meds: metoprolol Injectable 5 milliGRAM(s) IV Push every 4 hours      I&O's Detail    28 Sep 2017 07:01  -  29 Sep 2017 07:00  --------------------------------------------------------  IN:    dextrose 5% + sodium chloride 0.45%.: 1150 mL    heparin Infusion: 360 mL    IV PiggyBack: 350 mL    Oral Fluid: 500 mL    Solution: 150 mL  Total IN: 2510 mL    OUT:    Accordian: 150 mL    Indwelling Catheter - Urethral: 1950 mL    Nasoenteral Tube: 375 mL  Total OUT: 2475 mL    Total NET: 35 mL        GI/NUTRITION  Exam: soft, nontender, nondistended, incision C/D/I  Diet: NPO  Meds: pantoprazole  Injectable 40 milliGRAM(s) IV Push every 24 hours      GENITOURINARY      29 Sep 2017 02:57    137    |  98     |  34     ----------------------------<  106    4.7     |  27     |  1.03     Ca    7.9        29 Sep 2017 02:57  Phos  3.3       29 Sep 2017 02:57  Mg     2.7       29 Sep 2017 02:57          [x ] Torres catheter, indication: Will attempt to remove today  Meds: dextrose 5% + sodium chloride 0.45%. 1000 milliLiter(s) IV Continuous <Continuous>  calcium gluconate IVPB 2 Gram(s) IV Intermittent once        HEMATOLOGIC  Meds: heparin  Infusion 1200 Unit(s)/Hr IV Continuous <Continuous>    [x] VTE Prophylaxis                                   9.0    32.7  )-----------( 151      ( 29 Sep 2017 02:57 )             27.8     PT/INR - ( 29 Sep 2017 02:57 )   PT: 15.8 sec;   INR: 1.44 ratio         PTT - ( 29 Sep 2017 02:57 )  PTT:98.4 sec       PTT - ( 29 Sep 2017 02:57 )  PTT:98.4 sec  Transfusion     [ ] PRBC   [ ] Platelets   [ ] FFP   [ ] Cryoprecipitate      INFECTIOUS DISEASES  WBC Count: 32.7 K/uL (09-29 @ 02:57)  WBC Count: 31.8 K/uL (09-28 @ 04:04)    ICU Vital Signs Last 24 Hrs  T(C): 36.6 (29 Sep 2017 03:00), Max: 36.8 (28 Sep 2017 15:00)  T(F): 97.8 (29 Sep 2017 03:00), Max: 98.2 (28 Sep 2017 15:00)        RECENT CULTURES:  Culture - Body Fluid with Gram Stain (09.23.17 @ 00:35)    -  Erythromycin: I 4    -  Gentamicin: S <=4    -  Imipenem: S <=1    -  Levofloxacin: S <=2    -  Meropenem: S <=1    -  Piperacillin/Tazobactam: S <=16    -  Ciprofloxacin: I 2    -  Ciprofloxacin: S <=1    -  Ertapenem: S <=1    -  Ampicillin: R >8    -  Ampicillin: R >16    -  Ampicillin/Sulbactam: S <=8/4    -  Aztreonam: S <=4    -  Cefazolin: S <=8    -  Cefepime: S <=4    -  Cefoxitin: S <=8    -  Ceftazidime: S <=1    -  Ceftriaxone: S <=1    -  Vancomycin: S 1    Gram Stain:   polymorphonuclear leukocytes seen  Gram Positive Cocci in Pairs and Chains seen  Gram Variable Rods seen  by cytocentrifuge    -  Amikacin: S <=16    -  Tetra/Doxy: R >8    -  Tobramycin: S <=4    -  Trimethoprim/Sulfamethoxazole: S <=2/38    Specimen Source: Peritoneal 2 peritoneal fluid for culture    Culture Results:   Numerous Enterococcus faecium  Few Candida albicans  Growth in fluid media only Klebsiella pneumoniae    Organism Identification: Enterococcus faecium  Klebsiella pneumoniae    Organism: Enterococcus faecium    Organism: Klebsiella pneumoniae    Method Type: CARLY    Method Type: CARLY      Meds: piperacillin/tazobactam IVPB. 3.375 Gram(s) IV Intermittent every 8 hours  vancomycin  IVPB 1000 milliGRAM(s) IV Intermittent every 12 hours  vancomycin  IVPB      micafungin IVPB      micafungin IVPB 100 milliGRAM(s) IV Intermittent every 24 hours        ENDOCRINE  CAPILLARY BLOOD GLUCOSE        Meds:       ACCESS DEVICES:  [ ] Peripheral IV  [x ] Central Venous Line	[ ] R	[x ] L	[x ] IJ	[ ] Fem	[ ] SC	Placed:   [ ] Arterial Line		[ ] R	[ ] L	[ ] Fem	[ ] Rad	[ ] Ax	Placed:   [ ] PICC:					[ ] Mediport  [ ] Urinary Catheter, Date Placed:   [x] Necessity of urinary, arterial, and venous catheters discussed    OTHER MEDICATIONS:  naloxone Injectable 0.1 milliGRAM(s) IV Push every 3 minutes PRN      CODE STATUS: Full Code      IMAGING:

## 2017-09-29 NOTE — PROGRESS NOTE ADULT - ATTENDING COMMENTS
62 year old male s/p exploratory laparotomy for a small bowel perforation and his abdomen was closed.  His respiratory status continues to improve. He has been afebrile.  He denies any nausea or emesis.   Surgical pathology-Small bowel, resection  - Metastatic poorly differentiated non-small cell carcinoma,  consistent with  pulmonary origin.  - Lymphovascular space invasion is identified.  - Small bowel with acute serositis.    Current management includes:    1) Left sided lung collapse and atelectasis-improved; continue high flow 40 L and 50%     CXR- with left sided increasing pleural effusion    -continue incentive spirometer and acapella and chest PT    -keep SpO2 < 92%      2) leukocytosis - wbc 32.7  continues to be elevated of unclear etiology as CT scans have demonstrated no infectious source.   - E faecalis and Candida albicans cells from intraoperative peritoneal fluid  -will continue zosyn, vancomycin and micafungin   -fungitell >500  -blood cultures NGTD, urinalysis negative  -will consult ID  -procalcitonin continues to downtrend    3) Right segmental and subsegmental right upper lobe- continue heparin drip this morning  - 1500 units / hour  -PTT to be monitored with goal 60-90  -patient will be switched to lovenox  4) Keep NPO with IVF at 30 cc/hour; awaiting bowel function  -will discontinue NGT    5) hyperbilirubinemia- will obtain portal venous duplex- negative  -gallbladder normal

## 2017-09-29 NOTE — CONSULT NOTE ADULT - ASSESSMENT
62M with no significant PMH p/w worsening LBP associated with lower extremity  weakness for past 3 weeks. Over the weekend, he progressed to the point where he was unable to ambulate. He denies incontinence, saddle anesthesia, but complaining of significant weakness in legs, stating they "feel like jelly." He was seen at OSH and transferred to [facility] for spine evaluation. He denies fevers, history of cancer, trauma.  For the weakness of the lower extremities,  he had an mri done, which showed, t9 to t11 mets with epidural extension, underwent removal and biospy, s/p laminectomy, finally diagnosed as adenocarcinoma   on the 22nd he developed Abdominal pain, was found to have small bowerl perforation sec to ischemia, or infection, and was taken to the or, exlap- SBR, abd wash out, 10 cm resection and jailyn ton 24th again- abdominal washout, enteroenterostomy and abdominal closure.           vitals- no fever, on oxygen, and bp stable.   labs- pt has high white count, and increased to 30 000,   micro- Blood cx negative so far, and Peritoneal fluid- Ent faecium and cadida, species unknown   Fungi tell more than 500 a  radiology as below - ct showed lung consolidation, and endobronchial spread, and extensive mets   abx the pt was started on micafungin on the 27th,   Vancomycin and zosyn was started on 21st     plan- for the elevated fungitell- does not look like PCP or invasive aspergillosis, but would liana b galactomannan  Elevated levels can secondary to Other factors     for peritonitis- cont zosyn, and Micafungin for candida   and for endobronchial lesion- zosyn should be enough, 62M with no significant PMH p/w worsening LBP associated with lower extremity  weakness for past 3 weeks. Over the weekend, he progressed to the point where he was unable to ambulate. He denies incontinence, saddle anesthesia, but complaining of significant weakness in legs, stating they "feel like jelly." He was seen at OSH and transferred to [facility] for spine evaluation. He denies fevers, history of cancer, trauma.  For the weakness of the lower extremities,  he had an mri done, which showed, t9 to t11 mets with epidural extension, underwent removal and biospy, s/p laminectomy, finally diagnosed as adenocarcinoma   on the 22nd he developed Abdominal pain, was found to have small bowerl perforation sec to ischemia, or infection, and was taken to the or, exlap- SBR, abd wash out, 10 cm resection and jailyn ton 24th again- abdominal washout, enteroenterostomy and abdominal closure.           vitals- no fever, on oxygen, and bp stable.   labs- pt has high white count, and increased to 30 000,   micro- Blood cx negative so far, and Peritoneal fluid- Ent faecium and cadida, species unknown   Fungi tell more than 500 a  radiology as below - ct showed lung consolidation, and endobronchial spread, and extensive mets   abx the pt was started on micafungin on the 27th,   Vancomycin and zosyn was started on 21st     plan- for the elevated fungitell- does not look like PCP or invasive aspergillosis, but would liana b galactomannan, we hitnk its secondary to candida in the peritoneal fluid,   Elevated levels can secondary to Other factors     for peritonitis- cont zosyn, and Micafungin for candida   and for endobronchial lesion- zosyn should be enough  cont vancomycin, for enterococcus,   will follow up 62M with no significant PMH p/w worsening LBP associated with lower extremity  weakness for past 3 weeks. Over the weekend, he progressed to the point where he was unable to ambulate. He denies incontinence, saddle anesthesia, but complaining of significant weakness in legs, stating they "feel like jelly." He was seen at OSH and transferred to [facility] for spine evaluation. He denies fevers, history of cancer, trauma.  For the weakness of the lower extremities,  he had an mri done, which showed, t9 to t11 mets with epidural extension, underwent removal and biospy, s/p laminectomy, finally diagnosed as adenocarcinoma   on the 22nd he developed Abdominal pain, was found to have small bowerl perforation sec to ischemia, or infection, and was taken to the or, exlap- SBR, abd wash out, 10 cm resection and jailyn ton 24th again- abdominal washout, enteroenterostomy and abdominal closure.           vitals- no fever, on oxygen, and bp stable.   labs- pt has high white count, and increased to 30 000,   micro- Blood cx negative so far, and Peritoneal fluid- Ent faecium and cadida, species unknown   Fungi tell more than 500 a  radiology as below - ct showed lung consolidation, and endobronchial spread, and extensive mets   abx the pt was started on micafungin on the 27th,   Vancomycin and zosyn was started on 21st     plan- for the elevated fungitell- does not look like PCP or invasive aspergillosis, but would send b galactomannan, we think its secondary to candida in the peritoneal fluid,   Elevated levels can secondary to Other factors such as zosyn ( but not usually to the current formulations of zosyn)    for peritonitis- cont zosyn, and Micafungin for candida   and for endobronchial lesion- zosyn should be enough  cont vancomycin, for enterococcus,   will follow up      ID service will be covering over the weekend. Please call for acute issues or questions. [phone]

## 2017-09-29 NOTE — PROGRESS NOTE ADULT - ASSESSMENT
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 Complicated by acute hypoxemic respiratory failure requiring CPAP.     PLAN:  Neurologic: post-operative pain  -Will continue with Dilaudid PCA  - Would continue IV Tylenol 1000mg q 6hours x 24hours    Respiratory: Acute hypoxemic respiratory failure, improving  -Will administer supplemental O2 to maintain SpO2 > 88%  -Duonebs/ Mucomyst  -PT/ OOB    Cardiovascular: Hypertension  -Metoprolol 5mg q4 w/ hold parameters    Gastrointestinal/Nutrition: small bowel perforation s/p SBR, anastomosis   -NPO/NGT  -Await GI function  -Protonix 40mg daily    Renal/Genitourinary: ALEC, resolving  - Will administer D5 1/2NS @ 50ml/hr  - Will discontinue michelle catheter    Hematologic: b/l DVT; b/l PE  -Would transition to therapeutic Lovenox in setting of malignancy     Infectious Disease:Secondary Peritonitis 2/2 to Vanco sensitive E. Faecium, Klebsiella, Candida Albicans; leukocytosis  -Continue vancomycin (Day 8), Zosyn (Day 8), micafungin 9Day 8 of anti-fungal)  - Check Vanco trough tomorow at 5:30pm  -Send C.diff specimen if pt has BM    Disposition: SICU, Full Code, consider palliative care consult to help manage symptoms       Mauro Nunez PA-C  [id number]

## 2017-09-30 NOTE — PROGRESS NOTE ADULT - SUBJECTIVE AND OBJECTIVE BOX
Oncology Follow-up    INTERVAL HPI/OVERNIGHT EVENTS: Pt rousable and says thank you. When asked if he had questions, he did not want to ask questions and said there were only going to be scary questions. He gave permission to s/w his son Ariadna.     VITAL SIGNS:  T(F): 98.2 (09-30-17 @ 15:00)  HR: 89 (09-30-17 @ 16:00)  BP: 104/63 (09-30-17 @ 16:00)  RR: 24 (09-30-17 @ 16:00)  SpO2: 100% (09-30-17 @ 16:00)  Wt(kg): --    PHYSICAL EXAM:    Constitutional: arousable and alert to self and place   Eyes: PERRL, icterus   Neck: left IJ TLC C/D/I   Respiratory: clear anteriorly   Cardiovascular: RRR, normal S1S2  Gastrointestinal: distended with surgical scar site dressing C/D/I   Extremities: + BLE edema 3+  Skin: jaundice     MEDICATIONS  (STANDING):  vancomycin  IVPB 1000 milliGRAM(s) IV Intermittent every 12 hours  vancomycin  IVPB      ALBUTerol/ipratropium for Nebulization 3 milliLiter(s) Nebulizer every 6 hours  micafungin IVPB      micafungin IVPB 100 milliGRAM(s) IV Intermittent every 24 hours  HYDROmorphone PCA (1 mG/mL) 30 milliLiter(s) PCA Continuous PCA Continuous  pantoprazole  Injectable 40 milliGRAM(s) IV Push every 24 hours  enoxaparin Injectable 80 milliGRAM(s) SubCutaneous every 12 hours  meropenem IVPB      meropenem IVPB 1000 milliGRAM(s) IV Intermittent every 8 hours  dextrose 5% + sodium chloride 0.45%. 1000 milliLiter(s) (30 mL/Hr) IV Continuous <Continuous>  metoprolol Injectable 5 milliGRAM(s) IV Push every 6 hours    MEDICATIONS  (PRN):  HYDROmorphone PCA (1 mG/mL) Rescue Clinician Bolus 0.5 milliGRAM(s) IV Push every 15 minutes PRN for Pain Scale GREATER THAN 6  naloxone Injectable 0.1 milliGRAM(s) IV Push every 3 minutes PRN For ANY of the following changes in patient status:  A. RR LESS THAN 10 breaths per minute, B. Oxygen saturation LESS THAN 90%, C. Sedation score of 6  ondansetron Injectable 4 milliGRAM(s) IV Push every 6 hours PRN Nausea      No Known Allergies      LABS:                        8.3    29.2  )-----------( 176      ( 30 Sep 2017 03:07 )             25.7     09-30    137  |  98  |  29<H>  ----------------------------<  103<H>  4.9   |  26  |  0.94    Ca    7.9<L>      30 Sep 2017 02:52  Phos  3.1     09-30  Mg     2.6     09-30    TPro  4.7<L>  /  Alb  2.2<L>  /  TBili  5.0<H>  /  DBili  x   /  AST  59<H>  /  ALT  34  /  AlkPhos  96  09-30    PT/INR - ( 30 Sep 2017 02:52 )   PT: 15.7 sec;   INR: 1.44 ratio         PTT - ( 30 Sep 2017 02:52 )  PTT:27.4 sec   Urinalysis Basic - ( 29 Sep 2017 11:52 )    Color: Yellow / Appearance: x / SG: >1.030 / pH: x  Gluc: x / Ketone: Negative  / Bili: Small / Urobili: Negative   Blood: x / Protein: Trace / Nitrite: Negative   Leuk Esterase: Negative / RBC: 5-10 /HPF / WBC 5-10 /HPF   Sq Epi: x / Non Sq Epi: x / Bacteria: x        RADIOLOGY & ADDITIONAL TESTS:  Studies reviewed.

## 2017-09-30 NOTE — PROGRESS NOTE ADULT - ATTENDING COMMENTS
62 year old male s/p exploratory laparotomy for a small bowel perforation and his abdomen was closed.  His respiratory status continues to improve. He has been afebrile.  He denies any nausea or emesis.   Surgical pathology-Small bowel, resection  - Metastatic poorly differentiated non-small cell carcinoma,  consistent with  pulmonary origin.  - Lymphovascular space invasion is identified.  - Small bowel with acute serositis.    Current management includes:    1) Left sided lung collapse and atelectasis-improved; patient has been on nasal cannula but does appear to have hypoxic episodes as he continues to intermittently collapse his left lung     CXR- with left sided increasing pleural effusion    -continue incentive spirometer and acapella and chest PT    -keep SpO2 < 92%      2) leukocytosis - wbc29  slightly improved.   - E faecalis and Candida albicans cells from intraoperative peritoneal fluid  -will continue zosyn, vancomycin and micafungin   -fungitell >500  -blood cultures NGTD, urinalysis negative    3) Right segmental and subsegmental right upper lobe-continue lovenox    4) Keep NPO with IVF at 30 cc/hour; awaiting bowel function    5) hyperbilirubinemia- continues to be elevated; this can demonstrate progression of his malignancy   -Oncology will discuss prognosis with family this week. I have discussed his case with his son and he demonstrates understanding of the severity of his condition.  The patient continues to remain in critical condition.  CC time: 35 minutes

## 2017-09-30 NOTE — PROGRESS NOTE ADULT - PROBLEM SELECTOR PLAN 1
Metastatic non small cell lung cancer. We will continue to work with patient to see what he wants to know about his diagnosis. Currently, we have reviewed with his HCP son via phone the poor prognosis. While metastatic lung cancer has treatment options depending on the next generation testing that has been sent, he has had complicated morbidities due to his metastatic disease with PE/ DVT and hyperbilirubinemia. His bilirubin over the last 4 days have increased from normal to 5 without any biliary obstruction or any recent hypotensive event. If the hyperbilirubinemia is irreversible, any chemotherapy or targeted therapy would be extremely toxic. We reviewed supportive care and palliative care with his son. He will see when he will be able to come and have family meeting to review goals of care.

## 2017-09-30 NOTE — PROGRESS NOTE ADULT - ASSESSMENT
63 y/o M presented with spinal cord compression s/p decompression. He presented with metastatic non small cell lung cancer to the liver, bones, lymph nodes and bowel. Hospitalization course complicated by bowel perforation, hypoxic respiratory failure currently extubated, DVT/PE, septic shock and hyperbilirubinemia.

## 2017-09-30 NOTE — PROVIDER CONTACT NOTE (CHANGE IN STATUS NOTIFICATION) - SITUATION
Pt lethargic throughout shift with PCA pump dilaudid in progress. Pt answers questions related to orientation appropriately, but intermittently illogical/inappropriate to situation.

## 2017-09-30 NOTE — PROGRESS NOTE ADULT - ASSESSMENT
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 Complicated by acute hypoxemic respiratory failure requiring CPAP.     PLAN:  Neurologic: post-operative pain  -Will continue with Dilaudid PCA  - Would continue IV Tylenol 1000mg q 6hours x 24hours    Respiratory: Acute hypoxemic respiratory failure, improving  -Will administer supplemental O2 to maintain SpO2 > 88%  -Duonebs/ Mucomyst  -PT/ OOB    Cardiovascular: Hypertension  -Metoprolol 5mg q4 w/ hold parameters    Gastrointestinal/Nutrition: small bowel perforation s/p SBR, anastomosis   -NPO/NGT  -Await GI function  -Protonix 40mg daily    Renal/Genitourinary: ALEC, resolving  - Will administer D5 1/2NS @ 50ml/hr    Hematologic: b/l DVT; b/l PE  -Continue with therapeutic lovenox    Infectious Disease:Secondary Peritonitis 2/2 to Vanco sensitive E. 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 Complicated by acute hypoxemic respiratory failure requiring CPAP.     PLAN:  Neurologic: post-operative pain  -Will continue with Dilaudid PCA  - Would continue IV Tylenol 1000mg q 6hours x 24hours    Respiratory: Acute hypoxemic respiratory failure, improving  -Will administer supplemental O2 to maintain SpO2 > 88%  -Duonebs/ Mucomyst  -PT/ OOB  -Nocturnal Bipap    Cardiovascular: Hypertension  -Decrease Metoprolol to 5mg IV q6hrs    Gastrointestinal/Nutrition: small bowel perforation s/p SBR, anastomosis   -NPO  -Await GI function  -Protonix 40mg daily    Renal/Genitourinary: ALEC, resolving  - Decrease  D5 1/2NS to 30mL/hr  - Lasix 10mg IV x 1   -d/c michelle     Hematologic: b/l DVT; b/l PE  -Continue with therapeutic lovenox  -f/u Heme/Onc    Infectious Disease:Secondary Peritonitis 2/2 to Vanco sensitive E. Faecium, Klebsiella, Candida Albicans; leukocytosis  -Continue Vanco, Meropenem, Micafungin  -f/u ID    Disposition: SICU, palliative consult

## 2017-09-30 NOTE — PROGRESS NOTE ADULT - ASSESSMENT
62M w/ metastatic lung ca s/p laminectomy s/p ex-lap, SBR w/ primary anastomosis w/ right renal vein thrombus  no indication for vascular intervention at this time  continue anticoag rx

## 2017-09-30 NOTE — PROGRESS NOTE ADULT - SUBJECTIVE AND OBJECTIVE BOX
Patient is a 62y old  Male who presents with a chief complaint of cord compression (19 Sep 2017 09:44)      HPI:  62-year-old male with newly diagnosed metastatic Lung disease s/p T8-T11 laminectomies for resection of dorsal epidural tumor for spinal cord compression on 9/19/17. His hospital course was complicated by a right upper lobe pulmonary embolus, B/L lower extremity DVTs, and a bowel perforation, requiring two surgeries to eventually close .  Patient currently extubate . Pain under control.  He will need to heal before he can pursue chemotherapy/RT    MEDICATIONS  (STANDING):  vancomycin  IVPB 1000 milliGRAM(s) IV Intermittent every 12 hours  vancomycin  IVPB      ALBUTerol/ipratropium for Nebulization 3 milliLiter(s) Nebulizer every 6 hours  micafungin IVPB      micafungin IVPB 100 milliGRAM(s) IV Intermittent every 24 hours  pantoprazole  Injectable 40 milliGRAM(s) IV Push every 24 hours  meropenem IVPB      meropenem IVPB 1000 milliGRAM(s) IV Intermittent every 8 hours  dextrose 5% + sodium chloride 0.45%. 1000 milliLiter(s) (30 mL/Hr) IV Continuous <Continuous>  metoprolol Injectable 5 milliGRAM(s) IV Push every 6 hours  enoxaparin Injectable 90 milliGRAM(s) SubCutaneous every 12 hours  acetaminophen  IVPB. 1000 milliGRAM(s) IV Intermittent once    MEDICATIONS  (PRN):  HYDROmorphone  Injectable 0.5 milliGRAM(s) IV Push every 3 hours PRN Severe Pain (7 - 10)      Allergies    No Known Allergies    Intolerances        REVIEW OF SYSTEM:  CONSTITUTIONAL: No fever, No change in weight, No fatigue  HEAD: No headache, No dizziness, No recent trauma  EYES: No eye pain, No visual disturbances, No discharge  ENT:  No difficulty hearing, No tinnitus, No vertigo, No sinus pain, No throat pain  NECK: No pain, No stiffness  BREASTS: No pain, No masses, No nipple discharge  RESPIRATORY: No cough, No wheezing, No chills, No hemoptysis, No shortness of breath at rest or exertional shortness of breath  CARDIOVASCULAR: No chest pain, No palpitations, No dizziness, No CHF, No arrhythmia, No cardiomegaly, No leg swelling  GASTROINTESTINAL: No abdominal, No epigastric pain. No nausea, No vomiting, No hematemesis, No diarrhea, No constipation. No melena, No hematochezia. No GERD  GENITOURINARY: No dysuria, No frequency, No hematuria, No incontinence, No nocturia, No hesitancy,  SKIN: No itching, No burning, No rashes, No lesions   LYMPH NODES: No history of enlarged glands  ENDOCRINE: No heat or cold intolerance, No hair loss. No osteoporosis, No thyroid disease  MUSCULOSKELETAL: No joint pain or swelling, No muscle, back, or extremity pain  PSYCHIATRIC: No depression, No anxiety, No mood swings, No difficulty sleeping  HEME/LYMPH: No easy bruising, No anticoagulants, No bleeding disorder, No bleeding gums  ALLERGY AND IMMUNOLOGIC: No hives, No eczema  NEUROLOGICAL: No memory loss, (+) cord compression with muscle weakness right more than left        VITALS:   T(C): 37 (10-01-17 @ 19:15), Max: 37.3 (10-01-17 @ 11:00)  HR: 89 (10-01-17 @ 20:28) (73 - 110)  BP: 139/66 (10-01-17 @ 20:00) (99/72 - 169/74)  RR: 30 (10-01-17 @ 20:00) (17 - 50)  SpO2: 100% (10-01-17 @ 20:28) (92% - 100%)  Wt(kg): --    09-30 @ 07:01  -  10-01 @ 07:00  --------------------------------------------------------  IN: 1660 mL / OUT: 1870 mL / NET: -210 mL    10-01 @ 07:01  -  10-01 @ 20:40  --------------------------------------------------------  IN: 690 mL / OUT: 655 mL / NET: 35 mL        PHYSICAL EXAM:  GENERAL: NAD, well nourished and conversant  HEAD:  Atraumatic  EYES: EOM, PERRLA, conjunctiva pink and sclera white  ENT: No tonsillar erythema, exudates, or enlargement, moist mucous membranes, good dentition, no lesions  NECK: Supple, No JVD, normal thyroid, carotids with normal upstrokes and no bruits  CHEST/LUNG: Clear to auscultation bilaterally, No rales, rhonchi, wheezing, or rubs  HEART: Regular rate and rhythm, No murmurs, rubs, or gallops  ABDOMEN: Soft, nondistended, no masses, guarding, tenderness or rebound, bowel sounds present  EXTREMITIES:  2+ Peripheral Pulses, No clubbing, cyanosis, or edema. No arthritis of shoulders, elbows, hands, hips, knees, ankles, or feet. No DJD C spine, T spine, or L/S spine  LYMPH: No lymphadenopathy noted  SKIN: No rashes or lesions  NERVOUS SYSTEM:  Alert & Oriented X3, normal cognitive function. (+)Cord compression with right greater than left weakness    LABS:                          8.8    32.7  )-----------( 213      ( 01 Oct 2017 02:54 )             27.1     10-01    136  |  98  |  27<H>  ----------------------------<  106<H>  4.8   |  26  |  0.97  09-30    138  |  99  |  29<H>  ----------------------------<  103<H>  4.8   |  28  |  0.96  09-30    137  |  98  |  29<H>  ----------------------------<  103<H>  4.9   |  26  |  0.94    Ca    8.3<L>      01 Oct 2017 02:54  Ca    8.2<L>      30 Sep 2017 18:40  Ca    7.9<L>      30 Sep 2017 02:52  Phos  2.9     10-01  Phos  3.0     09-30  Phos  3.1     09-30  Mg     2.5     10-01  Mg     2.5     09-30  Mg     2.6     09-30    TPro  5.2<L>  /  Alb  2.3<L>  /  TBili  6.8<H>  /  DBili  x   /  AST  70<H>  /  ALT  46<H>  /  AlkPhos  121<H>  10-01  TPro  4.7<L>  /  Alb  2.2<L>  /  TBili  5.0<H>  /  DBili  x   /  AST  59<H>  /  ALT  34  /  AlkPhos  96  09-30    CAPILLARY BLOOD GLUCOSE          RADIOLOGY & ADDITIONAL TESTS:      Consultant(s):    Care Discussed with Consultants/Other Providers [ ] YES  [ ] NO

## 2017-09-30 NOTE — PROGRESS NOTE ADULT - ASSESSMENT
A/P: 62M admitted to neurosurgery for bilateral leg weakness found to have newly diagnosed metastatic disease s/p T8-T11 laminectomy presented today with deterioration in respiratory status and acute onset abdominal pain who was subsequently intubated. W/u revealed b/l DVTs, right upper lobe PE, and a bowel perforation.  Pt is now s/p Exploratory laparotomy, SBR x1 (left in discontinuity), abdominal washout, abthera VAC application (9/22); 1cm perforation in the small bowel 110 cm distal to the ligament of treitz. RTOR 9/24 for intestinal reconstruction and abdominal closure. Awaiting ROBF. 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 Most likely noninfected per vascular surgery    - Consider palliative care consult  - Wean 02 as tolerated, recommend diuresis as appropriate  - Leukocytosis; f/u Fungitell +  - NPO/IVF; awaiting GI fxn  - Pain control  - DVT ppx: T lovenox  - Continue acute care per SICU    Hong Cisneros M.D.

## 2017-09-30 NOTE — PROGRESS NOTE ADULT - SUBJECTIVE AND OBJECTIVE BOX
Patient is a 62y old  Male who presents with a chief complaint of cord compression (19 Sep 2017 09:44)      Vascular Surgery Attending Progress Note    Interval HPI: pt  appears confused    Medications:  vancomycin  IVPB 1000 milliGRAM(s) IV Intermittent every 12 hours  vancomycin  IVPB      ALBUTerol/ipratropium for Nebulization 3 milliLiter(s) Nebulizer every 6 hours  micafungin IVPB      micafungin IVPB 100 milliGRAM(s) IV Intermittent every 24 hours  HYDROmorphone PCA (1 mG/mL) 30 milliLiter(s) PCA Continuous PCA Continuous  HYDROmorphone PCA (1 mG/mL) Rescue Clinician Bolus 0.5 milliGRAM(s) IV Push every 15 minutes PRN  naloxone Injectable 0.1 milliGRAM(s) IV Push every 3 minutes PRN  ondansetron Injectable 4 milliGRAM(s) IV Push every 6 hours PRN  pantoprazole  Injectable 40 milliGRAM(s) IV Push every 24 hours  enoxaparin Injectable 80 milliGRAM(s) SubCutaneous every 12 hours  meropenem IVPB      meropenem IVPB 1000 milliGRAM(s) IV Intermittent every 8 hours  dextrose 5% + sodium chloride 0.45%. 1000 milliLiter(s) IV Continuous <Continuous>  metoprolol Injectable 5 milliGRAM(s) IV Push every 6 hours      Vital Signs Last 24 Hrs  T(C): 36.8 (30 Sep 2017 15:00), Max: 36.9 (30 Sep 2017 07:00)  T(F): 98.2 (30 Sep 2017 15:00), Max: 98.5 (30 Sep 2017 07:00)  HR: 87 (30 Sep 2017 17:00) (76 - 93)  BP: 128/69 (30 Sep 2017 17:00) (100/60 - 135/72)  BP(mean): 91 (30 Sep 2017 17:00) (74 - 109)  RR: 28 (30 Sep 2017 17:00) (11 - 31)  SpO2: 100% (30 Sep 2017 17:00) (91% - 100%)  I&O's Summary    29 Sep 2017 07:01  -  30 Sep 2017 07:00  --------------------------------------------------------  IN: 2180 mL / OUT: 1460 mL / NET: 720 mL    30 Sep 2017 07:01  -  30 Sep 2017 17:59  --------------------------------------------------------  IN: 510 mL / OUT: 980 mL / NET: -470 mL        Physical Exam:  Neuro  A&Ox1 VSS  Abd soft   Vascular:  stable  Musculoskeletal: limited    LABS:                        8.3    29.2  )-----------( 176      ( 30 Sep 2017 03:07 )             25.7     09-30    137  |  98  |  29<H>  ----------------------------<  103<H>  4.9   |  26  |  0.94    Ca    7.9<L>      30 Sep 2017 02:52  Phos  3.1     09-30  Mg     2.6     09-30    TPro  4.7<L>  /  Alb  2.2<L>  /  TBili  5.0<H>  /  DBili  x   /  AST  59<H>  /  ALT  34  /  AlkPhos  96  09-30    PT/INR - ( 30 Sep 2017 02:52 )   PT: 15.7 sec;   INR: 1.44 ratio         PTT - ( 30 Sep 2017 02:52 )  PTT:27.4 sec    TABITHA CAMARILLO MD  [phone]

## 2017-09-30 NOTE — PROGRESS NOTE ADULT - SUBJECTIVE AND OBJECTIVE BOX
Visit Summary: Patient seen and evaluated at bedside. Patient had no complaints    Overnight Events: None    Exam:  T(C): 36.8 (09-30-17 @ 11:00), Max: 37 (09-29-17 @ 15:00)  HR: 84 (09-30-17 @ 12:00) (76 - 93)  BP: 109/64 (09-30-17 @ 12:00) (100/60 - 135/72)  RR: 18 (09-30-17 @ 12:00) (15 - 31)  SpO2: 100% (09-30-17 @ 12:00) (91% - 100%)  Wt(kg): --    Constitutional: No Acute Distress     Neurological: AOx3, Following Commands    Motor exam:          Upper extremity                         Delt     Bicep     Tricep    HG                                                 R         5/5        5/5        5/5       5/5                                               L          5/5        5/5        5/5       5/5          Lower extremity                        HF         KF        KE       DF         PF                                                  R        0/5        0/5        0/5      0/5        0/5                                               L         1/5        0/5       0/5       2/5        2/5                                                 Sensation: [x] intact to light touch  [] decreased:     No clonus    Incision was clean dry and intact                        8.3    29.2  )-----------( 176      ( 30 Sep 2017 03:07 )             25.7     09-30    137  |  98  |  29<H>  ----------------------------<  103<H>  4.9   |  26  |  0.94    Ca    7.9<L>      30 Sep 2017 02:52  Phos  3.1     09-30  Mg     2.6     09-30    TPro  4.7<L>  /  Alb  2.2<L>  /  TBili  5.0<H>  /  DBili  x   /  AST  59<H>  /  ALT  34  /  AlkPhos  96  09-30  PT/INR - ( 30 Sep 2017 02:52 )   PT: 15.7 sec;   INR: 1.44 ratio         PTT - ( 30 Sep 2017 02:52 )  PTT:27.4 sec

## 2017-09-30 NOTE — PROGRESS NOTE ADULT - SUBJECTIVE AND OBJECTIVE BOX
HISTORY  62y Male initially admitted to neurosurgery for b/l leg weakness found to have newly diagnosed metastatic disease with spinal met causing spinal cord compression s/p T8-T11 laminectomy on 9/20, post-op course complicated by deterioration in respiratory status and acute onset abdominal pain requiring intubation and transfer to NSCU. CT chest/abdomen/pelvis demonstrated b/l DVTs, right upper lobe PE, and a bowel perforation with free air. Pt was taken emergently to the OR and is now s/p exploratory laparotomy, SBR x1 (left in discontinuity), abdominal washout, abthera VAC application on 9/22, after which he was transferred to SICU under ACS. He was taken back to the OR on 9/24 for a washout, primary anastomosis, and abdominal closure. Post-operative course complicated by Acute hypoxemic respiratory failure secondary to Acute Pulmonary Vascular congestion/ Left Lung Collapse.    24 HOUR EVENTS: Yesterday, NGT d/mark; Heparin gtt d/mark and transitioned to therapeutic lovenox. Switched to meropenem from zosyn per ID. Vascular consult called for right renal vein thrombus. Refused BiPAP overnight..    SUBJECTIVE/ROS:  [X] A ten-point review of systems was otherwise negative except as noted.  [ ] Due to altered mental status/intubation, subjective information were not able to be obtained from the patient. History was obtained, to the extent possible, from review of the chart and collateral sources of information.    NEURO  Exam: awake, alert, oriented  Meds: HYDROmorphone PCA (1 mG/mL) 30 milliLiter(s) PCA Continuous PCA Continuous  HYDROmorphone PCA (1 mG/mL) Rescue Clinician Bolus 0.5 milliGRAM(s) IV Push every 15 minutes PRN for Pain Scale GREATER THAN 6  ondansetron Injectable 4 milliGRAM(s) IV Push every 6 hours PRN Nausea    [x] Adequacy of sedation and pain control has been assessed and adjusted    RESPIRATORY  RR: 30 (09-30-17 @ 07:00) (15 - 31)  SpO2: 96% (09-30-17 @ 07:00) (89% - 100%)  Wt(kg): --  Exam: unlabored, Coarse breath sounds bilaterally, decreased at left base  Mechanical Ventilation:     [N/A] Extubation Readiness Assessed  Meds: ALBUTerol/ipratropium for Nebulization 3 milliLiter(s) Nebulizer every 6 hours      CARDIOVASCULAR  HR: 84 (09-30-17 @ 07:00) (72 - 93)  BP: 116/67 (09-30-17 @ 07:00) (100/60 - 135/72)  BP(mean): 86 (09-30-17 @ 07:00) (74 - 109)  ABP: --  ABP(mean): --  Wt(kg): --  CVP(cm H2O): --      Exam: regular rate and rhythm  Cardiac Rhythm: sinus  Perfusion     [x]Adequate   [ ]Inadequate  Mentation   [x]Normal       [ ]Reduced  Extremities  [x]Warm         [ ]Cool    09-29 @ 07:01  -  09-30 @ 07:00  --------------------------------------------------------  IN: 2180 mL / OUT: 1460 mL / NET: 720 mL      Volume Status [ ]Hypervolemic [x]Euvolemic [ ]Hypovolemic  Meds: metoprolol Injectable 5 milliGRAM(s) IV Push every 4 hours      GI/NUTRITION  Exam: soft, nontender, nondistended, incision C/D/I  Diet:  Meds: pantoprazole  Injectable 40 milliGRAM(s) IV Push every 24 hours      GENITOURINARY  I&O's Detail    09-29 @ 07:01  -  09-30 @ 07:00  --------------------------------------------------------  IN:    dextrose 5% + sodium chloride 0.45%.: 1150 mL    heparin Infusion: 30 mL    IV PiggyBack: 650 mL    Solution: 350 mL  Total IN: 2180 mL    OUT:    Accordian: 115 mL    Indwelling Catheter - Urethral: 1345 mL  Total OUT: 1460 mL    Total NET: 720 mL          09-30    137  |  98  |  29<H>  ----------------------------<  103<H>  4.9   |  26  |  0.94    Ca    7.9<L>      30 Sep 2017 02:52  Phos  3.1     09-30  Mg     2.6     09-30    TPro  4.7<L>  /  Alb  2.2<L>  /  TBili  5.0<H>  /  DBili  x   /  AST  59<H>  /  ALT  34  /  AlkPhos  96  09-30    [ ] Torres catheter, indication: N/A  Meds: dextrose 5% + sodium chloride 0.45%. 1000 milliLiter(s) IV Continuous <Continuous>      HEMATOLOGIC  Meds: enoxaparin Injectable 80 milliGRAM(s) SubCutaneous every 12 hours    [x] VTE Prophylaxis                        8.3    29.2  )-----------( 176      ( 30 Sep 2017 03:07 )             25.7     PT/INR - ( 30 Sep 2017 02:52 )   PT: 15.7 sec;   INR: 1.44 ratio         PTT - ( 30 Sep 2017 02:52 )  PTT:27.4 sec  Transfusion     [ ] PRBC   [ ] Platelets   [ ] FFP   [ ] Cryoprecipitate    INFECTIOUS DISEASES  WBC Count: 29.2 K/uL (09-30 @ 03:07)    RECENT CULTURES:    Meds: vancomycin  IVPB 1000 milliGRAM(s) IV Intermittent every 12 hours  vancomycin  IVPB      micafungin IVPB      micafungin IVPB 100 milliGRAM(s) IV Intermittent every 24 hours  meropenem IVPB      meropenem IVPB 1000 milliGRAM(s) IV Intermittent every 8 hours      ENDOCRINE  CAPILLARY BLOOD GLUCOSE  121 (29 Sep 2017 16:00)        Meds:     ACCESS DEVICES:  [ ] Peripheral IV  [X] Central Venous Line	[ ] R	[ ] L	[ ] IJ	[ ] Fem	[ ] SC	Placed:   [ ] Arterial Line		[ ] R	[ ] L	[ ] Fem	[ ] Rad	[ ] Ax	Placed:   [ ] PICC:					[ ] Mediport  [ ] Urinary Catheter, Date Placed:   [x] Necessity of urinary, arterial, and venous catheters discussed    OTHER MEDICATIONS:  naloxone Injectable 0.1 milliGRAM(s) IV Push every 3 minutes PRN      CODE STATUS: Full Code      IMAGING: HISTORY  62y Male initially admitted to neurosurgery for b/l leg weakness found to have newly diagnosed metastatic disease with spinal met causing spinal cord compression s/p T8-T11 laminectomy on 9/20, post-op course complicated by deterioration in respiratory status and acute onset abdominal pain requiring intubation and transfer to NSCU. CT chest/abdomen/pelvis demonstrated b/l DVTs, right upper lobe PE, and a bowel perforation with free air. Pt was taken emergently to the OR and is now s/p exploratory laparotomy, SBR x1 (left in discontinuity), abdominal washout, abthera VAC application on 9/22, after which he was transferred to SICU under ACS. He was taken back to the OR on 9/24 for a washout, primary anastomosis, and abdominal closure. Post-operative course complicated by Acute hypoxemic respiratory failure secondary to Acute Pulmonary Vascular congestion/ Left Lung Collapse.    24 HOUR EVENTS: Yesterday, NGT d/mark; Heparin gtt d/mark and transitioned to therapeutic lovenox. Switched to meropenem from zosyn per ID. Vascular consult called for right renal vein thrombus. Refused BiPAP overnight.    SUBJECTIVE/ROS:  [X] A ten-point review of systems was otherwise negative except as noted.  [ ] Due to altered mental status/intubation, subjective information were not able to be obtained from the patient. History was obtained, to the extent possible, from review of the chart and collateral sources of information.    NEURO  Exam: awake, alert, oriented  Meds: HYDROmorphone PCA (1 mG/mL) 30 milliLiter(s) PCA Continuous PCA Continuous  HYDROmorphone PCA (1 mG/mL) Rescue Clinician Bolus 0.5 milliGRAM(s) IV Push every 15 minutes PRN for Pain Scale GREATER THAN 6  ondansetron Injectable 4 milliGRAM(s) IV Push every 6 hours PRN Nausea    [x] Adequacy of sedation and pain control has been assessed and adjusted    RESPIRATORY  RR: 30 (09-30-17 @ 07:00) (15 - 31)  SpO2: 96% (09-30-17 @ 07:00) (89% - 100%)  Wt(kg): --  Exam: unlabored, Coarse breath sounds bilaterally, decreased at left base  Mechanical Ventilation:     [N/A] Extubation Readiness Assessed  Meds: ALBUTerol/ipratropium for Nebulization 3 milliLiter(s) Nebulizer every 6 hours      CARDIOVASCULAR  HR: 84 (09-30-17 @ 07:00) (72 - 93)  BP: 116/67 (09-30-17 @ 07:00) (100/60 - 135/72)  BP(mean): 86 (09-30-17 @ 07:00) (74 - 109)  ABP: --  ABP(mean): --  Wt(kg): --  CVP(cm H2O): --      Exam: regular rate and rhythm  Cardiac Rhythm: sinus  Perfusion     [x]Adequate   [ ]Inadequate  Mentation   [x]Normal       [ ]Reduced  Extremities  [x]Warm         [ ]Cool    09-29 @ 07:01  -  09-30 @ 07:00  --------------------------------------------------------  IN: 2180 mL / OUT: 1460 mL / NET: 720 mL      Volume Status [ ]Hypervolemic [x]Euvolemic [ ]Hypovolemic  Meds: metoprolol Injectable 5 milliGRAM(s) IV Push every 4 hours      GI/NUTRITION  Exam: soft, nontender, nondistended, incision C/D/I  Diet:  Meds: pantoprazole  Injectable 40 milliGRAM(s) IV Push every 24 hours      GENITOURINARY  I&O's Detail    09-29 @ 07:01  -  09-30 @ 07:00  --------------------------------------------------------  IN:    dextrose 5% + sodium chloride 0.45%.: 1150 mL    heparin Infusion: 30 mL    IV PiggyBack: 650 mL    Solution: 350 mL  Total IN: 2180 mL    OUT:    Accordian: 115 mL    Indwelling Catheter - Urethral: 1345 mL  Total OUT: 1460 mL    Total NET: 720 mL          09-30    137  |  98  |  29<H>  ----------------------------<  103<H>  4.9   |  26  |  0.94    Ca    7.9<L>      30 Sep 2017 02:52  Phos  3.1     09-30  Mg     2.6     09-30    TPro  4.7<L>  /  Alb  2.2<L>  /  TBili  5.0<H>  /  DBili  x   /  AST  59<H>  /  ALT  34  /  AlkPhos  96  09-30    [ ] Torres catheter, indication: N/A  Meds: dextrose 5% + sodium chloride 0.45%. 1000 milliLiter(s) IV Continuous <Continuous>      HEMATOLOGIC  Meds: enoxaparin Injectable 80 milliGRAM(s) SubCutaneous every 12 hours    [x] VTE Prophylaxis                        8.3    29.2  )-----------( 176      ( 30 Sep 2017 03:07 )             25.7     PT/INR - ( 30 Sep 2017 02:52 )   PT: 15.7 sec;   INR: 1.44 ratio         PTT - ( 30 Sep 2017 02:52 )  PTT:27.4 sec  Transfusion     [ ] PRBC   [ ] Platelets   [ ] FFP   [ ] Cryoprecipitate    INFECTIOUS DISEASES  WBC Count: 29.2 K/uL (09-30 @ 03:07)    RECENT CULTURES:  BCx 9/27 negative  BCx 9/26 negative     Meds: vancomycin  IVPB 1000 milliGRAM(s) IV Intermittent every 12 hours  vancomycin  IVPB      micafungin IVPB      micafungin IVPB 100 milliGRAM(s) IV Intermittent every 24 hours  meropenem IVPB      meropenem IVPB 1000 milliGRAM(s) IV Intermittent every 8 hours      ENDOCRINE  CAPILLARY BLOOD GLUCOSE  121 (29 Sep 2017 16:00)      Meds:     ACCESS DEVICES:  [ ] Peripheral IV  [X] Central Venous Line	[ ] R	[x ] L	[ x] IJ	[ ] Fem	[ ] SC	Placed:   [ ] Arterial Line		[ ] R	[ ] L	[ ] Fem	[ ] Rad	[ ] Ax	Placed:   [ ] PICC:					[ ] Mediport  [ ] Urinary Catheter, Date Placed:   [x] Necessity of urinary, arterial, and venous catheters discussed    OTHER MEDICATIONS:  naloxone Injectable 0.1 milliGRAM(s) IV Push every 3 minutes PRN      CODE STATUS: Full Code      IMAGING:

## 2017-09-30 NOTE — PROGRESS NOTE ADULT - ASSESSMENT
2-year-old male with newly diagnosed metastatic disease s/p T8-T11 laminectomies for resection of dorsal epidural tumor for spinal cord compression on 9/19/17. His hospital course was complicated by a right upper lobe pulmonary embolus, B/L lower extremity DVTs, and a bowel perforation, requiring two surgeries for repair with General Surgery.  - PT evaluation  - Will likely need spinal cord rehab post hospitalization  - Continue HMV drain at this time  - Neurochecks q4hrs

## 2017-09-30 NOTE — PROGRESS NOTE ADULT - SUBJECTIVE AND OBJECTIVE BOX
ATP Progress Note    S: NGT removed yesterday. Heparin gtt changed to T. Lovenox. Zosyn switched to Meropenem 2/2 concerns of nephrotoxic drugs. Vascular surgery consulted for concerns of an infected right renal vein thrombus, however blood cultures were negative. No acute events overnight. Patient resting comfortably in bed. Pain well controlled.     O:  T(C): 36.9 (09-30-17 @ 07:00), Max: 37 (09-29-17 @ 15:00)  HR: 84 (09-30-17 @ 07:00) (72 - 93)  BP: 116/67 (09-30-17 @ 07:00) (100/60 - 135/72)  RR: 30 (09-30-17 @ 07:00) (15 - 31)  SpO2: 96% (09-30-17 @ 07:00) (91% - 100%)  Wt(kg): --    09-29 @ 07:01  -  09-30 @ 07:00  --------------------------------------------------------  IN:    dextrose 5% + sodium chloride 0.45%.: 1150 mL    heparin Infusion: 30 mL    IV PiggyBack: 650 mL    Solution: 350 mL  Total IN: 2180 mL    OUT:    Accordian: 115 mL    Indwelling Catheter - Urethral: 1345 mL  Total OUT: 1460 mL    Total NET: 720 mL        CBC Full  -  ( 30 Sep 2017 03:07 )  WBC Count : 29.2 K/uL  Hemoglobin : 8.3 g/dL  Hematocrit : 25.7 %  Platelet Count - Automated : 176 K/uL  Mean Cell Volume : 96.2 fl  Mean Cell Hemoglobin : 31.0 pg  Mean Cell Hemoglobin Concentration : 32.2 gm/dL    CAPILLARY BLOOD GLUCOSE  121 (29 Sep 2017 16:00)          PHYSICAL EXAM:  Gen: NAD, following commands  Resp: no resp distress  Abd: Soft, NT, ND  Incision: c/d/i

## 2017-09-30 NOTE — PROGRESS NOTE ADULT - SUBJECTIVE AND OBJECTIVE BOX
Day _6/3__ of Anesthesia Pain Management Service    SUBJECTIVE:    Pain Scale Score	At rest: _2__ 	With Activity: ___ 	[ ] Refer to charted pain scores    THERAPY:    [ ] IV PCA Morphine		[ ] 5 mg/mL	[ ] 1 mg/mL  [x ] IV PCA Hydromorphone	[ ] 5 mg/mL	[ ] 1 mg/mL  [ ] IV PCA Fentanyl		[ ] 50 micrograms/mL    Demand dose 0.2   lockout 6   (minutes) Continuous Rate  0  Total:     Daily      MEDICATIONS  (STANDING):  vancomycin  IVPB 1000 milliGRAM(s) IV Intermittent every 12 hours  vancomycin  IVPB      ALBUTerol/ipratropium for Nebulization 3 milliLiter(s) Nebulizer every 6 hours  micafungin IVPB      micafungin IVPB 100 milliGRAM(s) IV Intermittent every 24 hours  HYDROmorphone PCA (1 mG/mL) 30 milliLiter(s) PCA Continuous PCA Continuous  pantoprazole  Injectable 40 milliGRAM(s) IV Push every 24 hours  enoxaparin Injectable 80 milliGRAM(s) SubCutaneous every 12 hours  meropenem IVPB      meropenem IVPB 1000 milliGRAM(s) IV Intermittent every 8 hours  dextrose 5% + sodium chloride 0.45%. 1000 milliLiter(s) (30 mL/Hr) IV Continuous <Continuous>  metoprolol Injectable 5 milliGRAM(s) IV Push every 6 hours    MEDICATIONS  (PRN):  HYDROmorphone PCA (1 mG/mL) Rescue Clinician Bolus 0.5 milliGRAM(s) IV Push every 15 minutes PRN for Pain Scale GREATER THAN 6  naloxone Injectable 0.1 milliGRAM(s) IV Push every 3 minutes PRN For ANY of the following changes in patient status:  A. RR LESS THAN 10 breaths per minute, B. Oxygen saturation LESS THAN 90%, C. Sedation score of 6  ondansetron Injectable 4 milliGRAM(s) IV Push every 6 hours PRN Nausea      OBJECTIVE:    Sedation Score:	[x ] Alert	[ ] Drowsy 	[ ] Arousable	[ ] Asleep	[ ] Unresponsive    Side Effects:	[x] None	[ ] Nausea	[ ] Vomiting	[ ] Pruritus  		[ ] Other:    Vital Signs Last 24 Hrs  T(C): 36.9 (30 Sep 2017 07:00), Max: 37 (29 Sep 2017 15:00)  T(F): 98.5 (30 Sep 2017 07:00), Max: 98.6 (29 Sep 2017 15:00)  HR: 84 (30 Sep 2017 09:00) (75 - 93)  BP: 102/62 (30 Sep 2017 09:00) (100/60 - 135/72)  BP(mean): 76 (30 Sep 2017 09:00) (74 - 109)  RR: 20 (30 Sep 2017 09:00) (15 - 31)  SpO2: 97% (30 Sep 2017 09:00) (91% - 100%)    ASSESSMENT/ PLAN    Therapy to  be:	[x ] Continue   [ ] Discontinued   [ ] Change to prn Analgesics    Documentation and Verification of current medications:   [X] Done	[ ] Not done, not elligible    Comments:

## 2017-09-30 NOTE — PROVIDER CONTACT NOTE (CHANGE IN STATUS NOTIFICATION) - BACKGROUND
Pt a/w progressive weakness diag with lung CA with multiple mets. S/p T8-T11 laminectomy c/b bowel perf and resp failure requiring SBR, Hiflo/CPAP

## 2017-09-30 NOTE — PROGRESS NOTE ADULT - SUBJECTIVE AND OBJECTIVE BOX
Follow-up Pulm Progress Note    Comfortable sitting in the chair  97% on 3L NC  Persistent LLL collapse   Refused Bipap overnight    Medications:  MEDICATIONS  (STANDING):  vancomycin  IVPB 1000 milliGRAM(s) IV Intermittent every 12 hours  vancomycin  IVPB      ALBUTerol/ipratropium for Nebulization 3 milliLiter(s) Nebulizer every 6 hours  micafungin IVPB      micafungin IVPB 100 milliGRAM(s) IV Intermittent every 24 hours  HYDROmorphone PCA (1 mG/mL) 30 milliLiter(s) PCA Continuous PCA Continuous  pantoprazole  Injectable 40 milliGRAM(s) IV Push every 24 hours  enoxaparin Injectable 80 milliGRAM(s) SubCutaneous every 12 hours  meropenem IVPB      meropenem IVPB 1000 milliGRAM(s) IV Intermittent every 8 hours  dextrose 5% + sodium chloride 0.45%. 1000 milliLiter(s) (30 mL/Hr) IV Continuous <Continuous>  metoprolol Injectable 5 milliGRAM(s) IV Push every 6 hours    MEDICATIONS  (PRN):  HYDROmorphone PCA (1 mG/mL) Rescue Clinician Bolus 0.5 milliGRAM(s) IV Push every 15 minutes PRN for Pain Scale GREATER THAN 6  naloxone Injectable 0.1 milliGRAM(s) IV Push every 3 minutes PRN For ANY of the following changes in patient status:  A. RR LESS THAN 10 breaths per minute, B. Oxygen saturation LESS THAN 90%, C. Sedation score of 6  ondansetron Injectable 4 milliGRAM(s) IV Push every 6 hours PRN Nausea          Vital Signs Last 24 Hrs  T(C): 36.8 (30 Sep 2017 11:00), Max: 37 (29 Sep 2017 15:00)  T(F): 98.3 (30 Sep 2017 11:00), Max: 98.6 (29 Sep 2017 15:00)  HR: 84 (30 Sep 2017 12:00) (76 - 93)  BP: 109/64 (30 Sep 2017 12:00) (100/60 - 135/72)  BP(mean): 80 (30 Sep 2017 12:00) (74 - 109)  RR: 18 (30 Sep 2017 12:00) (15 - 31)  SpO2: 100% (30 Sep 2017 12:00) (91% - 100%) on 3L NC          09-29 @ 07:01  -  09-30 @ 07:00  --------------------------------------------------------  IN: 2180 mL / OUT: 1460 mL / NET: 720 mL          LABS:                        8.3    29.2  )-----------( 176      ( 30 Sep 2017 03:07 )             25.7     09-30    137  |  98  |  29<H>  ----------------------------<  103<H>  4.9   |  26  |  0.94    Ca    7.9<L>      30 Sep 2017 02:52  Phos  3.1     09-30  Mg     2.6     09-30    TPro  4.7<L>  /  Alb  2.2<L>  /  TBili  5.0<H>  /  DBili  x   /  AST  59<H>  /  ALT  34  /  AlkPhos  96  09-30          CAPILLARY BLOOD GLUCOSE  121 (29 Sep 2017 16:00)        PT/INR - ( 30 Sep 2017 02:52 )   PT: 15.7 sec;   INR: 1.44 ratio         PTT - ( 30 Sep 2017 02:52 )  PTT:27.4 sec  Urinalysis Basic - ( 29 Sep 2017 11:52 )    Color: Yellow / Appearance: x / SG: >1.030 / pH: x  Gluc: x / Ketone: Negative  / Bili: Small / Urobili: Negative   Blood: x / Protein: Trace / Nitrite: Negative   Leuk Esterase: Negative / RBC: 5-10 /HPF / WBC 5-10 /HPF   Sq Epi: x / Non Sq Epi: x / Bacteria: x      Procalcitonin, Serum: 2.41 ng/mL (29 Sep 2017 03:31)  Procalcitonin, Serum: 3.09 ng/mL (28 Sep 2017 04:04)    Serum Pro-Brain Natriuretic Peptide: 1037 pg/mL (29 Sep 2017 02:57)                CULTURES: (if applicable)  blood cultures negative         Physical Examination:  PULM: Decreased BS L base  CVS: S1, S2 RRR    RADIOLOGY REVIEWED  CXR: persistent LLL collapse

## 2017-09-30 NOTE — PROGRESS NOTE ADULT - ATTENDING COMMENTS
Pt seen and examined.  Chart reviewed.  Resident note confirmed.  Pt is a 62 year old male admitted to neurosurgery for bilateral leg weakness found to have newly diagnosed metastatic disease.  Pt is s/p T8-T11 laminectomy with subsequent deterioration in respiratory status/acute onset abdominal pain.  Work up revealed bilateral DVTs, right upper lobe PE, and small intestinal perforation.  Pt is s/p Exploratory laparotomy, SBR x1 (left in discontinuity), abdominal washout, abthera VAC application (9/22) with intraop findings of an intestinal perforation and a large amount of succus in the abdomen. Pt s/p return to OR on 9/24 for intestinal reconstruction and abdominal closure.  No acute events overnight.  Continue pain control.  Echocardiogram is without evidence of right heart strain.  He remains extubated.  Left lung atelectasis has improved.  Continue diuresis for hypervolemia.  Keep NPO and await return of bowel function.  Marked leukocytosis noted.  Fungitel is positive and the Pt continues on micafungin.  Renal vein thrombus noted on CT.  Therapeutic lovenox continues.  Suggest palliative care consult.  Continue supportive care.

## 2017-09-30 NOTE — PROGRESS NOTE ADULT - PROBLEM SELECTOR PLAN 1
persistent, complete LLL collapse   -Unclear how much of this is extrinsic compression 2nd tumor vs. mucous plugging  but his lower lobe was up on CXR 9/26 after bipap overnight  -Please place on Bipap 18/15 or 20/15 qHS    -Refused bipap overnight, use encouraged  -Duoneb q6  -Worsened leukocytosis likely reactive 2nd post obstructive PNA, c/w vanco, zosyn. -Obtain sputum culture  -Chest PT, OOB, Acapella valve, chest vest for mucous clearance

## 2017-10-01 NOTE — PROGRESS NOTE ADULT - SUBJECTIVE AND OBJECTIVE BOX
ATP Progress Note    S: Oncology and palliative consulted, pt was not receptive to hearing about his prognosis, plan to have a family meeting sometime this week. He had become more confused and an ammonia level was sent (29). Pt also has an increasing bilirubin. Remained on 3 L NC during the day, BIPAP at night (18/15, FiO2: 50%) for left lower lobe atelectasis - tolerated for about 4 hours and then became agitated, required dose of 0.25 mg IV Ativan with adequate response and then became agitated again in early AM and was given 2.5 mg IV Haldol. Michelle was DCd and patient was DTV by 2100, did not void and was straight cathed with 350 cc urine, 6 hours post straight cath pt still did not void and michelle replaced.     O:  T(C): 37 (10-01-17 @ 08:00), Max: 37.1 (10-01-17 @ 03:00)  HR: 106 (10-01-17 @ 08:52) (79 - 110)  BP: 120/79 (10-01-17 @ 08:00) (99/72 - 148/70)  RR: 31 (10-01-17 @ 08:00) (11 - 50)  SpO2: 100% (10-01-17 @ 08:52) (92% - 100%)  Wt(kg): --    09-30 @ 07:01  -  10-01 @ 07:00  --------------------------------------------------------  IN:    dextrose 5% + sodium chloride 0.45%.: 660 mL    dextrose 5% + sodium chloride 0.45%.: 100 mL    IV PiggyBack: 650 mL    Solution: 250 mL  Total IN: 1660 mL    OUT:    Accordian: 140 mL    Indwelling Catheter - Urethral: 1380 mL    Intermittent Catheterization - Urethral: 350 mL  Total OUT: 1870 mL    Total NET: -210 mL      10-01 @ 07:01  -  10-01 @ 09:08  --------------------------------------------------------  IN:  Total IN: 0 mL    OUT:    Indwelling Catheter - Urethral: 75 mL  Total OUT: 75 mL    Total NET: -75 mL        PHYSICAL EXAM:  Gen: NAD, following commands  Resp: no resp distress  Abd: Soft, NT, ND  Incision: c/d/i    CBC Full  -  ( 01 Oct 2017 02:54 )  WBC Count : 32.7 K/uL  Hemoglobin : 8.8 g/dL  Hematocrit : 27.1 %  Platelet Count - Automated : 213 K/uL  Mean Cell Volume : 96.6 fl  Mean Cell Hemoglobin : 31.5 pg  Mean Cell Hemoglobin Concentration : 32.6 gm/dL  Auto Neutrophil # : x  Auto Lymphocyte # : x  Auto Monocyte # : x  Auto Eosinophil # : x  Auto Basophil # : x  Auto Neutrophil % : x  Auto Lymphocyte % : x  Auto Monocyte % : x  Auto Eosinophil % : x  Auto Basophil % : x

## 2017-10-01 NOTE — PROGRESS NOTE ADULT - PROBLEM SELECTOR PLAN 1
s/p thoracic spine surgery  continue significant weakness in the lower extremities  DVT and GI prophylaxis  Passive ROM exercises Need to prevent contractures  continue neuro checks

## 2017-10-01 NOTE — PROGRESS NOTE ADULT - SUBJECTIVE AND OBJECTIVE BOX
Patient is a 62y old  Male who presents with a chief complaint of cord compression (19 Sep 2017 09:44)      HPI:  62M admitted to neurosurgery for bilateral leg weakness found to have newly diagnosed cord compression due to  metastatic disease s/p T8-T11 laminectomy with deterioration in respiratory status and acute onset abdominal pain who was subsequently intubated. W/u revealed b/l DVTs, right upper lobe PE, and a bowel perforation.  He is s/p Exploratory laparotomy, SBR x1 (left in discontinuity), abdominal washout, abthera VAC application (9/22); 1cm perforation in the small bowel 110 cm distal to the ligament of treitz. RTOR 9/24 for intestinal reconstruction and abdominal closure. Awaiting ROBF. 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 Most likely noninfected per vascular surgery.  Patient more agitated anxious and combative. upset when approached with discussion re; prognosis. Needs vigorous pulmonary toilet and incentive spirometry but not able to comply well today     MEDICATIONS  (STANDING):  vancomycin  IVPB 1000 milliGRAM(s) IV Intermittent every 12 hours  vancomycin  IVPB      ALBUTerol/ipratropium for Nebulization 3 milliLiter(s) Nebulizer every 6 hours  micafungin IVPB      micafungin IVPB 100 milliGRAM(s) IV Intermittent every 24 hours  pantoprazole  Injectable 40 milliGRAM(s) IV Push every 24 hours  meropenem IVPB      meropenem IVPB 1000 milliGRAM(s) IV Intermittent every 8 hours  dextrose 5% + sodium chloride 0.45%. 1000 milliLiter(s) (30 mL/Hr) IV Continuous <Continuous>  metoprolol Injectable 5 milliGRAM(s) IV Push every 6 hours  enoxaparin Injectable 90 milliGRAM(s) SubCutaneous every 12 hours  acetaminophen  IVPB. 1000 milliGRAM(s) IV Intermittent once    MEDICATIONS  (PRN):  HYDROmorphone  Injectable 0.5 milliGRAM(s) IV Push every 3 hours PRN Severe Pain (7 - 10)      Allergies    No Known Allergies    Intolerances        REVIEW OF SYSTEM:  refusing to answer questions        VITALS:   T(C): 37 (10-01-17 @ 19:15), Max: 37.3 (10-01-17 @ 11:00)  HR: 89 (10-01-17 @ 20:28) (73 - 110)  BP: 139/66 (10-01-17 @ 20:00) (99/72 - 169/74)  RR: 30 (10-01-17 @ 20:00) (17 - 50)  SpO2: 100% (10-01-17 @ 20:28) (92% - 100%)  Wt(kg): --    09-30 @ 07:01  -  10-01 @ 07:00  --------------------------------------------------------  IN: 1660 mL / OUT: 1870 mL / NET: -210 mL    10-01 @ 07:01  -  10-01 @ 20:51  --------------------------------------------------------  IN: 690 mL / OUT: 655 mL / NET: 35 mL        PHYSICAL EXAM:  GENERAL: Acutely ill mlae with multisystem disease  HEAD:  Atraumatic  EYES: EOM,   ENT: No tonsillar erythema, exudates, or enlargement, moist mucous membranes, good dentition, no lesions  NECK: Supple, No JVD, normal thyroid, carotids with normal upstrokes and no bruits  CHEST/LUNG: Clear to auscultation bilaterally, No rales, rhonchi, wheezing, or rubs  HEART: Regular rate and rhythm, No murmurs, rubs, or gallops  ABDOMEN: Soft, nondistended, no masses, guarding, tenderness or rebound, bowel sounds present  EXTREMITIES:  2+ Peripheral Pulses, No clubbing, cyanosis, or edema. No arthritis of shoulders, elbows, hands, hips, knees, ankles, or feet. No DJD C spine, T spine, or L/S spine  LYMPH: No lymphadenopathy noted  SKIN: No rashes or lesions  NERVOUS SYSTEM:  Alert & confused, . Motor weakness due to cord compression    LABS:                          8.8    32.7  )-----------( 213      ( 01 Oct 2017 02:54 )             27.1     10-01    136  |  98  |  27<H>  ----------------------------<  106<H>  4.8   |  26  |  0.97  09-30    138  |  99  |  29<H>  ----------------------------<  103<H>  4.8   |  28  |  0.96  09-30    137  |  98  |  29<H>  ----------------------------<  103<H>  4.9   |  26  |  0.94    Ca    8.3<L>      01 Oct 2017 02:54  Ca    8.2<L>      30 Sep 2017 18:40  Ca    7.9<L>      30 Sep 2017 02:52  Phos  2.9     10-01  Phos  3.0     09-30  Phos  3.1     09-30  Mg     2.5     10-01  Mg     2.5     09-30  Mg     2.6     09-30    TPro  5.2<L>  /  Alb  2.3<L>  /  TBili  6.8<H>  /  DBili  x   /  AST  70<H>  /  ALT  46<H>  /  AlkPhos  121<H>  10-01  TPro  4.7<L>  /  Alb  2.2<L>  /  TBili  5.0<H>  /  DBili  x   /  AST  59<H>  /  ALT  34  /  AlkPhos  96  09-30    CAPILLARY BLOOD GLUCOSE          RADIOLOGY & ADDITIONAL TESTS:      Consultant(s):    Care Discussed with Consultants/Other Providers [ ] YES  [ ] NO

## 2017-10-01 NOTE — PROGRESS NOTE ADULT - SUBJECTIVE AND OBJECTIVE BOX
Day __7/4_ of Anesthesia Pain Management Service    SUBJECTIVE:    Pain Scale Score	At rest: ___2 	With Activity: ___ 	[ ] Refer to charted pain scores    THERAPY:    [ ] IV PCA Morphine		[ ] 5 mg/mL	[ ] 1 mg/mL  [x ] IV PCA Hydromorphone	[ ] 5 mg/mL	[ ] 1 mg/mL  [ ] IV PCA Fentanyl		[ ] 50 micrograms/mL    Demand dose  0.2  lockout  6  (minutes) Continuous Rate 0   Total:     Daily      MEDICATIONS  (STANDING):  vancomycin  IVPB 1000 milliGRAM(s) IV Intermittent every 12 hours  vancomycin  IVPB      ALBUTerol/ipratropium for Nebulization 3 milliLiter(s) Nebulizer every 6 hours  micafungin IVPB      micafungin IVPB 100 milliGRAM(s) IV Intermittent every 24 hours  HYDROmorphone PCA (1 mG/mL) 30 milliLiter(s) PCA Continuous PCA Continuous  pantoprazole  Injectable 40 milliGRAM(s) IV Push every 24 hours  enoxaparin Injectable 80 milliGRAM(s) SubCutaneous every 12 hours  meropenem IVPB      meropenem IVPB 1000 milliGRAM(s) IV Intermittent every 8 hours  dextrose 5% + sodium chloride 0.45%. 1000 milliLiter(s) (30 mL/Hr) IV Continuous <Continuous>  metoprolol Injectable 5 milliGRAM(s) IV Push every 6 hours    MEDICATIONS  (PRN):  HYDROmorphone PCA (1 mG/mL) Rescue Clinician Bolus 0.5 milliGRAM(s) IV Push every 15 minutes PRN for Pain Scale GREATER THAN 6  naloxone Injectable 0.1 milliGRAM(s) IV Push every 3 minutes PRN For ANY of the following changes in patient status:  A. RR LESS THAN 10 breaths per minute, B. Oxygen saturation LESS THAN 90%, C. Sedation score of 6  ondansetron Injectable 4 milliGRAM(s) IV Push every 6 hours PRN Nausea      OBJECTIVE:    Sedation Score:	[x ] Alert	[ ] Drowsy 	[ ] Arousable	[ ] Asleep	[ ] Unresponsive    Side Effects:	[ x] None	[ ] Nausea	[ ] Vomiting	[ ] Pruritus  		[ ] Other:    Vital Signs Last 24 Hrs  T(C): 37.1 (01 Oct 2017 03:00), Max: 37.1 (01 Oct 2017 03:00)  T(F): 98.8 (01 Oct 2017 03:00), Max: 98.8 (01 Oct 2017 03:00)  HR: 88 (01 Oct 2017 06:00) (79 - 101)  BP: 115/70 (01 Oct 2017 06:00) (99/72 - 148/70)  BP(mean): 88 (01 Oct 2017 06:00) (76 - 101)  RR: 46 (01 Oct 2017 06:00) (11 - 46)  SpO2: 95% (01 Oct 2017 06:00) (92% - 100%)    ASSESSMENT/ PLAN    Therapy to  be:	[x ] Continue   [ ] Discontinued   [ ] Change to prn Analgesics    Documentation and Verification of current medications:   [X] Done	[ ] Not done, not elligible    Comments:

## 2017-10-01 NOTE — PROGRESS NOTE ADULT - SUBJECTIVE AND OBJECTIVE BOX
Patient is a 62y old  Male who presents with a chief complaint of cord compression (19 Sep 2017 09:44)    Being followed by ID for Bowel perforation/peritonitis  62M admitted with cord compression (t9 to t11 mets with epidural extension on MRI), S/P laminectomy, diagnosed as adenocarcinoma.   ON 9/22 he was found to have small bowel perforation due to ischemia, or infection, and was taken to the or, for exlap- SBR, abd wash out, 10 cm resection and went on 24th again- abdominal washout, enteroenterostomy and abdominal closure. In SICU for management.  Has had leucocytosis and elevated fungitell. CT with lung consolidation, ascites, IVC thrombus, PE.  Covered broadly for peritonitis and fungal infection.  Abdominal fluid with E Faecium (amp resistant) and Klebsiella. On Vanco/Meropenem, micafungin.    Interval history:  No acute events      ROS:  No cough,SOB,CP  No N/V/D./abd pain  No other complaints      Antimicrobials:    vancomycin  IVPB 1000 milliGRAM(s) IV Intermittent every 12 hours  vancomycin  IVPB      micafungin IVPB      micafungin IVPB 100 milliGRAM(s) IV Intermittent every 24 hours  meropenem IVPB      meropenem IVPB 1000 milliGRAM(s) IV Intermittent every 8 hours      Vital Signs Last 24 Hrs  T(C): 37.1 (10-01-17 @ 03:00), Max: 37.1 (10-01-17 @ 03:00)  T(F): 98.8 (10-01-17 @ 03:00), Max: 98.8 (10-01-17 @ 03:00)  HR: 105 (10-01-17 @ 07:00) (79 - 105)  BP: 126/72 (10-01-17 @ 07:00) (99/72 - 148/70)  BP(mean): 94 (10-01-17 @ 07:00) (76 - 101)  RR: 50 (10-01-17 @ 07:00) (11 - 50)  SpO2: 92% (10-01-17 @ 07:00) (92% - 100%)    Physical Exam:    Constitutional well preserved,comfortable,pleasant    HEENT PERRLA EOMI,No pallor or icterus    No oral exudate or erythema    Neck supple no JVD or LN    Chest Good AE,CTA    CVS RRR S1 S2 WNl No murmur or rub or gallop    Abd soft BS normal No tenderness no masses    Ext No cyanosis clubbing or edema    IV site no erythema tenderness or discharge    Joints no swelling or LOM    CNS AAO X 3 no focal    Lab Data:                          8.8    32.7  )-----------( 213      ( 01 Oct 2017 02:54 )             27.1       10-01    136  |  98  |  27<H>  ----------------------------<  106<H>  4.8   |  26  |  0.97    Ca    8.3<L>      01 Oct 2017 02:54  Phos  2.9     10-01  Mg     2.5     10-01    TPro  5.2<L>  /  Alb  2.3<L>  /  TBili  6.8<H>  /  DBili  x   /  AST  70<H>  /  ALT  46<H>  /  AlkPhos  121<H>  10-01      Culture - Blood (09.27.17 @ 02:28)    Specimen Source: .Blood Blood-Peripheral    Culture Results:   No growth to date.    Urine Microscopic-Add On (NC) (09.29.17 @ 11:52)    Epithelial Cells: Occasional /HPF    Granular Cast: 2-5    Red Blood Cell - Urine: 5-10 /HPF    White Blood Cell - Urine: 5-10 /HPF    Culture - Blood (09.26.17 @ 15:15)    Specimen Source: .Blood Blood-Peripheral    Culture Results:   No growth to date.    Culture - Body Fluid with Gram Stain (09.23.17 @ 00:35)    -  Vancomycin: S 1    -  Tetra/Doxy: R >8    -  Tobramycin: S <=4    -  Trimethoprim/Sulfamethoxazole: S <=2/38    -  Erythromycin: I 4    -  Gentamicin: S <=4    -  Imipenem: S <=1    -  Levofloxacin: S <=2    -  Meropenem: S <=1    -  Piperacillin/Tazobactam: S <=16    -  Ciprofloxacin: I 2    -  Ciprofloxacin: S <=1    -  Ertapenem: S <=1    -  Ampicillin: R >8    -  Ampicillin: R >16    -  Ampicillin/Sulbactam: S <=8/4    -  Aztreonam: S <=4    -  Cefazolin: S <=8    -  Cefepime: S <=4    -  Cefoxitin: S <=8    -  Ceftazidime: S <=1    -  Ceftriaxone: S <=1    Gram Stain:   polymorphonuclear leukocytes seen  Gram Positive Cocci in Pairs and Chains seen  Gram Variable Rods seen  by cytocentrifuge    -  Amikacin: S <=16    Specimen Source: Peritoneal 2 peritoneal fluid for culture    Culture Results:   Numerous Enterococcus faecium  Few Candida albicans  Growth in fluid media only Klebsiella pneumoniae    Organism Identification: Enterococcus faecium  Klebsiella pneumoniae    Organism: Enterococcus faecium    Organism: Klebsiella pneumoniae    Method Type: CARLY    Method Type: CARLY  < from: Xray Chest 1 View AP -PORTABLE-Routine (09.30.17 @ 06:48) >  EXAM:  CHEST PORTABLE ROUTINE                            PROCEDURE DATE:  09/30/2017            INTERPRETATION:  CLINICAL INFORMATION: Patient is previously noted left   lung mass. Shortness of breath.    EXAM: AP view of the chest  obtained 9/30/2017 at 6:50 AM    COMPARISON: Radiograph from  9/29/2017 at 7:06 AM    FINDINGS:  Low lung volumes.  The cardiomediastinal silhouette size cannot be accurately assessed on   this radiograph.  Left-sided IJ catheter is present with tip at the level of the SVC.  Multiple surgical staples overlie the central mediastinum.   A partially visualized right-sided anatomic shoulder replacement is in   unchanged position from prior examination.  Retrocardiac opacity suggestive of small pleural effusion and/or   atelectasis.  There is no pneumothorax.        IMPRESSION:    Retrocardiac opacity suggestive of small pleural effusion and/or   atelectasis.  The right lung is clear.      XAM:  CT ABDOMEN AND PELVIS OC IC                          EXAM:  CT CHEST IC                            PROCEDURE DATE:  09/28/2017            INTERPRETATION:  CLINICAL INFORMATION: Metastatic disease. Small bowel   perforation status post resection with persistent leukocytosis. Evaluate   for abdominal abscess.     < end of copied text >  IMPRESSION:     Moderate volume ascites with pelvic peritoneal inflammation. No evidence   of discrete abscess.    Progression of wedge-shaped areas of decreased perfusion within the   kidneys which may be related to infarct or infection.    Thrombus within the right renal vein, with new extension into the   inferior vena cava.    Left lower lobe consolidation with occlusion of the left lower lobe   airways compatible with patient's known malignancy.    Previously noted right upper lobe pulmonary emboli are poorly visualized   on the current examination. No new emboli.    Additional metastatic disease in the chest, abdomen and pelvis without   significant change from recent prior imaging.        Vancomycin Level, Trough: 12.8 ug/mL (09-30-17 @ 17:48) Patient is a 62y old  Male who presents with a chief complaint of cord compression (19 Sep 2017 09:44)    Being followed by ID for Bowel perforation/peritonitis  62M admitted with cord compression (t9 to t11 mets with epidural extension on MRI), S/P laminectomy, diagnosed as adenocarcinoma.   ON 9/22 he was found to have small bowel perforation due to ischemia, or infection, and was taken to the or, for exlap- SBR, abd wash out, 10 cm resection and went on 24th again- abdominal washout, enteroenterostomy and abdominal closure. In SICU for management.  Has had leucocytosis and elevated fungitell. CT with lung consolidation, ascites, IVC thrombus, PE.  Covered broadly for peritonitis and fungal infection.  Abdominal fluid with E Faecium (amp resistant) and Klebsiella. On Vanco/Meropenem, micafungin.    Interval history:  No acute events      ROS:  C/O dry mouth  Abdominal pain (on PCA)      Antimicrobials:    vancomycin  IVPB 1000 milliGRAM(s) IV Intermittent every 12 hours  vancomycin  IVPB      micafungin IVPB      micafungin IVPB 100 milliGRAM(s) IV Intermittent every 24 hours  meropenem IVPB      meropenem IVPB 1000 milliGRAM(s) IV Intermittent every 8 hours      Vital Signs Last 24 Hrs  T(C): 37.1 (10-01-17 @ 03:00), Max: 37.1 (10-01-17 @ 03:00)  T(F): 98.8 (10-01-17 @ 03:00), Max: 98.8 (10-01-17 @ 03:00)  HR: 105 (10-01-17 @ 07:00) (79 - 105)  BP: 126/72 (10-01-17 @ 07:00) (99/72 - 148/70)  BP(mean): 94 (10-01-17 @ 07:00) (76 - 101)  RR: 50 (10-01-17 @ 07:00) (11 - 50)  SpO2: 92% (10-01-17 @ 07:00) (92% - 100%)    Physical Exam:    Constitutional agitated, using mouth swabs, C/O thirst  On NC O2    HEENT PERRLA EOMI,No pallor or icterus    No oral exudate or erythema    Neck supple no JVD or LN    Chest Decreased BS at base    CVS RRR S1 S2 WNl No murmur or rub or gallop    Abd Tender with RLQ surgical wound, staples in place no drainage.    Ext No cyanosis clubbing or edema    IV site no erythema tenderness or discharge    CNS AAO     Lab Data:                          8.8    32.7  )-----------( 213      ( 01 Oct 2017 02:54 )             27.1       10-01    136  |  98  |  27<H>  ----------------------------<  106<H>  4.8   |  26  |  0.97    Ca    8.3<L>      01 Oct 2017 02:54  Phos  2.9     10-01  Mg     2.5     10-01    TPro  5.2<L>  /  Alb  2.3<L>  /  TBili  6.8<H>  /  DBili  x   /  AST  70<H>  /  ALT  46<H>  /  AlkPhos  121<H>  10-01      Culture - Blood (09.27.17 @ 02:28)    Specimen Source: .Blood Blood-Peripheral    Culture Results:   No growth to date.    Urine Microscopic-Add On (NC) (09.29.17 @ 11:52)    Epithelial Cells: Occasional /HPF    Granular Cast: 2-5    Red Blood Cell - Urine: 5-10 /HPF    White Blood Cell - Urine: 5-10 /HPF    Culture - Blood (09.26.17 @ 15:15)    Specimen Source: .Blood Blood-Peripheral    Culture Results:   No growth to date.    Culture - Body Fluid with Gram Stain (09.23.17 @ 00:35)    -  Vancomycin: S 1    -  Tetra/Doxy: R >8    -  Tobramycin: S <=4    -  Trimethoprim/Sulfamethoxazole: S <=2/38    -  Erythromycin: I 4    -  Gentamicin: S <=4    -  Imipenem: S <=1    -  Levofloxacin: S <=2    -  Meropenem: S <=1    -  Piperacillin/Tazobactam: S <=16    -  Ciprofloxacin: I 2    -  Ciprofloxacin: S <=1    -  Ertapenem: S <=1    -  Ampicillin: R >8    -  Ampicillin: R >16    -  Ampicillin/Sulbactam: S <=8/4    -  Aztreonam: S <=4    -  Cefazolin: S <=8    -  Cefepime: S <=4    -  Cefoxitin: S <=8    -  Ceftazidime: S <=1    -  Ceftriaxone: S <=1    Gram Stain:   polymorphonuclear leukocytes seen  Gram Positive Cocci in Pairs and Chains seen  Gram Variable Rods seen  by cytocentrifuge    -  Amikacin: S <=16    Specimen Source: Peritoneal 2 peritoneal fluid for culture    Culture Results:   Numerous Enterococcus faecium  Few Candida albicans  Growth in fluid media only Klebsiella pneumoniae    Organism Identification: Enterococcus faecium  Klebsiella pneumoniae    Organism: Enterococcus faecium    Organism: Klebsiella pneumoniae    Method Type: CARLY    Method Type: CARLY  < from: Xray Chest 1 View AP -PORTABLE-Routine (09.30.17 @ 06:48) >  EXAM:  CHEST PORTABLE ROUTINE                            PROCEDURE DATE:  09/30/2017            INTERPRETATION:  CLINICAL INFORMATION: Patient is previously noted left   lung mass. Shortness of breath.    EXAM: AP view of the chest  obtained 9/30/2017 at 6:50 AM    COMPARISON: Radiograph from  9/29/2017 at 7:06 AM    FINDINGS:  Low lung volumes.  The cardiomediastinal silhouette size cannot be accurately assessed on   this radiograph.  Left-sided IJ catheter is present with tip at the level of the SVC.  Multiple surgical staples overlie the central mediastinum.   A partially visualized right-sided anatomic shoulder replacement is in   unchanged position from prior examination.  Retrocardiac opacity suggestive of small pleural effusion and/or   atelectasis.  There is no pneumothorax.        IMPRESSION:    Retrocardiac opacity suggestive of small pleural effusion and/or   atelectasis.  The right lung is clear.      XAM:  CT ABDOMEN AND PELVIS OC IC                          EXAM:  CT CHEST IC                            PROCEDURE DATE:  09/28/2017            INTERPRETATION:  CLINICAL INFORMATION: Metastatic disease. Small bowel   perforation status post resection with persistent leukocytosis. Evaluate   for abdominal abscess.     < end of copied text >  IMPRESSION:     Moderate volume ascites with pelvic peritoneal inflammation. No evidence   of discrete abscess.    Progression of wedge-shaped areas of decreased perfusion within the   kidneys which may be related to infarct or infection.    Thrombus within the right renal vein, with new extension into the   inferior vena cava.    Left lower lobe consolidation with occlusion of the left lower lobe   airways compatible with patient's known malignancy.    Previously noted right upper lobe pulmonary emboli are poorly visualized   on the current examination. No new emboli.    Additional metastatic disease in the chest, abdomen and pelvis without   significant change from recent prior imaging.        Vancomycin Level, Trough: 12.8 ug/mL (09-30-17 @ 17:48)

## 2017-10-01 NOTE — PROGRESS NOTE ADULT - ASSESSMENT
2-year-old male with newly diagnosed metastatic disease s/p T8-T11 laminectomies for resection of dorsal epidural tumor for spinal cord compression on 9/19/17. His hospital course was complicated by a right upper lobe pulmonary embolus, B/L lower extremity DVTs, and a bowel perforation, requiring two surgeries for repair with General Surgery.  - PT evaluation  - Will likely need spinal cord rehab post hospitalization  - removed HM[facility]  - Neurochecks q4hrs

## 2017-10-01 NOTE — PROGRESS NOTE ADULT - SUBJECTIVE AND OBJECTIVE BOX
Patient is a 62y old  Male who presents with a chief complaint of cord compression (19 Sep 2017 09:44)      Vascular Surgery Attending Progress Note    Interval HPI:     Medications:  vancomycin  IVPB 1000 milliGRAM(s) IV Intermittent every 12 hours  vancomycin  IVPB      ALBUTerol/ipratropium for Nebulization 3 milliLiter(s) Nebulizer every 6 hours  micafungin IVPB      micafungin IVPB 100 milliGRAM(s) IV Intermittent every 24 hours  pantoprazole  Injectable 40 milliGRAM(s) IV Push every 24 hours  meropenem IVPB      meropenem IVPB 1000 milliGRAM(s) IV Intermittent every 8 hours  dextrose 5% + sodium chloride 0.45%. 1000 milliLiter(s) IV Continuous <Continuous>  metoprolol Injectable 5 milliGRAM(s) IV Push every 6 hours  enoxaparin Injectable 90 milliGRAM(s) SubCutaneous every 12 hours  acetaminophen  IVPB. 1000 milliGRAM(s) IV Intermittent once  HYDROmorphone  Injectable 0.5 milliGRAM(s) IV Push every 3 hours PRN      Vital Signs Last 24 Hrs  T(C): 36.8 (01 Oct 2017 15:00), Max: 37.3 (01 Oct 2017 11:00)  T(F): 98.2 (01 Oct 2017 15:00), Max: 99.1 (01 Oct 2017 11:00)  HR: 88 (01 Oct 2017 17:00) (81 - 110)  BP: 133/71 (01 Oct 2017 17:00) (99/72 - 151/79)  BP(mean): 94 (01 Oct 2017 17:00) (79 - 108)  RR: 22 (01 Oct 2017 17:00) (17 - 50)  SpO2: 99% (01 Oct 2017 17:00) (92% - 100%)  I&O's Summary    30 Sep 2017 07:01  -  01 Oct 2017 07:00  --------------------------------------------------------  IN: 1660 mL / OUT: 1870 mL / NET: -210 mL    01 Oct 2017 07:01  -  01 Oct 2017 17:58  --------------------------------------------------------  IN: 630 mL / OUT: 465 mL / NET: 165 mL        Physical Exam:  Neuro  A&Ox3 VSS  CV:   Lungs:  Vascular:    Pulses  femoral   rt    +12       lt   +12                 popliteal  rt   +12        lt  +12                DPA          rt   +12        lt  +12                PTA          rt   +12        lt  +12   Extremity:   Musculoskeletal:    LABS:                        8.8    32.7  )-----------( 213      ( 01 Oct 2017 02:54 )             27.1     10-01    136  |  98  |  27<H>  ----------------------------<  106<H>  4.8   |  26  |  0.97    Ca    8.3<L>      01 Oct 2017 02:54  Phos  2.9     10-01  Mg     2.5     10-01    TPro  5.2<L>  /  Alb  2.3<L>  /  TBili  6.8<H>  /  DBili  x   /  AST  70<H>  /  ALT  46<H>  /  AlkPhos  121<H>  10-01    PT/INR - ( 01 Oct 2017 02:54 )   PT: 16.1 sec;   INR: 1.47 ratio         PTT - ( 01 Oct 2017 02:54 )  PTT:29.8 sec    TABITHA CAMARILLO MD  [phone] Patient is a 62y old  Male who presents with a chief complaint of cord compression (19 Sep 2017 09:44)      Vascular Surgery Attending Progress Note    Interval HPI: pt w/o c/o    Medications:  vancomycin  IVPB 1000 milliGRAM(s) IV Intermittent every 12 hours  vancomycin  IVPB      ALBUTerol/ipratropium for Nebulization 3 milliLiter(s) Nebulizer every 6 hours  micafungin IVPB      micafungin IVPB 100 milliGRAM(s) IV Intermittent every 24 hours  pantoprazole  Injectable 40 milliGRAM(s) IV Push every 24 hours  meropenem IVPB      meropenem IVPB 1000 milliGRAM(s) IV Intermittent every 8 hours  dextrose 5% + sodium chloride 0.45%. 1000 milliLiter(s) IV Continuous <Continuous>  metoprolol Injectable 5 milliGRAM(s) IV Push every 6 hours  enoxaparin Injectable 90 milliGRAM(s) SubCutaneous every 12 hours  acetaminophen  IVPB. 1000 milliGRAM(s) IV Intermittent once  HYDROmorphone  Injectable 0.5 milliGRAM(s) IV Push every 3 hours PRN      Vital Signs Last 24 Hrs  T(C): 36.8 (01 Oct 2017 15:00), Max: 37.3 (01 Oct 2017 11:00)  T(F): 98.2 (01 Oct 2017 15:00), Max: 99.1 (01 Oct 2017 11:00)  HR: 88 (01 Oct 2017 17:00) (81 - 110)  BP: 133/71 (01 Oct 2017 17:00) (99/72 - 151/79)  BP(mean): 94 (01 Oct 2017 17:00) (79 - 108)  RR: 22 (01 Oct 2017 17:00) (17 - 50)  SpO2: 99% (01 Oct 2017 17:00) (92% - 100%)  I&O's Summary    30 Sep 2017 07:01  -  01 Oct 2017 07:00  --------------------------------------------------------  IN: 1660 mL / OUT: 1870 mL / NET: -210 mL    01 Oct 2017 07:01  -  01 Oct 2017 17:58  --------------------------------------------------------  IN: 630 mL / OUT: 465 mL / NET: 165 mL        Physical Exam:  Neuro  A&Ox2 VSS  Abd soft  Vascular:  stable    LABS:                        8.8    32.7  )-----------( 213      ( 01 Oct 2017 02:54 )             27.1     10-01    136  |  98  |  27<H>  ----------------------------<  106<H>  4.8   |  26  |  0.97    Ca    8.3<L>      01 Oct 2017 02:54  Phos  2.9     10-01  Mg     2.5     10-01    TPro  5.2<L>  /  Alb  2.3<L>  /  TBili  6.8<H>  /  DBili  x   /  AST  70<H>  /  ALT  46<H>  /  AlkPhos  121<H>  10-01    PT/INR - ( 01 Oct 2017 02:54 )   PT: 16.1 sec;   INR: 1.47 ratio         PTT - ( 01 Oct 2017 02:54 )  PTT:29.8 sec    TABITHA CAMARILLO MD  [phone]

## 2017-10-01 NOTE — PROGRESS NOTE ADULT - SUBJECTIVE AND OBJECTIVE BOX
Follow-up Pulm Progress Note    Wore bipap x4hrs last night  Now on Hi Vineet 50L/min  Comfortable in bed, R side down  Minimally productive cough    Medications:  MEDICATIONS  (STANDING):  vancomycin  IVPB 1000 milliGRAM(s) IV Intermittent every 12 hours  vancomycin  IVPB      ALBUTerol/ipratropium for Nebulization 3 milliLiter(s) Nebulizer every 6 hours  micafungin IVPB      micafungin IVPB 100 milliGRAM(s) IV Intermittent every 24 hours  pantoprazole  Injectable 40 milliGRAM(s) IV Push every 24 hours  meropenem IVPB      meropenem IVPB 1000 milliGRAM(s) IV Intermittent every 8 hours  dextrose 5% + sodium chloride 0.45%. 1000 milliLiter(s) (30 mL/Hr) IV Continuous <Continuous>  metoprolol Injectable 5 milliGRAM(s) IV Push every 6 hours  enoxaparin Injectable 90 milliGRAM(s) SubCutaneous every 12 hours  acetaminophen  IVPB. 1000 milliGRAM(s) IV Intermittent once    MEDICATIONS  (PRN):  HYDROmorphone  Injectable 0.5 milliGRAM(s) IV Push every 3 hours PRN Severe Pain (7 - 10)          Vital Signs Last 24 Hrs  T(C): 36.8 (01 Oct 2017 15:00), Max: 37.3 (01 Oct 2017 11:00)  T(F): 98.2 (01 Oct 2017 15:00), Max: 99.1 (01 Oct 2017 11:00)  HR: 88 (01 Oct 2017 17:00) (81 - 110)  BP: 133/71 (01 Oct 2017 17:00) (99/72 - 151/79)  BP(mean): 94 (01 Oct 2017 17:00) (79 - 108)  RR: 22 (01 Oct 2017 17:00) (17 - 50)  SpO2: 99% (01 Oct 2017 17:00) (92% - 100%) on Hi Vineet 50L/min 40%    ABG - ( 01 Oct 2017 08:21 )  pH: 7.50  /  pCO2: 31    /  pO2: 48    / HCO3: 24    / Base Excess: 1.6   /  SaO2: 84                VBG pH 7.41 10-01 @ 06:52    VBG pCO2 47 10-01 @ 06:52    VBG O2 sat 32 10-01 @ 06:52    VBG lactate 2.7 10-01 @ 06:52  VBG pH 7.37 10-01 @ 02:47    VBG pCO2 52 10-01 @ 02:47    VBG O2 sat 67 10-01 @ 02:47    VBG lactate 1.5 10-01 @ 02:47      09-30 @ 07:01  -  10-01 @ 07:00  --------------------------------------------------------  IN: 1660 mL / OUT: 1870 mL / NET: -210 mL          LABS:                        8.8    32.7  )-----------( 213      ( 01 Oct 2017 02:54 )             27.1     10-01    136  |  98  |  27<H>  ----------------------------<  106<H>  4.8   |  26  |  0.97    Ca    8.3<L>      01 Oct 2017 02:54  Phos  2.9     10-01  Mg     2.5     10-01    TPro  5.2<L>  /  Alb  2.3<L>  /  TBili  6.8<H>  /  DBili  x   /  AST  70<H>  /  ALT  46<H>  /  AlkPhos  121<H>  10-01          CAPILLARY BLOOD GLUCOSE        PT/INR - ( 01 Oct 2017 02:54 )   PT: 16.1 sec;   INR: 1.47 ratio         PTT - ( 01 Oct 2017 02:54 )  PTT:29.8 sec    Procalcitonin, Serum: 2.41 ng/mL (29 Sep 2017 03:31)    Serum Pro-Brain Natriuretic Peptide: 1037 pg/mL (29 Sep 2017 02:57)            Physical Examination:  PULM: Decreased BS L base  CVS: S1, S2 RRR    RADIOLOGY REVIEWED  CXR: LLL collapse with effusion

## 2017-10-01 NOTE — PROGRESS NOTE ADULT - ASSESSMENT
62M admitted with cord compression (t9 to t11 mets with epidural extension on MRI), S/P laminectomy, diagnosed as adenocarcinoma.   ON 9/22 he was found to have small bowel perforation due to ischemia, or infection, and was taken to the or, for exlap- SBR, abd wash out, 10 cm resection and went on 24th again- abdominal washout, enteroenterostomy and abdominal closure. In SICU for management.  Has had leucocytosis and elevated fungitell. CT with lung consolidation, ascites, IVC thrombus, PE.  Covered broadly for peritonitis and fungal infection.  Abdominal fluid with E Faecium (amp resistant) and Klebsiella. On Vanco/Meropenem, micafungin.  Continue Vancomycin (for E Faecium) Meropenem (Fro Klebsiella and inter-abdominal/lung infection), and Micafungin for candida   cont vancomycin, for enterococcus,     Elan Crowder MD  pager [phone]  office [phone]

## 2017-10-01 NOTE — PROGRESS NOTE ADULT - ASSESSMENT
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 Complicated by acute hypoxemic respiratory failure requiring CPAP.     PLAN:  Neurologic: post-operative pain  -Will continue with Dilaudid PCA  - Would continue IV Tylenol 1000mg q 6hours x 24hours    Respiratory: Acute hypoxemic respiratory failure, improving  -Will administer supplemental O2 to maintain SpO2 > 88%  -Duonebs/ Mucomyst  -PT/ OOB  -Nocturnal Bipap    Cardiovascular: Hypertension  -Decrease Metoprolol to 5mg IV q6hrs    Gastrointestinal/Nutrition: small bowel perforation s/p SBR, anastomosis   -NPO  -Await GI function  -Protonix 40mg daily    Renal/Genitourinary: ALEC, resolving  - Decrease  D5 1/2NS to 30mL/hr  - Lasix 10mg IV x 1   -d/c michelle     Hematologic: b/l DVT; b/l PE  -Continue with therapeutic lovenox  -f/u Heme/Onc    Infectious Disease:Secondary Peritonitis 2/2 to Vanco sensitive E. 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 Complicated by acute hypoxemic respiratory failure requiring CPAP.     PLAN:  Neurologic: post-operative pain  -Will continue with Dilaudid PCA    Respiratory: Acute hypoxemic respiratory failure, improving  -Will administer supplemental O2 to maintain SpO2 > 88%  -Duonebs/ Mucomyst  -PT/ OOB  -Nocturnal Bipap (18/15, FiO2 50%)    Cardiovascular: Hypertension  -Continue metoprolol 5mg IV q6hrs    Gastrointestinal/Nutrition: small bowel perforation s/p SBR, anastomosis   -NPO  -Await GI function  -Protonix 40mg daily    Renal/Genitourinary: ALEC, resolving  -Continue D5 1/2NS to 30mL/hr  -Continue to monitor urine output and BUN/Cr    Hematologic: b/l DVT; b/l PE  -Continue with therapeutic lovenox  -f/u Heme/Onc    Infectious Disease:Secondary Peritonitis 2/2 to Vanco sensitive E. Faecium, Klebsiella, Candida Albicans; leukocytosis  -Continue Vanco, Meropenem, Micafungin  -f/u ID    Disposition: SICU, palliative consult

## 2017-10-01 NOTE — PROGRESS NOTE ADULT - ATTENDING COMMENTS
Pt seen and examined.  Chart reviewed.  Resident note confirmed.  Pt is a 62 year old male admitted to neurosurgery for bilateral leg weakness found to have newly diagnosed metastatic disease.  Pt is s/p T8-T11 laminectomy with subsequent deterioration in respiratory status/acute onset abdominal pain.  Work up revealed bilateral DVTs, right upper lobe PE, and small intestinal perforation.  Pt is s/p Exploratory laparotomy, SBR x1 (left in discontinuity), abdominal washout, abthera VAC application (9/22) with intraop findings of an intestinal perforation and a large amount of succus in the abdomen. Pt s/p return to OR on 9/24 for intestinal reconstruction and abdominal closure.  No acute events overnight.  Continue pain control.  Echocardiogram is without evidence of right heart strain.  He remains extubated  His mental and respiratory status continues to deteriorate. He has been afebrile.    Current management includes:    1) Left sided lung collapse and atelectasis-improved; patient has been on nasal cannula but does appear to have hypoxic episodes as he continues to intermittently collapse his left lung     CXR- with left sided increasing pleural effusion    -continue incentive spirometer and acapella and chest PT    -keep SpO2 < 92%      2) leukocytosis – [id number]     - E faecalis and Candida albicans cells from intraoperative peritoneal fluid  -will continue zosyn, vancomycin and micafungin   -fungitell >500  -blood cultures NGTD, urinalysis negative    3) Right segmental and subsegmental right upper lobe PE-continue lovenox    4) Keep NPO with IVF at 30 cc/hour; awaiting bowel function.  Rectal exam today    5) hyperbilirubinemia- continues to be elevated; this is likely due to progression of his malignancy   -Oncology will discuss prognosis with family this week.   The patient continues to remain in critical condition.

## 2017-10-01 NOTE — PROGRESS NOTE ADULT - ASSESSMENT
ThisHPI: 62y male admitted to neurosurgery for bilateral leg weakness found to have newly diagnosed metastatic disease s/p T8-T11 laminectomy presented today with deterioration in respiratory status and acute onset abdominal pain who was subsequently intubated and w/u revealed b/l DVTs, right upper lobe PE, and a bowel perforation.  Pt is now s/p Exploratory laparotomy #1, SBR x1 (left in discontinuity), abdominal washout, abthera VAC application (9/22) with intraop findings of d a large amount of succus as well as lettuce in the abdomen along with a 1cm perforation in the small bowel 110 cm distal to the ligament of treitz. )(/24/17 Exploratory OR #2 with abdominal irrigation and enterostomy. Right leg motor function being monitored by neurosurgery after emergency admission spinal cord decompression of metastatic tumor T8-!1. Lung cancer pathology is non small cell.  DVT/ PE  needs to be anticoagulated with clot going up into the right renal vein thrombosis.  Patient upset and not wishing to talk about prognosis.

## 2017-10-01 NOTE — PROGRESS NOTE ADULT - ASSESSMENT
A/P: 62M admitted to neurosurgery for bilateral leg weakness found to have newly diagnosed metastatic disease s/p T8-T11 laminectomy presented today with deterioration in respiratory status and acute onset abdominal pain who was subsequently intubated. W/u revealed b/l DVTs, right upper lobe PE, and a bowel perforation.  Pt is now s/p Exploratory laparotomy, SBR x1 (left in discontinuity), abdominal washout, abthera VAC application (9/22); 1cm perforation in the small bowel 110 cm distal to the ligament of treitz. RTOR 9/24 for intestinal reconstruction and abdominal closure. Awaiting ROBF. 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 Most likely noninfected per vascular surgery    - f/u palliative care/family meeting  - Wean 02 as tolerated, recommend diuresis as appropriate  - Leukocytosis; f/u Fungitell +, c/w vanc as per ID  - NPO/IVF; awaiting GI fxn  - Pain control  - DVT ppx: T lovenox  - Continue acute care per SICU    Norbert Robles DDS, MD

## 2017-10-01 NOTE — PROGRESS NOTE ADULT - SUBJECTIVE AND OBJECTIVE BOX
Visit Summary: Patient seen and evaluated at bedside. Patient had no complaints    Overnight Events: None    Exam:  T(C): 36.8 (09-30-17 @ 11:00), Max: 37 (09-29-14417 @ 15:00)  HR: 84 (09-30-17 @ 12:00) (76 - 93)  BP: 109/64 (09-30-17 @ 12:00) (100/60 - 135/72)  RR: 18 (09-30-17 @ 12:00) (15 - 31)  SpO2: 100% (09-30-17 @ 12:00) (91% - 100%)  Wt(kg): --    Constitutional: No Acute Distress     Neurological: AOx3, Following Commands    Motor exam:          Upper extremity                         Delt     Bicep     Tricep    HG                                                 R         5/5        5/5        5/5       5/5                                               L          5/5        5/5        5/5       5/5          Lower extremity                        HF         KF        KE       DF         PF                                                  R        0/5        0/5        0/5      0/5        0/5                                               L         1/5        0/5       0/5       2/5        2/5                                                 Sensation: [x] intact to light touch  [] decreased:     No clonus    Incision was clean dry and intact            HMV removed

## 2017-10-01 NOTE — PROGRESS NOTE ADULT - SUBJECTIVE AND OBJECTIVE BOX
HISTORY  62y Male initially admitted to neurosurgery for b/l leg weakness found to have newly diagnosed metastatic disease with spinal met causing spinal cord compression s/p T8-T11 laminectomy on 9/20, post-op course complicated by deterioration in respiratory status and acute onset abdominal pain requiring intubation and transfer to NSCU. CT chest/abdomen/pelvis demonstrated b/l DVTs, right upper lobe PE, and a bowel perforation with free air. Pt was taken emergently to the OR and is now s/p exploratory laparotomy, SBR x1 (left in discontinuity), abdominal washout, abthera VAC application on 9/22, after which he was transferred to SICU under ACS. He was taken back to the OR on 9/24 for a washout, primary anastomosis, and abdominal closure. Post-operative course complicated by Acute hypoxemic respiratory failure secondary to Acute Pulmonary Vascular congestion/ Left Lung Collapse.    24 HOUR EVENTS: Yesterday, NGT d/mark; Heparin gtt d/mark and transitioned to therapeutic lovenox. Switched to meropenem from zosyn per ID. Vascular consult called for right renal vein thrombus. Refused BiPAP overnight.    SUBJECTIVE/ROS:  [X] A ten-point review of systems was otherwise negative except as noted.  [ ] Due to altered mental status/intubation, subjective information were not able to be obtained from the patient. History was obtained, to the extent possible, from review of the chart and collateral sources of information.    NEURO  Exam: awake, alert, oriented  Meds: HYDROmorphone PCA (1 mG/mL) 30 milliLiter(s) PCA Continuous PCA Continuous  HYDROmorphone PCA (1 mG/mL) Rescue Clinician Bolus 0.5 milliGRAM(s) IV Push every 15 minutes PRN for Pain Scale GREATER THAN 6  ondansetron Injectable 4 milliGRAM(s) IV Push every 6 hours PRN Nausea    [x] Adequacy of sedation and pain control has been assessed and adjusted    RESPIRATORY  RR: 30 (09-30-17 @ 07:00) (15 - 31)  SpO2: 96% (09-30-17 @ 07:00) (89% - 100%)  Wt(kg): --  Exam: unlabored, Coarse breath sounds bilaterally, decreased at left base  Mechanical Ventilation:     [N/A] Extubation Readiness Assessed  Meds: ALBUTerol/ipratropium for Nebulization 3 milliLiter(s) Nebulizer every 6 hours      CARDIOVASCULAR  HR: 84 (09-30-17 @ 07:00) (72 - 93)  BP: 116/67 (09-30-17 @ 07:00) (100/60 - 135/72)  BP(mean): 86 (09-30-17 @ 07:00) (74 - 109)  ABP: --  ABP(mean): --  Wt(kg): --  CVP(cm H2O): --      Exam: regular rate and rhythm  Cardiac Rhythm: sinus  Perfusion     [x]Adequate   [ ]Inadequate  Mentation   [x]Normal       [ ]Reduced  Extremities  [x]Warm         [ ]Cool    09-29 @ 07:01  -  09-30 @ 07:00  --------------------------------------------------------  IN: 2180 mL / OUT: 1460 mL / NET: 720 mL      Volume Status [ ]Hypervolemic [x]Euvolemic [ ]Hypovolemic  Meds: metoprolol Injectable 5 milliGRAM(s) IV Push every 4 hours      GI/NUTRITION  Exam: soft, nontender, nondistended, incision C/D/I  Diet:  Meds: pantoprazole  Injectable 40 milliGRAM(s) IV Push every 24 hours      GENITOURINARY  I&O's Detail    09-29 @ 07:01  -  09-30 @ 07:00  --------------------------------------------------------  IN:    dextrose 5% + sodium chloride 0.45%.: 1150 mL    heparin Infusion: 30 mL    IV PiggyBack: 650 mL    Solution: 350 mL  Total IN: 2180 mL    OUT:    Accordian: 115 mL    Indwelling Catheter - Urethral: 1345 mL  Total OUT: 1460 mL    Total NET: 720 mL    09-30    137  |  98  |  29<H>  ----------------------------<  103<H>  4.9   |  26  |  0.94    Ca    7.9<L>      30 Sep 2017 02:52  Phos  3.1     09-30  Mg     2.6     09-30    TPro  4.7<L>  /  Alb  2.2<L>  /  TBili  5.0<H>  /  DBili  x   /  AST  59<H>  /  ALT  34  /  AlkPhos  96  09-30    [ ] Torres catheter, indication: N/A  Meds: dextrose 5% + sodium chloride 0.45%. 1000 milliLiter(s) IV Continuous <Continuous>      HEMATOLOGIC  Meds: enoxaparin Injectable 80 milliGRAM(s) SubCutaneous every 12 hours    [x] VTE Prophylaxis                        8.3    29.2  )-----------( 176      ( 30 Sep 2017 03:07 )             25.7     PT/INR - ( 30 Sep 2017 02:52 )   PT: 15.7 sec;   INR: 1.44 ratio         PTT - ( 30 Sep 2017 02:52 )  PTT:27.4 sec  Transfusion     [ ] PRBC   [ ] Platelets   [ ] FFP   [ ] Cryoprecipitate    INFECTIOUS DISEASES  WBC Count: 29.2 K/uL (09-30 @ 03:07)    RECENT CULTURES:  BCx 9/27 negative  BCx 9/26 negative     Meds: vancomycin  IVPB 1000 milliGRAM(s) IV Intermittent every 12 hours  vancomycin  IVPB      micafungin IVPB      micafungin IVPB 100 milliGRAM(s) IV Intermittent every 24 hours  meropenem IVPB      meropenem IVPB 1000 milliGRAM(s) IV Intermittent every 8 hours      ENDOCRINE  CAPILLARY BLOOD GLUCOSE  121 (29 Sep 2017 16:00)      Meds:     ACCESS DEVICES:  [ ] Peripheral IV  [X] Central Venous Line	[ ] R	[x ] L	[ x] IJ	[ ] Fem	[ ] SC	Placed:   [ ] Arterial Line		[ ] R	[ ] L	[ ] Fem	[ ] Rad	[ ] Ax	Placed:   [ ] PICC:					[ ] Mediport  [ ] Urinary Catheter, Date Placed:   [x] Necessity of urinary, arterial, and venous catheters discussed    OTHER MEDICATIONS:  naloxone Injectable 0.1 milliGRAM(s) IV Push every 3 minutes PRN      CODE STATUS: Full Code      IMAGING: HISTORY      24 HOUR EVENTS:    SUBJECTIVE/ROS:  [ ] A ten-point review of systems was otherwise negative except as noted.  [ ] Due to altered mental status/intubation, subjective information were not able to be obtained from the patient. History was obtained, to the extent possible, from review of the chart and collateral sources of information.      NEURO  RASS:     GCS:     [facility] ICU:  Exam:   Meds: HYDROmorphone PCA (1 mG/mL) 30 milliLiter(s) PCA Continuous PCA Continuous  HYDROmorphone PCA (1 mG/mL) Rescue Clinician Bolus 0.5 milliGRAM(s) IV Push every 15 minutes PRN for Pain Scale GREATER THAN 6  ondansetron Injectable 4 milliGRAM(s) IV Push every 6 hours PRN Nausea    [x] Adequacy of sedation and pain control has been assessed and adjusted      RESPIRATORY  RR: 24 (10-01-17 @ 00:00) (11 - 35)  SpO2: 100% (10-01-17 @ 00:04) (91% - 100%)  Wt(kg): --  Exam: unlabored, clear to auscultation bilaterally  Mechanical Ventilation:   ABG - ( 30 Sep 2017 15:40 )  pH: 7.41  /  pCO2: 46    /  pO2: 128   / HCO3: 29    / Base Excess: 4.3   /  SaO2: 99      Lactate: x                [ ] Extubation Readiness Assessed  Meds: ALBUTerol/ipratropium for Nebulization 3 milliLiter(s) Nebulizer every 6 hours        CARDIOVASCULAR  HR: 88 (10-01-17 @ 00:04) (77 - 94)  BP: 99/72 (10-01-17 @ 00:00) (99/72 - 148/70)  BP(mean): 81 (10-01-17 @ 00:00) (76 - 101)  ABP: --  ABP(mean): --  Wt(kg): --  CVP(cm H2O): --      Exam:  Cardiac Rhythm:  Perfusion     [ ]Adequate   [ ]Inadequate  Mentation   [ ]Normal       [ ]Reduced  Extremities  [ ]Warm         [ ]Cool  Volume Status [ ]Hypervolemic [ ]Euvolemic [ ]Hypovolemic  Meds: metoprolol Injectable 5 milliGRAM(s) IV Push every 6 hours        GI/NUTRITION  Exam:  Diet:  Meds: pantoprazole  Injectable 40 milliGRAM(s) IV Push every 24 hours      GENITOURINARY  I&O's Detail    09-29 @ 07:01 - 09-30 @ 07:00  --------------------------------------------------------  IN:    dextrose 5% + sodium chloride 0.45%.: 1150 mL    heparin Infusion: 30 mL    IV PiggyBack: 650 mL    Solution: 350 mL  Total IN: 2180 mL    OUT:    Accordian: 115 mL    Indwelling Catheter - Urethral: 1345 mL  Total OUT: 1460 mL    Total NET: 720 mL      09-30 @ 07:01  -  10-01 @ 00:38  --------------------------------------------------------  IN:    dextrose 5% + sodium chloride 0.45%.: 100 mL    dextrose 5% + sodium chloride 0.45%.: 450 mL    IV PiggyBack: 550 mL  Total IN: 1100 mL    OUT:    Accordian: 50 mL    Indwelling Catheter - Urethral: 930 mL  Total OUT: 980 mL    Total NET: 120 mL          09-30    138  |  99  |  29<H>  ----------------------------<  103<H>  4.8   |  28  |  0.96    Ca    8.2<L>      30 Sep 2017 18:40  Phos  3.0     09-30  Mg     2.5     09-30    TPro  4.7<L>  /  Alb  2.2<L>  /  TBili  5.0<H>  /  DBili  x   /  AST  59<H>  /  ALT  34  /  AlkPhos  96  09-30    [ ] Torres catheter, indication: N/A  Meds: dextrose 5% + sodium chloride 0.45%. 1000 milliLiter(s) IV Continuous <Continuous>        HEMATOLOGIC  Meds: enoxaparin Injectable 80 milliGRAM(s) SubCutaneous every 12 hours    [x] VTE Prophylaxis                        8.3    29.2  )-----------( 176      ( 30 Sep 2017 03:07 )             25.7     PT/INR - ( 30 Sep 2017 02:52 )   PT: 15.7 sec;   INR: 1.44 ratio         PTT - ( 30 Sep 2017 02:52 )  PTT:27.4 sec  Transfusion     [ ] PRBC   [ ] Platelets   [ ] FFP   [ ] Cryoprecipitate      INFECTIOUS DISEASES  T(C): 36.8 (09-30-17 @ 23:00), Max: 36.9 (09-30-17 @ 07:00)  Wt(kg): --  WBC Count: 29.2 K/uL (09-30 @ 03:07)    Recent Cultures:    Meds: vancomycin  IVPB 1000 milliGRAM(s) IV Intermittent every 12 hours  vancomycin  IVPB      micafungin IVPB      micafungin IVPB 100 milliGRAM(s) IV Intermittent every 24 hours  meropenem IVPB      meropenem IVPB 1000 milliGRAM(s) IV Intermittent every 8 hours        ENDOCRINE  Capillary Blood Glucose    Meds:       ACCESS DEVICES:  [ ] Peripheral IV  [ ] Central Venous Line	[ ] R	[ ] L	[ ] IJ	[ ] Fem	[ ] SC	Placed:   [ ] Arterial Line		[ ] R	[ ] L	[ ] Fem	[ ] Rad	[ ] Ax	Placed:   [ ] PICC:					[ ] Mediport  [ ] Urinary Catheter, Date Placed:   [ ] Necessity of urinary, arterial, and venous catheters discussed    OTHER MEDICATIONS:  naloxone Injectable 0.1 milliGRAM(s) IV Push every 3 minutes PRN      CODE STATUS:     IMAGING: HISTORY:  62y Male initially admitted to neurosurgery for b/l leg weakness found to have newly diagnosed metastatic disease with spinal met causing spinal cord compression s/p T8-T11 laminectomy on 9/20, post-op course complicated by deterioration in respiratory status and acute onset abdominal pain requiring intubation and transfer to NSCU. CT chest/abdomen/pelvis demonstrated b/l DVTs, right upper lobe PE, and a bowel perforation with free air. Pt was taken emergently to the OR and is now s/p exploratory laparotomy, SBR x1 (left in discontinuity), abdominal washout, abthera VAC application on 9/22, after which he was transferred to SICU under ACS. He was taken back to the OR on 9/24 for a washout, primary anastomosis, and abdominal closure. Post-operative course complicated by Acute hypoxemic respiratory failure secondary to Acute Pulmonary Vascular congestion/ Left Lung Collapse.    24 HOUR EVENTS:    SUBJECTIVE/ROS:  [ ] A ten-point review of systems was otherwise negative except as noted.  [ ] Due to altered mental status/intubation, subjective information were not able to be obtained from the patient. History was obtained, to the extent possible, from review of the chart and collateral sources of information.      NEURO  RASS:     GCS:     [facility] ICU:  Exam:   Meds: HYDROmorphone PCA (1 mG/mL) 30 milliLiter(s) PCA Continuous PCA Continuous  HYDROmorphone PCA (1 mG/mL) Rescue Clinician Bolus 0.5 milliGRAM(s) IV Push every 15 minutes PRN for Pain Scale GREATER THAN 6  ondansetron Injectable 4 milliGRAM(s) IV Push every 6 hours PRN Nausea    [x] Adequacy of sedation and pain control has been assessed and adjusted      RESPIRATORY  RR: 24 (10-01-17 @ 00:00) (11 - 35)  SpO2: 100% (10-01-17 @ 00:04) (91% - 100%)  Wt(kg): --  Exam: unlabored, clear to auscultation bilaterally  Mechanical Ventilation:   ABG - ( 30 Sep 2017 15:40 )  pH: 7.41  /  pCO2: 46    /  pO2: 128   / HCO3: 29    / Base Excess: 4.3   /  SaO2: 99      Lactate: x                [ ] Extubation Readiness Assessed  Meds: ALBUTerol/ipratropium for Nebulization 3 milliLiter(s) Nebulizer every 6 hours        CARDIOVASCULAR  HR: 88 (10-01-17 @ 00:04) (77 - 94)  BP: 99/72 (10-01-17 @ 00:00) (99/72 - 148/70)  BP(mean): 81 (10-01-17 @ 00:00) (76 - 101)  ABP: --  ABP(mean): --  Wt(kg): --  CVP(cm H2O): --      Exam:  Cardiac Rhythm:  Perfusion     [ ]Adequate   [ ]Inadequate  Mentation   [ ]Normal       [ ]Reduced  Extremities  [ ]Warm         [ ]Cool  Volume Status [ ]Hypervolemic [ ]Euvolemic [ ]Hypovolemic  Meds: metoprolol Injectable 5 milliGRAM(s) IV Push every 6 hours        GI/NUTRITION  Exam:  Diet:  Meds: pantoprazole  Injectable 40 milliGRAM(s) IV Push every 24 hours      GENITOURINARY  I&O's Detail    09-29 @ 07:01  -  09-30 @ 07:00  --------------------------------------------------------  IN:    dextrose 5% + sodium chloride 0.45%.: 1150 mL    heparin Infusion: 30 mL    IV PiggyBack: 650 mL    Solution: 350 mL  Total IN: 2180 mL    OUT:    Accordian: 115 mL    Indwelling Catheter - Urethral: 1345 mL  Total OUT: 1460 mL    Total NET: 720 mL      09-30 @ 07:01  -  10-01 @ 00:38  --------------------------------------------------------  IN:    dextrose 5% + sodium chloride 0.45%.: 100 mL    dextrose 5% + sodium chloride 0.45%.: 450 mL    IV PiggyBack: 550 mL  Total IN: 1100 mL    OUT:    Accordian: 50 mL    Indwelling Catheter - Urethral: 930 mL  Total OUT: 980 mL    Total NET: 120 mL          09-30    138  |  99  |  29<H>  ----------------------------<  103<H>  4.8   |  28  |  0.96    Ca    8.2<L>      30 Sep 2017 18:40  Phos  3.0     09-30  Mg     2.5     09-30    TPro  4.7<L>  /  Alb  2.2<L>  /  TBili  5.0<H>  /  DBili  x   /  AST  59<H>  /  ALT  34  /  AlkPhos  96  09-30    [ ] Torres catheter, indication: N/A  Meds: dextrose 5% + sodium chloride 0.45%. 1000 milliLiter(s) IV Continuous <Continuous>        HEMATOLOGIC  Meds: enoxaparin Injectable 80 milliGRAM(s) SubCutaneous every 12 hours    [x] VTE Prophylaxis                        8.3    29.2  )-----------( 176      ( 30 Sep 2017 03:07 )             25.7     PT/INR - ( 30 Sep 2017 02:52 )   PT: 15.7 sec;   INR: 1.44 ratio         PTT - ( 30 Sep 2017 02:52 )  PTT:27.4 sec  Transfusion     [ ] PRBC   [ ] Platelets   [ ] FFP   [ ] Cryoprecipitate      INFECTIOUS DISEASES  T(C): 36.8 (09-30-17 @ 23:00), Max: 36.9 (09-30-17 @ 07:00)  Wt(kg): --  WBC Count: 29.2 K/uL (09-30 @ 03:07)    Recent Cultures:    Meds: vancomycin  IVPB 1000 milliGRAM(s) IV Intermittent every 12 hours  vancomycin  IVPB      micafungin IVPB      micafungin IVPB 100 milliGRAM(s) IV Intermittent every 24 hours  meropenem IVPB      meropenem IVPB 1000 milliGRAM(s) IV Intermittent every 8 hours        ENDOCRINE  Capillary Blood Glucose    Meds:       ACCESS DEVICES:  [ ] Peripheral IV  [ ] Central Venous Line	[ ] R	[ ] L	[ ] IJ	[ ] Fem	[ ] SC	Placed:   [ ] Arterial Line		[ ] R	[ ] L	[ ] Fem	[ ] Rad	[ ] Ax	Placed:   [ ] PICC:					[ ] Mediport  [ ] Urinary Catheter, Date Placed:   [ ] Necessity of urinary, arterial, and venous catheters discussed    OTHER MEDICATIONS:  naloxone Injectable 0.1 milliGRAM(s) IV Push every 3 minutes PRN      CODE STATUS:     IMAGING: HISTORY:  62y Male initially admitted to neurosurgery for b/l leg weakness found to have newly diagnosed metastatic disease with spinal met causing spinal cord compression s/p T8-T11 laminectomy on 9/20, post-op course complicated by deterioration in respiratory status and acute onset abdominal pain requiring intubation and transfer to NSCU. CT chest/abdomen/pelvis demonstrated b/l DVTs, right upper lobe PE, and a bowel perforation with free air. Pt was taken emergently to the OR and is now s/p exploratory laparotomy, SBR x1 (left in discontinuity), abdominal washout, abthera VAC application on 9/22, after which he was transferred to SICU under ACS. He was taken back to the OR on 9/24 for a washout, primary anastomosis, and abdominal closure. Post-operative course complicated by Acute hypoxemic respiratory failure secondary to Acute Pulmonary Vascular congestion/ Left Lung Collapse.    24 HOUR EVENTS: Oncology and palliative consulted, pt was not receptive to hearing about his prognosis, plan to have a family meeting sometime this week. He had become more confused and an ammonia level was sent (29). Pt also has an increasing bilirubin. Pt had a leukocytosis, CT showed a new clot in right renal vein extending into the IVC. Remained on 3 L NC during the day, BIPAP at night (18/15) for left lower lobe atelectasis. Torres was DCd and patient was DTV by 2100, did not void and was straight cathed with 350 cc urine.    SUBJECTIVE/ROS:  [X] A ten-point review of systems was otherwise negative except as noted.  [ ] Due to altered mental status/intubation, subjective information were not able to be obtained from the patient. History was obtained, to the extent possible, from review of the chart and collateral sources of information.      NEURO  Exam: AAOx2-3, easily reoriented  Meds: HYDROmorphone PCA (1 mG/mL) 30 milliLiter(s) PCA Continuous PCA Continuous  HYDROmorphone PCA (1 mG/mL) Rescue Clinician Bolus 0.5 milliGRAM(s) IV Push every 15 minutes PRN for Pain Scale GREATER THAN 6  ondansetron Injectable 4 milliGRAM(s) IV Push every 6 hours PRN Nausea    [x] Adequacy of sedation and pain control has been assessed and adjusted    RESPIRATORY  RR: 24 (10-01-17 @ 00:00) (11 - 35)  SpO2: 100% (10-01-17 @ 00:04) (91% - 100%)  Wt(kg): --  Exam: unlabored, clear to auscultation bilaterally  Mechanical Ventilation:   ABG - ( 30 Sep 2017 15:40 )  pH: 7.41  /  pCO2: 46    /  pO2: 128   / HCO3: 29    / Base Excess: 4.3   /  SaO2: 99      Lactate: x      Meds: ALBUTerol/ipratropium for Nebulization 3 milliLiter(s) Nebulizer every 6 hours      CARDIOVASCULAR  HR: 88 (10-01-17 @ 00:04) (77 - 94)  BP: 99/72 (10-01-17 @ 00:00) (99/72 - 148/70)  BP(mean): 81 (10-01-17 @ 00:00) (76 - 101)  ABP: --  ABP(mean): --  Wt(kg): --  CVP(cm H2O): --    Exam:   Cardiac Rhythm:  Perfusion     [X]Adequate   [ ]Inadequate  Mentation   [X]Normal       [ ]Reduced  Extremities  [X]Warm         [ ]Cool  Volume Status [ ]Hypervolemic [X]Euvolemic [ ]Hypovolemic  Meds: metoprolol Injectable 5 milliGRAM(s) IV Push every 6 hours    GI/NUTRITION  Exam: abd dressing clean, dry, intact  Diet:  Meds: pantoprazole  Injectable 40 milliGRAM(s) IV Push every 24 hours      GENITOURINARY  I&O's Detail    09-29 @ 07:01  -  09-30 @ 07:00  --------------------------------------------------------  IN:    dextrose 5% + sodium chloride 0.45%.: 1150 mL    heparin Infusion: 30 mL    IV PiggyBack: 650 mL    Solution: 350 mL  Total IN: 2180 mL    OUT:    Accordian: 115 mL    Indwelling Catheter - Urethral: 1345 mL  Total OUT: 1460 mL    Total NET: 720 mL      09-30 @ 07:01  -  10-01 @ 00:38  --------------------------------------------------------  IN:    dextrose 5% + sodium chloride 0.45%.: 100 mL    dextrose 5% + sodium chloride 0.45%.: 450 mL    IV PiggyBack: 550 mL  Total IN: 1100 mL    OUT:    Accordian: 50 mL    Indwelling Catheter - Urethral: 930 mL  Total OUT: 980 mL    Total NET: 120 mL          09-30    138  |  99  |  29<H>  ----------------------------<  103<H>  4.8   |  28  |  0.96    Ca    8.2<L>      30 Sep 2017 18:40  Phos  3.0     09-30  Mg     2.5     09-30    TPro  4.7<L>  /  Alb  2.2<L>  /  TBili  5.0<H>  /  DBili  x   /  AST  59<H>  /  ALT  34  /  AlkPhos  96  09-30    [ ] Torres catheter, indication: N/A  Meds: dextrose 5% + sodium chloride 0.45%. 1000 milliLiter(s) IV Continuous <Continuous>        HEMATOLOGIC  Meds: enoxaparin Injectable 80 milliGRAM(s) SubCutaneous every 12 hours    [x] VTE Prophylaxis                        8.3    29.2  )-----------( 176      ( 30 Sep 2017 03:07 )             25.7     PT/INR - ( 30 Sep 2017 02:52 )   PT: 15.7 sec;   INR: 1.44 ratio         PTT - ( 30 Sep 2017 02:52 )  PTT:27.4 sec  Transfusion     [ ] PRBC   [ ] Platelets   [ ] FFP   [ ] Cryoprecipitate      INFECTIOUS DISEASES  T(C): 36.8 (09-30-17 @ 23:00), Max: 36.9 (09-30-17 @ 07:00)  Wt(kg): --  WBC Count: 29.2 K/uL (09-30 @ 03:07)    Recent Cultures:    Meds: vancomycin  IVPB 1000 milliGRAM(s) IV Intermittent every 12 hours  vancomycin  IVPB      micafungin IVPB      micafungin IVPB 100 milliGRAM(s) IV Intermittent every 24 hours  meropenem IVPB      meropenem IVPB 1000 milliGRAM(s) IV Intermittent every 8 hours        ENDOCRINE  Capillary Blood Glucose    Meds:       ACCESS DEVICES:  [ ] Peripheral IV  [ ] Central Venous Line	[ ] R	[ ] L	[ ] IJ	[ ] Fem	[ ] SC	Placed:   [ ] Arterial Line		[ ] R	[ ] L	[ ] Fem	[ ] Rad	[ ] Ax	Placed:   [ ] PICC:					[ ] Mediport  [ ] Urinary Catheter, Date Placed:   [ ] Necessity of urinary, arterial, and venous catheters discussed    OTHER MEDICATIONS:  naloxone Injectable 0.1 milliGRAM(s) IV Push every 3 minutes PRN      CODE STATUS:     IMAGING: HISTORY:  62y Male initially admitted to neurosurgery for b/l leg weakness found to have newly diagnosed metastatic disease with spinal met causing spinal cord compression s/p T8-T11 laminectomy on 9/20, post-op course complicated by deterioration in respiratory status and acute onset abdominal pain requiring intubation and transfer to NSCU. CT chest/abdomen/pelvis demonstrated b/l DVTs, right upper lobe PE, and a bowel perforation with free air. Pt was taken emergently to the OR and is now s/p exploratory laparotomy, SBR x1 (left in discontinuity), abdominal washout, abthera VAC application on 9/22, after which he was transferred to SICU under ACS. He was taken back to the OR on 9/24 for a washout, primary anastomosis, and abdominal closure. Post-operative course complicated by Acute hypoxemic respiratory failure secondary to Acute Pulmonary Vascular congestion/ Left Lung Collapse.    24 HOUR EVENTS: Oncology and palliative consulted, pt was not receptive to hearing about his prognosis, plan to have a family meeting sometime this week. He had become more confused and an ammonia level was sent (29). Pt also has an increasing bilirubin. Pt had a leukocytosis, CT showed a new clot in right renal vein extending into the IVC. Remained on 3 L NC during the day, BIPAP at night (18/15) for left lower lobe atelectasis. Torres was DCd and patient was DTV by 2100, did not void and was straight cathed with 350 cc urine.    SUBJECTIVE/ROS:  [X] A ten-point review of systems was otherwise negative except as noted.  [ ] Due to altered mental status/intubation, subjective information were not able to be obtained from the patient. History was obtained, to the extent possible, from review of the chart and collateral sources of information.      NEURO  Exam: AAOx2-3, easily reoriented  Meds: HYDROmorphone PCA (1 mG/mL) 30 milliLiter(s) PCA Continuous PCA Continuous  HYDROmorphone PCA (1 mG/mL) Rescue Clinician Bolus 0.5 milliGRAM(s) IV Push every 15 minutes PRN for Pain Scale GREATER THAN 6  ondansetron Injectable 4 milliGRAM(s) IV Push every 6 hours PRN Nausea    [x] Adequacy of sedation and pain control has been assessed and adjusted    RESPIRATORY  RR: 24 (10-01-17 @ 00:00) (11 - 35)  SpO2: 100% (10-01-17 @ 00:04) (91% - 100%)  Wt(kg): --  Exam: unlabored, clear to auscultation bilaterally  Mechanical Ventilation:   ABG - ( 30 Sep 2017 15:40 )  pH: 7.41  /  pCO2: 46    /  pO2: 128   / HCO3: 29    / Base Excess: 4.3   /  SaO2: 99      Lactate: x      Meds: ALBUTerol/ipratropium for Nebulization 3 milliLiter(s) Nebulizer every 6 hours      CARDIOVASCULAR  HR: 88 (10-01-17 @ 00:04) (77 - 94)  BP: 99/72 (10-01-17 @ 00:00) (99/72 - 148/70)  BP(mean): 81 (10-01-17 @ 00:00) (76 - 101)  ABP: --  ABP(mean): --  Wt(kg): --  CVP(cm H2O): --    Exam: normal rate and rhythm  Cardiac Rhythm: NSR  Perfusion     [X]Adequate   [ ]Inadequate  Mentation   [X]Normal       [ ]Reduced  Extremities  [X]Warm         [ ]Cool  Volume Status [ ]Hypervolemic [X]Euvolemic [ ]Hypovolemic  Meds: metoprolol Injectable 5 milliGRAM(s) IV Push every 6 hours    GI/NUTRITION  Exam: vertical midline abd dressing C/D/I, abd S/NT/ND  Diet:  Meds: pantoprazole  Injectable 40 milliGRAM(s) IV Push every 24 hours      GENITOURINARY  I&O's Detail    09-29 @ 07:01  -  09-30 @ 07:00  --------------------------------------------------------  IN:    dextrose 5% + sodium chloride 0.45%.: 1150 mL    heparin Infusion: 30 mL    IV PiggyBack: 650 mL    Solution: 350 mL  Total IN: 2180 mL    OUT:    Accordian: 115 mL    Indwelling Catheter - Urethral: 1345 mL  Total OUT: 1460 mL    Total NET: 720 mL      09-30 @ 07:01  -  10-01 @ 00:38  --------------------------------------------------------  IN:    dextrose 5% + sodium chloride 0.45%.: 100 mL    dextrose 5% + sodium chloride 0.45%.: 450 mL    IV PiggyBack: 550 mL  Total IN: 1100 mL    OUT:    Accordian: 50 mL    Indwelling Catheter - Urethral: 930 mL  Total OUT: 980 mL    Total NET: 120 mL          09-30    138  |  99  |  29<H>  ----------------------------<  103<H>  4.8   |  28  |  0.96    Ca    8.2<L>      30 Sep 2017 18:40  Phos  3.0     09-30  Mg     2.5     09-30    TPro  4.7<L>  /  Alb  2.2<L>  /  TBili  5.0<H>  /  DBili  x   /  AST  59<H>  /  ALT  34  /  AlkPhos  96  09-30    [ ] Torres catheter, indication: N/A  Meds: dextrose 5% + sodium chloride 0.45%. 1000 milliLiter(s) IV Continuous <Continuous>        HEMATOLOGIC  Meds: enoxaparin Injectable 80 milliGRAM(s) SubCutaneous every 12 hours    [x] VTE Prophylaxis                        8.3    29.2  )-----------( 176      ( 30 Sep 2017 03:07 )             25.7     PT/INR - ( 30 Sep 2017 02:52 )   PT: 15.7 sec;   INR: 1.44 ratio         PTT - ( 30 Sep 2017 02:52 )  PTT:27.4 sec  Transfusion     [ ] PRBC   [ ] Platelets   [ ] FFP   [ ] Cryoprecipitate      INFECTIOUS DISEASES  T(C): 36.8 (09-30-17 @ 23:00), Max: 36.9 (09-30-17 @ 07:00)  Wt(kg): --  WBC Count: 29.2 K/uL (09-30 @ 03:07)    Recent Cultures:    Meds: vancomycin  IVPB 1000 milliGRAM(s) IV Intermittent every 12 hours  vancomycin  IVPB      micafungin IVPB      micafungin IVPB 100 milliGRAM(s) IV Intermittent every 24 hours  meropenem IVPB      meropenem IVPB 1000 milliGRAM(s) IV Intermittent every 8 hours        ENDOCRINE  Capillary Blood Glucose      ACCESS DEVICES:  [ ] Peripheral IV  [X] Central Venous Line	[ ] R	[X] L	[X] IJ	[ ] Fem	[ ] SC	Placed: 9/22  [ ] Arterial Line		[ ] R	[ ] L	[ ] Fem	[ ] Rad	[ ] Ax	Placed:   [ ] PICC:					[ ] Mediport  [ ] Urinary Catheter, Date Placed:   [ ] Necessity of urinary, arterial, and venous catheters discussed    OTHER MEDICATIONS:  naloxone Injectable 0.1 milliGRAM(s) IV Push every 3 minutes PRN      CODE STATUS: Full Code    IMAGING: HISTORY:  62y Male initially admitted to neurosurgery for b/l leg weakness found to have newly diagnosed metastatic disease with spinal met causing spinal cord compression s/p T8-T11 laminectomy on 9/20, post-op course complicated by deterioration in respiratory status and acute onset abdominal pain requiring intubation and transfer to NSCU. CT chest/abdomen/pelvis demonstrated b/l DVTs, right upper lobe PE, and a bowel perforation with free air. Pt was taken emergently to the OR and is now s/p exploratory laparotomy, SBR x1 (left in discontinuity), abdominal washout, abthera VAC application on 9/22, after which he was transferred to SICU under ACS. He was taken back to the OR on 9/24 for a washout, primary anastomosis, and abdominal closure. Post-operative course complicated by Acute hypoxemic respiratory failure secondary to Acute Pulmonary Vascular congestion/ Left Lung Collapse.    24 HOUR EVENTS: Oncology and palliative consulted, pt was not receptive to hearing about his prognosis, plan to have a family meeting sometime this week. He had become more confused and an ammonia level was sent (29). Pt also has an increasing bilirubin. Remained on 3 L NC during the day, BIPAP at night (18/15, FiO2: 50%) for left lower lobe atelectasis. Torres was DCd and patient was DTV by 2100, did not void and was straight cathed with 350 cc urine.    SUBJECTIVE/ROS:  [X] A ten-point review of systems was otherwise negative except as noted.  [ ] Due to altered mental status/intubation, subjective information were not able to be obtained from the patient. History was obtained, to the extent possible, from review of the chart and collateral sources of information.      NEURO  Exam: AAOx2-3, easily reoriented  Meds: HYDROmorphone PCA (1 mG/mL) 30 milliLiter(s) PCA Continuous PCA Continuous  HYDROmorphone PCA (1 mG/mL) Rescue Clinician Bolus 0.5 milliGRAM(s) IV Push every 15 minutes PRN for Pain Scale GREATER THAN 6  ondansetron Injectable 4 milliGRAM(s) IV Push every 6 hours PRN Nausea    [x] Adequacy of sedation and pain control has been assessed and adjusted    RESPIRATORY  RR: 24 (10-01-17 @ 00:00) (11 - 35)  SpO2: 100% (10-01-17 @ 00:04) (91% - 100%)  Wt(kg): --  Exam: unlabored, clear to auscultation bilaterally  Mechanical Ventilation:   ABG - ( 30 Sep 2017 15:40 )  pH: 7.41  /  pCO2: 46    /  pO2: 128   / HCO3: 29    / Base Excess: 4.3   /  SaO2: 99      Lactate: x      Meds: ALBUTerol/ipratropium for Nebulization 3 milliLiter(s) Nebulizer every 6 hours      CARDIOVASCULAR  HR: 88 (10-01-17 @ 00:04) (77 - 94)  BP: 99/72 (10-01-17 @ 00:00) (99/72 - 148/70)  BP(mean): 81 (10-01-17 @ 00:00) (76 - 101)  ABP: --  ABP(mean): --  Wt(kg): --  CVP(cm H2O): --    Exam: normal rate and rhythm  Cardiac Rhythm: NSR  Perfusion     [X]Adequate   [ ]Inadequate  Mentation   [X]Normal       [ ]Reduced  Extremities  [X]Warm         [ ]Cool  Volume Status [ ]Hypervolemic [X]Euvolemic [ ]Hypovolemic  Meds: metoprolol Injectable 5 milliGRAM(s) IV Push every 6 hours    GI/NUTRITION  Exam: vertical midline abd dressing C/D/I, abd S/NT/ND  Diet: NPO  Meds: pantoprazole  Injectable 40 milliGRAM(s) IV Push every 24 hours      GENITOURINARY  I&O's Detail    09-29 @ 07:01  -  09-30 @ 07:00  --------------------------------------------------------  IN:    dextrose 5% + sodium chloride 0.45%.: 1150 mL    heparin Infusion: 30 mL    IV PiggyBack: 650 mL    Solution: 350 mL  Total IN: 2180 mL    OUT:    Accordian: 115 mL    Indwelling Catheter - Urethral: 1345 mL  Total OUT: 1460 mL    Total NET: 720 mL      09-30 @ 07:01  -  10-01 @ 00:38  --------------------------------------------------------  IN:    dextrose 5% + sodium chloride 0.45%.: 100 mL    dextrose 5% + sodium chloride 0.45%.: 450 mL    IV PiggyBack: 550 mL  Total IN: 1100 mL    OUT:    Accordian: 50 mL    Indwelling Catheter - Urethral: 930 mL  Total OUT: 980 mL    Total NET: 120 mL          09-30    138  |  99  |  29<H>  ----------------------------<  103<H>  4.8   |  28  |  0.96    Ca    8.2<L>      30 Sep 2017 18:40  Phos  3.0     09-30  Mg     2.5     09-30    TPro  4.7<L>  /  Alb  2.2<L>  /  TBili  5.0<H>  /  DBili  x   /  AST  59<H>  /  ALT  34  /  AlkPhos  96  09-30    [ ] Torres catheter, indication: N/A  Meds: dextrose 5% + sodium chloride 0.45%. 1000 milliLiter(s) IV Continuous <Continuous>        HEMATOLOGIC  Meds: enoxaparin Injectable 80 milliGRAM(s) SubCutaneous every 12 hours    [x] VTE Prophylaxis                        8.3    29.2  )-----------( 176      ( 30 Sep 2017 03:07 )             25.7     PT/INR - ( 30 Sep 2017 02:52 )   PT: 15.7 sec;   INR: 1.44 ratio         PTT - ( 30 Sep 2017 02:52 )  PTT:27.4 sec  Transfusion     [ ] PRBC   [ ] Platelets   [ ] FFP   [ ] Cryoprecipitate      INFECTIOUS DISEASES  T(C): 36.8 (09-30-17 @ 23:00), Max: 36.9 (09-30-17 @ 07:00)  Wt(kg): --  WBC Count: 29.2 K/uL (09-30 @ 03:07)    Recent Cultures:    Meds: vancomycin  IVPB 1000 milliGRAM(s) IV Intermittent every 12 hours  vancomycin  IVPB      micafungin IVPB      micafungin IVPB 100 milliGRAM(s) IV Intermittent every 24 hours  meropenem IVPB      meropenem IVPB 1000 milliGRAM(s) IV Intermittent every 8 hours        ENDOCRINE  Capillary Blood Glucose      ACCESS DEVICES:  [ ] Peripheral IV  [X] Central Venous Line	[ ] R	[X] L	[X] IJ	[ ] Fem	[ ] SC	Placed: 9/22  [ ] Arterial Line		[ ] R	[ ] L	[ ] Fem	[ ] Rad	[ ] Ax	Placed:   [ ] PICC:					[ ] Mediport  [ ] Urinary Catheter, Date Placed:   [ ] Necessity of urinary, arterial, and venous catheters discussed    OTHER MEDICATIONS:  naloxone Injectable 0.1 milliGRAM(s) IV Push every 3 minutes PRN      CODE STATUS: Full Code    IMAGING: HISTORY:  62y Male initially admitted to neurosurgery for b/l leg weakness found to have newly diagnosed metastatic disease with spinal met causing spinal cord compression s/p T8-T11 laminectomy on 9/20, post-op course complicated by deterioration in respiratory status and acute onset abdominal pain requiring intubation and transfer to NSCU. CT chest/abdomen/pelvis demonstrated b/l DVTs, right upper lobe PE, and a bowel perforation with free air. Pt was taken emergently to the OR and is now s/p exploratory laparotomy, SBR x1 (left in discontinuity), abdominal washout, abthera VAC application on 9/22, after which he was transferred to SICU under ACS. He was taken back to the OR on 9/24 for a washout, primary anastomosis, and abdominal closure. Post-operative course complicated by Acute hypoxemic respiratory failure secondary to Acute Pulmonary Vascular congestion/ Left Lung Collapse.    24 HOUR EVENTS: Oncology and palliative consulted, pt was not receptive to hearing about his prognosis, plan to have a family meeting sometime this week. He had become more confused and an ammonia level was sent (29). Pt also has an increasing bilirubin. Remained on 3 L NC during the day, BIPAP at night (18/15, FiO2: 50%) for left lower lobe atelectasis - tolerated for about 4 hours and then became agitated, required dose of 0.25 mg IV Ativan with adequate response and then became agitated again in early AM and was given 2.5 mg IV Haldol. Michelle was DCd and patient was DTV by 2100, did not void and was straight cathed with 350 cc urine, 6 hours post straight cath pt still did not void and michelle replaced.     SUBJECTIVE/ROS:  [X] A ten-point review of systems was otherwise negative except as noted.  [ ] Due to altered mental status/intubation, subjective information were not able to be obtained from the patient. History was obtained, to the extent possible, from review of the chart and collateral sources of information.      NEURO  Exam: AAOx2-3, easily reoriented  Meds: HYDROmorphone PCA (1 mG/mL) 30 milliLiter(s) PCA Continuous PCA Continuous  HYDROmorphone PCA (1 mG/mL) Rescue Clinician Bolus 0.5 milliGRAM(s) IV Push every 15 minutes PRN for Pain Scale GREATER THAN 6  ondansetron Injectable 4 milliGRAM(s) IV Push every 6 hours PRN Nausea    [x] Adequacy of sedation and pain control has been assessed and adjusted    RESPIRATORY  RR: 17 (01 Oct 2017 04:00) (11 - 35)  SpO2: 99% (01 Oct 2017 04:00) (92% - 100%)  Wt(kg): --  Exam: unlabored, clear to auscultation bilaterally  Mechanical Ventilation:   ABG - ( 30 Sep 2017 15:40 )  pH: 7.41  /  pCO2: 46    /  pO2: 128   / HCO3: 29    / Base Excess: 4.3   /  SaO2: 99      Lactate: x      Meds: ALBUTerol/ipratropium for Nebulization 3 milliLiter(s) Nebulizer every 6 hours      CARDIOVASCULAR  HR: 93 (01 Oct 2017 04:00) (79 - 96)  BP: 118/75 (01 Oct 2017 04:00) (99/72 - 148/70)  BP(mean): 88 (01 Oct 2017 04:00) (76 - 101)  ABP: --  ABP(mean): --  Wt(kg): --  CVP(cm H2O): --    Exam: normal rate and rhythm  Cardiac Rhythm: NSR  Perfusion     [X]Adequate   [ ]Inadequate  Mentation   [X]Normal       [ ]Reduced  Extremities  [X]Warm         [ ]Cool  Volume Status [ ]Hypervolemic [X]Euvolemic [ ]Hypovolemic  Meds: metoprolol Injectable 5 milliGRAM(s) IV Push every 6 hours    GI/NUTRITION  Exam: vertical midline abd dressing C/D/I, abd S/NT/ND  Diet: NPO  Meds: pantoprazole  Injectable 40 milliGRAM(s) IV Push every 24 hours      GENITOURINARY  I&O's Detail    09-29 @ 07:01  -  09-30 @ 07:00  --------------------------------------------------------  IN:    dextrose 5% + sodium chloride 0.45%.: 1150 mL    heparin Infusion: 30 mL    IV PiggyBack: 650 mL    Solution: 350 mL  Total IN: 2180 mL    OUT:    Accordian: 115 mL    Indwelling Catheter - Urethral: 1345 mL  Total OUT: 1460 mL    Total NET: 720 mL      09-30 @ 07:01  -  10-01 @ 00:38  --------------------------------------------------------  IN:    dextrose 5% + sodium chloride 0.45%.: 100 mL    dextrose 5% + sodium chloride 0.45%.: 450 mL    IV PiggyBack: 550 mL  Total IN: 1100 mL    OUT:    Accordian: 50 mL    Indwelling Catheter - Urethral: 930 mL  Total OUT: 980 mL    Total NET: 120 mL      10-01    136  |  98  |  27<H>  ----------------------------<  106<H>  4.8   |  26  |  0.97    Ca    8.3<L>      01 Oct 2017 02:54  Phos  2.9     10-01  Mg     2.5     10-01    TPro  5.2<L>  /  Alb  2.3<L>  /  TBili  6.8<H>  /  DBili  x   /  AST  70<H>  /  ALT  46<H>  /  AlkPhos  121<H>  10-01      [ ] Michelle catheter, indication: N/A  Meds: dextrose 5% + sodium chloride 0.45%. 1000 milliLiter(s) IV Continuous <Continuous>        HEMATOLOGIC  Meds: enoxaparin Injectable 80 milliGRAM(s) SubCutaneous every 12 hours    [x] VTE Prophylaxis                                   8.8    32.7  )-----------( 213      ( 01 Oct 2017 02:54 )             27.1     PT/INR - ( 01 Oct 2017 02:54 )   PT: 16.1 sec;   INR: 1.47 ratio    PTT - ( 01 Oct 2017 02:54 )  PTT:29.8 sec    Transfusion     [ ] PRBC   [ ] Platelets   [ ] FFP   [ ] Cryoprecipitate      INFECTIOUS DISEASES  T(C): 37.1 (01 Oct 2017 03:00), Max: 37.1 (01 Oct 2017 03:00)  T(F): 98.8 (01 Oct 2017 03:00), Max: 98.8 (01 Oct 2017 03:00)  Wt(kg): --  WBC Count: 32.7 K/uL (10.01.17 @ 02:54)    Recent Cultures:    Meds: vancomycin  IVPB 1000 milliGRAM(s) IV Intermittent every 12 hours  vancomycin  IVPB      micafungin IVPB      micafungin IVPB 100 milliGRAM(s) IV Intermittent every 24 hours  meropenem IVPB      meropenem IVPB 1000 milliGRAM(s) IV Intermittent every 8 hours        ENDOCRINE  Capillary Blood Glucose      ACCESS DEVICES:  [ ] Peripheral IV  [X] Central Venous Line	[ ] R	[X] L	[X] IJ	[ ] Fem	[ ] SC	Placed: 9/22  [ ] Arterial Line		[ ] R	[ ] L	[ ] Fem	[ ] Rad	[ ] Ax	Placed:   [ ] PICC:					[ ] Mediport  [ ] Urinary Catheter, Date Placed:   [ ] Necessity of urinary, arterial, and venous catheters discussed    OTHER MEDICATIONS:  naloxone Injectable 0.1 milliGRAM(s) IV Push every 3 minutes PRN      CODE STATUS: Full Code    IMAGING:

## 2017-10-01 NOTE — PROGRESS NOTE ADULT - ATTENDING COMMENTS
I saw and evaluated the patient. The patient is a 62-year-old male with newly diagnosed metastatic disease s/p T8-T11 laminectomies for resection of dorsal epidural tumor for spinal cord compression on 9/19/17. His hospital course was complicated by a right upper lobe pulmonary embolus, B/L lower extremity DVTs, and a bowel perforation, requiring two surgeries for repair with General Surgery. The patient's exam is notable for 2-3/5 left lower extremity weakness and 0-1/5 right lower extremity weakness. Hemovac drain removed. Continue mobilization with PT, OT and PMR, and disposition planning to spinal cord injury rehab when medically ready. Ok to begin chemotherapy and radiation from Neurosurgery perspective on POD#14. Appreciate SICU support.

## 2017-10-01 NOTE — PROGRESS NOTE ADULT - PROBLEM SELECTOR PLAN 1
-Unclear how much of this is extrinsic compression 2nd tumor vs. mucous plugging  but his lower lobe was up on CXR 9/26 after bipap overnight  -c/w Bipap 20/15 overnight, Hi Vineet daytime for PEEP  -If LLL still collapsed after NIV, chest PT and R lung down will plan for bronchoscopy this week  -Duoneb q6  -Obtain sputum culture  -Chest PT, OOB, Acapella valve, chest vest for mucous clearance

## 2017-10-02 NOTE — PROGRESS NOTE ADULT - ASSESSMENT
62M with no significant PMH presenting with spinal cord compression of thoracic spine s/p laminectomy with surgical decompression and biopsy of mass taken. Pan-CT concerning for possible lung primary.  Suspicious lesions of liver and adrenal gland concerning for metastases.   Now s/p Laminectomy and decompression.    Abdominal Pain and distension - likely due to ileus  AXR - 9/21 c/w ileus, (stool and contrast in Right colon)  s/p RRT due to respiratory distress 9/22 am- s/p intubation, sedation, pressor support and new ALEC with hyperkalemia  CT 9/22 - SB perforation and PE  s/p Exploratory laparotomy 9/22/17, SBR x1 (left in discontinuity), abdominal washout, abthera VAC application (9/22)  RTOR 9/24/17; ex-lap, washout, re-anastomosis, abdominal closure  Spinal mass biopsy c/w Lung Ca - Heme/Onc following    PLAN  NPO, NGT  IV PPI  Post-op care per SICU  Await GI function  On IV Anti-microbials - Zosyn, Vanco, Micafungin (E. faecium in peritoneal culture)  follow-up cultures  AC per Hem/Onc (PE)  Monitor Hgb       Bandar Baker PA-C    [facility]  [phone]

## 2017-10-02 NOTE — PROGRESS NOTE ADULT - ASSESSMENT
62M admitted with cord compression (t9 to t11 mets with epidural extension on MRI), S/P laminectomy, diagnosed as adenocarcinoma.   ON 9/22 he was found to have small bowel perforation due to ischemia, or infection, and was taken to the or, for exlap- SBR, abd wash out, 10 cm resection and went on 24th again- abdominal washout, enteroenterostomy and abdominal closure. In SICU for management.  . His hospital course was also complicated by a right upper lobe pulmonary embolus, B/L lower extremity DVTs,  Has had leucocytosis and elevated fungitell. CT with lung consolidation, ascites, IVC thrombus, PE.  Covered broadly for peritonitis and fungal infection.  Abdominal fluid with E Faecium (amp resistant) and Klebsiella and candida  On Vanco/Meropenem, micafungin.  Continue Vancomycin (for E Faecium) Meropenem (Fro Klebsiella and inter-abdominal/lung infection), and Micafungin for candida   cont vancomycin, for enterococcus,   WBC remains elevated, ? in part secondary leukemoid reaction from liver mets    Palliative input appreciated    await follow up cultures

## 2017-10-02 NOTE — PROGRESS NOTE ADULT - ASSESSMENT
A/P: 62M admitted to neurosurgery for bilateral leg weakness found to have newly diagnosed metastatic disease s/p T8-T11 laminectomy presented today with deterioration in respiratory status and acute onset abdominal pain who was subsequently intubated. W/u revealed b/l DVTs, right upper lobe PE, and a bowel perforation.  Pt is now s/p Exploratory laparotomy, SBR x1 (left in discontinuity), abdominal washout, abthera VAC application (9/22); 1cm perforation in the small bowel 110 cm distal to the ligament of treitz. RTOR 9/24 for intestinal reconstruction and abdominal closure. Awaiting ROBF. 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 Most likely noninfected per vascular surgery    - f/u palliative care/family meeting  - Wean 02 as tolerated, recommend diuresis as appropriate  - Nsx: ok to start chemo meds  - Leukocytosis; f/u Fungitell +, c/w vanc as per ID  - NPO/IVF; awaiting GI fxn  - Pain control  - DVT ppx: T lovenox  - Continue acute care per SICU    Norbert Robles DDS, MD

## 2017-10-02 NOTE — PROGRESS NOTE ADULT - SUBJECTIVE AND OBJECTIVE BOX
Follow-up Pulm Progress Note    On high-flow at present. Events noted.     Medications:  MEDICATIONS  (STANDING):  vancomycin  IVPB 1000 milliGRAM(s) IV Intermittent every 12 hours  vancomycin  IVPB      ALBUTerol/ipratropium for Nebulization 3 milliLiter(s) Nebulizer every 6 hours  micafungin IVPB      micafungin IVPB 100 milliGRAM(s) IV Intermittent every 24 hours  pantoprazole  Injectable 40 milliGRAM(s) IV Push every 24 hours  meropenem IVPB      meropenem IVPB 1000 milliGRAM(s) IV Intermittent every 8 hours  dextrose 5% + sodium chloride 0.45%. 1000 milliLiter(s) (30 mL/Hr) IV Continuous <Continuous>  metoprolol Injectable 5 milliGRAM(s) IV Push every 6 hours  enoxaparin Injectable 90 milliGRAM(s) SubCutaneous every 12 hours    MEDICATIONS  (PRN):  HYDROmorphone  Injectable 0.2 milliGRAM(s) IV Push every 2 hours PRN Severe Pain (7 - 10)    Vital Signs Last 24 Hrs  T(C): 37.1 (02 Oct 2017 15:00), Max: 37.4 (02 Oct 2017 11:00)  T(F): 98.8 (02 Oct 2017 15:00), Max: 99.3 (02 Oct 2017 11:00)  HR: 99 (02 Oct 2017 15:01) (73 - 100)  BP: 104/71 (02 Oct 2017 15:00) (104/71 - 169/74)  BP(mean): 84 (02 Oct 2017 15:00) (77 - 114)  RR: 39 (02 Oct 2017 15:01) (22 - 53)  SpO2: 100% (02 Oct 2017 15:01) (90% - 100%)    ABG - ( 02 Oct 2017 02:48 )  pH: 7.48  /  pCO2: 35    /  pO2: 183   / HCO3: 26    / Base Excess: 3.0   /  SaO2: 100       VBG pH 7.41 10-01 @ 06:52    VBG pCO2 47 10-01 @ 06:52    VBG O2 sat 32 10-01 @ 06:52    VBG lactate 2.7 10-01 @ 06:52  VBG pH 7.37 10-01 @ 02:47    VBG pCO2 52 10-01 @ 02:47    VBG O2 sat 67 10-01 @ 02:47    VBG lactate 1.5 10-01 @ 02:47      10-01 @ 07:01  -  10-02 @ 07:00  --------------------------------------------------------  IN: 1120 mL / OUT: 1195 mL / NET: -75 mL    LABS:                        7.7    26.6  )-----------( 247      ( 02 Oct 2017 10:44 )             23.5     10-02    139  |  100  |  28<H>  ----------------------------<  100<H>  4.4   |  28  |  1.08    Ca    8.2<L>      02 Oct 2017 02:54  Phos  2.5     10-02  Mg     2.4     10-02    TPro  5.0<L>  /  Alb  2.3<L>  /  TBili  7.8<H>  /  DBili  6.6<H>  /  AST  77<H>  /  ALT  45  /  AlkPhos  111  10-02    CAPILLARY BLOOD GLUCOSE    PT/INR - ( 02 Oct 2017 02:54 )   PT: 17.2 sec;   INR: 1.58 ratio         PTT - ( 02 Oct 2017 02:54 )  PTT:31.7 sec    Procalcitonin, Serum: 1.53 ng/mL (02 Oct 2017 06:26)    CULTURES: (if applicable)    Physical Examination:  PULM: Decreased BS at left base  CVS: S1, S2 heard    RADIOLOGY REVIEWED  CXR:     CT chest:    TTE:

## 2017-10-02 NOTE — CONSULT NOTE ADULT - SUBJECTIVE AND OBJECTIVE BOX
HPI:    61yo M with no significant PMH. Consulted on 9/12 for worsening LBP associated with lower extremity weakness for past 3 weeks. Over the weekend, he progressed to the point where he was unable to ambulate. He was seen at OSH and transferred to [facility] for spine evaluation. Was found to have epidural compression T8-T11 from newly diagnosed metastatic disease to liver, bone and bowel mets. S/p laminectomy on 9/20. Hospitalization complicated by acute hypoxic respiratory failure 2/2 acute pulmonary vascular congestion, L lung collapse, b/l PE, RUL PNA. Was found to have bowel perf with free air, was taken for ex-lap on 9/22, closure on 9/24. Peritonitis subsequently - E. faecium, Klebsiella and Candida.     Patient seen and examined at bedside, lethargic, slightly tachypneic when off high flow. Sister in law (sister of patient's partner - for 18yrs) in the room. Spoke to son (surrogate) over the phone, good insight as to what is occuring, still hopeful, yet understanding of the severity of the disease. He will be able to come on Wednesday at 11 AM for family meeting.     PERTINENT PM/SXH:       SOCIAL HISTORY:   Significant other/partner:  [x ] YES  [ ] NO               Children:  [x ] YES  [ ] NO                   Christianity/Spirituality:  Substance hx:  [ ] YES   [x ] NO                   Tobacco hx:  [ ] YES  [x ] NO                       Alcohol hx: [ ] YES  [x ] NO         Home Opioid hx:  [ ] YES  [ x] NO   Living Situation: [ x] Home  [ ] Long term care  [ ] Rehab [ ] Other    FAMILY HISTORY:  [x ] Family history non-contributory     BASELINE (I)ADLs (prior to admission):  [place]: [x ] total  [ ] moderate [ ] dependent    ADVANCE DIRECTIVES:    Full code  MOLST  [ ] YES [x ] NO                      [ ] Completed  Health Care Proxy [ ] YES  [x ] NO   [ ] Completed  Living Will  [ ] YES [x ] NO             [x ] Surrogate  [ ] HCP  [ ] Guardian:   Elan Ferguson (son) [phone]  Francisca Simeon (partner - living together for 18yrs) [phone]  Chantel Santoyo (sister) [phone]    Allergies    No Known Allergies    Intolerances      MEDICATIONS  (STANDING):  vancomycin  IVPB 1000 milliGRAM(s) IV Intermittent every 12 hours  vancomycin  IVPB      ALBUTerol/ipratropium for Nebulization 3 milliLiter(s) Nebulizer every 6 hours  micafungin IVPB      micafungin IVPB 100 milliGRAM(s) IV Intermittent every 24 hours  pantoprazole  Injectable 40 milliGRAM(s) IV Push every 24 hours  meropenem IVPB      meropenem IVPB 1000 milliGRAM(s) IV Intermittent every 8 hours  dextrose 5% + sodium chloride 0.45%. 1000 milliLiter(s) (30 mL/Hr) IV Continuous <Continuous>  metoprolol Injectable 5 milliGRAM(s) IV Push every 6 hours  enoxaparin Injectable 90 milliGRAM(s) SubCutaneous every 12 hours    MEDICATIONS  (PRN):  HYDROmorphone  Injectable 0.2 milliGRAM(s) IV Push every 2 hours PRN Severe Pain (7 - 10)    PRESENT SYMPTOMS:  Source: [x ] Patient   [ ] Family   [ ] Team     Pain:                        [ ] No [x ] Yes             [ ] Mild [x ] Moderate [ ] Severe    Onset -  Location -  Duration -  Character -  Alleviating/Aggravating -  Radiation -  Timing -    Dyspnea:                [x ] No [ ] Yes             [ ] Mild [ ] Moderate [ ] Severe    Anxiety:                  [x ] No [ ] Yes             [ ] Mild [ ] Moderate [ ] Severe    Fatigue:                  [x ] No [ ] Yes             [ ] Mild [ ] Moderate [ ] Severe    Nausea:                  [x ] No [ ] Yes             [ ] Mild [ ] Moderate [ ] Severe    Loss of appetite:   [ ] No [x ] Yes             [ ] Mild [x ] Moderate [ ] Severe    Constipation:        [x ] No [ ] Yes             [ ] Mild [ ] Moderate [ ] Severe    Other Symptoms:  [ ] All other review of systems negative   [x ] Unable to obtain due to poor mentation     Karnofsky Performance Score/Palliative Performance Status Version 2:  30%    PHYSICAL EXAM:  Vital Signs Last 24 Hrs  T(C): 37.4 (02 Oct 2017 11:00), Max: 37.4 (02 Oct 2017 11:00)  T(F): 99.3 (02 Oct 2017 11:00), Max: 99.3 (02 Oct 2017 11:00)  HR: 90 (02 Oct 2017 13:00) (73 - 97)  BP: 134/72 (02 Oct 2017 13:00) (113/57 - 169/74)  BP(mean): 97 (02 Oct 2017 13:00) (77 - 114)  RR: 36 (02 Oct 2017 13:00) (22 - 53)  SpO2: 100% (02 Oct 2017 13:00) (95% - 100%) I&O's Summary    01 Oct 2017 07:01  -  02 Oct 2017 07:00  --------------------------------------------------------  IN: 1120 mL / OUT: 1195 mL / NET: -75 mL    02 Oct 2017 07:01  -  02 Oct 2017 14:05  --------------------------------------------------------  IN: 210 mL / OUT: 270 mL / NET: -60 mL    General:  [ ] Alert  [ ] Oriented x      [x ] Lethargic  [ ] Agitated   [ ] Cachexia   [ ] Unarousable  [ x] Verbal  [ ] Non-Verbal    HEENT:  [ ] Normal   [x ] Dry mouth   [ ] ET Tube    [ ] Trach  [ ] Oral lesions    Lungs:   [ ] Clear [x ] Tachypnea  [ ] Audible excessive secretions   [ ] Rhonchi        [ ] Right [ ] Left [ ] Bilateral  [ ] Crackles        [ ] Right [ ] Left [ ] Bilateral  [ ] Wheezing     [ ] Right [ ] Left [ ] Bilateral    Cardiovascular:  [x ] Regular [ ] Irregular [ ] Tachycardia   [ ] Bradycardia  [ ] Murmur [ ] Other    Abdomen: [ ] Soft  [ ] Distended   [ ] +BS  [ ] Non tender [x ] Tender  [ ]PEG   [ ]OGT/ NGT   Last BM:       Genitourinary: [ ] Normal [ ] Incontinent   [ ] Oliguria/Anuria   [x ] Torres    Musculoskeletal:  [ ] Normal   [x ] Weakness  [ ] Bedbound/Wheelchair bound [ ] Edema    Neurological: [ ] No focal deficits  [x ] Cognitive impairment  [ ] Dysphagia [ ] Dysarthria [ ] Paresis [ ] Other     Skin: [ ] Normal   [ ] Pressure ulcer(s)                  [ ] Rash    LABS:                        7.7    26.6  )-----------( 247      ( 02 Oct 2017 10:44 )             23.5     10-02    139  |  100  |  28<H>  ----------------------------<  100<H>  4.4   |  28  |  1.08    Ca    8.2<L>      02 Oct 2017 02:54  Phos  2.5     10-02  Mg     2.4     10-02    TPro  5.0<L>  /  Alb  2.3<L>  /  TBili  7.8<H>  /  DBili  6.6<H>  /  AST  77<H>  /  ALT  45  /  AlkPhos  111  10-02    PT/INR - ( 02 Oct 2017 02:54 )   PT: 17.2 sec;   INR: 1.58 ratio       PTT - ( 02 Oct 2017 02:54 )  PTT:31.7 sec    Shock: No [ ] Septic [ ] Cardiogenic [ ] Neurologic [ ] Hypovolemic  Vasopressors x None   Inotrophs x None    Protein Calorie Malnutrition: [ ] Mild [ ] Moderate [ ] Severe    Oral Intake: [ ] Unable/mouth care only [x ] Minimal [ ] Moderate [ ] Full Capability  Diet: [ x] NPO [ ] Tube feeds [ ] TPN [ ] Other     RADIOLOGY & ADDITIONAL STUDIES: < from: CT Abdomen and Pelvis w/ Oral Cont and w/ IV Cont (09.28.17 @ 16:45) >  Moderate volume ascites with pelvic peritoneal inflammation. No evidence   of discrete abscess.    Progression of wedge-shaped areas of decreased perfusion within the   kidneys which may be related to infarct or infection.    Thrombus within the right renal vein, with new extension into the   inferior vena cava.    Left lower lobe consolidation with occlusion of the left lower lobe   airways compatible with patient's known malignancy.    Previously noted right upper lobe pulmonary emboli are poorly visualized   on the current examination. No new emboli.    Additional metastatic disease in the chest, abdomen and pelvis without   significant change from recent prior imaging.    < end of copied text >      REFERRALS:   [ ] Chaplaincy  [ ] Hospice  [ ] Child Life  [ ] Social Work  [ ] Case management [ ] Holistic Therapy

## 2017-10-02 NOTE — PROGRESS NOTE ADULT - SUBJECTIVE AND OBJECTIVE BOX
HISTORY:  62y Male initially admitted to neurosurgery for b/l leg weakness found to have newly diagnosed metastatic disease with spinal met causing spinal cord compression s/p T8-T11 laminectomy on 9/20, post-op course complicated by deterioration in respiratory status and acute onset abdominal pain requiring intubation and transfer to NSCU. CT chest/abdomen/pelvis demonstrated b/l DVTs, right upper lobe PE, and a bowel perforation with free air. Pt was taken emergently to the OR and is now s/p exploratory laparotomy, SBR x1 (left in discontinuity), abdominal washout, abthera VAC application on 9/22, after which he was transferred to SICU under ACS. He was taken back to the OR on 9/24 for a washout, primary anastomosis, and abdominal closure. Post-operative course complicated by Acute hypoxemic respiratory failure secondary to Acute Pulmonary Vascular congestion/ Left Lung Collapse.    HISTORY  62y Male    24 HOUR EVENTS: Pt placed on high flow NC during day due to worsening hypoxemia, complained of pain more frequently, Dilaudid changed from Q3H to Q2H PRN.     SUBJECTIVE/ROS:  [X] A ten-point review of systems was otherwise negative except as noted.    NEURO  Exam: Oriented to self, cooperative  Meds: HYDROmorphone  Injectable 0.2 milliGRAM(s) IV Push every 2 hours PRN Severe Pain (7 - 10)    [x] Adequacy of sedation and pain control has been assessed and adjusted      RESPIRATORY  RR: 30 (10-02-17 @ 04:00) (22 - 53)  SpO2: 100% (10-02-17 @ 04:10) (92% - 100%)  Wt(kg): --  Exam: unlabored, clear to auscultation bilaterally  Mechanical Ventilation:   ABG - ( 02 Oct 2017 02:48 )  pH: 7.48  /  pCO2: 35    /  pO2: 183   / HCO3: 26    / Base Excess: 3.0   /  SaO2: 100     Lactate: x        Meds: ALBUTerol/ipratropium for Nebulization 3 milliLiter(s) Nebulizer every 6 hours        CARDIOVASCULAR  HR: 81 (10-02-17 @ 04:10) (73 - 110)  BP: 121/65 (10-02-17 @ 04:00) (102/66 - 169/74)  BP(mean): 88 (10-02-17 @ 04:00) (77 - 114)  ABP: --  ABP(mean): --  Wt(kg): --  CVP(cm H2O): --  VBG - ( 01 Oct 2017 06:52 )  pH: 7.41  /  pCO2: 47    /  pO2: 22    / HCO3: 29    / Base Excess: 4.5   /  SaO2: 32     Lactate: 2.7                Exam:  Cardiac Rhythm:  Perfusion     [ ]Adequate   [ ]Inadequate  Mentation   [ ]Normal       [ ]Reduced  Extremities  [ ]Warm         [ ]Cool  Volume Status [ ]Hypervolemic [ ]Euvolemic [ ]Hypovolemic  Meds: metoprolol Injectable 5 milliGRAM(s) IV Push every 6 hours        GI/NUTRITION  Exam:  Diet:  Meds: pantoprazole  Injectable 40 milliGRAM(s) IV Push every 24 hours      GENITOURINARY  I&O's Detail    09-30 @ 07:01  -  10-01 @ 07:00  --------------------------------------------------------  IN:    dextrose 5% + sodium chloride 0.45%.: 660 mL    dextrose 5% + sodium chloride 0.45%.: 100 mL    IV PiggyBack: 650 mL    Solution: 250 mL  Total IN: 1660 mL    OUT:    Accordian: 140 mL    Indwelling Catheter - Urethral: 1380 mL    Intermittent Catheterization - Urethral: 350 mL  Total OUT: 1870 mL    Total NET: -210 mL      10-01 @ 07:01  -  10-02 @ 04:43  --------------------------------------------------------  IN:    dextrose 5% + sodium chloride 0.45%.: 630 mL    IV PiggyBack: 150 mL    Solution: 250 mL  Total IN: 1030 mL    OUT:    Indwelling Catheter - Urethral: 1085 mL  Total OUT: 1085 mL    Total NET: -55 mL        Weight (kg): 84.7 (10-01 @ 08:00)  10-02    139  |  100  |  28<H>  ----------------------------<  100<H>  4.4   |  28  |  1.08    Ca    8.2<L>      02 Oct 2017 02:54  Phos  2.5     10-02  Mg     2.4     10-02    TPro  5.0<L>  /  Alb  2.3<L>  /  TBili  7.8<H>  /  DBili  6.6<H>  /  AST  77<H>  /  ALT  45  /  AlkPhos  111  10-02    [ ] Torres catheter, indication: N/A  Meds: dextrose 5% + sodium chloride 0.45%. 1000 milliLiter(s) IV Continuous <Continuous>        HEMATOLOGIC  Meds: enoxaparin Injectable 90 milliGRAM(s) SubCutaneous every 12 hours    [x] VTE Prophylaxis                        7.6    27.5  )-----------( 217      ( 02 Oct 2017 02:54 )             22.9     PT/INR - ( 02 Oct 2017 02:54 )   PT: 17.2 sec;   INR: 1.58 ratio         PTT - ( 02 Oct 2017 02:54 )  PTT:31.7 sec  Transfusion     [ ] PRBC   [ ] Platelets   [ ] FFP   [ ] Cryoprecipitate      INFECTIOUS DISEASES  T(C): 36.8 (10-02-17 @ 03:00), Max: 37.3 (10-01-17 @ 11:00)  Wt(kg): --  WBC Count: 27.5 K/uL (10-02 @ 02:54)    Recent Cultures:    Meds: vancomycin  IVPB 1000 milliGRAM(s) IV Intermittent every 12 hours  vancomycin  IVPB      micafungin IVPB      micafungin IVPB 100 milliGRAM(s) IV Intermittent every 24 hours  meropenem IVPB      meropenem IVPB 1000 milliGRAM(s) IV Intermittent every 8 hours    ENDOCRINE  Capillary Blood Glucose    Meds:       ACCESS DEVICES:  [ ] Peripheral IV  [ ] Central Venous Line	[ ] R	[ ] L	[ ] IJ	[ ] Fem	[ ] SC	Placed:   [ ] Arterial Line		[ ] R	[ ] L	[ ] Fem	[ ] Rad	[ ] Ax	Placed:   [ ] PICC:					[ ] Mediport  [ ] Urinary Catheter, Date Placed:   [ ] Necessity of urinary, arterial, and venous catheters discussed    OTHER MEDICATIONS:      CODE STATUS:     IMAGING: HISTORY:  62y Male initially admitted to neurosurgery for b/l leg weakness found to have newly diagnosed metastatic disease with spinal met causing spinal cord compression s/p T8-T11 laminectomy on 9/20, post-op course complicated by deterioration in respiratory status and acute onset abdominal pain requiring intubation and transfer to NSCU. CT chest/abdomen/pelvis demonstrated b/l DVTs, right upper lobe PE, and a bowel perforation with free air. Pt was taken emergently to the OR and is now s/p exploratory laparotomy, SBR x1 (left in discontinuity), abdominal washout, abthera VAC application on 9/22, after which he was transferred to SICU under ACS. He was taken back to the OR on 9/24 for a washout, primary anastomosis, and abdominal closure. Post-operative course complicated by Acute hypoxemic respiratory failure secondary to Acute Pulmonary Vascular congestion/ Left Lung Collapse.    HISTORY  62y Male    24 HOUR EVENTS: Pt placed on high flow NC during day due to worsening hypoxemia, complained of pain more frequently, Dilaudid changed from Q3H to Q2H PRN.     SUBJECTIVE/ROS:  [X] A ten-point review of systems was otherwise negative except as noted.    NEURO  Exam: Oriented to self, cooperative  Meds: HYDROmorphone  Injectable 0.2 milliGRAM(s) IV Push every 2 hours PRN Severe Pain (7 - 10)    [x] Adequacy of sedation and pain control has been assessed and adjusted      RESPIRATORY  RR: 30 (10-02-17 @ 04:00) (22 - 53)  SpO2: 100% (10-02-17 @ 04:10) (92% - 100%)  Wt(kg): --  Exam: unlabored, clear to auscultation bilaterally  ABG - ( 02 Oct 2017 02:48 )  pH: 7.48  /  pCO2: 35    /  pO2: 183   / HCO3: 26    / Base Excess: 3.0   /  SaO2: 100     Lactate: x        Meds: ALBUTerol/ipratropium for Nebulization 3 milliLiter(s) Nebulizer every 6 hours    CARDIOVASCULAR  HR: 81 (10-02-17 @ 04:10) (73 - 110)  BP: 121/65 (10-02-17 @ 04:00) (102/66 - 169/74)  BP(mean): 88 (10-02-17 @ 04:00) (77 - 114)  ABP: --  ABP(mean): --  Wt(kg): --  CVP(cm H2O): --  VBG - ( 01 Oct 2017 06:52 )  pH: 7.41  /  pCO2: 47    /  pO2: 22    / HCO3: 29    / Base Excess: 4.5   /  SaO2: 32     Lactate: 2.7        Exam: regular rate and rhythm  Cardiac Rhythm: NSR  Perfusion     [X]Adequate   [ ]Inadequate  Mentation   [X]Normal       [ ]Reduced  Extremities  [X]Warm         [ ]Cool  Volume Status [ ]Hypervolemic [X]Euvolemic [ ]Hypovolemic  Meds: metoprolol Injectable 5 milliGRAM(s) IV Push every 6 hours        GI/NUTRITION  Exam: abd S/NT/ND with abdominal dsg C/D/I  Diet: NPO  Meds: pantoprazole  Injectable 40 milliGRAM(s) IV Push every 24 hours      GENITOURINARY  I&O's Detail    09-30 @ 07:01  -  10-01 @ 07:00  --------------------------------------------------------  IN:    dextrose 5% + sodium chloride 0.45%.: 660 mL    dextrose 5% + sodium chloride 0.45%.: 100 mL    IV PiggyBack: 650 mL    Solution: 250 mL  Total IN: 1660 mL    OUT:    Accordian: 140 mL    Indwelling Catheter - Urethral: 1380 mL    Intermittent Catheterization - Urethral: 350 mL  Total OUT: 1870 mL    Total NET: -210 mL      10-01 @ 07:01  -  10-02 @ 04:43  --------------------------------------------------------  IN:    dextrose 5% + sodium chloride 0.45%.: 630 mL    IV PiggyBack: 150 mL    Solution: 250 mL  Total IN: 1030 mL    OUT:    Indwelling Catheter - Urethral: 1085 mL  Total OUT: 1085 mL    Total NET: -55 mL        Weight (kg): 84.7 (10-01 @ 08:00)  10-02    139  |  100  |  28<H>  ----------------------------<  100<H>  4.4   |  28  |  1.08    Ca    8.2<L>      02 Oct 2017 02:54  Phos  2.5     10-02  Mg     2.4     10-02    TPro  5.0<L>  /  Alb  2.3<L>  /  TBili  7.8<H>  /  DBili  6.6<H>  /  AST  77<H>  /  ALT  45  /  AlkPhos  111  10-02    [ ] Torres catheter, indication: N/A  Meds: dextrose 5% + sodium chloride 0.45%. 1000 milliLiter(s) IV Continuous <Continuous>        HEMATOLOGIC  Meds: enoxaparin Injectable 90 milliGRAM(s) SubCutaneous every 12 hours    [x] VTE Prophylaxis                        7.6    27.5  )-----------( 217      ( 02 Oct 2017 02:54 )             22.9     PT/INR - ( 02 Oct 2017 02:54 )   PT: 17.2 sec;   INR: 1.58 ratio         PTT - ( 02 Oct 2017 02:54 )  PTT:31.7 sec  Transfusion     [ ] PRBC   [ ] Platelets   [ ] FFP   [ ] Cryoprecipitate      INFECTIOUS DISEASES  T(C): 36.8 (10-02-17 @ 03:00), Max: 37.3 (10-01-17 @ 11:00)  Wt(kg): --  WBC Count: 27.5 K/uL (10-02 @ 02:54)    Recent Cultures: x    Meds: vancomycin  IVPB 1000 milliGRAM(s) IV Intermittent every 12 hours  vancomycin  IVPB      micafungin IVPB      micafungin IVPB 100 milliGRAM(s) IV Intermittent every 24 hours  meropenem IVPB      meropenem IVPB 1000 milliGRAM(s) IV Intermittent every 8 hours    ENDOCRINE  Capillary Blood Glucose    ACCESS DEVICES:  [ ] Peripheral IV  [X] Central Venous Line	[ ] R	[X] L	[X] IJ	[ ] Fem	[ ] SC	Placed: 9/22  [ ] Arterial Line		[ ] R	[ ] L	[ ] Fem	[ ] Rad	[ ] Ax	Placed:   [ ] PICC:					[ ] Mediport  [ ] Urinary Catheter, Date Placed:   [ ] Necessity of urinary, arterial, and venous catheters discussed    CODE STATUS: full code    IMAGING: x

## 2017-10-02 NOTE — CONSULT NOTE ADULT - PROBLEM SELECTOR RECOMMENDATION 9
Metastatic non small cell lung cancer. Multiple complications for his met dz, including bowel perf, lung collapse, PE/DVT and now hyperbilirubinemia. Per heme-onc if hyperbilirubinemia is irreversible, any chemotherapy or targeted therapy would be extremely toxic. Son - Surrogate already had discussion with Heme-Onc. Pending f/u conversation with son and patient's partner scheduled on 10/4 @11am
-Change CPAP to Bipap 15/5  -Position patient R lung down  -Duoneb q6 with Mucomyst 20% 3cc q6hrs  -Repeat CXR this afternoon   -Aggressive chest PT  -If patient develops respiratory failure and has to be intubated, will plan to bronch
Biopsy of spinal mass pending. Pt doing well s/p laminectomy and spinal decompression. Pan imaging concerning for pervasive metastatic disease.  - will await results of biopsy. If lung primary, will request molecular lung panel.  - can order CEA level  - continue dexamethasone, taper as per neurosurgery  - please consult radiation-oncology for spinal met causing cord compression  - will refer to skilled nursing on discharge to establish care
continue anticoag  as above

## 2017-10-02 NOTE — PROGRESS NOTE ADULT - SUBJECTIVE AND OBJECTIVE BOX
Visit Summary: Patient seen and evaluated at bedside.    Overnight Events: none    Exam:  T(C): 37.1 (10-02-17 @ 07:00), Max: 37.3 (10-01-17 @ 11:00)  HR: 92 (10-02-17 @ 10:00) (73 - 103)  BP: 131/65 (10-02-17 @ 10:00) (102/66 - 169/74)  RR: 38 (10-02-17 @ 10:00) (22 - 53)  SpO2: 100% (10-02-17 @ 10:00) (95% - 100%)  Wt(kg): --      Neurological: AOx3, Following Commands    Motor exam:          Upper extremity                         Delt     Bicep     Tricep    HG                                                 R         5/5        5/5        5/5       5/5                                               L          5/5        5/5        5/5       5/5          Lower extremity                        HF         KF        KE       DF         PF                                                  R        0/5        0/5        0/5      0/5        0/5                                               L         1/5        0/5       0/5       2/5        2/5                                                 Sensation: [x] intact to light touch  [] decreased:     No clonus    Incision is CDI                        7.6    27.5  )-----------( 217      ( 02 Oct 2017 02:54 )             22.9     10-02    139  |  100  |  28<H>  ----------------------------<  100<H>  4.4   |  28  |  1.08    Ca    8.2<L>      02 Oct 2017 02:54  Phos  2.5     10-02  Mg     2.4     10-02    TPro  5.0<L>  /  Alb  2.3<L>  /  TBili  7.8<H>  /  DBili  6.6<H>  /  AST  77<H>  /  ALT  45  /  AlkPhos  111  10-02  PT/INR - ( 02 Oct 2017 02:54 )   PT: 17.2 sec;   INR: 1.58 ratio         PTT - ( 02 Oct 2017 02:54 )  PTT:31.7 sec

## 2017-10-02 NOTE — CONSULT NOTE ADULT - PROBLEM SELECTOR RECOMMENDATION 3
Multifactorial, lung CA + PE + Collapsed lung. Has had increase in FiO2 requirements in the last couple of days. Currently on high flow. Pending discussion with son about GOC. Patient would benefit from opiates PRN for dyspnea.
Stage IV NSC Lung CA

## 2017-10-02 NOTE — CONSULT NOTE ADULT - ATTENDING COMMENTS
Patient seen, with dyspnea. In agreement with starting dilaudid ATC for symptoms. Family meeting on Wed at 11am.

## 2017-10-02 NOTE — CONSULT NOTE ADULT - ASSESSMENT
61yo M with no significant PMH. Consulted on 9/12 for worsening LBP associated with lower extremity weakness for past 3 weeks. Over the weekend, he progressed to the point where he was unable to ambulate. He was seen at OSH and transferred to [facility] for spine evaluation. Was found to have epidural compression T8-T11 from newly diagnosed metastatic disease to liver, bone and bowel mets. S/p laminectomy on 9/20. Hospitalization complicated by acute hypoxic respiratory failure 2/2 acute pulmonary vascular congestion, L lung collapse, b/l PE, RUL PNA. Was found to have bowel perf with free air, was taken for ex-lap on 9/22, closure on 9/24. Peritonitis subsequently - E. faecium, Klebsiella and Candida. Consult placed for GOC and symptom management.

## 2017-10-02 NOTE — PROGRESS NOTE ADULT - PROBLEM SELECTOR PLAN 1
-Unclear how much of this is extrinsic compression 2nd tumor vs. mucous plugging  but his lower lobe was up on CXR 9/26 after bipap overnight  -c/w Bipap 20/15 overnight, Hi Vineet daytime for PEEP  -Duoneb q6  -Chest PT, OOB, Acapella valve, chest vest for mucous clearance

## 2017-10-02 NOTE — PROGRESS NOTE ADULT - ASSESSMENT
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 Complicated by acute hypoxemic respiratory failure requiring CPAP.     PLAN:  Neurologic: post-operative pain  -Will continue with Dilaudid PCA    Respiratory: Acute hypoxemic respiratory failure, improving  -Will administer supplemental O2 to maintain SpO2 > 88%  -Duonebs/ Mucomyst  -PT/ OOB  -Nocturnal Bipap (18/15, FiO2 50%)    Cardiovascular: Hypertension  -Continue metoprolol 5mg IV q6hrs    Gastrointestinal/Nutrition: small bowel perforation s/p SBR, anastomosis   -NPO  -Await GI function  -Protonix 40mg daily    Renal/Genitourinary: ALEC, resolving  -Continue D5 1/2NS to 30mL/hr  -Continue to monitor urine output and BUN/Cr    Hematologic: b/l DVT; b/l PE  -Continue with therapeutic lovenox  -f/u Heme/Onc    Infectious Disease:Secondary Peritonitis 2/2 to Vanco sensitive E. 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 Complicated by acute hypoxemic respiratory failure requiring CPAP.     PLAN:  Neurologic: post-operative pain  -Will continue with Dilaudid IV PRN    Respiratory: Acute hypoxemic respiratory failure, improving  -Will administer supplemental O2 to maintain SpO2 > 88%, on HFNC FiO2 40%  -Duonebs/ Mucomyst  -PT/ OOB    Cardiovascular: Hypertension  -Continue metoprolol 5mg IV q6hrs    Gastrointestinal/Nutrition: small bowel perforation s/p SBR, anastomosis   -NPO  -Await GI function  -Protonix 40mg daily    Renal/Genitourinary: ALEC, resolving  -Continue D5 1/2NS to 30mL/hr  -Continue to monitor urine output and BUN/Cr    Hematologic: b/l DVT; b/l PE  -Continue with therapeutic lovenox  -f/u Heme/Onc    Infectious Disease:Secondary Peritonitis 2/2 to Vanco sensitive E. Faecium, Klebsiella, Candida Albicans; leukocytosis  -Continue Vanco, Meropenem, Micafungin  -f/u ID    Disposition: SICU, palliative consult

## 2017-10-02 NOTE — PROGRESS NOTE ADULT - SUBJECTIVE AND OBJECTIVE BOX
Patient is a 62y old  Male who presents with a chief complaint of cord compression (19 Sep 2017 09:44)    Being followed by ID for help with management    Events of weekend reviewed  pt sitting in chair- minimal to no movement of LLE, no mvmt of RLE  abdomen comfortable  wearing high antonio    PAST MEDICAL & SURGICAL HISTORY:      Antimicrobials:    vancomycin  IVPB 1000 milliGRAM(s) IV Intermittent every 12 hours  vancomycin  IVPB      micafungin IVPB      micafungin IVPB 100 milliGRAM(s) IV Intermittent every 24 hours  meropenem IVPB      meropenem IVPB 1000 milliGRAM(s) IV Intermittent every 8 hours    MEDICATIONS  (STANDING):  vancomycin  IVPB 1000 milliGRAM(s) IV Intermittent every 12 hours  vancomycin  IVPB      ALBUTerol/ipratropium for Nebulization 3 milliLiter(s) Nebulizer every 6 hours  micafungin IVPB      micafungin IVPB 100 milliGRAM(s) IV Intermittent every 24 hours  pantoprazole  Injectable 40 milliGRAM(s) IV Push every 24 hours  meropenem IVPB      meropenem IVPB 1000 milliGRAM(s) IV Intermittent every 8 hours  dextrose 5% + sodium chloride 0.45%. 1000 milliLiter(s) (30 mL/Hr) IV Continuous <Continuous>  metoprolol Injectable 5 milliGRAM(s) IV Push every 6 hours  enoxaparin Injectable 90 milliGRAM(s) SubCutaneous every 12 hours      Vital Signs Last 24 Hrs  T(C): 37.1 (10-02-17 @ 15:00), Max: 37.4 (10-02-17 @ 11:00)  T(F): 98.8 (10-02-17 @ 15:00), Max: 99.3 (10-02-17 @ 11:00)  HR: 103 (10-02-17 @ 17:00) (73 - 103)  BP: 128/72 (10-02-17 @ 17:00) (104/71 - 169/74)  BP(mean): 93 (10-02-17 @ 17:00) (77 - 114)  RR: 30 (10-02-17 @ 17:00) (22 - 53)  SpO2: 92% (10-02-17 @ 17:00) (90% - 100%)    Physical Exam:    sitting in chair  wearing High antonio  lungs- clear  cor- s1s2  abd- nontender- wound dressed  ext- edema  minimal to no mvmt LLE , no mvmt RLE  back wound - clean/ dry /intact      Lab Data:                          7.7    26.6  )-----------( 247      ( 02 Oct 2017 10:44 )             23.5       10-02    139  |  100  |  28<H>  ----------------------------<  100<H>  4.4   |  28  |  1.08    Ca    8.2<L>      02 Oct 2017 02:54  Phos  2.5     10-02  Mg     2.4     10-02    TPro  5.0<L>  /  Alb  2.3<L>  /  TBili  7.8<H>  /  DBili  6.6<H>  /  AST  77<H>  /  ALT  45  /  AlkPhos  111  10-02        Vancomycin Level, Trough: 12.8 ug/mL (09-30-17 @ 17:48)      Culture - Blood (09.27.17 @ 02:28)    Specimen Source: .Blood Blood-Peripheral    Culture Results:   No growth at 5 days.    Culture - Body Fluid with Gram Stain (09.23.17 @ 00:35)    -  Ciprofloxacin: I 2    -  Ciprofloxacin: S <=1    -  Ertapenem: S <=1    Gram Stain:   polymorphonuclear leukocytes seen  Gram Positive Cocci in Pairs and Chains seen  Gram Variable Rods seen  by cytocentrifuge    -  Amikacin: S <=16    -  Ampicillin: R >8    -  Ampicillin: R >16    -  Ampicillin/Sulbactam: S <=8/4    -  Aztreonam: S <=4    -  Cefazolin: S <=8    -  Cefepime: S <=4    -  Cefoxitin: S <=8    -  Ceftazidime: S <=1    -  Ceftriaxone: S <=1    -  Tetra/Doxy: R >8    -  Tobramycin: S <=4    -  Trimethoprim/Sulfamethoxazole: S <=2/38    -  Erythromycin: I 4    -  Gentamicin: S <=4    -  Imipenem: S <=1    -  Levofloxacin: S <=2    -  Meropenem: S <=1    -  Piperacillin/Tazobactam: S <=16    -  Vancomycin: S 1    Specimen Source: Peritoneal 2 peritoneal fluid for culture    Culture Results:   Numerous Enterococcus faecium  Few Candida albicans  Growth in fluid media only Klebsiella pneumoniae    Organism Identification: Enterococcus faecium  Klebsiella pneumoniae    Organism: Enterococcus faecium    Organism: Klebsiella pneumoniae    Method Type: CARLY    Method Type: CARLY

## 2017-10-02 NOTE — PROGRESS NOTE ADULT - ATTENDING COMMENTS
Pt seen and examined.  Chart reviewed.  Resident note confirmed.  Pt is a 62 year old male admitted to neurosurgery for bilateral leg weakness found to have newly diagnosed metastatic disease.  Pt is s/p T8-T11 laminectomy with subsequent deterioration in respiratory status/acute onset abdominal pain.  Work up revealed bilateral DVTs, right upper lobe PE, and small intestinal perforation.  Pt is s/p Exploratory laparotomy, SBR x1 (left in discontinuity), abdominal washout, abthera VAC application (9/22) with intraop findings of an intestinal perforation and a large amount of succus in the abdomen. Pt s/p return to OR on 9/24 for intestinal reconstruction and abdominal closure.  No acute events overnight.  Continue pain control.  Echocardiogram is without evidence of right heart strain.  He remains extubated  His mental and respiratory status are improved today. He has been afebrile, with decreasing WBC and procalcitonin.    Current management includes:    1) Left sided lung collapse and atelectasis-improved; patient has been on nasal cannula but does appear to have hypoxic episodes as he continues to intermittently collapse his left lung     CXR- with left sided increasing pleural effusion    -continue incentive spirometer and acapella and chest PT    -keep SpO2 < 92%      2) leukocytosis – wbc 27.5     - E faecalis and Candida albicans cells from intraoperative peritoneal fluid  -will continue zosyn, vancomycin and micafungin   -fungitell >500  -blood cultures NGTD, urinalysis negative    3) Right segmental and subsegmental right upper lobe PE-continue lovenox    4) Keep NPO with IVF at 30 cc/hour; awaiting bowel function.  Rectal exam today    5) hyperbilirubinemia- continues to be elevated; this is likely due to progression of his malignancy   -Oncology will discuss prognosis with family this week.  -Palliative care consult   The patient continues to remain in critical condition.

## 2017-10-02 NOTE — PROGRESS NOTE ADULT - ATTENDING COMMENTS
as above Stable from vasc standpoint   recommend to continue anticoag rx for 3-6mo w f/u US or CTV abd/pelvis in 3 mo  f/u as outpt  reconsult prn

## 2017-10-02 NOTE — PROGRESS NOTE ADULT - SUBJECTIVE AND OBJECTIVE BOX
Patient is a 62y old  Male who presented with a chief complaint of cord compression (19 Sep 2017 09:44)      INTERVAL HPI/OVERNIGHT EVENTS:  no BM no flatus  Remains in SICU  no fever or chills    MEDICATIONS  (STANDING):  vancomycin  IVPB 1000 milliGRAM(s) IV Intermittent every 12 hours  vancomycin  IVPB      ALBUTerol/ipratropium for Nebulization 3 milliLiter(s) Nebulizer every 6 hours  micafungin IVPB      micafungin IVPB 100 milliGRAM(s) IV Intermittent every 24 hours  pantoprazole  Injectable 40 milliGRAM(s) IV Push every 24 hours  meropenem IVPB      meropenem IVPB 1000 milliGRAM(s) IV Intermittent every 8 hours  dextrose 5% + sodium chloride 0.45%. 1000 milliLiter(s) (30 mL/Hr) IV Continuous <Continuous>  metoprolol Injectable 5 milliGRAM(s) IV Push every 6 hours  enoxaparin Injectable 90 milliGRAM(s) SubCutaneous every 12 hours    MEDICATIONS  (PRN):  HYDROmorphone  Injectable 0.2 milliGRAM(s) IV Push every 2 hours PRN Severe Pain (7 - 10)      Allergies    No Known Allergies    Intolerances        Review of Systems:  Gen: no fever or chills  CV: denies CP  Resp: denies dyspnea at this time, on high flow nasal O2 and night time CPAP  Neuro: no focal weakness    Vital Signs Last 24 Hrs  T(C): 37.4 (02 Oct 2017 11:00), Max: 37.4 (02 Oct 2017 11:00)  T(F): 99.3 (02 Oct 2017 11:00), Max: 99.3 (02 Oct 2017 11:00)  HR: 90 (02 Oct 2017 13:00) (73 - 97)  BP: 134/72 (02 Oct 2017 13:00) (113/57 - 169/74)  BP(mean): 97 (02 Oct 2017 13:00) (77 - 114)  RR: 36 (02 Oct 2017 13:00) (22 - 53)  SpO2: 100% (02 Oct 2017 13:00) (95% - 100%)    PHYSICAL EXAM:  Constitutional: +NGT - green, bilious, on nasal high flow,responsive  Neck: supple  Respiratory: decreased BS bases, rhonchi  Cardiovascular: S1 and S2, regular  Gastrointestinal: distended, nontender, +midline incision +michelle  Extremities: +edema b/l  Vascular: 2+ peripheral pulses  Neurological: alert and appropriate  Skin: No rashes    LABS:                        7.7    26.6  )-----------( 247      ( 02 Oct 2017 10:44 )             23.5     10-02    139  |  100  |  28<H>  ----------------------------<  100<H>  4.4   |  28  |  1.08    Ca    8.2<L>      02 Oct 2017 02:54  Phos  2.5     10-02  Mg     2.4     10-02    TPro  5.0<L>  /  Alb  2.3<L>  /  TBili  7.8<H>  /  DBili  6.6<H>  /  AST  77<H>  /  ALT  45  /  AlkPhos  111  10-02    PT/INR - ( 02 Oct 2017 02:54 )   PT: 17.2 sec;   INR: 1.58 ratio         PTT - ( 02 Oct 2017 02:54 )  PTT:31.7 sec    LIVER FUNCTIONS - ( 02 Oct 2017 02:54 )  Alb: 2.3 g/dL / Pro: 5.0 g/dL / ALK PHOS: 111 U/L / ALT: 45 U/L RC / AST: 77 U/L / GGT: x             RADIOLOGY & ADDITIONAL TESTS:

## 2017-10-02 NOTE — PROGRESS NOTE ADULT - SUBJECTIVE AND OBJECTIVE BOX
Patient is a 62y old  Male who presents with a chief complaint of cord compression (19 Sep 2017 09:44)      Vascular Surgery Attending Progress Note    Interval HPI:     Medications:  vancomycin  IVPB 1000 milliGRAM(s) IV Intermittent every 12 hours  vancomycin  IVPB      ALBUTerol/ipratropium for Nebulization 3 milliLiter(s) Nebulizer every 6 hours  micafungin IVPB      micafungin IVPB 100 milliGRAM(s) IV Intermittent every 24 hours  pantoprazole  Injectable 40 milliGRAM(s) IV Push every 24 hours  meropenem IVPB      meropenem IVPB 1000 milliGRAM(s) IV Intermittent every 8 hours  dextrose 5% + sodium chloride 0.45%. 1000 milliLiter(s) IV Continuous <Continuous>  metoprolol Injectable 5 milliGRAM(s) IV Push every 6 hours  enoxaparin Injectable 90 milliGRAM(s) SubCutaneous every 12 hours  HYDROmorphone  Injectable 0.2 milliGRAM(s) IV Push every 2 hours PRN      Vital Signs Last 24 Hrs  T(C): 37.4 (02 Oct 2017 11:00), Max: 37.4 (02 Oct 2017 11:00)  T(F): 99.3 (02 Oct 2017 11:00), Max: 99.3 (02 Oct 2017 11:00)  HR: 90 (02 Oct 2017 13:00) (73 - 97)  BP: 134/72 (02 Oct 2017 13:00) (113/57 - 169/74)  BP(mean): 97 (02 Oct 2017 13:00) (77 - 114)  RR: 36 (02 Oct 2017 13:00) (22 - 53)  SpO2: 100% (02 Oct 2017 13:00) (95% - 100%)  I&O's Summary    01 Oct 2017 07:01  -  02 Oct 2017 07:00  --------------------------------------------------------  IN: 1120 mL / OUT: 1195 mL / NET: -75 mL    02 Oct 2017 07:01  -  02 Oct 2017 14:05  --------------------------------------------------------  IN: 210 mL / OUT: 270 mL / NET: -60 mL        Physical Exam:  Neuro  A&Ox3 VSS  CV:   Lungs:  Vascular:    Pulses  femoral   rt    +12       lt   +12                 popliteal  rt   +12        lt  +12                DPA          rt   +12        lt  +12                PTA          rt   +12        lt  +12   Extremity:   Musculoskeletal:    LABS:                        7.7    26.6  )-----------( 247      ( 02 Oct 2017 10:44 )             23.5     10-02    139  |  100  |  28<H>  ----------------------------<  100<H>  4.4   |  28  |  1.08    Ca    8.2<L>      02 Oct 2017 02:54  Phos  2.5     10-02  Mg     2.4     10-02    TPro  5.0<L>  /  Alb  2.3<L>  /  TBili  7.8<H>  /  DBili  6.6<H>  /  AST  77<H>  /  ALT  45  /  AlkPhos  111  10-02    PT/INR - ( 02 Oct 2017 02:54 )   PT: 17.2 sec;   INR: 1.58 ratio         PTT - ( 02 Oct 2017 02:54 )  PTT:31.7 sec    TABITHA CAMARILLO MD  [phone] Patient is a 62y old  Male who presents with a chief complaint of cord compression (19 Sep 2017 09:44)      Vascular Surgery Attending Progress Note    Interval HPI: pt w/o c/o    Medications:  vancomycin  IVPB 1000 milliGRAM(s) IV Intermittent every 12 hours  vancomycin  IVPB      ALBUTerol/ipratropium for Nebulization 3 milliLiter(s) Nebulizer every 6 hours  micafungin IVPB      micafungin IVPB 100 milliGRAM(s) IV Intermittent every 24 hours  pantoprazole  Injectable 40 milliGRAM(s) IV Push every 24 hours  meropenem IVPB      meropenem IVPB 1000 milliGRAM(s) IV Intermittent every 8 hours  dextrose 5% + sodium chloride 0.45%. 1000 milliLiter(s) IV Continuous <Continuous>  metoprolol Injectable 5 milliGRAM(s) IV Push every 6 hours  enoxaparin Injectable 90 milliGRAM(s) SubCutaneous every 12 hours  HYDROmorphone  Injectable 0.2 milliGRAM(s) IV Push every 2 hours PRN      Vital Signs Last 24 Hrs  T(C): 37.4 (02 Oct 2017 11:00), Max: 37.4 (02 Oct 2017 11:00)  T(F): 99.3 (02 Oct 2017 11:00), Max: 99.3 (02 Oct 2017 11:00)  HR: 90 (02 Oct 2017 13:00) (73 - 97)  BP: 134/72 (02 Oct 2017 13:00) (113/57 - 169/74)  BP(mean): 97 (02 Oct 2017 13:00) (77 - 114)  RR: 36 (02 Oct 2017 13:00) (22 - 53)  SpO2: 100% (02 Oct 2017 13:00) (95% - 100%)  I&O's Summary    01 Oct 2017 07:01  -  02 Oct 2017 07:00  --------------------------------------------------------  IN: 1120 mL / OUT: 1195 mL / NET: -75 mL    02 Oct 2017 07:01  -  02 Oct 2017 14:05  --------------------------------------------------------  IN: 210 mL / OUT: 270 mL / NET: -60 mL        Physical Exam:  Neuro  A&Ox1 VSS  Abd soft  Vascular:  stable  LABS:                        7.7    26.6  )-----------( 247      ( 02 Oct 2017 10:44 )             23.5     10-02    139  |  100  |  28<H>  ----------------------------<  100<H>  4.4   |  28  |  1.08    Ca    8.2<L>      02 Oct 2017 02:54  Phos  2.5     10-02  Mg     2.4     10-02    TPro  5.0<L>  /  Alb  2.3<L>  /  TBili  7.8<H>  /  DBili  6.6<H>  /  AST  77<H>  /  ALT  45  /  AlkPhos  111  10-02    PT/INR - ( 02 Oct 2017 02:54 )   PT: 17.2 sec;   INR: 1.58 ratio         PTT - ( 02 Oct 2017 02:54 )  PTT:31.7 sec    TABITHA CAMARILOL MD  [phone]

## 2017-10-02 NOTE — PROGRESS NOTE ADULT - SUBJECTIVE AND OBJECTIVE BOX
ATP Progress Note    S: Pt placed on high flow NC during day due to worsening hypoxemia, complained of pain more frequently, Dilaudid changed from Q3H to Q2H PRN.     O:  T(C): 37.1 (10-02-17 @ 07:00), Max: 37.3 (10-01-17 @ 11:00)  HR: 81 (10-02-17 @ 07:00) (73 - 106)  BP: 120/69 (10-02-17 @ 07:00) (102/66 - 169/74)  RR: 28 (10-02-17 @ 07:00) (22 - 53)  SpO2: 100% (10-02-17 @ 07:00) (95% - 100%)  Wt(kg): --    10-01 @ 07:01  -  10-02 @ 07:00  --------------------------------------------------------  IN:    dextrose 5% + sodium chloride 0.45%.: 720 mL    IV PiggyBack: 150 mL    Solution: 250 mL  Total IN: 1120 mL    OUT:    Indwelling Catheter - Urethral: 1195 mL  Total OUT: 1195 mL    Total NET: -75 mL        PHYSICAL EXAM:  Gen: NAD, following commands  Resp: Pt on high flow NC, no acute resp distress  Abd: Soft, NT, ND  Incision: c/d/i                          7.6    27.5  )-----------( 217      ( 02 Oct 2017 02:54 )             22.9     10-02    139  |  100  |  28<H>  ----------------------------<  100<H>  4.4   |  28  |  1.08    Ca    8.2<L>      02 Oct 2017 02:54  Phos  2.5     10-02  Mg     2.4     10-02    TPro  5.0<L>  /  Alb  2.3<L>  /  TBili  7.8<H>  /  DBili  6.6<H>  /  AST  77<H>  /  ALT  45  /  AlkPhos  111  10-02

## 2017-10-02 NOTE — PROGRESS NOTE ADULT - SUBJECTIVE AND OBJECTIVE BOX
Patient is a 62y old  Male who presents with a chief complaint of cord compression found to have NSCLC with mets        MEDICATIONS  (STANDING):  vancomycin  IVPB 1000 milliGRAM(s) IV Intermittent every 12 hours  vancomycin  IVPB      ALBUTerol/ipratropium for Nebulization 3 milliLiter(s) Nebulizer every 6 hours  micafungin IVPB      micafungin IVPB 100 milliGRAM(s) IV Intermittent every 24 hours  pantoprazole  Injectable 40 milliGRAM(s) IV Push every 24 hours  meropenem IVPB      meropenem IVPB 1000 milliGRAM(s) IV Intermittent every 8 hours  dextrose 5% + sodium chloride 0.45%. 1000 milliLiter(s) (30 mL/Hr) IV Continuous <Continuous>  metoprolol Injectable 5 milliGRAM(s) IV Push every 6 hours  enoxaparin Injectable 90 milliGRAM(s) SubCutaneous every 12 hours    MEDICATIONS  (PRN):  HYDROmorphone  Injectable 0.2 milliGRAM(s) IV Push every 2 hours PRN Severe Pain (7 - 10)      REVIEW OF SYSTEM:  CONSTITUTIONAL: No fever, No change in weight, No fatigue  HEAD: No headache, No dizziness, No recent trauma  EYES: No eye pain, No visual disturbances, No discharge  ENT:  No difficulty hearing, No tinnitus, No vertigo, No sinus pain, No throat pain  NECK: No pain, No stiffness  BREASTS: No pain, No masses, No nipple discharge  RESPIRATORY: No cough, No wheezing, No chills, No hemoptysis, No shortness of breath at rest or exertional shortness of breath  CARDIOVASCULAR: No chest pain, No palpitations, No dizziness, No CHF, No arrhythmia, No cardiomegaly, No leg swelling  GASTROINTESTINAL: No abdominal, No epigastric pain. No nausea, No vomiting, No hematemesis, No diarrhea, No constipation. No melena, No hematochezia. No GERD  GENITOURINARY: No dysuria, No frequency, No hematuria, No incontinence, No nocturia, No hesitancy,  SKIN: No itching, No burning, No rashes, No lesions   LYMPH NODES: No history of enlarged glands  ENDOCRINE: No heat or cold intolerance, No hair loss. No osteoporosis, No thyroid disease  MUSCULOSKELETAL: No joint pain or swelling, No muscle, back, or extremity pain  PSYCHIATRIC: No depression, No anxiety, No mood swings, No difficulty sleeping  HEME/LYMPH: No easy bruising, No anticoagulants, No bleeding disorder, No bleeding gums  ALLERGY AND IMMUNOLOGIC: No hives, No eczema  NEUROLOGICAL: No memory loss, (+) cord compression with muscle weakness right more than left      VITALS:   T(C): 37.3 (10-02-17 @ 19:00), Max: 37.4 (10-02-17 @ 11:00)  HR: 92 (10-02-17 @ 21:00) (80 - 103)  BP: 126/85 (10-02-17 @ 21:00) (104/71 - 160/74)  RR: 30 (10-02-17 @ 21:00) (22 - 45)  SpO2: 96% (10-02-17 @ 21:00) (90% - 100%)  Wt(kg): --    PHYSICAL EXAM:  GENERAL: NAD, well nourished and conversant  HEAD:  Atraumatic  EYES: EOM, PERRLA, conjunctiva pink and sclera white  ENT: No tonsillar erythema, exudates, or enlargement, moist mucous membranes, good dentition, no lesions  NECK: Supple, No JVD, normal thyroid, carotids with normal upstrokes and no bruits  CHEST/LUNG: Clear to auscultation bilaterally, No rales, rhonchi, wheezing, or rubs  HEART: Regular rate and rhythm, No murmurs, rubs, or gallops  ABDOMEN: Soft, nondistended, no masses, guarding, tenderness or rebound, bowel sounds present  EXTREMITIES:  2+ Peripheral Pulses, No clubbing, cyanosis, or edema. No arthritis of shoulders, elbows, hands, hips, knees, ankles, or feet. No DJD C spine, T spine, or L/S spine  LYMPH: No lymphadenopathy noted  SKIN: No rashes or lesions  NERVOUS SYSTEM:  Alert & Oriented X3, normal cognitive function. (+)Cord compression with right greater than left weakness    LABS:  ABG - ( 02 Oct 2017 02:48 )  pH: 7.48  /  pCO2: 35    /  pO2: 183   / HCO3: 26    / Base Excess: 3.0   /  SaO2: 100                   CBC Full  -  ( 02 Oct 2017 10:44 )  WBC Count : 26.6 K/uL  Hemoglobin : 7.7 g/dL  Hematocrit : 23.5 %  Platelet Count - Automated : 247 K/uL  Mean Cell Volume : 96.5 fl  Mean Cell Hemoglobin : 31.4 pg  Mean Cell Hemoglobin Concentration : 32.6 gm/dL  Auto Neutrophil # : 24.2 K/uL  Auto Lymphocyte # : 0.8 K/uL  Auto Monocyte # : 1.2 K/uL  Auto Eosinophil # : 0.3 K/uL  Auto Basophil # : 0.0 K/uL  Auto Neutrophil % : 94.0 %  Auto Lymphocyte % : 2.0 %  Auto Monocyte % : 4.0 %  Auto Eosinophil % : x  Auto Basophil % : x    10-02    139  |  100  |  28<H>  ----------------------------<  100<H>  4.4   |  28  |  1.08    Ca    8.2<L>      02 Oct 2017 02:54  Phos  2.5     10-02  Mg     2.4     10-02    TPro  5.0<L>  /  Alb  2.3<L>  /  TBili  7.8<H>  /  DBili  6.6<H>  /  AST  77<H>  /  ALT  45  /  AlkPhos  111  10-02    LIVER FUNCTIONS - ( 02 Oct 2017 02:54 )  Alb: 2.3 g/dL / Pro: 5.0 g/dL / ALK PHOS: 111 U/L / ALT: 45 U/L RC / AST: 77 U/L / GGT: x           PT/INR - ( 02 Oct 2017 02:54 )   PT: 17.2 sec;   INR: 1.58 ratio         PTT - ( 02 Oct 2017 02:54 )  PTT:31.7 sec    CAPILLARY BLOOD GLUCOSE          RADIOLOGY & ADDITIONAL TESTS:

## 2017-10-02 NOTE — PROGRESS NOTE ADULT - ASSESSMENT
2-year-old male with newly diagnosed metastatic disease s/p T8-T11 laminectomies for resection of dorsal epidural tumor for spinal cord compression on 9/19/17. His hospital course was complicated by a right upper lobe pulmonary embolus, B/L lower extremity DVTs, and a bowel perforation, requiring two surgeries for repair with General Surgery.  - PT evaluation  - Will likely need spinal cord rehab post hospitalization  - Will d/c staple tomorrow  - Neurochecks q4hrs

## 2017-10-03 NOTE — PROGRESS NOTE ADULT - SUBJECTIVE AND OBJECTIVE BOX
Patient is a 62y old  Male who presents with a chief complaint of cord compression (19 Sep 2017 09:44)    Being followed by ID for help with antibioitcs        pt resting quietly  some abd discomfort- not much  limited mvmt of LLE  still requiring high antonio  No N/V/D  had normal bowel mvmt ysterday  michelle in place  No HA  No other complaints    PAST MEDICAL & SURGICAL HISTORY:      Antimicrobials:    meropenem IVPB      meropenem IVPB 1000 milliGRAM(s) IV Intermittent every 8 hours  micafungin IVPB      micafungin IVPB 100 milliGRAM(s) IV Intermittent every 24 hours  vancomycin  IVPB 1000 milliGRAM(s) IV Intermittent every 12 hours  vancomycin  IVPB        MEDICATIONS  (STANDING):  ALBUTerol/ipratropium for Nebulization 3 milliLiter(s) Nebulizer every 6 hours  dextrose 5% + sodium chloride 0.45%. 1000 milliLiter(s) (30 mL/Hr) IV Continuous <Continuous>  enoxaparin Injectable 90 milliGRAM(s) SubCutaneous every 12 hours  haloperidol    Injectable 2.5 milliGRAM(s) IV Push once  meropenem IVPB      meropenem IVPB 1000 milliGRAM(s) IV Intermittent every 8 hours  metoprolol Injectable 5 milliGRAM(s) IV Push every 6 hours  micafungin IVPB      micafungin IVPB 100 milliGRAM(s) IV Intermittent every 24 hours  pantoprazole  Injectable 40 milliGRAM(s) IV Push every 24 hours  vancomycin  IVPB 1000 milliGRAM(s) IV Intermittent every 12 hours  vancomycin  IVPB          Vital Signs Last 24 Hrs  T(C): 36.9 (10-03-17 @ 07:00), Max: 37.4 (10-02-17 @ 11:00)  T(F): 98.5 (10-03-17 @ 07:00), Max: 99.3 (10-02-17 @ 11:00)  HR: 85 (10-03-17 @ 08:15) (74 - 103)  BP: 126/80 (10-03-17 @ 08:00) (104/71 - 155/87)  BP(mean): 98 (10-03-17 @ 08:00) (84 - 114)  RR: 32 (10-03-17 @ 08:15) (22 - 43)  SpO2: 100% (10-03-17 @ 08:15) (90% - 100%)    Physical Exam:    Constitutional : lying in bed    HEENT PERRLA  wearing high antonio      No oral exudate or erythema  ? subtle decreased LNLF vs assymetry due to pull of high antonio    Neck supple no JVD or LN    Chest Good AE,CTA    CVS RRR S1 S2 WNl     Abd softly distended  nontender incision clean    Ext No cyanosis clubbing or edema    IV site no erythema tenderness or discharge  minimal mvmt LLE no spont mvmt RLE        Lab Data:                          7.8    25.3  )-----------( 251      ( 03 Oct 2017 03:12 )             24.0       10-03    139  |  101  |  32<H>  ----------------------------<  106<H>  4.2   |  27  |  1.12    Ca    8.3<L>      03 Oct 2017 03:12  Phos  2.8     10-03  Mg     2.4     10-03    TPro  5.2<L>  /  Alb  2.4<L>  /  TBili  5.8<H>  /  DBili  4.9<H>  /  AST  88<H>  /  ALT  56<H>  /  AlkPhos  115  10-03          .Blood Blood-Peripheral  09-27-17   No growth at 5 days.  --  --      .Blood Blood-Peripheral  09-26-17   No growth at 5 days.  --  --                Vancomycin Level, Trough: 17.7 ug/mL (10-02-17 @ 18:05)

## 2017-10-03 NOTE — PROGRESS NOTE ADULT - PROBLEM SELECTOR PLAN 4
Patient is full code. Pending conversations of [facility] with family. Meeting set up for 10/4 at 11AM. Discussed with patient's partner, has a lot of existential distress, feels guilty about not noticing that patient was sick earlier. Has ongoing family issues of her own. Will f/u with family meeting tomorrow.

## 2017-10-03 NOTE — PROGRESS NOTE ADULT - ASSESSMENT
62M admitted with cord compression (t9 to t11 mets with epidural extension on MRI), S/P laminectomy, diagnosed as adenocarcinoma.   ON 9/22 he was found to have small bowel perforation due to ischemia, or infection, and was taken to the or, for exlap- SBR, abd wash out, 10 cm resection and went on 24th again- abdominal washout, enteroenterostomy and abdominal closure. In SICU for management.  . His hospital course was also complicated by a right upper lobe pulmonary embolus, B/L lower extremity DVTs,  Has had leucocytosis and elevated fungitell. CT with lung consolidation, ascites, IVC thrombus, PE.  Covered broadly for peritonitis and fungal infection.  Abdominal fluid with E Faecium (amp resistant) and Klebsiella and candida  On Vanco/Meropenem, micafungin.  Continue Vancomycin (for E Faecium) Meropenem (Fro Klebsiella and inter-abdominal/lung infection), and Micafungin for candida     WBC remains elevated, ? in part secondary leukemoid reaction from liver mets    Palliative input appreciated    await follow up cultures  consider imaging of brain to r/o mets     I will be away as of tomorrow. My associates will be covering. Please call [phone] with acute issues, questions.

## 2017-10-03 NOTE — PROGRESS NOTE ADULT - SUBJECTIVE AND OBJECTIVE BOX
INTERVAL HPI/OVERNIGHT EVENTS:    Patient seen and examined at bedside, sitting in chair, able to answer questions. Still drowsy but able to have a conversation more than yesterday. Had discussion with partner Francisca Simeon, who is aware of the extent of his disease, still very hopeful that he will recover enough to get chemo. Family meeting set up for tomorrow at 11AM.    Allergies    No Known Allergies    Intolerances      ADVANCE DIRECTIVES:    Full code  MOLST [ ] YES [x ] NO     MEDICATIONS  (STANDING):  ALBUTerol/ipratropium for Nebulization 3 milliLiter(s) Nebulizer every 6 hours  dextrose 5% + sodium chloride 0.45%. 1000 milliLiter(s) (30 mL/Hr) IV Continuous <Continuous>  enoxaparin Injectable 90 milliGRAM(s) SubCutaneous every 12 hours  meropenem IVPB      meropenem IVPB 1000 milliGRAM(s) IV Intermittent every 8 hours  metoprolol Injectable 5 milliGRAM(s) IV Push every 6 hours  micafungin IVPB      micafungin IVPB 100 milliGRAM(s) IV Intermittent every 24 hours  pantoprazole  Injectable 40 milliGRAM(s) IV Push every 24 hours  vancomycin  IVPB 1000 milliGRAM(s) IV Intermittent every 12 hours  vancomycin  IVPB        MEDICATIONS  (PRN):  HYDROmorphone  Injectable 0.2 milliGRAM(s) IV Push every 2 hours PRN Severe Pain (7 - 10)    PRESENT SYMPTOMS:  Source: [x ] Patient   [ ] Family   [ ] Team     Pain:                        [ ] No [x ] Yes             [x ] Mild [ ] Moderate [ ] Severe    Onset - days  Location - abdomen, lower back.  Duration - days  Character - aching  Alleviating/Aggravating - movement  Radiation -  Timing -    Dyspnea:                [ ] No [x ] Yes             [ ] Mild [x ] Moderate [ ] Severe    Anxiety:                  [x ] No [ ] Yes             [ ] Mild [ ] Moderate [ ] Severe    Fatigue:                  [x ] No [ ] Yes             [ ] Mild [ ] Moderate [ ] Severe    Nausea:                  [ x] No [ ] Yes             [ ] Mild [ ] Moderate [ ] Severe    Loss of appetite:   [ ] No [x ] Yes             [x ] Mild [ ] Moderate [ ] Severe    Constipation:        [x ] No [ ] Yes             [ ] Mild [ ] Moderate [ ] Severe    Other Symptoms:  [ x] All other review of systems negative   [ ] Unable to obtain due to poor mentation     Karnofsky Performance Score/Palliative Performance Status Version 2:  30-40%    PHYSICAL EXAM:  Vital Signs Last 24 Hrs  T(C): 37.2 (03 Oct 2017 15:00), Max: 37.3 (02 Oct 2017 19:00)  T(F): 98.9 (03 Oct 2017 15:00), Max: 99.1 (02 Oct 2017 19:00)  HR: 81 (03 Oct 2017 16:00) (74 - 103)  BP: 115/66 (03 Oct 2017 16:00) (110/64 - 155/87)  BP(mean): 85 (03 Oct 2017 16:00) (82 - 114)  RR: 28 (03 Oct 2017 16:00) (22 - 35)  SpO2: 100% (03 Oct 2017 16:00) (92% - 100%) I&O's Summary    02 Oct 2017 07:01  -  03 Oct 2017 07:00  --------------------------------------------------------  IN: 820 mL / OUT: 1180 mL / NET: -360 mL    03 Oct 2017 07:01  -  03 Oct 2017 16:32  --------------------------------------------------------  IN: 440 mL / OUT: 730 mL / NET: -290 mL    General:  [ ] Alert  [ ] Oriented x      [ x] Lethargic  [ ] Agitated   [ ] Cachexia   [ ] Unarousable  [x ] Verbal  [ ] Non-Verbal    HEENT:  [ ] Normal   [x ] Dry mouth   [ ] ET Tube    [ ] Trach  [ ] Oral lesions    Lungs:   [ ] Clear [x ] Tachypnea  [ ] Audible excessive secretions   [ ] Rhonchi        [ ] Right [ ] Left [ ] Bilateral  [ ] Crackles        [ ] Right [ ] Left [ ] Bilateral  [ ] Wheezing     [ ] Right [ ] Left [ ] Bilateral    Cardiovascular:  [x ] Regular [ ] Irregular [ ] Tachycardia   [ ] Bradycardia  [ ] Murmur [ ] Other    Abdomen: [ ] Soft  [ ] Distended   [ ] +BS  [ ] Non tender [x ] Tender  [ ]PEG   [ ]OGT/ NGT   Last BM:       Genitourinary: [ ] Normal [ ] Incontinent   [ ] Oliguria/Anuria   [x ] Torres    Musculoskeletal:  [ ] Normal   [ x] Weakness  [ ] Bedbound/Wheelchair bound [ ] Edema    Neurological: [ ] No focal deficits  [ ] Cognitive impairment  [ ] Dysphagia [ ] Dysarthria [x ] Paresis: LE [ ] Other     Skin: [ ] Normal   [ ] Pressure ulcer(s)                  [ ] Rash    LABS:                        7.8    25.3  )-----------( 251      ( 03 Oct 2017 03:12 )             24.0     10-03    139  |  101  |  32<H>  ----------------------------<  106<H>  4.2   |  27  |  1.12    Ca    8.3<L>      03 Oct 2017 03:12  Phos  2.8     10-03  Mg     2.4     10-03    TPro  5.2<L>  /  Alb  2.4<L>  /  TBili  5.8<H>  /  DBili  4.9<H>  /  AST  88<H>  /  ALT  56<H>  /  AlkPhos  115  10-03    PT/INR - ( 03 Oct 2017 03:12 )   PT: 18.8 sec;   INR: 1.71 ratio       PTT - ( 03 Oct 2017 03:12 )  PTT:28.4 sec    Shock: No [ ] Septic [ ] Cardiogenic [ ] Neurologic [ ] Hypovolemic  Vasopressors x None   Inotrophs x None    Oral Intake: [ ] Unable/mouth care only [ ] Minimal [ ] Moderate [x ] Full Capability    Diet: [x ] NPO [ ] Tube feeds [ ] TPN [ ] Other     RADIOLOGY & ADDITIONAL STUDIES: reviewed.    REFERRALS:   [ ] Chaplaincy  [ ] Hospice  [ ] Child Life  [ ] Social Work  [ ] Case management [ ] Holistic Therapy

## 2017-10-03 NOTE — PROGRESS NOTE ADULT - ATTENDING COMMENTS
I saw and evaluated the patient. The patient is a 62-year-old male with newly diagnosed metastatic disease s/p T8-T11 laminectomies for resection of dorsal epidural tumor for spinal cord compression on 9/19/17. His hospital course was complicated by a right upper lobe pulmonary embolus, B/L lower extremity DVTs, and a bowel perforation, requiring two surgeries for repair with General Surgery. The patient's exam is notable for 2/5 left lower extremity weakness and 0/5 right lower extremity weakness. Continue mobilization with PT, OT and PMR, and disposition planning to spinal cord injury rehab if and when medically ready. Staples removed. Ok to begin chemotherapy and radiation from Neurosurgery perspective if and when medically ready. Appreciate SICU support.

## 2017-10-03 NOTE — PROGRESS NOTE ADULT - PROBLEM SELECTOR PLAN 2
Dilaudid 0.2mg Q2hrs PRN. Has received 2 PRNs in last 24hrs. Would not recommend any changes at the time.

## 2017-10-03 NOTE — PROGRESS NOTE ADULT - ATTENDING COMMENTS
Pt seen and examined.  Chart reviewed.  Resident note confirmed.  Pt is a 62 year old male admitted to neurosurgery for bilateral leg weakness found to have newly diagnosed metastatic disease.  Pt is s/p T8-T11 laminectomy with subsequent deterioration in respiratory status/acute onset abdominal pain.  Work up revealed bilateral DVTs, right upper lobe PE, and small intestinal perforation.  Pt is s/p Exploratory laparotomy, SBR x1 (left in discontinuity), abdominal washout, abthera VAC application (9/22) with intraop findings of an intestinal perforation and a large amount of succus in the abdomen. Pt s/p return to OR on 9/24 for intestinal reconstruction and abdominal closure.  No acute events overnight.  Continue pain control.  Echocardiogram is without evidence of right heart strain.  He remains extubated  His mental and respiratory status are improved today. He has been afebrile, with decreasing WBC and procalcitonin.    Current management includes:    1) Right segmental and subsegmental right upper lobe PE.  Left sided lung collapse and atelectasis-improved; patient has been on nasal cannula but does appear to have hypoxic episodes as he continues to intermittently collapse his left lung     CXR- with left sided increasing pleural effusion    -continue lovenox    -start lasix    -continue incentive spirometer and acapella and chest PT    -keep SpO2 < 92%      2) leukocytosis      - E faecalis and Candida albicans cells from intraoperative peritoneal fluid  -will continue zosyn, vancomycin and micafungin   -fungitell >500  -blood cultures NGTD, urinalysis negative  -continue abx    3) abdominal distention  -likely ascites  -check abdominal xray.    4) Keep NPO with IVF at 30 cc/hour; awaiting bowel function.  Rectal exam today    5) hyperbilirubinemia- improved   -Oncology will discuss prognosis with family this week.  -Family meeting wednesday.  Pt seen and examined.  Chart reviewed.  Resident note confirmed.  Pt is a 62 year old male admitted to neurosurgery for bilateral leg weakness found to have newly diagnosed metastatic disease.  Pt is s/p T8-T11 laminectomy with subsequent deterioration in respiratory status/acute onset abdominal pain.  Work up revealed bilateral DVTs, right upper lobe PE, and small intestinal perforation.  Pt is s/p Exploratory laparotomy, SBR x1 (left in discontinuity), abdominal washout, abthera VAC application (9/22) with intraop findings of an intestinal perforation and a large amount of succus in the abdomen. Pt s/p return to OR on 9/24 for intestinal reconstruction and abdominal closure.  No acute events overnight.  Continue pain control.  Echocardiogram is without evidence of right heart strain.  He remains extubated  His mental and respiratory status are improved today. He has been afebrile, with decreasing WBC and procalcitonin.    Current management includes:    1) Right segmental and subsegmental right upper lobe PE.  Left sided lung collapse and atelectasis-improved; patient has been on nasal cannula but does appear to have hypoxic episodes as he continues to intermittently collapse his left lung     CXR- with left sided increasing pleural effusion    -continue lovenox    -start lasix    -continue incentive spirometer and acapella and chest PT    -keep SpO2 < 92%      2) leukocytosis      - E faecalis and Candida albicans cells from intraoperative peritoneal fluid  -will continue zosyn, vancomycin and micafungin   -fungitell >500  -blood cultures NGTD, urinalysis negative  -continue abx    3) abdominal distention.  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  Metastatic disease noted in liver.  -distention likely due to ascites  -check abdominal xray.    4) Keep NPO with IVF at 30 cc/hour; awaiting bowel function.  Rectal exam today    5) hyperbilirubinemia- improved   -Oncology will discuss prognosis with family this week.  -Family meeting wednesday. Pt seen and examined.  Chart reviewed.  Resident note confirmed.  Pt is a 62 year old male admitted to neurosurgery for bilateral leg weakness found to have newly diagnosed metastatic disease.  Pt is s/p T8-T11 laminectomy with subsequent deterioration in respiratory status/acute onset abdominal pain.  Work up revealed bilateral DVTs, right upper lobe PE, and small intestinal perforation.  Pt is s/p Exploratory laparotomy, SBR x1 (left in discontinuity), abdominal washout, abthera VAC application (9/22) with intraop findings of an intestinal perforation and a large amount of succus in the abdomen. Pt s/p return to OR on 9/24 for intestinal reconstruction and abdominal closure.  No acute events overnight.  Continue pain control.  Echocardiogram is without evidence of right heart strain.  He remains extubated  His mental and respiratory status are improved today. He has been afebrile, with decreasing WBC and procalcitonin.    Current management includes:    1) Right segmental and subsegmental right upper lobe PE.  Left sided lung collapse and atelectasis-improved; patient has been on nasal cannula but does appear to have hypoxic episodes as he continues to intermittently collapse his left lung     CXR- with left sided increasing pleural effusion    -continue lovenox    -start lasix    -continue incentive spirometer and acapella and chest PT    -keep SpO2 < 92%      2) leukocytosis      - E faecalis and Candida albicans cells from intraoperative peritoneal fluid  -will continue zosyn, vancomycin and micafungin   -fungitell >500  -blood cultures NGTD, urinalysis negative  -continue abx    3) abdominal distention.  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  Metastatic disease noted in liver.  -distention likely due to ascites  -check abdominal xray.    4) Keep NPO with IVF at 30 cc/hour; awaiting bowel function.  Rectal exam today    5) hyperbilirubinemia- improved   -Oncology will discuss prognosis with family this week.  -Family meeting wednesday.

## 2017-10-03 NOTE — PROGRESS NOTE ADULT - SUBJECTIVE AND OBJECTIVE BOX
Patient is a 62y old  Male who presents with a chief complaint of cord compression found to have NSCLC with mets.  Out of bed to chair and kept NPO as an aspiration risk, but verbalizes a desire for coffee. Oriented and alert. No movement in either leg at present with history of metastatic lung cancer to thoracic spine and emergency admission surgery for spinal cord compression.      MEDICATIONS  (STANDING):  ALBUTerol/ipratropium for Nebulization 3 milliLiter(s) Nebulizer every 6 hours  dextrose 5% + sodium chloride 0.45%. 1000 milliLiter(s) (30 mL/Hr) IV Continuous <Continuous>  enoxaparin Injectable 90 milliGRAM(s) SubCutaneous every 12 hours  haloperidol    Injectable 2.5 milliGRAM(s) IV Push once  meropenem IVPB      meropenem IVPB 1000 milliGRAM(s) IV Intermittent every 8 hours  metoprolol Injectable 5 milliGRAM(s) IV Push every 6 hours  micafungin IVPB      micafungin IVPB 100 milliGRAM(s) IV Intermittent every 24 hours  pantoprazole  Injectable 40 milliGRAM(s) IV Push every 24 hours  vancomycin  IVPB 1000 milliGRAM(s) IV Intermittent every 12 hours  vancomycin  IVPB        MEDICATIONS  (PRN):  HYDROmorphone  Injectable 0.2 milliGRAM(s) IV Push every 2 hours PRN Severe Pain (7 - 10)    MEDICATIONS  (STANDING):      REVIEW OF SYSTEM:  CONSTITUTIONAL: No fever, No change in weight, No fatigue  HEAD: No headache, No dizziness, No recent trauma  EYES: No eye pain, No visual disturbances, No discharge  ENT:  No difficulty hearing, No tinnitus, No vertigo, No sinus pain, No throat pain  NECK: No pain, No stiffness  BREASTS: No pain, No masses, No nipple discharge  RESPIRATORY: No cough, No wheezing, No chills, No hemoptysis, No shortness of breath at rest or exertional shortness of breath  CARDIOVASCULAR: No chest pain, No palpitations, No dizziness, No CHF, No arrhythmia, No cardiomegaly, No leg swelling  GASTROINTESTINAL: No abdominal, No epigastric pain. No nausea, No vomiting, No hematemesis, No diarrhea, No constipation. No melena, No hematochezia. No GERD  GENITOURINARY: No dysuria, No frequency, No hematuria, No incontinence, No nocturia, No hesitancy,  SKIN: No itching, No burning, No rashes, No lesions   LYMPH NODES: No history of enlarged glands  ENDOCRINE: No heat or cold intolerance, No hair loss. No osteoporosis, No thyroid disease  MUSCULOSKELETAL: No joint pain or swelling, No muscle, back, or extremity pain  PSYCHIATRIC: No depression, No anxiety, No mood swings, No difficulty sleeping  HEME/LYMPH: No easy bruising, No anticoagulants, No bleeding disorder, No bleeding gums  ALLERGY AND IMMUNOLOGIC: No hives, No eczema  NEUROLOGICAL: No memory loss, (+) cord compression with muscle weakness right more than left and inability to move either leg.      ICU Vital Signs Last 24 Hrs  T(C): 37.3 (03 Oct 2017 11:00), Max: 37.3 (02 Oct 2017 19:00)  T(F): 99.1 (03 Oct 2017 11:00), Max: 99.1 (02 Oct 2017 19:00)  HR: 77 (03 Oct 2017 11:45) (74 - 103)  BP: 113/69 (03 Oct 2017 11:00) (104/71 - 155/87)  BP(mean): 85 (03 Oct 2017 11:00) (84 - 114)  ABP: --  ABP(mean): --  RR: 31 (03 Oct 2017 11:44) (22 - 43)  SpO2: 99% (03 Oct 2017 11:45) (90% - 100%)    PHYSICAL EXAM:  GENERAL: NAD, well nourished and conversant  HEAD:  Atraumatic  EYES: EOM, PERRLA, conjunctiva pink and sclera white  ENT: No tonsillar erythema, exudates, or enlargement, moist mucous membranes, good dentition, no lesions  NECK: Supple, No JVD, normal thyroid, carotids with normal upstrokes and no bruits  CHEST/LUNG: Clear to auscultation bilaterally, No rales, rhonchi, wheezing, or rubs  HEART: Regular rate and rhythm, No murmurs, rubs, or gallops  ABDOMEN: Soft, nondistended, no masses, guarding, tenderness or rebound, bowel sounds present  EXTREMITIES:  2+ Peripheral Pulses, No clubbing, cyanosis, or edema. No arthritis of shoulders, elbows, hands, hips, knees, ankles, or feet. No DJD C spine, T spine, or L/S spine  LYMPH: No lymphadenopathy noted  SKIN: No rashes or lesions  NERVOUS SYSTEM:  Alert & Oriented X3, normal cognitive function. (+)Cord compression with right greater than left weakness with paraplegia    LABS:  ABG - ( 03 Oct 2017 00:28 )  pH: 7.51  /  pCO2: 34    /  pO2: 178   / HCO3: 27    / Base Excess: 3.8   /  SaO2: 100                   CBC Full  -  ( 03 Oct 2017 03:12 )  WBC Count : 25.3 K/uL  Hemoglobin : 7.8 g/dL  Hematocrit : 24.0 %  Platelet Count - Automated : 251 K/uL  Mean Cell Volume : 96.4 fl  Mean Cell Hemoglobin : 31.5 pg  Mean Cell Hemoglobin Concentration : 32.7 gm/dL  Auto Neutrophil # : x  Auto Lymphocyte # : x  Auto Monocyte # : x  Auto Eosinophil # : x  Auto Basophil # : x  Auto Neutrophil % : x  Auto Lymphocyte % : x  Auto Monocyte % : x  Auto Eosinophil % : x  Auto Basophil % : x    10-03    139  |  101  |  32<H>  ----------------------------<  106<H>  4.2   |  27  |  1.12    Ca    8.3<L>      03 Oct 2017 03:12  Phos  2.8     10-03  Mg     2.4     10-03    TPro  5.2<L>  /  Alb  2.4<L>  /  TBili  5.8<H>  /  DBili  4.9<H>  /  AST  88<H>  /  ALT  56<H>  /  AlkPhos  115  10-03    LIVER FUNCTIONS - ( 03 Oct 2017 03:12 )  Alb: 2.4 g/dL / Pro: 5.2 g/dL / ALK PHOS: 115 U/L / ALT: 56 U/L RC / AST: 88 U/L / GGT: x           PT/INR - ( 03 Oct 2017 03:12 )   PT: 18.8 sec;   INR: 1.71 ratio         PTT - ( 03 Oct 2017 03:12 )  PTT:28.4 sec          RADIOLOGY & ADDITIONAL TESTS:

## 2017-10-03 NOTE — PROGRESS NOTE ADULT - PROBLEM SELECTOR PLAN 3
Multifactorial, lung CA + PE + Collapsed lung. Has had increase in FiO2 requirements in the last couple of days. Currently on high flow. Pending discussion with son about GOC. Patient would benefit from opiates PRN for dyspnea.

## 2017-10-03 NOTE — CHART NOTE - NSCHARTNOTEFT_GEN_A_CORE
Pt seen for nutrition follow up, as per department protocol    Hospital Course: Pt with newly diagnosed metastatic disease S/P T8-T11 laminectomy 9/18; now S/P OR for small bowel perforation, resection, left in discontinuity with abthera (9/22), S/P ex-lap, washout, re-anatomosis, abdominal closure (9/24). Peritonitis subsequently - E. faecium, Klebsiella and Candida.     SICU RD f/u: Pt NPO x 2 weeks with no return of bowel function, no BM since 9/17. Per team, pt is not a candidate for TPN due to very poor prognosis. Plan for palliative/GOC discussion tomorrow to determine if EN is appropriate or if comfort feeds should be initiated. Pt noted to be lethargic and delirious.    Source: Patient [ ]    Family [ ]     other [X ]; team rounds, medical record    Diet : NPO     Enteral /Parenteral Nutrition: n/a    Current Weight: 86.4Kg (10/3), 88.1Kg (9/29), 80.1Kg (9/28)  Edema: 2+ generalized; dependent; scrotum      Pertinent Medications: MEDICATIONS  (STANDING):  ALBUTerol/ipratropium for Nebulization 3 milliLiter(s) Nebulizer every 6 hours  dextrose 5% + sodium chloride 0.45%. 1000 milliLiter(s) (30 mL/Hr) IV Continuous <Continuous>  enoxaparin Injectable 90 milliGRAM(s) SubCutaneous every 12 hours  furosemide   Injectable 5 milliGRAM(s) IV Push once  haloperidol    Injectable 2.5 milliGRAM(s) IV Push once  meropenem IVPB      meropenem IVPB 1000 milliGRAM(s) IV Intermittent every 8 hours  metoprolol Injectable 5 milliGRAM(s) IV Push every 6 hours  micafungin IVPB      micafungin IVPB 100 milliGRAM(s) IV Intermittent every 24 hours  pantoprazole  Injectable 40 milliGRAM(s) IV Push every 24 hours  vancomycin  IVPB 1000 milliGRAM(s) IV Intermittent every 12 hours  vancomycin  IVPB        MEDICATIONS  (PRN):  HYDROmorphone  Injectable 0.2 milliGRAM(s) IV Push every 2 hours PRN Severe Pain (7 - 10)    Pertinent Labs:  10-03 Na139 mmol/L Glu 106 mg/dL<H> K+ 4.2 mmol/L Cr  1.12 mg/dL BUN 32 mg/dL<H> Phos 2.8 mg/dL Alb 2.4 g/dL<L>    Skin: no pressure injuries noted    Estimated Needs:   [X ] no change since previous assessment  [ ] recalculated:       Previous Nutrition Diagnosis:     [X ] Inadequate Protein- Energy Intake; to be addressed with Palliative/GOC discussion on 10/4       Monitoring and Evaluation:   Follow up per protocol  RD will remain available to honor pt/ family wishes regarding plan of care.   Falguni Denise, MS RD [facility], #[phone].

## 2017-10-03 NOTE — PROGRESS NOTE ADULT - SUBJECTIVE AND OBJECTIVE BOX
Patient is a 62y old  Male who presented with a chief complaint of cord compression (19 Sep 2017 09:44)      INTERVAL HPI/OVERNIGHT EVENTS:  some abdominal discomfort  no nausea ro vomiting  remains on high flow oxygen    MEDICATIONS  (STANDING):  ALBUTerol/ipratropium for Nebulization 3 milliLiter(s) Nebulizer every 6 hours  dextrose 5% + sodium chloride 0.45%. 1000 milliLiter(s) (30 mL/Hr) IV Continuous <Continuous>  enoxaparin Injectable 90 milliGRAM(s) SubCutaneous every 12 hours  haloperidol    Injectable 2.5 milliGRAM(s) IV Push once  meropenem IVPB      meropenem IVPB 1000 milliGRAM(s) IV Intermittent every 8 hours  metoprolol Injectable 5 milliGRAM(s) IV Push every 6 hours  micafungin IVPB      micafungin IVPB 100 milliGRAM(s) IV Intermittent every 24 hours  pantoprazole  Injectable 40 milliGRAM(s) IV Push every 24 hours  vancomycin  IVPB 1000 milliGRAM(s) IV Intermittent every 12 hours  vancomycin  IVPB        MEDICATIONS  (PRN):  HYDROmorphone  Injectable 0.2 milliGRAM(s) IV Push every 2 hours PRN Severe Pain (7 - 10)      Allergies    No Known Allergies    Intolerances        Review of Systems:  Gen: no fever or chills  CV: denies CP  Resp: denies dyspnea at this time, on high flow nasal O2 and night time CPAP  Neuro: no focal weakness    Vital Signs Last 24 Hrs  T(C): 36.9 (03 Oct 2017 07:00), Max: 37.4 (02 Oct 2017 11:00)  T(F): 98.5 (03 Oct 2017 07:00), Max: 99.3 (02 Oct 2017 11:00)  HR: 87 (03 Oct 2017 10:00) (74 - 103)  BP: 116/67 (03 Oct 2017 10:00) (104/71 - 155/87)  BP(mean): 86 (03 Oct 2017 10:00) (84 - 114)  RR: 29 (03 Oct 2017 10:00) (22 - 43)  SpO2: 98% (03 Oct 2017 10:00) (90% - 100%)    PHYSICAL EXAM:  Constitutional: on nasal high flow,responsive  Neck: supple  Sclera: icteric  Respiratory: decreased BS bases, rhonchi  Cardiovascular: S1 and S2, regular  Gastrointestinal: more distended, mild right sided tenderness - without rebound or guarding without ecchymosis or bleeding, +midline incision with staples +michelle  Extremities: +edema b/l, +scrotal edema  Vascular: 2+ peripheral pulses  Neurological: alert and appropriate  Skin: No rashes    LABS:                        7.8    25.3  )-----------( 251      ( 03 Oct 2017 03:12 )             24.0     10-03    139  |  101  |  32<H>  ----------------------------<  106<H>  4.2   |  27  |  1.12    Ca    8.3<L>      03 Oct 2017 03:12  Phos  2.8     10-03  Mg     2.4     10-03    TPro  5.2<L>  /  Alb  2.4<L>  /  TBili  5.8<H>  /  DBili  4.9<H>  /  AST  88<H>  /  ALT  56<H>  /  AlkPhos  115  10-03    PT/INR - ( 03 Oct 2017 03:12 )   PT: 18.8 sec;   INR: 1.71 ratio         PTT - ( 03 Oct 2017 03:12 )  PTT:28.4 sec    LIVER FUNCTIONS - ( 03 Oct 2017 03:12 )  Alb: 2.4 g/dL / Pro: 5.2 g/dL / ALK PHOS: 115 U/L / ALT: 56 U/L RC / AST: 88 U/L / GGT: x             RADIOLOGY & ADDITIONAL TESTS:      EXAM:  US ABDOMEN RT UPR QUADRANT                        EXAM:  US DPLX ABDOMEN                        PROCEDURE DATE:  09/27/2017      INTERPRETATION:  CLINICAL INFORMATION: Metastatic disease, now with   abdominal tenderness, distention and hypotension. Concern for portal vein   thrombosis.    COMPARISON: CT abdomen pelvis dated 22 September.    TECHNIQUE: Sonography of the right upper quadrant. Portal duplex study.   Study limited due to patient's medical condition.    FINDINGS:    Liver: Normal size. Multiple hypoechoic solid lesions consistent with   known metastases. Reference lesion in the left lobe adjacent to the IVC   measures 4.6 x 3.8 cm. Lesion in the right lobemeasures 4.3 x 4.2 cm.  Bile ducts: No intrahepatic biliary ductal dilatation. Common hepatic   duct measures 2 mm.   Gallbladder: Within normal limits.      Pancreas: Head and neck appear unremarkable. The remainder is obscured by   gas.  Right kidney: 10.7 cm. No hydronephrosis. Lower pole is obscured.  Ascites: Mild perihepatic fluid. Bilateral pleural effusions.  Vessels: The main portal vein is normal in caliber and no echogenic   thrombus is seen within it. Hepatopedal blood flow is presentwithin the   main, right and left portal veins. Normal phasic venous flow is present   within the middle, right and left hepatic veins and the IVC. The hepatic   artery demonstrates a normal spectral waveform with peak systolic   velocity of 25 cm/s.    IMPRESSION:     Multiple hepatic metastases without evidence of biliary obstruction.  No duplex evidence of portal or hepatic venous thrombosis.  Bilateral pleural effusions and mild ascites. Patient is a 62y old  Male who presented with a chief complaint of cord compression (19 Sep 2017 09:44)    INTERVAL HPI/OVERNIGHT EVENTS:  some abdominal discomfort  no nausea ro vomiting  remains on high flow oxygen    MEDICATIONS  (STANDING):  ALBUTerol/ipratropium for Nebulization 3 milliLiter(s) Nebulizer every 6 hours  dextrose 5% + sodium chloride 0.45%. 1000 milliLiter(s) (30 mL/Hr) IV Continuous <Continuous>  enoxaparin Injectable 90 milliGRAM(s) SubCutaneous every 12 hours  haloperidol    Injectable 2.5 milliGRAM(s) IV Push once  meropenem IVPB      meropenem IVPB 1000 milliGRAM(s) IV Intermittent every 8 hours  metoprolol Injectable 5 milliGRAM(s) IV Push every 6 hours  micafungin IVPB      micafungin IVPB 100 milliGRAM(s) IV Intermittent every 24 hours  pantoprazole  Injectable 40 milliGRAM(s) IV Push every 24 hours  vancomycin  IVPB 1000 milliGRAM(s) IV Intermittent every 12 hours  vancomycin  IVPB        MEDICATIONS  (PRN):  HYDROmorphone  Injectable 0.2 milliGRAM(s) IV Push every 2 hours PRN Severe Pain (7 - 10)    Allergies  No Known Allergies    Review of Systems:  Gen: no fever or chills  CV: denies CP  Resp: denies dyspnea at this time, on high flow nasal O2 and night time CPAP  Neuro: no focal weakness    Vital Signs Last 24 Hrs  T(C): 36.9 (03 Oct 2017 07:00), Max: 37.4 (02 Oct 2017 11:00)  T(F): 98.5 (03 Oct 2017 07:00), Max: 99.3 (02 Oct 2017 11:00)  HR: 87 (03 Oct 2017 10:00) (74 - 103)  BP: 116/67 (03 Oct 2017 10:00) (104/71 - 155/87)  BP(mean): 86 (03 Oct 2017 10:00) (84 - 114)  RR: 29 (03 Oct 2017 10:00) (22 - 43)  SpO2: 98% (03 Oct 2017 10:00) (90% - 100%)    PHYSICAL EXAM:  Constitutional: on nasal high flow,responsive  Neck: supple  Sclera: icteric  Respiratory: decreased BS bases, rhonchi  Cardiovascular: S1 and S2, regular  Gastrointestinal: more distended, mild right sided tenderness - without rebound or guarding without ecchymosis or bleeding, +midline incision with staples +michelle  Extremities: +edema b/l, +scrotal edema  Vascular: 2+ peripheral pulses  Neurological: alert and appropriate  Skin: No rashes    LABS:                      7.8    25.3  )-----------( 251      ( 03 Oct 2017 03:12 )             24.0     139  |  101  |  32<H>  ----------------------------<  106<H>  4.2   |  27  |  1.12    Ca    8.3<L>      03 Oct 2017 03:12  Phos  2.8     10-03  Mg     2.4     10-03    TPro  5.2<L>  /  Alb  2.4<L>  /  TBili  5.8<H>  /  DBili  4.9<H>  /  AST  88<H>  /  ALT  56<H>  /  AlkPhos  115  10-03    PT/INR - ( 03 Oct 2017 03:12 )   PT: 18.8 sec;   INR: 1.71 ratio    PTT - ( 03 Oct 2017 03:12 )  PTT:28.4 sec    RADIOLOGY & ADDITIONAL TESTS:    EXAM:  US ABDOMEN RT UPR QUADRANT                        EXAM:  US DPLX ABDOMEN                        PROCEDURE DATE:  09/27/2017      INTERPRETATION:  CLINICAL INFORMATION: Metastatic disease, now with   abdominal tenderness, distention and hypotension. Concern for portal vein   thrombosis.    COMPARISON: CT abdomen pelvis dated 22 September.    TECHNIQUE: Sonography of the right upper quadrant. Portal duplex study.   Study limited due to patient's medical condition.    FINDINGS:    Liver: Normal size. Multiple hypoechoic solid lesions consistent with   known metastases. Reference lesion in the left lobe adjacent to the IVC   measures 4.6 x 3.8 cm. Lesion in the right lobemeasures 4.3 x 4.2 cm.  Bile ducts: No intrahepatic biliary ductal dilatation. Common hepatic   duct measures 2 mm.   Gallbladder: Within normal limits.      Pancreas: Head and neck appear unremarkable. The remainder is obscured by   gas.  Right kidney: 10.7 cm. No hydronephrosis. Lower pole is obscured.  Ascites: Mild perihepatic fluid. Bilateral pleural effusions.  Vessels: The main portal vein is normal in caliber and no echogenic   thrombus is seen within it. Hepatopedal blood flow is presentwithin the   main, right and left portal veins. Normal phasic venous flow is present   within the middle, right and left hepatic veins and the IVC. The hepatic   artery demonstrates a normal spectral waveform with peak systolic   velocity of 25 cm/s.    IMPRESSION:     Multiple hepatic metastases without evidence of biliary obstruction.  No duplex evidence of portal or hepatic venous thrombosis.  Bilateral pleural effusions and mild ascites.

## 2017-10-03 NOTE — PROGRESS NOTE ADULT - SUBJECTIVE AND OBJECTIVE BOX
ATP Progress Note    S: No acute events overnight. Patient resting comfortably in bed.     O:  T(C): 37.3 (10-03-17 @ 11:00), Max: 37.3 (10-02-17 @ 19:00)  HR: 77 (10-03-17 @ 13:00) (74 - 103)  BP: 116/72 (10-03-17 @ 13:00) (104/71 - 155/87)  RR: 26 (10-03-17 @ 13:00) (22 - 43)  SpO2: 100% (10-03-17 @ 13:00) (90% - 100%)  Wt(kg): --    10-02 @ 07:01  -  10-03 @ 07:00  --------------------------------------------------------  IN:    dextrose 5% + sodium chloride 0.45%.: 720 mL    IV PiggyBack: 100 mL  Total IN: 820 mL    OUT:    Indwelling Catheter - Urethral: 1180 mL  Total OUT: 1180 mL    Total NET: -360 mL      10-03 @ 07:01  -  10-03 @ 13:22  --------------------------------------------------------  IN:    dextrose 5% + sodium chloride 0.45%.: 180 mL    IV PiggyBack: 100 mL  Total IN: 280 mL    OUT:    Indwelling Catheter - Urethral: 450 mL  Total OUT: 450 mL    Total NET: -170 mL        CBC Full  -  ( 03 Oct 2017 03:12 )  WBC Count : 25.3 K/uL  Hemoglobin : 7.8 g/dL  Hematocrit : 24.0 %  Platelet Count - Automated : 251 K/uL  Mean Cell Volume : 96.4 fl  Mean Cell Hemoglobin : 31.5 pg  Mean Cell Hemoglobin Concentration : 32.7 gm/dL          PHYSICAL EXAM:  Gen: NAD  Resp: Pt on high flow NC, no acute resp distress  Abd: Soft, NT, ND  Incision: c/d/i

## 2017-10-03 NOTE — PROGRESS NOTE ADULT - ASSESSMENT
ThisHPI: 62y male admitted to neurosurgery for bilateral leg weakness found to have newly diagnosed metastatic disease s/p T8-T11 laminectomy presented today with deterioration in respiratory status and acute onset abdominal pain who was subsequently intubated and w/u revealed b/l DVTs, right upper lobe PE, and a bowel perforation.  Pt is now s/p Exploratory laparotomy #1, SBR x1 (left in discontinuity), abdominal washout, abthera VAC application (9/22) with intraop findings of d a large amount of succus as well as lettuce in the abdomen along with a 1cm perforation in the small bowel 110 cm distal to the ligament of treitz. )(/24/17 Exploratory OR #2 with abdominal irrigation and enterostomy. Right leg motor function being monitored by neurosurgery after emergency admission spinal cord decompression of metastatic tumor T8-!1. Lung cancer pathology is non small cell.  DVT/ PE  needs to be anticoagulated with clot going up into the right renal vein thrombosis. no return of leg movement or sensation to light touch over the past 5 days.

## 2017-10-03 NOTE — PROGRESS NOTE ADULT - SUBJECTIVE AND OBJECTIVE BOX
Follow-up Pulm Progress Note    Still on high flow.     Medications:  MEDICATIONS  (STANDING):  ALBUTerol/ipratropium for Nebulization 3 milliLiter(s) Nebulizer every 6 hours  dextrose 5% + sodium chloride 0.45%. 1000 milliLiter(s) (30 mL/Hr) IV Continuous <Continuous>  enoxaparin Injectable 90 milliGRAM(s) SubCutaneous every 12 hours  haloperidol    Injectable 2.5 milliGRAM(s) IV Push once  meropenem IVPB      meropenem IVPB 1000 milliGRAM(s) IV Intermittent every 8 hours  metoprolol Injectable 5 milliGRAM(s) IV Push every 6 hours  micafungin IVPB      micafungin IVPB 100 milliGRAM(s) IV Intermittent every 24 hours  pantoprazole  Injectable 40 milliGRAM(s) IV Push every 24 hours  vancomycin  IVPB 1000 milliGRAM(s) IV Intermittent every 12 hours  vancomycin  IVPB        MEDICATIONS  (PRN):  HYDROmorphone  Injectable 0.2 milliGRAM(s) IV Push every 2 hours PRN Severe Pain (7 - 10)    Vital Signs Last 24 Hrs  T(C): 36.9 (03 Oct 2017 07:00), Max: 37.4 (02 Oct 2017 11:00)  T(F): 98.5 (03 Oct 2017 07:00), Max: 99.3 (02 Oct 2017 11:00)  HR: 86 (03 Oct 2017 09:00) (74 - 103)  BP: 128/75 (03 Oct 2017 09:00) (104/71 - 155/87)  BP(mean): 92 (03 Oct 2017 09:00) (84 - 114)  RR: 33 (03 Oct 2017 09:00) (22 - 43)  SpO2: 100% (03 Oct 2017 09:00) (90% - 100%)    ABG - ( 03 Oct 2017 00:28 )  pH: 7.51  /  pCO2: 34    /  pO2: 178   / HCO3: 27    / Base Excess: 3.8   /  SaO2: 100       10-02 @ 07:01  -  10-03 @ 07:00  --------------------------------------------------------  IN: 820 mL / OUT: 1180 mL / NET: -360 mL    LABS:                        7.8    25.3  )-----------( 251      ( 03 Oct 2017 03:12 )             24.0     10-03    139  |  101  |  32<H>  ----------------------------<  106<H>  4.2   |  27  |  1.12    Ca    8.3<L>      03 Oct 2017 03:12  Phos  2.8     10-03  Mg     2.4     10-03    TPro  5.2<L>  /  Alb  2.4<L>  /  TBili  5.8<H>  /  DBili  4.9<H>  /  AST  88<H>  /  ALT  56<H>  /  AlkPhos  115  10-03    CAPILLARY BLOOD GLUCOSE        PT/INR - ( 03 Oct 2017 03:12 )   PT: 18.8 sec;   INR: 1.71 ratio         PTT - ( 03 Oct 2017 03:12 )  PTT:28.4 sec    Procalcitonin, Serum: 1.74 ng/mL (10-03-17 @ 03:12)  Procalcitonin, Serum: 1.53 ng/mL (10-02-17 @ 06:26)    Serum Pro-Brain Natriuretic Peptide: 1839 pg/mL (10-03-17 @ 03:12)    CULTURES: (if applicable)  Culture Results:   No growth at 5 days. (09-27 @ 02:28)  Culture Results:   No growth at 5 days. (09-26 @ 15:15)    Physical Examination:  PULM: decreased BS at left base  CVS: S1, S2 heard    RADIOLOGY REVIEWED  CXR:     CT chest:    TTE:

## 2017-10-03 NOTE — PROGRESS NOTE ADULT - PROBLEM SELECTOR PLAN 1
Metastatic non small cell lung cancer. Multiple complications from his met dz, including bowel perf, lung collapse, PE/DVT and now hyperbilirubinemia. Per heme-onc if hyperbilirubinemia is irreversible, any chemotherapy or targeted therapy would be extremely toxic. Son - Surrogate already had discussion with Heme-Onc. Pending f/u conversation with son and patient's partner scheduled on 10/4 @11am.

## 2017-10-03 NOTE — PROGRESS NOTE ADULT - ASSESSMENT
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 Complicated by acute hypoxemic respiratory failure requiring CPAP.     PLAN:  Neurologic: post-operative pain  -Will continue with Dilaudid IV PRN    Respiratory: Acute hypoxemic respiratory failure, improving  -Will administer supplemental O2 to maintain SpO2 > 88%, on HFNC FiO2 40%   -Duonebs/ Mucomyst  -PT/ OOB    Cardiovascular: Hypertension  -Continue metoprolol 5mg IV q6hrs    Gastrointestinal/Nutrition: small bowel perforation s/p SBR, anastomosis   -NPO  -Await GI function - abdominal Xray for increased distention, rectal exam  -Protonix 40mg daily    Renal/Genitourinary: ALEC, resolving  -Continue D5 1/2NS to 30mL/hr  -Continue to monitor urine output and BUN/Cr    Hematologic: b/l DVT; b/l PE  -Continue with therapeutic lovenox  -f/u Heme/Onc    Infectious Disease:Secondary Peritonitis 2/2 to Vanco sensitive E. Faecium, Klebsiella, Candida Albicans; leukocytosis  -Continue Vanco, Meropenem  -f/u ID    Disposition: SICU, palliative consult

## 2017-10-03 NOTE — PROGRESS NOTE ADULT - SUBJECTIVE AND OBJECTIVE BOX
HISTORY:  62y Male initially admitted to neurosurgery for b/l leg weakness found to have newly diagnosed metastatic disease with spinal met causing spinal cord compression s/p T8-T11 laminectomy on 9/20, post-op course complicated by deterioration in respiratory status and acute onset abdominal pain requiring intubation and transfer to NSCU. CT chest/abdomen/pelvis demonstrated b/l DVTs, right upper lobe PE, and a bowel perforation with free air. Pt was taken emergently to the OR and is now s/p exploratory laparotomy, SBR x1 (left in discontinuity), abdominal washout, abthera VAC application on 9/22, after which he was transferred to SICU under ACS. He was taken back to the OR on 9/24 for a washout, primary anastomosis, and abdominal closure. Post-operative course complicated by Acute hypoxemic respiratory failure secondary to Acute Pulmonary Vascular congestion/ Left Lung Collapse.    24 HOUR EVENTS: Pt continues on high flow NC at LPM 50, FiO2 40%, abdomen more distended today, abdominal Xray ordered for AM.     SUBJECTIVE/ROS:  [X] A ten-point review of systems was otherwise negative except as noted.    NEURO  Exam: AAOx1-2, calm and cooperative  Meds: HYDROmorphone  Injectable 0.2 milliGRAM(s) IV Push every 2 hours PRN Severe Pain (7 - 10)  haloperidol    Injectable 2.5 milliGRAM(s) IV Push once    [x] Adequacy of sedation and pain control has been assessed and adjusted      RESPIRATORY  RR: 28 (10-03-17 @ 02:00) (22 - 45)  SpO2: 99% (10-03-17 @ 02:00) (90% - 100%)  Wt(kg): --  Exam: unlabored, clear to auscultation bilaterally  Mechanical Ventilation:   ABG - ( 03 Oct 2017 00:28 )  pH: 7.51  /  pCO2: 34    /  pO2: 178   / HCO3: 27    / Base Excess: 3.8   /  SaO2: 100     Lactate: x        Meds: ALBUTerol/ipratropium for Nebulization 3 milliLiter(s) Nebulizer every 6 hours    CARDIOVASCULAR  HR: 79 (10-03-17 @ 02:00) (77 - 103)  BP: 132/79 (10-03-17 @ 02:00) (104/71 - 160/74)  BP(mean): 101 (10-03-17 @ 02:00) (84 - 111)  ABP: --  ABP(mean): --  Wt(kg): --  CVP(cm H2O): --  VBG - ( 01 Oct 2017 06:52 )  pH: 7.41  /  pCO2: 47    /  pO2: 22    / HCO3: 29    / Base Excess: 4.5   /  SaO2: 32     Lactate: 2.7      Exam: regular rate and rhythm  Cardiac Rhythm: NSR  Perfusion     [X]Adequate   [ ]Inadequate  Mentation   [X]Normal       [ ]Reduced  Extremities  [X]Warm         [ ]Cool  Volume Status [ ]Hypervolemic [X]Euvolemic [ ]Hypovolemic  Meds: metoprolol Injectable 5 milliGRAM(s) IV Push every 6 hours    GI/NUTRITION  Exam: abd soft, distended, nontender, abd dsg CDI  Diet: NPO  Meds: pantoprazole  Injectable 40 milliGRAM(s) IV Push every 24 hours      GENITOURINARY  I&O's Detail    10-01 @ 07:01  -  10-02 @ 07:00  --------------------------------------------------------  IN:    dextrose 5% + sodium chloride 0.45%.: 720 mL    IV PiggyBack: 150 mL    Solution: 250 mL  Total IN: 1120 mL    OUT:    Indwelling Catheter - Urethral: 1195 mL  Total OUT: 1195 mL    Total NET: -75 mL      10-02 @ 07:01  -  10-03 @ 02:27  --------------------------------------------------------  IN:    dextrose 5% + sodium chloride 0.45%.: 570 mL    IV PiggyBack: 100 mL  Total IN: 670 mL    OUT:    Indwelling Catheter - Urethral: 860 mL  Total OUT: 860 mL    Total NET: -190 mL          10-02    139  |  100  |  28<H>  ----------------------------<  100<H>  4.4   |  28  |  1.08    Ca    8.2<L>      02 Oct 2017 02:54  Phos  2.5     10-02  Mg     2.4     10-02    TPro  5.0<L>  /  Alb  2.3<L>  /  TBili  7.8<H>  /  DBili  6.6<H>  /  AST  77<H>  /  ALT  45  /  AlkPhos  111  10-02    [ ] Torres catheter, indication: N/A  Meds: dextrose 5% + sodium chloride 0.45%. 1000 milliLiter(s) IV Continuous <Continuous>        HEMATOLOGIC  Meds: enoxaparin Injectable 90 milliGRAM(s) SubCutaneous every 12 hours    [x] VTE Prophylaxis                        7.7    26.6  )-----------( 247      ( 02 Oct 2017 10:44 )             23.5     PT/INR - ( 02 Oct 2017 02:54 )   PT: 17.2 sec;   INR: 1.58 ratio         PTT - ( 02 Oct 2017 02:54 )  PTT:31.7 sec  Transfusion     [ ] PRBC   [ ] Platelets   [ ] FFP   [ ] Cryoprecipitate      INFECTIOUS DISEASES  T(C): 37.3 (10-02-17 @ 23:00), Max: 37.4 (10-02-17 @ 11:00)  Wt(kg): --  WBC Count: 26.6 K/uL (10-02 @ 10:44)  WBC Count: 27.5 K/uL (10-02 @ 02:54)    Recent Cultures: 9/23 - peritoneal cultures grew Enterococcus faecium and Klebsiella pneumoniae    Meds: vancomycin  IVPB 1000 milliGRAM(s) IV Intermittent every 12 hours  vancomycin  IVPB      micafungin IVPB      micafungin IVPB 100 milliGRAM(s) IV Intermittent every 24 hours  meropenem IVPB      meropenem IVPB 1000 milliGRAM(s) IV Intermittent every 8 hours      ENDOCRINE  Capillary Blood Glucose      ACCESS DEVICES:  [ ] Peripheral IV  [X] Central Venous Line	[ ] R	[X] L	[X] IJ	[ ] Fem	[ ] SC	Placed: 9/22  [ ] Arterial Line		[ ] R	[ ] L	[ ] Fem	[ ] Rad	[ ] Ax	Placed:   [ ] PICC:					[ ] Mediport  [ ] Urinary Catheter, Date Placed:   [ ] Necessity of urinary, arterial, and venous catheters discussed        CODE STATUS: Full code    IMAGING: x HISTORY:  62y Male initially admitted to neurosurgery for b/l leg weakness found to have newly diagnosed metastatic disease with spinal met causing spinal cord compression s/p T8-T11 laminectomy on 9/20, post-op course complicated by deterioration in respiratory status and acute onset abdominal pain requiring intubation and transfer to NSCU. CT chest/abdomen/pelvis demonstrated b/l DVTs, right upper lobe PE, and a bowel perforation with free air. Pt was taken emergently to the OR and is now s/p exploratory laparotomy, SBR x1 (left in discontinuity), abdominal washout, abthera VAC application on 9/22, after which he was transferred to SICU under ACS. He was taken back to the OR on 9/24 for a washout, primary anastomosis, and abdominal closure. Post-operative course complicated by Acute hypoxemic respiratory failure secondary to Acute Pulmonary Vascular congestion/ Left Lung Collapse.    24 HOUR EVENTS: Pt continues on high flow NC at LPM 50, FiO2 40%, abdomen more distended today, abdominal Xray ordered for AM.     SUBJECTIVE/ROS:  [X] A ten-point review of systems was otherwise negative except as noted.    NEURO  Exam: AAOx1-2, calm and cooperative  Meds: HYDROmorphone  Injectable 0.2 milliGRAM(s) IV Push every 2 hours PRN Severe Pain (7 - 10)  haloperidol    Injectable 2.5 milliGRAM(s) IV Push once    [x] Adequacy of sedation and pain control has been assessed and adjusted      RESPIRATORY  RR: 28 (10-03-17 @ 02:00) (22 - 45)  SpO2: 99% (10-03-17 @ 02:00) (90% - 100%)  Wt(kg): --  Exam: unlabored, clear to auscultation bilaterally  Mechanical Ventilation:   ABG - ( 03 Oct 2017 00:28 )  pH: 7.51  /  pCO2: 34    /  pO2: 178   / HCO3: 27    / Base Excess: 3.8   /  SaO2: 100     Lactate: x        Meds: ALBUTerol/ipratropium for Nebulization 3 milliLiter(s) Nebulizer every 6 hours    CARDIOVASCULAR  HR: 79 (10-03-17 @ 02:00) (77 - 103)  BP: 132/79 (10-03-17 @ 02:00) (104/71 - 160/74)  BP(mean): 101 (10-03-17 @ 02:00) (84 - 111)  ABP: --  ABP(mean): --  Wt(kg): --  CVP(cm H2O): --  VBG - ( 01 Oct 2017 06:52 )  pH: 7.41  /  pCO2: 47    /  pO2: 22    / HCO3: 29    / Base Excess: 4.5   /  SaO2: 32     Lactate: 2.7      Exam: regular rate and rhythm  Cardiac Rhythm: NSR  Perfusion     [X]Adequate   [ ]Inadequate  Mentation   [X]Normal       [ ]Reduced  Extremities  [X]Warm         [ ]Cool  Volume Status [ ]Hypervolemic [X]Euvolemic [ ]Hypovolemic  Meds: metoprolol Injectable 5 milliGRAM(s) IV Push every 6 hours    GI/NUTRITION  Exam: abd soft, distended, nontender, abd dsg CDI  Diet: NPO  Meds: pantoprazole  Injectable 40 milliGRAM(s) IV Push every 24 hours      GENITOURINARY  I&O's Detail    10-01 @ 07:01  -  10-02 @ 07:00  --------------------------------------------------------  IN:    dextrose 5% + sodium chloride 0.45%.: 720 mL    IV PiggyBack: 150 mL    Solution: 250 mL  Total IN: 1120 mL    OUT:    Indwelling Catheter - Urethral: 1195 mL  Total OUT: 1195 mL    Total NET: -75 mL        01 Oct 2017 07:01  -  02 Oct 2017 07:00  --------------------------------------------------------  IN:    dextrose 5% + sodium chloride 0.45%.: 720 mL    IV PiggyBack: 150 mL    Solution: 250 mL  Total IN: 1120 mL    OUT:    Indwelling Catheter - Urethral: 1195 mL  Total OUT: 1195 mL    Total NET: -75 mL      02 Oct 2017 07:01  -  03 Oct 2017 04:21  --------------------------------------------------------  IN:    dextrose 5% + sodium chloride 0.45%.: 630 mL    IV PiggyBack: 100 mL  Total IN: 730 mL    OUT:    Indwelling Catheter - Urethral: 905 mL  Total OUT: 905 mL    Total NET: -175 mL      10-03    139  |  101  |  32<H>  ----------------------------<  106<H>  4.2   |  27  |  1.12    Ca    8.3<L>      03 Oct 2017 03:12  Phos  2.8     10-03  Mg     2.4     10-03    TPro  5.2<L>  /  Alb  2.4<L>  /  TBili  5.8<H>  /  DBili  4.9<H>  /  AST  88<H>  /  ALT  56<H>  /  AlkPhos  115  10-03      [ ] Torres catheter, indication: N/A  Meds: dextrose 5% + sodium chloride 0.45%. 1000 milliLiter(s) IV Continuous <Continuous>        HEMATOLOGIC  Meds: enoxaparin Injectable 90 milliGRAM(s) SubCutaneous every 12 hours    [x] VTE Prophylaxis                          7.8    25.3  )-----------( 251      ( 03 Oct 2017 03:12 )             24.0   PT/INR - ( 03 Oct 2017 03:12 )   PT: 18.8 sec;   INR: 1.71 ratio    PTT - ( 03 Oct 2017 03:12 )  PTT:28.4 sec    Transfusion     [ ] PRBC   [ ] Platelets   [ ] FFP   [ ] Cryoprecipitate      INFECTIOUS DISEASES  T(C): 37.3 (10-02-17 @ 23:00), Max: 37.4 (10-02-17 @ 11:00)  Wt(kg): --  WBC Trend: 25.3<--, 26.6<--, 27.5<--    Recent Cultures: 9/23 - peritoneal cultures grew Enterococcus faecium and Klebsiella pneumoniae    Meds: vancomycin  IVPB 1000 milliGRAM(s) IV Intermittent every 12 hours  vancomycin  IVPB      micafungin IVPB      micafungin IVPB 100 milliGRAM(s) IV Intermittent every 24 hours  meropenem IVPB      meropenem IVPB 1000 milliGRAM(s) IV Intermittent every 8 hours      ENDOCRINE  Capillary Blood Glucose      ACCESS DEVICES:  [ ] Peripheral IV  [X] Central Venous Line	[ ] R	[X] L	[X] IJ	[ ] Fem	[ ] SC	Placed: 9/22  [ ] Arterial Line		[ ] R	[ ] L	[ ] Fem	[ ] Rad	[ ] Ax	Placed:   [ ] PICC:					[ ] Mediport  [ ] Urinary Catheter, Date Placed:   [ ] Necessity of urinary, arterial, and venous catheters discussed        CODE STATUS: Full code    IMAGING: x HISTORY:  62y Male initially admitted to neurosurgery for b/l leg weakness found to have newly diagnosed metastatic disease with spinal met causing spinal cord compression s/p T8-T11 laminectomy on 9/20, post-op course complicated by deterioration in respiratory status and acute onset abdominal pain requiring intubation and transfer to NSCU. CT chest/abdomen/pelvis demonstrated b/l DVTs, right upper lobe PE, and a bowel perforation with free air. Pt was taken emergently to the OR and is now s/p exploratory laparotomy, SBR x1 (left in discontinuity), abdominal washout, abthera VAC application on 9/22, after which he was transferred to SICU under ACS. He was taken back to the OR on 9/24 for a washout, primary anastomosis, and abdominal closure. Post-operative course complicated by Acute hypoxemic respiratory failure secondary to Acute Pulmonary Vascular congestion/ Left Lung Collapse.    24 HOUR EVENTS: Pt continues on high flow NC at LPM 50, FiO2 40%, abdomen more distended today, abdominal Xray ordered for AM.     SUBJECTIVE/ROS:  [X] A ten-point review of systems was otherwise negative except as noted.    NEURO  Exam: AAOx1-2, calm and cooperative  Meds: HYDROmorphone  Injectable 0.2 milliGRAM(s) IV Push every 2 hours PRN Severe Pain (7 - 10)  haloperidol    Injectable 2.5 milliGRAM(s) IV Push once    [x] Adequacy of sedation and pain control has been assessed and adjusted      RESPIRATORY  RR: 33 (03 Oct 2017 09:00) (22 - 43)  SpO2: 100% (03 Oct 2017 09:00) (90% - 100%)    Wt(kg): --  Exam: unlabored, clear to auscultation bilaterally  Mechanical Ventilation:   ABG - ( 03 Oct 2017 00:28 )  pH: 7.51  /  pCO2: 34    /  pO2: 178   / HCO3: 27    / Base Excess: 3.8   /  SaO2: 100     Lactate: x        Meds: ALBUTerol/ipratropium for Nebulization 3 milliLiter(s) Nebulizer every 6 hours    CARDIOVASCULAR  HR: 86 (03 Oct 2017 09:00) (74 - 103)  BP: 128/75 (03 Oct 2017 09:00) (104/71 - 155/87)  BP(mean): 92 (03 Oct 2017 09:00) (84 - 114)  ABP: --  ABP(mean): --  RR: 33 (03 Oct 2017 09:00) (22 - 43)  SpO2: 100% (03 Oct 2017 09:00) (90% - 100%)    VBG - ( 01 Oct 2017 06:52 )  pH: 7.41  /  pCO2: 47    /  pO2: 22    / HCO3: 29    / Base Excess: 4.5   /  SaO2: 32     Lactate: 2.7      Exam: regular rate and rhythm  Cardiac Rhythm: NSR  Perfusion     [X]Adequate   [ ]Inadequate  Mentation   [X]Normal       [ ]Reduced  Extremities  [X]Warm         [ ]Cool  Volume Status [ ]Hypervolemic [X]Euvolemic [ ]Hypovolemic  Meds: metoprolol Injectable 5 milliGRAM(s) IV Push every 6 hours    GI/NUTRITION  Exam: abd soft, distended, nontender, abd dsg CDI  Diet: NPO  Meds: pantoprazole  Injectable 40 milliGRAM(s) IV Push every 24 hours      GENITOURINARY  I&O's Detail    I&O's Detail    02 Oct 2017 07:01  -  03 Oct 2017 07:00  --------------------------------------------------------  IN:    dextrose 5% + sodium chloride 0.45%.: 720 mL    IV PiggyBack: 100 mL  Total IN: 820 mL    OUT:    Indwelling Catheter - Urethral: 1180 mL  Total OUT: 1180 mL    Total NET: -360 mL      03 Oct 2017 07:01  -  03 Oct 2017 10:02  --------------------------------------------------------  IN:    dextrose 5% + sodium chloride 0.45%.: 30 mL  Total IN: 30 mL    OUT:    Indwelling Catheter - Urethral: 75 mL  Total OUT: 75 mL    Total NET: -45 mL        10-03    139  |  101  |  32<H>  ----------------------------<  106<H>  4.2   |  27  |  1.12    Ca    8.3<L>      03 Oct 2017 03:12  Phos  2.8     10-03  Mg     2.4     10-03    TPro  5.2<L>  /  Alb  2.4<L>  /  TBili  5.8<H>  /  DBili  4.9<H>  /  AST  88<H>  /  ALT  56<H>  /  AlkPhos  115  10-03      [ ] Torres catheter, indication: N/A  Meds: dextrose 5% + sodium chloride 0.45%. 1000 milliLiter(s) IV Continuous <Continuous>        HEMATOLOGIC  Meds: enoxaparin Injectable 90 milliGRAM(s) SubCutaneous every 12 hours    [x] VTE Prophylaxis                          7.8    25.3  )-----------( 251      ( 03 Oct 2017 03:12 )             24.0   PT/INR - ( 03 Oct 2017 03:12 )   PT: 18.8 sec;   INR: 1.71 ratio    PTT - ( 03 Oct 2017 03:12 )  PTT:28.4 sec    Transfusion     [ ] PRBC   [ ] Platelets   [ ] FFP   [ ] Cryoprecipitate      INFECTIOUS DISEASES  ICU Vital Signs Last 24 Hrs  T(C): 36.9 (03 Oct 2017 07:00), Max: 37.4 (02 Oct 2017 11:00)  T(F): 98.5 (03 Oct 2017 07:00), Max: 99.3 (02 Oct 2017 11:00)  WBC Count: 25.3 K/uL (10-03 @ 03:12)  WBC Count: 26.6 K/uL (10-02 @ 10:44)      Recent Cultures: 9/23 - peritoneal cultures grew Enterococcus faecium and Klebsiella pneumoniae    Meds: vancomycin  IVPB 1000 milliGRAM(s) IV Intermittent every 12 hours  vancomycin  IVPB      micafungin IVPB      micafungin IVPB 100 milliGRAM(s) IV Intermittent every 24 hours  meropenem IVPB      meropenem IVPB 1000 milliGRAM(s) IV Intermittent every 8 hours      ENDOCRINE  Capillary Blood Glucose      ACCESS DEVICES:  [ ] Peripheral IV  [X] Central Venous Line	[ ] R	[X] L	[X] IJ	[ ] Fem	[ ] SC	Placed: 9/22  [ ] Arterial Line		[ ] R	[ ] L	[ ] Fem	[ ] Rad	[ ] Ax	Placed:   [ ] PICC:					[ ] Mediport  [ ] Urinary Catheter, Date Placed:   [ ] Necessity of urinary, arterial, and venous catheters discussed        CODE STATUS: Full code    IMAGING: x

## 2017-10-03 NOTE — PROGRESS NOTE ADULT - ASSESSMENT
62M with no significant PMH presenting with spinal cord compression of thoracic spine s/p laminectomy with surgical decompression and biopsy of mass taken. Pan-CT concerning for possible lung primary.  Suspicious lesions of liver and adrenal gland concerning for metastases.   Now s/p Laminectomy and decompression.    Abdominal Pain and distension - likely due to ileus  AXR - 9/21 c/w ileus, (stool and contrast in Right colon)  s/p RRT due to respiratory distress 9/22 am- s/p intubation, sedation, pressor support and new ALEC with hyperkalemia  CT 9/22 - SB perforation and PE  s/p Exploratory laparotomy 9/22/17, SBR x1 (left in discontinuity), abdominal washout, abthera VAC application (9/22)  RTOR 9/24/17; ex-lap, washout, re-anastomosis, abdominal closure  Spinal mass biopsy c/w Lung Ca - Heme/Onc following  Liver lesions (likely metastatic disease), new jaundice - no biliary ductal dilation on 9/28 US, suspect due to mets +/- meds    PLAN  NPO  IV PPI  Await GI function, monitor abdominal exam - plans noted for AXR per Surgery team  On IV Anti-microbials - Zosyn, Vanco, Micafungin (E. faecium in peritoneal culture)  follow-up cultures  AC per Hem/Onc (PE)  Monitor Hgb   Monitor LFTs, avoid hepatotoxic Rx, Consider repeat US if LFTS/ Bilirubin continue to rise    Post-op care per ERIN Baker PA-C    [facility]  [phone]

## 2017-10-03 NOTE — PROGRESS NOTE ADULT - ASSESSMENT
A/P: 62M admitted to neurosurgery for bilateral leg weakness found to have newly diagnosed metastatic disease s/p T8-T11 laminectomy presented today with deterioration in respiratory status and acute onset abdominal pain who was subsequently intubated. W/u revealed b/l DVTs, right upper lobe PE, and a bowel perforation.  Pt is now s/p Exploratory laparotomy, SBR x1 (left in discontinuity), abdominal washout, abthera VAC application (9/22); 1cm perforation in the small bowel 110 cm distal to the ligament of treitz. RTOR 9/24 for intestinal reconstruction and abdominal closure. Awaiting ROBF. 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 Most likely noninfected per vascular surgery    - f/u palliative care/family meeting  - Wean 02 as tolerated, recommend diuresis as appropriate  - NPO/IVF  - Pain control  - DVT ppx: T lovenox  [facility]   [phone]

## 2017-10-03 NOTE — PROGRESS NOTE ADULT - ASSESSMENT
61yo M with no significant PMH. Consulted on 9/12 for worsening LBP associated with lower extremity weakness for past 3 weeks. Over the weekend, he progressed to the point where he was unable to ambulate. He was seen at OSH and transferred to [facility] for spine evaluation. Was found to have epidural compression T8-T11 from newly diagnosed metastatic disease to liver, bone and bowel mets. S/p laminectomy on 9/20. Hospitalization complicated by acute hypoxic respiratory failure 2/2 acute pulmonary vascular congestion, L lung collapse, b/l PE, RUL PNA. Was found to have bowel perf with free air, was taken for ex-lap on 9/22, closure on 9/24. Peritonitis subsequently - E. faecium, Klebsiella and Candida. Consult placed for GOC and symptom management.

## 2017-10-04 NOTE — PROGRESS NOTE ADULT - PROBLEM SELECTOR PLAN 2
Dilaudid 0.2mg Q2hrs PRN. Has received only 1 PRN in last 24hrs. Would not recommend any changes at the time.

## 2017-10-04 NOTE — PROGRESS NOTE ADULT - SUBJECTIVE AND OBJECTIVE BOX
HISTORY:  62y Male initially admitted to neurosurgery for b/l leg weakness found to have newly diagnosed metastatic disease with spinal met causing spinal cord compression s/p T8-T11 laminectomy on 9/20, post-op course complicated by deterioration in respiratory status and acute onset abdominal pain requiring intubation and transfer to NSCU. CT chest/abdomen/pelvis demonstrated b/l DVTs, right upper lobe PE, and a bowel perforation with free air. Pt was taken emergently to the OR and is now s/p exploratory laparotomy, SBR x1 (left in discontinuity), abdominal washout, abthera VAC application on 9/22, after which he was transferred to SICU under ACS. He was taken back to the OR on 9/24 for a washout, primary anastomosis, and abdominal closure. Post-operative course complicated by Acute hypoxemic respiratory failure secondary to Acute Pulmonary Vascular congestion/ Left Lung Collapse.    24 HOUR EVENTS: Pt continues on high flow NC at LPM 50.  Received 5mg lasix x2 doses for diuresis with good response of UOP. Plan for family meeting today at 11am. HISTORY:  62y Male initially admitted to neurosurgery for b/l leg weakness found to have newly diagnosed metastatic disease with spinal met causing spinal cord compression s/p T8-T11 laminectomy on 9/20, post-op course complicated by deterioration in respiratory status and acute onset abdominal pain requiring intubation and transfer to NSCU. CT chest/abdomen/pelvis demonstrated b/l DVTs, right upper lobe PE, and a bowel perforation with free air. Pt was taken emergently to the OR and is now s/p exploratory laparotomy, SBR x1 (left in discontinuity), abdominal washout, abthera VAC application on 9/22, after which he was transferred to SICU under ACS. He was taken back to the OR on 9/24 for a washout, primary anastomosis, and abdominal closure. Post-operative course complicated by Acute hypoxemic respiratory failure secondary to Acute Pulmonary Vascular congestion/ Left Lung Collapse.    24 HOUR EVENTS: Pt continues on high flow NC at LPM 50.  Received 5mg lasix x2 doses for diuresis with good response of UOP. Plan for family meeting today at 11am.      SUBJECTIVE/ROS:  [ x] A ten-point review of systems was otherwise negative except as noted.        NEURO  Exam: AxO x 2. calm, cooperative  Meds: HYDROmorphone  Injectable 0.2 milliGRAM(s) IV Push every 2 hours PRN Severe Pain (7 - 10)    [x] Adequacy of sedation and pain control has been assessed and adjusted      RESPIRATORY  crackles on the left lung field  RR: 28 (10-04-17 @ 02:00) (25 - 226)  SpO2: 98% (10-04-17 @ 02:00) (96% - 100%)  ABG - ( 04 Oct 2017 02:44 )  pH: 7.48  /  pCO2: 37    /  pO2: 115   / HCO3: 27    / Base Excess: 3.4   /  SaO2: 100     Lactate: x          Meds: ALBUTerol/ipratropium for Nebulization 3 milliLiter(s) Nebulizer every 6 hours        CARDIOVASCULAR  HR: 82 (10-04-17 @ 02:00) (67 - 92)  BP: 134/65 (10-04-17 @ 02:00) (110/64 - 141/82)  BP(mean): 93 (10-04-17 @ 02:00) (82 - 104)    Exam: regular rate and rhythm  Cardiac Rhythm: sinus  Perfusion     [x]Adequate   [ ]Inadequate  Mentation   []Normal       [ x]Reduced  Extremities  [x]Warm         [ ]Cool  Volume Status [ ]Hypervolemic [x]Euvolemic [ ]Hypovolemic  Meds: metoprolol Injectable 5 milliGRAM(s) IV Push every 6 hours        GI/NUTRITION  Exam: soft, distended with mild fluid wave,   Diet: NPO  Meds: pantoprazole  Injectable 40 milliGRAM(s) IV Push every 24 hours      GENITOURINARY  I&O's Detail    10-02 @ 07:01  -  10-03 @ 07:00  --------------------------------------------------------  IN:    dextrose 5% + sodium chloride 0.45%.: 720 mL    IV PiggyBack: 100 mL  Total IN: 820 mL    OUT:    Indwelling Catheter - Urethral: 1180 mL  Total OUT: 1180 mL    Total NET: -360 mL      10-03 @ 07:01  -  10-04 @ 04:07  --------------------------------------------------------  IN:    dextrose 5% + sodium chloride 0.45%.: 540 mL    IV PiggyBack: 550 mL  Total IN: 1090 mL    OUT:    Indwelling Catheter - Urethral: 1530 mL  Total OUT: 1530 mL    Total NET: -440 mL          10-04    141  |  103  |  33<H>  ----------------------------<  93  4.1   |  26  |  1.20    Ca    8.1<L>      04 Oct 2017 02:45  Phos  2.8     10-04  Mg     2.3     10-04    TPro  5.1<L>  /  Alb  2.2<L>  /  TBili  4.3<H>  /  DBili  3.4<H>  /  AST  94<H>  /  ALT  58<H>  /  AlkPhos  128<H>  10-04    [ x] Torres catheter, indication: strict I's and O's   Meds: dextrose 5% + sodium chloride 0.45%. 1000 milliLiter(s) IV Continuous <Continuous>        HEMATOLOGIC  Meds: enoxaparin Injectable 90 milliGRAM(s) SubCutaneous every 12 hours    [x] VTE Treatment (therapeutic AC)                 Urinalysis (09-29 @ 11:52):     Color: Yellow / Appearance: SL Turbid<!> / SG: >1.030<H> / pH: 5.0 / Gluc: Negative / Ketones: Negative / Bili: Small<!> / Urobili: Negative / Protein :Trace / Nitrites: Negative / Leuk.Est: Negative / RBC: 5-10<!> / WBC: 5-10<!> / Sq Epi:  / Non Sq Epi: Occasional / Bacteria        -> .Blood Blood-Peripheral Culture (09-27 @ 02:28)     NG    NG    No growth at 5 days.    -> .Blood Blood-Peripheral Culture (09-26 @ 15:15)     NG    NG    No growth at 5 days.        Meds: meropenem IVPB      meropenem IVPB 1000 milliGRAM(s) IV Intermittent every 8 hours  micafungin IVPB      micafungin IVPB 100 milliGRAM(s) IV Intermittent every 24 hours  vancomycin  IVPB 1000 milliGRAM(s) IV Intermittent every 12 hours  vancomycin  IVPB            ENDOCRINE  CAPILLARY BLOOD GLUCOSE        Meds:       ACCESS DEVICES:  [ ] Peripheral IV  [x ] Central Venous Line	[ ] R	[x ] L	[x ] IJ	[ ] Fem	[ ] SC	Placed:   [ ] Arterial Line		[ ] R	[ ] L	[ ] Fem	[ ] Rad	[ ] Ax	Placed:   [ ] PICC:					[ ] Mediport  [ x] Urinary Catheter  [x] Necessity of urinary, arterial, and venous catheters discussed      CODE STATUS: full code HISTORY:  62y Male initially admitted to neurosurgery for b/l leg weakness found to have newly diagnosed metastatic disease with spinal met causing spinal cord compression s/p T8-T11 laminectomy on 9/20, post-op course complicated by deterioration in respiratory status and acute onset abdominal pain requiring intubation and transfer to NSCU. CT chest/abdomen/pelvis demonstrated b/l DVTs, right upper lobe PE, and a bowel perforation with free air. Pt was taken emergently to the OR and is now s/p exploratory laparotomy, SBR x1 (left in discontinuity), abdominal washout, abthera VAC application on 9/22, after which he was transferred to SICU under ACS. He was taken back to the OR on 9/24 for a washout, primary anastomosis, and abdominal closure. Post-operative course complicated by Acute hypoxemic respiratory failure secondary to Acute Pulmonary Vascular congestion/ Left Lung Collapse.    24 HOUR EVENTS: Pt continues on high flow NC at LPM 50.  Received 5mg lasix x2 doses for diuresis with good response of UOP. Plan for family meeting today at 11am.      SUBJECTIVE/ROS:  [ x] A ten-point review of systems was otherwise negative except as noted.        NEURO  Exam: AxO x 2. calm, cooperative  Meds: HYDROmorphone  Injectable 0.2 milliGRAM(s) IV Push every 2 hours PRN Severe Pain (7 - 10)    [x] Adequacy of sedation and pain control has been assessed and adjusted      RESPIRATORY  crackles on the left lung field  RR: 28 (04 Oct 2017 07:00) (25 - 226)  SpO2: 100% (04 Oct 2017 07:00) (98% - 100%)    ABG - ( 04 Oct 2017 02:44 )  pH: 7.48  /  pCO2: 37    /  pO2: 115   / HCO3: 27    / Base Excess: 3.4   /  SaO2: 100     Lactate: x          Meds: ALBUTerol/ipratropium for Nebulization 3 milliLiter(s) Nebulizer every 6 hours        CARDIOVASCULAR  HR: 84 (04 Oct 2017 07:00) (67 - 92)  BP: 120/68 (04 Oct 2017 07:00) (110/64 - 135/71)  BP(mean): 89 (04 Oct 2017 07:00) (82 - 98)        Exam: regular rate and rhythm  Cardiac Rhythm: sinus  Perfusion     [x]Adequate   [ ]Inadequate  Mentation   []Normal       [ x]Reduced  Extremities  [x]Warm         [ ]Cool  Volume Status [ ]Hypervolemic [x]Euvolemic [ ]Hypovolemic  Meds: metoprolol Injectable 5 milliGRAM(s) IV Push every 6 hours        GI/NUTRITION  Exam: soft, distended with mild fluid wave,   Diet: NPO  Meds: pantoprazole  Injectable 40 milliGRAM(s) IV Push every 24 hours      GENITOURINARY  I&O's Detail    03 Oct 2017 07:01  -  04 Oct 2017 07:00  --------------------------------------------------------  IN:    dextrose 5% + sodium chloride 0.45%.: 660 mL    IV PiggyBack: 650 mL    Solution: 250 mL  Total IN: 1560 mL    OUT:    Indwelling Catheter - Urethral: 1755 mL  Total OUT: 1755 mL    Total NET: -195 mL                 04 Oct 2017 02:45    141    |  103    |  33     ----------------------------<  93     4.1     |  26     |  1.20     Ca    8.1        04 Oct 2017 02:45  Phos  2.8       04 Oct 2017 02:45  Mg     2.3       04 Oct 2017 02:45    TPro  5.1    /  Alb  2.2    /  TBili  4.3    /  DBili  3.4    /  AST  94     /  ALT  58     /  AlkPhos  128    04 Oct 2017 02:45      [ x] Torres catheter, indication: strict I's and O's   Meds: dextrose 5% + sodium chloride 0.45%. 1000 milliLiter(s) IV Continuous <Continuous>        HEMATOLOGIC                        7.4    20.0  )-----------( 263      ( 04 Oct 2017 03:40 )             22.4       Meds: enoxaparin Injectable 90 milliGRAM(s) SubCutaneous every 12 hours    [x] VTE Treatment (therapeutic AC)    INFECTIOUS DISEASE  T(C): 37.1 (04 Oct 2017 03:00), Max: 37.5 (03 Oct 2017 23:00)  T(F): 98.7 (04 Oct 2017 03:00), Max: 99.5 (03 Oct 2017 23:00)    WBC Count: 20.0 K/uL (10-04 @ 03:40)  WBC Count: 25.3 K/uL (10-03 @ 03:12)  WBC Count: 26.6 K/uL (10-02 @ 10:44)  WBC Count: 27.5 K/uL (10-02 @ 02:54)  WBC Count: 32.7 K/uL (10-01 @ 02:54)        Urinalysis (09-29 @ 11:52):     Color: Yellow / Appearance: SL Turbid<!> / SG: >1.030<H> / pH: 5.0 / Gluc: Negative / Ketones: Negative / Bili: Small<!> / Urobili: Negative / Protein :Trace / Nitrites: Negative / Leuk.Est: Negative / RBC: 5-10<!> / WBC: 5-10<!> / Sq Epi:  / Non Sq Epi: Occasional / Bacteria        -> .Blood Blood-Peripheral Culture (09-27 @ 02:28)     NG    NG    No growth at 5 days.    -> .Blood Blood-Peripheral Culture (09-26 @ 15:15)     NG    NG    No growth at 5 days.        Meds: meropenem IVPB      meropenem IVPB 1000 milliGRAM(s) IV Intermittent every 8 hours  micafungin IVPB      micafungin IVPB 100 milliGRAM(s) IV Intermittent every 24 hours  vancomycin  IVPB 1000 milliGRAM(s) IV Intermittent every 12 hours  vancomycin  IVPB            ENDOCRINE  CAPILLARY BLOOD GLUCOSE        Meds:       ACCESS DEVICES:  [ ] Peripheral IV  [x ] Central Venous Line	[ ] R	[x ] L	[x ] IJ	[ ] Fem	[ ] SC	Placed:   [ ] Arterial Line		[ ] R	[ ] L	[ ] Fem	[ ] Rad	[ ] Ax	Placed:   [ ] PICC:					[ ] Mediport  [ x] Urinary Catheter  [x] Necessity of urinary, arterial, and venous catheters discussed      CODE STATUS: full code

## 2017-10-04 NOTE — PROGRESS NOTE ADULT - ASSESSMENT
A/P: 62M admitted to neurosurgery for bilateral leg weakness found to have newly diagnosed metastatic disease s/p T8-T11 laminectomy presented today with deterioration in respiratory status and acute onset abdominal pain who was subsequently intubated. W/u revealed b/l DVTs, right upper lobe PE, and a bowel perforation.  Pt is now s/p Exploratory laparotomy, SBR x1 (left in discontinuity), abdominal washout, abthera VAC application (9/22); 1cm perforation in the small bowel 110 cm distal to the ligament of treitz. RTOR 9/24 for intestinal reconstruction and abdominal closure. Awaiting ROBF. 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 Most likely noninfected per vascular surgery. Family meeting today.    - f/u palliative care/family meeting today  - Wean 02 as tolerated, recommend diuresis as appropriate  - NPO/IVF  - Pain control  - DVT ppx: T lovenox  - Primary Care Per ERIN Cisneros M.D.   [id number]

## 2017-10-04 NOTE — PROGRESS NOTE ADULT - PROBLEM SELECTOR PLAN 1
Metastatic non small cell lung cancer. Multiple complications from his met dz, including bowel perf, lung collapse, PE/DVT and now hyperbilirubinemia. Per heme-onc if hyperbilirubinemia is irreversible, any chemotherapy or targeted therapy would be extremely toxic. Son - Surrogate already had discussion with Heme-Onc. Scheduled family meeting on 10/4 @11am. Metastatic non small cell lung cancer. Multiple complications from his met dz, including bowel perf, lung collapse, PE/DVT and now hyperbilirubinemia. Per heme-onc, main focus is to resolve the acute ongoing medical issues. If patient is able to improve performance status, and f/u pathology is consistent would be able to offer targeted therapy weeks down the line. Did indicate that the chance of the patient reaching that point is very grim. Guarded prognosis.   Primary team f/u with CT surgery to see if there is anything else being offered for the lung mass and collapsed lung.

## 2017-10-04 NOTE — PROGRESS NOTE ADULT - PROBLEM SELECTOR PLAN 3
Multifactorial, lung CA + PE + Collapsed lung. Has had increase in FiO2 requirements in the last couple of days. Currently on high flow. Patient would benefit from opiates PRN for dyspnea.

## 2017-10-04 NOTE — PROGRESS NOTE ADULT - ASSESSMENT
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 Complicated by acute hypoxemic respiratory failure requiring CPAP with transition to high flow NC.      PLAN:  Neurologic: post-operative pain and neoplastic pain   -Will continue with Dilaudid IV PRN  -pending goals of care discussion could also use PRN narcotics for dyspnea    Respiratory: Acute hypoxemic respiratory failure, improving  -Will administer supplemental O2 to maintain SpO2 > 88%, on HFNC FiO2 40%   -Duonebs/ Mucomyst  -PT/ OOB    Cardiovascular: Hypertension  -Continue metoprolol 5mg IV q6hrs    Gastrointestinal/Nutrition: small bowel perforation s/p SBR, anastomosis   -Currently NPO; will need a plan for nutrition as has been significant time without nutrition.  Feeding plan dependent on goals of care discussion.     -Protonix 40mg daily    Renal/Genitourinary: ALEC, resolving  -WIll IVL and continue diuresis  -Continue to monitor urine output and BUN/Cr  -    Hematologic: b/l DVT; b/l PE  -Continue with therapeutic lovenox  -will need treatement for     Infectious Disease:Secondary Peritonitis 2/2 to Vanco sensitive E. Faecium, Klebsiella, Candida Albicans; leukocytosis  -Per ID recommendations Continue Vancomycin (for E Faecium) Meropenem (Fro Klebsiella and inter-abdominal/lung infection), and Micafungin for candida     Disposition: SICU; pending GOC discussion today. 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 Complicated by acute hypoxemic respiratory failure requiring CPAP with transition to high flow NC.      PLAN:  Neurologic: post-operative pain and neoplastic pain   -Will continue with Dilaudid IV PRN  -pending goals of care discussion could also use PRN narcotics for dyspnea    Respiratory: Acute hypoxemic respiratory failure, improving  -Will administer supplemental O2 to maintain SpO2 > 88%, on HFNC FiO2 40%   -Encourage CPAP overnight; pt not tolerating most nights  -Duonebs/ Mucomyst  -PT/ OOB    Cardiovascular: Hypertension  -Continue metoprolol 5mg IV q6hrs    Gastrointestinal/Nutrition: small bowel perforation s/p SBR, anastomosis   -Currently NPO; will need a plan for nutrition as has been significant time without nutrition.  Feeding plan dependent on goals of care discussion.     -Protonix 40mg daily    Renal/Genitourinary: ALEC, resolving  -WIll IVL and continue diuresis  -Continue to monitor urine output and BUN/Cr  -    Hematologic: b/l DVT; b/l PE  -Continue with therapeutic lovenox  -will need treatement for     Infectious Disease:Secondary Peritonitis 2/2 to Vanco sensitive E. Faecium, Klebsiella, Candida Albicans; leukocytosis  -Per ID recommendations Continue Vancomycin (for E Faecium) Meropenem (Fro Klebsiella and inter-abdominal/lung infection), and Micafungin for candida     Disposition: SICU; pending C discussion today.

## 2017-10-04 NOTE — PROGRESS NOTE ADULT - SUBJECTIVE AND OBJECTIVE BOX
Patient is a 62y old  Male who presented with a chief complaint of cord compression (19 Sep 2017 09:44)      INTERVAL HPI/OVERNIGHT EVENTS:  +BM this morning  "I'm hungry"  no fever or chills    MEDICATIONS  (STANDING):  ALBUTerol/ipratropium for Nebulization 3 milliLiter(s) Nebulizer every 6 hours  dextrose 5% + sodium chloride 0.45%. 1000 milliLiter(s) (30 mL/Hr) IV Continuous <Continuous>  enoxaparin Injectable 90 milliGRAM(s) SubCutaneous every 12 hours  meropenem IVPB      meropenem IVPB 1000 milliGRAM(s) IV Intermittent every 8 hours  metoprolol Injectable 5 milliGRAM(s) IV Push every 6 hours  micafungin IVPB      micafungin IVPB 100 milliGRAM(s) IV Intermittent every 24 hours  pantoprazole  Injectable 40 milliGRAM(s) IV Push every 24 hours  vancomycin  IVPB 1000 milliGRAM(s) IV Intermittent every 12 hours  vancomycin  IVPB        MEDICATIONS  (PRN):  HYDROmorphone  Injectable 0.2 milliGRAM(s) IV Push every 2 hours PRN Severe Pain (7 - 10)      Allergies    No Known Allergies    Intolerances        Review of Systems:  Gen: no fever or chills  CV: denies CP  Resp: denies dyspnea at this time, on high flow nasal O2 and night time CPAP  Neuro: no focal weakness    Vital Signs Last 24 Hrs  T(C): 37.3 (04 Oct 2017 08:00), Max: 37.5 (03 Oct 2017 23:00)  T(F): 99.2 (04 Oct 2017 08:00), Max: 99.5 (03 Oct 2017 23:00)  HR: 83 (04 Oct 2017 11:44) (67 - 92)  BP: 117/73 (04 Oct 2017 10:00) (110/64 - 135/71)  BP(mean): 90 (04 Oct 2017 10:00) (82 - 98)  RR: 37 (04 Oct 2017 10:00) (25 - 226)  SpO2: 99% (04 Oct 2017 11:44) (98% - 100%)    PHYSICAL EXAM:  Constitutional: on nasal high flow, responsive, OOB to chair  Neck: supple  Sclera: icteric  Respiratory: decreased BS bases, rhonchi  Cardiovascular: S1 and S2, regular  Gastrointestinal: lessdistended, mild right sided tenderness - without rebound or guarding without ecchymosis or bleeding, +midline incision with staples +michelle  Extremities: +edema b/l, +scrotal edema  Vascular: 2+ peripheral pulses  Neurological: alert and appropriate  Skin: No rashes    LABS:                        7.4    20.0  )-----------( 263      ( 04 Oct 2017 03:40 )             22.4     10-04    141  |  103  |  33<H>  ----------------------------<  93  4.1   |  26  |  1.20    Ca    8.1<L>      04 Oct 2017 02:45  Phos  2.8     10-04  Mg     2.3     10-04    TPro  5.1<L>  /  Alb  2.2<L>  /  TBili  4.3<H>  /  DBili  3.4<H>  /  AST  94<H>  /  ALT  58<H>  /  AlkPhos  128<H>  10-04    PT/INR - ( 04 Oct 2017 02:45 )   PT: 17.9 sec;   INR: 1.64 ratio         PTT - ( 04 Oct 2017 02:45 )  PTT:31.9 sec    LIVER FUNCTIONS - ( 04 Oct 2017 02:45 )  Alb: 2.2 g/dL / Pro: 5.1 g/dL / ALK PHOS: 128 U/L / ALT: 58 U/L RC / AST: 94 U/L / GGT: x             RADIOLOGY & ADDITIONAL TESTS:

## 2017-10-04 NOTE — PROGRESS NOTE ADULT - ASSESSMENT
62M with no significant PMH presenting with spinal cord compression of thoracic spine s/p laminectomy with surgical decompression and biopsy of mass taken. Pan-CT concerning for possible lung primary.  Suspicious lesions of liver and adrenal gland concerning for metastases.   Now s/p Laminectomy and decompression.    Abdominal Pain and distension - likely due to ileus  AXR - 9/21 c/w ileus, (stool and contrast in Right colon)  s/p RRT due to respiratory distress 9/22 am- s/p intubation, sedation, pressor support and new ALEC with hyperkalemia  CT 9/22 - SB perforation and PE  s/p Exploratory laparotomy 9/22/17, SBR x1 (left in discontinuity), abdominal washout, abthera VAC application (9/22)  RTOR 9/24/17; ex-lap, washout, re-anastomosis, abdominal closure  Spinal mass biopsy c/w Lung Ca - Heme/Onc following  Liver lesions (likely metastatic disease), new jaundice - no biliary ductal dilation on 9/28 US, suspect due to mets +/- meds    PLAN    IV PPI  diet as per surgery  On IV Anti-microbials - Zosyn, Vanco, Micafungin (E. faecium in peritoneal culture)  AC per Hem/Onc (PE)  Monitor Hgb   Monitor LFTs, avoid hepatotoxic Rx, Consider repeat US if LFTS/ Bilirubin are rising    Post-op care per ERIN Baker PA-C    [facility]  [phone]

## 2017-10-04 NOTE — PROGRESS NOTE ADULT - SUBJECTIVE AND OBJECTIVE BOX
INTERVAL HPI/OVERNIGHT EVENTS:    Patient seen and examined at bedside, sitting in chair, able to answer questions. Still drowsy but able to have a conversation more than yesterday. Had discussion with partner Francisca Simeon, who is aware of the extent of his disease, still very hopeful that he will recover enough to get chemo. Family meeting set up for today at 11AM.    Allergies    No Known Allergies    Intolerances      ADVANCE DIRECTIVES:    Full code  MOLST [ ] YES [x ] NO     MEDICATIONS  (STANDING):  ALBUTerol/ipratropium for Nebulization 3 milliLiter(s) Nebulizer every 6 hours  dextrose 5% + sodium chloride 0.45%. 1000 milliLiter(s) (30 mL/Hr) IV Continuous <Continuous>  enoxaparin Injectable 90 milliGRAM(s) SubCutaneous every 12 hours  meropenem IVPB      meropenem IVPB 1000 milliGRAM(s) IV Intermittent every 8 hours  metoprolol Injectable 5 milliGRAM(s) IV Push every 6 hours  micafungin IVPB      micafungin IVPB 100 milliGRAM(s) IV Intermittent every 24 hours  pantoprazole  Injectable 40 milliGRAM(s) IV Push every 24 hours  vancomycin  IVPB 1000 milliGRAM(s) IV Intermittent every 12 hours  vancomycin  IVPB        MEDICATIONS  (PRN):  HYDROmorphone  Injectable 0.2 milliGRAM(s) IV Push every 2 hours PRN Severe Pain (7 - 10)    PRESENT SYMPTOMS:  Source: [x ] Patient   [ ] Family   [ ] Team     Pain:                        [ ] No [x ] Yes             [x ] Mild [ ] Moderate [ ] Severe    Onset - days  Location - abdomen, lower back.  Duration - days  Character - aching  Alleviating/Aggravating - movement  Radiation -  Timing -    Dyspnea:                [ ] No [x ] Yes             [ ] Mild [x ] Moderate [ ] Severe    Anxiety:                  [x ] No [ ] Yes             [ ] Mild [ ] Moderate [ ] Severe    Fatigue:                  [x ] No [ ] Yes             [ ] Mild [ ] Moderate [ ] Severe    Nausea:                  [ x] No [ ] Yes             [ ] Mild [ ] Moderate [ ] Severe    Loss of appetite:   [ ] No [x ] Yes             [x ] Mild [ ] Moderate [ ] Severe    Constipation:        [x ] No [ ] Yes             [ ] Mild [ ] Moderate [ ] Severe    Other Symptoms:  [ x] All other review of systems negative   [ ] Unable to obtain due to poor mentation     Karnofsky Performance Score/Palliative Performance Status Version 2:  30-40%    PHYSICAL EXAM:  Vital Signs Last 24 Hrs  T(C): 37.3 (04 Oct 2017 08:00), Max: 37.5 (03 Oct 2017 23:00)  T(F): 99.2 (04 Oct 2017 08:00), Max: 99.5 (03 Oct 2017 23:00)  HR: 86 (04 Oct 2017 08:51) (67 - 92)  BP: 130/75 (04 Oct 2017 08:00) (110/64 - 135/71)  BP(mean): 98 (04 Oct 2017 08:00) (82 - 98)  RR: 27 (04 Oct 2017 08:51) (25 - 226)  SpO2: 100% (04 Oct 2017 08:51) (98% - 100%) I&O's Summary    03 Oct 2017 07:01  -  04 Oct 2017 07:00  --------------------------------------------------------  IN: 1560 mL / OUT: 1755 mL / NET: -195 mL    General:  [ ] Alert  [ ] Oriented x      [ x] Lethargic  [ ] Agitated   [ ] Cachexia   [ ] Unarousable  [x ] Verbal  [ ] Non-Verbal    HEENT:  [ ] Normal   [x ] Dry mouth   [ ] ET Tube    [ ] Trach  [ ] Oral lesions    Lungs:   [ ] Clear [x ] Tachypnea  [ ] Audible excessive secretions   [ ] Rhonchi        [ ] Right [ ] Left [ ] Bilateral  [ ] Crackles        [ ] Right [ ] Left [ ] Bilateral  [ ] Wheezing     [ ] Right [ ] Left [ ] Bilateral    Cardiovascular:  [x ] Regular [ ] Irregular [ ] Tachycardia   [ ] Bradycardia  [ ] Murmur [ ] Other    Abdomen: [ ] Soft  [ ] Distended   [ ] +BS  [ ] Non tender [x ] Tender  [ ]PEG   [ ]OGT/ NGT   Last BM:       Genitourinary: [ ] Normal [ ] Incontinent   [ ] Oliguria/Anuria   [x ] Torres    Musculoskeletal:  [ ] Normal   [ x] Weakness  [ ] Bedbound/Wheelchair bound [ ] Edema    Neurological: [ ] No focal deficits  [ ] Cognitive impairment  [ ] Dysphagia [ ] Dysarthria [x ] Paresis: LE [ ] Other     Skin: [ ] Normal   [ ] Pressure ulcer(s)                  [ ] Rash    LABS:                        7.4    20.0  )-----------( 263      ( 04 Oct 2017 03:40 )             22.4     10-04    141  |  103  |  33<H>  ----------------------------<  93  4.1   |  26  |  1.20    Ca    8.1<L>      04 Oct 2017 02:45  Phos  2.8     10-04  Mg     2.3     10-04    TPro  5.1<L>  /  Alb  2.2<L>  /  TBili  4.3<H>  /  DBili  3.4<H>  /  AST  94<H>  /  ALT  58<H>  /  AlkPhos  128<H>  10-04    PT/INR - ( 04 Oct 2017 02:45 )   PT: 17.9 sec;   INR: 1.64 ratio         PTT - ( 04 Oct 2017 02:45 )  PTT:31.9 sec    Shock: No [ ] Septic [ ] Cardiogenic [ ] Neurologic [ ] Hypovolemic  Vasopressors x None   Inotrophs x None    Oral Intake: [ ] Unable/mouth care only [ ] Minimal [ ] Moderate [x ] Full Capability    Diet: [x ] NPO [ ] Tube feeds [ ] TPN [ ] Other     RADIOLOGY & ADDITIONAL STUDIES: reviewed.    REFERRALS:   [ ] Chaplaincy  [ ] Hospice  [ ] Child Life  [ ] Social Work  [ ] Case management [ ] Holistic Therapy INTERVAL HPI/OVERNIGHT EVENTS:    Patient seen and examined at bedside, sitting in chair, able to answer questions. Still drowsy but able to have a conversation more than yesterday. Family meeting at 11AM, see [facility] note.    Allergies    No Known Allergies    Intolerances      ADVANCE DIRECTIVES:    Full code  MOLST [ ] YES [x ] NO     MEDICATIONS  (STANDING):  ALBUTerol/ipratropium for Nebulization 3 milliLiter(s) Nebulizer every 6 hours  dextrose 5% + sodium chloride 0.45%. 1000 milliLiter(s) (30 mL/Hr) IV Continuous <Continuous>  enoxaparin Injectable 90 milliGRAM(s) SubCutaneous every 12 hours  meropenem IVPB      meropenem IVPB 1000 milliGRAM(s) IV Intermittent every 8 hours  metoprolol Injectable 5 milliGRAM(s) IV Push every 6 hours  micafungin IVPB      micafungin IVPB 100 milliGRAM(s) IV Intermittent every 24 hours  pantoprazole  Injectable 40 milliGRAM(s) IV Push every 24 hours  vancomycin  IVPB 1000 milliGRAM(s) IV Intermittent every 12 hours  vancomycin  IVPB        MEDICATIONS  (PRN):  HYDROmorphone  Injectable 0.2 milliGRAM(s) IV Push every 2 hours PRN Severe Pain (7 - 10)    PRESENT SYMPTOMS:  Source: [x ] Patient   [ ] Family   [ ] Team     Pain:                        [ ] No [x ] Yes             [x ] Mild [ ] Moderate [ ] Severe    Onset - days  Location - abdomen, lower back.  Duration - days  Character - aching  Alleviating/Aggravating - movement  Radiation -  Timing -    Dyspnea:                [ ] No [x ] Yes             [ ] Mild [x ] Moderate [ ] Severe    Anxiety:                  [x ] No [ ] Yes             [ ] Mild [ ] Moderate [ ] Severe    Fatigue:                  [x ] No [ ] Yes             [ ] Mild [ ] Moderate [ ] Severe    Nausea:                  [ x] No [ ] Yes             [ ] Mild [ ] Moderate [ ] Severe    Loss of appetite:   [ ] No [x ] Yes             [x ] Mild [ ] Moderate [ ] Severe    Constipation:        [x ] No [ ] Yes             [ ] Mild [ ] Moderate [ ] Severe    Other Symptoms:  [ x] All other review of systems negative   [ ] Unable to obtain due to poor mentation     Karnofsky Performance Score/Palliative Performance Status Version 2:  30-40%    PHYSICAL EXAM:  Vital Signs Last 24 Hrs  T(C): 37.3 (04 Oct 2017 08:00), Max: 37.5 (03 Oct 2017 23:00)  T(F): 99.2 (04 Oct 2017 08:00), Max: 99.5 (03 Oct 2017 23:00)  HR: 86 (04 Oct 2017 08:51) (67 - 92)  BP: 130/75 (04 Oct 2017 08:00) (110/64 - 135/71)  BP(mean): 98 (04 Oct 2017 08:00) (82 - 98)  RR: 27 (04 Oct 2017 08:51) (25 - 226)  SpO2: 100% (04 Oct 2017 08:51) (98% - 100%) I&O's Summary    03 Oct 2017 07:01  -  04 Oct 2017 07:00  --------------------------------------------------------  IN: 1560 mL / OUT: 1755 mL / NET: -195 mL    General:  [ ] Alert  [ ] Oriented x      [ x] Lethargic  [ ] Agitated   [ ] Cachexia   [ ] Unarousable  [x ] Verbal  [ ] Non-Verbal    HEENT:  [ ] Normal   [x ] Dry mouth   [ ] ET Tube    [ ] Trach  [ ] Oral lesions    Lungs:   [ ] Clear [x ] Tachypnea  [ ] Audible excessive secretions   [ ] Rhonchi        [ ] Right [ ] Left [ ] Bilateral  [ ] Crackles        [ ] Right [ ] Left [ ] Bilateral  [ ] Wheezing     [ ] Right [ ] Left [ ] Bilateral    Cardiovascular:  [x ] Regular [ ] Irregular [ ] Tachycardia   [ ] Bradycardia  [ ] Murmur [ ] Other    Abdomen: [ ] Soft  [ ] Distended   [ ] +BS  [ ] Non tender [x ] Tender  [ ]PEG   [ ]OGT/ NGT   Last BM:       Genitourinary: [ ] Normal [ ] Incontinent   [ ] Oliguria/Anuria   [x ] Torres    Musculoskeletal:  [ ] Normal   [ x] Weakness  [ ] Bedbound/Wheelchair bound [ ] Edema    Neurological: [ ] No focal deficits  [ ] Cognitive impairment  [ ] Dysphagia [ ] Dysarthria [x ] Paresis: LE [ ] Other     Skin: [ ] Normal   [ ] Pressure ulcer(s)                  [ ] Rash    LABS:                        7.4    20.0  )-----------( 263      ( 04 Oct 2017 03:40 )             22.4     10-04    141  |  103  |  33<H>  ----------------------------<  93  4.1   |  26  |  1.20    Ca    8.1<L>      04 Oct 2017 02:45  Phos  2.8     10-04  Mg     2.3     10-04    TPro  5.1<L>  /  Alb  2.2<L>  /  TBili  4.3<H>  /  DBili  3.4<H>  /  AST  94<H>  /  ALT  58<H>  /  AlkPhos  128<H>  10-04    PT/INR - ( 04 Oct 2017 02:45 )   PT: 17.9 sec;   INR: 1.64 ratio         PTT - ( 04 Oct 2017 02:45 )  PTT:31.9 sec    Shock: No [ ] Septic [ ] Cardiogenic [ ] Neurologic [ ] Hypovolemic  Vasopressors x None   Inotrophs x None    Oral Intake: [ ] Unable/mouth care only [ ] Minimal [ ] Moderate [x ] Full Capability    Diet: [x ] NPO [ ] Tube feeds [ ] TPN [ ] Other     RADIOLOGY & ADDITIONAL STUDIES: reviewed.    REFERRALS:   [ ] Chaplaincy  [ ] Hospice  [ ] Child Life  [ ] Social Work  [ ] Case management [ ] Holistic Therapy

## 2017-10-04 NOTE — PROGRESS NOTE ADULT - SUBJECTIVE AND OBJECTIVE BOX
ATP Progress Note    S: t continues on high flow NC at LPM 50.  Received 5mg lasix x2 doses for diuresis with good response of UOP. Plan for family meeting today at 11am.  No acute events overnight. Patient resting comfortably in bed. Pain well controlled.    O:  T(C): 37.3 (10-04-17 @ 08:00), Max: 37.5 (10-03-17 @ 23:00)  HR: 88 (10-04-17 @ 10:00) (67 - 92)  BP: 117/73 (10-04-17 @ 10:00) (110/64 - 135/71)  RR: 37 (10-04-17 @ 10:00) (25 - 226)  SpO2: 100% (10-04-17 @ 10:00) (98% - 100%)  Wt(kg): --    10-03 @ 07:01  -  10-04 @ 07:00  --------------------------------------------------------  IN:    dextrose 5% + sodium chloride 0.45%.: 660 mL    IV PiggyBack: 650 mL    Solution: 250 mL  Total IN: 1560 mL    OUT:    Indwelling Catheter - Urethral: 1755 mL  Total OUT: 1755 mL    Total NET: -195 mL      10-04 @ 07:01  -  10-04 @ 10:49  --------------------------------------------------------  IN:    dextrose 5% + sodium chloride 0.45%.: 60 mL  Total IN: 60 mL    OUT:    Indwelling Catheter - Urethral: 225 mL  Total OUT: 225 mL    Total NET: -165 mL        CBC Full  -  ( 04 Oct 2017 03:40 )  WBC Count : 20.0 K/uL  Hemoglobin : 7.4 g/dL  Hematocrit : 22.4 %  Platelet Count - Automated : 263 K/uL  Mean Cell Volume : 97.0 fl  Mean Cell Hemoglobin : 32.1 pg  Mean Cell Hemoglobin Concentration : 33.0 gm/dL          PHYSICAL EXAM:  Gen: NAD  Resp: Pt on high flow NC, no acute resp distress  Abd: Soft, NT, ND  Incision: c/d/i

## 2017-10-04 NOTE — PROGRESS NOTE ADULT - SUBJECTIVE AND OBJECTIVE BOX
Oncology Follow-up    INTERVAL HPI/OVERNIGHT EVENTS:  Patient S&E at bedside. Patient is still requiring on high flow nasal cannula. Family meeting today with surgery team, palliative team and us (oncology)       VITAL SIGNS:  T(F): 97.6 (10-04-17 @ 12:00)  HR: 89 (10-04-17 @ 14:00)  BP: 121/71 (10-04-17 @ 14:00)  RR: 25 (10-04-17 @ 14:00)  SpO2: 100% (10-04-17 @ 14:00)  Wt(kg): --    PHYSICAL EXAM:    Constitutional:  lethargic  and icteric On highflow nasal cannula at 50% 40L  Lungs: Right lung CTA, decreased sounds at left base, crackles left middle  CV regular no murmurs  Abd soft, incision   Extremities:  1+ pedal edema   Skin: Normal temperature    MEDICATIONS  (STANDING):  ALBUTerol/ipratropium for Nebulization 3 milliLiter(s) Nebulizer every 6 hours  dextrose 5% + sodium chloride 0.45%. 1000 milliLiter(s) (30 mL/Hr) IV Continuous <Continuous>  enoxaparin Injectable 90 milliGRAM(s) SubCutaneous every 12 hours  meropenem IVPB      meropenem IVPB 1000 milliGRAM(s) IV Intermittent every 8 hours  metoprolol Injectable 5 milliGRAM(s) IV Push every 6 hours  micafungin IVPB      micafungin IVPB 100 milliGRAM(s) IV Intermittent every 24 hours  pantoprazole  Injectable 40 milliGRAM(s) IV Push every 24 hours  vancomycin  IVPB 1000 milliGRAM(s) IV Intermittent every 12 hours  vancomycin  IVPB        MEDICATIONS  (PRN):  HYDROmorphone  Injectable 0.2 milliGRAM(s) IV Push every 2 hours PRN Severe Pain (7 - 10)      No Known Allergies      LABS:                        7.4    20.0  )-----------( 263      ( 04 Oct 2017 03:40 )             22.4     10-04    141  |  103  |  33<H>  ----------------------------<  93  4.1   |  26  |  1.20    Ca    8.1<L>      04 Oct 2017 02:45  Phos  2.8     10-04  Mg     2.3     10-04    TPro  5.1<L>  /  Alb  2.2<L>  /  TBili  4.3<H>  /  DBili  3.4<H>  /  AST  94<H>  /  ALT  58<H>  /  AlkPhos  128<H>  10-04    PT/INR - ( 04 Oct 2017 02:45 )   PT: 17.9 sec;   INR: 1.64 ratio         PTT - ( 04 Oct 2017 02:45 )  PTT:31.9 sec       RADIOLOGY & ADDITIONAL TESTS:  Studies reviewed.

## 2017-10-04 NOTE — CONSULT NOTE ADULT - SUBJECTIVE AND OBJECTIVE BOX
GENERAL SURGERY CONSULT NOTE    Patient is a 62y old  Male who presents with a chief complaint of bilateral leg weakness (19 Sep 2017 09:44)  HPI:  62y Male initially admitted to neurosurgery for b/l leg weakness found to have newly diagnosed metastatic disease with spinal met causing spinal cord compression s/p T8-T11 laminectomy on , post-op course complicated by deterioration in respiratory status and acute onset abdominal pain requiring intubation and transfer to NSCU. CT chest/abdomen/pelvis demonstrated b/l DVTs, right upper lobe PE, and a bowel perforation with free air. Pt was taken emergently to the OR and is now s/p exploratory laparotomy, SBR x1 (left in discontinuity), abdominal washout, abthera VAC application on , after which he was transferred to SICU under ACS. He was taken back to the OR on  for a washout, primary anastomosis, and abdominal closure. Post-operative course complicated by Acute hypoxemic respiratory failure secondary to Acute Pulmonary Vascular congestion/ Left Lung Collapse.   Pathology from both spine and bowel revealed non small cell lung cancer. CT chest revealed tumor compressing left lower lobe bronchus. He was also found to have a left sided pleural effusion which has since been resistant to diuretics and medical management.  Thoracic surgery was consulted for a palliative procedure, such as a drainage catheter, following a family meeting today in which the patient was made DNR, but not DNI.        PAST MEDICAL & SURGICAL HISTORY:  [ x ] No significant past history as reviewed with the patient and family    FAMILY HISTORY: No family history of lung cancer    SOCIAL HISTORY: Smoker    MEDICATIONS  (STANDING):  ALBUTerol/ipratropium for Nebulization 3 milliLiter(s) Nebulizer every 6 hours  dextrose 5% + sodium chloride 0.45%. 1000 milliLiter(s) (30 mL/Hr) IV Continuous <Continuous>  enoxaparin Injectable 90 milliGRAM(s) SubCutaneous every 12 hours  meropenem IVPB      meropenem IVPB 1000 milliGRAM(s) IV Intermittent every 8 hours  metoprolol Injectable 5 milliGRAM(s) IV Push every 6 hours  micafungin IVPB      micafungin IVPB 100 milliGRAM(s) IV Intermittent every 24 hours  pantoprazole  Injectable 40 milliGRAM(s) IV Push every 24 hours  vancomycin  IVPB 1000 milliGRAM(s) IV Intermittent every 12 hours  vancomycin  IVPB        MEDICATIONS  (PRN):  HYDROmorphone  Injectable 0.2 milliGRAM(s) IV Push every 2 hours PRN Severe Pain (7 - 10)    Allergies    No Known Allergies    Intolerances        Vital Signs Last 24 Hrs  T(C): 37.3 (04 Oct 2017 08:00), Max: 37.5 (03 Oct 2017 23:00)  T(F): 99.2 (04 Oct 2017 08:00), Max: 99.5 (03 Oct 2017 23:00)  HR: 83 (04 Oct 2017 11:44) (67 - 92)  BP: 117/73 (04 Oct 2017 10:00) (110/64 - 135/71)  BP(mean): 90 (04 Oct 2017 10:00) (82 - 98)  RR: 37 (04 Oct 2017 10:00) (25 - 226)  SpO2: 99% (04 Oct 2017 11:44) (98% - 100%)  Daily     Daily Weight in k.2 (04 Oct 2017 00:54)    Physical Exam:  NAD, lethargic  On highflow nasal cannula at 50% 40L  Right lung CTA, decreased sounds at left base, crackles left middle  CV regular no murmurs  Abd soft, incision c/d/i                          7.4    20.0  )-----------( 263      ( 04 Oct 2017 03:40 )             22.4     10-04    141  |  103  |  33<H>  ----------------------------<  93  4.1   |  26  |  1.20    Ca    8.1<L>      04 Oct 2017 02:45  Phos  2.8     10-04  Mg     2.3     10-04    TPro  5.1<L>  /  Alb  2.2<L>  /  TBili  4.3<H>  /  DBili  3.4<H>  /  AST  94<H>  /  ALT  58<H>  /  AlkPhos  128<H>  10-04    PT/INR - ( 04 Oct 2017 02:45 )   PT: 17.9 sec;   INR: 1.64 ratio         PTT - ( 04 Oct 2017 02:45 )  PTT:31.9 sec      IMAGING STUDIES:

## 2017-10-04 NOTE — PROGRESS NOTE ADULT - PROBLEM SELECTOR PLAN 1
Metastatic non small cell lung cancer. We will continue to work with patient to see what he wants to know about his diagnosis. Currently, we have reviewed with his HCP son via phone the poor prognosis. While metastatic lung cancer has treatment options depending on the next generation testing that has been sent, he has had complicated morbidities due to his metastatic disease with PE/ DVT and hyperbilirubinemia. His bilirubin over the last 4 days have increased from normal to 5 without any biliary obstruction or any recent hypotensive event. If the hyperbilirubinemia is irreversible, any chemotherapy or targeted therapy would be extremely toxic. We reviewed supportive care and palliative care with his son. He will see when he will be able to come and have family meeting to review goals of care. Metastatic non small cell lung cancer, with pending molecular studies. Given his current clinical status, patient is not a candidate for chemotherapy at this point. Family meeting today with HCP (son) and sisters about his guarded prognosis. Discussion today about current status of the patient and given his respiratory failure, liver impairment and recent bowel perforation,  DNR/ DNI status was placed. Plan is to consult thoracic surgery to either to evaluate further management for airway obstruction.

## 2017-10-04 NOTE — PROGRESS NOTE ADULT - ASSESSMENT
62M admitted with cord compression (t9 to t11 mets with epidural extension on MRI), S/P laminectomy, diagnosed as adenocarcinoma. 9/22/ he was found to have small bowel perforation due to ischemia, or infection, and was taken to the or, for exlap- SBR, abd wash out, 10 cm resection and went on 24th again- abdominal washout, enteroenterostomy and abdominal closure. In SICU for management.  His hospital course was also complicated by a right upper lobe pulmonary embolus, B/L lower extremity DVTs.  Elevated fungitell.  CT with lung consolidation, ascites, IVC thrombus, PE.  Covered broadly for peritonitis and fungal infection.  Abdominal fluid with E Faecium (amp resistant) and Klebsiella and candida  On Vanco/Meropenem, micafungin.  Persistent but stable leukocytosis.    - continue Vancomycin (for E Faecium) Meropenem (Fro Klebsiella and inter-abdominal/lung infection), and Micafungin for candida  - Palliative input appreciated

## 2017-10-04 NOTE — PROGRESS NOTE ADULT - SUBJECTIVE AND OBJECTIVE BOX
Visit Summary: Patient seen and evaluated at bedside. Patient had no new complaints.     Overnight Events: none    Exam:  T(C): 37.3 (10-04-17 @ 08:00), Max: 37.5 (10-03-17 @ 23:00)  HR: 86 (10-04-17 @ 08:51) (67 - 92)  BP: 130/75 (10-04-17 @ 08:00) (110/64 - 135/71)  RR: 27 (10-04-17 @ 08:51) (25 - 226)  SpO2: 100% (10-04-17 @ 08:51) (98% - 100%)  Wt(kg): --    Neurological: AOx3, Following Commands    Motor exam:          Upper extremity                         Delt     Bicep     Tricep    HG                                                 R         5/5        5/5        5/5       5/5                                               L          5/5        5/5        5/5       5/5          Lower extremity                        HF         KF        KE       DF         PF                                                  R        0/5        0/5        0/5      0/5        0/5                                               L         1/5        0/5       0/5       2/5        2/5                                                 Sensation: [x] intact to light touch  [] decreased:     No clonus                 Incision is C/D/I, small eschar on inferior portion. Will continue to monitor                  7.4    20.0  )-----------( 263      ( 04 Oct 2017 03:40 )             22.4     10-04    141  |  103  |  33<H>  ----------------------------<  93  4.1   |  26  |  1.20    Ca    8.1<L>      04 Oct 2017 02:45  Phos  2.8     10-04  Mg     2.3     10-04    TPro  5.1<L>  /  Alb  2.2<L>  /  TBili  4.3<H>  /  DBili  3.4<H>  /  AST  94<H>  /  ALT  58<H>  /  AlkPhos  128<H>  10-04  PT/INR - ( 04 Oct 2017 02:45 )   PT: 17.9 sec;   INR: 1.64 ratio         PTT - ( 04 Oct 2017 02:45 )  PTT:31.9 sec

## 2017-10-04 NOTE — GOALS OF CARE CONVERSATION - PERSONAL ADVANCE DIRECTIVE - WHAT MATTERS MOST
Be able to provide treatment as much as possible to give patient a chance of recovery from the acute medical issues.

## 2017-10-04 NOTE — PROGRESS NOTE ADULT - ATTENDING COMMENTS
I personally took part in advance care planning time spent 60 minutes discussing code status, as per above. Patient is DNR/ DNI. Please refer to above for further details.

## 2017-10-04 NOTE — PROGRESS NOTE ADULT - SUBJECTIVE AND OBJECTIVE BOX
Patient is a 62y old  Male who presents with a chief complaint of cord compression (19 Sep 2017 09:44)      HPI:  Patient with intermittent hypoxia due to LLL collapse due to tumor Effusion not amenable to  removal by thoracostenosis.  Patient made DNR but not DNI. Patient with cord compression and widely metastatic NSCLC      MEDICATIONS  (STANDING):  ALBUTerol/ipratropium for Nebulization 3 milliLiter(s) Nebulizer every 6 hours  dextrose 5% + sodium chloride 0.45%. 1000 milliLiter(s) (30 mL/Hr) IV Continuous <Continuous>  enoxaparin Injectable 90 milliGRAM(s) SubCutaneous every 12 hours  meropenem IVPB      meropenem IVPB 1000 milliGRAM(s) IV Intermittent every 8 hours  metoprolol Injectable 5 milliGRAM(s) IV Push every 6 hours  micafungin IVPB      micafungin IVPB 100 milliGRAM(s) IV Intermittent every 24 hours  pantoprazole  Injectable 40 milliGRAM(s) IV Push every 24 hours  vancomycin  IVPB 750 milliGRAM(s) IV Intermittent every 12 hours    MEDICATIONS  (PRN):  HYDROmorphone  Injectable 0.2 milliGRAM(s) IV Push every 2 hours PRN Severe Pain (7 - 10)      Allergies    No Known Allergies    Intolerances        REVIEW OF SYSTEM:  CONSTITUTIONAL: No fever, No change in weight, No fatigue  HEAD: No headache, No dizziness, No recent trauma  EYES: No eye pain, No visual disturbances, No discharge  ENT:  No difficulty hearing, No tinnitus, No vertigo, No sinus pain, No throat pain  NECK: No pain, No stiffness  BREASTS: No pain, No masses, No nipple discharge  RESPIRATORY: respiratory compromise w  LLL collapse due to tumor  CARDIOVASCULAR: No chest pain, No palpitations, No dizziness, No CHF, No arrhythmia, No cardiomegaly, No leg swelling  GASTROINTESTINAL: s/p bowel perforation  GENITOURINARY: No dysuria, No frequency, No hematuria, No incontinence, No nocturia, No hesitancy,  SKIN: No itching, No burning, No rashes, No lesions   LYMPH NODES: No history of enlarged glands  ENDOCRINE: No heat or cold intolerance, No hair loss. No osteoporosis, No thyroid disease  MUSCULOSKELETAL: No joint pain or swelling, No muscle, back, or extremity pain  PSYCHIATRIC: No depression, No anxiety, No mood swings, No difficulty sleeping  HEME/LYMPH: No easy bruising, No anticoagulants, No bleeding disorder, No bleeding gums  ALLERGY AND IMMUNOLOGIC: No hives, No eczema  NEUROLOGICAL: Spinal cord compression        VITALS:   T(C): 37.1 (10-05-17 @ 03:00), Max: 37.3 (10-04-17 @ 08:00)  HR: 81 (10-05-17 @ 03:00) (78 - 93)  BP: 113/71 (10-05-17 @ 03:00) (106/69 - 136/77)  RR: 27 (10-05-17 @ 03:00) (17 - 37)  SpO2: 100% (10-05-17 @ 03:00) (99% - 100%)  Wt(kg): --    10-03 @ 07:01  -  10-04 @ 07:00  --------------------------------------------------------  IN: 1560 mL / OUT: 1755 mL / NET: -195 mL    10-04 @ 07:01  -  10-05 @ 03:40  --------------------------------------------------------  IN: 1765 mL / OUT: 1110 mL / NET: 655 mL        PHYSICAL EXAM:  GENERAL: NAD, well nourished and conversant  HEAD:  Atraumatic  EYES: EOM, PERRLA, conjunctiva pink and sclera white  ENT: No tonsillar erythema, exudates, or enlargement, moist mucous membranes, good dentition, no lesions  NECK: Supple, No JVD, normal thyroid, carotids with normal upstrokes and no bruits  CHEST/LUNG: rhonchi and wheezing  HEART: Regular rate and rhythm, No murmurs, rubs, or gallops  ABDOMEN: Soft, nondistended, s/p abd surgery for bowel perforation  EXTREMITIES:  2+ Peripheral Pulses, No clubbing, cyanosis, or edema. No arthritis of shoulders, elbows, hands, hips, knees, ankles, or feet. No DJD C spine, T spine, or L/S spine  LYMPH: No lymphadenopathy noted  SKIN: No rashes or lesions  NERVOUS SYSTEM:  weakness due to cord compression    LABS:                          7.4    20.0  )-----------( 263      ( 04 Oct 2017 03:40 )             22.4     10-04    141  |  103  |  33<H>  ----------------------------<  93  4.1   |  26  |  1.20  10-03    139  |  101  |  32<H>  ----------------------------<  106<H>  4.2   |  27  |  1.12    Ca    8.1<L>      04 Oct 2017 02:45  Ca    8.3<L>      03 Oct 2017 03:12  Phos  2.8     10-04  Phos  2.8     10-03  Mg     2.3     10-04  Mg     2.4     10-03    TPro  5.1<L>  /  Alb  2.2<L>  /  TBili  4.3<H>  /  DBili  3.4<H>  /  AST  94<H>  /  ALT  58<H>  /  AlkPhos  128<H>  10-04  TPro  5.2<L>  /  Alb  2.4<L>  /  TBili  5.8<H>  /  DBili  4.9<H>  /  AST  88<H>  /  ALT  56<H>  /  AlkPhos  115  10-03    CAPILLARY BLOOD GLUCOSE          RADIOLOGY & ADDITIONAL TESTS:      Consultant(s):    Care Discussed with Consultants/Other Providers [ ] YES  [ ] NO

## 2017-10-04 NOTE — CONSULT NOTE ADULT - ASSESSMENT
62M smoker w/ new NSCLC s/p thoracic laminectomy and SBR resection both for complications 2/2 metastasis, now with left sided pleural effusion resistant to diuresis. Patient DNR, palliative.  - will evaluate for drainage catheter on left  - discussed with family  - will discuss plan with Dr. Manda Garland [place] [id number] 62M smoker w/ new NSCLC s/p thoracic laminectomy and SBR resection both for complications 2/2 metastasis, now with left sided pleural effusion resistant to diuresis. Patient DNR, palliative.  - CT and recent Xrays reviewed with Dr. Holman   - Left lower lobe collapsed secondary to mass effect of tumor upon bronchus.  A small amount of fluid may be amenable to drainage, however a chest tube would likely have minimal benefit for the patient's overall respiratory status.  ROXANE Garland [place] [id number]

## 2017-10-04 NOTE — PROGRESS NOTE ADULT - ATTENDING COMMENTS
I saw and evaluated the patient. The patient is a 62-year-old male with newly diagnosed metastatic disease s/p T8-T11 laminectomies for resection of dorsal epidural tumor for spinal cord compression on 9/19/17. His hospital course was complicated by a right upper lobe pulmonary embolus, B/L lower extremity DVTs, and a bowel perforation, requiring two surgeries for repair with General Surgery. The patient's exam is notable for 2/5 left lower extremity weakness and 0/5 right lower extremity weakness. Continue mobilization with PT, OT and PMR, and disposition planning to spinal cord injury rehab if and when medically ready. Ok to begin chemotherapy and radiation from Neurosurgery perspective if and when medically ready. Family meeting pending today, re: goals of care. Appreciate SICU support.

## 2017-10-04 NOTE — PROGRESS NOTE ADULT - SUBJECTIVE AND OBJECTIVE BOX
Follow-up Pulm Progress Note    No new respiratory events overnight.  Denies SOB/CP.   99% on Hi Vineet 50L/min 45%  No complaints    Medications:  MEDICATIONS  (STANDING):  ALBUTerol/ipratropium for Nebulization 3 milliLiter(s) Nebulizer every 6 hours  dextrose 5% + sodium chloride 0.45%. 1000 milliLiter(s) (30 mL/Hr) IV Continuous <Continuous>  enoxaparin Injectable 90 milliGRAM(s) SubCutaneous every 12 hours  meropenem IVPB      meropenem IVPB 1000 milliGRAM(s) IV Intermittent every 8 hours  metoprolol Injectable 5 milliGRAM(s) IV Push every 6 hours  micafungin IVPB      micafungin IVPB 100 milliGRAM(s) IV Intermittent every 24 hours  pantoprazole  Injectable 40 milliGRAM(s) IV Push every 24 hours  vancomycin  IVPB 1000 milliGRAM(s) IV Intermittent every 12 hours  vancomycin  IVPB        MEDICATIONS  (PRN):  HYDROmorphone  Injectable 0.2 milliGRAM(s) IV Push every 2 hours PRN Severe Pain (7 - 10)          Vital Signs Last 24 Hrs  T(C): 36.4 (04 Oct 2017 12:00), Max: 37.5 (03 Oct 2017 23:00)  T(F): 97.6 (04 Oct 2017 12:00), Max: 99.5 (03 Oct 2017 23:00)  HR: 89 (04 Oct 2017 16:14) (67 - 93)  BP: 128/72 (04 Oct 2017 15:00) (112/66 - 135/71)  BP(mean): 95 (04 Oct 2017 15:00) (84 - 98)  RR: 31 (04 Oct 2017 16:14) (25 - 39)  SpO2: 100% (04 Oct 2017 16:14) (98% - 100%) on Hi Vineet    ABG - ( 04 Oct 2017 02:44 )  pH: 7.48  /  pCO2: 37    /  pO2: 115   / HCO3: 27    / Base Excess: 3.4   /  SaO2: 100                   10-03 @ 07:01  -  10-04 @ 07:00  --------------------------------------------------------  IN: 1560 mL / OUT: 1755 mL / NET: -195 mL          LABS:                        7.4    20.0  )-----------( 263      ( 04 Oct 2017 03:40 )             22.4     10-04    141  |  103  |  33<H>  ----------------------------<  93  4.1   |  26  |  1.20    Ca    8.1<L>      04 Oct 2017 02:45  Phos  2.8     10-04  Mg     2.3     10-04    TPro  5.1<L>  /  Alb  2.2<L>  /  TBili  4.3<H>  /  DBili  3.4<H>  /  AST  94<H>  /  ALT  58<H>  /  AlkPhos  128<H>  10-04          CAPILLARY BLOOD GLUCOSE        PT/INR - ( 04 Oct 2017 02:45 )   PT: 17.9 sec;   INR: 1.64 ratio         PTT - ( 04 Oct 2017 02:45 )  PTT:31.9 sec    Procalcitonin, Serum: 1.74 ng/mL (10-03-17 @ 03:12)  Procalcitonin, Serum: 1.53 ng/mL (10-02-17 @ 06:26)    Serum Pro-Brain Natriuretic Peptide: 1839 pg/mL (10-03-17 @ 03:12)                CULTURES: (if applicable)          Physical Examination:  PULM: Decreased BS L lung  CVS: S1, S2 RRR    RADIOLOGY REVIEWED  CXR: LLL collapse with effusion

## 2017-10-04 NOTE — PROGRESS NOTE ADULT - ATTENDING COMMENTS
Pt seen and examined.  Chart reviewed.  Resident note confirmed.  Pt is a 62 year old male admitted to neurosurgery for bilateral leg weakness found to have newly diagnosed metastatic disease.  Pt is s/p T8-T11 laminectomy with subsequent deterioration in respiratory status/acute onset abdominal pain.  Work up revealed bilateral DVTs, right upper lobe PE, and small intestinal perforation.  Pt is s/p Exploratory laparotomy, SBR x1 (left in discontinuity), abdominal washout, abthera VAC application (9/22) with intraop findings of an intestinal perforation and a large amount of succus in the abdomen. Pt s/p return to OR on 9/24 for intestinal reconstruction and abdominal closure.  No acute events overnight.  Continue pain control.  Echocardiogram is without evidence of right heart strain.  He remains extubated  His mental and respiratory status are improved today. He has been afebrile, with decreasing WBC and procalcitonin.    Current management includes:    1) Right segmental and subsegmental right upper lobe PE.  Left sided lung collapse and atelectasis-improved; patient has been on nasal cannula but does appear to have hypoxic episodes as he continues to intermittently collapse his left lung     CXR- with left sided increasing pleural effusion    -continue lovenox    -start lasix    -continue incentive spirometer and acapella and chest PT    -keep SpO2 < 92%      2) leukocytosis      - E faecalis and Candida albicans cells from intraoperative peritoneal fluid  -will continue zosyn, vancomycin and micafungin   -fungitell >500  -blood cultures NGTD, urinalysis negative  -continue abx thru friday.    3) abdominal distention.  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  Metastatic disease noted in liver.  -distention likely due to ascites  -return of bowel function  -bedside swallow.  -start diet if passes    4) hyperbilirubinemia- improved   -Oncology will discuss prognosis with family this week.  -Family meeting today.      5) resolving ALEC  -Monitor I's and O's

## 2017-10-04 NOTE — PROGRESS NOTE ADULT - SUBJECTIVE AND OBJECTIVE BOX
f/u abdominal infection    interval history/ROS:  complains of pain from highflo canula.  no n/v/d.  no f/c.  Rest of ROS otherwise negative.    Antimicrobials:  meropenem IVPB 1000 milliGRAM(s) IV Intermittent every 8 hours  micafungin IVPB 100 milliGRAM(s) IV Intermittent every 24 hours  vancomycin  IVPB 1000 milliGRAM(s) IV Intermittent every 12 hours    MEDICATIONS  (STANDING):  ALBUTerol/ipratropium for Nebulization 3 every 6 hours  enoxaparin Injectable 90 every 12 hours  HYDROmorphone  Injectable 0.2 every 2 hours PRN  metoprolol Injectable 5 every 6 hours  pantoprazole  Injectable 40 every 24 hours    Vital Signs Last 24 Hrs  T(F): 99.2 (10-04-17 @ 08:00), Max: 99.5 (10-03-17 @ 23:00)  HR: 88 (10-04-17 @ 10:00)  BP: 117/73 (10-04-17 @ 10:00)  RR: 37 (10-04-17 @ 10:00)  SpO2: 100% (10-04-17 @ 10:00) (98% - 100%)  Wt(kg): --      PHYSICAL EXAM:  General: non-toxic; sitting in bed  HEAD/EYES:  high antonio canula  ENT:  supple; no thrush  Cardiovascular:   S1, S2  Respiratory:  decreased BS bilaterally  GI:  soft; large abdominal dressing just changed - is c/d/i  :  michelle  Musculoskeletal:  no synovitis  Neurologic:  grossly non-focal  Skin:  no rash  Lymph: no lymphadenopathy  Psychiatric:  appropriate affect  Vascular:  no phlebitis    WBC Count: 20.0 (10-04-17 @ 03:40)  WBC Count: 25.3 (10-03-17 @ 03:12)  WBC Count: 26.6 (10-02-17 @ 10:44)  WBC Count: 27.5 (10-02-17 @ 02:54)  WBC Count: 32.7 (10-01-17 @ 02:54)  WBC Count: 29.2 (09-30-17 @ 03:07)  WBC Count: 32.7 (09-29-17 @ 02:57)  WBC Count: 31.8 (09-28-17 @ 04:04)                      7.4    20.0  )-----------( 263      ( 04 Oct 2017 03:40 )             22.4 10-04    141  |  103  |  33  ----------------------------<  93  4.1   |  26  |  1.20  Ca    8.1      04 Oct 2017 02:45Phos  2.8     10-04Mg     2.3     10-04  TPro  5.1  /  Alb  2.2  /  TBili  4.3  /  DBili  3.4  /  AST  94  /  ALT  58  /  AlkPhos  128  10-04    Surgical Pathology Report (09.22.17 @ 17:18)    Surgical Pathology Report:   ACCESSION No:  [id number]  Surgical Final Report  Final Diagnosis  Small bowel, resection  - Metastatic poorly differentiated non-small cell carcinoma, consistent with pulmonary origin.  - Lymphovascular space invasion is identified.  - Small bowel with acute serositis.  - See comment.    Surgical Pathology Report (09.19.17 @ 17:01)    Surgical Pathology Report:   ACCESSION No:  10 [id number]  Surgical Final Report  Final Diagnosis  1. Spine, designated "thoracic spine tumor", resection:  - Metastatic poorly differentiated non-small cell carcinoma involving bone and soft tissue, consistent with lung origin (See comment)  2. Spine, designated "thoracic lamina", resection:  - Metastatic poorly differentiated non-small cell carcinoma involving bone and soft tissue, consistent with lung origin  (See comment)        MICROBIOLOGY:  Specimen Source: .Blood Blood-Peripheral (09.27.17 @ 02:28)  Specimen Source: .Blood Blood-Peripheral (09.26.17 @ 15:15)    Culture - Body Fluid with Gram Stain (09.23.17 @ 00:35)    Organism: Enterococcus faecium    Organism: Klebsiella pneumoniae    Method Type: CARLY    Method Type: CARLY    Culture - Body Fluid with Gram Stain (09.23.17 @ 00:26)    Specimen Source: Peritoneal 1 peritoneakl fluid for cultur  Numerous Enterococcus faecium  Few [place] albicans    Specimen Source: .Blood Blood-Venous (09.22.17 @ 14:58)  Specimen Source: .Blood Blood-Venous (09.22.17 @ 14:58)Specimen Source: .Blood Blood-Venous (09.22.17 @ 14:58)    RADIOLOGY:  CT Chest w/ IV Cont (09.28.17 @ 16:45)  IMPRESSION:   Moderate volume ascites with pelvic peritoneal inflammation. No evidence of discrete abscess.  Progression of wedge-shaped areas of decreased perfusion within the kidneys which may be related to infarct or infection.  Thrombus within the right renal vein, with new extension into the inferior vena cava.  Left lower lobe consolidation with occlusion of the left lower lobe airways compatible with patient's known malignancy.  Previously noted right upper lobe pulmonary emboli are poorly visualized on the current examination. No new emboli.  Additional metastatic disease in the chest, abdomen and pelvis without significant change from recent prior imaging.    CT Abdomen and Pelvis w/ Oral Cont and w/ IV Cont (09.28.17 @ 16:45)   IMPRESSION:   Moderate volume ascites with pelvic peritoneal inflammation. No evidence of discrete abscess.  Progression of wedge-shaped areas of decreased perfusion within the kidneys which may be related to infarct or infection.  Thrombus within the right renal vein, with new extension into the inferior vena cava.  Left lower lobe consolidation with occlusion of the left lower lobe airways compatible with patient's known malignancy.  Previously noted right upper lobe pulmonary emboli are poorly visualized on the current examination. No new emboli.  Additional metastatic disease in the chest, abdomen and pelvis without significant change from recent prior imaging.    MRI Thoracic Spine w/o Cont (09.24.17 @ 00:12)   Impression: New postoperative changes are identified with resection of previously noted epidural tumor. There is a small amount of residual tumor still seen though previously noted spinal cord compression is no longer identified.      MRI Thoracic Spine w/wo Cont (09.19.17 @ 07:12)   Impression: Study is limited as patient refused to continue. Osseous metastatic disease with evidence of epidural extension involving the T9-T11 levels with probable cord compromise given the appearance.  Recommend complete evaluation when patient is able. When evaluating patient's chest x-ray findings,  underlying lung primary should be considered. Further evaluation is necessary.

## 2017-10-04 NOTE — PROGRESS NOTE ADULT - ASSESSMENT
the patient is a 62-year-old male with newly diagnosed metastatic disease s/p T8-T11 laminectomies for resection of dorsal epidural tumor for spinal cord compression on 9/19/17. His hospital course was complicated by a right upper lobe pulmonary embolus, B/L lower extremity DVTs, and a bowel perforation, requiring two surgeries for repair with General Surgery. The patient's exam is notable for 2/5 left lower extremity weakness and 0/5 right lower extremity weakness. Continue mobilization with PT, OT and PMR, and disposition planning to spinal cord injury rehab if and when medically ready. Ok to begin chemotherapy and radiation from Neurosurgery perspective if and when medically ready. continue care as per primary team. Cont q4hr neurochecks.

## 2017-10-04 NOTE — GOALS OF CARE CONVERSATION - PERSONAL ADVANCE DIRECTIVE - CONVERSATION DETAILS
Discussion with patient's family, primary team, and oncology in respect of patient's current medical state, cancer diagnosis and prognosis and alternatives of treatment. Discussed with family that current concerns are the ongoing acute medical issues, that preclude him to get any sort of chemotherapy. Pending biopsy results to determine if targeted therapy is even a possibility once patient is more stable, depending of performance status. Family with good insight about the severity of the disease and the treatment options available. Pending to hear from CT surgery for further recs. Code status addressed, patient is DNR/DNI.

## 2017-10-04 NOTE — PROGRESS NOTE ADULT - PROBLEM SELECTOR PLAN 1
-Unclear how much of this is extrinsic compression 2nd tumor vs. mucous plugging. Small L pleural effusion  -Thoracic consult noted and appreciated, no plan for thoracentesis at this time  -c/w Bipap 20/15 overnight which he is mainly refusing  -Hi Vineet daytime for PEEP  -No plans for bronchoscopy  -Duoneb q6  -Chest PT, OOB, Acapella valve, chest vest for mucous clearance

## 2017-10-04 NOTE — PROGRESS NOTE ADULT - ASSESSMENT
61yo M with no significant PMH. Consulted on 9/12 for worsening LBP associated with lower extremity weakness for past 3 weeks. Over the weekend, he progressed to the point where he was unable to ambulate. He was seen at OSH and transferred to [facility] for spine evaluation. Was found to have epidural compression T8-T11 from newly diagnosed metastatic disease to liver, bone and bowel mets. S/p laminectomy on 9/20. Hospitalization complicated by acute hypoxic respiratory failure 2/2 acute pulmonary vascular congestion, L lung collapse, b/l PE, RUL PNA. Was found to have bowel perf with free air, was taken for ex-lap on 9/22, closure on 9/24. Peritonitis subsequently - E. faecium, Klebsiella and Candida. Consult placed for GOC and symptom management.

## 2017-10-04 NOTE — PROGRESS NOTE ADULT - ASSESSMENT
61 y/o M presented with spinal cord compression s/p decompression. He presented with metastatic non small cell lung cancer to the liver, bones, lymph nodes and bowel. Hospitalization course complicated by bowel perforation, hypoxic respiratory failure currently extubated, DVT/PE, septic shock and hyperbilirubinemia.

## 2017-10-05 NOTE — PROGRESS NOTE ADULT - PROBLEM SELECTOR PLAN 1
s/p thoracic spine surgery  continue significant weakness in the lower extremities  DVT and GI prophylaxis  Passive ROM exercises Need to prevent contractures  continue neuro checks  pain better controlled

## 2017-10-05 NOTE — PROGRESS NOTE ADULT - ASSESSMENT
61yo M with no significant PMH. Consulted on 9/12 for worsening LBP associated with lower extremity weakness for past 3 weeks. Over the weekend, he progressed to the point where he was unable to ambulate. He was seen at OSH and transferred to [facility] for spine evaluation. Was found to have epidural compression T8-T11 from newly diagnosed metastatic disease to liver, bone and bowel mets. S/p laminectomy on 9/20. Hospitalization complicated by acute hypoxic respiratory failure 2/2 acute pulmonary vascular congestion, L lung collapse, b/l PE, RUL PNA. Was found to have bowel perf with free air, was taken for ex-lap on 9/22, closure on 9/24. Peritonitis subsequently - E. faecium, Klebsiella and Candida. Consult placed for GOC and symptom management.

## 2017-10-05 NOTE — PROGRESS NOTE ADULT - ASSESSMENT
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 Complicated by acute hypoxemic respiratory failure requiring CPAP with transition to high flow NC.    PLAN:  Neurologic: post-operative pain and neoplastic pain   - Will continue with Tylenol and Dilaudid IV PRN    Respiratory: acute hypoxemic respiratory failure  - Will administer supplemental O2 to maintain SpO2 > 88%, on HFNC FiO2 40%   - Encourage CPAP overnight; patient not tolerating most nights  - Duonebs for dyspnea.  - PT/ OOB to prevent atelectasis    Cardiovascular: HTN  - Continue metoprolol 5mg IV q6hrs    Gastrointestinal/Nutrition: small bowel perforation s/p SBR, anastomosis   - CLD. Advance diet as per primary team.  - Protonix 40mg daily    Renal/Genitourinary: ALEC, resolving  - Monitor I&Os and electrolytes. Replete as needed.  - Maintenance fluids at 30 mL/hr for poor PO intake.    Hematologic: b/l DVT; b/l PE  - Continue with therapeutic Lovenox    Infectious Disease: secondary peritonitis 2/2 to Vanco sensitive E. Faecium, Klebsiella, Candida Albicans; leukocytosis  -Per ID recommendations Continue Vancomycin (for E Faecium) Meropenem (Fro Klebsiella and inter-abdominal/lung infection), and Micafungin for candida     Disposition:  - DNR.  - To remain in SICU. Consider transfer to PCU.    Parisa Carrasquillo PA-C    [id number] ASSESSMENT: 62 year old male with Stage IV non-small cell Lung carcinoma with spinal/ SB metastasis and likely liver metastasis RUL PE, IVC, renal thrombosis s/p small bowel perforation, resection, left in discontinuity with Abthera (9/22), s/p ex-lap, washout, re-anastomosis, abdominal closure (9/24). Complicated by acute hypoxemic respiratory failure requiring CPAP with transition to high flow NC.    PLAN:  Neurologic: post-operative pain and neoplastic pain   - Transition to PO pain meds    Respiratory: acute hypoxemic respiratory failure  - Will administer supplemental O2 to maintain SpO2 > 88%, on HFNC FiO2 40%   - Encourage CPAP overnight; patient not tolerating most nights  - Duonebs for dyspnea.  - PT/ OOB to prevent atelectasis  -IR pigtail catheter insertion    Cardiovascular: HTN  - Change Metoprolol to 25mg PO q12hrs    Gastrointestinal/Nutrition: small bowel perforation s/p SBR, anastomosis   - Advance to regular diet  - Protonix 40mg daily, Add Colace and Senna    Renal/Genitourinary: ALEC, resolving  - Monitor I&Os and electrolytes. Replete as needed.  - IV lock fluids  -20mg IV Lasix     Hematologic: b/l DVT; b/l PE  - Continue with therapeutic Lovenox, hold for IR procedure tomorrow  -Vitamin K     Infectious Disease: secondary peritonitis 2/2 to Vanco sensitive E. Faecium, Klebsiella, Candida Albicans; leukocytosis  -Per ID recommendations Continue Vancomycin (for E Faecium) Meropenem (Fro Klebsiella and inter-abdominal/lung infection), and Micafungin for candida     Disposition:  - DNR.  - To remain in SICU. Consider transfer to PCU.    Parisa Carrasquillo PA-C    [id number]

## 2017-10-05 NOTE — PROGRESS NOTE ADULT - PROBLEM SELECTOR PLAN 1
-Now completely collapse L lung again  -Unclear how much of this is extrinsic compression 2nd tumor vs. mucous plugging. Small L pleural effusion  -Plan for IR guided L pigtail  -Thoracic consult noted and appreciated, no plan for thoracentesis at this time  -c/w Bipap 20/15 overnight which he is mainly refusing. Encouraged again today  -Hi Vineet daytime for PEEP  -No plans for bronchoscopy  -Duoneb q6  -Chest PT, OOB, Acapella valve, chest vest for mucous clearance  -R lung down while in bed

## 2017-10-05 NOTE — PROGRESS NOTE ADULT - ATTENDING COMMENTS
I saw and evaluated the patient. The patient is a 62-year-old male with newly diagnosed metastatic disease s/p T8-T11 laminectomies for resection of dorsal epidural tumor for spinal cord compression on 9/19/17. His hospital course was complicated by a right upper lobe pulmonary embolus, B/L lower extremity DVTs, and a bowel perforation, requiring two surgeries for repair with General Surgery. The patient's exam is notable for 2/5 left lower extremity weakness and 0/5 right lower extremity weakness. The patient is now DNR/DNI. Continue goals of care discussion with the family. Appreciate Palliative Care support. Continue mobilization with PT, OT and PMR. Ok to begin chemotherapy and radiation from Neurosurgery perspective if and when medically ready.  Appreciate SICU support. I saw and evaluated the patient. The patient is a 62-year-old male with newly diagnosed metastatic disease s/p T8-T11 laminectomies for resection of dorsal epidural tumor for spinal cord compression on 9/19/17. His hospital course was complicated by a right upper lobe pulmonary embolus, B/L lower extremity DVTs, and a bowel perforation, requiring two surgeries for repair with General Surgery. The patient's exam is notable for 2/5 left lower extremity weakness and 0/5 right lower extremity weakness. The patient is now DNR Continue goals of care discussion with the family. Appreciate Palliative Care support. Continue mobilization with PT, OT and PMR. Ok to begin chemotherapy and radiation from Neurosurgery perspective if and when medically ready.  Appreciate SICU support.

## 2017-10-05 NOTE — PROGRESS NOTE ADULT - PROBLEM SELECTOR PLAN 1
Metastatic non small cell lung cancer. Multiple complications from his met dz, including bowel perf, lung collapse, PE/DVT and now hyperbilirubinemia. Per heme-onc, main focus is to resolve the acute ongoing medical issues. If patient is able to improve performance status, and f/u pathology is consistent would be able to offer targeted therapy weeks down the line. Did indicate that the chance of the patient reaching that point is very grim. Guarded prognosis.   CT surgery not recommending any further interventions.

## 2017-10-05 NOTE — PROGRESS NOTE ADULT - ASSESSMENT
62M admitted with cord compression (t9 to t11 mets with epidural extension on MRI), S/P laminectomy, diagnosed as adenocarcinoma. 9/22/ he was found to have small bowel perforation due to ischemia, or infection, and was taken to the or, for exlap- SBR, abd wash out, 10 cm resection and went on 24th again- abdominal washout, enteroenterostomy and abdominal closure. In SICU for management.  His hospital course was also complicated by a right upper lobe pulmonary embolus, B/L lower extremity DVTs.  Elevated fungitell.  CT with lung consolidation, ascites, IVC thrombus, PE.  Covered broadly for peritonitis and fungal infection.  Abdominal fluid with E Faecium (amp resistant) and Klebsiella and candida  On Vanco/Meropenem, micafungin.  Slowly improving leukocytosis.  For IR drainage of L pleural space    - continue Vancomycin (for E Faecium) Meropenem (Fro Klebsiella and inter-abdominal/lung infection), and Micafungin for candida  - DNR

## 2017-10-05 NOTE — PROGRESS NOTE ADULT - SUBJECTIVE AND OBJECTIVE BOX
f/u abdominal infection    interval history/ROS:  tired today.  sitting in chair.  remains on high antonio.  no n/v/d.  no f/c.  Rest of ROS otherwise negative.    Antimicrobials:  meropenem IVPB 1000 milliGRAM(s) IV Intermittent every 8 hours  micafungin IVPB 100 milliGRAM(s) IV Intermittent every 24 hours  vancomycin  IVPB 1000 milliGRAM(s) IV Intermittent every 12 hours    MEDICATIONS  (STANDING):  ALBUTerol/ipratropium for Nebulization 3 every 6 hours  enoxaparin Injectable 90 every 12 hours  HYDROmorphone  Injectable 0.2 every 2 hours PRN  metoprolol Injectable 5 every 6 hours  pantoprazole  Injectable 40 every 24 hours    Vital Signs Last 24 Hrs  T(F): 98.9 (10-05-17 @ 08:00), Max: 98.9 (10-05-17 @ 08:00)  HR: 82 (10-05-17 @ 09:00)  BP: 116/69 (10-05-17 @ 09:00)  RR: 28 (10-05-17 @ 09:00)  SpO2: 100% (10-05-17 @ 09:00) (99% - 100%)  Wt(kg): --    PHYSICAL EXAM:  General: non-toxic; sitting in bed  HEAD/EYES:  high antonio canula  ENT:  supple; no thrush  Cardiovascular:   S1, S2  Respiratory:  decreased BS L>R  GI:  soft; large abdominal dressing just changed - is c/d/i  :  michelle  Musculoskeletal:  no synovitis  Neurologic:  grossly non-focal  Skin:  no rash  Lymph: no lymphadenopathy  Psychiatric:  appropriate affect  Vascular:  no phlebitis    WBC Count: 18.1 (10-05-17 @ 03:21)  WBC Count: 20.0 (10-04-17 @ 03:40)  WBC Count: 25.3 (10-03-17 @ 03:12)  WBC Count: 26.6 (10-02-17 @ 10:44)  WBC Count: 27.5 (10-02-17 @ 02:54)  WBC Count: 32.7 (10-01-17 @ 02:54)  WBC Count: 29.2 (09-30-17 @ 03:07)  WBC Count: 32.7 (09-29-17 @ 02:57)                    7.7    18.1  )-----------( 273      ( 05 Oct 2017 03:21 )             23.2 10-05    137  |  103  |  32  ----------------------------<  120  3.9   |  25  |  1.26  Ca    8.2      05 Oct 2017 03:20Phos  2.8     10-05Mg     2.3     10-05  TPro  5.3  /  Alb  2.1  /  TBili  3.4  /  DBili  2.4  /  AST  97  /  ALT  62  /  AlkPhos  155  10-05    Surgical Pathology Report (09.22.17 @ 17:18)    Surgical Pathology Report:   ACCESSION No:  [id number]  Surgical Final Report  Final Diagnosis  Small bowel, resection  - Metastatic poorly differentiated non-small cell carcinoma, consistent with pulmonary origin.  - Lymphovascular space invasion is identified.  - Small bowel with acute serositis.  - See comment.    Surgical Pathology Report (09.19.17 @ 17:01)    Surgical Pathology Report:   ACCESSION No:  10 [id number]  Surgical Final Report  Final Diagnosis  1. Spine, designated "thoracic spine tumor", resection:  - Metastatic poorly differentiated non-small cell carcinoma involving bone and soft tissue, consistent with lung origin (See comment)  2. Spine, designated "thoracic lamina", resection:  - Metastatic poorly differentiated non-small cell carcinoma involving bone and soft tissue, consistent with lung origin  (See comment)      MICROBIOLOGY:  Specimen Source: .Blood Blood-Peripheral (09.27.17 @ 02:28)  Specimen Source: .Blood Blood-Peripheral (09.26.17 @ 15:15)    Culture - Body Fluid with Gram Stain (09.23.17 @ 00:35)    Organism: Enterococcus faecium    Organism: Klebsiella pneumoniae    Method Type: CARLY    Method Type: CARLY    Culture - Body Fluid with Gram Stain (09.23.17 @ 00:26)    Specimen Source: Peritoneal 1 peritoneakl fluid for cultur  Numerous Enterococcus faecium  Few [place] albicans    Specimen Source: .Blood Blood-Venous (09.22.17 @ 14:58)  Specimen Source: .Blood Blood-Venous (09.22.17 @ 14:58)Specimen Source: .Blood Blood-Venous (09.22.17 @ 14:58)    RADIOLOGY:  CT Chest w/ IV Cont (09.28.17 @ 16:45)  IMPRESSION:   Moderate volume ascites with pelvic peritoneal inflammation. No evidence of discrete abscess.  Progression of wedge-shaped areas of decreased perfusion within the kidneys which may be related to infarct or infection.  Thrombus within the right renal vein, with new extension into the inferior vena cava.  Left lower lobe consolidation with occlusion of the left lower lobe airways compatible with patient's known malignancy.  Previously noted right upper lobe pulmonary emboli are poorly visualized on the current examination. No new emboli.  Additional metastatic disease in the chest, abdomen and pelvis without significant change from recent prior imaging.    CT Abdomen and Pelvis w/ Oral Cont and w/ IV Cont (09.28.17 @ 16:45)   IMPRESSION:   Moderate volume ascites with pelvic peritoneal inflammation. No evidence of discrete abscess.  Progression of wedge-shaped areas of decreased perfusion within the kidneys which may be related to infarct or infection.  Thrombus within the right renal vein, with new extension into the inferior vena cava.  Left lower lobe consolidation with occlusion of the left lower lobe airways compatible with patient's known malignancy.  Previously noted right upper lobe pulmonary emboli are poorly visualized on the current examination. No new emboli.  Additional metastatic disease in the chest, abdomen and pelvis without significant change from recent prior imaging.    MRI Thoracic Spine w/o Cont (09.24.17 @ 00:12)   Impression: New postoperative changes are identified with resection of previously noted epidural tumor. There is a small amount of residual tumor still seen though previously noted spinal cord compression is no longer identified.      MRI Thoracic Spine w/wo Cont (09.19.17 @ 07:12)   Impression: Study is limited as patient refused to continue. Osseous metastatic disease with evidence of epidural extension involving the T9-T11 levels with probable cord compromise given the appearance.  Recommend complete evaluation when patient is able. When evaluating patient's chest x-ray findings,  underlying lung primary should be considered. Further evaluation is necessary.

## 2017-10-05 NOTE — PROGRESS NOTE ADULT - SUBJECTIVE AND OBJECTIVE BOX
Patient with intermittent hypoxia due to LLL collapse due to tumor Effusion not amenable to  removal by thoracostenosis.  Patient made DNR but not DNI. Patient with cord compression and widely metastatic NSCLC     MEDICATIONS  (STANDING):  ALBUTerol/ipratropium for Nebulization 3 milliLiter(s) Nebulizer every 6 hours  docusate sodium 100 milliGRAM(s) Oral two times a day  meropenem IVPB      meropenem IVPB 1000 milliGRAM(s) IV Intermittent every 8 hours  metoprolol 25 milliGRAM(s) Oral every 12 hours  micafungin IVPB      micafungin IVPB 100 milliGRAM(s) IV Intermittent every 24 hours  pantoprazole    Tablet 40 milliGRAM(s) Oral before breakfast  senna 2 Tablet(s) Oral at bedtime  spironolactone 25 milliGRAM(s) Oral daily  vancomycin  IVPB 750 milliGRAM(s) IV Intermittent every 12 hours    MEDICATIONS  (PRN):  HYDROmorphone  Injectable 0.2 milliGRAM(s) IV Push every 3 hours PRN breakthrough  oxyCODONE    IR 5 milliGRAM(s) Oral every 4 hours PRN Moderate Pain (4 - 6)  oxyCODONE    IR 10 milliGRAM(s) Oral every 4 hours PRN Severe Pain (7 - 10)    REVIEW OF SYSTEM:  CONSTITUTIONAL: No fever, No change in weight, No fatigue  HEAD: No headache, No dizziness, No recent trauma  EYES: No eye pain, No visual disturbances, No discharge  ENT:  No difficulty hearing, No tinnitus, No vertigo, No sinus pain, No throat pain  NECK: No pain, No stiffness  BREASTS: No pain, No masses, No nipple discharge  RESPIRATORY: respiratory compromise w  LLL collapse due to tumor  CARDIOVASCULAR: No chest pain, No palpitations, No dizziness, No CHF, No arrhythmia, No cardiomegaly, No leg swelling  GASTROINTESTINAL: s/p bowel perforation  GENITOURINARY: No dysuria, No frequency, No hematuria, No incontinence, No nocturia, No hesitancy,  SKIN: No itching, No burning, No rashes, No lesions   LYMPH NODES: No history of enlarged glands  ENDOCRINE: No heat or cold intolerance, No hair loss. No osteoporosis, No thyroid disease  MUSCULOSKELETAL: No joint pain or swelling, No muscle, back, or extremity pain  PSYCHIATRIC: No depression, No anxiety, No mood swings, No difficulty sleeping  HEME/LYMPH: No easy bruising, No anticoagulants, No bleeding disorder, No bleeding gums  ALLERGY AND IMMUNOLOGIC: No hives, No eczema  NEUROLOGICAL: Spinal cord compression      VITALS:   T(C): 37 (10-05-17 @ 19:00), Max: 37.2 (10-05-17 @ 08:00)  HR: 90 (10-05-17 @ 22:00) (68 - 107)  BP: 115/68 (10-05-17 @ 22:00) (101/72 - 141/73)  RR: 23 (10-05-17 @ 22:00) (17 - 37)  SpO2: 99% (10-05-17 @ 22:00) (96% - 100%)  Wt(kg): --    PHYSICAL EXAM:  GENERAL: NAD, well nourished and conversant  HEAD:  Atraumatic  EYES: EOM, PERRLA, conjunctiva pink and sclera white  ENT: No tonsillar erythema, exudates, or enlargement, moist mucous membranes, good dentition, no lesions  NECK: Supple, No JVD, normal thyroid, carotids with normal upstrokes and no bruits  CHEST/LUNG: rhonchi and wheezing  HEART: Regular rate and rhythm, No murmurs, rubs, or gallops  ABDOMEN: Soft, nondistended, s/p abd surgery for bowel perforation  EXTREMITIES:  2+ Peripheral Pulses, No clubbing, cyanosis, or edema. No arthritis of shoulders, elbows, hands, hips, knees, ankles, or feet. No DJD C spine, T spine, or L/S spine  LYMPH: No lymphadenopathy noted  SKIN: No rashes or lesions  NERVOUS SYSTEM:  weakness due to cord compression    LABS:  ABG - ( 05 Oct 2017 03:18 )  pH: 7.45  /  pCO2: 39    /  pO2: 140   / HCO3: 26    / Base Excess: 2.7   /  SaO2: 100                   CBC Full  -  ( 05 Oct 2017 03:21 )  WBC Count : 18.1 K/uL  Hemoglobin : 7.7 g/dL  Hematocrit : 23.2 %  Platelet Count - Automated : 273 K/uL  Mean Cell Volume : 97.0 fl  Mean Cell Hemoglobin : 32.0 pg  Mean Cell Hemoglobin Concentration : 33.0 gm/dL  Auto Neutrophil # : x  Auto Lymphocyte # : x  Auto Monocyte # : x  Auto Eosinophil # : x  Auto Basophil # : x  Auto Neutrophil % : x  Auto Lymphocyte % : x  Auto Monocyte % : x  Auto Eosinophil % : x  Auto Basophil % : x    10-05    137  |  103  |  32<H>  ----------------------------<  120<H>  3.9   |  25  |  1.26    Ca    8.2<L>      05 Oct 2017 03:20  Phos  2.8     10-05  Mg     2.3     10-05    TPro  5.3<L>  /  Alb  2.1<L>  /  TBili  3.4<H>  /  DBili  2.4<H>  /  AST  97<H>  /  ALT  62<H>  /  AlkPhos  155<H>  10-05    LIVER FUNCTIONS - ( 05 Oct 2017 03:20 )  Alb: 2.1 g/dL / Pro: 5.3 g/dL / ALK PHOS: 155 U/L / ALT: 62 U/L RC / AST: 97 U/L / GGT: x           PT/INR - ( 05 Oct 2017 03:21 )   PT: 18.3 sec;   INR: 1.66 ratio         PTT - ( 05 Oct 2017 03:21 )  PTT:31.3 sec    CAPILLARY BLOOD GLUCOSE          RADIOLOGY & ADDITIONAL TESTS:

## 2017-10-05 NOTE — PROGRESS NOTE ADULT - SUBJECTIVE AND OBJECTIVE BOX
INTERVAL HPI/OVERNIGHT EVENTS:    Patient seen and examined at bedside, sitting in the chair, able to answer questions. More awake than prior days, denies any complaints.    Allergies    No Known Allergies    Intolerances    ADVANCE DIRECTIVES:    DNR/DNI [x] Yes  MOLST [ ] YES [x ] NO     MEDICATIONS  (STANDING):  ALBUTerol/ipratropium for Nebulization 3 milliLiter(s) Nebulizer every 6 hours  docusate sodium 100 milliGRAM(s) Oral two times a day  enoxaparin Injectable 90 milliGRAM(s) SubCutaneous every 12 hours  meropenem IVPB      meropenem IVPB 1000 milliGRAM(s) IV Intermittent every 8 hours  metoprolol 25 milliGRAM(s) Oral every 12 hours  micafungin IVPB      micafungin IVPB 100 milliGRAM(s) IV Intermittent every 24 hours  pantoprazole    Tablet 40 milliGRAM(s) Oral before breakfast  senna 2 Tablet(s) Oral at bedtime  spironolactone 25 milliGRAM(s) Oral daily  vancomycin  IVPB 750 milliGRAM(s) IV Intermittent every 12 hours    MEDICATIONS  (PRN):  HYDROmorphone  Injectable 0.2 milliGRAM(s) IV Push every 3 hours PRN breakthrough  oxyCODONE    IR 5 milliGRAM(s) Oral every 4 hours PRN Moderate Pain (4 - 6)  oxyCODONE    IR 10 milliGRAM(s) Oral every 4 hours PRN Severe Pain (7 - 10)    PRESENT SYMPTOMS:  Source: [x ] Patient   [ ] Family   [ ] Team     Pain:                        [ ] No [x ] Yes             [x ] Mild [ ] Moderate [ ] Severe    Onset - days  Location - abdomen, lower back.  Duration - days  Character - aching  Alleviating/Aggravating - movement  Radiation -  Timing -    Dyspnea:                [ ] No [x ] Yes             [ ] Mild [x ] Moderate [ ] Severe    Anxiety:                  [x ] No [ ] Yes             [ ] Mild [ ] Moderate [ ] Severe    Fatigue:                  [x ] No [ ] Yes             [ ] Mild [ ] Moderate [ ] Severe    Nausea:                  [ x] No [ ] Yes             [ ] Mild [ ] Moderate [ ] Severe    Loss of appetite:   [ ] No [x ] Yes             [x ] Mild [ ] Moderate [ ] Severe    Constipation:        [x ] No [ ] Yes             [ ] Mild [ ] Moderate [ ] Severe    Other Symptoms:  [ x] All other review of systems negative   [ ] Unable to obtain due to poor mentation     Karnofsky Performance Score/Palliative Performance Status Version 2:  30-40%    PHYSICAL EXAM:  Vital Signs Last 24 Hrs  T(C): 36.6 (05 Oct 2017 11:00), Max: 37.2 (05 Oct 2017 08:00)  T(F): 97.9 (05 Oct 2017 11:00), Max: 98.9 (05 Oct 2017 08:00)  HR: 94 (05 Oct 2017 14:00) (68 - 94)  BP: 127/79 (05 Oct 2017 14:00) (101/72 - 136/77)  BP(mean): 98 (05 Oct 2017 14:00) (83 - 102)  RR: 37 (05 Oct 2017 14:00) (17 - 37)  SpO2: 98% (05 Oct 2017 14:00) (98% - 100%) I&O's Summary    04 Oct 2017 07:01  -  05 Oct 2017 07:00  --------------------------------------------------------  IN: 2550 mL / OUT: 1275 mL / NET: 1275 mL    05 Oct 2017 07:01  -  05 Oct 2017 15:07  --------------------------------------------------------  IN: 315 mL / OUT: 330 mL / NET: -15 mL    General:  [ ] Alert  [ ] Oriented x      [ x] Lethargic  [ ] Agitated   [ ] Cachexia   [ ] Unarousable  [x ] Verbal  [ ] Non-Verbal    HEENT:  [ ] Normal   [x ] Dry mouth   [ ] ET Tube    [ ] Trach  [ ] Oral lesions    Lungs:   [ ] Clear [x ] Tachypnea  [ ] Audible excessive secretions   [ ] Rhonchi        [ ] Right [ ] Left [ ] Bilateral  [ ] Crackles        [ ] Right [ ] Left [ ] Bilateral  [ ] Wheezing     [ ] Right [ ] Left [ ] Bilateral    Cardiovascular:  [x ] Regular [ ] Irregular [ ] Tachycardia   [ ] Bradycardia  [ ] Murmur [ ] Other    Abdomen: [ ] Soft  [ ] Distended   [ ] +BS  [ ] Non tender [x ] Tender  [ ]PEG   [ ]OGT/ NGT   Last BM:       Genitourinary: [ ] Normal [ ] Incontinent   [ ] Oliguria/Anuria   [x ] Torres    Musculoskeletal:  [ ] Normal   [ x] Weakness  [ ] Bedbound/Wheelchair bound [ ] Edema    Neurological: [ ] No focal deficits  [ ] Cognitive impairment  [ ] Dysphagia [ ] Dysarthria [x ] Paresis: LE [ ] Other     Skin: [ ] Normal   [ ] Pressure ulcer(s)                  [ ] Rash    LABS:                        7.7    18.1  )-----------( 273      ( 05 Oct 2017 03:21 )             23.2     10-05    137  |  103  |  32<H>  ----------------------------<  120<H>  3.9   |  25  |  1.26    Ca    8.2<L>      05 Oct 2017 03:20  Phos  2.8     10-05  Mg     2.3     10-05    TPro  5.3<L>  /  Alb  2.1<L>  /  TBili  3.4<H>  /  DBili  2.4<H>  /  AST  97<H>  /  ALT  62<H>  /  AlkPhos  155<H>  10-05    PT/INR - ( 05 Oct 2017 03:21 )   PT: 18.3 sec;   INR: 1.66 ratio       PTT - ( 05 Oct 2017 03:21 )  PTT:31.3 sec    Shock: No [ ] Septic [ ] Cardiogenic [ ] Neurologic [ ] Hypovolemic  Vasopressors x None   Inotrophs x None    Oral Intake: [ ] Unable/mouth care only [ ] Minimal [ ] Moderate [x ] Full Capability    Diet: [x ] NPO [ ] Tube feeds [ ] TPN [ ] Other     RADIOLOGY & ADDITIONAL STUDIES: reviewed.    REFERRALS:   [ ] Chaplaincy  [ ] Hospice  [ ] Child Life  [ ] Social Work  [ ] Case management [ ] Holistic Therapy

## 2017-10-05 NOTE — PROGRESS NOTE ADULT - ATTENDING COMMENTS
Pt seen and examined.  Chart reviewed.  Resident note confirmed.  Pt is a 62 year old male admitted to neurosurgery for bilateral leg weakness found to have newly diagnosed metastatic disease.  Pt is s/p T8-T11 laminectomy with subsequent deterioration in respiratory status/acute onset abdominal pain.  Work up revealed bilateral DVTs, right upper lobe PE, and small intestinal perforation.  Pt is s/p Exploratory laparotomy, SBR x1 (left in discontinuity), abdominal washout, abthera VAC application (9/22) with intraop findings of an intestinal perforation and a large amount of succus in the abdomen. Pt s/p return to OR on 9/24 for intestinal reconstruction and abdominal closure.  No acute events overnight.  Continue pain control.  Echocardiogram is without evidence of right heart strain.  He remains extubated  His mental and respiratory status are improved today. He has been afebrile, with decreasing WBC and procalcitonin.    Current management includes:    1) Right segmental and subsegmental right upper lobe PE.  Left sided lung collapse and atelectasis-improved; patient has been on nasal cannula but does appear to have hypoxic episodes as he continues to intermittently collapse his left lung     CXR- with left sided increasing pleural effusion    -continue lovenox    -continue incentive spirometer and acapella and chest PT    -keep SpO2 < 92%    -Pleural drain by VIR tomorrow	      2) leukocytosis      - E faecalis and Candida albicans cells from intraoperative peritoneal fluid  -will continue zosyn, vancomycin and micafungin   -fungitell >500  -blood cultures NGTD, urinalysis negative  -continue abx thru friday.    3) abdominal distention.  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  Metastatic disease noted in liver.  -distention likely due to ascites  -return of bowel function  -bedside swallow.  -start diet if passes    4) hyperbilirubinemia- improved   -continue to monitor    5) resolving ALEC  -Monitor I's and O's.    Pt is DNR.

## 2017-10-05 NOTE — PROGRESS NOTE ADULT - ASSESSMENT
A/P: 62M admitted to neurosurgery for bilateral leg weakness found to have newly diagnosed metastatic disease s/p T8-T11 laminectomy presented today with deterioration in respiratory status and acute onset abdominal pain who was subsequently intubated. W/u revealed b/l DVTs, right upper lobe PE, and a bowel perforation.  Pt is now s/p Exploratory laparotomy, SBR x1 (left in discontinuity), abdominal washout, abthera VAC application (9/22); 1cm perforation in the small bowel 110 cm distal to the ligament of treitz. RTOR 9/24 for intestinal reconstruction and abdominal closure. Awaiting ROBF. 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 Most likely noninfected per vascular surgery. Family meeting - Pt is DNR, not DNI.    - DNR, not DNI  - Wean 02 as tolerated, recommend diuresis as appropriate  - NPO/IVF  - Pain control  - DVT ppx: T lovenox  - c/w abx  - Primary Care Per SICU    Norbert Robles DDS, MD

## 2017-10-05 NOTE — PROGRESS NOTE ADULT - SUBJECTIVE AND OBJECTIVE BOX
HISTORY:  62y Male initially admitted to neurosurgery for b/l leg weakness found to have newly diagnosed metastatic disease with spinal met causing spinal cord compression s/p T8-T11 laminectomy on 9/20, post-op course complicated by deterioration in respiratory status and acute onset abdominal pain requiring intubation and transfer to NSCU. CT chest/abdomen/pelvis demonstrated b/l DVTs, right upper lobe PE, and a bowel perforation with free air. Pt was taken emergently to the OR and is now s/p exploratory laparotomy, SBR x1 (left in discontinuity), abdominal washout, abthera VAC application on 9/22, after which he was transferred to SICU under ACS. He was taken back to the OR on 9/24 for a washout, primary anastomosis, and abdominal closure. Post-operative course complicated by Acute hypoxemic respiratory failure secondary to Acute Pulmonary Vascular congestion/ Left Lung Collapse.    24 HOUR EVENTS: Family meeting was held. Patient is now DNR but not DNI. Remains in HFO2. He had a bowel movement so started on a CLD. Thoracic consult obtained in order to explore possible palliative procedures that may make the patient more comfortable. Dr. Mosqueda reviewed his case and decision was made to not offer any procedure as the risks outweighed the benefits.    SUBJECTIVE/ROS:  [ ] A ten-point review of systems was otherwise negative except as noted.  [ ] Due to altered mental status/intubation, subjective information were not able to be obtained from the patient. History was obtained, to the extent possible, from review of the chart and collateral sources of information.      NEURO  RASS:     GCS:     CAM ICU:  Exam:   Meds: HYDROmorphone  Injectable 0.2 milliGRAM(s) IV Push every 2 hours PRN Severe Pain (7 - 10)    [x] Adequacy of sedation and pain control has been assessed and adjusted      RESPIRATORY  RR: 21 (10-05-17 @ 07:00) (17 - 37)  SpO2: 100% (10-05-17 @ 07:00) (99% - 100%)  Wt(kg): --  Exam:   Mechanical Ventilation:   ABG - ( 05 Oct 2017 03:18 )  pH: 7.45  /  pCO2: 39    /  pO2: 140   / HCO3: 26    / Base Excess: 2.7   /  SaO2: 100     Lactate: x                [ ] Extubation Readiness Assessed  Meds: ALBUTerol/ipratropium for Nebulization 3 milliLiter(s) Nebulizer every 6 hours        CARDIOVASCULAR  HR: 78 (10-05-17 @ 07:00) (68 - 93)  BP: 110/69 (10-05-17 @ 07:00) (106/69 - 136/77)  BP(mean): 85 (10-05-17 @ 07:00) (83 - 102)  ABP: --  ABP(mean): --  Wt(kg): --  CVP(cm H2O): --      Exam:  Cardiac Rhythm:  Perfusion     [ ]Adequate   [ ]Inadequate  Mentation   [ ]Normal       [ ]Reduced  Extremities  [ ]Warm         [ ]Cool  Volume Status [ ]Hypervolemic [ ]Euvolemic [ ]Hypovolemic  Meds: metoprolol Injectable 5 milliGRAM(s) IV Push every 6 hours        GI/NUTRITION  Exam:  Diet:  Meds: pantoprazole  Injectable 40 milliGRAM(s) IV Push every 24 hours      GENITOURINARY  I&O's Detail    10-04 @ 07:01  -  10-05 @ 07:00  --------------------------------------------------------  IN:    dextrose 5% + sodium chloride 0.45%.: 600 mL    IV PiggyBack: 650 mL    Oral Fluid: 1175 mL    Solution: 125 mL  Total IN: 2550 mL    OUT:    Indwelling Catheter - Urethral: 1275 mL  Total OUT: 1275 mL    Total NET: 1275 mL          10-05    137  |  103  |  32<H>  ----------------------------<  120<H>  3.9   |  25  |  1.26    Ca    8.2<L>      05 Oct 2017 03:20  Phos  2.8     10-05  Mg     2.3     10-05    TPro  5.3<L>  /  Alb  2.1<L>  /  TBili  3.4<H>  /  DBili  2.4<H>  /  AST  97<H>  /  ALT  62<H>  /  AlkPhos  155<H>  10-05    [ ] Torres catheter, indication:   Meds: dextrose 5% + sodium chloride 0.45%. 1000 milliLiter(s) IV Continuous <Continuous>        HEMATOLOGIC  Meds: enoxaparin Injectable 90 milliGRAM(s) SubCutaneous every 12 hours    [x] VTE Prophylaxis                        7.7    18.1  )-----------( 273      ( 05 Oct 2017 03:21 )             23.2     PT/INR - ( 05 Oct 2017 03:21 )   PT: 18.3 sec;   INR: 1.66 ratio         PTT - ( 05 Oct 2017 03:21 )  PTT:31.3 sec  Transfusion     [ ] PRBC   [ ] Platelets   [ ] FFP   [ ] Cryoprecipitate      INFECTIOUS DISEASES  T(C): 37.1 (10-05-17 @ 03:00), Max: 37.3 (10-04-17 @ 08:00)  Wt(kg): --  WBC Count: 18.1 K/uL (10-05 @ 03:21)    Recent Cultures:    Meds: meropenem IVPB      meropenem IVPB 1000 milliGRAM(s) IV Intermittent every 8 hours  micafungin IVPB      micafungin IVPB 100 milliGRAM(s) IV Intermittent every 24 hours  vancomycin  IVPB 750 milliGRAM(s) IV Intermittent every 12 hours        ENDOCRINE  Capillary Blood Glucose    Meds:       ACCESS DEVICES:  [ ] Peripheral IV  [ ] Central Venous Line	[ ] R	[ ] L	[ ] IJ	[ ] Fem	[ ] SC	Placed:   [ ] Arterial Line		[ ] R	[ ] L	[ ] Fem	[ ] Rad	[ ] Ax	Placed:   [ ] PICC:					[ ] Mediport  [ ] Urinary Catheter, Date Placed:   [ ] Necessity of urinary, arterial, and venous catheters discussed    OTHER MEDICATIONS:      CODE STATUS: Yes      IMAGING: HISTORY:  62y Male initially admitted to neurosurgery for b/l leg weakness found to have newly diagnosed metastatic disease with spinal met causing spinal cord compression s/p T8-T11 laminectomy on 9/20, post-op course complicated by deterioration in respiratory status and acute onset abdominal pain requiring intubation and transfer to NSCU. CT chest/abdomen/pelvis demonstrated b/l DVTs, right upper lobe PE, and a bowel perforation with free air. Pt was taken emergently to the OR and is now s/p exploratory laparotomy, SBR x1 (left in discontinuity), abdominal washout, abthera VAC application on 9/22, after which he was transferred to SICU under ACS. He was taken back to the OR on 9/24 for a washout, primary anastomosis, and abdominal closure. Post-operative course complicated by Acute hypoxemic respiratory failure secondary to Acute Pulmonary Vascular congestion/ Left Lung Collapse.    24 HOUR EVENTS: Family meeting was held. Patient is now DNR but not DNI. Remains in HFO2. He had a bowel movement so started on a CLD. Thoracic consult obtained in order to explore possible palliative procedures that may make the patient more comfortable. Dr. Mosqueda reviewed his case and decision was made to not offer any procedure as the risks outweighed the benefits. Refused BiPAP overnight.    SUBJECTIVE/ROS:  [x] A ten-point review of systems was otherwise negative except as noted.  [ ] Due to altered mental status/intubation, subjective information were not able to be obtained from the patient. History was obtained, to the extent possible, from review of the chart and collateral sources of information.    NEURO  CAM ICU: negative  Exam: awake, alert, oriented x4, NAD, bilateral LE weakness  Meds: HYDROmorphone  Injectable 0.2 milliGRAM(s) IV Push every 2 hours PRN Severe Pain (7 - 10)  [x] Adequacy of sedation and pain control has been assessed and adjusted    RESPIRATORY  RR: 21 (10-05-17 @ 07:00) (17 - 37)  SpO2: 100% (10-05-17 @ 07:00) (99% - 100%)  Exam: minimal breath sounds in all left lung fields, clear to auscultation in right lung fields, on HFO2  Mechanical Ventilation: no  [N/A] Extubation Readiness Assessed  ABG - ( 05 Oct 2017 03:18 )  pH: 7.45  /  pCO2: 39    /  pO2: 140   / HCO3: 26    / Base Excess: 2.7   /  SaO2: 100     Lactate: 1.7  Meds: ALBUTerol/ipratropium for Nebulization 3 milliLiter(s) Nebulizer every 6 hours    CARDIOVASCULAR  HR: 78 (10-05-17 @ 07:00) (68 - 93)  BP: 110/69 (10-05-17 @ 07:00) (106/69 - 136/77)  BP(mean): 85 (10-05-17 @ 07:00) (83 - 102)  Exam: regular rate and rhythm, S1S2  Cardiac Rhythm: sinus  Perfusion     [x]Adequate   [ ]Inadequate  Mentation   [x]Normal       [ ]Reduced  Extremities  [x]Warm         [ ]Cool  Volume Status [ ]Hypervolemic [x]Euvolemic [ ]Hypovolemic  Meds: metoprolol Injectable 5 milliGRAM(s) IV Push every 6 hours    GI/NUTRITION  Exam: soft, nondistended, tobi-incisional tenderness  Diet: CLD  Meds: pantoprazole  Injectable 40 milliGRAM(s) IV Push every 24 hours    GENITOURINARY  I&O's Detail    10-04 @ 07:01  -  10-05 @ 07:00  --------------------------------------------------------  IN:    dextrose 5% + sodium chloride 0.45%.: 600 mL    IV PiggyBack: 650 mL    Oral Fluid: 1175 mL    Solution: 125 mL  Total IN: 2550 mL    OUT:    Indwelling Catheter - Urethral: 1275 mL  Total OUT: 1275 mL    Total NET: 1275 mL    137  |  103  |  32<H>  ----------------------------<  120<H>  3.9   |  25  |  1.26    Ca    8.2<L>      05 Oct 2017 03:20  Phos  2.8  Mg     2.3  TPro  5.3<L>  /  Alb  2.1<L>  /  TBili  3.4<H>  /  DBili  2.4<H>  /  AST  97<H>  /  ALT  62<H>  /  AlkPhos  155<H>  10-05    [x] Torres catheter, indication: urine output monitoring in the critically ill  Meds: dextrose 5% + sodium chloride 0.45% infuse at 30 mL/Hr    HEMATOLOGIC  Meds: enoxaparin Injectable 90 milliGRAM(s) SubCutaneous every 12 hours  [x] VTE Prophylaxis                        7.7    18.1  )-----------( 273      ( 05 Oct 2017 03:21 )             23.2     PT/INR - ( 05 Oct 2017 03:21 )   PT: 18.3 sec;   INR: 1.66 ratio    PTT - ( 05 Oct 2017 03:21 )  PTT:31.3 sec    INFECTIOUS DISEASES  T(C): 37.1 (10-05-17 @ 03:00), Max: 37.3 (10-04-17 @ 08:00)  WBC Count: 18.1 K/uL (10-05 @ 03:21)  Recent Cultures: none  Meds:     ·	meropenem IVPB 1000 milliGRAM(s) IV Intermittent every 8 hours  ·	micafungin IVPB 100 milliGRAM(s) IV Intermittent every 24 hours  ·	vancomycin  IVPB 750 milliGRAM(s) IV Intermittent every 12 hours    ENDOCRINE  Capillary Blood Glucose: none  Meds: none    ACCESS DEVICES:  [x] Peripheral IV  [x] Central Venous Line	[ ] R	[x] L	[x] IJ	[ ] Fem	[ ] SC	Placed: 9/22/2017  [ ] Arterial Line		[ ] R	[ ] L	[ ] Fem	[ ] Rad	[ ] Ax	Placed:   [ ] PICC:					[ ] Mediport  [x] Urinary Catheter, Date Placed: 9/30/2017  [ ] Necessity of urinary, arterial, and venous catheters discussed    OTHER MEDICATIONS: none    CODE STATUS: DNR    IMAGING: HISTORY:  62y Male initially admitted to neurosurgery for b/l leg weakness found to have newly diagnosed metastatic disease with spinal met causing spinal cord compression s/p T8-T11 laminectomy on 9/20, post-op course complicated by deterioration in respiratory status and acute onset abdominal pain requiring intubation and transfer to NSCU. CT chest/abdomen/pelvis demonstrated b/l DVTs, right upper lobe PE, and a bowel perforation with free air. Pt was taken emergently to the OR and is now s/p exploratory laparotomy, SBR x1 (left in discontinuity), abdominal washout, abthera VAC application on 9/22, after which he was transferred to SICU under ACS. He was taken back to the OR on 9/24 for a washout, primary anastomosis, and abdominal closure. Post-operative course complicated by Acute hypoxemic respiratory failure secondary to Acute Pulmonary Vascular congestion/ Left Lung Collapse.    24 HOUR EVENTS: Family meeting was held. Patient is now DNR but not DNI. Remains in HFO2. He had a bowel movement so started on a CLD. Thoracic consult obtained in order to explore possible palliative procedures that may make the patient more comfortable. Dr. Mosqueda reviewed his case and decision was made to not offer any procedure as the risks outweighed the benefits. Refused BiPAP overnight.    SUBJECTIVE/ROS:  [x] A ten-point review of systems was otherwise negative except as noted.  [ ] Due to altered mental status/intubation, subjective information were not able to be obtained from the patient. History was obtained, to the extent possible, from review of the chart and collateral sources of information.    NEURO  CAM ICU: negative  Exam: awake, alert, oriented x4, NAD, bilateral LE weakness  Meds: HYDROmorphone  Injectable 0.2 milliGRAM(s) IV Push every 2 hours PRN Severe Pain (7 - 10)  [x] Adequacy of sedation and pain control has been assessed and adjusted    RESPIRATORY  RR: 21 (10-05-17 @ 07:00) (17 - 37)  SpO2: 100% (10-05-17 @ 07:00) (99% - 100%)  Exam: minimal breath sounds in all left lung fields, clear to auscultation in right lung fields, on HFO2  Mechanical Ventilation: no  [N/A] Extubation Readiness Assessed  ABG - ( 05 Oct 2017 03:18 )  pH: 7.45  /  pCO2: 39    /  pO2: 140   / HCO3: 26    / Base Excess: 2.7   /  SaO2: 100     Lactate: 1.7  Meds: ALBUTerol/ipratropium for Nebulization 3 milliLiter(s) Nebulizer every 6 hours    CARDIOVASCULAR  HR: 78 (10-05-17 @ 07:00) (68 - 93)  BP: 110/69 (10-05-17 @ 07:00) (106/69 - 136/77)  BP(mean): 85 (10-05-17 @ 07:00) (83 - 102)  Exam: regular rate and rhythm, S1S2  Cardiac Rhythm: sinus  Perfusion     [x]Adequate   [ ]Inadequate  Mentation   [x]Normal       [ ]Reduced  Extremities  [x]Warm         [ ]Cool  Volume Status [x ]Hypervolemic [ ]Euvolemic [ ]Hypovolemic  Meds: metoprolol Injectable 5 milliGRAM(s) IV Push every 6 hours    GI/NUTRITION  Exam: soft, nondistended, tobi-incisional tenderness  Diet: CLD  Meds: pantoprazole  Injectable 40 milliGRAM(s) IV Push every 24 hours    GENITOURINARY  I&O's Detail    10-04 @ 07:01  -  10-05 @ 07:00  --------------------------------------------------------  IN:    dextrose 5% + sodium chloride 0.45%.: 600 mL    IV PiggyBack: 650 mL    Oral Fluid: 1175 mL    Solution: 125 mL  Total IN: 2550 mL    OUT:    Indwelling Catheter - Urethral: 1275 mL  Total OUT: 1275 mL    Total NET: 1275 mL    137  |  103  |  32<H>  ----------------------------<  120<H>  3.9   |  25  |  1.26    Ca    8.2<L>      05 Oct 2017 03:20  Phos  2.8  Mg     2.3  TPro  5.3<L>  /  Alb  2.1<L>  /  TBili  3.4<H>  /  DBili  2.4<H>  /  AST  97<H>  /  ALT  62<H>  /  AlkPhos  155<H>  10-05    [x] Torres catheter, indication: urine output monitoring in the critically ill  Meds: dextrose 5% + sodium chloride 0.45% infuse at 30 mL/Hr    HEMATOLOGIC  Meds: enoxaparin Injectable 90 milliGRAM(s) SubCutaneous every 12 hours  [x] VTE Prophylaxis                        7.7    18.1  )-----------( 273      ( 05 Oct 2017 03:21 )             23.2     PT/INR - ( 05 Oct 2017 03:21 )   PT: 18.3 sec;   INR: 1.66 ratio    PTT - ( 05 Oct 2017 03:21 )  PTT:31.3 sec    INFECTIOUS DISEASES  T(C): 37.1 (10-05-17 @ 03:00), Max: 37.3 (10-04-17 @ 08:00)  WBC Count: 18.1 K/uL (10-05 @ 03:21)  Recent Cultures: none  Meds:     ·	meropenem IVPB 1000 milliGRAM(s) IV Intermittent every 8 hours  ·	micafungin IVPB 100 milliGRAM(s) IV Intermittent every 24 hours  ·	vancomycin  IVPB 750 milliGRAM(s) IV Intermittent every 12 hours    ENDOCRINE  Capillary Blood Glucose: none  Meds: none    ACCESS DEVICES:  [x] Peripheral IV  [x] Central Venous Line	[ ] R	[x] L	[x] IJ	[ ] Fem	[ ] SC	Placed: 9/22/2017  [ ] Arterial Line		[ ] R	[ ] L	[ ] Fem	[ ] Rad	[ ] Ax	Placed:   [ ] PICC:					[ ] Mediport  [x] Urinary Catheter, Date Placed: 9/30/2017  [ ] Necessity of urinary, arterial, and venous catheters discussed    OTHER MEDICATIONS: none    CODE STATUS: DNR    IMAGING:

## 2017-10-05 NOTE — PROGRESS NOTE ADULT - ASSESSMENT
ThisHPI: 62y male admitted to neurosurgery for bilateral leg weakness found to have newly diagnosed metastatic disease s/p T8-T11 laminectomy presented with deterioration in respiratory status and acute onset abdominal pain who was subsequently intubated and w/u revealed b/l DVTs, right upper lobe PE, and a bowel perforation.  Pt is now s/p Exploratory laparotomy #1, SBR x1 (left in discontinuity), abdominal washout, abthera VAC application (9/22) with intraop findings of a 1cm perforation in the small bowel 110 cm distal to the ligament of treitz. )(/24/17 Exploratory OR #2 with abdominal irrigation and enterostomy. Right leg motor function being monitored by neurosurgery after emergency admission spinal cord decompression of metastatic tumor T8. Lung cancer pathology is non small cell.  DVT/ PE  needs to be anticoagulated with clot going up into the right renal vein thrombosis. no return of leg movement or sensation to light touch over the past 5 days.

## 2017-10-05 NOTE — PROGRESS NOTE ADULT - PROBLEM SELECTOR PLAN 2
chemo as per oncology  LLL collapse due to tumor  with pleural effusion  Patient with whiteout of left lung scheduled for chest tube placememnt

## 2017-10-05 NOTE — PROGRESS NOTE ADULT - SUBJECTIVE AND OBJECTIVE BOX
ATP Progress Note    S: Family meeting was held yesterday. Patient is now DNR but not DNI. Remains in HFO2. + BM, started on a CLD. Thoracic consult - decision was made to not offer any procedure as the risks outweighed the benefits. Refused BiPAP overnight.    O:  T(C): 37.2 (10-05-17 @ 08:00), Max: 37.2 (10-05-17 @ 08:00)  HR: 88 (10-05-17 @ 10:00) (68 - 93)  BP: 129/74 (10-05-17 @ 10:00) (101/72 - 136/77)  RR: 29 (10-05-17 @ 10:00) (17 - 35)  SpO2: 100% (10-05-17 @ 10:00) (99% - 100%)  Wt(kg): --    10-04 @ 07:01  -  10-05 @ 07:00  --------------------------------------------------------  IN:    dextrose 5% + sodium chloride 0.45%.: 600 mL    IV PiggyBack: 650 mL    Oral Fluid: 1175 mL    Solution: 125 mL  Total IN: 2550 mL    OUT:    Indwelling Catheter - Urethral: 1275 mL  Total OUT: 1275 mL    Total NET: 1275 mL      10-05 @ 07:01  -  10-05 @ 10:27  --------------------------------------------------------  IN:    dextrose 5% + sodium chloride 0.45%.: 90 mL    Solution: 125 mL  Total IN: 215 mL    OUT:    Indwelling Catheter - Urethral: 145 mL  Total OUT: 145 mL    Total NET: 70 mL            PHYSICAL EXAM:  Gen: NAD  Resp: Pt on high flow NC, no acute resp distress  Abd: Soft, NT, ND  Incision: c/d/i                          7.7    18.1  )-----------( 273      ( 05 Oct 2017 03:21 )             23.2       10-05    137  |  103  |  32<H>  ----------------------------<  120<H>  3.9   |  25  |  1.26    Ca    8.2<L>      05 Oct 2017 03:20  Phos  2.8     10-05  Mg     2.3     10-05    TPro  5.3<L>  /  Alb  2.1<L>  /  TBili  3.4<H>  /  DBili  2.4<H>  /  AST  97<H>  /  ALT  62<H>  /  AlkPhos  155<H>  10-05

## 2017-10-05 NOTE — PROGRESS NOTE ADULT - SUBJECTIVE AND OBJECTIVE BOX
Follow-up Pulm Progress Note    No new respiratory events overnight.  Denies SOB/CP.   OOB in the chair, comfortable on Hi Vineet 50L/min 35%    Medications:  MEDICATIONS  (STANDING):  ALBUTerol/ipratropium for Nebulization 3 milliLiter(s) Nebulizer every 6 hours  docusate sodium 100 milliGRAM(s) Oral two times a day  enoxaparin Injectable 90 milliGRAM(s) SubCutaneous every 12 hours  meropenem IVPB      meropenem IVPB 1000 milliGRAM(s) IV Intermittent every 8 hours  metoprolol 25 milliGRAM(s) Oral every 12 hours  micafungin IVPB      micafungin IVPB 100 milliGRAM(s) IV Intermittent every 24 hours  pantoprazole    Tablet 40 milliGRAM(s) Oral before breakfast  senna 2 Tablet(s) Oral at bedtime  spironolactone 25 milliGRAM(s) Oral daily  vancomycin  IVPB 750 milliGRAM(s) IV Intermittent every 12 hours    MEDICATIONS  (PRN):  HYDROmorphone  Injectable 0.2 milliGRAM(s) IV Push every 3 hours PRN breakthrough  oxyCODONE    IR 5 milliGRAM(s) Oral every 4 hours PRN Moderate Pain (4 - 6)  oxyCODONE    IR 10 milliGRAM(s) Oral every 4 hours PRN Severe Pain (7 - 10)          Vital Signs Last 24 Hrs  T(C): 37.2 (05 Oct 2017 15:00), Max: 37.2 (05 Oct 2017 08:00)  T(F): 99 (05 Oct 2017 15:00), Max: 99 (05 Oct 2017 15:00)  HR: 93 (05 Oct 2017 16:00) (68 - 94)  BP: 127/67 (05 Oct 2017 16:00) (101/72 - 141/73)  BP(mean): 91 (05 Oct 2017 16:00) (83 - 102)  RR: 24 (05 Oct 2017 16:00) (17 - 37)  SpO2: 98% (05 Oct 2017 16:00) (96% - 100%) on Hi Vineet    ABG - ( 05 Oct 2017 03:18 )  pH: 7.45  /  pCO2: 39    /  pO2: 140   / HCO3: 26    / Base Excess: 2.7   /  SaO2: 100                   10-04 @ 07:01  -  10-05 @ 07:00  --------------------------------------------------------  IN: 2550 mL / OUT: 1275 mL / NET: 1275 mL          LABS:                        7.7    18.1  )-----------( 273      ( 05 Oct 2017 03:21 )             23.2     10-05    137  |  103  |  32<H>  ----------------------------<  120<H>  3.9   |  25  |  1.26    Ca    8.2<L>      05 Oct 2017 03:20  Phos  2.8     10-05  Mg     2.3     10-05    TPro  5.3<L>  /  Alb  2.1<L>  /  TBili  3.4<H>  /  DBili  2.4<H>  /  AST  97<H>  /  ALT  62<H>  /  AlkPhos  155<H>  10-05          CAPILLARY BLOOD GLUCOSE        PT/INR - ( 05 Oct 2017 03:21 )   PT: 18.3 sec;   INR: 1.66 ratio         PTT - ( 05 Oct 2017 03:21 )  PTT:31.3 sec    Procalcitonin, Serum: 1.74 ng/mL (10-03-17 @ 03:12)    Serum Pro-Brain Natriuretic Peptide: 1839 pg/mL (10-03-17 @ 03:12)                CULTURES: (if applicable)          Physical Examination:  PULM: Decreased BS L  CVS: S1, S2 RRR    RADIOLOGY REVIEWED  CXR: Complete L lung atelectasis Follow-up Pulm Progress Note    No new respiratory events overnight.  Denies SOB/CP.   OOB in the chair, comfortable on Hi Vineet 50L/min 35%    Medications:  MEDICATIONS  (STANDING):  ALBUTerol/ipratropium for Nebulization 3 milliLiter(s) Nebulizer every 6 hours  docusate sodium 100 milliGRAM(s) Oral two times a day  enoxaparin Injectable 90 milliGRAM(s) SubCutaneous every 12 hours  meropenem IVPB      meropenem IVPB 1000 milliGRAM(s) IV Intermittent every 8 hours  metoprolol 25 milliGRAM(s) Oral every 12 hours  micafungin IVPB      micafungin IVPB 100 milliGRAM(s) IV Intermittent every 24 hours  pantoprazole    Tablet 40 milliGRAM(s) Oral before breakfast  senna 2 Tablet(s) Oral at bedtime  spironolactone 25 milliGRAM(s) Oral daily  vancomycin  IVPB 750 milliGRAM(s) IV Intermittent every 12 hours    MEDICATIONS  (PRN):  HYDROmorphone  Injectable 0.2 milliGRAM(s) IV Push every 3 hours PRN breakthrough  oxyCODONE    IR 5 milliGRAM(s) Oral every 4 hours PRN Moderate Pain (4 - 6)  oxyCODONE    IR 10 milliGRAM(s) Oral every 4 hours PRN Severe Pain (7 - 10)          Vital Signs Last 24 Hrs  T(C): 37.2 (05 Oct 2017 15:00), Max: 37.2 (05 Oct 2017 08:00)  T(F): 99 (05 Oct 2017 15:00), Max: 99 (05 Oct 2017 15:00)  HR: 93 (05 Oct 2017 16:00) (68 - 94)  BP: 127/67 (05 Oct 2017 16:00) (101/72 - 141/73)  BP(mean): 91 (05 Oct 2017 16:00) (83 - 102)  RR: 24 (05 Oct 2017 16:00) (17 - 37)  SpO2: 98% (05 Oct 2017 16:00) (96% - 100%) on Hi Vineet    ABG - ( 05 Oct 2017 03:18 )  pH: 7.45  /  pCO2: 39    /  pO2: 140   / HCO3: 26    / Base Excess: 2.7   /  SaO2: 100                   10-04 @ 07:01  -  10-05 @ 07:00  --------------------------------------------------------  IN: 2550 mL / OUT: 1275 mL / NET: 1275 mL          LABS:                        7.7    18.1  )-----------( 273      ( 05 Oct 2017 03:21 )             23.2     10-05    137  |  103  |  32<H>  ----------------------------<  120<H>  3.9   |  25  |  1.26    Ca    8.2<L>      05 Oct 2017 03:20  Phos  2.8     10-05  Mg     2.3     10-05    TPro  5.3<L>  /  Alb  2.1<L>  /  TBili  3.4<H>  /  DBili  2.4<H>  /  AST  97<H>  /  ALT  62<H>  /  AlkPhos  155<H>  10-05          CAPILLARY BLOOD GLUCOSE        PT/INR - ( 05 Oct 2017 03:21 )   PT: 18.3 sec;   INR: 1.66 ratio         PTT - ( 05 Oct 2017 03:21 )  PTT:31.3 sec    Procalcitonin, Serum: 1.74 ng/mL (10-03-17 @ 03:12)    Serum Pro-Brain Natriuretic Peptide: 1839 pg/mL (10-03-17 @ 03:12)      CULTURES: (if applicable)    Physical Examination:  PULM: Decreased BS L  CVS: S1, S2 RRR    RADIOLOGY REVIEWED  CXR:

## 2017-10-06 NOTE — PROGRESS NOTE ADULT - ASSESSMENT
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 Complicated by acute hypoxemic respiratory failure requiring CPAP with transition to high flow NC.    PLAN:  Neurologic: post-operative pain and neoplastic pain   - Continue PO Oxycodone PRN for pain    Respiratory: acute hypoxemic respiratory failure  - Will administer supplemental O2 to maintain SpO2 > 88%, on HFNC FiO2 40%   - Encourage CPAP overnight; patient not tolerating most nights  - Duonebs for dyspnea.  - PT/ OOB to prevent atelectasis  - IR pigtail catheter insertion 10/6    Cardiovascular: HTN  - Continue Metoprolol 25 mg Q12H    Gastrointestinal/Nutrition: small bowel perforation s/p SBR, anastomosis   - NPO overnight for pigtail, will resume diet post-procedure  - Protonix 40mg daily  - Colace and Senna    Renal/Genitourinary: ALEC, resolving  - Monitor I&Os and electrolytes. Replete as needed.  - IV lock    Hematologic: b/l DVT; b/l PE  - resume lovenox following IR procedure    Infectious Disease: secondary peritonitis 2/2 to Vanco sensitive E. Faecium, Klebsiella, Candida Albicans; leukocytosis  -Per ID recommendations Continue Vancomycin (for E Faecium) Meropenem (Fro Klebsiella and inter-abdominal/lung infection), and Micafungin for candida     Disposition:  - DNR.  - To remain in SICU. Consider transfer to PCU.    Antoinette Avila M.D. Resident

## 2017-10-06 NOTE — CHART NOTE - NSCHARTNOTEFT_GEN_A_CORE
ATP Team Post Op Note    s/p: L pigtail insertion by IR    S: Pt seen and examined at bedside. No acute events since procedure. Denies fever/chills/N/V. Pain well controlled.     T(C): 37 (10-06-17 @ 15:00), Max: 37.4 (10-06-17 @ 03:00)  HR: 89 (10-06-17 @ 18:21) (76 - 96)  BP: 104/63 (10-06-17 @ 18:00) (104/63 - 132/82)  RR: 19 (10-06-17 @ 18:00) (18 - 36)  SpO2: 100% (10-06-17 @ 18:21) (97% - 100%)  Wt(kg): --    10-05 @ 07:01  -  10-06 @ 07:00  --------------------------------------------------------  IN:    dextrose 5% + sodium chloride 0.45%.: 90 mL    IV PiggyBack: 400 mL    Oral Fluid: 500 mL    Solution: 125 mL  Total IN: 1115 mL    OUT:    Indwelling Catheter - Urethral: 995 mL  Total OUT: 995 mL    Total NET: 120 mL      10-06 @ 07:01  -  10-06 @ 19:11  --------------------------------------------------------  IN:    IV PiggyBack: 200 mL    Oral Fluid: 480 mL  Total IN: 680 mL    OUT:    Chest Tube: 850 mL    Indwelling Catheter - Urethral: 445 mL  Total OUT: 1295 mL    Total NET: -615 mL      PE:   General: NAD  Resp: airway patent, respirations unlabored, no acute respiratory distress,m L pigtail hooked to vac, site hemostatic  CVS: regular rate and rhythm  Abdomen: abdomen is soft, NT, ND,  No palpable masses                            8.0    19.0  )-----------( 290      ( 06 Oct 2017 02:38 )             24.5     10-06    139  |  103  |  33<H>  ----------------------------<  113<H>  4.5   |  25  |  1.46<H>    Ca    8.0<L>      06 Oct 2017 02:38  Phos  3.0     10-06  Mg     2.2     10-06    TPro  5.5<L>  /  Alb  2.2<L>  /  TBili  2.7<H>  /  DBili  x   /  AST  78<H>  /  ALT  60<H>  /  AlkPhos  172<H>  10-06    PT/INR - ( 06 Oct 2017 02:38 )   PT: 13.3 sec;   INR: 1.23 ratio         PTT - ( 06 Oct 2017 02:38 )  PTT:25.8 sec        A/P: 62yMale s/p L pigtail insertion by IR    - encourage OOB/IS  - SCD/ DVT PPX  - monitor labs, replete electrolytes as needed  - Pain control  - DNR  - Wean 02 as tolerated, recommend diuresis as appropriate  - NPO/IVF  - Pain control  - c/w abx  - Primary Care Per SICU

## 2017-10-06 NOTE — PROGRESS NOTE ADULT - SUBJECTIVE AND OBJECTIVE BOX
INTERVAL HPI/OVERNIGHT EVENTS:    Patient seen and examined at bedside, sitting in the chair, able to answer questions. More awake than prior days, denies any complaints.    Allergies    No Known Allergies    Intolerances    ADVANCE DIRECTIVES:    DNR/DNI [x] Yes  MOLST [ ] YES [x ] NO     MEDICATIONS  (STANDING):  ALBUTerol/ipratropium for Nebulization 3 milliLiter(s) Nebulizer every 6 hours  docusate sodium 100 milliGRAM(s) Oral two times a day  meropenem IVPB      meropenem IVPB 1000 milliGRAM(s) IV Intermittent every 8 hours  metoprolol 25 milliGRAM(s) Oral every 12 hours  micafungin IVPB      micafungin IVPB 100 milliGRAM(s) IV Intermittent every 24 hours  pantoprazole    Tablet 40 milliGRAM(s) Oral before breakfast  senna 2 Tablet(s) Oral at bedtime  spironolactone 25 milliGRAM(s) Oral daily    MEDICATIONS  (PRN):  HYDROmorphone  Injectable 0.2 milliGRAM(s) IV Push every 3 hours PRN breakthrough  oxyCODONE    IR 5 milliGRAM(s) Oral every 4 hours PRN Moderate Pain (4 - 6)  oxyCODONE    IR 10 milliGRAM(s) Oral every 4 hours PRN Severe Pain (7 - 10)    PRESENT SYMPTOMS:  Source: [x ] Patient   [ ] Family   [ ] Team     Pain:                        [ ] No [x ] Yes             [x ] Mild [ ] Moderate [ ] Severe    Onset - days  Location - abdomen, lower back.  Duration - days  Character - aching  Alleviating/Aggravating - movement  Radiation -  Timing -    Dyspnea:                [ ] No [x ] Yes             [ ] Mild [x ] Moderate [ ] Severe    Anxiety:                  [x ] No [ ] Yes             [ ] Mild [ ] Moderate [ ] Severe    Fatigue:                  [x ] No [ ] Yes             [ ] Mild [ ] Moderate [ ] Severe    Nausea:                  [ x] No [ ] Yes             [ ] Mild [ ] Moderate [ ] Severe    Loss of appetite:   [ ] No [x ] Yes             [x ] Mild [ ] Moderate [ ] Severe    Constipation:        [x ] No [ ] Yes             [ ] Mild [ ] Moderate [ ] Severe    Other Symptoms:  [ x] All other review of systems negative   [ ] Unable to obtain due to poor mentation     Karnofsky Performance Score/Palliative Performance Status Version 2:  30-40%    PHYSICAL EXAM:  Vital Signs Last 24 Hrs  T(C): 37.3 (06 Oct 2017 07:00), Max: 37.4 (06 Oct 2017 03:00)  T(F): 99.1 (06 Oct 2017 07:00), Max: 99.4 (06 Oct 2017 03:00)  HR: 83 (06 Oct 2017 08:00) (76 - 107)  BP: 113/75 (06 Oct 2017 08:00) (106/73 - 141/73)  BP(mean): 90 (06 Oct 2017 08:00) (79 - 100)  RR: 30 (06 Oct 2017 08:00) (19 - 37)  SpO2: 100% (06 Oct 2017 08:00) (96% - 100%) I&O's Summary    05 Oct 2017 07:01  -  06 Oct 2017 07:00  --------------------------------------------------------  IN: 1115 mL / OUT: 995 mL / NET: 120 mL    06 Oct 2017 07:01  -  06 Oct 2017 09:00  --------------------------------------------------------  IN: 60 mL / OUT: 0 mL / NET: 60 mL    General:  [ ] Alert  [ ] Oriented x      [ x] Lethargic  [ ] Agitated   [ ] Cachexia   [ ] Unarousable  [x ] Verbal  [ ] Non-Verbal    HEENT:  [ ] Normal   [x ] Dry mouth   [ ] ET Tube    [ ] Trach  [ ] Oral lesions    Lungs:   [ ] Clear [x ] Tachypnea  [ ] Audible excessive secretions   [ ] Rhonchi        [ ] Right [ ] Left [ ] Bilateral  [ ] Crackles        [ ] Right [ ] Left [ ] Bilateral  [ ] Wheezing     [ ] Right [ ] Left [ ] Bilateral    Cardiovascular:  [x ] Regular [ ] Irregular [ ] Tachycardia   [ ] Bradycardia  [ ] Murmur [ ] Other    Abdomen: [ ] Soft  [ ] Distended   [ ] +BS  [ ] Non tender [x ] Tender  [ ]PEG   [ ]OGT/ NGT   Last BM:       Genitourinary: [ ] Normal [ ] Incontinent   [ ] Oliguria/Anuria   [x ] Torres    Musculoskeletal:  [ ] Normal   [ x] Weakness  [ ] Bedbound/Wheelchair bound [ ] Edema    Neurological: [ ] No focal deficits  [ ] Cognitive impairment  [ ] Dysphagia [ ] Dysarthria [x ] Paresis: LE [ ] Other     Skin: [ ] Normal   [ ] Pressure ulcer(s)                  [ ] Rash    LABS:                        8.0    19.0  )-----------( 290      ( 06 Oct 2017 02:38 )             24.5     10-06    139  |  103  |  33<H>  ----------------------------<  113<H>  4.5   |  25  |  1.46<H>    Ca    8.0<L>      06 Oct 2017 02:38  Phos  3.0     10-06  Mg     2.2     10-06    TPro  5.5<L>  /  Alb  2.2<L>  /  TBili  2.7<H>  /  DBili  x   /  AST  78<H>  /  ALT  60<H>  /  AlkPhos  172<H>  10-06    PT/INR - ( 06 Oct 2017 02:38 )   PT: 13.3 sec;   INR: 1.23 ratio         PTT - ( 06 Oct 2017 02:38 )  PTT:25.8 sec    Shock: No [ ] Septic [ ] Cardiogenic [ ] Neurologic [ ] Hypovolemic  Vasopressors x None   Inotrophs x None    Oral Intake: [ ] Unable/mouth care only [ ] Minimal [ ] Moderate [x ] Full Capability    Diet: [x ] NPO [ ] Tube feeds [ ] TPN [ ] Other     RADIOLOGY & ADDITIONAL STUDIES: reviewed.    REFERRALS:   [ ] Chaplaincy  [ ] Hospice  [ ] Child Life  [ ] Social Work  [ ] Case management [ ] Holistic Therapy INTERVAL HPI/OVERNIGHT EVENTS:    Patient seen and examined at bedside, sitting in the chair, able to answer questions. More awake than prior days, denies any complaints.    Allergies    No Known Allergies    Intolerances    ADVANCE DIRECTIVES:    DNR/DNI [x] Yes  MOLST [ ] YES [x ] NO     MEDICATIONS  (STANDING):  ALBUTerol/ipratropium for Nebulization 3 milliLiter(s) Nebulizer every 6 hours  docusate sodium 100 milliGRAM(s) Oral two times a day  meropenem IVPB      meropenem IVPB 1000 milliGRAM(s) IV Intermittent every 8 hours  metoprolol 25 milliGRAM(s) Oral every 12 hours  micafungin IVPB      micafungin IVPB 100 milliGRAM(s) IV Intermittent every 24 hours  pantoprazole    Tablet 40 milliGRAM(s) Oral before breakfast  senna 2 Tablet(s) Oral at bedtime  spironolactone 25 milliGRAM(s) Oral daily    MEDICATIONS  (PRN):  HYDROmorphone  Injectable 0.2 milliGRAM(s) IV Push every 3 hours PRN breakthrough  oxyCODONE    IR 5 milliGRAM(s) Oral every 4 hours PRN Moderate Pain (4 - 6)  oxyCODONE    IR 10 milliGRAM(s) Oral every 4 hours PRN Severe Pain (7 - 10)    PRESENT SYMPTOMS:  Source: [x ] Patient   [ ] Family   [ ] Team     Pain:                        [ ] No [x ] Yes             [x ] Mild [ ] Moderate [ ] Severe    Onset - days  Location - abdomen, lower back.  Duration - days  Character - aching  Alleviating/Aggravating - movement  Radiation -  Timing -    Dyspnea:                [ ] No [x ] Yes             [ ] Mild [x ] Moderate [ ] Severe    Anxiety:                  [x ] No [ ] Yes             [ ] Mild [ ] Moderate [ ] Severe    Fatigue:                  [x ] No [ ] Yes             [ ] Mild [ ] Moderate [ ] Severe    Nausea:                  [ x] No [ ] Yes             [ ] Mild [ ] Moderate [ ] Severe    Loss of appetite:   [ ] No [x ] Yes             [x ] Mild [ ] Moderate [ ] Severe    Constipation:        [x ] No [ ] Yes             [ ] Mild [ ] Moderate [ ] Severe    Other Symptoms:  [ x] All other review of systems negative   [ ] Unable to obtain due to poor mentation     Karnofsky Performance Score/Palliative Performance Status Version 2:  30-40%    PHYSICAL EXAM:  Vital Signs Last 24 Hrs  T(C): 37.3 (06 Oct 2017 07:00), Max: 37.4 (06 Oct 2017 03:00)  T(F): 99.1 (06 Oct 2017 07:00), Max: 99.4 (06 Oct 2017 03:00)  HR: 83 (06 Oct 2017 08:00) (76 - 107)  BP: 113/75 (06 Oct 2017 08:00) (106/73 - 141/73)  BP(mean): 90 (06 Oct 2017 08:00) (79 - 100)  RR: 30 (06 Oct 2017 08:00) (19 - 37)  SpO2: 100% (06 Oct 2017 08:00) (96% - 100%) I&O's Summary    05 Oct 2017 07:01  -  06 Oct 2017 07:00  --------------------------------------------------------  IN: 1115 mL / OUT: 995 mL / NET: 120 mL    06 Oct 2017 07:01  -  06 Oct 2017 09:00  --------------------------------------------------------  IN: 60 mL / OUT: 0 mL / NET: 60 mL    General:  [ ] Alert  [ ] Oriented x      [ x] Lethargic  [ ] Agitated   [ ] Cachexia   [ ] Unarousable  [x ] Verbal  [ ] Non-Verbal    HEENT:  [ ] Normal   [x ] Dry mouth   [ ] ET Tube    [ ] Trach  [ ] Oral lesions    Lungs:   [ ] Clear [x ] Tachypnea  [ ] Audible excessive secretions   [ ] Rhonchi        [ ] Right [ ] Left [ ] Bilateral  [ ] Crackles        [ ] Right [ ] Left [ ] Bilateral  [ ] Wheezing     [ ] Right [ ] Left [ ] Bilateral    Cardiovascular:  [x ] Regular [ ] Irregular [ ] Tachycardia   [ ] Bradycardia  [ ] Murmur [ ] Other    Abdomen: [ ] Soft  [ ] Distended   [ ] +BS  [ ] Non tender [x ] Tender  [ ]PEG   [ ]OGT/ NGT   Last BM:       Genitourinary: [ ] Normal [ ] Incontinent   [ ] Oliguria/Anuria   [x ] Torres    Musculoskeletal:  [ ] Normal   [ x] Weakness  [ ] Bedbound/Wheelchair bound [ ] Edema    Neurological: [ ] No focal deficits  [ ] Cognitive impairment  [ ] Dysphagia [ ] Dysarthria [x ] Paresis: LE [ ] Other     Skin: [ ] Normal   [ ] Pressure ulcer(s)                  [ ] Rash    LABS:                        8.0    19.0  )-----------( 290      ( 06 Oct 2017 02:38 )             24.5     10-06    139  |  103  |  33<H>  ----------------------------<  113<H>  4.5   |  25  |  1.46<H>    Ca    8.0<L>      06 Oct 2017 02:38  Phos  3.0     10-06  Mg     2.2     10-06    TPro  5.5<L>  /  Alb  2.2<L>  /  TBili  2.7<H>  /  DBili  x   /  AST  78<H>  /  ALT  60<H>  /  AlkPhos  172<H>  10-06    PT/INR - ( 06 Oct 2017 02:38 )   PT: 13.3 sec;   INR: 1.23 ratio       PTT - ( 06 Oct 2017 02:38 )  PTT:25.8 sec    Shock: No [ ] Septic [ ] Cardiogenic [ ] Neurologic [ ] Hypovolemic  Vasopressors x None   Inotrophs x None    Oral Intake: [ ] Unable/mouth care only [ ] Minimal [ ] Moderate [x ] Full Capability    Diet: [x ] NPO [ ] Tube feeds [ ] TPN [ ] Other     RADIOLOGY & ADDITIONAL STUDIES: reviewed.    REFERRALS:   [ ] Chaplaincy  [ ] Hospice  [ ] Child Life  [ ] Social Work  [ ] Case management [ ] Holistic Therapy

## 2017-10-06 NOTE — PROGRESS NOTE ADULT - PROBLEM SELECTOR PLAN 1
-Persistent atelectasis L lung again  -Unclear how much of this is extrinsic compression 2nd tumor vs. mucous plugging. Small L pleural effusion  -Plan for IR guided L pigtail today  -Thoracic consult noted and appreciated, no plan for thoracentesis at this time  -c/w Bipap 20/15 overnight which he is mainly refusing. Encouraged again today  -Hi Vineet daytime for PEEP  -No plans for bronchoscopy  -Duoneb q6  -Chest PT, OOB, Acapella valve, chest vest for mucous clearance  -R lung down while in bed

## 2017-10-06 NOTE — PROGRESS NOTE ADULT - SUBJECTIVE AND OBJECTIVE BOX
HISTORY:  62y Male initially admitted to neurosurgery for b/l leg weakness found to have newly diagnosed metastatic disease with spinal met causing spinal cord compression s/p T8-T11 laminectomy on 9/20, post-op course complicated by deterioration in respiratory status and acute onset abdominal pain requiring intubation and transfer to NSCU. CT chest/abdomen/pelvis demonstrated b/l DVTs, right upper lobe PE, and a bowel perforation with free air. Pt was taken emergently to the OR and is now s/p exploratory laparotomy, SBR x1 (left in discontinuity), abdominal washout, abthera VAC application on 9/22, after which he was transferred to SICU under ACS. He was taken back to the OR on 9/24 for a washout, primary anastomosis, and abdominal closure. Post-operative course complicated by Acute hypoxemic respiratory failure secondary to Acute Pulmonary Vascular congestion/ Left Lung Collapse.    24 HOUR EVENTS:  Pt had BM yesterday and advanced to regular diet today from clears, IR consulted for pigtail given worsening CXR- will have procedure 10/6 AM (NPO overnight). Continues on HFNC, refuses nocturnal BIPAP, pt continues to have waxing and waning mental status, pain medication changed to PO, lovenox discontinued and pt given Vit K in anticipation of pigtail, IV locked today, Spirinolactone started for diuresis, Metoprolol changed from IV to PO. HISTORY:  62y Male initially admitted to neurosurgery for b/l leg weakness found to have newly diagnosed metastatic disease with spinal met causing spinal cord compression s/p T8-T11 laminectomy on 9/20, post-op course complicated by deterioration in respiratory status and acute onset abdominal pain requiring intubation and transfer to NSCU. CT chest/abdomen/pelvis demonstrated b/l DVTs, right upper lobe PE, and a bowel perforation with free air. Pt was taken emergently to the OR and is now s/p exploratory laparotomy, SBR x1 (left in discontinuity), abdominal washout, abthera VAC application on 9/22, after which he was transferred to SICU under ACS. He was taken back to the OR on 9/24 for a washout, primary anastomosis, and abdominal closure. Post-operative course complicated by Acute hypoxemic respiratory failure secondary to Acute Pulmonary Vascular congestion/ Left Lung Collapse.    24 HOUR EVENTS:  Pt had BM yesterday and advanced to regular diet today from clears, IR consulted for pigtail given worsening CXR- will have procedure 10/6 AM (NPO overnight). Continues on HFNC, refuses nocturnal BIPAP, pt continues to have waxing and waning mental status, pain medication changed to PO, lovenox discontinued and pt given Vit K in anticipation of pigtail, IV locked today, Spirinolactone started for diuresis, Metoprolol changed from IV to PO. Vancomycin changed to renal dosing given worsening ALEC,. HISTORY:  62y Male initially admitted to neurosurgery for b/l leg weakness found to have newly diagnosed metastatic disease with spinal met causing spinal cord compression s/p T8-T11 laminectomy on 9/20, post-op course complicated by deterioration in respiratory status and acute onset abdominal pain requiring intubation and transfer to NSCU. CT chest/abdomen/pelvis demonstrated b/l DVTs, right upper lobe PE, and a bowel perforation with free air. Pt was taken emergently to the OR and is now s/p exploratory laparotomy, SBR x1 (left in discontinuity), abdominal washout, abthera VAC application on 9/22, after which he was transferred to SICU under ACS. He was taken back to the OR on 9/24 for a washout, primary anastomosis, and abdominal closure. Post-operative course complicated by Acute hypoxemic respiratory failure secondary to Acute Pulmonary Vascular congestion/ Left Lung Collapse.    24 HOUR EVENTS:  Pt had BM yesterday and advanced to regular diet today from clears, IR consulted for pigtail given worsening CXR- will have procedure 10/6 AM (NPO overnight). Continues on HFNC, refuses nocturnal BIPAP, pt continues to have waxing and waning mental status, pain medication changed to PO, lovenox discontinued and pt given Vit K in anticipation of pigtail, IV locked today, Spirinolactone started for diuresis, Metoprolol changed from IV to PO. Vancomycin discontinued and random level ordered given worsening ALEC. HISTORY:  62y Male initially admitted to neurosurgery for b/l leg weakness found to have newly diagnosed metastatic disease with spinal met causing spinal cord compression s/p T8-T11 laminectomy on 9/20, post-op course complicated by deterioration in respiratory status and acute onset abdominal pain requiring intubation and transfer to NSCU. CT chest/abdomen/pelvis demonstrated b/l DVTs, right upper lobe PE, and a bowel perforation with free air. Pt was taken emergently to the OR and is now s/p exploratory laparotomy, SBR x1 (left in discontinuity), abdominal washout, abthera VAC application on 9/22, after which he was transferred to SICU under ACS. He was taken back to the OR on 9/24 for a washout, primary anastomosis, and abdominal closure. Post-operative course complicated by Acute hypoxemic respiratory failure secondary to Acute Pulmonary Vascular congestion/ Left Lung Collapse.    24 HOUR EVENTS:  Pt had BM yesterday and advanced to regular diet today from clears, IR consulted for pigtail given worsening CXR- will have procedure 10/6 AM (NPO overnight). Continues on HFNC, refuses nocturnal BIPAP, pt continues to have waxing and waning mental status, pain medication changed to PO, lovenox discontinued and pt given Vit K in anticipation of pigtail, IV locked today, Spirinolactone started for diuresis, Metoprolol changed from IV to PO. Vancomycin discontinued and random level ordered given worsening ALEC.    SUBJECTIVE/ROS:  [X] A ten-point review of systems was otherwise negative except as noted.  [ ] Due to altered mental status/intubation, subjective information were not able to be obtained from the patient. History was obtained, to the extent possible, from review of the chart and collateral sources of information.    NEURO  Exam: waxing and waning mental status  Meds: HYDROmorphone  Injectable 0.2 milliGRAM(s) IV Push every 3 hours PRN breakthrough  oxyCODONE    IR 5 milliGRAM(s) Oral every 4 hours PRN Moderate Pain (4 - 6)  oxyCODONE    IR 10 milliGRAM(s) Oral every 4 hours PRN Severe Pain (7 - 10)    [x] Adequacy of sedation and pain control has been assessed and adjusted    RESPIRATORY  RR: 22 (10-06-17 @ 03:00) (19 - 37)  SpO2: 99% (10-06-17 @ 03:00) (96% - 100%)  Wt(kg): --  Exam: unlabored, clear to auscultation bilaterally  Mechanical Ventilation:   ABG - ( 05 Oct 2017 03:18 )  pH: 7.45  /  pCO2: 39    /  pO2: 140   / HCO3: 26    / Base Excess: 2.7   /  SaO2: 100     Lactate: x        Meds: ALBUTerol/ipratropium for Nebulization 3 milliLiter(s) Nebulizer every 6 hours    CARDIOVASCULAR  HR: 94 (10-06-17 @ 03:00) (68 - 107)  BP: 114/72 (10-06-17 @ 03:00) (101/72 - 141/73)  BP(mean): 87 (10-06-17 @ 03:00) (79 - 100)  ABP: --  ABP(mean): --  Wt(kg): --  CVP(cm H2O): --    Exam: regular rate and rhythm  Cardiac Rhythm: normal sinus rhythm  Perfusion     [X]Adequate   [ ]Inadequate  Mentation   [X]Normal       [ ]Reduced  Extremities  [X]Warm         [ ]Cool  Volume Status [ ]Hypervolemic [X]Euvolemic [ ]Hypovolemic  Meds: metoprolol 25 milliGRAM(s) Oral every 12 hours  spironolactone 25 milliGRAM(s) Oral daily    GI/NUTRITION  Exam: abd S/NT/ND  Diet: NPO since midnight for IR procedure  Meds: docusate sodium 100 milliGRAM(s) Oral two times a day  pantoprazole    Tablet 40 milliGRAM(s) Oral before breakfast  senna 2 Tablet(s) Oral at bedtime      GENITOURINARY  I&O's Detail    10-04 @ 07:01  -  10-05 @ 07:00  --------------------------------------------------------  IN:    dextrose 5% + sodium chloride 0.45%.: 600 mL    IV PiggyBack: 650 mL    Oral Fluid: 1175 mL    Solution: 125 mL  Total IN: 2550 mL    OUT:    Indwelling Catheter - Urethral: 1275 mL  Total OUT: 1275 mL    Total NET: 1275 mL      10-05 @ 07:01  -  10-06 @ 04:17  --------------------------------------------------------  IN:    dextrose 5% + sodium chloride 0.45%.: 90 mL    IV PiggyBack: 300 mL    Oral Fluid: 500 mL    Solution: 125 mL  Total IN: 1015 mL    OUT:    Indwelling Catheter - Urethral: 870 mL  Total OUT: 870 mL    Total NET: 145 mL      10-06    139  |  103  |  33<H>  ----------------------------<  113<H>  4.5   |  25  |  1.46<H>    Ca    8.0<L>      06 Oct 2017 02:38  Phos  3.0     10-06  Mg     2.2     10-06    TPro  5.5<L>  /  Alb  2.2<L>  /  TBili  2.7<H>  /  DBili  x   /  AST  78<H>  /  ALT  60<H>  /  AlkPhos  172<H>  10-06    [X] Torres catheter, indication: Is and Os monitoring in critically ill patient patient    HEMATOLOGIC  [ ] VTE Prophylaxis: lovenox held for IR procedure                        8.0    19.0  )-----------( 290      ( 06 Oct 2017 02:38 )             24.5     PT/INR - ( 06 Oct 2017 02:38 )   PT: 13.3 sec;   INR: 1.23 ratio         PTT - ( 06 Oct 2017 02:38 )  PTT:25.8 sec  Transfusion     [ ] PRBC   [ ] Platelets   [ ] FFP   [ ] Cryoprecipitate      INFECTIOUS DISEASES  T(C): 37.4 (10-06-17 @ 03:00), Max: 37.4 (10-06-17 @ 03:00)  Wt(kg): --  WBC Count: 19.0 K/uL (10-06 @ 02:38)    Recent Cultures: x    Meds: meropenem IVPB      meropenem IVPB 1000 milliGRAM(s) IV Intermittent every 8 hours  micafungin IVPB      micafungin IVPB 100 milliGRAM(s) IV Intermittent every 24 hours        ENDOCRINE  Capillary Blood Glucose      ACCESS DEVICES:  [x] Peripheral IV  [x] Central Venous Line	[ ] R	[x] L	[x] IJ	[ ] Fem	[ ] SC	Placed: 9/22/2017  [ ] Arterial Line		[ ] R	[ ] L	[ ] Fem	[ ] Rad	[ ] Ax	Placed:   [ ] PICC:					[ ] Mediport  [x] Urinary Catheter, Date Placed: 9/30/2017  [ ] Necessity of urinary, arterial, and venous catheters discussed    CODE STATUS: Full code      IMAGING: x HISTORY:  62y Male initially admitted to neurosurgery for b/l leg weakness found to have newly diagnosed metastatic disease with spinal met causing spinal cord compression s/p T8-T11 laminectomy on 9/20, post-op course complicated by deterioration in respiratory status and acute onset abdominal pain requiring intubation and transfer to NSCU. CT chest/abdomen/pelvis demonstrated b/l DVTs, right upper lobe PE, and a bowel perforation with free air. Pt was taken emergently to the OR and is now s/p exploratory laparotomy, SBR x1 (left in discontinuity), abdominal washout, abthera VAC application on 9/22, after which he was transferred to SICU under ACS. He was taken back to the OR on 9/24 for a washout, primary anastomosis, and abdominal closure. Post-operative course complicated by Acute hypoxemic respiratory failure secondary to Acute Pulmonary Vascular congestion/ Left Lung Collapse.    24 HOUR EVENTS:  Pt had BM yesterday and advanced to regular diet today from clears, IR consulted for pigtail given worsening CXR- will have procedure 10/6 AM (NPO overnight). Continues on HFNC, refuses nocturnal BIPAP, pt continues to have waxing and waning mental status, pain medication changed to PO, lovenox discontinued and pt given Vit K in anticipation of pigtail, IV locked today, Spirinolactone started for diuresis, Metoprolol changed from IV to PO. Vancomycin discontinued and random level ordered given worsening ALEC.    SUBJECTIVE/ROS:  [X] A ten-point review of systems was otherwise negative except as noted.  [ ] Due to altered mental status/intubation, subjective information were not able to be obtained from the patient. History was obtained, to the extent possible, from review of the chart and collateral sources of information.    NEURO  Exam: waxing and waning mental status  Meds: HYDROmorphone  Injectable 0.2 milliGRAM(s) IV Push every 3 hours PRN breakthrough  oxyCODONE    IR 5 milliGRAM(s) Oral every 4 hours PRN Moderate Pain (4 - 6)  oxyCODONE    IR 10 milliGRAM(s) Oral every 4 hours PRN Severe Pain (7 - 10)    [x] Adequacy of sedation and pain control has been assessed and adjusted    RESPIRATORY  RR: 21 (06 Oct 2017 05:00) (19 - 37)  SpO2: 99% (06 Oct 2017 05:01) (96% - 100%)    Exam: unlabored, clear to auscultation bilaterally  Mechanical Ventilation:   ABG - ( 05 Oct 2017 03:18 )  pH: 7.45  /  pCO2: 39    /  pO2: 140   / HCO3: 26    / Base Excess: 2.7   /  SaO2: 100     Lactate: x        Meds: ALBUTerol/ipratropium for Nebulization 3 milliLiter(s) Nebulizer every 6 hours    CARDIOVASCULAR  HR: 93 (06 Oct 2017 05:01) (81 - 107)  BP: 110/81 (06 Oct 2017 05:00) (101/72 - 141/73)  BP(mean): 90 (06 Oct 2017 05:00) (79 - 100)      Exam: regular rate and rhythm  Cardiac Rhythm: normal sinus rhythm  Perfusion     [X]Adequate   [ ]Inadequate  Mentation   [X]Normal       [ ]Reduced  Extremities  [X]Warm         [ ]Cool  Volume Status [ ]Hypervolemic [X]Euvolemic [ ]Hypovolemic  Meds: metoprolol 25 milliGRAM(s) Oral every 12 hours  spironolactone 25 milliGRAM(s) Oral daily    GI/NUTRITION  Exam: abd S/NT/ND  Diet: NPO since midnight for IR procedure  Meds: docusate sodium 100 milliGRAM(s) Oral two times a day  pantoprazole    Tablet 40 milliGRAM(s) Oral before breakfast  senna 2 Tablet(s) Oral at bedtime      GENITOURINARY  I&O's Detail    10-04 @ 07:01  -  10-05 @ 07:00  --------------------------------------------------------  IN:    dextrose 5% + sodium chloride 0.45%.: 600 mL    IV PiggyBack: 650 mL    Oral Fluid: 1175 mL    Solution: 125 mL  Total IN: 2550 mL    OUT:    Indwelling Catheter - Urethral: 1275 mL  Total OUT: 1275 mL    Total NET: 1275 mL      I&O's Detail    05 Oct 2017 07:01  -  06 Oct 2017 07:00  --------------------------------------------------------  IN:    dextrose 5% + sodium chloride 0.45%.: 90 mL    IV PiggyBack: 400 mL    Oral Fluid: 500 mL    Solution: 125 mL  Total IN: 1115 mL    OUT:    Indwelling Catheter - Urethral: 930 mL  Total OUT: 930 mL    Total NET: 185 mL        10-06    139  |  103  |  33<H>  ----------------------------<  113<H>  4.5   |  25  |  1.46<H>    Ca    8.0<L>      06 Oct 2017 02:38  Phos  3.0     10-06  Mg     2.2     10-06    TPro  5.5<L>  /  Alb  2.2<L>  /  TBili  2.7<H>  /  DBili  x   /  AST  78<H>  /  ALT  60<H>  /  AlkPhos  172<H>  10-06    [X] Torres catheter, indication: Is and Os monitoring in critically ill patient patient    HEMATOLOGIC  [ ] VTE Prophylaxis: lovenox held for IR procedure                        8.0    19.0  )-----------( 290      ( 06 Oct 2017 02:38 )             24.5     PT/INR - ( 06 Oct 2017 02:38 )   PT: 13.3 sec;   INR: 1.23 ratio         PTT - ( 06 Oct 2017 02:38 )  PTT:25.8 sec  Transfusion     [ ] PRBC   [ ] Platelets   [ ] FFP   [ ] Cryoprecipitate      INFECTIOUS DISEASES  ICU Vital Signs Last 24 Hrs  T(C): 37.4 (06 Oct 2017 03:00), Max: 37.4 (06 Oct 2017 03:00)  T(F): 99.4 (06 Oct 2017 03:00), Max: 99.4 (06 Oct 2017 03:00)    WBC Count: 19.0 K/uL (10-06 @ 02:38)  WBC Count: 18.1 K/uL (10-05 @ 03:21)  WBC Count: 20.0 K/uL (10-04 @ 03:40)  WBC Count: 25.3 K/uL (10-03 @ 03:12)  WBC Count: 26.6 K/uL (10-02 @ 10:44)      Recent Cultures: x    Meds: meropenem IVPB      meropenem IVPB 1000 milliGRAM(s) IV Intermittent every 8 hours  micafungin IVPB      micafungin IVPB 100 milliGRAM(s) IV Intermittent every 24 hours        ENDOCRINE  Capillary Blood Glucose      ACCESS DEVICES:  [x] Peripheral IV  [x] Central Venous Line	[ ] R	[x] L	[x] IJ	[ ] Fem	[ ] SC	Placed: 9/22/2017  [ ] Arterial Line		[ ] R	[ ] L	[ ] Fem	[ ] Rad	[ ] Ax	Placed:   [ ] PICC:					[ ] Mediport  [x] Urinary Catheter, Date Placed: 9/30/2017  [ ] Necessity of urinary, arterial, and venous catheters discussed    CODE STATUS: Full code      IMAGING: x

## 2017-10-06 NOTE — PROGRESS NOTE ADULT - ASSESSMENT
61yo M with no significant PMH. Consulted on 9/12 for worsening LBP associated with lower extremity weakness for past 3 weeks. Over the weekend, he progressed to the point where he was unable to ambulate. He was seen at OSH and transferred to [facility] for spine evaluation. Was found to have epidural compression T8-T11 from newly diagnosed metastatic disease to liver, bone and bowel mets. S/p laminectomy on 9/20. Hospitalization complicated by acute hypoxic respiratory failure 2/2 acute pulmonary vascular congestion, L lung collapse, b/l PE, RUL PNA. Was found to have bowel perf with free air, was taken for ex-lap on 9/22, closure on 9/24. Peritonitis subsequently - E. faecium, Klebsiella and Candida. Consult placed for GOC and symptom management.

## 2017-10-06 NOTE — PROGRESS NOTE ADULT - SUBJECTIVE AND OBJECTIVE BOX
ATP Progress Note    S: + BM yesterday, advanced to regular diet. Plan for IR today for pigtail placement. Continues on HFNC, refuses nocturnal BIPAP, pt continues to have waxing and waning mental status, pain medication changed to PO, lovenox discontinued and pt given Vit K in anticipation of pigtail, IV locked today, Spirinolactone started for diuresis. Vancomycin discontinued and random level ordered given worsening ALEC.    O:  T(C): 37.4 (10-06-17 @ 03:00), Max: 37.4 (10-06-17 @ 03:00)  HR: 78 (10-06-17 @ 07:41) (76 - 107)  BP: 112/74 (10-06-17 @ 07:00) (101/72 - 141/73)  RR: 27 (10-06-17 @ 07:41) (19 - 37)  SpO2: 100% (10-06-17 @ 07:41) (96% - 100%)  Wt(kg): --    10-05 @ 07:01  -  10-06 @ 07:00  --------------------------------------------------------  IN:    dextrose 5% + sodium chloride 0.45%.: 90 mL    IV PiggyBack: 400 mL    Oral Fluid: 500 mL    Solution: 125 mL  Total IN: 1115 mL    OUT:    Indwelling Catheter - Urethral: 995 mL  Total OUT: 995 mL    Total NET: 120 mL        PHYSICAL EXAM:  Gen: NAD  Resp: Pt on high flow NC, no acute resp distress  Abd: Soft, NT, ND  Incision: c/d/i                                   8.0    19.0  )-----------( 290      ( 06 Oct 2017 02:38 )             24.5     10-06    139  |  103  |  33<H>  ----------------------------<  113<H>  4.5   |  25  |  1.46<H>    Ca    8.0<L>      06 Oct 2017 02:38  Phos  3.0     10-06  Mg     2.2     10-06    TPro  5.5<L>  /  Alb  2.2<L>  /  TBili  2.7<H>  /  DBili  x   /  AST  78<H>  /  ALT  60<H>  /  AlkPhos  172<H>  10-06

## 2017-10-06 NOTE — PROGRESS NOTE ADULT - PROBLEM SELECTOR PLAN 2
chemo as per oncology  LLL collapse due to tumor  with pleural effusion  Patient with whiteout of left lung scheduled for chest tube placement

## 2017-10-06 NOTE — PROGRESS NOTE ADULT - ATTENDING COMMENTS
Pt seen and examined.  Chart reviewed.  Resident note confirmed.  Pt is a 62 year old male admitted to neurosurgery for bilateral leg weakness found to have newly diagnosed metastatic disease.  Pt is s/p T8-T11 laminectomy with subsequent deterioration in respiratory status/acute onset abdominal pain.  Work up revealed bilateral DVTs, right upper lobe PE, and small intestinal perforation.  Pt is s/p Exploratory laparotomy, SBR x1 (left in discontinuity), abdominal washout, abthera VAC application (9/22) with intraop findings of an intestinal perforation and a large amount of succus in the abdomen. Pt s/p return to OR on 9/24 for intestinal reconstruction and abdominal closure.  No acute events overnight.  Continue pain control.  Echocardiogram is without evidence of right heart strain.  He remains extubated.  His mental and respiratory status are improved today. He has been afebrile, with decreasing WBC and procalcitonin.    Current management includes:    1) Right segmental and subsegmental right upper lobe PE.  Left sided lung collapse and atelectasis-improved; patient has been on hiflow O2. Compressive atelectasis of left lung secondary to a malignant effusion     CXR- with left sided increasing pleural effusion    -continue lovenox    -continue incentive spirometer and acapella and chest PT    -keep SpO2 < 92%    -Pleural drain by VIR tomorrow	      2) leukocytosis      - E faecalis and Candida albicans cells from intraoperative peritoneal fluid.  Course of abx completed  -will d/c zosyn, vancomycin and micafungin   -fungitell >500  -blood cultures NGTD, urinalysis negative    3) abdominal distention.  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  Metastatic disease noted in liver.  -distention likely due to ascites  -return of bowel function  - NPO for pleural drainage today.    4) hyperbilirubinemia- improved   -continue to monitor    5) resolving ALEC  -Monitor I's and O's.    Pt is DNR.

## 2017-10-06 NOTE — PROGRESS NOTE ADULT - PROBLEM SELECTOR PLAN 4
Patient is DNR/DNI. GOC well established. Plan is to provide support to family at this point. Patient is DNR/DNI. GOC well established. Symptoms well controlled. At this time we will sign off. Reconsult as needed.

## 2017-10-06 NOTE — PROGRESS NOTE ADULT - PROBLEM SELECTOR PLAN 3
Multifactorial, lung CA + PE + Collapsed lung. Has had increase in FiO2 requirements in the last couple of days. Currently on high flow. Patient would benefit from opiates PRN for dyspnea. Multifactorial, lung CA + PE + Collapsed lung. Has had increase in FiO2 requirements in the last couple of days. Currently on high flow.

## 2017-10-06 NOTE — PROGRESS NOTE ADULT - SUBJECTIVE AND OBJECTIVE BOX
f/u abdominal infection    interval history/ROS:  wife at bedside.  in bed.  awake.  no complaints. Rest of ROS otherwise negative.    Antimicrobials:  meropenem IVPB 1000 milliGRAM(s) IV Intermittent every 8 hours  micafungin IVPB 100 milliGRAM(s) IV Intermittent every 24 hours  vancomycin  IVPB 1000 milliGRAM(s) IV Intermittent every 12 hours    MEDICATIONS  (STANDING):  ALBUTerol/ipratropium for Nebulization 3 every 6 hours  docusate sodium 100 two times a day  metoprolol 25 every 12 hours  oxyCODONE    IR 5 every 4 hours PRN  oxyCODONE    IR 10 every 4 hours PRN  pantoprazole    Tablet 40 before breakfast  senna 2 at bedtime  spironolactone 25 daily    Vital Signs Last 24 Hrs  T(F): 99 (10-06-17 @ 11:00), Max: 99.4 (10-06-17 @ 03:00)  HR: 88 (10-06-17 @ 13:00)  BP: 111/75 (10-06-17 @ 13:00)  RR: 18 (10-06-17 @ 13:00)  SpO2: 100% (10-06-17 @ 13:00) (96% - 100%)  Wt(kg): --    PHYSICAL EXAM:  General: non-toxic; sitting in bed  HEAD/EYES:  high antonio canula  ENT:  supple; no thrush  Cardiovascular:   S1, S2  Respiratory:  decreased BS L>R  GI:  soft; large abdominal dressing just changed - is c/d/i  :  michelle  Musculoskeletal:  no synovitis  Neurologic:  grossly non-focal  Skin:  no rash  Lymph: no lymphadenopathy  Psychiatric:  appropriate affect  Vascular:  no phlebitis                        8.0    19.0  )-----------( 290      ( 06 Oct 2017 02:38 )             24.5 10-06    139  |  103  |  33  ----------------------------<  113  4.5   |  25  |  1.46  Ca    8.0      06 Oct 2017 02:38Phos  3.0     10-06Mg     2.2     10-06  TPro  5.5  /  Alb  2.2  /  TBili  2.7  /  DBili  x   /  AST  78  /  ALT  60  /  AlkPhos  172  10-06    Surgical Pathology Report (09.22.17 @ 17:18)    Surgical Pathology Report:   ACCESSION No:  10 [id number]  Surgical Final Report  Final Diagnosis  Small bowel, resection  - Metastatic poorly differentiated non-small cell carcinoma, consistent with pulmonary origin.  - Lymphovascular space invasion is identified.  - Small bowel with acute serositis.  - See comment.    Surgical Pathology Report (09.19.17 @ 17:01)    Surgical Pathology Report:   ACCESSION No:  10 [id number]  Surgical Final Report  Final Diagnosis  1. Spine, designated "thoracic spine tumor", resection:  - Metastatic poorly differentiated non-small cell carcinoma involving bone and soft tissue, consistent with lung origin (See comment)  2. Spine, designated "thoracic lamina", resection:  - Metastatic poorly differentiated non-small cell carcinoma involving bone and soft tissue, consistent with lung origin  (See comment)      MICROBIOLOGY:  Specimen Source: .Blood Blood-Peripheral (09.27.17 @ 02:28)  Specimen Source: .Blood Blood-Peripheral (09.26.17 @ 15:15)    Culture - Body Fluid with Gram Stain (09.23.17 @ 00:35)    Organism: Enterococcus faecium    Organism: Klebsiella pneumoniae    Method Type: CARLY    Method Type: CARLY    Culture - Body Fluid with Gram Stain (09.23.17 @ 00:26)    Specimen Source: Peritoneal 1 peritoneakl fluid for cultur  Numerous Enterococcus faecium  Few [place] albicans    Specimen Source: .Blood Blood-Venous (09.22.17 @ 14:58)  Specimen Source: .Blood Blood-Venous (09.22.17 @ 14:58)Specimen Source: .Blood Blood-Venous (09.22.17 @ 14:58)    RADIOLOGY:  CT Chest w/ IV Cont (09.28.17 @ 16:45)  IMPRESSION:   Moderate volume ascites with pelvic peritoneal inflammation. No evidence of discrete abscess.  Progression of wedge-shaped areas of decreased perfusion within the kidneys which may be related to infarct or infection.  Thrombus within the right renal vein, with new extension into the inferior vena cava.  Left lower lobe consolidation with occlusion of the left lower lobe airways compatible with patient's known malignancy.  Previously noted right upper lobe pulmonary emboli are poorly visualized on the current examination. No new emboli.  Additional metastatic disease in the chest, abdomen and pelvis without significant change from recent prior imaging.    CT Abdomen and Pelvis w/ Oral Cont and w/ IV Cont (09.28.17 @ 16:45)   IMPRESSION:   Moderate volume ascites with pelvic peritoneal inflammation. No evidence of discrete abscess.  Progression of wedge-shaped areas of decreased perfusion within the kidneys which may be related to infarct or infection.  Thrombus within the right renal vein, with new extension into the inferior vena cava.  Left lower lobe consolidation with occlusion of the left lower lobe airways compatible with patient's known malignancy.  Previously noted right upper lobe pulmonary emboli are poorly visualized on the current examination. No new emboli.  Additional metastatic disease in the chest, abdomen and pelvis without significant change from recent prior imaging.    MRI Thoracic Spine w/o Cont (09.24.17 @ 00:12)   Impression: New postoperative changes are identified with resection of previously noted epidural tumor. There is a small amount of residual tumor still seen though previously noted spinal cord compression is no longer identified.      MRI Thoracic Spine w/wo Cont (09.19.17 @ 07:12)   Impression: Study is limited as patient refused to continue. Osseous metastatic disease with evidence of epidural extension involving the T9-T11 levels with probable cord compromise given the appearance.  Recommend complete evaluation when patient is able. When evaluating patient's chest x-ray findings,  underlying lung primary should be considered. Further evaluation is necessary.

## 2017-10-06 NOTE — PROGRESS NOTE ADULT - SUBJECTIVE AND OBJECTIVE BOX
VIR Procedure Note    Diagnosis: Left pleural effusion  Procedure: Left pleural pigtail placement. 1.5L od serosanguinous fluid aspirated. Tube hooked to Pleur-e-vac    Complications: None  Blood loss: 5cc  Sedation: Anesthesia     Plan: Chest tube management per primary team.    Brown Ryan MD  VIR

## 2017-10-06 NOTE — PROGRESS NOTE ADULT - SUBJECTIVE AND OBJECTIVE BOX
Patient is a 62y old  Male who presents with a chief complaint of cord compression (19 Sep 2017 09:44)      HPI:    MEDICATIONS  (STANDING):  ALBUTerol/ipratropium for Nebulization 3 milliLiter(s) Nebulizer every 6 hours  docusate sodium 100 milliGRAM(s) Oral two times a day  enoxaparin Injectable 90 milliGRAM(s) SubCutaneous every 12 hours  meropenem IVPB      meropenem IVPB 1000 milliGRAM(s) IV Intermittent every 8 hours  metoprolol 25 milliGRAM(s) Oral every 12 hours  micafungin IVPB      micafungin IVPB 100 milliGRAM(s) IV Intermittent every 24 hours  pantoprazole    Tablet 40 milliGRAM(s) Oral before breakfast  senna 2 Tablet(s) Oral at bedtime  spironolactone 25 milliGRAM(s) Oral daily    MEDICATIONS  (PRN):  oxyCODONE    IR 5 milliGRAM(s) Oral every 4 hours PRN Moderate Pain (4 - 6)  oxyCODONE    IR 10 milliGRAM(s) Oral every 4 hours PRN Severe Pain (7 - 10)      Allergies    No Known Allergies    Intolerances        REVIEW OF SYSTEM:  CONSTITUTIONAL: No fever, No change in weight, No fatigue  HEAD: No headache, No dizziness, No recent trauma  EYES: No eye pain, No visual disturbances, No discharge  ENT:  No difficulty hearing, No tinnitus, No vertigo, No sinus pain, No throat pain  NECK: No pain, No stiffness  BREASTS: No pain, No masses, No nipple discharge  RESPIRATORY: No cough, No wheezing, No chills, No hemoptysis, No shortness of breath at rest or exertional shortness of breath  CARDIOVASCULAR: No chest pain, No palpitations, No dizziness, No CHF, No arrhythmia, No cardiomegaly, No leg swelling  GASTROINTESTINAL: No abdominal, No epigastric pain. No nausea, No vomiting, No hematemesis, No diarrhea, No constipation. No melena, No hematochezia. No GERD  GENITOURINARY: No dysuria, No frequency, No hematuria, No incontinence, No nocturia, No hesitancy,  SKIN: No itching, No burning, No rashes, No lesions   LYMPH NODES: No history of enlarged glands  ENDOCRINE: No heat or cold intolerance, No hair loss. No osteoporosis, No thyroid disease  MUSCULOSKELETAL: No joint pain or swelling, No muscle, back, or extremity pain  PSYCHIATRIC: No depression, No anxiety, No mood swings, No difficulty sleeping  HEME/LYMPH: No easy bruising, No anticoagulants, No bleeding disorder, No bleeding gums  ALLERGY AND IMMUNOLOGIC: No hives, No eczema  NEUROLOGICAL: No memory loss, No loss of strength, No numbness, No tremors        VITALS:   T(C): 37 (10-06-17 @ 15:00), Max: 37.4 (10-06-17 @ 03:00)  HR: 94 (10-06-17 @ 19:00) (76 - 96)  BP: 105/59 (10-06-17 @ 19:00) (104/63 - 132/82)  RR: 17 (10-06-17 @ 19:00) (17 - 36)  SpO2: 99% (10-06-17 @ 19:00) (97% - 100%)  Wt(kg): --    10-05 @ 07:01  -  10-06 @ 07:00  --------------------------------------------------------  IN: 1115 mL / OUT: 995 mL / NET: 120 mL    10-06 @ 07:01  -  10-06 @ 19:36  --------------------------------------------------------  IN: 680 mL / OUT: 1295 mL / NET: -615 mL        PHYSICAL EXAM:  GENERAL: NAD, well nourished and conversant  HEAD:  Atraumatic  EYES: EOM, PERRLA, conjunctiva pink and sclera white  ENT: No tonsillar erythema, exudates, or enlargement, moist mucous membranes, good dentition, no lesions  NECK: Supple, No JVD, normal thyroid, carotids with normal upstrokes and no bruits  CHEST/LUNG: Clear to auscultation bilaterally, No rales, rhonchi, wheezing, or rubs  HEART: Regular rate and rhythm, No murmurs, rubs, or gallops  ABDOMEN: Soft, nondistended, no masses, guarding, tenderness or rebound, bowel sounds present  EXTREMITIES:  2+ Peripheral Pulses, No clubbing, cyanosis, or edema. No arthritis of shoulders, elbows, hands, hips, knees, ankles, or feet. No DJD C spine, T spine, or L/S spine  LYMPH: No lymphadenopathy noted  SKIN: No rashes or lesions  NERVOUS SYSTEM:  Alert & Oriented X3, normal cognitive function. Motor Strength 5/5 right upper and right lower.  5/5 left upper and left lower extremities, DTRs 2+ intact and symmetric    LABS:                          8.0    19.0  )-----------( 290      ( 06 Oct 2017 02:38 )             24.5     10-06    139  |  103  |  33<H>  ----------------------------<  113<H>  4.5   |  25  |  1.46<H>  10-05    137  |  103  |  32<H>  ----------------------------<  120<H>  3.9   |  25  |  1.26  10-04    141  |  103  |  33<H>  ----------------------------<  93  4.1   |  26  |  1.20    Ca    8.0<L>      06 Oct 2017 02:38  Ca    8.2<L>      05 Oct 2017 03:20  Ca    8.1<L>      04 Oct 2017 02:45  Phos  3.0     10-06  Phos  2.8     10-05  Phos  2.8     10-04  Mg     2.2     10-06  Mg     2.3     10-05  Mg     2.3     10-04    TPro  5.5<L>  /  Alb  2.2<L>  /  TBili  2.7<H>  /  DBili  x   /  AST  78<H>  /  ALT  60<H>  /  AlkPhos  172<H>  10-06  TPro  5.3<L>  /  Alb  2.1<L>  /  TBili  3.4<H>  /  DBili  2.4<H>  /  AST  97<H>  /  ALT  62<H>  /  AlkPhos  155<H>  10-05  TPro  5.1<L>  /  Alb  2.2<L>  /  TBili  4.3<H>  /  DBili  3.4<H>  /  AST  94<H>  /  ALT  58<H>  /  AlkPhos  128<H>  10-04    CAPILLARY BLOOD GLUCOSE          RADIOLOGY & ADDITIONAL TESTS:      Consultant(s):    Care Discussed with Consultants/Other Providers [ ] YES  [ ] NO Patient is a 62y old  Male who presents with a chief complaint of cord compression (19 Sep 2017 09:44)      HPI:   Patient is more alert and interactive with others.    MEDICATIONS  (STANDING):  ALBUTerol/ipratropium for Nebulization 3 milliLiter(s) Nebulizer every 6 hours  docusate sodium 100 milliGRAM(s) Oral two times a day  enoxaparin Injectable 90 milliGRAM(s) SubCutaneous every 12 hours  meropenem IVPB      meropenem IVPB 1000 milliGRAM(s) IV Intermittent every 8 hours  metoprolol 25 milliGRAM(s) Oral every 12 hours  micafungin IVPB      micafungin IVPB 100 milliGRAM(s) IV Intermittent every 24 hours  pantoprazole    Tablet 40 milliGRAM(s) Oral before breakfast  senna 2 Tablet(s) Oral at bedtime  spironolactone 25 milliGRAM(s) Oral daily    MEDICATIONS  (PRN):  oxyCODONE    IR 5 milliGRAM(s) Oral every 4 hours PRN Moderate Pain (4 - 6)  oxyCODONE    IR 10 milliGRAM(s) Oral every 4 hours PRN Severe Pain (7 - 10)      Allergies    No Known Allergies    Intolerances        REVIEW OF SYSTEM:  CONSTITUTIONAL: No fever, No change in weight, No fatigue  HEAD: No headache, No dizziness, No recent trauma  EYES: No eye pain, No visual disturbances, No discharge  ENT:  No difficulty hearing, No tinnitus, No vertigo, No sinus pain, No throat pain  NECK: No pain, No stiffness  BREASTS: No pain, No masses, No nipple discharge  RESPIRATORY: No cough, No wheezing, No chills, No hemoptysis, No shortness of breath at rest or exertional shortness of breath  CARDIOVASCULAR: No chest pain, No palpitations, No dizziness, No CHF, No arrhythmia, No cardiomegaly, No leg swelling  GASTROINTESTINAL: No abdominal, No epigastric pain. No nausea, No vomiting, No hematemesis, No diarrhea, No constipation. No melena, No hematochezia. No GERD  GENITOURINARY: No dysuria, No frequency, No hematuria, No incontinence, No nocturia, No hesitancy,  SKIN: No itching, No burning, No rashes, No lesions   LYMPH NODES: No history of enlarged glands  ENDOCRINE: No heat or cold intolerance, No hair loss. No osteoporosis, No thyroid disease  MUSCULOSKELETAL: No joint pain or swelling, No muscle, back, or extremity pain  PSYCHIATRIC: No depression, No anxiety, No mood swings, No difficulty sleeping  HEME/LYMPH: No easy bruising, No anticoagulants, No bleeding disorder, No bleeding gums  ALLERGY AND IMMUNOLOGIC: No hives, No eczema  NEUROLOGICAL: No memory loss, No loss of strength, No numbness, No tremors        VITALS:   T(C): 37 (10-06-17 @ 15:00), Max: 37.4 (10-06-17 @ 03:00)  HR: 94 (10-06-17 @ 19:00) (76 - 96)  BP: 105/59 (10-06-17 @ 19:00) (104/63 - 132/82)  RR: 17 (10-06-17 @ 19:00) (17 - 36)  SpO2: 99% (10-06-17 @ 19:00) (97% - 100%)  Wt(kg): --    10-05 @ 07:01  -  10-06 @ 07:00  --------------------------------------------------------  IN: 1115 mL / OUT: 995 mL / NET: 120 mL    10-06 @ 07:01  -  10-06 @ 19:36  --------------------------------------------------------  IN: 680 mL / OUT: 1295 mL / NET: -615 mL        PHYSICAL EXAM:  GENERAL: NAD, well nourished and conversant  HEAD:  Atraumatic  EYES: EOM, PERRLA, conjunctiva pink and sclera white  ENT: No tonsillar erythema, exudates, or enlargement, moist mucous membranes, good dentition, no lesions  NECK: Supple, No JVD, normal thyroid, carotids with normal upstrokes and no bruits  CHEST/LUNG: Clear to auscultation bilaterally, No rales, rhonchi, wheezing, or rubs  HEART: Regular rate and rhythm, No murmurs, rubs, or gallops  ABDOMEN: Soft, nondistended, no masses, guarding, tenderness or rebound, bowel sounds present  EXTREMITIES:  2+ Peripheral Pulses, No clubbing, cyanosis, or edema. No arthritis of shoulders, elbows, hands, hips, knees, ankles, or feet. No DJD C spine, T spine, or L/S spine  LYMPH: No lymphadenopathy noted  SKIN: No rashes or lesions  NERVOUS SYSTEM:  Alert & Oriented X3, normal cognitive function. Motor Strength 5/5 right upper and right lower.  5/5 left upper and left lower extremities, DTRs 2+ intact and symmetric    LABS:                          8.0    19.0  )-----------( 290      ( 06 Oct 2017 02:38 )             24.5     10-06    139  |  103  |  33<H>  ----------------------------<  113<H>  4.5   |  25  |  1.46<H>  10-05    137  |  103  |  32<H>  ----------------------------<  120<H>  3.9   |  25  |  1.26  10-04    141  |  103  |  33<H>  ----------------------------<  93  4.1   |  26  |  1.20    Ca    8.0<L>      06 Oct 2017 02:38  Ca    8.2<L>      05 Oct 2017 03:20  Ca    8.1<L>      04 Oct 2017 02:45  Phos  3.0     10-06  Phos  2.8     10-05  Phos  2.8     10-04  Mg     2.2     10-06  Mg     2.3     10-05  Mg     2.3     10-04    TPro  5.5<L>  /  Alb  2.2<L>  /  TBili  2.7<H>  /  DBili  x   /  AST  78<H>  /  ALT  60<H>  /  AlkPhos  172<H>  10-06  TPro  5.3<L>  /  Alb  2.1<L>  /  TBili  3.4<H>  /  DBili  2.4<H>  /  AST  97<H>  /  ALT  62<H>  /  AlkPhos  155<H>  10-05  TPro  5.1<L>  /  Alb  2.2<L>  /  TBili  4.3<H>  /  DBili  3.4<H>  /  AST  94<H>  /  ALT  58<H>  /  AlkPhos  128<H>  10-04    CAPILLARY BLOOD GLUCOSE          RADIOLOGY & ADDITIONAL TESTS:      Consultant(s):    Care Discussed with Consultants/Other Providers [ ] YES  [ ] NO

## 2017-10-06 NOTE — PROGRESS NOTE ADULT - ASSESSMENT
A/P: 62M admitted to neurosurgery for bilateral leg weakness found to have newly diagnosed metastatic disease s/p T8-T11 laminectomy presented today with deterioration in respiratory status and acute onset abdominal pain who was subsequently intubated. W/u revealed b/l DVTs, right upper lobe PE, and a bowel perforation.  Pt is now s/p Exploratory laparotomy, SBR x1 (left in discontinuity), abdominal washout, abthera VAC application (9/22); 1cm perforation in the small bowel 110 cm distal to the ligament of treitz. RTOR 9/24 for intestinal reconstruction and abdominal closure. Awaiting ROBF. 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 Most likely noninfected per vascular surgery. Family meeting - Pt is DNR, not DNI.   - IR pigtail catheter insertion today  - DNR  - Wean 02 as tolerated, recommend diuresis as appropriate  - NPO/IVF  - Pain control  - DVT ppx: T lovenox  - c/w abx  - Primary Care Per SICU    Norbert Robles DDS, MD

## 2017-10-06 NOTE — PROGRESS NOTE ADULT - ASSESSMENT
62M admitted with cord compression (t9 to t11 mets with epidural extension on MRI), S/P laminectomy, diagnosed as adenocarcinoma. 9/22/17 he was found to have small bowel perforation due to ischemia, or infection, and was taken to the or, for exlap- SBR, abd wash out, 10 cm resection and went on 24th again- abdominal washout, enteroenterostomy and abdominal closure. In SICU for management.  His hospital course was also complicated by a right upper lobe pulmonary embolus, B/L lower extremity DVTs.  Elevated fungitell.  CT with lung consolidation, ascites, IVC thrombus, PE.  Covered broadly for peritonitis and fungal infection.  Abdominal fluid with E Faecium (amp resistant) and Klebsiella and candida  On Vanco/Meropenem, micafungin.  Slowly improving leukocytosis.  For IR drainage of L pleural space.  He is s/p 14 days antibiotics from last surgery and washout    - to stop antibiotics and observe  - for drainage of L chest  - watch closely for s/sx of infection    above d/w SICU attending - dr. estrada    Please call the ID service [phone] with questions or concerns over the weekend

## 2017-10-06 NOTE — PROGRESS NOTE ADULT - PROBLEM SELECTOR PLAN 2
Dilaudid 0.2mg Q2hrs PRN. Has not received any PRNs in last 24hrs. Transitioned to PO regimen. Used 4PRNs of 5mg PO oxy IR and 1 PRN 10mg oxy IR. Dilaudid 0.2 mg Q2hrs PRN. Has not received any PRNs in last 24hrs. Transitioned to PO regimen. Used 4PRNs of 5mg PO oxy IR and 1 PRN 10mg oxy IR. Dilaudid 0.2 mg Q2hrs PRN. Has not received any PRNs in last 24hrs. Transitioned to PO regimen. Used 4PRNs of 5mg PO oxy IR and 1 PRN 10mg oxy IR. Total of 35mg in 24hrs with good pain relief. Would recommend to start Oxy ER 15mg BID and continue with the breakthrough oxy IR 5mg.

## 2017-10-06 NOTE — PROGRESS NOTE ADULT - SUBJECTIVE AND OBJECTIVE BOX
Follow-up Pulm Progress Note    Persistent L lung atelectasis   Comfortable on Hi Vineet 50L/min 30%  Refused Bipap overnight      Medications:  MEDICATIONS  (STANDING):  ALBUTerol/ipratropium for Nebulization 3 milliLiter(s) Nebulizer every 6 hours  docusate sodium 100 milliGRAM(s) Oral two times a day  meropenem IVPB      meropenem IVPB 1000 milliGRAM(s) IV Intermittent every 8 hours  metoprolol 25 milliGRAM(s) Oral every 12 hours  micafungin IVPB      micafungin IVPB 100 milliGRAM(s) IV Intermittent every 24 hours  pantoprazole    Tablet 40 milliGRAM(s) Oral before breakfast  senna 2 Tablet(s) Oral at bedtime  spironolactone 25 milliGRAM(s) Oral daily    MEDICATIONS  (PRN):  oxyCODONE    IR 5 milliGRAM(s) Oral every 4 hours PRN Moderate Pain (4 - 6)  oxyCODONE    IR 10 milliGRAM(s) Oral every 4 hours PRN Severe Pain (7 - 10)          Vital Signs Last 24 Hrs  T(C): 37.2 (06 Oct 2017 11:00), Max: 37.4 (06 Oct 2017 03:00)  T(F): 99 (06 Oct 2017 11:00), Max: 99.4 (06 Oct 2017 03:00)  HR: 92 (06 Oct 2017 14:00) (76 - 107)  BP: 113/74 (06 Oct 2017 14:00) (106/73 - 141/73)  BP(mean): 89 (06 Oct 2017 14:00) (79 - 100)  RR: 26 (06 Oct 2017 14:00) (18 - 36)  SpO2: 100% (06 Oct 2017 14:00) (96% - 100%) on Hi Vineet 50L/min 30%    ABG - ( 05 Oct 2017 03:18 )  pH: 7.45  /  pCO2: 39    /  pO2: 140   / HCO3: 26    / Base Excess: 2.7   /  SaO2: 100                   10-05 @ 07:01  -  10-06 @ 07:00  --------------------------------------------------------  IN: 1115 mL / OUT: 995 mL / NET: 120 mL          LABS:                        8.0    19.0  )-----------( 290      ( 06 Oct 2017 02:38 )             24.5     10-06    139  |  103  |  33<H>  ----------------------------<  113<H>  4.5   |  25  |  1.46<H>    Ca    8.0<L>      06 Oct 2017 02:38  Phos  3.0     10-06  Mg     2.2     10-06    TPro  5.5<L>  /  Alb  2.2<L>  /  TBili  2.7<H>  /  DBili  x   /  AST  78<H>  /  ALT  60<H>  /  AlkPhos  172<H>  10-06          CAPILLARY BLOOD GLUCOSE        PT/INR - ( 06 Oct 2017 02:38 )   PT: 13.3 sec;   INR: 1.23 ratio         PTT - ( 06 Oct 2017 02:38 )  PTT:25.8 sec                    CULTURES: (if applicable)          Physical Examination:  PULM: Absent BS L   CVS: S1, S2 RRR    RADIOLOGY REVIEWED  CXR: L Lung atelectasis

## 2017-10-06 NOTE — PROGRESS NOTE ADULT - ATTENDING COMMENTS
Patient seen, son at bedside. Goals of care delineated, dyspnea resolved. Please reconsult as needed.

## 2017-10-06 NOTE — PROGRESS NOTE ADULT - ATTENDING COMMENTS
I saw and evaluated the patient. The patient is a 62-year-old male with newly diagnosed metastatic disease s/p T8-T11 laminectomies for resection of dorsal epidural tumor for spinal cord compression on 9/19/17. His hospital course was complicated by a right upper lobe pulmonary embolus, B/L lower extremity DVTs, and a bowel perforation, requiring two surgeries for repair with General Surgery. The patient's exam is notable for 2/5 left lower extremity weakness and 0/5 right lower extremity weakness. A small new area of dehiscence is noted at the superior aspect of the patient's incision. Recommend local wound care. Consider Wound Care evaluation.    The patient is now DNR. Continue goals of care discussion with the family. Appreciate Palliative Care support. Continue mobilization with PT, OT and PMR. Ok to begin chemotherapy and radiation from Neurosurgery perspective if and when medically ready.  Appreciate SICU support.

## 2017-10-07 NOTE — PROGRESS NOTE ADULT - SUBJECTIVE AND OBJECTIVE BOX
ATP Progress Note    S: No acute events overnight. Patient more comfortable today after pigtail placement.    O:  T(C): 36.8 (10-07-17 @ 07:00), Max: 37.4 (10-06-17 @ 19:00)  HR: 93 (10-07-17 @ 10:00) (81 - 96)  BP: 115/75 (10-07-17 @ 10:00) (97/60 - 123/68)  RR: 31 (10-07-17 @ 10:00) (17 - 36)  SpO2: 95% (10-07-17 @ 10:00) (93% - 100%)  Wt(kg): --    10-06 @ 07:01  -  10-07 @ 07:00  --------------------------------------------------------  IN:    dextrose 5% + sodium chloride 0.45%.: 500 mL    IV PiggyBack: 400 mL    Oral Fluid: 720 mL  Total IN: 1620 mL    OUT:    Chest Tube: 1100 mL    Indwelling Catheter - Urethral: 945 mL  Total OUT: 2045 mL    Total NET: -425 mL      10-07 @ 07:01  -  10-07 @ 11:29  --------------------------------------------------------  IN:    dextrose 5% + sodium chloride 0.45%.: 150 mL    Oral Fluid: 60 mL  Total IN: 210 mL    OUT:    Indwelling Catheter - Urethral: 125 mL  Total OUT: 125 mL    Total NET: 85 mL        CBC Full  -  ( 07 Oct 2017 02:58 )  WBC Count : 15.8 K/uL  Hemoglobin : 7.4 g/dL  Hematocrit : 22.5 %  Platelet Count - Automated : 194 K/uL  Mean Cell Volume : 97.4 fl  Mean Cell Hemoglobin : 31.9 pg  Mean Cell Hemoglobin Concentration : 32.8 gm/dL      PHYSICAL EXAM:  Gen: NAD  Resp: Pt on high flow NC, no acute resp distress  Abd: Soft, NT, ND  Incision: c/d/i

## 2017-10-07 NOTE — PROGRESS NOTE ADULT - ASSESSMENT
61 y/o M with no significant PMH presents 9/19 with bilateral LE weakness/paraplegia x several weeks. 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 CT evidence of L bronchial obstruction 2nd lesion with LLL collapse, necrotic mediastinal/hilar LN, liver mets, ?adrenal mets. Hospital course c/b RUL segmental PE, bilateral soleal DVT. Further c/b SB perforation s/p SBR, primary anastomosis on 9/22, RTOR 9/24 for ex-lap, washout, re-anastomosis and abdominal closure. Abdominal cultures with E. Faecium. Extubated 9/24. Now with L lung atelectasis. 10/6 s/p L CT

## 2017-10-07 NOTE — PROGRESS NOTE ADULT - ATTENDING COMMENTS
Agree with above  Pigtail initially helped, now fluid has recurred  may need upsize of chest tube early next week  pt comfortable at present, no need for urgent intervention  daily CXRs  d/w SICU PA

## 2017-10-07 NOTE — PROGRESS NOTE ADULT - SUBJECTIVE AND OBJECTIVE BOX
Follow-up Pulm Progress Note    s/p Left pigtail cath placement with 1.5 l of serous fluid-connested to pleuravac/water seal.  o2 sat96% on 3l, speaking in full senctence, -sob currently, "breathing easier since  tube placed"  wore high flow over night    Medications:  MEDICATIONS  (STANDING):  ALBUTerol/ipratropium for Nebulization 3 milliLiter(s) Nebulizer every 6 hours  dextrose 5% + sodium chloride 0.45%. 1000 milliLiter(s) (50 mL/Hr) IV Continuous <Continuous>  docusate sodium 100 milliGRAM(s) Oral two times a day  enoxaparin Injectable 80 milliGRAM(s) SubCutaneous every 12 hours  meropenem IVPB      meropenem IVPB 1000 milliGRAM(s) IV Intermittent every 8 hours  metoprolol 25 milliGRAM(s) Oral every 12 hours  micafungin IVPB      micafungin IVPB 100 milliGRAM(s) IV Intermittent every 24 hours  pantoprazole    Tablet 40 milliGRAM(s) Oral before breakfast  senna 2 Tablet(s) Oral at bedtime  spironolactone 25 milliGRAM(s) Oral daily    MEDICATIONS  (PRN):  oxyCODONE    IR 5 milliGRAM(s) Oral every 4 hours PRN Moderate Pain (4 - 6)  oxyCODONE    IR 10 milliGRAM(s) Oral every 4 hours PRN Severe Pain (7 - 10)          Vital Signs Last 24 Hrs  T(C): 36.8 (07 Oct 2017 07:00), Max: 37.4 (06 Oct 2017 19:00)  T(F): 98.2 (07 Oct 2017 07:00), Max: 99.4 (06 Oct 2017 19:00)  HR: 93 (07 Oct 2017 09:00) (81 - 96)  BP: 108/81 (07 Oct 2017 09:00) (97/60 - 123/68)  BP(mean): 89 (07 Oct 2017 09:00) (74 - 92)  RR: 33 (07 Oct 2017 09:00) (17 - 36)  SpO2: 95% (07 Oct 2017 09:00) (93% - 100%)          10-06 @ 07:01  -  10-07 @ 07:00  --------------------------------------------------------  IN: 1620 mL / OUT: 2045 mL / NET: -425 mL          LABS:                        7.4    15.8  )-----------( 194      ( 07 Oct 2017 02:58 )             22.5     10-07    139  |  104  |  39<H>  ----------------------------<  121<H>  4.9   |  26  |  1.83<H>    Ca    7.9<L>      07 Oct 2017 02:58  Phos  3.5     10-07  Mg     2.3     10-07    TPro  5.0<L>  /  Alb  1.9<L>  /  TBili  2.0<H>  /  DBili  x   /  AST  57<H>  /  ALT  37  /  AlkPhos  137<H>  10-07          CAPILLARY BLOOD GLUCOSE        PT/INR - ( 07 Oct 2017 02:58 )   PT: 13.9 sec;   INR: 1.27 ratio         PTT - ( 07 Oct 2017 02:58 )  PTT:32.7 sec                Fluid characteristics  -- 10-06 @ 20:17  pH 7.87  LDH 1648  tprot 2.3    Cell count  Appearance Sl Bloody  Fluid type --  BF lymph 37  color Red  eosinophil --  PMN 27  Mesothelial 4  Monocyte 2  Other body cells 30      CULTURES: (if applicable)    Most recent blood culture -- 10-07 @ 00:46   -- -- .Body Fluid Pleural Fluid 10-07 @ 00:46    Blood culture 10-07 @ 00:46  --    No polymorphonuclear cells seen per low power field  No organisms seen per oil power field  by cytocentrifuge  --  --  --    Urine culture    --Culture - Blood (09.27.17 @ 02:28)    Specimen Source: .Blood Blood-Peripheral    Culture Results:   No growth at 5 days.    >      Physical Examination:  PULM: decreased bs L>R bilaterally, no significant sputum production  CVS: S1, S2 heard    RADIOLOGY REVIEWED  CXR: 10/7- decreasing aeration of Left upper lung as compared to cxr from 10/6 Follow-up Pulm Progress Note    s/p Left pigtail cath placement with 1.5 l of serous fluid-connested to pleuravac/water seal.  o2 sat96% on 3l, speaking in full senctence, -sob currently, "breathing easier since  tube placed"  wore high flow over night    Medications:  MEDICATIONS  (STANDING):  ALBUTerol/ipratropium for Nebulization 3 milliLiter(s) Nebulizer every 6 hours  dextrose 5% + sodium chloride 0.45%. 1000 milliLiter(s) (50 mL/Hr) IV Continuous <Continuous>  docusate sodium 100 milliGRAM(s) Oral two times a day  enoxaparin Injectable 80 milliGRAM(s) SubCutaneous every 12 hours  meropenem IVPB      meropenem IVPB 1000 milliGRAM(s) IV Intermittent every 8 hours  metoprolol 25 milliGRAM(s) Oral every 12 hours  micafungin IVPB      micafungin IVPB 100 milliGRAM(s) IV Intermittent every 24 hours  pantoprazole    Tablet 40 milliGRAM(s) Oral before breakfast  senna 2 Tablet(s) Oral at bedtime  spironolactone 25 milliGRAM(s) Oral daily    MEDICATIONS  (PRN):  oxyCODONE    IR 5 milliGRAM(s) Oral every 4 hours PRN Moderate Pain (4 - 6)  oxyCODONE    IR 10 milliGRAM(s) Oral every 4 hours PRN Severe Pain (7 - 10)          Vital Signs Last 24 Hrs  T(C): 36.8 (07 Oct 2017 07:00), Max: 37.4 (06 Oct 2017 19:00)  T(F): 98.2 (07 Oct 2017 07:00), Max: 99.4 (06 Oct 2017 19:00)  HR: 93 (07 Oct 2017 09:00) (81 - 96)  BP: 108/81 (07 Oct 2017 09:00) (97/60 - 123/68)  BP(mean): 89 (07 Oct 2017 09:00) (74 - 92)  RR: 33 (07 Oct 2017 09:00) (17 - 36)  SpO2: 95% (07 Oct 2017 09:00) (93% - 100%)          10-06 @ 07:01  -  10-07 @ 07:00  --------------------------------------------------------  IN: 1620 mL / OUT: 2045 mL / NET: -425 mL          LABS:                        7.4    15.8  )-----------( 194      ( 07 Oct 2017 02:58 )             22.5     10-07    139  |  104  |  39<H>  ----------------------------<  121<H>  4.9   |  26  |  1.83<H>    Ca    7.9<L>      07 Oct 2017 02:58  Phos  3.5     10-07  Mg     2.3     10-07    TPro  5.0<L>  /  Alb  1.9<L>  /  TBili  2.0<H>  /  DBili  x   /  AST  57<H>  /  ALT  37  /  AlkPhos  137<H>  10-07    CAPILLARY BLOOD GLUCOSE        PT/INR - ( 07 Oct 2017 02:58 )   PT: 13.9 sec;   INR: 1.27 ratio         PTT - ( 07 Oct 2017 02:58 )  PTT:32.7 sec      Fluid characteristics  -- 10-06 @ 20:17  pH 7.87  LDH 1648  tprot 2.3    Cell count  Appearance Sl Bloody  Fluid type --  BF lymph 37  color Red  eosinophil --  PMN 27  Mesothelial 4  Monocyte 2  Other body cells 30      CULTURES: (if applicable)    Most recent blood culture -- 10-07 @ 00:46   -- -- .Body Fluid Pleural Fluid 10-07 @ 00:46    Blood culture 10-07 @ 00:46  --    No polymorphonuclear cells seen per low power field  No organisms seen per oil power field  by cytocentrifuge  --  --  --    Urine culture    --Culture - Blood (09.27.17 @ 02:28)    Specimen Source: .Blood Blood-Peripheral    Culture Results:   No growth at 5 days.    >      Physical Examination:  PULM: decreased bs L>R bilaterally, no significant sputum production  CVS: S1, S2 heard    RADIOLOGY REVIEWED  CXR: 10/7- decreasing aeration of Left upper lung as compared to cxr from 10/6

## 2017-10-07 NOTE — PROGRESS NOTE ADULT - SUBJECTIVE AND OBJECTIVE BOX
61yo M with no significant PMH. Consulted on 9/12 for worsening LBP associated with lower extremity weakness for past 3 weeks. Over the weekend, he progressed to the point where he was unable to ambulate. He was seen at OSH and transferred to [facility] for spine evaluation. Was found to have epidural compression T8-T11 from newly diagnosed metastatic disease to liver, bone and bowel mets. S/p laminectomy on 9/20. Hospitalization complicated by acute hypoxic respiratory failure 2/2 acute pulmonary vascular congestion, L lung collapse, b/l PE, RUL PNA. Was found to have bowel perf with free air, was taken for ex-lap on 9/22, closure on 9/24. Peritonitis subsequently - E. faecium, Klebsiella and Candida.       MEDICATIONS  (STANDING):  ALBUTerol/ipratropium for Nebulization 3 milliLiter(s) Nebulizer every 6 hours  dextrose 5% + sodium chloride 0.45%. 1000 milliLiter(s) (50 mL/Hr) IV Continuous <Continuous>  docusate sodium 100 milliGRAM(s) Oral two times a day  enoxaparin Injectable 80 milliGRAM(s) SubCutaneous every 12 hours  metoprolol 25 milliGRAM(s) Oral every 12 hours  pantoprazole    Tablet 40 milliGRAM(s) Oral before breakfast  senna 2 Tablet(s) Oral at bedtime  spironolactone 25 milliGRAM(s) Oral daily    MEDICATIONS  (PRN):  oxyCODONE    IR 5 milliGRAM(s) Oral every 4 hours PRN Moderate Pain (4 - 6)  oxyCODONE    IR 10 milliGRAM(s) Oral every 4 hours PRN Severe Pain (7 - 10)    REVIEW OF SYSTEM:  CONSTITUTIONAL: No fever, No change in weight, No fatigue  HEAD: No headache, No dizziness, No recent trauma  EYES: No eye pain, No visual disturbances, No discharge  ENT:  No difficulty hearing, No tinnitus, No vertigo, No sinus pain, No throat pain  NECK: No pain, No stiffness  BREASTS: No pain, No masses, No nipple discharge  RESPIRATORY: No cough, No wheezing, No chills, No hemoptysis, No shortness of breath at rest or exertional shortness of breath  CARDIOVASCULAR: No chest pain, No palpitations, No dizziness, No CHF, No arrhythmia, No cardiomegaly, No leg swelling  GASTROINTESTINAL: No abdominal, No epigastric pain. No nausea, No vomiting, No hematemesis, No diarrhea, No constipation. No melena, No hematochezia. No GERD  GENITOURINARY: No dysuria, No frequency, No hematuria, No incontinence, No nocturia, No hesitancy,  SKIN: No itching, No burning, No rashes, No lesions   LYMPH NODES: No history of enlarged glands  ENDOCRINE: No heat or cold intolerance, No hair loss. No osteoporosis, No thyroid disease  MUSCULOSKELETAL: No joint pain or swelling, No muscle, back, or extremity pain  PSYCHIATRIC: No depression, No anxiety, No mood swings, No difficulty sleeping  HEME/LYMPH: No easy bruising, No anticoagulants, No bleeding disorder, No bleeding gums  ALLERGY AND IMMUNOLOGIC: No hives, No eczema  NEUROLOGICAL: No memory loss, No loss of strength, No numbness, No tremors          VITALS:   T(C): 37.3 (10-07-17 @ 15:00), Max: 37.4 (10-06-17 @ 19:00)  HR: 92 (10-07-17 @ 18:00) (81 - 97)  BP: 119/72 (10-07-17 @ 18:00) (97/60 - 122/74)  RR: 18 (10-07-17 @ 18:00) (17 - 33)  SpO2: 100% (10-07-17 @ 18:00) (93% - 100%)  Wt(kg): --    PHYSICAL EXAM:  GENERAL: NAD, well nourished and conversant  HEAD:  Atraumatic  EYES: EOM, PERRLA, conjunctiva pink and sclera white  ENT: No tonsillar erythema, exudates, or enlargement, moist mucous membranes, good dentition, no lesions  NECK: Supple, No JVD, normal thyroid, carotids with normal upstrokes and no bruits  CHEST/LUNG: Clear to auscultation bilaterally, No rales, rhonchi, wheezing, or rubs  HEART: Regular rate and rhythm, No murmurs, rubs, or gallops  ABDOMEN: Soft, nondistended, no masses, guarding, tenderness or rebound, bowel sounds present  EXTREMITIES:  2+ Peripheral Pulses, No clubbing, cyanosis, or edema. No arthritis of shoulders, elbows, hands, hips, knees, ankles, or feet. No DJD C spine, T spine, or L/S spine  LYMPH: No lymphadenopathy noted  SKIN: No rashes or lesions    LABS:        CBC Full  -  ( 07 Oct 2017 02:58 )  WBC Count : 15.8 K/uL  Hemoglobin : 7.4 g/dL  Hematocrit : 22.5 %  Platelet Count - Automated : 194 K/uL  Mean Cell Volume : 97.4 fl  Mean Cell Hemoglobin : 31.9 pg  Mean Cell Hemoglobin Concentration : 32.8 gm/dL  Auto Neutrophil # : x  Auto Lymphocyte # : x  Auto Monocyte # : x  Auto Eosinophil # : x  Auto Basophil # : x  Auto Neutrophil % : x  Auto Lymphocyte % : x  Auto Monocyte % : x  Auto Eosinophil % : x  Auto Basophil % : x    10-07    136  |  101  |  39<H>  ----------------------------<  143<H>  4.7   |  24  |  1.82<H>    Ca    8.1<L>      07 Oct 2017 14:57  Phos  3.2     10-07  Mg     2.2     10-07    TPro  5.0<L>  /  Alb  1.9<L>  /  TBili  2.0<H>  /  DBili  x   /  AST  57<H>  /  ALT  37  /  AlkPhos  137<H>  10-07    LIVER FUNCTIONS - ( 07 Oct 2017 02:58 )  Alb: 1.9 g/dL / Pro: 5.0 g/dL / ALK PHOS: 137 U/L / ALT: 37 U/L RC / AST: 57 U/L / GGT: x           PT/INR - ( 07 Oct 2017 02:58 )   PT: 13.9 sec;   INR: 1.27 ratio         PTT - ( 07 Oct 2017 02:58 )  PTT:32.7 sec    CAPILLARY BLOOD GLUCOSE          RADIOLOGY & ADDITIONAL TESTS: 61yo M with no significant PMH. Consulted on 9/12 for worsening LBP associated with lower extremity weakness for past 3 weeks. Over the weekend, he progressed to the point where he was unable to ambulate. He was seen at OSH and transferred to [facility] for spine evaluation. Was found to have epidural compression T8-T11 from newly diagnosed metastatic disease to liver, bone and bowel mets. S/p laminectomy on 9/20. Hospitalization complicated by acute hypoxic respiratory failure 2/2 acute pulmonary vascular congestion, L lung collapse, b/l PE, RUL PNA. Was found to have bowel perf with free air, was taken for ex-lap on 9/22, closure on 9/24. Peritonitis subsequently - E. faecium, Klebsiella and Candida.       MEDICATIONS  (STANDING):  ALBUTerol/ipratropium for Nebulization 3 milliLiter(s) Nebulizer every 6 hours  dextrose 5% + sodium chloride 0.45%. 1000 milliLiter(s) (50 mL/Hr) IV Continuous <Continuous>  docusate sodium 100 milliGRAM(s) Oral two times a day  enoxaparin Injectable 80 milliGRAM(s) SubCutaneous every 12 hours  metoprolol 25 milliGRAM(s) Oral every 12 hours  pantoprazole    Tablet 40 milliGRAM(s) Oral before breakfast  senna 2 Tablet(s) Oral at bedtime  spironolactone 25 milliGRAM(s) Oral daily    MEDICATIONS  (PRN):  oxyCODONE    IR 5 milliGRAM(s) Oral every 4 hours PRN Moderate Pain (4 - 6)  oxyCODONE    IR 10 milliGRAM(s) Oral every 4 hours PRN Severe Pain (7 - 10)    REVIEW OF SYSTEM:  CONSTITUTIONAL: No fever, No change in weight, No fatigue  HEAD: No headache, No dizziness, No recent trauma  EYES: No eye pain, No visual disturbances, No discharge  ENT:  No difficulty hearing, No tinnitus, No vertigo, No sinus pain, No throat pain  NECK: No pain, No stiffness  BREASTS: No pain, No masses, No nipple discharge  RESPIRATORY: No cough, No wheezing, No chills, No hemoptysis, No shortness of breath at rest or exertional shortness of breath  CARDIOVASCULAR: No chest pain, No palpitations, No dizziness, No CHF, No arrhythmia, No cardiomegaly, No leg swelling  GASTROINTESTINAL: No abdominal, No epigastric pain. No nausea, No vomiting, No hematemesis, No diarrhea, No constipation. No melena, No hematochezia. No GERD  GENITOURINARY: No dysuria, No frequency, No hematuria, No incontinence, No nocturia, No hesitancy,  SKIN: No itching, No burning, No rashes, No lesions   LYMPH NODES: No history of enlarged glands  ENDOCRINE: No heat or cold intolerance, No hair loss. No osteoporosis, No thyroid disease  MUSCULOSKELETAL: No joint pain or swelling, No muscle, back, or extremity pain  PSYCHIATRIC: No depression, No anxiety, No mood swings, No difficulty sleeping  HEME/LYMPH: No easy bruising, No anticoagulants, No bleeding disorder, No bleeding gums  ALLERGY AND IMMUNOLOGIC: No hives, No eczema  NEUROLOGICAL: No memory loss, No loss of strength, No numbness, No tremors          VITALS:   T(C): 37.3 (10-07-17 @ 15:00), Max: 37.4 (10-06-17 @ 19:00)  HR: 92 (10-07-17 @ 18:00) (81 - 97)  BP: 119/72 (10-07-17 @ 18:00) (97/60 - 122/74)  RR: 18 (10-07-17 @ 18:00) (17 - 33)  SpO2: 100% (10-07-17 @ 18:00) (93% - 100%)  Wt(kg): --    PHYSICAL EXAM:  GENERAL: NAD, well nourished and conversant  HEAD:  Atraumatic  EYES: EOM, PERRLA, conjunctiva pink and sclera white  ENT: No tonsillar erythema, exudates, or enlargement, moist mucous membranes, good dentition, no lesions  NECK: Supple, No JVD, normal thyroid, carotids with normal upstrokes and no bruits  CHEST/LUNG: Clear to auscultation bilaterally, No rales, rhonchi, wheezing, or rubs  HEART: Regular rate and rhythm, No murmurs, rubs, or gallops  ABDOMEN: decreased BS + distention  EXTREMITIES:, No clubbing, cyanosis, + edema.  LYMPH: No lymphadenopathy noted  SKIN: No rashes or lesions  NEURO: + LE paralysis    LABS:        CBC Full  -  ( 07 Oct 2017 02:58 )  WBC Count : 15.8 K/uL  Hemoglobin : 7.4 g/dL  Hematocrit : 22.5 %  Platelet Count - Automated : 194 K/uL  Mean Cell Volume : 97.4 fl  Mean Cell Hemoglobin : 31.9 pg  Mean Cell Hemoglobin Concentration : 32.8 gm/dL  Auto Neutrophil # : x  Auto Lymphocyte # : x  Auto Monocyte # : x  Auto Eosinophil # : x  Auto Basophil # : x  Auto Neutrophil % : x  Auto Lymphocyte % : x  Auto Monocyte % : x  Auto Eosinophil % : x  Auto Basophil % : x    10-07    136  |  101  |  39<H>  ----------------------------<  143<H>  4.7   |  24  |  1.82<H>    Ca    8.1<L>      07 Oct 2017 14:57  Phos  3.2     10-07  Mg     2.2     10-07    TPro  5.0<L>  /  Alb  1.9<L>  /  TBili  2.0<H>  /  DBili  x   /  AST  57<H>  /  ALT  37  /  AlkPhos  137<H>  10-07    LIVER FUNCTIONS - ( 07 Oct 2017 02:58 )  Alb: 1.9 g/dL / Pro: 5.0 g/dL / ALK PHOS: 137 U/L / ALT: 37 U/L RC / AST: 57 U/L / GGT: x           PT/INR - ( 07 Oct 2017 02:58 )   PT: 13.9 sec;   INR: 1.27 ratio         PTT - ( 07 Oct 2017 02:58 )  PTT:32.7 sec    CAPILLARY BLOOD GLUCOSE          RADIOLOGY & ADDITIONAL TESTS:

## 2017-10-07 NOTE — PROGRESS NOTE ADULT - ATTENDING COMMENTS
Pt seen and examined.  Chart reviewed.  Resident note confirmed.  Pt is a 62 year old male admitted to neurosurgery for bilateral leg weakness found to have newly diagnosed metastatic disease.  Pt is s/p T8-T11 laminectomy with subsequent deterioration in respiratory status/acute onset abdominal pain.  Work up revealed bilateral DVTs, right upper lobe PE, and small intestinal perforation.  Pt is s/p Exploratory laparotomy, SBR x1 (left in discontinuity), abdominal washout, abthera VAC application (9/22) with intraop findings of an intestinal perforation and a large amount of succus in the abdomen. Pt s/p return to OR on 9/24 for intestinal reconstruction and abdominal closure.  No acute events overnight.  He remains extubated.  His mental and respiratory status are stable. His creatinine is trending up and is c/w ALEC 3.  He has been afebrile, with decreasing WBC and procalcitonin.    Current management includes:    1) Right segmental and subsegmental right upper lobe PE.  Left sided lung collapse and atelectasis-improved; patient has been on hiflow O2. Compressive atelectasis of left lung secondary to a malignant effusion     CXR- with improved pleural effusion, s/p pigtail placement.    -continue lovenox    -continue incentive spirometer and acapella and chest PT    -keep SpO2 < 92%    -Thoracic follow up.      2) leukocytosis      - course of abx completed  - culture for fever.    3) s/p  Intestinal resection with tumor as cause of perforation.  Metastatic disease noted in liver.  -regular diet  -add protein drinks.    4) hyperbilirubinemia- improved   -continue to monitor    5) worsening ALEC  -Monitor I's and O's.  -adjust meds for GFR  -consider renal US    Pt is DNR.

## 2017-10-07 NOTE — PROGRESS NOTE ADULT - PROBLEM SELECTOR PLAN 1
-Left lung collapse:  extrinsic compression 2nd tumor, atelectasis. L pleural effusion  -s/p L pigtail cath  L effusion=exudate, currently draining less since yesterday, with decreased aeration of Left upper lobe  management of CT as per sicu, consider place to suction, may need to be flushed    -Duoneb q6  -Chest PT, OOB, Acapella valve, IS  keep o2 sat>=90% with 3lnc, alternate with high flow 40%/50lpm and apply qhs

## 2017-10-07 NOTE — PROGRESS NOTE ADULT - ASSESSMENT
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 Complicated by acute hypoxemic respiratory failure requiring CPAP with transition to high flow NC.    PLAN:  Neurologic: post-operative pain and neoplastic pain   - Continue PO Oxycodone PRN for pain    Respiratory: acute hypoxemic respiratory failure  - Will administer supplemental O2 to maintain SpO2 > 88%, on HFNC FiO2 40%   - Encourage CPAP overnight; patient not tolerating most nights  - Duonebs for dyspnea.  - PT/ OOB to prevent atelectasis  - Continue to monitor pigtail catheter output    Cardiovascular: HTN  - Continue Metoprolol 25 mg Q12H    Gastrointestinal/Nutrition: small bowel perforation s/p SBR, anastomosis   - Con't regular diet  - Protonix 40mg daily  - Colace and Senna    Renal/Genitourinary: ALEC, resolving  - Monitor I&Os and electrolytes. Replete as needed.  - IV lock    Hematologic: b/l DVT; b/l PE  - therapeutic lovenox    Infectious Disease: secondary peritonitis 2/2 to Vanco sensitive E. Faecium, Klebsiella, Candida Albicans; leukocytosis  -Per ID recommendations Continue Vancomycin (for E Faecium) Meropenem (Fro Klebsiella and inter-abdominal/lung infection), and Micafungin for candida     Disposition:  - DNR.  - To remain in SICU. Consider transfer to PCU.    ~Antoinette Avila M.D. 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 Complicated by acute hypoxemic respiratory failure requiring CPAP with transition to high flow NC.    PLAN:  Neurologic: post-operative pain and neoplastic pain   - Continue PO Oxycodone PRN for pain    Respiratory: acute hypoxemic respiratory failure  - Will administer supplemental O2 to maintain SpO2 > 88%, on HFNC FiO2 40%   - Encourage CPAP overnight; patient not tolerating most nights  - Duonebs for dyspnea.  - PT/ OOB to prevent atelectasis  - Continue to monitor pigtail catheter output    Cardiovascular: HTN  - Continue Metoprolol 25 mg Q12H    Gastrointestinal/Nutrition: small bowel perforation s/p SBR, anastomosis   - Con't regular diet  - Protonix 40mg daily  - Colace and Senna    Renal/Genitourinary: ALEC, resolving  - Monitor I&Os and electrolytes. Replete as needed.  - IV lock    Hematologic: b/l DVT; b/l PE  - therapeutic lovenox    Infectious Disease: secondary peritonitis 2/2 to Vanco sensitive E. Faecium, Klebsiella, Candida Albicans; leukocytosis  -Per ID recommendations Continue Vancomycin (for E Faecium) Meropenem (Fro Klebsiella and inter-abdominal/lung infection), and Micafungin for candida   -Follow up pleural fluid cx sent 10/6    Disposition:  - DNR.  - To remain in SICU. Consider transfer to PCU.    ~Antoinette Avila M.D. Resident

## 2017-10-07 NOTE — PROGRESS NOTE ADULT - ASSESSMENT
A/P: 62M admitted to neurosurgery for bilateral leg weakness found to have newly diagnosed metastatic disease s/p T8-T11 laminectomy presented today with deterioration in respiratory status and acute onset abdominal pain who was subsequently intubated. W/u revealed b/l DVTs, right upper lobe PE, and a bowel perforation.  Pt is now s/p Exploratory laparotomy, SBR x1 (left in discontinuity), abdominal washout, abthera VAC application (9/22); 1cm perforation in the small bowel 110 cm distal to the ligament of treitz. RTOR 9/24 for intestinal reconstruction and abdominal closure. Awaiting ROBF. 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 Most likely noninfected per vascular surgery. Family meeting - Pt is DNR, not DNI.    - Wean 02 as tolerated, recommend diuresis as appropriate  - NPO/IVF  - Pain control  - DVT ppx: T lovenox  - Primary Care Per SICU    Hong Cisneros M.D.

## 2017-10-07 NOTE — PROGRESS NOTE ADULT - SUBJECTIVE AND OBJECTIVE BOX
HISTORY:  62y Male initially admitted to neurosurgery for b/l leg weakness found to have newly diagnosed metastatic disease with spinal met causing spinal cord compression s/p T8-T11 laminectomy on 9/20, post-op course complicated by deterioration in respiratory status and acute onset abdominal pain requiring intubation and transfer to NSCU. CT chest/abdomen/pelvis demonstrated b/l DVTs, right upper lobe PE, and a bowel perforation with free air. Pt was taken emergently to the OR and is now s/p exploratory laparotomy, SBR x1 (left in discontinuity), abdominal washout, abthera VAC application on 9/22, after which he was transferred to SICU under ACS. He was taken back to the OR on 9/24 for a washout, primary anastomosis, and abdominal closure. Post-operative course complicated by Acute hypoxemic respiratory failure secondary to Acute Pulmonary Vascular congestion/ Left Lung Collapse.    24 HOUR EVENTS:  Left pigtail in IR today, 2L drained upon placement, discontinued dilaudid, pt comfortable with PO pain medications. Restarted lovenox. HISTORY:  62y Male initially admitted to neurosurgery for b/l leg weakness found to have newly diagnosed metastatic disease with spinal met causing spinal cord compression s/p T8-T11 laminectomy on 9/20, post-op course complicated by deterioration in respiratory status and acute onset abdominal pain requiring intubation and transfer to NSCU. CT chest/abdomen/pelvis demonstrated b/l DVTs, right upper lobe PE, and a bowel perforation with free air. Pt was taken emergently to the OR and is now s/p exploratory laparotomy, SBR x1 (left in discontinuity), abdominal washout, abthera VAC application on 9/22, after which he was transferred to SICU under ACS. He was taken back to the OR on 9/24 for a washout, primary anastomosis, and abdominal closure. Post-operative course complicated by Acute hypoxemic respiratory failure secondary to Acute Pulmonary Vascular congestion/ Left Lung Collapse.    24 HOUR EVENTS:  Left pigtail in IR today, 2L drained upon placement, discontinued dilaudid, pt comfortable with PO pain medications. Restarted lovenox.    SUBJECTIVE/ROS:  [X] A ten-point review of systems was otherwise negative except as noted.  [ ] Due to altered mental status/intubation, subjective information were not able to be obtained from the patient. History was obtained, to the extent possible, from review of the chart and collateral sources of information.    NEURO  Exam: AAOx1-2, waxing and waning mental status  Meds: oxyCODONE    IR 5 milliGRAM(s) Oral every 4 hours PRN Moderate Pain (4 - 6)  oxyCODONE    IR 10 milliGRAM(s) Oral every 4 hours PRN Severe Pain (7 - 10)    [x] Adequacy of sedation and pain control has been assessed and adjusted    RESPIRATORY  RR: 24 (10-07-17 @ 04:00) (17 - 36)  SpO2: 98% (10-07-17 @ 04:00) (97% - 100%)  Wt(kg): --  Exam: unlabored, clear to auscultation bilaterally  Mechanical Ventilation:     Meds: ALBUTerol/ipratropium for Nebulization 3 milliLiter(s) Nebulizer every 6 hours    CARDIOVASCULAR  HR: 86 (10-07-17 @ 04:00) (76 - 96)  BP: 107/70 (10-07-17 @ 04:00) (97/60 - 123/76)  BP(mean): 85 (10-07-17 @ 04:00) (74 - 95)  ABP: --  ABP(mean): --  Wt(kg): --  CVP(cm H2O): --    Exam: regular rate and rhythm  Cardiac Rhythm: normal sinus rhythm  Perfusion     [X]Adequate   [ ]Inadequate  Mentation   [ ]Normal       [X]Reduced  Extremities  [X]Warm         [ ]Cool  Volume Status [ ]Hypervolemic [X]Euvolemic [ ]Hypovolemic  Meds: metoprolol 25 milliGRAM(s) Oral every 12 hours  spironolactone 25 milliGRAM(s) Oral daily    GI/NUTRITION  Exam: abd S/NT/ND  Diet: regular diet  Meds: docusate sodium 100 milliGRAM(s) Oral two times a day  pantoprazole    Tablet 40 milliGRAM(s) Oral before breakfast  senna 2 Tablet(s) Oral at bedtime      GENITOURINARY  I&O's Detail    10-05 @ 07:01  -  10-06 @ 07:00  --------------------------------------------------------  IN:    dextrose 5% + sodium chloride 0.45%.: 90 mL    IV PiggyBack: 400 mL    Oral Fluid: 500 mL    Solution: 125 mL  Total IN: 1115 mL    OUT:    Indwelling Catheter - Urethral: 995 mL  Total OUT: 995 mL    Total NET: 120 mL      10-06 @ 07:01  -  10-07 @ 04:24  --------------------------------------------------------  IN:    dextrose 5% + sodium chloride 0.45%.: 300 mL    IV PiggyBack: 300 mL    Oral Fluid: 720 mL  Total IN: 1320 mL    OUT:    Chest Tube: 850 mL    Indwelling Catheter - Urethral: 765 mL  Total OUT: 1615 mL    Total NET: -295 mL      10-07    139  |  104  |  39<H>  ----------------------------<  121<H>  4.9   |  26  |  1.83<H>    Ca    7.9<L>      07 Oct 2017 02:58  Phos  3.5     10-07  Mg     2.3     10-07    TPro  5.0<L>  /  Alb  1.9<L>  /  TBili  2.0<H>  /  DBili  x   /  AST  57<H>  /  ALT  37  /  AlkPhos  137<H>  10-07    [X] Torres catheter, indication: monitoring in critically ill patient  Meds: dextrose 5% + sodium chloride 0.45%. 1000 milliLiter(s) IV Continuous <Continuous>      HEMATOLOGIC  Meds: enoxaparin Injectable 90 milliGRAM(s) SubCutaneous every 12 hours    [x] VTE Prophylaxis                        7.4    15.8  )-----------( 194      ( 07 Oct 2017 02:58 )             22.5     PT/INR - ( 07 Oct 2017 02:58 )   PT: 13.9 sec;   INR: 1.27 ratio         PTT - ( 07 Oct 2017 02:58 )  PTT:32.7 sec  Transfusion     [ ] PRBC   [ ] Platelets   [ ] FFP   [ ] Cryoprecipitate      INFECTIOUS DISEASES  T(C): 37.1 (10-07-17 @ 03:00), Max: 37.4 (10-06-17 @ 19:00)  Wt(kg): --  WBC Count: 15.8 K/uL (10-07 @ 02:58)    Recent Cultures:  Specimen Source: .Body Fluid Pleural Fluid, 10-07 @ 00:46; Results --; Gram Stain:   No polymorphonuclear cells seen per low power field  No organisms seen per oil power field  by cytocentrifuge; Organism: --    Meds: meropenem IVPB      meropenem IVPB 1000 milliGRAM(s) IV Intermittent every 8 hours  micafungin IVPB      micafungin IVPB 100 milliGRAM(s) IV Intermittent every 24 hours      ENDOCRINE  Capillary Blood Glucose      ACCESS DEVICES:  [x] Peripheral IV  [x] Central Venous Line	[ ] R	[x] L	[x] IJ	[ ] Fem	[ ] SC	Placed: 9/22/2017  [ ] Arterial Line		[ ] R	[ ] L	[ ] Fem	[ ] Rad	[ ] Ax	Placed:   [ ] PICC:					[ ] Mediport  [x] Urinary Catheter, Date Placed: 9/30/2017  [ ] Necessity of urinary, arterial, and venous catheters discussed      CODE STATUS: DNR    IMAGING: x HISTORY:  62y Male initially admitted to neurosurgery for b/l leg weakness found to have newly diagnosed metastatic disease with spinal met causing spinal cord compression s/p T8-T11 laminectomy on 9/20, post-op course complicated by deterioration in respiratory status and acute onset abdominal pain requiring intubation and transfer to NSCU. CT chest/abdomen/pelvis demonstrated b/l DVTs, right upper lobe PE, and a bowel perforation with free air. Pt was taken emergently to the OR and is now s/p exploratory laparotomy, SBR x1 (left in discontinuity), abdominal washout, abthera VAC application on 9/22, after which he was transferred to SICU under ACS. He was taken back to the OR on 9/24 for a washout, primary anastomosis, and abdominal closure. Post-operative course complicated by Acute hypoxemic respiratory failure secondary to Acute Pulmonary Vascular congestion/ Left Lung Collapse.    24 HOUR EVENTS:  Left pigtail in IR today, 2L drained upon placement, discontinued dilaudid, pt comfortable with PO pain medications. Restarted lovenox.    SUBJECTIVE/ROS:  [X] A ten-point review of systems was otherwise negative except as noted.  [ ] Due to altered mental status/intubation, subjective information were not able to be obtained from the patient. History was obtained, to the extent possible, from review of the chart and collateral sources of information.    NEURO  Exam: AAOx1-2, waxing and waning mental status  Meds: oxyCODONE    IR 5 milliGRAM(s) Oral every 4 hours PRN Moderate Pain (4 - 6)  oxyCODONE    IR 10 milliGRAM(s) Oral every 4 hours PRN Severe Pain (7 - 10)    [x] Adequacy of sedation and pain control has been assessed and adjusted    RESPIRATORY  RR: 21 (07 Oct 2017 06:00) (17 - 36)  SpO2: 93% (07 Oct 2017 06:00) (93% - 100%)      Exam: unlabored, clear to auscultation bilaterally  Mechanical Ventilation:     Meds: ALBUTerol/ipratropium for Nebulization 3 milliLiter(s) Nebulizer every 6 hours    CARDIOVASCULAR  HR: 90 (07 Oct 2017 06:00) (78 - 96)  BP: 99/76 (07 Oct 2017 06:00) (97/60 - 123/76)  BP(mean): 84 (07 Oct 2017 06:00) (74 - 95)  ABP: --  ABP(mean): --        Exam: regular rate and rhythm  Cardiac Rhythm: normal sinus rhythm  Perfusion     [X]Adequate   [ ]Inadequate  Mentation   [ ]Normal       [X]Reduced  Extremities  [X]Warm         [ ]Cool  Volume Status [ ]Hypervolemic [X]Euvolemic [ ]Hypovolemic  Meds: metoprolol 25 milliGRAM(s) Oral every 12 hours  spironolactone 25 milliGRAM(s) Oral daily    GI/NUTRITION  Exam: abd S/NT/ND  Diet: regular diet  Meds: docusate sodium 100 milliGRAM(s) Oral two times a day  pantoprazole    Tablet 40 milliGRAM(s) Oral before breakfast  senna 2 Tablet(s) Oral at bedtime      GENITOURINARY  I&O's Detail    10-05 @ 07:01  -  10-06 @ 07:00  --------------------------------------------------------  IN:    dextrose 5% + sodium chloride 0.45%.: 90 mL    IV PiggyBack: 400 mL    Oral Fluid: 500 mL    Solution: 125 mL  Total IN: 1115 mL    OUT:    Indwelling Catheter - Urethral: 995 mL  Total OUT: 995 mL    Total NET: 120 mL      I&O's Detail    06 Oct 2017 07:01  -  07 Oct 2017 07:00  --------------------------------------------------------  IN:    dextrose 5% + sodium chloride 0.45%.: 500 mL    IV PiggyBack: 400 mL    Oral Fluid: 720 mL  Total IN: 1620 mL    OUT:    Chest Tube: 1100 mL    Indwelling Catheter - Urethral: 945 mL  Total OUT: 2045 mL    Total NET: -425 mL      10-07    139  |  104  |  39<H>  ----------------------------<  121<H>  4.9   |  26  |  1.83<H>    Ca    7.9<L>      07 Oct 2017 02:58  Phos  3.5     10-07  Mg     2.3     10-07    TPro  5.0<L>  /  Alb  1.9<L>  /  TBili  2.0<H>  /  DBili  x   /  AST  57<H>  /  ALT  37  /  AlkPhos  137<H>  10-07    [X] Torres catheter, indication: monitoring in critically ill patient  Meds: dextrose 5% + sodium chloride 0.45%. 1000 milliLiter(s) IV Continuous <Continuous>      HEMATOLOGIC  Meds: enoxaparin Injectable 90 milliGRAM(s) SubCutaneous every 12 hours    [x] VTE Prophylaxis                        7.4    15.8  )-----------( 194      ( 07 Oct 2017 02:58 )             22.5     PT/INR - ( 07 Oct 2017 02:58 )   PT: 13.9 sec;   INR: 1.27 ratio         PTT - ( 07 Oct 2017 02:58 )  PTT:32.7 sec  Transfusion     [ ] PRBC   [ ] Platelets   [ ] FFP   [ ] Cryoprecipitate      INFECTIOUS DISEASES  T(C): 37.1 (07 Oct 2017 03:00), Max: 37.4 (06 Oct 2017 19:00)    WBC Count: 15.8 K/uL (10-07 @ 02:58)  WBC Count: 19.0 K/uL (10-06 @ 02:38)  WBC Count: 18.1 K/uL (10-05 @ 03:21)  WBC Count: 20.0 K/uL (10-04 @ 03:40)  WBC Count: 25.3 K/uL (10-03 @ 03:12)      Recent Cultures:  Specimen Source: .Body Fluid Pleural Fluid, 10-07 @ 00:46; Results --; Gram Stain:   No polymorphonuclear cells seen per low power field  No organisms seen per oil power field  by cytocentrifuge; Organism: --    Meds: meropenem IVPB      meropenem IVPB 1000 milliGRAM(s) IV Intermittent every 8 hours  micafungin IVPB      micafungin IVPB 100 milliGRAM(s) IV Intermittent every 24 hours      ENDOCRINE  Capillary Blood Glucose      ACCESS DEVICES:  [x] Peripheral IV  [x] Central Venous Line	[ ] R	[x] L	[x] IJ	[ ] Fem	[ ] SC	Placed: 9/22/2017  [ ] Arterial Line		[ ] R	[ ] L	[ ] Fem	[ ] Rad	[ ] Ax	Placed:   [ ] PICC:					[ ] Mediport  [x] Urinary Catheter, Date Placed: 9/30/2017  [ ] Necessity of urinary, arterial, and venous catheters discussed      CODE STATUS: DNR    IMAGING: x

## 2017-10-08 NOTE — PROGRESS NOTE ADULT - SUBJECTIVE AND OBJECTIVE BOX
61yo M with no significant PMH. Consulted on 9/12 for worsening LBP associated with lower extremity weakness for past 3 weeks. Over the weekend, he progressed to the point where he was unable to ambulate. He was seen at OSH and transferred to [facility] for spine evaluation. Was found to have epidural compression T8-T11 from newly diagnosed metastatic disease to liver, bone and bowel mets. S/p laminectomy on 9/20. Hospitalization complicated by acute hypoxic respiratory failure 2/2 acute pulmonary vascular congestion, L lung collapse, b/l PE, RUL PNA. Was found to have bowel perf with free air, was taken for ex-lap on 9/22, closure on 9/24. Peritonitis subsequently - E. faecium, Klebsiella and Candida.         MEDICATIONS  (STANDING):  ALBUTerol/ipratropium for Nebulization 3 milliLiter(s) Nebulizer every 6 hours  dextrose 5% + sodium chloride 0.45%. 1000 milliLiter(s) (75 mL/Hr) IV Continuous <Continuous>  docusate sodium 100 milliGRAM(s) Oral two times a day  dronabinol 2.5 milliGRAM(s) Oral two times a day before meals  enoxaparin Injectable 80 milliGRAM(s) SubCutaneous every 12 hours  metoprolol 25 milliGRAM(s) Oral every 12 hours  pantoprazole    Tablet 40 milliGRAM(s) Oral before breakfast  senna 2 Tablet(s) Oral at bedtime    MEDICATIONS  (PRN):  acetaminophen   Tablet. 650 milliGRAM(s) Oral every 6 hours PRN Mild Pain (1 - 3)  oxyCODONE    IR 5 milliGRAM(s) Oral every 4 hours PRN Moderate Pain (4 - 6)  oxyCODONE    IR 10 milliGRAM(s) Oral every 4 hours PRN Severe Pain (7 - 10)    REVIEW OF SYSTEM:  CONSTITUTIONAL: No fever, No change in weight, No fatigue  HEAD: No headache, No dizziness, No recent trauma  EYES: No eye pain, No visual disturbances, No discharge  ENT:  No difficulty hearing, No tinnitus, No vertigo, No sinus pain, No throat pain  NECK: No pain, No stiffness  BREASTS: No pain, No masses, No nipple discharge  RESPIRATORY: No cough, No wheezing, No chills, No hemoptysis, No shortness of breath at rest or exertional shortness of breath  CARDIOVASCULAR: No chest pain, No palpitations, No dizziness, No CHF, No arrhythmia, No cardiomegaly, No leg swelling  GASTROINTESTINAL: No abdominal, No epigastric pain. No nausea, No vomiting, No hematemesis, No diarrhea, No constipation. No melena, No hematochezia. No GERD  GENITOURINARY: No dysuria, No frequency, No hematuria, No incontinence, No nocturia, No hesitancy,  SKIN: No itching, No burning, No rashes, No lesions   LYMPH NODES: No history of enlarged glands  ENDOCRINE: No heat or cold intolerance, No hair loss. No osteoporosis, No thyroid disease  MUSCULOSKELETAL: No joint pain  No muscle, back, or extremity pain      VITALS:   T(C): 36.9 (10-08-17 @ 19:00), Max: 37.1 (10-07-17 @ 23:00)  HR: 93 (10-08-17 @ 19:00) (84 - 101)  BP: 108/68 (10-08-17 @ 19:00) (87/57 - 122/74)  RR: 17 (10-08-17 @ 19:00) (14 - 28)  SpO2: 100% (10-08-17 @ 19:00) (90% - 100%)  Wt(kg): --    PHYSICAL EXAM:  GENERAL: NAD, well nourished and conversant  HEAD:  Atraumatic  EYES: EOM, PERRLA, conjunctiva pink and sclera white  ENT: No tonsillar erythema, exudates, or enlargement, moist mucous membranes, good dentition, no lesions  NECK: Supple, No JVD, normal thyroid, carotids with normal upstrokes and no bruits  CHEST/LUNG: Clear to auscultation bilaterally, No rales, rhonchi, wheezing, or rubs  HEART: Regular rate and rhythm, No murmurs, rubs, or gallops  ABDOMEN: decreased BS + distention  EXTREMITIES:, No clubbing, cyanosis, + edema.  LYMPH: No lymphadenopathy noted  SKIN: No rashes or lesions  NEURO: + LE paralysis  LABS:        CBC Full  -  ( 08 Oct 2017 04:13 )  WBC Count : 17.0 K/uL  Hemoglobin : 7.4 g/dL  Hematocrit : 23.3 %  Platelet Count - Automated : 188 K/uL  Mean Cell Volume : 97.3 fl  Mean Cell Hemoglobin : 31.1 pg  Mean Cell Hemoglobin Concentration : 31.9 gm/dL  Auto Neutrophil # : x  Auto Lymphocyte # : x  Auto Monocyte # : x  Auto Eosinophil # : x  Auto Basophil # : x  Auto Neutrophil % : x  Auto Lymphocyte % : x  Auto Monocyte % : x  Auto Eosinophil % : x  Auto Basophil % : x    10-08    138  |  102  |  40<H>  ----------------------------<  110<H>  5.1   |  25  |  2.00<H>    Ca    7.9<L>      08 Oct 2017 04:13  Phos  4.1     10-08  Mg     2.3     10-08    TPro  5.0<L>  /  Alb  1.9<L>  /  TBili  1.8<H>  /  DBili  x   /  AST  57<H>  /  ALT  34  /  AlkPhos  132<H>  10-08    LIVER FUNCTIONS - ( 08 Oct 2017 04:13 )  Alb: 1.9 g/dL / Pro: 5.0 g/dL / ALK PHOS: 132 U/L / ALT: 34 U/L RC / AST: 57 U/L / GGT: x           PT/INR - ( 08 Oct 2017 04:13 )   PT: 13.3 sec;   INR: 1.23 ratio         PTT - ( 08 Oct 2017 04:13 )  PTT:26.6 sec    CAPILLARY BLOOD GLUCOSE          RADIOLOGY & ADDITIONAL TESTS:

## 2017-10-08 NOTE — PROGRESS NOTE ADULT - ASSESSMENT
62-year-old male with newly diagnosed metastatic disease s/p T8-T11 laminectomies for resection of dorsal epidural tumor for spinal cord compression on 9/19/17. His hospital course was complicated by a right upper lobe pulmonary embolus, B/L lower extremity DVTs, and a bowel perforation, requiring two surgeries for repair with General Surgery.  - Incision in intact and dry, small eschar and small dehiscence at inferior margin. Continue care as per SICU. Woundcare for incision.

## 2017-10-08 NOTE — PROGRESS NOTE ADULT - SUBJECTIVE AND OBJECTIVE BOX
Visit Summary: Patient seen and evaluated at bedside.    Overnight Events: none    Exam:  T(C): 36.3 (10-08-17 @ 15:00), Max: 37.1 (10-07-17 @ 23:00)  HR: 93 (10-08-17 @ 19:00) (84 - 101)  BP: 108/68 (10-08-17 @ 19:00) (87/57 - 122/74)  RR: 17 (10-08-17 @ 19:00) (14 - 28)  SpO2: 100% (10-08-17 @ 19:00) (90% - 100%)  Wt(kg): --    Neurological: AOx3, Following Commands    Motor exam:          Upper extremity                         Delt     Bicep     Tricep    HG                                                 R         5/5        5/5        5/5       5/5                                               L          5/5        5/5        5/5       5/5          Lower extremity                        HF         KF        KE       DF         PF                                                  R        1/5        0/5        0/5      2/5        2/5                                               L         1/5        1/5       0/5       2/5        2/5                                                 Sensation: [x] intact to light touch  [] decreased:     No clonus    Incision is CDI, staples out, mild dehisence at lower edge of incision with black eschar                        7.4    17.0  )-----------( 188      ( 08 Oct 2017 04:13 )             23.3     10-08    138  |  102  |  40<H>  ----------------------------<  110<H>  5.1   |  25  |  2.00<H>    Ca    7.9<L>      08 Oct 2017 04:13  Phos  4.1     10-08  Mg     2.3     10-08    TPro  5.0<L>  /  Alb  1.9<L>  /  TBili  1.8<H>  /  DBili  x   /  AST  57<H>  /  ALT  34  /  AlkPhos  132<H>  10-08  PT/INR - ( 08 Oct 2017 04:13 )   PT: 13.3 sec;   INR: 1.23 ratio         PTT - ( 08 Oct 2017 04:13 )  PTT:26.6 sec

## 2017-10-08 NOTE — PROGRESS NOTE ADULT - ATTENDING COMMENTS
I saw and evaluated the patient. The patient is a 62-year-old male with newly diagnosed metastatic disease s/p T8-T11 laminectomies for resection of dorsal epidural tumor for spinal cord compression on 9/19/17. His hospital course was complicated by a right upper lobe pulmonary embolus, B/L lower extremity DVTs, and a bowel perforation, requiring two surgeries for repair with General Surgery. The patient's respiratory status appears improved s/p pigtail insertion. The patient's neurologic exam is notable for 2/5 left lower extremity weakness and 0/5 right lower extremity weakness. A small area of dehiscence is noted at the superior aspect of the patient's incision. Recommend local wound care. Consider Wound Care evaluation.

## 2017-10-08 NOTE — PROGRESS NOTE ADULT - SUBJECTIVE AND OBJECTIVE BOX
ATP Progress Note    S: No acute events overnight. Pt weaned to nasal cannula, Antibiotics discontinued. Pleural fluid culture negative.     O:  T(C): 37.1 (10-08-17 @ 03:00), Max: 37.8 (10-07-17 @ 19:00)  HR: 89 (10-08-17 @ 06:00) (86 - 99)  BP: 111/67 (10-08-17 @ 06:00) (104/76 - 122/74)  RR: 18 (10-08-17 @ 06:00) (14 - 33)  SpO2: 100% (10-08-17 @ 06:00) (94% - 100%)  Wt(kg): --    10-07 @ 07:01  -  10-08 @ 07:00  --------------------------------------------------------  IN:    dextrose 5% + sodium chloride 0.45%.: 1150 mL    Oral Fluid: 480 mL  Total IN: 1630 mL    OUT:    Chest Tube: 60 mL    Indwelling Catheter - Urethral: 810 mL  Total OUT: 870 mL    Total NET: 760 mL      PHYSICAL EXAM:  Gen: NAD  Resp: Pt on NC, no acute resp distress  Abd: Soft, NT, ND  Incision: c/d/i                          7.4    17.0  )-----------( 188      ( 08 Oct 2017 04:13 )             23.3     10-08    138  |  102  |  40<H>  ----------------------------<  110<H>  5.1   |  25  |  2.00<H>    Ca    7.9<L>      08 Oct 2017 04:13  Phos  4.1     10-08  Mg     2.3     10-08    TPro  5.0<L>  /  Alb  1.9<L>  /  TBili  1.8<H>  /  DBili  x   /  AST  57<H>  /  ALT  34  /  AlkPhos  132<H>  10-08

## 2017-10-08 NOTE — PROGRESS NOTE ADULT - ASSESSMENT
A/P: 62M admitted to neurosurgery for bilateral leg weakness found to have newly diagnosed metastatic disease s/p T8-T11 laminectomy presented today with deterioration in respiratory status and acute onset abdominal pain who was subsequently intubated. W/u revealed b/l DVTs, right upper lobe PE, and a bowel perforation.  Pt is now s/p Exploratory laparotomy, SBR x1 (left in discontinuity), abdominal washout, abthera VAC application (9/22); 1cm perforation in the small bowel 110 cm distal to the ligament of treitz. RTOR 9/24 for intestinal reconstruction and abdominal closure. Awaiting ROBF. 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 Most likely noninfected per vascular surgery. Family meeting - Pt is DNR, not DNI.    - Wean 02 as tolerated, recommend diuresis as appropriate  - c/w reg diet  - Pain control  - DVT ppx: T lovenox  - Primary Care Per ERIN Robles DDS, MD

## 2017-10-08 NOTE — PROGRESS NOTE ADULT - PROBLEM SELECTOR PLAN 1
-Left lung collapse:  extrinsic compression 2nd tumor, atelectasis. L pleural effusion  -s/p L pigtail cath-10F  L effusion=exudate, L lung with reaccumulation of pleural effusion  management of CT as per sicu, consider place to suction, may need to be flushed    for pleurx cath this week  -Duoneb q6  -Chest PT, OOB, Acapella valve, IS  keep o2 sat>=90% with 3lnc, alternate with high flow 40%/50lpm and apply qhs

## 2017-10-08 NOTE — PROGRESS NOTE ADULT - ASSESSMENT
63 y/o M with no significant PMH presents 9/19 with bilateral LE weakness/paraplegia x several weeks. 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 CT evidence of L bronchial obstruction 2nd lesion with LLL collapse, necrotic mediastinal/hilar LN, liver mets, ?adrenal mets. Hospital course c/b RUL segmental PE, bilateral soleal DVT. Further c/b SB perforation s/p SBR, primary anastomosis on 9/22, RTOR 9/24 for ex-lap, washout, re-anastomosis and abdominal closure. Abdominal cultures with E. Faecium. Extubated 9/24. Now with L lung atelectasis. 10/6 s/p L CT

## 2017-10-08 NOTE — PROGRESS NOTE ADULT - ASSESSMENT
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 Complicated by acute hypoxemic respiratory failure requiring CPAP with transition to high flow NC.    PLAN:  Neurologic: post-operative pain and neoplastic pain   - Continue PO Oxycodone PRN for pain    Respiratory: acute hypoxemic respiratory failure  - Will administer supplemental O2 to maintain SpO2 > 88%  - Encourage CPAP overnight; patient not tolerating most nights  - Duonebs for dyspnea.  - PT/ OOB to prevent atelectasis  - Continue to monitor pigtail catheter output    Cardiovascular: HTN  - Continue Metoprolol 25 mg Q12H    Gastrointestinal/Nutrition: small bowel perforation s/p SBR, anastomosis   - Con't regular diet  - Protonix 40mg daily  - Colace and Senna    Renal/Genitourinary: ALEC, resolving  - Monitor I&Os and electrolytes. Replete as needed.  - IV lock    Hematologic: b/l DVT; b/l PE  - therapeutic lovenox    Infectious Disease: high risk for infection given prolonged hospital course  - no need for antibiotics at this time, monitor for signs of infection    Disposition:  - DNR.  - To remain in SICU. Consider transfer to PCU.    ~Antoinette Avila M.D. 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 Complicated by acute hypoxemic respiratory failure requiring CPAP with transition to high flow NC.    PLAN:  Neurologic: post-operative pain and neoplastic pain   - Continue PO Oxycodone PRN for pain    Respiratory: acute hypoxemic respiratory failure  - Will administer supplemental O2 to maintain SpO2 > 88%  - Encourage CPAP overnight; patient not tolerating most nights  - Duonebs for dyspnea.  - PT/ OOB to prevent atelectasis  - Continue to monitor pigtail catheter output    Cardiovascular: HTN  - Continue Metoprolol 25 mg Q12H    Gastrointestinal/Nutrition: small bowel perforation s/p SBR, anastomosis   - Con't regular diet  - Protonix 40mg daily  - Colace and Senna    Renal/Genitourinary: ALEC  - Monitor I&Os and electrolytes. Replete as needed.  - IV lock    Hematologic: b/l DVT; b/l PE  - therapeutic lovenox    Infectious Disease: high risk for infection given prolonged hospital course  - no need for antibiotics at this time, monitor for signs of infection    Disposition:  - DNR.  - To remain in SICU. Consider transfer to PCU.    ~Antoinette Avila M.D. 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 Complicated by acute hypoxemic respiratory failure requiring CPAP with transition to high flow NC.    PLAN:  Neurologic: post-operative pain and neoplastic pain   - Continue PO Oxycodone PRN for pain    Respiratory: acute hypoxemic respiratory failure  - Will administer supplemental O2 to maintain SpO2 > 88%  - Encourage CPAP overnight; patient not tolerating most nights  - Duonebs for dyspnea.  - PT/ OOB to prevent atelectasis  - Continue to monitor pigtail catheter output    Cardiovascular: HTN  - Continue Metoprolol 25 mg Q12H    Gastrointestinal/Nutrition: small bowel perforation s/p SBR, anastomosis   - Con't regular diet  - Protonix 40mg daily  - Colace and Senna    Renal/Genitourinary: ALEC  - Monitor I&Os and electrolytes. Replete as needed.  - D5 1/2 NS @ 50    Hematologic: b/l DVT; b/l PE  - therapeutic lovenox    Infectious Disease: high risk for infection given prolonged hospital course  - no need for antibiotics at this time, monitor for signs of infection    Disposition:  - DNR.  - To remain in SICU. Consider transfer to PCU.    ~Antoinette Avila M.D. Resident

## 2017-10-08 NOTE — PROGRESS NOTE ADULT - SUBJECTIVE AND OBJECTIVE BOX
Follow-up Pulm Progress Note    No new respiratory events overnight.  o2 sat 93% on 3lnc, CT drained 60cc    Medications:  MEDICATIONS  (STANDING):  albumin human  5% IVPB 250 milliLiter(s) IV Intermittent once  ALBUTerol/ipratropium for Nebulization 3 milliLiter(s) Nebulizer every 6 hours  dextrose 5% + sodium chloride 0.45%. 1000 milliLiter(s) (75 mL/Hr) IV Continuous <Continuous>  docusate sodium 100 milliGRAM(s) Oral two times a day  dronabinol 5 milliGRAM(s) Oral every 12 hours  enoxaparin Injectable 80 milliGRAM(s) SubCutaneous every 12 hours  metoprolol 25 milliGRAM(s) Oral every 12 hours  pantoprazole    Tablet 40 milliGRAM(s) Oral before breakfast  senna 2 Tablet(s) Oral at bedtime    MEDICATIONS  (PRN):  acetaminophen   Tablet. 650 milliGRAM(s) Oral every 6 hours PRN Mild Pain (1 - 3)  oxyCODONE    IR 5 milliGRAM(s) Oral every 4 hours PRN Moderate Pain (4 - 6)  oxyCODONE    IR 10 milliGRAM(s) Oral every 4 hours PRN Severe Pain (7 - 10)          Vital Signs Last 24 Hrs  T(C): 36.3 (08 Oct 2017 11:00), Max: 37.8 (07 Oct 2017 19:00)  T(F): 97.3 (08 Oct 2017 11:00), Max: 100.1 (07 Oct 2017 19:00)  HR: 93 (08 Oct 2017 11:00) (86 - 101)  BP: 104/71 (08 Oct 2017 11:00) (87/57 - 122/74)  BP(mean): 83 (08 Oct 2017 11:00) (67 - 93)  RR: 23 (08 Oct 2017 11:00) (14 - 32)  SpO2: 99% (08 Oct 2017 11:00) (90% - 100%)          10-07 @ 07:01  -  10-08 @ 07:00  --------------------------------------------------------  IN: 1680 mL / OUT: 900 mL / NET: 780 mL          LABS:                        7.4    17.0  )-----------( 188      ( 08 Oct 2017 04:13 )             23.3     10-08    138  |  102  |  40<H>  ----------------------------<  110<H>  5.1   |  25  |  2.00<H>    Ca    7.9<L>      08 Oct 2017 04:13  Phos  4.1     10-08  Mg     2.3     10-08    TPro  5.0<L>  /  Alb  1.9<L>  /  TBili  1.8<H>  /  DBili  x   /  AST  57<H>  /  ALT  34  /  AlkPhos  132<H>  10-08          CAPILLARY BLOOD GLUCOSE        PT/INR - ( 08 Oct 2017 04:13 )   PT: 13.3 sec;   INR: 1.23 ratio         PTT - ( 08 Oct 2017 04:13 )  PTT:26.6 sec                Fluid characteristics  -- 10-06 @ 20:17  pH 7.87  LDH 1648  tprot 2.3    Cell count  Appearance Sl Bloody  Fluid type --  BF lymph 37  color Red  eosinophil --  PMN 27  Mesothelial 4  Monocyte 2  Other body cells 30      CULTURES: (if applicable)  Culture - Body Fluid with Gram Stain (10.07.17 @ 00:46)    Gram Stain:   No polymorphonuclear cells seen per low power field  No organisms seen per oil power field  by cytocentrifuge    Specimen Source: .Body Fluid Pleural Fluid    Culture Results:   No growth    Culture - Blood (09.27.17 @ 02:28)    Specimen Source: .Blood Blood-Peripheral    Culture Results:   No growth at 5 days.    Culture - Blood (09.26.17 @ 15:15)    Specimen Source: .Blood Blood-Peripheral    Culture Results:   No growth at 5 days.        -->      Physical Examination:  PULM: decreased bs on Left, no significant sputum production  CVS: S1, S2 heard    RADIOLOGY REVIEWED  CXR: 10/8-complete opacification of L lung Follow-up Pulm Progress Note    No new respiratory events overnight.  o2 sat 93% on 3lnc, CT drained 60cc    Medications:  MEDICATIONS  (STANDING):  albumin human  5% IVPB 250 milliLiter(s) IV Intermittent once  ALBUTerol/ipratropium for Nebulization 3 milliLiter(s) Nebulizer every 6 hours  dextrose 5% + sodium chloride 0.45%. 1000 milliLiter(s) (75 mL/Hr) IV Continuous <Continuous>  docusate sodium 100 milliGRAM(s) Oral two times a day  dronabinol 5 milliGRAM(s) Oral every 12 hours  enoxaparin Injectable 80 milliGRAM(s) SubCutaneous every 12 hours  metoprolol 25 milliGRAM(s) Oral every 12 hours  pantoprazole    Tablet 40 milliGRAM(s) Oral before breakfast  senna 2 Tablet(s) Oral at bedtime    MEDICATIONS  (PRN):  acetaminophen   Tablet. 650 milliGRAM(s) Oral every 6 hours PRN Mild Pain (1 - 3)  oxyCODONE    IR 5 milliGRAM(s) Oral every 4 hours PRN Moderate Pain (4 - 6)  oxyCODONE    IR 10 milliGRAM(s) Oral every 4 hours PRN Severe Pain (7 - 10)    Vital Signs Last 24 Hrs  T(C): 36.3 (08 Oct 2017 11:00), Max: 37.8 (07 Oct 2017 19:00)  T(F): 97.3 (08 Oct 2017 11:00), Max: 100.1 (07 Oct 2017 19:00)  HR: 93 (08 Oct 2017 11:00) (86 - 101)  BP: 104/71 (08 Oct 2017 11:00) (87/57 - 122/74)  BP(mean): 83 (08 Oct 2017 11:00) (67 - 93)  RR: 23 (08 Oct 2017 11:00) (14 - 32)  SpO2: 99% (08 Oct 2017 11:00) (90% - 100%)    10-07 @ 07:01  -  10-08 @ 07:00  --------------------------------------------------------  IN: 1680 mL / OUT: 900 mL / NET: 780 mL    LABS:                        7.4    17.0  )-----------( 188      ( 08 Oct 2017 04:13 )             23.3     10-08    138  |  102  |  40<H>  ----------------------------<  110<H>  5.1   |  25  |  2.00<H>    Ca    7.9<L>      08 Oct 2017 04:13  Phos  4.1     10-08  Mg     2.3     10-08    TPro  5.0<L>  /  Alb  1.9<L>  /  TBili  1.8<H>  /  DBili  x   /  AST  57<H>  /  ALT  34  /  AlkPhos  132<H>  10-08          CAPILLARY BLOOD GLUCOSE        PT/INR - ( 08 Oct 2017 04:13 )   PT: 13.3 sec;   INR: 1.23 ratio         PTT - ( 08 Oct 2017 04:13 )  PTT:26.6 sec    Fluid characteristics  -- 10-06 @ 20:17  pH 7.87  LDH 1648  tprot 2.3    Cell count  Appearance Sl Bloody  Fluid type --  BF lymph 37  color Red  eosinophil --  PMN 27  Mesothelial 4  Monocyte 2  Other body cells 30      CULTURES: (if applicable)  Culture - Body Fluid with Gram Stain (10.07.17 @ 00:46)    Gram Stain:   No polymorphonuclear cells seen per low power field  No organisms seen per oil power field  by cytocentrifuge    Specimen Source: .Body Fluid Pleural Fluid    Culture Results:   No growth    Culture - Blood (09.27.17 @ 02:28)    Specimen Source: .Blood Blood-Peripheral    Culture Results:   No growth at 5 days.    Culture - Blood (09.26.17 @ 15:15)    Specimen Source: .Blood Blood-Peripheral    Culture Results:   No growth at 5 days.        -->      Physical Examination:  PULM: decreased bs on Left, no significant sputum production  CVS: S1, S2 heard    RADIOLOGY REVIEWED  CXR: 10/8-complete opacification of L lung

## 2017-10-08 NOTE — PROGRESS NOTE ADULT - ATTENDING COMMENTS
Pt seen and examined.  Chart reviewed.  Resident note confirmed.  Pt is a 62 year old male admitted to neurosurgery for bilateral leg weakness found to have newly diagnosed metastatic disease.  Pt is s/p T8-T11 laminectomy with subsequent deterioration in respiratory status/acute onset abdominal pain.  Work up revealed bilateral DVTs, right upper lobe PE, and small intestinal perforation.  Pt is s/p Exploratory laparotomy, SBR x1 (left in discontinuity), abdominal washout, abthera VAC application (9/22) with intraop findings of an intestinal perforation and a large amount of succus in the abdomen. Pt s/p return to OR on 9/24 for intestinal reconstruction and abdominal closure.  No acute events overnight.  He remains extubated.  His mental and respiratory status are stable. His creatinine is trending up and is c/w ALEC 3.  He has been afebrile, with decreasing WBC and procalcitonin.    Current management includes:    1) Right segmental and subsegmental right upper lobe PE.  Left sided lung collapse and atelectasis-improved; patient has been on hiflow O2. Compressive atelectasis of left lung secondary to a malignant effusion     CXR- with improved pleural effusion, s/p pigtail placement.    -continue lovenox    -continue incentive spirometer and acapella and chest PT    -keep SpO2 < 92%    -flush pleural drain and return to suction.    -Thoracic follow up.      2) leukocytosis      - course of abx completed  - culture for fever.    3) s/p  Intestinal resection with tumor as cause of perforation.  Metastatic disease noted in liver.  -regular diet  -add marinol.    4) hyperbilirubinemia- improved   -continue to monitor    5) worsening ALEC  -Monitor I's and O's.  -adjust meds for GFR  -consider renal US    Pt is DNR.

## 2017-10-08 NOTE — PROGRESS NOTE ADULT - SUBJECTIVE AND OBJECTIVE BOX
HISTORY:  62y Male initially admitted to neurosurgery for b/l leg weakness found to have newly diagnosed metastatic disease with spinal met causing spinal cord compression s/p T8-T11 laminectomy on 9/20, post-op course complicated by deterioration in respiratory status and acute onset abdominal pain requiring intubation and transfer to NSCU. CT chest/abdomen/pelvis demonstrated b/l DVTs, right upper lobe PE, and a bowel perforation with free air. Pt was taken emergently to the OR and is now s/p exploratory laparotomy, SBR x1 (left in discontinuity), abdominal washout, abthera VAC application on 9/22, after which he was transferred to SICU under ACS. He was taken back to the OR on 9/24 for a washout, primary anastomosis, and abdominal closure. Post-operative course complicated by Acute hypoxemic respiratory failure secondary to Acute Pulmonary Vascular congestion/ Left Lung Collapse.    24 HOUR EVENTS; Pt weaned to nasal cannula, pigtail output about 60 cc for day shift, plan to reconsult CT surgery if output is substantial warranting a pleurX. Antibiotics discontinued, random vancomycin level: 11.2. Pleural fluid culture negative. HISTORY:  62y Male initially admitted to neurosurgery for b/l leg weakness found to have newly diagnosed metastatic disease with spinal met causing spinal cord compression s/p T8-T11 laminectomy on 9/20, post-op course complicated by deterioration in respiratory status and acute onset abdominal pain requiring intubation and transfer to NSCU. CT chest/abdomen/pelvis demonstrated b/l DVTs, right upper lobe PE, and a bowel perforation with free air. Pt was taken emergently to the OR and is now s/p exploratory laparotomy, SBR x1 (left in discontinuity), abdominal washout, abthera VAC application on 9/22, after which he was transferred to SICU under ACS. He was taken back to the OR on 9/24 for a washout, primary anastomosis, and abdominal closure. Post-operative course complicated by Acute hypoxemic respiratory failure secondary to Acute Pulmonary Vascular congestion/ Left Lung Collapse.    24 HOUR EVENTS; Pt weaned to nasal cannula, pigtail output about 60 cc for day shift, plan to reconsult CT surgery if output is substantial warranting a pleurX. Antibiotics discontinued, random vancomycin level: 11.2. Pleural fluid culture negative.     SUBJECTIVE/ROS:  [X] A ten-point review of systems was otherwise negative except as noted.  [ ] Due to altered mental status/intubation, subjective information were not able to be obtained from the patient. History was obtained, to the extent possible, from review of the chart and collateral sources of information. Worsening ALEC 10/8 (Cr: 2.00 from 1.88).      NEURO  Exam: waxing and waning mental status, AAO x 2-3  Meds: oxyCODONE    IR 5 milliGRAM(s) Oral every 4 hours PRN Moderate Pain (4 - 6)  oxyCODONE    IR 10 milliGRAM(s) Oral every 4 hours PRN Severe Pain (7 - 10)    [x] Adequacy of sedation and pain control has been assessed and adjusted      RESPIRATORY  RR: 15 (10-08-17 @ 04:00) (14 - 33)  SpO2: 99% (10-08-17 @ 04:00) (93% - 100%)  Wt(kg): --  Exam: unlabored, clear to auscultation bilaterally  Mechanical Ventilation: N/A    [N/A] Extubation Readiness Assessed  Meds: ALBUTerol/ipratropium for Nebulization 3 milliLiter(s) Nebulizer every 6 hours      CARDIOVASCULAR  HR: 86 (10-08-17 @ 04:00) (84 - 97)  BP: 122/74 (10-08-17 @ 04:00) (99/76 - 122/74)  BP(mean): 93 (10-08-17 @ 04:00) (77 - 93)  ABP: --  ABP(mean): --  Wt(kg): --  CVP(cm H2O): --      Exam: regular rate and rhythm  Cardiac Rhythm: normal sinus rhythm  Perfusion     [X]Adequate   [ ]Inadequate  Mentation   [X]Normal       [ ]Reduced  Extremities  [X]Warm         [ ]Cool  Volume Status [ ]Hypervolemic [X]Euvolemic [ ]Hypovolemic  Meds: metoprolol 25 milliGRAM(s) Oral every 12 hours  spironolactone 25 milliGRAM(s) Oral daily        GI/NUTRITION  Exam: abd S/NT/ND  Diet:  Meds: docusate sodium 100 milliGRAM(s) Oral two times a day  pantoprazole    Tablet 40 milliGRAM(s) Oral before breakfast  senna 2 Tablet(s) Oral at bedtime      GENITOURINARY  I&O's Detail    10-06 @ 07:01  -  10-07 @ 07:00  --------------------------------------------------------  IN:    dextrose 5% + sodium chloride 0.45%.: 500 mL    IV PiggyBack: 400 mL    Oral Fluid: 720 mL  Total IN: 1620 mL    OUT:    Chest Tube: 1100 mL    Indwelling Catheter - Urethral: 945 mL  Total OUT: 2045 mL    Total NET: -425 mL      10-07 @ 07:01  -  10-08 @ 04:38  --------------------------------------------------------  IN:    dextrose 5% + sodium chloride 0.45%.: 1050 mL    Oral Fluid: 480 mL  Total IN: 1530 mL    OUT:    Chest Tube: 60 mL    Indwelling Catheter - Urethral: 750 mL  Total OUT: 810 mL    Total NET: 720 mL          10-08    138  |  102  |  40<H>  ----------------------------<  110<H>  5.1   |  25  |  2.00<H>    Ca    7.9<L>      08 Oct 2017 04:13  Phos  4.1     10-08  Mg     2.3     10-08    TPro  5.0<L>  /  Alb  1.9<L>  /  TBili  1.8<H>  /  DBili  x   /  AST  57<H>  /  ALT  34  /  AlkPhos  132<H>  10-08    [X] Torres catheter, indication: monitoring in the critically ill  Meds: dextrose 5% + sodium chloride 0.45%. 1000 milliLiter(s) IV Continuous <Continuous>      HEMATOLOGIC  Meds: enoxaparin Injectable 80 milliGRAM(s) SubCutaneous every 12 hours    [x] VTE Prophylaxis                        7.4    17.0  )-----------( 188      ( 08 Oct 2017 04:13 )             23.3     PT/INR - ( 07 Oct 2017 02:58 )   PT: 13.9 sec;   INR: 1.27 ratio         PTT - ( 07 Oct 2017 02:58 )  PTT:32.7 sec  Transfusion     [ ] PRBC   [ ] Platelets   [ ] FFP   [ ] Cryoprecipitate      INFECTIOUS DISEASES  T(C): 37.1 (10-08-17 @ 03:00), Max: 37.8 (10-07-17 @ 19:00)  Wt(kg): --  WBC Count: 17.0 K/uL (10-08 @ 04:13)    Recent Cultures:  Specimen Source: .Body Fluid Pleural Fluid, 10-07 @ 00:46; Results   No growth; Gram Stain:   No polymorphonuclear cells seen per low power field  No organisms seen per oil power field  by cytocentrifuge; Organism: --      ENDOCRINE  Capillary Blood Glucose      ACCESS DEVICES:  [x] Peripheral IV  [x] Central Venous Line	[ ] R	[x] L	[x] IJ	[ ] Fem	[ ] SC	Placed: 9/22/2017  [ ] Arterial Line		[ ] R	[ ] L	[ ] Fem	[ ] Rad	[ ] Ax	Placed:   [ ] PICC:					[ ] Mediport  [x] Urinary Catheter, Date Placed: 9/30/2017  [ ] Necessity of urinary, arterial, and venous catheters discussed    CODE STATUS: DNR      IMAGING: x HISTORY:  62y Male initially admitted to neurosurgery for b/l leg weakness found to have newly diagnosed metastatic disease with spinal met causing spinal cord compression s/p T8-T11 laminectomy on 9/20, post-op course complicated by deterioration in respiratory status and acute onset abdominal pain requiring intubation and transfer to NSCU. CT chest/abdomen/pelvis demonstrated b/l DVTs, right upper lobe PE, and a bowel perforation with free air. Pt was taken emergently to the OR and is now s/p exploratory laparotomy, SBR x1 (left in discontinuity), abdominal washout, abthera VAC application on 9/22, after which he was transferred to SICU under ACS. He was taken back to the OR on 9/24 for a washout, primary anastomosis, and abdominal closure. Post-operative course complicated by Acute hypoxemic respiratory failure secondary to Acute Pulmonary Vascular congestion/ Left Lung Collapse.    24 HOUR EVENTS; Pt weaned to nasal cannula, pigtail output about 60 cc for day shift, plan to reconsult CT surgery if output is substantial warranting a pleurX. Antibiotics discontinued, random vancomycin level: 11.2. Pleural fluid culture negative.     SUBJECTIVE/ROS:  [X] A ten-point review of systems was otherwise negative except as noted.  [ ] Due to altered mental status/intubation, subjective information were not able to be obtained from the patient. History was obtained, to the extent possible, from review of the chart and collateral sources of information. Worsening ALEC 10/8 (Cr: 2.00 from 1.88).      NEURO  Exam: waxing and waning mental status, AAO x 2-3  Meds: oxyCODONE    IR 5 milliGRAM(s) Oral every 4 hours PRN Moderate Pain (4 - 6)  oxyCODONE    IR 10 milliGRAM(s) Oral every 4 hours PRN Severe Pain (7 - 10)    [x] Adequacy of sedation and pain control has been assessed and adjusted      RESPIRATORY  RR: 15 (10-08-17 @ 04:00) (14 - 33)  SpO2: 99% (10-08-17 @ 04:00) (93% - 100%)  Wt(kg): --  Exam: unlabored, clear to auscultation bilaterally  Mechanical Ventilation: N/A    [N/A] Extubation Readiness Assessed  Meds: ALBUTerol/ipratropium for Nebulization 3 milliLiter(s) Nebulizer every 6 hours      CARDIOVASCULAR  HR: 86 (10-08-17 @ 04:00) (84 - 97)  BP: 122/74 (10-08-17 @ 04:00) (99/76 - 122/74)  BP(mean): 93 (10-08-17 @ 04:00) (77 - 93)  ABP: --  ABP(mean): --  Wt(kg): --  CVP(cm H2O): --      Exam: regular rate and rhythm  Cardiac Rhythm: normal sinus rhythm  Perfusion     [X]Adequate   [ ]Inadequate  Mentation   [X]Normal       [ ]Reduced  Extremities  [X]Warm         [ ]Cool  Volume Status [ ]Hypervolemic [X]Euvolemic [ ]Hypovolemic  Meds: metoprolol 25 milliGRAM(s) Oral every 12 hours  spironolactone 25 milliGRAM(s) Oral daily        GI/NUTRITION  Exam: abd S/NT/ND  Diet:  Meds: docusate sodium 100 milliGRAM(s) Oral two times a day  pantoprazole    Tablet 40 milliGRAM(s) Oral before breakfast  senna 2 Tablet(s) Oral at bedtime      GENITOURINARY  I&O's Detail    07 Oct 2017 07:01  -  08 Oct 2017 07:00  --------------------------------------------------------  IN:    dextrose 5% + sodium chloride 0.45%.: 1150 mL    Oral Fluid: 480 mL  Total IN: 1630 mL    OUT:    Chest Tube: 60 mL    Indwelling Catheter - Urethral: 810 mL  Total OUT: 870 mL    Total NET: 760 mL                10-08    138  |  102  |  40<H>  ----------------------------<  110<H>  5.1   |  25  |  2.00<H>    Ca    7.9<L>      08 Oct 2017 04:13  Phos  4.1     10-08  Mg     2.3     10-08    TPro  5.0<L>  /  Alb  1.9<L>  /  TBili  1.8<H>  /  DBili  x   /  AST  57<H>  /  ALT  34  /  AlkPhos  132<H>  10-08    [X] Torres catheter, indication: monitoring in the critically ill  Meds: dextrose 5% + sodium chloride 0.45%. 1000 milliLiter(s) IV Continuous <Continuous>      HEMATOLOGIC  Meds: enoxaparin Injectable 80 milliGRAM(s) SubCutaneous every 12 hours    [x] VTE Prophylaxis                        7.4    17.0  )-----------( 188      ( 08 Oct 2017 04:13 )             23.3     PT/INR - ( 07 Oct 2017 02:58 )   PT: 13.9 sec;   INR: 1.27 ratio         PTT - ( 07 Oct 2017 02:58 )  PTT:32.7 sec  Transfusion     [ ] PRBC   [ ] Platelets   [ ] FFP   [ ] Cryoprecipitate      INFECTIOUS DISEASES  T(C): 37.1 (10-08-17 @ 03:00), Max: 37.8 (10-07-17 @ 19:00)  Wt(kg): --  WBC Count: 17.0 K/uL (10-08 @ 04:13)    Recent Cultures:  Specimen Source: .Body Fluid Pleural Fluid, 10-07 @ 00:46; Results   No growth; Gram Stain:   No polymorphonuclear cells seen per low power field  No organisms seen per oil power field  by cytocentrifuge; Organism: --      ENDOCRINE  Capillary Blood Glucose      ACCESS DEVICES:  [x] Peripheral IV  [x] Central Venous Line	[ ] R	[x] L	[x] IJ	[ ] Fem	[ ] SC	Placed: 9/22/2017  [ ] Arterial Line		[ ] R	[ ] L	[ ] Fem	[ ] Rad	[ ] Ax	Placed:   [ ] PICC:					[ ] Mediport  [x] Urinary Catheter, Date Placed: 9/30/2017  [ ] Necessity of urinary, arterial, and venous catheters discussed    CODE STATUS: DNR      IMAGING: x

## 2017-10-09 NOTE — PROGRESS NOTE ADULT - SUBJECTIVE AND OBJECTIVE BOX
Oncology Follow-up    INTERVAL HPI/OVERNIGHT EVENTS:  Patient S&E at bedside. Patient's Mental status waxes and waning. No complaints when we saw him.     VITAL SIGNS:  T(F): 98.1 (10-09-17 @ 15:00)  HR: 87 (10-09-17 @ 16:00)  BP: 94/54 (10-09-17 @ 16:00)  RR: 10 (10-09-17 @ 16:00)  SpO2: 100% (10-09-17 @ 16:00)  Wt(kg): --    PHYSICAL EXAM:    Constitutional: cachectic, sleepy but able to answer questions. Ox3 when he is awake .    Eyes: PERRL, EOMI, sclera non-icteric  Neck: supple, no masses, no JVD  Respiratory: mild crackles in the bases BL   Cardiovascular: RRR, normal S1S2, no M/R/G  Gastrointestinal: soft, NTND, edema in the abdomen   Extremities:  2+ edema  Skin: Jaundice     MEDICATIONS  (STANDING):  ALBUTerol/ipratropium for Nebulization 3 milliLiter(s) Nebulizer every 6 hours  dextrose 5% + sodium chloride 0.45%. 1000 milliLiter(s) (100 mL/Hr) IV Continuous <Continuous>  docusate sodium 100 milliGRAM(s) Oral two times a day  dronabinol 2.5 milliGRAM(s) Oral two times a day before meals  enoxaparin Injectable 80 milliGRAM(s) SubCutaneous every 12 hours  fentaNYL   Patch  25 MICROgram(s)/Hr 1 Patch Transdermal every 72 hours  gabapentin 200 milliGRAM(s) Oral every 12 hours  metoprolol 25 milliGRAM(s) Oral every 12 hours  pantoprazole    Tablet 40 milliGRAM(s) Oral before breakfast  senna 2 Tablet(s) Oral at bedtime    MEDICATIONS  (PRN):  acetaminophen   Tablet. 650 milliGRAM(s) Oral every 6 hours PRN Mild Pain (1 - 3)  oxyCODONE    IR 5 milliGRAM(s) Oral every 4 hours PRN Moderate Pain (4 - 6)  oxyCODONE    IR 10 milliGRAM(s) Oral every 4 hours PRN Severe Pain (7 - 10)      No Known Allergies      LABS:                        7.2    16.3  )-----------( 193      ( 09 Oct 2017 02:51 )             22.2     10-09    137  |  100  |  44<H>  ----------------------------<  105<H>  5.6<H>   |  25  |  2.47<H>    Ca    8.3<L>      09 Oct 2017 12:17  Phos  5.2     10-09  Mg     2.5     10-09    TPro  5.0<L>  /  Alb  1.7<L>  /  TBili  2.2<H>  /  DBili  1.0<H>  /  AST  79<H>  /  ALT  30  /  AlkPhos  138<H>  10-09    PT/INR - ( 08 Oct 2017 04:13 )   PT: 13.3 sec;   INR: 1.23 ratio         PTT - ( 08 Oct 2017 04:13 )  PTT:26.6 sec       RADIOLOGY & ADDITIONAL TESTS:  Studies reviewed.

## 2017-10-09 NOTE — PROGRESS NOTE ADULT - ASSESSMENT
62M admitted with cord compression (t9 to t11 mets with epidural extension on MRI), S/P laminectomy, diagnosed as adenocarcinoma. 9/22/17 he was found to have small bowel perforation due to ischemia, or infection, and was taken to the or, for exlap- SBR, abd wash out, 10 cm resection and went on 24th again- abdominal washout, enteroenterostomy and abdominal closure. In SICU for management.  His hospital course was also complicated by a right upper lobe pulmonary embolus, B/L lower extremity DVTs.  Elevated fungitell.  CT with lung consolidation, ascites, IVC thrombus, PE.  Covered broadly for peritonitis and fungal infection.  Abdominal fluid with E Faecium (amp resistant) and Klebsiella and candida  On Vanco/Meropenem, micafungin.  Slowly improving leukocytosis.  For IR drainage of L pleural space.  Pt completed 14 days antibiotics from last surgery and washout    stable off antibiotics  pleural culture NGTD  neurosurgery following wound    - watch closely for s/sx of infection    ID will follow the patient PRN. Please recontact ID if we can be of further assistance . [phone]

## 2017-10-09 NOTE — PROGRESS NOTE ADULT - ASSESSMENT
A/P: 62M admitted to neurosurgery for bilateral leg weakness found to have newly diagnosed metastatic disease s/p T8-T11 laminectomy presented today with deterioration in respiratory status and acute onset abdominal pain who was subsequently intubated. W/u revealed b/l DVTs, right upper lobe PE, and a bowel perforation.  Pt is now s/p Exploratory laparotomy, SBR x1 (left in discontinuity), abdominal washout, abthera VAC application (9/22); 1cm perforation in the small bowel 110 cm distal to the ligament of treitz. RTOR 9/24 for intestinal reconstruction and abdominal closure. Awaiting ROBF. 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 Most likely noninfected per vascular surgery. Family meeting - Pt is DNR, not DNI.    - F/U Thoracic surgery  - Wean 02 as tolerated, recommend diuresis as appropriate  - c/w reg diet  - Pain control  - DVT ppx: T lovenox  - Primary Care Per SICU    1271

## 2017-10-09 NOTE — ADVANCED PRACTICE NURSE CONSULT - ASSESSMENT
Patient is on a [employer] low airloss surface and is being turned and positioned as per the nursing documentation.  He does attempt to help turn self when asked.  He had not eaten much of his breakfast this morning-not hungry.  On his surgical site mid back a small wound measuring approximately 1.0 cm X 1.0 cm X 0 cm was noted.  The wound base has darkened eschar that is drya nd stable at present.  No drainage nor odor was noted.  The periwound skin was dry and was recommended to cover wound site with Cavilon daily  This will provide a clear protective coating over the wound without usage of adhesives.   On his sacral area slight redness was identified and a Allevyn gentle foam dressing was applied to cover and protect the skin.  RN at bedside during assessment and aware of findings and recommendations

## 2017-10-09 NOTE — CONSULT NOTE ADULT - SUBJECTIVE AND OBJECTIVE BOX
Wound SURGERY CONSULT NOTE    FROM:   FOR:   Reason for Consult:    HPI:  62M with no significant PMH p/w worsening LBP associated with lower extremity  weakness for past 3 weeks. he progressed to the point where he was unable to ambulate. He denies incontinence, saddle anesthesia, but complaining of significant weakness in legs, stating they "feel like jelly." He was seen at OSH and transferred to [facility] for spine evaluation. He denies fevers, history of cancer, trauma. (19 Sep 2017 09:44). Since admission he has undergone emergency spinal decompression , and an exploratory laparotomy for perforated viscus, both of which confirm , metastatic lung cancer. Consult requested to evaluate spine wound.      PAST MEDICAL & SURGICAL HISTORY:      REVIEW OF SYSTEMS      General	    Skin/Breast:  	  Ophthalmologic:  	      Respiratory: recent h/o dry , hacking cough  	  Cardiovascular:	    Gastrointestinal:	    Genitourinary:	    Musculoskeletal:	 bilat leg weakness    Neurological: 	    Psychiatric:	    Hematology/Lymphatics:	    Endocrine:	    Allergic/Immunologic:	    MEDICATIONS  (STANDING):  ALBUTerol/ipratropium for Nebulization 3 milliLiter(s) Nebulizer every 6 hours  dextrose 5% + sodium chloride 0.45%. 1000 milliLiter(s) (100 mL/Hr) IV Continuous <Continuous>  docusate sodium 100 milliGRAM(s) Oral two times a day  dronabinol 2.5 milliGRAM(s) Oral two times a day before meals  enoxaparin Injectable 80 milliGRAM(s) SubCutaneous every 12 hours  fentaNYL   Patch  25 MICROgram(s)/Hr 1 Patch Transdermal every 72 hours  gabapentin 200 milliGRAM(s) Oral every 12 hours  metoprolol 25 milliGRAM(s) Oral every 12 hours  pantoprazole    Tablet 40 milliGRAM(s) Oral before breakfast  senna 2 Tablet(s) Oral at bedtime    MEDICATIONS  (PRN):  acetaminophen   Tablet. 650 milliGRAM(s) Oral every 6 hours PRN Mild Pain (1 - 3)  oxyCODONE    IR 5 milliGRAM(s) Oral every 4 hours PRN Moderate Pain (4 - 6)  oxyCODONE    IR 10 milliGRAM(s) Oral every 4 hours PRN Severe Pain (7 - 10)      Allergies    No Known Allergies    Intolerances        SOCIAL HISTORY: Many year smoker    FAMILY HISTORY:      Vital Signs Last 24 Hrs  T(C): 36.7 (09 Oct 2017 15:00), Max: 36.9 (08 Oct 2017 19:00)  T(F): 98.1 (09 Oct 2017 15:00), Max: 98.4 (08 Oct 2017 19:00)  HR: 90 (09 Oct 2017 17:14) (82 - 96)  BP: 108/62 (09 Oct 2017 17:00) (85/55 - 122/70)  BP(mean): 79 (09 Oct 2017 17:00) (65 - 91)  RR: 12 (09 Oct 2017 17:00) (10 - 27)  SpO2: 100% (09 Oct 2017 17:14) (92% - 100%)    PHYSICAL EXAM:      Constitutional: At bedrest , using nasal O2    Eyes: no jaundice    ENMT: Atraumatic    Neck:    Breasts:    Back:    Respiratory: Pleur-evac in place    Cardiovascular:    Gastrointestinal: Marginal ability to take PO    Genitourinary:    Rectal:    Extremities:    Vascular:    Neurological: Awake, answers most questions    Skin:    Lymph Nodes:    Musculoskeletal: At bedrest    Dorsal Spinal surgical wound is well healed with all staples having already been removed. No CSF drainage from wound. Approx.  0.5 cm. superficial eschar distal most aspect of wound, without fluctuance or cellulitis. Cavilon applied. Findings d/w patient,  who expressed his relie    LABS:                        7.2    16.3  )-----------( 193      ( 09 Oct 2017 02:51 )             22.2     10-09    137  |  100  |  44<H>  ----------------------------<  105<H>  5.6<H>   |  25  |  2.47<H>    Ca    8.3<L>      09 Oct 2017 12:17  Phos  5.2     10-09  Mg     2.5     10-09    TPro  5.0<L>  /  Alb  1.7<L>  /  TBili  2.2<H>  /  DBili  1.0<H>  /  AST  79<H>  /  ALT  30  /  AlkPhos  138<H>  10-09    PT/INR - ( 08 Oct 2017 04:13 )   PT: 13.3 sec;   INR: 1.23 ratio         PTT - ( 08 Oct 2017 04:13 )  PTT:26.6 sec      Albumin, Serum: 1.7 g/dL (10-09 @ 02:51)  Albumin, Serum: 1.9 g/dL (10-08 @ 04:13)  Albumin, Serum: 1.9 g/dL (10-07 @ 02:58)  Albumin, Serum: 2.2 g/dL (10-06 @ 02:38)  Albumin, Serum: 2.1 g/dL (10-05 @ 03:20)  Albumin, Serum: 2.2 g/dL (10-04 @ 02:45)  Albumin, Serum: 2.4 g/dL (10-03 @ 03:12)      HgA1c      RADIOLOGY & ADDITIONAL STUDIES:  Cultures:    Per PMD, f/u as outpatient at [facility] [address] [facility] [phone]

## 2017-10-09 NOTE — PROGRESS NOTE ADULT - ATTENDING COMMENTS
I saw and evaluated the patient. The patient is a 62-year-old male with newly diagnosed metastatic disease s/p T8-T11 laminectomies for resection of dorsal epidural tumor for spinal cord compression on 9/19/17. His hospital course was complicated by a right upper lobe pulmonary embolus, B/L lower extremity DVTs, and a bowel perforation, requiring two surgeries for repair with General Surgery. The patient's respiratory status appears improved s/p pigtail insertion. The patient is currently neurologically stable with B/L lower extremity paraparesis. A small area of dehiscence is noted at the superior aspect of the patient's incision. Recommend local wound care. Consider Wound Care evaluation.

## 2017-10-09 NOTE — PROGRESS NOTE ADULT - ATTENDING COMMENTS
Add fentanyl patch and Neurontin for better pain control  Worsening of lt effusion, may need re evaluation with CT chest and  large bore chest tube  Worsening of Samantha, clinically not in fluid overload, will increase IVF rate  Hyperkalemia ?hemolyzed, will repeat  Therapeutic lovenox for DVT/PE, stable thrombocytopenia  Overall prognosis poor

## 2017-10-09 NOTE — PROGRESS NOTE ADULT - PROBLEM SELECTOR PLAN 1
-Left lung atelectasis- extrinsic compression 2nd tumor, atelectasis. L pleural effusion  -s/p L pigtail cath, exudative effusion  -c/w L CT to LWS, ?pleurex   -Duoneb q6  -Chest PT, OOB, Acapella valve, IS

## 2017-10-09 NOTE — PROVIDER CONTACT NOTE (EICU) - BACKGROUND
s/p laminectomy T8-T11 tumor decompressed.
s/p ex lap on 9/20 T8-T11 laminectomy and decompression of tumor.
s/p laminectomy T8-T11 spinal tumor decompression.

## 2017-10-09 NOTE — ADVANCED PRACTICE NURSE CONSULT - RECOMMEDATIONS
1.  Cavilon to small wound at base of surgical site on back.  Apply daily and prn  2.  Allevyn gentle foam dressing to sacrum and change every 3 days and prn  3.  Monitor nutritional intake.  4.  Continue with turning and positioning.  Remain available as requested by staff

## 2017-10-09 NOTE — CONSULT NOTE ADULT - PROVIDER SPECIALTY LIST ADULT
Heme/Onc
Infectious Disease
Internal Medicine
Internal Medicine
Neurosurgery
Pulmonology
Rehab Medicine
SICU
Surgery
Thoracic Surgery
Vascular Surgery
Wound Care
Gastroenterology
Nephrology
Palliative Care

## 2017-10-09 NOTE — CONSULT NOTE ADULT - CONSULT REQUESTED BY NAME
Andres Rose
Dr Rose
Dr Rose
Dr. Maddy Masters
Dr. Padilla
Dr. Singletary
Dre
ED
Neurosurgery
Neurosurgery
selma
MD Milton
Dr. Rose
Primary team

## 2017-10-09 NOTE — PROGRESS NOTE ADULT - SUBJECTIVE AND OBJECTIVE BOX
Patient is a 62y old  Male who presents with a chief complaint of cord compression (19 Sep 2017 09:44)    Being followed by ID for antibioitc management    Recent events reviewed  remains afebrile off antibioitcs  pt currently  sleeping and cannot give ROS        PAST MEDICAL & SURGICAL HISTORY:      Antimicrobials:      MEDICATIONS  (STANDING):  ALBUTerol/ipratropium for Nebulization 3 milliLiter(s) Nebulizer every 6 hours  dextrose 5% + sodium chloride 0.45%. 1000 milliLiter(s) (100 mL/Hr) IV Continuous <Continuous>  docusate sodium 100 milliGRAM(s) Oral two times a day  dronabinol 2.5 milliGRAM(s) Oral two times a day before meals  enoxaparin Injectable 80 milliGRAM(s) SubCutaneous every 12 hours  fentaNYL   Patch  25 MICROgram(s)/Hr 1 Patch Transdermal every 72 hours  metoprolol 25 milliGRAM(s) Oral every 12 hours  pantoprazole    Tablet 40 milliGRAM(s) Oral before breakfast  senna 2 Tablet(s) Oral at bedtime      Vital Signs Last 24 Hrs  T(C): 36.3 (10-09-17 @ 07:00), Max: 36.9 (10-08-17 @ 19:00)  T(F): 97.4 (10-09-17 @ 07:00), Max: 98.4 (10-08-17 @ 19:00)  HR: 85 (10-09-17 @ 10:00) (84 - 96)  BP: 96/58 (10-09-17 @ 10:00) (90/54 - 122/70)  BP(mean): 71 (10-09-17 @ 10:00) (65 - 91)  RR: 17 (10-09-17 @ 10:00) (15 - 27)  SpO2: 98% (10-09-17 @ 10:00) (94% - 100%)    Physical Exam:    Constitutional  sleeping  HEENT- wearing O2  lungs -celar  Cor- S1S2  abd- wound with serous discharge , abd- soflty distended  ext- edema- minimal mvmt LLE per nurse        Lab Data:                          7.2    16.3  )-----------( 193      ( 09 Oct 2017 02:51 )             22.2       10-09    135  |  101  |  42<H>  ----------------------------<  108<H>  6.1<H>   |  22  |  2.24<H>    Ca    7.8<L>      09 Oct 2017 02:51  Phos  4.4     10-09  Mg     2.4     10-09    TPro  5.0<L>  /  Alb  1.7<L>  /  TBili  2.2<H>  /  DBili  1.0<H>  /  AST  79<H>  /  ALT  30  /  AlkPhos  138<H>  10-09          .Body Fluid Pleural Fluid  10-07-17   No growth  --    No polymorphonuclear cells seen per low power field  No organisms seen per oil power field  by cytocentrifuge    Culture - Body Fluid with Gram Stain (09.23.17 @ 00:35)    Gram Stain:   polymorphonuclear leukocytes seen  Gram Positive Cocci in Pairs and Chains seen  Gram Variable Rods seen  by cytocentrifuge    -  Amikacin: S <=16    -  Ampicillin: R >8    -  Ampicillin: R >16    -  Ampicillin/Sulbactam: S <=8/4    -  Aztreonam: S <=4    -  Cefazolin: S <=8    -  Cefepime: S <=4    -  Cefoxitin: S <=8    -  Ceftazidime: S <=1    -  Ceftriaxone: S <=1    -  Ciprofloxacin: I 2    -  Ciprofloxacin: S <=1    -  Ertapenem: S <=1    -  Erythromycin: I 4    -  Gentamicin: S <=4    -  Imipenem: S <=1    -  Levofloxacin: S <=2    -  Meropenem: S <=1    -  Piperacillin/Tazobactam: S <=16    -  Tetra/Doxy: R >8    -  Tobramycin: S <=4    -  Trimethoprim/Sulfamethoxazole: S <=2/38    -  Vancomycin: S 1    Specimen Source: Peritoneal 2 peritoneal fluid for culture    Culture Results:   Numerous Enterococcus faecium  Few Candida albicans  Growth in fluid media only Klebsiella pneumoniae    Organism Identification: Enterococcus faecium  Klebsiella pneumoniae    Organism: Enterococcus faecium    Organism: Klebsiella pneumoniae    Method Type: CARLY    Method Type: CARLY                Vancomycin Level, Random: 11.2 ug/mL (10-07-17 @ 14:57)

## 2017-10-09 NOTE — CONSULT NOTE ADULT - CONSULT REQUESTED DATE/TIME
04-Oct-2017 13:44
09-Oct-2017 17:43
18-Sep-2017 21:55
19-Sep-2017 21:11
19-Sep-2017 21:23
20-Sep-2017 14:03
22-Sep-2017 10:03
22-Sep-2017 19:11
25-Sep-2017 14:00
26-Sep-2017 15:09
29-Sep-2017 12:42
29-Sep-2017 16:50
21-Sep-2017 12:54
22-Sep-2017 13:47
02-Oct-2017 14:03

## 2017-10-09 NOTE — PROGRESS NOTE ADULT - PROBLEM SELECTOR PLAN 1
Metastatic non small cell lung cancer, with pending molecular studies. Given his current clinical status, patient is not a candidate for chemotherapy at this point. Family meeting a week ago, about his guarded prognosis, now DNR/DNI. Currently able to tolerate NC on stable 3L, after pigtail was placed. Bili is trending down, but new ALEC with worsening urine output. At some point, patient will need a brain MRI with contrast, but given his new ALEC; we will differ for now . Metastatic non small cell lung cancer, with pending molecular studies. Given his current clinical status, patient is not a candidate for chemotherapy at this point. Family meeting a week ago, about his guarded prognosis, now DNR/DNI. Currently able to tolerate NC on stable 3L, after pigtail was placed. Bili is trending down, but new ALEC with worsening urine output. At some point, patient will need a brain MRI with contrast, but given his new ALEC; we will hold off for now.

## 2017-10-09 NOTE — PROGRESS NOTE ADULT - PROBLEM SELECTOR PLAN 2
chemo as per oncology  LLL collapse due to tumor with large left pleural effusion still seen on CXR  follow output from chest tube

## 2017-10-09 NOTE — PROVIDER CONTACT NOTE (EICU) - ASSESSMENT
Central line blue and white ports unable to flush or get positive blood return.
Urine output 20 cc/hr.
Urine output decreased 20 cc/hr.

## 2017-10-09 NOTE — PROGRESS NOTE ADULT - SUBJECTIVE AND OBJECTIVE BOX
HISTORY:  62y Male initially admitted to neurosurgery for b/l leg weakness found to have newly diagnosed metastatic disease with spinal met causing spinal cord compression s/p T8-T11 laminectomy on 9/20, post-op course complicated by deterioration in respiratory status and acute onset abdominal pain requiring intubation and transfer to NSCU. CT chest/abdomen/pelvis demonstrated b/l DVTs, right upper lobe PE, and a bowel perforation with free air. Pt was taken emergently to the OR and is now s/p exploratory laparotomy, SBR x1 (left in discontinuity), abdominal washout, abthera VAC application on 9/22, after which he was transferred to SICU under ACS. He was taken back to the OR on 9/24 for a washout, primary anastomosis, and abdominal closure. Post-operative course complicated by Acute hypoxemic respiratory failure secondary to Acute Pulmonary Vascular congestion/ Left Lung Collapse.    24 HOUR EVENTS: HISTORY:  62y Male initially admitted to neurosurgery for b/l leg weakness found to have newly diagnosed metastatic disease with spinal met causing spinal cord compression s/p T8-T11 laminectomy on 9/20, post-op course complicated by deterioration in respiratory status and acute onset abdominal pain requiring intubation and transfer to NSCU. CT chest/abdomen/pelvis demonstrated b/l DVTs, right upper lobe PE, and a bowel perforation with free air. Pt was taken emergently to the OR and is now s/p exploratory laparotomy, SBR x1 (left in discontinuity), abdominal washout, abthera VAC application on 9/22, after which he was transferred to SICU under ACS. He was taken back to the OR on 9/24 for a washout, primary anastomosis, and abdominal closure. Post-operative course complicated by Acute hypoxemic respiratory failure secondary to Acute Pulmonary Vascular congestion/ Left Lung Collapse.    24 HOUR EVENTS: Remains alert & oriented x3. Left pigtail catheter output minimal. Tolerating regular diet so pain medication changed to Tylenol & oxycodone PO. Noted to have poor appetite so Marinol started for appetite stimulation. ALEC noted with Cr elevated as high as 2 with FeNA of 0.4% so maintenance IV fluids increased to 75 mL/hr and he was given 250 cc of 5% albumin. HISTORY:  62y Male initially admitted to neurosurgery for b/l leg weakness found to have newly diagnosed metastatic disease with spinal met causing spinal cord compression s/p T8-T11 laminectomy on 9/20, post-op course complicated by deterioration in respiratory status and acute onset abdominal pain requiring intubation and transfer to NSCU. CT chest/abdomen/pelvis demonstrated b/l DVTs, right upper lobe PE, and a bowel perforation with free air. Pt was taken emergently to the OR and is now s/p exploratory laparotomy, SBR x1 (left in discontinuity), abdominal washout, abthera VAC application on 9/22, after which he was transferred to SICU under ACS. He was taken back to the OR on 9/24 for a washout, primary anastomosis, and abdominal closure. Post-operative course complicated by Acute hypoxemic respiratory failure secondary to Acute Pulmonary Vascular congestion/ Left Lung Collapse.    24 HOUR EVENTS: Remains alert & oriented x3. Left pigtail catheter output minimal. Tolerating regular diet so pain medication changed to Tylenol & oxycodone PO. Noted to have poor appetite so Marinol started for appetite stimulation. ALEC noted with Cr elevated as high as 2 with FeNA of 0.4% so maintenance IV fluids increased to 75 mL/hr and he was given 250 cc of 5% albumin.    SUBJECTIVE/ROS:  [x] A ten-point review of systems was otherwise negative except as noted.  [ ] Due to altered mental status/intubation, subjective information were not able to be obtained from the patient. History was obtained, to the extent possible, from review of the chart and collateral sources of information.    NEURO  CAM ICU: positive  Exam: awake, alert, oriented x3, NAD  Meds:  ·	acetaminophen   Tablet. 650 milliGRAM(s) Oral every 6 hours PRN Mild Pain (1 - 3)  ·	oxyCODONE    IR 5 milliGRAM(s) Oral every 4 hours PRN Moderate Pain (4 - 6)  ·	oxyCODONE    IR 10 milliGRAM(s) Oral every 4 hours PRN Severe Pain (7 - 10)  [x] Adequacy of sedation and pain control has been assessed and adjusted    RESPIRATORY  RR: 16 (10-09-17 @ 05:00) (15 - 27)  SpO2: 97% (10-09-17 @ 05:00) (90% - 100%)  Exam: mild rhonchi in the upper lung fields, decreased breath sounds in the LLL, pigtail to suction  Mechanical Ventilation: no  [N/A] Extubation Readiness Assessed  Meds: ALBUTerol/ipratropium for Nebulization 3 milliLiter(s) Nebulizer every 6 hours    CARDIOVASCULAR  HR: 89 (10-09-17 @ 05:00) (84 - 101)  BP: 93/59 (10-09-17 @ 05:00) (87/57 - 122/70)  BP(mean): 70 (10-09-17 @ 05:00) (67 - 91)  Exam: regular rate and rhythm, S1S2  Cardiac Rhythm: sinus  Perfusion     [x]Adequate   [ ]Inadequate  Mentation   [ ]Normal       [x]Reduced  Extremities  [x]Warm         [ ]Cool  Volume Status [ ]Hypervolemic [x]Euvolemic [ ]Hypovolemic  Meds: metoprolol 25 milliGRAM(s) Oral every 12 hours    GI/NUTRITION  Exam: soft, nontender, nondistended, surgical incision healing well and is C/D/I  Diet: regular w/ Ensure cans  Meds:  ·	docusate sodium 100 milliGRAM(s) Oral two times a day  ·	pantoprazole    Tablet 40 milliGRAM(s) Oral before breakfast  ·	senna 2 Tablet(s) Oral at bedtime  ·	dronabinol 2.5 milliGRAM(s) Oral two times a day before meals    GENITOURINARY      135  |  101  |  42<H>  ----------------------------<  108<H>  6.1<H>   |  22  |  2.24<H>    Ca    7.8<L>      09 Oct 2017 02:51  Phos  4.4  Mg     2.4  TPro  5.0<L>  /  Alb  1.7<L>  /  TBili  2.2<H>  /  DBili  1.0<H>  /  AST  79<H>  /  ALT  30  /  AlkPhos  138<H>  10-09  [x] Torres catheter, indication: urinary retention  Meds: dextrose 5% + sodium chloride 0.45%. infuse at 75 mL/hr    HEMATOLOGIC  Meds: enoxaparin Injectable 80 milliGRAM(s) SubCutaneous every 12 hours  [x] VTE Prophylaxis                        7.2    16.3  )-----------( 193      ( 09 Oct 2017 02:51 )             22.2     INFECTIOUS DISEASES  T(C): 36.2 (10-09-17 @ 03:00), Max: 36.9 (10-08-17 @ 19:00)  WBC Count: 16.3 K/uL (10-09 @ 02:51)  Recent Cultures: Body Fluid Pleural Fluid, 10-07 @ 00:46; Results No growth  Meds: none    ENDOCRINE  Capillary Blood Glucose: none  Meds: none    ACCESS DEVICES:  [x] Peripheral IV  [x] Central Venous Line	[ ] R	[x] L	[x] IJ	[ ] Fem	[ ] SC	Placed: 9/22/2017  [ ] Arterial Line		[ ] R	[ ] L	[ ] Fem	[ ] Rad	[ ] Ax	Placed:   [ ] PICC:					[ ] Mediport  [x] Urinary Catheter, Date Placed: 10/9/2017  [x] Necessity of urinary, arterial, and venous catheters discussed    OTHER MEDICATIONS: none    CODE STATUS: DNR/DNI    IMAGING:

## 2017-10-09 NOTE — ADVANCED PRACTICE NURSE CONSULT - REASON FOR CONSULT
Requested by  staff to assess dehiscence of surgical site on back.  Patient is a 62y Male initially admitted to neurosurgery for b/l leg weakness found to have newly diagnosed metastatic disease with spinal met causing spinal cord compression s/p T8-T11 laminectomy on 9/20, post-op course complicated by deterioration in respiratory status and acute onset abdominal pain requiring intubation and transfer to NSCU. CT chest/abdomen/pelvis demonstrated b/l DVTs, right upper lobe PE, and a bowel perforation with free air. Pt was taken emergently to the OR and is now s/p exploratory laparotomy, SBR x1 (left in discontinuity), abdominal washout, abthera VAC application on 9/22, after which he was transferred to SICU under ACS. He was taken back to the OR on 9/24 for a washout, primary anastomosis, and abdominal closure. Post-operative course complicated by Acute hypoxemic respiratory failure secondary to Acute Pulmonary Vascular congestion/ Left Lung Collapse. Pathology from both spine and bowel revealed non small cell lung cancer. CT chest revealed tumor compressing left lower lobe bronchus. He was also found to have a left sided pleural effusion which has since been resistant to diuretics and medical management. Thoracic surgery was consulted for a palliative procedure, such as a drainage catheter, following a family meeting today in which the patient was made DNR, but not DNI.   At this time patient remains in the SICU.  Has moments of  altered mental status.  Did not appear to be in any pain.  Patient was seen with Dr Chauhan who will write consult note.

## 2017-10-09 NOTE — CHART NOTE - NSCHARTNOTEFT_GEN_A_CORE
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 Pt previously NPO x 2 weeks awaiting return of bowel function. Palliative Care following.    SICU RD f/u: Pt advanced to Regular diet on 10/5, however noted with poor appetite; Reglan ordered. Pt noted with waxing/waning mental status and worsening ALEC. Pt observed asleep at breakfast, with tray untouched. Last BM 10/6.    Source: Patient [ ]    Family [ ]     other [X ]; team rounds, medical record    Diet : Regular + Ensure Enlive (provides 350cal, 20Gm protein per 8oz serving) tid     Enteral /Parenteral Nutrition: n/a    Current Weight: 83.7Kg (10/7), 86.4Kg (10/3), 88.1Kg (9/29), 80.1Kg (9/28)  Edema: 2+ L/R hand/arm; 3+ L/R foot, scrotum    Pertinent Medications: MEDICATIONS  (STANDING):  ALBUTerol/ipratropium for Nebulization 3 milliLiter(s) Nebulizer every 6 hours  dextrose 5% + sodium chloride 0.45%. 1000 milliLiter(s) (100 mL/Hr) IV Continuous <Continuous>  docusate sodium 100 milliGRAM(s) Oral two times a day  dronabinol 2.5 milliGRAM(s) Oral two times a day before meals  enoxaparin Injectable 80 milliGRAM(s) SubCutaneous every 12 hours  fentaNYL   Patch  25 MICROgram(s)/Hr 1 Patch Transdermal every 72 hours  metoprolol 25 milliGRAM(s) Oral every 12 hours  pantoprazole    Tablet 40 milliGRAM(s) Oral before breakfast  senna 2 Tablet(s) Oral at bedtime    MEDICATIONS  (PRN):  acetaminophen   Tablet. 650 milliGRAM(s) Oral every 6 hours PRN Mild Pain (1 - 3)  oxyCODONE    IR 5 milliGRAM(s) Oral every 4 hours PRN Moderate Pain (4 - 6)  oxyCODONE    IR 10 milliGRAM(s) Oral every 4 hours PRN Severe Pain (7 - 10)    Pertinent Labs:  10-09 Na135 mmol/L Glu 108 mg/dL<H> K+ 6.1 mmol/L<H> Cr  2.24 mg/dL<H> BUN 42 mg/dL<H> Phos 4.4 mg/dL Alb 1.7 g/dL<L>     Skin: no pressure injuries noted    Estimated Needs:   [X ] no change since previous assessment  [ ] recalculated:     Previous Nutrition Diagnosis:     [X ] Inadequate Protein- Energy Intake; being addressed with po diet and oral supplements     Intervention    Recommend:  1) continue Regular diet + Ensure Enlive tid; encourage po intake at every meal  2) Monitor weight, lab values, skin, po intake and GI tolerance    Monitoring and Evaluation:   Follow up per protocol  RD will remain available to honor pt/ family wishes regarding plan of care.   Falguni Denise, MS RD [facility], #[phone].     .

## 2017-10-09 NOTE — CHART NOTE - NSCHARTNOTEFT_GEN_A_CORE
Discussed with SICU PA that as per prior family meeting, although family wished to sign a DNR, they remain hopeful for treatment (chemo or RT) and were awaiting EGFR studies results from oncology. As such, patient is not hospice eligible as he wishes to pursue further disease modifying therapy. Goals of care were previously established. Should the need arise, please reconsult palliative care.

## 2017-10-09 NOTE — PROGRESS NOTE ADULT - ASSESSMENT
63 y/o M presented with spinal cord compression s/p decompression. He presented with metastatic non small cell lung cancer to the liver, bones, lymph nodes and bowel. Hospitalization course complicated by bowel perforation, hypoxic respiratory failure currently extubated, DVT/PE, septic shock, hyperbilirubinemia and new ALEC.

## 2017-10-09 NOTE — PROGRESS NOTE ADULT - SUBJECTIVE AND OBJECTIVE BOX
Interventional Radiology Follow- Up Note      This is a 62y Male who was admitted with LBP and LE weakness on 9/19/17 2/2 newly diagnosed metastatic disease to spine causing cord compression. Pt underwent T8-T11 laminectomy on 9/20, post-op course c/b deterioration in respiratory status and found to have bowel perforation. Pt underwent Exlap with SBR on 9/22 and RTOR on 9/24 for washout, primary anastomosis and abdominal closure. Pt is currently s/p left pigtail placement in IR on 10/6 with Dr. Ryan for left sided pleural effusion. No events over weekend. CXR from today show unchanged large left sided pleural effusion.         PAST MEDICAL & SURGICAL HISTORY:      Allergies: No Known Allergies      LABS:                        7.2    16.3  )-----------( 193      ( 09 Oct 2017 02:51 )             22.2     10-09    135  |  101  |  42<H>  ----------------------------<  108<H>  6.1<H>   |  22  |  2.24<H>    Ca    7.8<L>      09 Oct 2017 02:51  Phos  4.4     10-09  Mg     2.4     10-09    TPro  5.0<L>  /  Alb  1.7<L>  /  TBili  2.2<H>  /  DBili  1.0<H>  /  AST  79<H>  /  ALT  30  /  AlkPhos  138<H>  10-09    PT/INR - ( 08 Oct 2017 04:13 )   PT: 13.3 sec;   INR: 1.23 ratio         PTT - ( 08 Oct 2017 04:13 )  PTT:26.6 sec      Pleural fluid Cultures: neg for growth to date    Vitals: T(F): 97.4 (10-09-17 @ 07:00), Max: 98.4 (10-08-17 @ 19:00)  HR: 86 (10-09-17 @ 09:00) (84 - 96)  BP: 97/57 (10-09-17 @ 09:00) (90/54 - 122/70)  RR: 26 (10-09-17 @ 09:00) (15 - 27)  SpO2: 99% (10-09-17 @ 09:00) (94% - 100%)    PHYSICAL EXAM:  Gen:  Chest: 300cc/ 24hr (per chart record)    A/P: 62y Male s/p left sided chest tube placement in IR with Dr. Ryan on 10/6/17  -Continue serial CXR  -continue to monitor output    -trend vitals, labs  -will discuss with IR attending     Please call IR at extension [phone] with any questions, concerns, or issues regarding above. Interventional Radiology Follow- Up Note      This is a 62y Male who was admitted with LBP and LE weakness on 9/19/17 2/2 newly diagnosed metastatic disease to spine causing cord compression. Pt underwent T8-T11 laminectomy on 9/20, post-op course c/b deterioration in respiratory status and found to have bowel perforation. Pt underwent Exlap with SBR on 9/22 and RTOR on 9/24 for washout, primary anastomosis and abdominal closure. Pt is currently s/p left pigtail placement in IR on 10/6 with Dr. Ryan for left sided pleural effusion. No events over weekend. CXR from today show unchanged large left sided pleural effusion. Pt seen and examined at bedside. Offers no complaints at this time. Denies dyspnea, chest pain. States breathing is improving.         PAST MEDICAL & SURGICAL HISTORY:      Allergies: No Known Allergies      LABS:                        7.2    16.3  )-----------( 193      ( 09 Oct 2017 02:51 )             22.2     10-09    135  |  101  |  42<H>  ----------------------------<  108<H>  6.1<H>   |  22  |  2.24<H>    Ca    7.8<L>      09 Oct 2017 02:51  Phos  4.4     10-09  Mg     2.4     10-09    TPro  5.0<L>  /  Alb  1.7<L>  /  TBili  2.2<H>  /  DBili  1.0<H>  /  AST  79<H>  /  ALT  30  /  AlkPhos  138<H>  10-09    PT/INR - ( 08 Oct 2017 04:13 )   PT: 13.3 sec;   INR: 1.23 ratio         PTT - ( 08 Oct 2017 04:13 )  PTT:26.6 sec      Pleural fluid Cultures: neg for growth to date    Vitals: T(F): 97.4 (10-09-17 @ 07:00), Max: 98.4 (10-08-17 @ 19:00)  HR: 86 (10-09-17 @ 09:00) (84 - 96)  BP: 97/57 (10-09-17 @ 09:00) (90/54 - 122/70)  RR: 26 (10-09-17 @ 09:00) (15 - 27)  SpO2: 99% (10-09-17 @ 09:00) (94% - 100%)    PHYSICAL EXAM:  Gen: NAD, A&Ox3  Chest: left chest pigtail intact to pleurvac to suction. Serosanguinous output.  300cc/ 24hr (per chart record)    A/P: 62y Male s/p left sided chest tube placement in IR with Dr. Ryan on 10/6/17  -Continue serial CXR  -continue to monitor output    -trend vitals, labs  -will discuss with IR attending     Please call IR at extension [phone] with any questions, concerns, or issues regarding above.

## 2017-10-09 NOTE — PROGRESS NOTE ADULT - SUBJECTIVE AND OBJECTIVE BOX
ATP Progress Note    S: No acute events overnight.     O:  T(C): 36.3 (10-09-17 @ 07:00), Max: 36.9 (10-08-17 @ 19:00)  HR: 85 (10-09-17 @ 10:00) (84 - 96)  BP: 96/58 (10-09-17 @ 10:00) (90/54 - 122/70)  RR: 17 (10-09-17 @ 10:00) (15 - 27)  SpO2: 98% (10-09-17 @ 10:00) (94% - 100%)  Wt(kg): --    10-08 @ 07:01  -  10-09 @ 07:00  --------------------------------------------------------  IN:    dextrose 5% + sodium chloride 0.45%.: 1800 mL    IV PiggyBack: 250 mL    Oral Fluid: 200 mL  Total IN: 2250 mL    OUT:    Chest Tube: 300 mL    Indwelling Catheter - Urethral: 770 mL  Total OUT: 1070 mL    Total NET: 1180 mL      10-09 @ 07:01  -  10-09 @ 11:04  --------------------------------------------------------  IN:    dextrose 5% + sodium chloride 0.45%.: 150 mL  Total IN: 150 mL    OUT:    Chest Tube: 50 mL    Indwelling Catheter - Urethral: 40 mL  Total OUT: 90 mL    Total NET: 60 mL        CBC Full  -  ( 09 Oct 2017 02:51 )  WBC Count : 16.3 K/uL  Hemoglobin : 7.2 g/dL  Hematocrit : 22.2 %  Platelet Count - Automated : 193 K/uL  Mean Cell Volume : 96.6 fl  Mean Cell Hemoglobin : 31.3 pg  Mean Cell Hemoglobin Concentration : 32.4 gm/dL          PHYSICAL EXAM:  Gen: NAD  Resp: Pt on NC, no acute resp distress  Abd: Soft, NT, ND  Incision: c/d/i

## 2017-10-09 NOTE — CONSULT NOTE ADULT - CONSULT REASON
Abdominal distension, pain
Concern for Infected Right Renal Vein Thrombus
Evaluate for palliative chest tube/pigtail
Evaluation for rehabilitation needs
[place],
L lung atelectasis
Medical evaluation post-op
Medical evaluation post-op
Metastatic disease likely from lung primary
cord compression, transfer from OSH
s/p bowel resection for ischemic bowel
wound
post-op ileus
angelica
GOC

## 2017-10-09 NOTE — PROGRESS NOTE ADULT - SUBJECTIVE AND OBJECTIVE BOX
Follow-up Pulm Progress Note    L pigtail cath draining, 200cc x24hrs  98% on 2L NC    Medications:  MEDICATIONS  (STANDING):  ALBUTerol/ipratropium for Nebulization 3 milliLiter(s) Nebulizer every 6 hours  dextrose 5% + sodium chloride 0.45%. 1000 milliLiter(s) (100 mL/Hr) IV Continuous <Continuous>  docusate sodium 100 milliGRAM(s) Oral two times a day  dronabinol 2.5 milliGRAM(s) Oral two times a day before meals  enoxaparin Injectable 80 milliGRAM(s) SubCutaneous every 12 hours  fentaNYL   Patch  25 MICROgram(s)/Hr 1 Patch Transdermal every 72 hours  gabapentin 200 milliGRAM(s) Oral every 12 hours  metoprolol 25 milliGRAM(s) Oral every 12 hours  pantoprazole    Tablet 40 milliGRAM(s) Oral before breakfast  senna 2 Tablet(s) Oral at bedtime    MEDICATIONS  (PRN):  acetaminophen   Tablet. 650 milliGRAM(s) Oral every 6 hours PRN Mild Pain (1 - 3)  oxyCODONE    IR 5 milliGRAM(s) Oral every 4 hours PRN Moderate Pain (4 - 6)  oxyCODONE    IR 10 milliGRAM(s) Oral every 4 hours PRN Severe Pain (7 - 10)          Vital Signs Last 24 Hrs  T(C): 36.7 (09 Oct 2017 15:00), Max: 36.9 (08 Oct 2017 19:00)  T(F): 98.1 (09 Oct 2017 15:00), Max: 98.4 (08 Oct 2017 19:00)  HR: 87 (09 Oct 2017 16:00) (82 - 96)  BP: 94/54 (09 Oct 2017 16:00) (85/55 - 122/70)  BP(mean): 69 (09 Oct 2017 16:00) (65 - 91)  RR: 10 (09 Oct 2017 16:00) (10 - 27)  SpO2: 100% (09 Oct 2017 16:00) (92% - 100%) on 2L NC          10-08 @ 07:01  -  10-09 @ 07:00  --------------------------------------------------------  IN: 2250 mL / OUT: 1070 mL / NET: 1180 mL          LABS:                        7.2    16.3  )-----------( 193      ( 09 Oct 2017 02:51 )             22.2     10-09    137  |  100  |  44<H>  ----------------------------<  105<H>  5.6<H>   |  25  |  2.47<H>    Ca    8.3<L>      09 Oct 2017 12:17  Phos  5.2     10-09  Mg     2.5     10-09    TPro  5.0<L>  /  Alb  1.7<L>  /  TBili  2.2<H>  /  DBili  1.0<H>  /  AST  79<H>  /  ALT  30  /  AlkPhos  138<H>  10-09          CAPILLARY BLOOD GLUCOSE        PT/INR - ( 08 Oct 2017 04:13 )   PT: 13.3 sec;   INR: 1.23 ratio         PTT - ( 08 Oct 2017 04:13 )  PTT:26.6 sec    Procalcitonin, Serum: 1.61 ng/mL (10-09-17 @ 02:51)    Serum Pro-Brain Natriuretic Peptide: 2025 pg/mL (10-09-17 @ 02:51)            Fluid characteristics  -- 10-06 @ 20:17  pH 7.87  LDH 1648  tprot 2.3    Cell count  Appearance Sl Bloody  Fluid type --  BF lymph 37  color Red  eosinophil --  PMN 27  Mesothelial 4  Monocyte 2  Other body cells 30      CULTURES: (if applicable)  Culture Results:   No growth (10-07 @ 00:46)    Most recent blood culture -- 10-07 @ 00:46   -- -- .Body Fluid Pleural Fluid 10-07 @ 00:46    Blood culture 10-07 @ 00:46  --    No polymorphonuclear cells seen per low power field  No organisms seen per oil power field  by cytocentrifuge  --  --  --    Urine culture    -->      Physical Examination:  PULM: Decreased BS L lung  CVS: S1, S2 RRR    RADIOLOGY REVIEWED  CXR: Persistent L lung atelectasis Follow-up Pulm Progress Note    L pigtail cath draining, 200cc x24hrs  98% on 2L NC    Medications:  MEDICATIONS  (STANDING):  ALBUTerol/ipratropium for Nebulization 3 milliLiter(s) Nebulizer every 6 hours  dextrose 5% + sodium chloride 0.45%. 1000 milliLiter(s) (100 mL/Hr) IV Continuous <Continuous>  docusate sodium 100 milliGRAM(s) Oral two times a day  dronabinol 2.5 milliGRAM(s) Oral two times a day before meals  enoxaparin Injectable 80 milliGRAM(s) SubCutaneous every 12 hours  fentaNYL   Patch  25 MICROgram(s)/Hr 1 Patch Transdermal every 72 hours  gabapentin 200 milliGRAM(s) Oral every 12 hours  metoprolol 25 milliGRAM(s) Oral every 12 hours  pantoprazole    Tablet 40 milliGRAM(s) Oral before breakfast  senna 2 Tablet(s) Oral at bedtime    MEDICATIONS  (PRN):  acetaminophen   Tablet. 650 milliGRAM(s) Oral every 6 hours PRN Mild Pain (1 - 3)  oxyCODONE    IR 5 milliGRAM(s) Oral every 4 hours PRN Moderate Pain (4 - 6)  oxyCODONE    IR 10 milliGRAM(s) Oral every 4 hours PRN Severe Pain (7 - 10)          Vital Signs Last 24 Hrs  T(C): 36.7 (09 Oct 2017 15:00), Max: 36.9 (08 Oct 2017 19:00)  T(F): 98.1 (09 Oct 2017 15:00), Max: 98.4 (08 Oct 2017 19:00)  HR: 87 (09 Oct 2017 16:00) (82 - 96)  BP: 94/54 (09 Oct 2017 16:00) (85/55 - 122/70)  BP(mean): 69 (09 Oct 2017 16:00) (65 - 91)  RR: 10 (09 Oct 2017 16:00) (10 - 27)  SpO2: 100% (09 Oct 2017 16:00) (92% - 100%) on 2L NC          10-08 @ 07:01  -  10-09 @ 07:00  --------------------------------------------------------  IN: 2250 mL / OUT: 1070 mL / NET: 1180 mL          LABS:                        7.2    16.3  )-----------( 193      ( 09 Oct 2017 02:51 )             22.2     10-09    137  |  100  |  44<H>  ----------------------------<  105<H>  5.6<H>   |  25  |  2.47<H>    Ca    8.3<L>      09 Oct 2017 12:17  Phos  5.2     10-09  Mg     2.5     10-09    TPro  5.0<L>  /  Alb  1.7<L>  /  TBili  2.2<H>  /  DBili  1.0<H>  /  AST  79<H>  /  ALT  30  /  AlkPhos  138<H>  10-09      CAPILLARY BLOOD GLUCOSE    PT/INR - ( 08 Oct 2017 04:13 )   PT: 13.3 sec;   INR: 1.23 ratio         PTT - ( 08 Oct 2017 04:13 )  PTT:26.6 sec    Procalcitonin, Serum: 1.61 ng/mL (10-09-17 @ 02:51)    Serum Pro-Brain Natriuretic Peptide: 2025 pg/mL (10-09-17 @ 02:51)    Fluid characteristics  -- 10-06 @ 20:17  pH 7.87  LDH 1648  tprot 2.3    Cell count  Appearance Sl Bloody  Fluid type --  BF lymph 37  color Red  eosinophil --  PMN 27  Mesothelial 4  Monocyte 2  Other body cells 30      CULTURES: (if applicable)  Culture Results:   No growth (10-07 @ 00:46)    Most recent blood culture -- 10-07 @ 00:46   -- -- .Body Fluid Pleural Fluid 10-07 @ 00:46    Blood culture 10-07 @ 00:46  --    No polymorphonuclear cells seen per low power field  No organisms seen per oil power field  by cytocentrifuge  --  --  --    Urine culture    -->      Physical Examination:  PULM: Decreased BS L lung  CVS: S1, S2 RRR    RADIOLOGY REVIEWED  CXR: Persistent L lung atelectasis

## 2017-10-09 NOTE — PROGRESS NOTE ADULT - ASSESSMENT
ASSESSMENT: 62 year old male with Stage IV non-small cell lung carcinoma with spinal, small bowel, and likely liver metastasis presenting with RUL PE with IVC and renal thrombosis, small bowel perforation s/p resection & left in discontinuity with Abthera (9/22) with RTOR for washout, primary anastomosis, & abdominal closure (9/24) complicated by acute hypoxemic respiratory failure requiring CPAP & high flow NC likely secondary    PLAN:  Neurologic: post-operative pain and neoplastic pain   - Continue PO Oxycodone PRN for pain    Respiratory: acute hypoxemic respiratory failure  - Will administer supplemental O2 to maintain SpO2 > 88%  - Encourage CPAP overnight; patient not tolerating most nights  - Duonebs for dyspnea.  - PT/ OOB to prevent atelectasis  - Continue to monitor pigtail catheter output    Cardiovascular: HTN  - Continue Metoprolol 25 mg Q12H    Gastrointestinal/Nutrition: small bowel perforation s/p SBR, anastomosis   - Con't regular diet  - Protonix 40mg daily  - Colace and Senna    Renal/Genitourinary: ALEC  - Monitor I&Os and electrolytes. Replete as needed.  - D5 1/2 NS @ 50    Hematologic: b/l DVT; b/l PE  - therapeutic lovenox    Infectious Disease: high risk for infection given prolonged hospital course  - no need for antibiotics at this time, monitor for signs of infection    Disposition:  - DNR.  - To remain in SICU. Consider transfer to PCU.    ~Antoinette Avila M.D. Resident ASSESSMENT: 62 year old male with Stage IV non-small cell lung carcinoma with spinal, small bowel, and likely liver metastasis presenting with RUL PE with IVC and renal thrombosis, small bowel perforation s/p resection & left in discontinuity with Abthera (9/22) with RTOR for washout, primary anastomosis, & abdominal closure (9/24) complicated by acute hypoxemic respiratory failure requiring CPAP & high flow NC likely secondary    PLAN:  Neurologic: post-operative pain and neoplastic pain  - Continue Tylenol & oxycodone PRN for pain.    Respiratory: acute hypoxemic respiratory failure  - Supplemental O2 via NC as needed to maintain SpO2 > 88%.  - Duonebs for dyspnea.  - Incentive spirometry & out of bed to chair to prevent atelectasis.  - Left pigtail catheter to suction and monitor output.     Cardiovascular: HTN  - Continue metoprolol 25 mg q12hrs for HTN.    Gastrointestinal/Nutrition: small bowel perforation s/p SBR w/ primary anastomosis   - Regular diet as tolerated.  - Marinol for appetite stimulation.  - Protonix 40mg daily for GERD.  - Bowel regimen with Colace & senna.  - Monitor LFTs.    Renal/Genitourinary: ALEC, likely pre-renal  - Monitor I&Os and electrolytes. Replete as needed.  - D5 1/2 NS @ 75 for likely pre-renal ALEC.    Hematologic: b/l DVT, b/l PE  - Therapeutic Lovenox for RUL PE and bilateral DVTs.  - Monitor CBCs.    Infectious Disease: no active issues  - Monitor for clinical evidence of active infection.    Endocrine: no active issues  - Monitor blood glucose on BMP.    Disposition:  - DNR.  - To remain in SICU.    Parisa Carrasquillo PA-C    [id number]    ~Antoinette Avila M.D. Resident ASSESSMENT: 62 year old male with Stage IV non-small cell lung carcinoma with spinal, small bowel, and likely liver metastasis presenting with RUL PE with IVC and renal thrombosis, small bowel perforation s/p resection & left in discontinuity with Abthera (9/22) with RTOR for washout, primary anastomosis, & abdominal closure (9/24) complicated by acute hypoxemic respiratory failure requiring CPAP & high flow NC likely secondary to his NSCLC and     PLAN:  Neurologic: post-operative pain and neoplastic pain  - Continue Tylenol & oxycodone PRN for pain.    Respiratory: acute hypoxemic respiratory failure  - Supplemental O2 via NC as needed to maintain SpO2 > 88%.  - Duonebs for dyspnea.  - Incentive spirometry & out of bed to chair to prevent atelectasis.  - Left pigtail catheter to suction and monitor output.     Cardiovascular: HTN  - Continue metoprolol 25 mg q12hrs for HTN.    Gastrointestinal/Nutrition: small bowel perforation s/p SBR w/ primary anastomosis   - Regular diet as tolerated.  - Marinol for appetite stimulation.  - Protonix 40mg daily for GERD.  - Bowel regimen with Colace & senna.  - Monitor LFTs.    Renal/Genitourinary: ALEC, likely pre-renal  - Monitor I&Os and electrolytes. Replete as needed.  - D5 1/2 NS @ 75 for likely pre-renal ALEC.    Hematologic: b/l DVT, b/l PE  - Therapeutic Lovenox for RUL PE and bilateral DVTs.  - Monitor CBCs.    Infectious Disease: no active issues  - Monitor for clinical evidence of active infection.    Endocrine: no active issues  - Monitor blood glucose on BMP.    Disposition:  - DNR.  - To remain in SICU.    Parisa Carrasquillo PA-C    [id number]    ~Antoinette Avila M.D. Resident ASSESSMENT: 62 year old male with Stage IV non-small cell lung carcinoma with spinal, small bowel, and likely liver metastasis presenting with RUL PE with IVC and renal thrombosis, small bowel perforation s/p resection & left in discontinuity with Abthera (9/22) with RTOR for washout, primary anastomosis, & abdominal closure (9/24) complicated by acute hypoxemic respiratory failure requiring CPAP & high flow NC likely secondary to his NSCLC and RUL PE.    PLAN:  Neurologic: post-operative pain and neoplastic pain  - Continue Tylenol & oxycodone PRN for pain.    Respiratory: acute hypoxemic respiratory failure  - Supplemental O2 via NC as needed to maintain SpO2 > 88%.  - Duonebs for dyspnea.  - Incentive spirometry & out of bed to chair to prevent atelectasis.  - Left pigtail catheter to suction and monitor output.     Cardiovascular: HTN  - Continue metoprolol 25 mg q12hrs for HTN.    Gastrointestinal/Nutrition: small bowel perforation s/p SBR w/ primary anastomosis   - Regular diet as tolerated.  - Marinol for appetite stimulation.  - Protonix 40mg daily for GERD.  - Bowel regimen with Colace & senna.  - Monitor LFTs.    Renal/Genitourinary: ALEC, likely pre-renal  - Monitor I&Os and electrolytes. Replete as needed.  - D5 1/2 NS @ 75 for likely pre-renal ALEC.    Hematologic: b/l DVT, b/l PE  - Therapeutic Lovenox for RUL PE and bilateral DVTs.  - Monitor CBCs.    Infectious Disease: no active issues  - Monitor for clinical evidence of active infection.    Endocrine: no active issues  - Monitor blood glucose on BMP.    Disposition:  - DNR.  - To remain in SICU.    Parisa Carrasquillo PA-C    [id number]

## 2017-10-09 NOTE — PROGRESS NOTE ADULT - SUBJECTIVE AND OBJECTIVE BOX
63yo M with no significant PMH. Consulted on 9/12 for worsening LBP associated with lower extremity weakness for past 3 weeks. Over the weekend, he progressed to the point where he was unable to ambulate. He was seen at OSH and transferred to [facility] for spine evaluation. Was found to have epidural compression T8-T11 from newly diagnosed metastatic disease to liver, bone and bowel mets. S/p laminectomy on 9/20. Hospitalization complicated by acute hypoxic respiratory failure 2/2 acute pulmonary vascular congestion, L lung collapse, b/l PE, RUL PNA. Was found to have bowel perf with free air, was taken for ex-lap on 9/22, closure on 9/24. Peritonitis subsequently - E. faecium, Klebsiella and Candida.       MEDICATIONS  (STANDING):  ALBUTerol/ipratropium for Nebulization 3 milliLiter(s) Nebulizer every 6 hours  dextrose 5% + sodium chloride 0.45%. 1000 milliLiter(s) (100 mL/Hr) IV Continuous <Continuous>  docusate sodium 100 milliGRAM(s) Oral two times a day  dronabinol 2.5 milliGRAM(s) Oral two times a day before meals  fentaNYL   Patch  25 MICROgram(s)/Hr 1 Patch Transdermal every 72 hours  gabapentin 200 milliGRAM(s) Oral every 12 hours  metoprolol 25 milliGRAM(s) Oral every 12 hours  pantoprazole    Tablet 40 milliGRAM(s) Oral before breakfast  senna 2 Tablet(s) Oral at bedtime    MEDICATIONS  (PRN):  acetaminophen   Tablet. 650 milliGRAM(s) Oral every 6 hours PRN Mild Pain (1 - 3)  oxyCODONE    IR 5 milliGRAM(s) Oral every 4 hours PRN Moderate Pain (4 - 6)  oxyCODONE    IR 10 milliGRAM(s) Oral every 4 hours PRN Severe Pain (7 - 10)      REVIEW OF SYSTEM:  CONSTITUTIONAL: No fever, No change in weight, No fatigue  HEAD: No headache, No dizziness, No recent trauma  EYES: No eye pain, No visual disturbances, No discharge  ENT:  No difficulty hearing, No tinnitus, No vertigo, No sinus pain, No throat pain  NECK: No pain, No stiffness  BREASTS: No pain, No masses, No nipple discharge  RESPIRATORY: No cough, No wheezing, No chills, No hemoptysis, No shortness of breath at rest or exertional shortness of breath  CARDIOVASCULAR: No chest pain, No palpitations, No dizziness, No CHF, No arrhythmia, No cardiomegaly, No leg swelling  GASTROINTESTINAL: No abdominal, No epigastric pain. No nausea, No vomiting, No hematemesis, No diarrhea, No constipation. No melena, No hematochezia. No GERD  GENITOURINARY: No dysuria, No frequency, No hematuria, No incontinence, No nocturia, No hesitancy,  SKIN: No itching, No burning, No rashes, No lesions   LYMPH NODES: No history of enlarged glands  ENDOCRINE: No heat or cold intolerance, No hair loss. No osteoporosis, No thyroid disease  MUSCULOSKELETAL: No joint pain  No muscle, back, or extremity pain      VITALS:   T(C): 36.7 (10-09-17 @ 15:00), Max: 36.7 (10-09-17 @ 15:00)  HR: 93 (10-09-17 @ 19:00) (82 - 96)  BP: 104/62 (10-09-17 @ 19:00) (85/55 - 122/70)  RR: 13 (10-09-17 @ 19:00) (10 - 27)  SpO2: 100% (10-09-17 @ 19:00) (92% - 100%)  Wt(kg): --    PHYSICAL EXAM:  GENERAL: NAD, well nourished and conversant  HEAD:  Atraumatic  EYES: EOM, PERRLA, conjunctiva pink and sclera white  ENT: No tonsillar erythema, exudates, or enlargement, moist mucous membranes, good dentition, no lesions  NECK: Supple, No JVD, normal thyroid, carotids with normal upstrokes and no bruits  CHEST/LUNG: Clear to auscultation bilaterally, No rales, rhonchi, wheezing, or rubs  HEART: Regular rate and rhythm, No murmurs, rubs, or gallops  ABDOMEN: decreased BS + distention  EXTREMITIES:, No clubbing, cyanosis, + edema.  LYMPH: No lymphadenopathy noted  SKIN: No rashes or lesions  NEURO: + LE paralysis    LABS:        CBC Full  -  ( 09 Oct 2017 02:51 )  WBC Count : 16.3 K/uL  Hemoglobin : 7.2 g/dL  Hematocrit : 22.2 %  Platelet Count - Automated : 193 K/uL  Mean Cell Volume : 96.6 fl  Mean Cell Hemoglobin : 31.3 pg  Mean Cell Hemoglobin Concentration : 32.4 gm/dL  Auto Neutrophil # : x  Auto Lymphocyte # : x  Auto Monocyte # : x  Auto Eosinophil # : x  Auto Basophil # : x  Auto Neutrophil % : x  Auto Lymphocyte % : x  Auto Monocyte % : x  Auto Eosinophil % : x  Auto Basophil % : x    10-09    137  |  100  |  44<H>  ----------------------------<  105<H>  5.6<H>   |  25  |  2.47<H>    Ca    8.3<L>      09 Oct 2017 12:17  Phos  5.2     10-09  Mg     2.5     10-09    TPro  5.0<L>  /  Alb  1.7<L>  /  TBili  2.2<H>  /  DBili  1.0<H>  /  AST  79<H>  /  ALT  30  /  AlkPhos  138<H>  10-09    LIVER FUNCTIONS - ( 09 Oct 2017 02:51 )  Alb: 1.7 g/dL / Pro: 5.0 g/dL / ALK PHOS: 138 U/L / ALT: 30 U/L RC / AST: 79 U/L / GGT: x           PT/INR - ( 08 Oct 2017 04:13 )   PT: 13.3 sec;   INR: 1.23 ratio         PTT - ( 08 Oct 2017 04:13 )  PTT:26.6 sec    CAPILLARY BLOOD GLUCOSE          RADIOLOGY & ADDITIONAL TESTS:

## 2017-10-09 NOTE — PROVIDER CONTACT NOTE (EICU) - RECOMMENDATIONS
Central line to be removed by team.
Continue to observe.
Increased IV fluid rate to 100 cc/hr and administered Albumin 500 cc.

## 2017-10-10 NOTE — PROGRESS NOTE ADULT - SUBJECTIVE AND OBJECTIVE BOX
HISTORY:  62y Male initially admitted to neurosurgery for b/l leg weakness found to have newly diagnosed metastatic disease with spinal met causing spinal cord compression s/p T8-T11 laminectomy on 9/20, post-op course complicated by deterioration in respiratory status and acute onset abdominal pain requiring intubation and transfer to NSCU. CT chest/abdomen/pelvis demonstrated b/l DVTs, right upper lobe PE, and a bowel perforation with free air. Pt was taken emergently to the OR and is now s/p exploratory laparotomy, SBR x1 (left in discontinuity), abdominal washout, abthera VAC application on 9/22, after which he was transferred to SICU under ACS. He was taken back to the OR on 9/24 for a washout, primary anastomosis, and abdominal closure. Post-operative course complicated by Acute hypoxemic respiratory failure secondary to Acute Pulmonary Vascular congestion/ Left Lung Collapse.    24 HOUR EVENTS: Patient continues to have severe back pain so fentanyl patch 25 mcg/hr and gabapentin 200 mg BID added. Plan to have Pleurx catheter placed on Weds. ALEC worsening with Cr rise to 2.47 so maintenance IV fluids increased to 100 mL/hr and 500 cc of 5% albumin was given. UOP briefly improved from 20 cc/hr to 30 cc/hr. However, UOP decreased overnight to 10 cc/hr so an additional 500 cc of 5% albumin was given. HISTORY:  62y Male initially admitted to neurosurgery for b/l leg weakness found to have newly diagnosed metastatic disease with spinal met causing spinal cord compression s/p T8-T11 laminectomy on 9/20, post-op course complicated by deterioration in respiratory status and acute onset abdominal pain requiring intubation and transfer to NSCU. CT chest/abdomen/pelvis demonstrated b/l DVTs, right upper lobe PE, and a bowel perforation with free air. Pt was taken emergently to the OR and is now s/p exploratory laparotomy, SBR x1 (left in discontinuity), abdominal washout, abthera VAC application on 9/22, after which he was transferred to SICU under ACS. He was taken back to the OR on 9/24 for a washout, primary anastomosis, and abdominal closure. Post-operative course complicated by Acute hypoxemic respiratory failure secondary to Acute Pulmonary Vascular congestion/ Left Lung Collapse.    24 HOUR EVENTS: Patient continues to have severe back pain so fentanyl patch 25 mcg/hr and gabapentin 200 mg BID added. Plan to have Pleurx catheter placed on Weds. ALEC worsening with Cr rise to 2.47 so maintenance IV fluids increased to 100 mL/hr and 500 cc of 5% albumin was given. UOP briefly improved from 20 cc/hr to 30 cc/hr. However, UOP decreased overnight to 10 cc/hr so an additional 500 cc of 5% albumin was given.    SUBJECTIVE/ROS:  [x] A ten-point review of systems was otherwise negative except as noted.  [ ] Due to altered mental status/intubation, subjective information were not able to be obtained from the patient. History was obtained, to the extent possible, from review of the chart and collateral sources of information.    NEURO  CAM ICU: positive  Exam: awake, alert, oriented x3, NAD  Meds:  ·	acetaminophen   Tablet. 650 milliGRAM(s) Oral every 6 hours PRN Mild Pain (1 - 3)  ·	fentaNYL   Patch  25 MICROgram(s)/Hr 1 Patch Transdermal every 72 hours  ·	gabapentin 200 milliGRAM(s) Oral every 12 hours  ·	oxyCODONE    IR 5 milliGRAM(s) Oral every 4 hours PRN Moderate Pain (4 - 6)  ·	oxyCODONE    IR 10 milliGRAM(s) Oral every 4 hours PRN Severe Pain (7 - 10)  [x] Adequacy of sedation and pain control has been assessed and adjusted    RESPIRATORY  RR: 13 (10-10-17 @ 03:00) (10 - 26)  SpO2: 93% (10-10-17 @ 03:00) (92% - 100%)  Exam: mild rhonchi in the upper lung fields, decreased breath sounds in the LLL, pigtail to suction draining serosanguineous fluid  Mechanical Ventilation: no  [N/A] Extubation Readiness Assessed  Meds: ALBUTerol/ipratropium for Nebulization 3 milliLiter(s) Nebulizer every 6 hours    CARDIOVASCULAR  HR: 92 (10-10-17 @ 03:00) (82 - 97)  BP: 96/57 (10-10-17 @ 03:00) (85/55 - 120/58)  BP(mean): 69 (10-10-17 @ 03:00) (65 - 80)  Exam: regular rate and rhythm, S1S2  Cardiac Rhythm: sinus  Perfusion     [x]Adequate   [ ]Inadequate  Mentation   [ ]Normal       [x]Reduced  Extremities  [x]Warm         [ ]Cool  Volume Status [ ]Hypervolemic [x]Euvolemic [ ]Hypovolemic  Meds: metoprolol 25 milliGRAM(s) Oral every 12 hours    GI/NUTRITION  Exam: soft, nontender, nondistended, surgical incision healing well and is C/D/I  Diet: regular w/ Ensure cans  Meds:  ·	docusate sodium 100 milliGRAM(s) Oral two times a day  ·	pantoprazole    Tablet 40 milliGRAM(s) Oral before breakfast  ·	senna 2 Tablet(s) Oral at bedtime  ·	dronabinol 2.5 milliGRAM(s) Oral two times a day before meals    GENITOURINARY    10 Oct 2017 03:27    136  |  100  |  46<H>  ----------------------------<  120<H>  5.2   |  22  |  2.66<H>    Ca    8.2<L>        Phos  5.1     10-10  Mg     2.4     10-10  TPro  5.3<L>  /  Alb  2.7<L>  /  TBili  2.3<H>  /  DBili  1.8<H>  /  AST  43<H>  /  ALT  19  /  AlkPhos  103  10-10    [x] Torres catheter, indication: urinary retention  Meds: dextrose 5% + sodium chloride 0.45%. infuse at 100 mL/hr    HEMATOLOGIC  Meds: enoxaparin Injectable 80 milliGRAM(s) SubCutaneous every 12 hours  [x] VTE Prophylaxis                        6.1    10.9  )-----------( 227      ( 10 Oct 2017 03:27 )             19.2     INFECTIOUS DISEASES  T(C): 36.6 (10-10-17 @ 03:00), Max: 36.7 (10-09-17 @ 15:00)  WBC Count: 10.9 K/uL (10-10 @ 03:27)  Recent Cultures: Body Fluid Pleural Fluid, 10-07 @ 00:46; Results No growth  Meds: none    ENDOCRINE  Capillary Blood Glucose: none  Meds: none    ACCESS DEVICES:  [x] Peripheral IV  [x] Central Venous Line	[ ] R	[x] L	[x] IJ	[ ] Fem	[ ] SC	Placed: 9/22/2017  [ ] Arterial Line		[ ] R	[ ] L	[ ] Fem	[ ] Rad	[ ] Ax	Placed:   [ ] PICC:					[ ] Mediport  [x] Urinary Catheter, Date Placed: 10/9/2017  [x] Necessity of urinary, arterial, and venous catheters discussed    OTHER MEDICATIONS: none    CODE STATUS: DNR    IMAGING: HISTORY:  62y Male initially admitted to neurosurgery for b/l leg weakness found to have newly diagnosed metastatic disease with spinal met causing spinal cord compression s/p T8-T11 laminectomy on 9/20, post-op course complicated by deterioration in respiratory status and acute onset abdominal pain requiring intubation and transfer to NSCU. CT chest/abdomen/pelvis demonstrated b/l DVTs, right upper lobe PE, and a bowel perforation with free air. Pt was taken emergently to the OR and is now s/p exploratory laparotomy, SBR x1 (left in discontinuity), abdominal washout, abthera VAC application on 9/22, after which he was transferred to SICU under ACS. He was taken back to the OR on 9/24 for a washout, primary anastomosis, and abdominal closure. Post-operative course complicated by Acute hypoxemic respiratory failure secondary to Acute Pulmonary Vascular congestion/ Left Lung Collapse.    24 HOUR EVENTS: Patient continues to have severe back pain so fentanyl patch 25 mcg/hr and gabapentin 200 mg BID added. Plan to have Pleurx catheter placed on Weds. ALEC worsening with Cr rise to 2.47 so maintenance IV fluids increased to 100 mL/hr and 500 cc of 5% albumin was given. UOP briefly improved from 20 cc/hr to 30 cc/hr. However, UOP decreased overnight to 10 cc/hr so an additional 500 cc of 5% albumin was given. 1 unit of PRBCs given for HCT of 19.2.    SUBJECTIVE/ROS:  [x] A ten-point review of systems was otherwise negative except as noted.  [ ] Due to altered mental status/intubation, subjective information were not able to be obtained from the patient. History was obtained, to the extent possible, from review of the chart and collateral sources of information.    NEURO  CAM ICU: positive  Exam: awake, alert, oriented x3, NAD  Meds:  ·	acetaminophen   Tablet. 650 milliGRAM(s) Oral every 6 hours PRN Mild Pain (1 - 3)  ·	fentaNYL   Patch  25 MICROgram(s)/Hr 1 Patch Transdermal every 72 hours  ·	gabapentin 200 milliGRAM(s) Oral every 12 hours  ·	oxyCODONE    IR 5 milliGRAM(s) Oral every 4 hours PRN Moderate Pain (4 - 6)  ·	oxyCODONE    IR 10 milliGRAM(s) Oral every 4 hours PRN Severe Pain (7 - 10)  [x] Adequacy of sedation and pain control has been assessed and adjusted    RESPIRATORY  RR: 13 (10-10-17 @ 03:00) (10 - 26)  SpO2: 93% (10-10-17 @ 03:00) (92% - 100%)  Exam: mild rhonchi in the upper lung fields, decreased breath sounds in the LLL, pigtail to suction draining serosanguineous fluid  Mechanical Ventilation: no  [N/A] Extubation Readiness Assessed  Meds: ALBUTerol/ipratropium for Nebulization 3 milliLiter(s) Nebulizer every 6 hours    CARDIOVASCULAR  HR: 92 (10-10-17 @ 03:00) (82 - 97)  BP: 96/57 (10-10-17 @ 03:00) (85/55 - 120/58)  BP(mean): 69 (10-10-17 @ 03:00) (65 - 80)  Exam: regular rate and rhythm, S1S2  Cardiac Rhythm: sinus  Perfusion     [x]Adequate   [ ]Inadequate  Mentation   [ ]Normal       [x]Reduced  Extremities  [x]Warm         [ ]Cool  Volume Status [ ]Hypervolemic [x]Euvolemic [ ]Hypovolemic  Meds: metoprolol 25 milliGRAM(s) Oral every 12 hours    GI/NUTRITION  Exam: soft, nontender, nondistended, surgical incision healing well and is C/D/I  Diet: regular w/ Ensure cans  Meds:  ·	docusate sodium 100 milliGRAM(s) Oral two times a day  ·	pantoprazole    Tablet 40 milliGRAM(s) Oral before breakfast  ·	senna 2 Tablet(s) Oral at bedtime  ·	dronabinol 2.5 milliGRAM(s) Oral two times a day before meals    GENITOURINARY    10 Oct 2017 03:27    136  |  100  |  46<H>  ----------------------------<  120<H>  5.2   |  22  |  2.66<H>    Ca    8.2<L>        Phos  5.1     10-10  Mg     2.4     10-10  TPro  5.3<L>  /  Alb  2.7<L>  /  TBili  2.3<H>  /  DBili  1.8<H>  /  AST  43<H>  /  ALT  19  /  AlkPhos  103  10-10    [x] Torres catheter, indication: urinary retention  Meds: dextrose 5% + sodium chloride 0.45%. infuse at 100 mL/hr    HEMATOLOGIC  Meds: enoxaparin Injectable 80 milliGRAM(s) SubCutaneous every 12 hours  [x] VTE Prophylaxis                        6.1    10.9  )-----------( 227      ( 10 Oct 2017 03:27 )             19.2     INFECTIOUS DISEASES  T(C): 36.6 (10-10-17 @ 03:00), Max: 36.7 (10-09-17 @ 15:00)  WBC Count: 10.9 K/uL (10-10 @ 03:27)  Recent Cultures: Body Fluid Pleural Fluid, 10-07 @ 00:46; Results No growth  Meds: none    ENDOCRINE  Capillary Blood Glucose: none  Meds: none    ACCESS DEVICES:  [x] Peripheral IV  [x] Central Venous Line	[ ] R	[x] L	[x] IJ	[ ] Fem	[ ] SC	Placed: 9/22/2017  [ ] Arterial Line		[ ] R	[ ] L	[ ] Fem	[ ] Rad	[ ] Ax	Placed:   [ ] PICC:					[ ] Mediport  [x] Urinary Catheter, Date Placed: 10/9/2017  [x] Necessity of urinary, arterial, and venous catheters discussed    OTHER MEDICATIONS: none    CODE STATUS: DNR    IMAGING:

## 2017-10-10 NOTE — PROGRESS NOTE ADULT - ASSESSMENT
62y Male initially admitted to neurosurgery for b/l leg weakness found to have newly diagnosed metastatic disease with spinal met causing spinal cord compression s/p T8-T11 laminectomy on 9/20, post-op course complicated by deterioration in respiratory status and acute onset abdominal pain requiring intubation and transfer to NSCU. CT chest/abdomen/pelvis demonstrated b/l DVTs, right upper lobe PE, and a bowel perforation with free air. Pt was taken emergently to the OR and is now s/p exploratory laparotomy, SBR x1 (left in discontinuity), abdominal washout, abthera VAC application on 9/22, after which he was transferred to SICU under ACS. He was taken back to the OR on 9/24 for a washout, primary anastomosis, and abdominal closure. Post-operative course complicated by Acute hypoxemic respiratory failure secondary to Acute Pulmonary Vascular congestion/ Left Lung Collapse. Pathology from both spine and bowel revealed non small cell lung cancer. CT chest revealed tumor compressing left lower lobe bronchus. He was also found to have a left sided pleural effusion which has since been resistant to diuretics and medical management. Thoracic surgery was consulted for a palliative procedure, such as a drainage catheter, following a family meeting today in which the patient was made DNR, but not DNI. Pt s/p 10/6 left pigtail drain in IR with symptomatic relief.

## 2017-10-10 NOTE — PROGRESS NOTE ADULT - SUBJECTIVE AND OBJECTIVE BOX
ATP Progress Note    S: complaining of severe back pain. Plan to have Pleurx catheter placed on Weds. UOP decreased overnight to 5% albumin was given. 1 unit of PRBCs given for HCT of 19.2.    O:  T(C): 37.1 (10-10-17 @ 08:00), Max: 37.1 (10-10-17 @ 08:00)  HR: 90 (10-10-17 @ 12:39) (85 - 103)  BP: 112/69 (10-10-17 @ 12:00) (89/52 - 151/72)  RR: 15 (10-10-17 @ 12:00) (10 - 32)  SpO2: 99% (10-10-17 @ 12:39) (89% - 100%)  Wt(kg): --    10-09 @ 07:01  -  10-10 @ 07:00  --------------------------------------------------------  IN:    dextrose 5% + sodium chloride 0.45%.: 2300 mL    Packed Red Blood Cells: 250 mL    Solution: 1000 mL  Total IN: 3550 mL    OUT:    Chest Tube: 470 mL    Indwelling Catheter - Urethral: 485 mL  Total OUT: 955 mL    Total NET: 2595 mL      10-10 @ 07:01  -  10-10 @ 12:54  --------------------------------------------------------  IN:    dextrose 5% + sodium chloride 0.45%.: 500 mL    Oral Fluid: 160 mL  Total IN: 660 mL    OUT:    Indwelling Catheter - Urethral: 140 mL  Total OUT: 140 mL    Total NET: 520 mL      PHYSICAL EXAM:  Gen: NAD  Resp: Pt on NC, no acute resp distress  Abd: Soft, NT, ND  Incision: c/d/i

## 2017-10-10 NOTE — PROGRESS NOTE ADULT - PROBLEM SELECTOR PLAN 1
-Left lung atelectasis- mucous plugging with airway compression 2nd tumor, atelectasis. L pleural effusion  -s/p L pigtail cath, exudative effusion  -Plan for L pleurex tomorrow with thoracic surgery   -Duoneb q6  -Chest PT, OOB, Acapella valve, IS

## 2017-10-10 NOTE — PROGRESS NOTE ADULT - ASSESSMENT
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    PLAN:  Neurologic: post-operative pain and neoplastic pain  - Continue Tylenol, oxycodone, gabapentin, and fentanyl patch for pain.    Respiratory: acute hypoxemic respiratory failure  - Supplemental O2 via NC as needed to maintain SpO2 > 88%.  - Duonebs for dyspnea.  - Incentive spirometry & out of bed to chair to prevent atelectasis.  - Left pigtail catheter to water seal and monitor output.     Cardiovascular: HTN  - Continue metoprolol 25 mg q12hrs for HTN.    Gastrointestinal/Nutrition: small bowel perforation s/p SBR w/ primary anastomosis   - Regular diet as tolerated.  - Marinol for appetite stimulation.  - Protonix 40mg daily for GERD.  - Bowel regimen with Colace & senna.  - Monitor LFTs.    Renal/Genitourinary: ALEC, likely pre-renal  - Monitor I&Os and electrolytes. Replete as needed.  - D5 1/2 NS @ 100 for likely pre-renal ALEC.    Hematologic: b/l DVT, b/l PE  - Therapeutic Lovenox for RUL PE and bilateral DVTs.  - Monitor CBCs.    Infectious Disease: no active issues  - Monitor for clinical evidence of active infection.    Endocrine: no active issues  - Monitor blood glucose on BMP.    Disposition:  - DNR.  - Stable for transfer to floors.    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    PLAN:  Neurologic: post-operative pain and neoplastic pain  - Continue Tylenol, oxycodone, gabapentin, and fentanyl patch for pain.    Respiratory: acute hypoxemic respiratory failure  - Supplemental O2 via NC as needed to maintain SpO2 > 88%.  - Duonebs for dyspnea.  - Incentive spirometry & out of bed to chair to prevent atelectasis.  - Left pigtail catheter to water seal and monitor output.     Cardiovascular: HTN  - Monitor vital signs.  - Continue metoprolol 25 mg q12hrs for HTN.    Gastrointestinal/Nutrition: small bowel perforation s/p SBR w/ primary anastomosis   - Regular diet as tolerated.  - Marinol for appetite stimulation.  - Protonix 40mg daily for GERD.  - Bowel regimen with Colace & senna.  - Monitor LFTs.    Renal/Genitourinary: ALEC, likely pre-renal  - Monitor I&Os and electrolytes. Replete as needed.  - D5 1/2 NS @ 100 for likely pre-renal ALEC.    Hematologic: b/l DVT, b/l PE  - Therapeutic Lovenox for RUL PE and bilateral DVTs.  - Monitor CBCs.    Infectious Disease: no active issues  - Monitor for clinical evidence of active infection.    Endocrine: no active issues  - Monitor blood glucose on BMP.    Disposition:  - DNR.  - Stable for transfer to floors.    Parisa Carrasquillo PA-C    [id number]

## 2017-10-10 NOTE — PROGRESS NOTE ADULT - SUBJECTIVE AND OBJECTIVE BOX
Follow-up Pulm Progress Note    No new respiratory events overnight.  Denies SOB/CP.   98% on 2L NC  -470cc from L pigtail     Medications:  MEDICATIONS  (STANDING):  ALBUTerol/ipratropium for Nebulization 3 milliLiter(s) Nebulizer every 6 hours  dextrose 5% + sodium chloride 0.45%. 1000 milliLiter(s) (100 mL/Hr) IV Continuous <Continuous>  docusate sodium 100 milliGRAM(s) Oral two times a day  dronabinol 2.5 milliGRAM(s) Oral two times a day before meals  enoxaparin Injectable 80 milliGRAM(s) SubCutaneous every 12 hours  fentaNYL   Patch  25 MICROgram(s)/Hr 1 Patch Transdermal every 72 hours  gabapentin 200 milliGRAM(s) Oral every 12 hours  metoprolol 25 milliGRAM(s) Oral every 12 hours  pantoprazole    Tablet 40 milliGRAM(s) Oral before breakfast  senna 2 Tablet(s) Oral at bedtime    MEDICATIONS  (PRN):  acetaminophen   Tablet. 650 milliGRAM(s) Oral every 6 hours PRN Mild Pain (1 - 3)  oxyCODONE    IR 5 milliGRAM(s) Oral every 4 hours PRN Moderate Pain (4 - 6)  oxyCODONE    IR 10 milliGRAM(s) Oral every 4 hours PRN Severe Pain (7 - 10)          Vital Signs Last 24 Hrs  T(C): 37.1 (10 Oct 2017 08:00), Max: 37.1 (10 Oct 2017 08:00)  T(F): 98.7 (10 Oct 2017 08:00), Max: 98.7 (10 Oct 2017 08:00)  HR: 87 (10 Oct 2017 15:00) (85 - 103)  BP: 106/63 (10 Oct 2017 15:00) (89/52 - 151/72)  BP(mean): 80 (10 Oct 2017 15:00) (65 - 103)  RR: 13 (10 Oct 2017 15:00) (12 - 32)  SpO2: 97% (10 Oct 2017 15:00) (89% - 100%) on 2L NC          10-09 @ 07:01  -  10-10 @ 07:00  --------------------------------------------------------  IN: 3550 mL / OUT: 955 mL / NET: 2595 mL          LABS:                        8.1    13.2  )-----------( 235      ( 10 Oct 2017 07:52 )             24.3     10-10    136  |  100  |  46<H>  ----------------------------<  120<H>  5.2   |  22  |  2.66<H>    Ca    8.2<L>      10 Oct 2017 03:27  Phos  5.1     10-10  Mg     2.4     10-10    TPro  5.3<L>  /  Alb  2.7<L>  /  TBili  2.3<H>  /  DBili  1.8<H>  /  AST  43<H>  /  ALT  19  /  AlkPhos  103  10-10          CAPILLARY BLOOD GLUCOSE            Procalcitonin, Serum: 1.93 ng/mL (10-10-17 @ 03:27)  Procalcitonin, Serum: 1.61 ng/mL (10-09-17 @ 02:51)    Serum Pro-Brain Natriuretic Peptide: 3305 pg/mL (10-10-17 @ 03:27)  Serum Pro-Brain Natriuretic Peptide: 2025 pg/mL (10-09-17 @ 02:51)            Fluid characteristics  -- 10-06 @ 20:17  pH 7.87  LDH 1648  tprot 2.3    Cell count  Appearance Sl Bloody  Fluid type --  BF lymph 37  color Red  eosinophil --  PMN 27  Mesothelial 4  Monocyte 2  Other body cells 30      CULTURES: (if applicable)  Culture Results:   No growth (10-07 @ 00:46)    Most recent blood culture -- 10-07 @ 00:46   -- -- .Body Fluid Pleural Fluid 10-07 @ 00:46    Blood culture 10-07 @ 00:46  --    No polymorphonuclear cells seen per low power field  No organisms seen per oil power field  by cytocentrifuge  --  --  --    Urine culture    -->      Physical Examination:  PULM: Referred BS L  CVS: S1, S2 RRR    RADIOLOGY REVIEWED  CXR: L lung opacification, unchanged

## 2017-10-10 NOTE — PROGRESS NOTE ADULT - PROBLEM SELECTOR PLAN 1
Persistent re-current left malignant pleural effusion  Plan for left pleurx catheter placement for comfort measures and long term drainage on Wednesday 10/11 at 1pm with Dr. Holman.  Pre-op pt, NPO p MN.  Continue management per primary team, will follow. Persistent re-current left malignant pleural effusion  Plan for left pleurx catheter placement for comfort measures and long term drainage on Wednesday 10/11 at 1pm with Dr. Holman.  Pre-op pt, NPO p MN. Hold SQ Lovenox for OR tomorrow.  Continue management per primary team, will follow.

## 2017-10-10 NOTE — PROGRESS NOTE ADULT - ASSESSMENT
A/P: 62M admitted to neurosurgery for bilateral leg weakness found to have newly diagnosed metastatic disease s/p T8-T11 laminectomy presented today with deterioration in respiratory status and acute onset abdominal pain who was subsequently intubated. W/u revealed b/l DVTs, right upper lobe PE, and a bowel perforation.  Pt is now s/p Exploratory laparotomy, SBR x1 (left in discontinuity), abdominal washout, abthera VAC application (9/22); 1cm perforation in the small bowel 110 cm distal to the ligament of treitz. RTOR 9/24 for intestinal reconstruction and abdominal closure. Awaiting ROBF. 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 Most likely noninfected per vascular surgery. Family meeting - Pt is DNR, not DNI.    - F/U Thoracic surgery: Plan to have Pleurx catheter placed on Weds  - Wean 02 as tolerated, recommend diuresis as appropriate  - c/w reg diet  - Pain control  - DVT ppx: T lovenox  - Primary Care Per SICU    [id number]

## 2017-10-10 NOTE — PROGRESS NOTE ADULT - SUBJECTIVE AND OBJECTIVE BOX
Subjective: Pt states "my back still hurts" denies any CP, SOB, N/V and palpitations. No acute events overnight.     Vital Signs Last 24 Hrs  T(F): 98.7, Max: 98.7   HR: 90   BP: 102/63   RR: 13   SpO2: 98%  2L NC    10-10-17 @ 07:01  -  10-10-17 @ 14:30  --------------------------------------------------------  IN: 760 mL / OUT: 175 mL / NET: 585 mL    Relevant labs, radiology and Medications reviewed                        8.1    13.2  )-----------( 235      ( 10 Oct 2017 07:52 )             24.3    136  |  100  |  46<H>  ----------------------------<  120<H>  5.2   |  22  |  2.66<H>        PHYSICAL EXAM  Neurology: A&Ox3, NAD  CV : RRR+S1S2  Lungs: Respirations non-labored, B/L BS diminished with bibasilar crackles  Abdomen: Soft, NT/ND, +BSx4Q, +BM, incision CDI CAROL  : +Torres CDI with clear, yellow urine  Extremities: B/L LE +1 edema, negative calf tenderness, +PP           MEDICATIONS  acetaminophen   Tablet. 650 milliGRAM(s) Oral every 6 hours PRN  albumin human  5% IVPB 250 milliLiter(s) IV Intermittent once  ALBUTerol/ipratropium for Nebulization 3 milliLiter(s) Nebulizer every 6 hours  dextrose 5% + sodium chloride 0.45%. 1000 milliLiter(s) IV Continuous <Continuous>  docusate sodium 100 milliGRAM(s) Oral two times a day  dronabinol 2.5 milliGRAM(s) Oral two times a day before meals  enoxaparin Injectable 80 milliGRAM(s) SubCutaneous every 12 hours  fentaNYL   Patch  25 MICROgram(s)/Hr 1 Patch Transdermal every 72 hours  gabapentin 200 milliGRAM(s) Oral every 12 hours  metoprolol 25 milliGRAM(s) Oral every 12 hours  oxyCODONE    IR 5 milliGRAM(s) Oral every 4 hours PRN  oxyCODONE    IR 10 milliGRAM(s) Oral every 4 hours PRN  pantoprazole    Tablet 40 milliGRAM(s) Oral before breakfast  senna 2 Tablet(s) Oral at bedtime      Discussed with thoracic surgery attending, Dr. Holman.

## 2017-10-10 NOTE — PROGRESS NOTE ADULT - SUBJECTIVE AND OBJECTIVE BOX
63yo M with no significant PMH. Consulted on 9/12 for worsening LBP associated with lower extremity weakness for past 3 weeks. Over the weekend, he progressed to the point where he was unable to ambulate. He was seen at OSH and transferred to [facility] for spine evaluation. Was found to have epidural compression T8-T11 from newly diagnosed metastatic disease to liver, bone and bowel mets. S/p laminectomy on 9/20. Hospitalization complicated by acute hypoxic respiratory failure 2/2 acute pulmonary vascular congestion, L lung collapse, b/l PE, RUL PNA. Was found to have bowel perf with free air, was taken for ex-lap on 9/22, closure on 9/24. Peritonitis subsequently - E. faecium, Klebsiella and Candida.     MEDICATIONS  (STANDING):  ALBUTerol/ipratropium for Nebulization 3 milliLiter(s) Nebulizer every 6 hours  dextrose 5% + sodium chloride 0.45%. 1000 milliLiter(s) (100 mL/Hr) IV Continuous <Continuous>  docusate sodium 100 milliGRAM(s) Oral two times a day  dronabinol 2.5 milliGRAM(s) Oral two times a day before meals  enoxaparin Injectable 80 milliGRAM(s) SubCutaneous every 12 hours  fentaNYL   Patch  25 MICROgram(s)/Hr 1 Patch Transdermal every 72 hours  gabapentin 200 milliGRAM(s) Oral every 12 hours  metoprolol 25 milliGRAM(s) Oral every 12 hours  pantoprazole    Tablet 40 milliGRAM(s) Oral before breakfast  senna 2 Tablet(s) Oral at bedtime    MEDICATIONS  (PRN):  acetaminophen   Tablet. 650 milliGRAM(s) Oral every 6 hours PRN Mild Pain (1 - 3)  oxyCODONE    IR 5 milliGRAM(s) Oral every 4 hours PRN Moderate Pain (4 - 6)  oxyCODONE    IR 10 milliGRAM(s) Oral every 4 hours PRN Severe Pain (7 - 10)        REVIEW OF SYSTEM:  CONSTITUTIONAL: No fever, No change in weight, No fatigue  HEAD: No headache, No dizziness, No recent trauma  EYES: No eye pain, No visual disturbances, No discharge  ENT:  No difficulty hearing, No tinnitus, No vertigo, No sinus pain, No throat pain  NECK: No pain, No stiffness  BREASTS: No pain, No masses, No nipple discharge  RESPIRATORY: No cough, No wheezing, No chills, No hemoptysis, No shortness of breath at rest or exertional shortness of breath  CARDIOVASCULAR: No chest pain, No palpitations, No dizziness, No CHF, No arrhythmia, No cardiomegaly, No leg swelling  GASTROINTESTINAL: No abdominal, No epigastric pain. No nausea, No vomiting, No hematemesis, No diarrhea, No constipation. No melena, No hematochezia. No GERD  GENITOURINARY: No dysuria, No frequency, No hematuria, No incontinence, No nocturia, No hesitancy,  SKIN: No itching, No burning, No rashes, No lesions   LYMPH NODES: No history of enlarged glands  ENDOCRINE: No heat or cold intolerance, No hair loss. No osteoporosis, No thyroid disease  MUSCULOSKELETAL: No joint pain  No muscle, back, or extremity pain    ICU Vital Signs Last 24 Hrs  T(C): 37.1 (10 Oct 2017 08:00), Max: 37.1 (10 Oct 2017 08:00)  T(F): 98.7 (10 Oct 2017 08:00), Max: 98.7 (10 Oct 2017 08:00)  HR: 87 (10 Oct 2017 15:00) (85 - 103)  BP: 106/63 (10 Oct 2017 15:00) (89/52 - 151/72)  BP(mean): 80 (10 Oct 2017 15:00) (65 - 103)  ABP: --  ABP(mean): --  RR: 13 (10 Oct 2017 15:00) (10 - 32)  SpO2: 97% (10 Oct 2017 15:00) (89% - 100%)          PHYSICAL EXAM:  GENERAL: NAD, well nourished and conversant  HEAD:  Atraumatic  EYES: EOM, PERRLA, conjunctiva pink and sclera white  ENT: No tonsillar erythema, exudates, or enlargement, moist mucous membranes, good dentition, no lesions  NECK: Supple, No JVD, normal thyroid, carotids with normal upstrokes and no bruits  CHEST/LUNG: Clear to auscultation bilaterally, No rales, rhonchi, wheezing, or rubs  HEART: Regular rate and rhythm, No murmurs, rubs, or gallops  ABDOMEN: decreased BS + distention  EXTREMITIES:, No clubbing, cyanosis, + edema.  LYMPH: No lymphadenopathy noted  SKIN: No rashes or lesions  NEURO: + LE paralysis    LABS:              CBC Full  -  ( 10 Oct 2017 07:52 )  WBC Count : 13.2 K/uL  Hemoglobin : 8.1 g/dL  Hematocrit : 24.3 %  Platelet Count - Automated : 235 K/uL  Mean Cell Volume : 96.2 fl  Mean Cell Hemoglobin : 32.2 pg  Mean Cell Hemoglobin Concentration : 33.5 gm/dL  Auto Neutrophil # : x  Auto Lymphocyte # : x  Auto Monocyte # : x  Auto Eosinophil # : x  Auto Basophil # : x  Auto Neutrophil % : x  Auto Lymphocyte % : x  Auto Monocyte % : x  Auto Eosinophil % : x  Auto Basophil % : x    10-10    136  |  100  |  46<H>  ----------------------------<  120<H>  5.2   |  22  |  2.66<H>    Ca    8.2<L>      10 Oct 2017 03:27  Phos  5.1     10-10  Mg     2.4     10-10    TPro  5.3<L>  /  Alb  2.7<L>  /  TBili  2.3<H>  /  DBili  1.8<H>  /  AST  43<H>  /  ALT  19  /  AlkPhos  103  10-10    LIVER FUNCTIONS - ( 10 Oct 2017 03:27 )  Alb: 2.7 g/dL / Pro: 5.3 g/dL / ALK PHOS: 103 U/L / ALT: 19 U/L RC / AST: 43 U/L / GGT: x

## 2017-10-10 NOTE — PROGRESS NOTE ADULT - ATTENDING COMMENTS
large recurrent lt effusion for pleurax catheter placement for long term drainage access  May need VATS   titrate O2  ALEC, on INF, started to make urine, resolving oliguria, monitor BUN/Cr  S/O one unit transfusion with appropriate response  Overall poor prognosis  Palliative care reconsult

## 2017-10-10 NOTE — PROGRESS NOTE ADULT - ASSESSMENT
ThisHPI: 62y male admitted to neurosurgery for bilateral leg weakness found to have newly diagnosed metastatic disease s/p T8-T11 laminectomy presented with deterioration in respiratory status and acute onset abdominal pain who was subsequently intubated and w/u revealed b/l DVTs, right upper lobe PE, and a bowel perforation.  Pt is now s/p Exploratory laparotomy #1, SBR x1 (left in discontinuity), abdominal washout, abthera VAC application (9/22) with intraop findings of a 1cm perforation in the small bowel 110 cm distal to the ligament of treitz. )(/24/17 Exploratory OR #2 with abdominal irrigation and enterostomy. Right leg motor function being monitored by neurosurgery after emergency admission spinal cord decompression of metastatic tumor T8. Lung cancer pathology is non small cell.  DVT/ PE  needs to be anticoagulated with clot going up into the right renal vein thrombosis. no return of leg movement or sensation to light touch over the past 5 days. ThisHPI: 62y male admitted to neurosurgery for bilateral leg weakness found to have newly diagnosed metastatic disease s/p T8-T11 laminectomy presented with deterioration in respiratory status and acute onset abdominal pain who was subsequently intubated and w/u revealed b/l DVTs, right upper lobe PE, and a bowel perforation.  Pt is now s/p Exploratory laparotomy #1, SBR x1 (left in discontinuity), abdominal washout, abthera VAC application (9/22) with intraop findings of a 1cm perforation in the small bowel 110 cm distal to the ligament of treitz. )(/24/17 Exploratory OR #2 with abdominal irrigation and enterostomy. Right leg motor function being monitored by neurosurgery after emergency admission spinal cord decompression of metastatic tumor T8. Lung cancer pathology is non small cell.  DVT/ PE  needs to be anticoagulated with clot going up into the right renal vein thrombosis. no return of leg movement or sensation to light touch over the past 5 days.    Left pleural effusion is the result of the primary lung cancer with distal obstruction. Chest tube intervention is lessening the SOB.    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 The patient is unlikely to have neurologic improvement in the future.

## 2017-10-11 NOTE — PROGRESS NOTE ADULT - SUBJECTIVE AND OBJECTIVE BOX
SICU PA Intubation Note    Called to pt bedside for lethargy.  Pt minimally responsive to sternal rub, pupils pinpoint B/L.  Pt received Oxycodone overnight and was on Fentanyl patch.  Patch d/c'ed.  Pt given 0.4mg Narcan x 1 with immediate improvement in mental status.  Pt awake, interactive and talking to bedside nurse.  Pt given 20mg IV Lasix, fluids decreased to 30mL/hr and placed on [place] NC for increased respiratory support.  ABG drawn immediately prior to Narcan administration 7.26/49/82/21/97% on NC.  Pt placed on Bipap 16/8, FiO2 40%.     Later called back to pt bedside for decrease in mental status once more.  Pt minimally responsive to sternal rub, pupils no longer pinpoint.  Repeat ABG shows 7.30/44/174/21/100%.  Decision made to intubate for airway protection.  Plan for intubation discussed with son, Elan, who agrees with plan.  Pt sedated with Etomidate and Succinylcholine.  Pre-oxygenated with BiPap and ambu bag.  Visualization obtained with glidoscope.  Pt found to have large amount of thick mucous directly obstructing airway.  Mucous suctioned and cleared.  Pt intubated with size 8 ETT, confirmed by capnography and B/L breath sounds.  Secured at 25cm at the lip.  Pt tolerated procedure well, saturating 100%.  Placed on PRVC 16/450/100%/5.  CXR ordered, plan on A-line placement and will f/u post-extubation ABG.  Family updated and primary team made aware.  Plan for OR with thoracic this afternoon.  Dr. Terry present for above events.

## 2017-10-11 NOTE — PROGRESS NOTE ADULT - SUBJECTIVE AND OBJECTIVE BOX
Patient is a 62y old  Male who presents with a chief complaint of cord compression (19 Sep 2017 09:44)      HPI:    MEDICATIONS  (STANDING):  ALBUTerol/ipratropium for Nebulization 3 milliLiter(s) Nebulizer every 6 hours  dextrose 5% + sodium chloride 0.9%. 1000 milliLiter(s) (100 mL/Hr) IV Continuous <Continuous>  enoxaparin Injectable 90 milliGRAM(s) SubCutaneous daily  pantoprazole  Injectable 40 milliGRAM(s) IV Push daily  phenylephrine    Infusion 0.4 MICROgram(s)/kG/Min (12.705 mL/Hr) IV Continuous <Continuous>  senna 2 Tablet(s) Oral at bedtime  sodium chloride 0.9% Bolus 500 milliLiter(s) IV Bolus once    MEDICATIONS  (PRN):      Allergies    No Known Allergies    Intolerances        REVIEW OF SYSTEM:  CONSTITUTIONAL: No fever, No change in weight, No fatigue  HEAD: No headache, No dizziness, No recent trauma  EYES: No eye pain, No visual disturbances, No discharge  ENT:  No difficulty hearing, No tinnitus, No vertigo, No sinus pain, No throat pain  NECK: No pain, No stiffness  BREASTS: No pain, No masses, No nipple discharge  RESPIRATORY: No cough, No wheezing, No chills, No hemoptysis, No shortness of breath at rest or exertional shortness of breath  CARDIOVASCULAR: No chest pain, No palpitations, No dizziness, No CHF, No arrhythmia, No cardiomegaly, No leg swelling  GASTROINTESTINAL: No abdominal, No epigastric pain. No nausea, No vomiting, No hematemesis, No diarrhea, No constipation. No melena, No hematochezia. No GERD  GENITOURINARY: No dysuria, No frequency, No hematuria, No incontinence, No nocturia, No hesitancy,  SKIN: No itching, No burning, No rashes, No lesions   LYMPH NODES: No history of enlarged glands  ENDOCRINE: No heat or cold intolerance, No hair loss. No osteoporosis, No thyroid disease  MUSCULOSKELETAL: No joint pain or swelling, No muscle, back, or extremity pain  PSYCHIATRIC: No depression, No anxiety, No mood swings, No difficulty sleeping  HEME/LYMPH: No easy bruising, No anticoagulants, No bleeding disorder, No bleeding gums  ALLERGY AND IMMUNOLOGIC: No hives, No eczema  NEUROLOGICAL: No memory loss, No loss of strength, No numbness, No tremors        VITALS:   T(C): 37.8 (10-11-17 @ 23:00), Max: 38.1 (10-11-17 @ 15:00)  HR: 88 (10-11-17 @ 23:50) (75 - 97)  BP: 96/53 (10-11-17 @ 22:15) (90/61 - 122/60)  RR: 18 (10-11-17 @ 21:15) (12 - 27)  SpO2: 100% (10-11-17 @ 23:50) (96% - 100%)  Wt(kg): --    10-10 @ 07:01  -  10-11 @ 07:00  --------------------------------------------------------  IN: 2810 mL / OUT: 1250 mL / NET: 1560 mL    10-11 @ 07:01  -  10-11 @ 23:59  --------------------------------------------------------  IN: 948.8 mL / OUT: 810 mL / NET: 138.8 mL        PHYSICAL EXAM:  GENERAL: NAD, well nourished and conversant  HEAD:  Atraumatic  EYES: EOM, PERRLA, conjunctiva pink and sclera white  ENT: No tonsillar erythema, exudates, or enlargement, moist mucous membranes, good dentition, no lesions  NECK: Supple, No JVD, normal thyroid, carotids with normal upstrokes and no bruits  CHEST/LUNG: Clear to auscultation bilaterally, No rales, rhonchi, wheezing, or rubs  HEART: Regular rate and rhythm, No murmurs, rubs, or gallops  ABDOMEN: Soft, nondistended, no masses, guarding, tenderness or rebound, bowel sounds present  EXTREMITIES:  2+ Peripheral Pulses, No clubbing, cyanosis, or edema. No arthritis of shoulders, elbows, hands, hips, knees, ankles, or feet. No DJD C spine, T spine, or L/S spine  LYMPH: No lymphadenopathy noted  SKIN: No rashes or lesions  NERVOUS SYSTEM:  Alert & Oriented X3, normal cognitive function. Motor Strength 5/5 right upper and right lower.  5/5 left upper and left lower extremities, DTRs 2+ intact and symmetric    LABS:                          7.9    18.3  )-----------( 334      ( 11 Oct 2017 21:07 )             23.5     10-11    134<L>  |  99  |  52<H>  ----------------------------<  88  5.2   |  19<L>  |  3.40<H>  10-11    131<L>  |  98  |  48<H>  ----------------------------<  110<H>  5.4<H>   |  19<L>  |  3.05<H>  10-10    136  |  100  |  46<H>  ----------------------------<  120<H>  5.2   |  22  |  2.66<H>    Ca    7.9<L>      11 Oct 2017 21:07  Ca    8.0<L>      11 Oct 2017 04:40  Ca    8.2<L>      10 Oct 2017 03:27  Phos  5.4     10-11  Phos  5.1     10-10  Phos  5.2     10-09  Mg     2.4     10-11  Mg     2.4     10-10  Mg     2.5     10-09    TPro  5.7<L>  /  Alb  2.5<L>  /  TBili  3.0<H>  /  DBili  2.1<H>  /  AST  43<H>  /  ALT  18  /  AlkPhos  117  10-11  TPro  5.3<L>  /  Alb  2.7<L>  /  TBili  2.3<H>  /  DBili  1.8<H>  /  AST  43<H>  /  ALT  19  /  AlkPhos  103  10-10  TPro  5.0<L>  /  Alb  1.7<L>  /  TBili  2.2<H>  /  DBili  1.0<H>  /  AST  79<H>  /  ALT  30  /  AlkPhos  138<H>  10-09    CAPILLARY BLOOD GLUCOSE          RADIOLOGY & ADDITIONAL TESTS:      Consultant(s):    Care Discussed with Consultants/Other Providers [ ] YES  [ ] NO Patient is a 62y old  Male who presents with a chief complaint of cord compression (19 Sep 2017 09:44)      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      MEDICATIONS  (STANDING):  ALBUTerol/ipratropium for Nebulization 3 milliLiter(s) Nebulizer every 6 hours  dextrose 5% + sodium chloride 0.9%. 1000 milliLiter(s) (100 mL/Hr) IV Continuous <Continuous>  enoxaparin Injectable 90 milliGRAM(s) SubCutaneous daily  pantoprazole  Injectable 40 milliGRAM(s) IV Push daily  phenylephrine    Infusion 0.4 MICROgram(s)/kG/Min (12.705 mL/Hr) IV Continuous <Continuous>  senna 2 Tablet(s) Oral at bedtime  sodium chloride 0.9% Bolus 500 milliLiter(s) IV Bolus once    MEDICATIONS  (PRN):      Allergies    No Known Allergies    Intolerances        REVIEW OF SYSTEM:  CONSTITUTIONAL: No fever, No change in weight, No fatigue  HEAD: No headache, No dizziness, No recent trauma  EYES: No eye pain, No visual disturbances, No discharge  ENT:  No difficulty hearing, No tinnitus, No vertigo, No sinus pain, No throat pain  NECK: No pain, No stiffness  BREASTS: No pain, No masses, No nipple discharge  RESPIRATORY: No cough, No wheezing, No chills, No hemoptysis, No shortness of breath at rest or exertional shortness of breath  CARDIOVASCULAR: No chest pain, No palpitations, No dizziness, No CHF, No arrhythmia, No cardiomegaly, No leg swelling  GASTROINTESTINAL: No abdominal, No epigastric pain. No nausea, No vomiting, No hematemesis, No diarrhea, No constipation. No melena, No hematochezia. No GERD  GENITOURINARY: No dysuria, No frequency, No hematuria, No incontinence, No nocturia, No hesitancy,  SKIN: No itching, No burning, No rashes, No lesions   LYMPH NODES: No history of enlarged glands  ENDOCRINE: No heat or cold intolerance, No hair loss. No osteoporosis, No thyroid disease  MUSCULOSKELETAL: No joint pain or swelling, No muscle, back, or extremity pain  PSYCHIATRIC: No depression, No anxiety, No mood swings, No difficulty sleeping  HEME/LYMPH: No easy bruising, No anticoagulants, No bleeding disorder, No bleeding gums  ALLERGY AND IMMUNOLOGIC: No hives, No eczema  NEUROLOGICAL: No memory loss, No loss of strength, No numbness, No tremors        VITALS:   T(C): 37.8 (10-11-17 @ 23:00), Max: 38.1 (10-11-17 @ 15:00)  HR: 88 (10-11-17 @ 23:50) (75 - 97)  BP: 96/53 (10-11-17 @ 22:15) (90/61 - 122/60)  RR: 18 (10-11-17 @ 21:15) (12 - 27)  SpO2: 100% (10-11-17 @ 23:50) (96% - 100%)  Wt(kg): --    10-10 @ 07:01  -  10-11 @ 07:00  --------------------------------------------------------  IN: 2810 mL / OUT: 1250 mL / NET: 1560 mL    10-11 @ 07:01  -  10-11 @ 23:59  --------------------------------------------------------  IN: 948.8 mL / OUT: 810 mL / NET: 138.8 mL        PHYSICAL EXAM:  GENERAL: NAD, well nourished and conversant  HEAD:  Atraumatic  EYES: EOM, PERRLA, conjunctiva pink and sclera white  ENT: No tonsillar erythema, exudates, or enlargement, moist mucous membranes, good dentition, no lesions  NECK: Supple, No JVD, normal thyroid, carotids with normal upstrokes and no bruits  CHEST/LUNG: Clear to auscultation bilaterally, No rales, rhonchi, wheezing, or rubs  HEART: Regular rate and rhythm, No murmurs, rubs, or gallops  ABDOMEN: Soft, nondistended, no masses, guarding, tenderness or rebound, bowel sounds present  EXTREMITIES:  2+ Peripheral Pulses, No clubbing, cyanosis, or edema. No arthritis of shoulders, elbows, hands, hips, knees, ankles, or feet. No DJD C spine, T spine, or L/S spine  LYMPH: No lymphadenopathy noted  SKIN: No rashes or lesions  NERVOUS SYSTEM:  Alert & Oriented X3, normal cognitive function. Motor Strength 5/5 right upper and right lower.  5/5 left upper and left lower extremities, DTRs 2+ intact and symmetric    LABS:                          7.9    18.3  )-----------( 334      ( 11 Oct 2017 21:07 )             23.5     10-11    134<L>  |  99  |  52<H>  ----------------------------<  88  5.2   |  19<L>  |  3.40<H>  10-11    131<L>  |  98  |  48<H>  ----------------------------<  110<H>  5.4<H>   |  19<L>  |  3.05<H>  10-10    136  |  100  |  46<H>  ----------------------------<  120<H>  5.2   |  22  |  2.66<H>    Ca    7.9<L>      11 Oct 2017 21:07  Ca    8.0<L>      11 Oct 2017 04:40  Ca    8.2<L>      10 Oct 2017 03:27  Phos  5.4     10-11  Phos  5.1     10-10  Phos  5.2     10-09  Mg     2.4     10-11  Mg     2.4     10-10  Mg     2.5     10-09    TPro  5.7<L>  /  Alb  2.5<L>  /  TBili  3.0<H>  /  DBili  2.1<H>  /  AST  43<H>  /  ALT  18  /  AlkPhos  117  10-11  TPro  5.3<L>  /  Alb  2.7<L>  /  TBili  2.3<H>  /  DBili  1.8<H>  /  AST  43<H>  /  ALT  19  /  AlkPhos  103  10-10  TPro  5.0<L>  /  Alb  1.7<L>  /  TBili  2.2<H>  /  DBili  1.0<H>  /  AST  79<H>  /  ALT  30  /  AlkPhos  138<H>  10-09    CAPILLARY BLOOD GLUCOSE          RADIOLOGY & ADDITIONAL TESTS:      Consultant(s):    Care Discussed with Consultants/Other Providers [ ] YES  [ ] NO

## 2017-10-11 NOTE — PROGRESS NOTE ADULT - ASSESSMENT
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    PLAN:  Neurologic: post-operative pain and neoplastic pain  - Continue Tylenol, oxycodone, gabapentin, and fentanyl patch for pain.    Respiratory: acute hypoxemic respiratory failure  - Supplemental O2 via NC as needed to maintain SpO2 > 88%.  - Duonebs for dyspnea.  - Incentive spirometry & out of bed to chair to prevent atelectasis.  - Left pigtail catheter to water seal and monitor output. Pleurx catheter with thoracic surgery today.    Cardiovascular: HTN  - Monitor vital signs.  - Continue metoprolol 25 mg q12hrs for HTN.    Gastrointestinal/Nutrition: small bowel perforation s/p SBR w/ primary anastomosis   - Regular diet as tolerated. NPO   - Marinol for appetite stimulation.  - Protonix 40mg daily for GERD.  - Bowel regimen with Colace & senna.  - Monitor LFTs.    Renal/Genitourinary: ALEC, likely pre-renal  - Monitor I&Os and electrolytes. Replete as needed.  - D5 1/2 NS @ 100 for likely pre-renal ALEC.    Hematologic: b/l DVT, b/l PE  - Therapeutic Lovenox for RUL PE and bilateral DVTs.  - Monitor CBCs.    Infectious Disease: no active issues  - Monitor for clinical evidence of active infection.    Endocrine: no active issues  - Monitor blood glucose on BMP.    Disposition:  - DNR.  - Stable for transfer to floors.    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    PLAN:  Neurologic: post-operative pain and neoplastic pain  - Continue Tylenol, oxycodone, gabapentin, and fentanyl patch for pain.    Respiratory: acute hypoxemic respiratory failure  - Supplemental O2 via NC as needed to maintain SpO2 > 88%.  - Duonebs for dyspnea.  - Incentive spirometry & out of bed to chair to prevent atelectasis.  - Left pigtail catheter to water seal and monitor output. Pleurx catheter with thoracic surgery today.    Cardiovascular: HTN  - Monitor vital signs.  - Continue metoprolol 25 mg q12hrs for HTN.    Gastrointestinal/Nutrition: small bowel perforation s/p SBR w/ primary anastomosis   - Regular diet as tolerated upon return from the OR.  - Marinol for appetite stimulation.  - Protonix 40mg daily for GERD.  - Bowel regimen with Colace & senna.  - Monitor LFTs.    Renal/Genitourinary: ALEC, likely pre-renal  - Monitor I&Os and electrolytes. Replete as needed.  - D5 1/2 NS @ 100 for likely pre-renal ALEC.    Hematologic: b/l DVT, b/l PE  - Therapeutic Lovenox for RUL PE and bilateral DVTs.  - Monitor CBCs.    Infectious Disease: no active issues  - Monitor for clinical evidence of active infection.    Endocrine: no active issues  - Monitor blood glucose on BMP.    Disposition:  - DNR.  - Will remain in SICU for monitoring post-operatively.    Parisa Carrasquillo PA-C    [id number] ASSESSMENT: 62 year old male with Stage IV non-small cell lung carcinoma with spinal, small bowel, and likely liver metastasis presenting with RUL PE with IVC and renal thrombosis, small bowel perforation s/p resection & left in discontinuity with Abthera (9/22) with RTOR for washout, primary anastomosis, & abdominal closure (9/24) complicated by acute hypoxemic respiratory failure requiring CPAP & high flow NC likely secondary to his NSCLC and RUL PE.    PLAN:  Neurologic: post-operative pain and neoplastic pain  - Continue Tylenol, gabapentin  - d/c Narcotics    Respiratory: acute hypoxemic respiratory failure  - Bipap 16/8, 40% for ventilatory support   - Duonebs for dyspnea.  - Incentive spirometry & out of bed to chair to prevent atelectasis.  - Left pigtail catheter to water seal and monitor output. Pleurex catheter with thoracic surgery today.    Cardiovascular: HTN  - Monitor vital signs.  - Continue metoprolol 25 mg q12hrs for HTN.    Gastrointestinal/Nutrition: small bowel perforation s/p SBR w/ primary anastomosis   - Regular diet as tolerated upon return from the OR.  - Marinol for appetite stimulation.  - Protonix 40mg daily for GERD.  - Bowel regimen with Colace & senna.  - Monitor LFTs.    Renal/Genitourinary: ALEC, likely pre-renal  - Monitor I&Os and electrolytes. Replete as needed.  - D5NS @ 30mL/hr     Hematologic: b/l DVT, b/l PE  - Therapeutic Lovenox for RUL PE and bilateral DVTs, held for potential OR today   - Monitor CBCs.    Infectious Disease: no active issues  - Monitor for clinical evidence of active infection.    Endocrine: no active issues  - Monitor blood glucose on BMP.    Disposition:  - DNR.  - Will remain in SICU for monitoring post-operatively.    Parisa Carrasquillo PA-C    [id number]

## 2017-10-11 NOTE — PROGRESS NOTE ADULT - SUBJECTIVE AND OBJECTIVE BOX
HISTORY:  62y Male initially admitted to neurosurgery for b/l leg weakness found to have newly diagnosed metastatic disease with spinal met causing spinal cord compression s/p T8-T11 laminectomy on 9/20, post-op course complicated by deterioration in respiratory status and acute onset abdominal pain requiring intubation and transfer to NSCU. CT chest/abdomen/pelvis demonstrated b/l DVTs, right upper lobe PE, and a bowel perforation with free air. Pt was taken emergently to the OR and is now s/p exploratory laparotomy, SBR x1 (left in discontinuity), abdominal washout, abthera VAC application on 9/22, after which he was transferred to SICU under ACS. He was taken back to the OR on 9/24 for a washout, primary anastomosis, and abdominal closure. Post-operative course complicated by Acute hypoxemic respiratory failure secondary to Acute Pulmonary Vascular congestion/ Left Lung Collapse.    24 HOUR EVENTS: HISTORY:  62y Male initially admitted to neurosurgery for b/l leg weakness found to have newly diagnosed metastatic disease with spinal met causing spinal cord compression s/p T8-T11 laminectomy on 9/20, post-op course complicated by deterioration in respiratory status and acute onset abdominal pain requiring intubation and transfer to NSCU. CT chest/abdomen/pelvis demonstrated b/l DVTs, right upper lobe PE, and a bowel perforation with free air. Pt was taken emergently to the OR and is now s/p exploratory laparotomy, SBR x1 (left in discontinuity), abdominal washout, abthera VAC application on 9/22, after which he was transferred to SICU under ACS. He was taken back to the OR on 9/24 for a washout, primary anastomosis, and abdominal closure. Post-operative course complicated by Acute hypoxemic respiratory failure secondary to Acute Pulmonary Vascular congestion / Left Lung Collapse.    24 HOUR EVENTS: Back pain is well controlled and appetite has improved. Patient is scheduled for Pleurx catheter with thoracic today so he was made NPO after midnight and his therapeutic Lovenox was held. R IJ triple lumen CVC d/mark. Melissa was not d/mark as patient is heading to the OR today. HISTORY:  62y Male initially admitted to neurosurgery for b/l leg weakness found to have newly diagnosed metastatic disease with spinal met causing spinal cord compression s/p T8-T11 laminectomy on 9/20, post-op course complicated by deterioration in respiratory status and acute onset abdominal pain requiring intubation and transfer to NSCU. CT chest/abdomen/pelvis demonstrated b/l DVTs, right upper lobe PE, and a bowel perforation with free air. Pt was taken emergently to the OR and is now s/p exploratory laparotomy, SBR x1 (left in discontinuity), abdominal washout, abthera VAC application on 9/22, after which he was transferred to SICU under ACS. He was taken back to the OR on 9/24 for a washout, primary anastomosis, and abdominal closure. Post-operative course complicated by Acute hypoxemic respiratory failure secondary to Acute Pulmonary Vascular congestion / Left Lung Collapse.    24 HOUR EVENTS: Back pain is well controlled and appetite has improved. Patient is scheduled for Pleurx catheter with thoracic today so he was made NPO after midnight and his therapeutic Lovenox was held. R IJ triple lumen CVC d/mark. Torres was not d/mark as patient is heading to the OR today.    SUBJECTIVE/ROS:  [x] A ten-point review of systems was otherwise negative except as noted.  [ ] Due to altered mental status/intubation, subjective information were not able to be obtained from the patient. History was obtained, to the extent possible, from review of the chart and collateral sources of information.      NEURO  CAM ICU: positive  Exam: awake, alert, oriented x3, NAD  Meds:  ·	acetaminophen   Tablet. 650 milliGRAM(s) Oral every 6 hours PRN Mild Pain (1 - 3)  ·	fentaNYL   Patch  25 MICROgram(s)/Hr 1 Patch Transdermal every 72 hours  ·	gabapentin 200 milliGRAM(s) Oral every 12 hours  ·	oxyCODONE    IR 5 milliGRAM(s) Oral every 4 hours PRN Moderate Pain (4 - 6)  ·	oxyCODONE    IR 10 milliGRAM(s) Oral every 4 hours PRN Severe Pain (7 - 10)  [x] Adequacy of sedation and pain control has been assessed and adjusted    RESPIRATORY  RR: 13 (10-11-17 @ 06:00) (12 - 32)  SpO2: 100% (10-11-17 @ 06:00) (89% - 100%)  Exam: mild rhonchi in the upper lung fields, decreased breath sounds in the LLL, pigtail to suction draining serosanguineous fluid  Mechanical Ventilation: no  [N/A] Extubation Readiness Assessed  Meds: ALBUTerol/ipratropium for Nebulization 3 milliLiter(s) Nebulizer every 6 hours    CARDIOVASCULAR  HR: 86 (10-11-17 @ 06:00) (81 - 103)  BP: 110/65 (10-11-17 @ 06:00) (100/64 - 180/144)  BP(mean): 82 (10-11-17 @ 06:00) (74 - 160)  Exam: regular rate and rhythm, S1S2  Cardiac Rhythm: sinus  Perfusion     [x]Adequate   [ ]Inadequate  Mentation   [ ]Normal       [x]Reduced  Extremities  [x]Warm         [ ]Cool  Volume Status [ ]Hypervolemic [x]Euvolemic [ ]Hypovolemic  Meds: metoprolol 25 milliGRAM(s) Oral every 12 hours    GI/NUTRITION  Exam: soft, nontender, nondistended, surgical incision healing well and is C/D/I  Diet: regular w/ Ensure cans  Meds:  ·	docusate sodium 100 milliGRAM(s) Oral two times a day  ·	pantoprazole    Tablet 40 milliGRAM(s) Oral before breakfast  ·	senna 2 Tablet(s) Oral at bedtime  ·	dronabinol 2.5 milliGRAM(s) Oral two times a day before meals    GENITOURINARY      11 Oct 2017 04:40    131<L>  |  98  |  48<H>  ----------------------------<  110<H>  5.4<H>   |  19<L>  |  3.05<H>    Ca    8.0<L>        Phos  5.4     10-11  Mg     2.4     10-11  TPro  5.7<L>  /  Alb  2.5<L>  /  TBili  3.0<H>  /  DBili  2.1<H>  /  AST  43<H>  /  ALT  18  /  AlkPhos  117  10-11    [x] Torres catheter, indication: urinary retention  Meds: dextrose 5% + sodium chloride 0.45%. infuse at 100 mL/hr    HEMATOLOGIC  Meds: enoxaparin Injectable 80 milliGRAM(s) SubCutaneous every 12 hours  [x] VTE Prophylaxis                        9.0    16.4  )-----------( 306      ( 11 Oct 2017 04:40 )             27.4     PT/INR - ( 11 Oct 2017 04:59 )   PT: 14.8 sec;   INR: 1.35 ratio    PTT - ( 11 Oct 2017 04:59 )  PTT:31.3 sec    INFECTIOUS DISEASES  T(C): 37.1 (10-11-17 @ 04:00), Max: 37.1 (10-10-17 @ 08:00)  WBC Count:  ·	16.4 K/uL (10-11 @ 04:40)  ·	13.2 K/uL (10-10 @ 07:52)  Recent Cultures: Specimen Source: .Body Fluid Pleural Fluid, 10-07 @ 00:46; Results No growth  Meds: none    ENDOCRINE  Capillary Blood Glucose: none  Meds: none    ACCESS DEVICES:  [x] Peripheral IV  [ ] Central Venous Line	[ ] R	[ ] L	[ ] IJ	[ ] Fem	[ ] SC	Placed:  [ ] Arterial Line		[ ] R	[ ] L	[ ] Fem	[ ] Rad	[ ] Ax	Placed:   [ ] PICC:					[ ] Mediport  [x] Urinary Catheter, Date Placed: 10/9/2017  [x] Necessity of urinary, arterial, and venous catheters discussed    OTHER MEDICATIONS: none    CODE STATUS: DNR    IMAGING: HISTORY:  62y Male initially admitted to neurosurgery for b/l leg weakness found to have newly diagnosed metastatic disease with spinal met causing spinal cord compression s/p T8-T11 laminectomy on 9/20, post-op course complicated by deterioration in respiratory status and acute onset abdominal pain requiring intubation and transfer to NSCU. CT chest/abdomen/pelvis demonstrated b/l DVTs, right upper lobe PE, and a bowel perforation with free air. Pt was taken emergently to the OR and is now s/p exploratory laparotomy, SBR x1 (left in discontinuity), abdominal washout, abthera VAC application on 9/22, after which he was transferred to SICU under ACS. He was taken back to the OR on 9/24 for a washout, primary anastomosis, and abdominal closure. Post-operative course complicated by Acute hypoxemic respiratory failure secondary to Acute Pulmonary Vascular congestion / Left Lung Collapse.    24 HOUR EVENTS: Back pain is well controlled and appetite has improved. Patient is scheduled for Pleurx catheter with thoracic today so he was made NPO after midnight and his therapeutic Lovenox was held. Pt bolused 250Ml 5% albumin, transfused 1 unit PRBC.  R IJ triple lumen CVC d/mark. Torres was not d/mark as patient is heading to the OR today. Pt noted to be lethargic during am rounds, Fentanyl patch d/c'ed and Narcan given with improvement in mental status.  Given Lasix 20mg IV x 1 and fluids decreased to 30mL/hr. Started on [place].     SUBJECTIVE/ROS:  [x] A ten-point review of systems was otherwise negative except as noted.  [ ] Due to altered mental status/intubation, subjective information were not able to be obtained from the patient. History was obtained, to the extent possible, from review of the chart and collateral sources of information.      NEURO  CAM ICU: positive  Exam: Drowsy but arousable to voice, disoriented to time and place.   Meds:  ·	acetaminophen   Tablet. 650 milliGRAM(s) Oral every 6 hours PRN Mild Pain (1 - 3)    ·	gabapentin 200 milliGRAM(s) Oral every 12 hours  ·	oxyCODONE    IR 5 milliGRAM(s) Oral every 4 hours PRN Moderate Pain (4 - 6)  ·	oxyCODONE    IR 10 milliGRAM(s) Oral every 4 hours PRN Severe Pain (7 - 10)  [x] Adequacy of sedation and pain control has been assessed and adjusted    RESPIRATORY  RR: 13 (10-11-17 @ 06:00) (12 - 32)  SpO2: 100% (10-11-17 @ 06:00) (89% - 100%)  Exam: mild rhonchi in the upper lung fields, decreased breath sounds in the LLL, pigtail to suction draining serosanguineous fluid  Mechanical Ventilation: no  [N/A] Extubation Readiness Assessed  Meds: ALBUTerol/ipratropium for Nebulization 3 milliLiter(s) Nebulizer every 6 hours    CARDIOVASCULAR  HR: 86 (10-11-17 @ 06:00) (81 - 103)  BP: 110/65 (10-11-17 @ 06:00) (100/64 - 180/144)  BP(mean): 82 (10-11-17 @ 06:00) (74 - 160)  Exam: regular rate and rhythm, S1S2  Cardiac Rhythm: sinus  Perfusion     [x]Adequate   [ ]Inadequate  Mentation   [ ]Normal       [x]Reduced  Extremities  [x]Warm         [ ]Cool  Volume Status [ ]Hypervolemic [x]Euvolemic [ ]Hypovolemic  Meds: metoprolol 25 milliGRAM(s) Oral every 12 hours    GI/NUTRITION  Exam: soft, nontender, nondistended, surgical incision healing well and is C/D/I  Diet: regular w/ Ensure cans  Meds:  ·	docusate sodium 100 milliGRAM(s) Oral two times a day  ·	pantoprazole    Tablet 40 milliGRAM(s) Oral before breakfast  ·	senna 2 Tablet(s) Oral at bedtime  ·	dronabinol 2.5 milliGRAM(s) Oral two times a day before meals    GENITOURINARY  I&O's Detail    10 Oct 2017 07:01  -  11 Oct 2017 07:00  --------------------------------------------------------  IN:    dextrose 5% + sodium chloride 0.45%.: 2400 mL    Oral Fluid: 160 mL    Solution: 250 mL  Total IN: 2810 mL    OUT:    Chest Tube: 600 mL    Indwelling Catheter - Urethral: 650 mL  Total OUT: 1250 mL    Total NET: 1560 mL      11 Oct 2017 07:01  -  11 Oct 2017 10:02  --------------------------------------------------------  IN:    dextrose 5% + sodium chloride 0.45%.: 100 mL    dextrose 5% + sodium chloride 0.45%.: 60 mL  Total IN: 160 mL    OUT:    Indwelling Catheter - Urethral: 85 mL  Total OUT: 85 mL    Total NET: 75 mL    11 Oct 2017 04:40    131<L>  |  98  |  48<H>  ----------------------------<  110<H>  5.4<H>   |  19<L>  |  3.05<H>    Ca    8.0<L>        Phos  5.4     10-11  Mg     2.4     10-11  TPro  5.7<L>  /  Alb  2.5<L>  /  TBili  3.0<H>  /  DBili  2.1<H>  /  AST  43<H>  /  ALT  18  /  AlkPhos  117  10-11    [x] Torres catheter, indication: urinary retention  Meds: dextrose 5% + sodium chloride 0.45%. infuse at 100 mL/hr    HEMATOLOGIC  Meds: enoxaparin Injectable 80 milliGRAM(s) SubCutaneous every 12 hours  [x] VTE Prophylaxis                        9.0    16.4  )-----------( 306      ( 11 Oct 2017 04:40 )             27.4     PT/INR - ( 11 Oct 2017 04:59 )   PT: 14.8 sec;   INR: 1.35 ratio    PTT - ( 11 Oct 2017 04:59 )  PTT:31.3 sec    INFECTIOUS DISEASES  T(C): 37.1 (10-11-17 @ 04:00), Max: 37.1 (10-10-17 @ 08:00)  WBC Count:  ·	16.4 K/uL (10-11 @ 04:40)  ·	13.2 K/uL (10-10 @ 07:52)  Recent Cultures: Specimen Source: .Body Fluid Pleural Fluid, 10-07 @ 00:46; Results No growth  Meds: none    ENDOCRINE  Capillary Blood Glucose: none  Meds: none    ACCESS DEVICES:  [x] Peripheral IV  [ ] Central Venous Line	[ ] R	[ ] L	[ ] IJ	[ ] Fem	[ ] SC	Placed:  [ ] Arterial Line		[ ] R	[ ] L	[ ] Fem	[ ] Rad	[ ] Ax	Placed:   [ ] PICC:					[ ] Mediport  [x] Urinary Catheter, Date Placed: 10/9/2017  [x] Necessity of urinary, arterial, and venous catheters discussed    OTHER MEDICATIONS: none    CODE STATUS: DNR    IMAGING:

## 2017-10-11 NOTE — PROGRESS NOTE ADULT - SUBJECTIVE AND OBJECTIVE BOX
Follow-up Pulm Progress Note    More obtunded this AM. Narcan given. High-flow, then Bilevel tried. Required intubation.     Medications:  MEDICATIONS  (STANDING):  ALBUTerol/ipratropium for Nebulization 3 milliLiter(s) Nebulizer every 6 hours  dextrose 5% + sodium chloride 0.9%. 1000 milliLiter(s) (50 mL/Hr) IV Continuous <Continuous>  pantoprazole  Injectable 40 milliGRAM(s) IV Push daily  phenylephrine    Infusion 0.4 MICROgram(s)/kG/Min (12.705 mL/Hr) IV Continuous <Continuous>  senna 2 Tablet(s) Oral at bedtime    MEDICATIONS  (PRN):      Mode: AC/ CMV (Assist Control/ Continuous Mandatory Ventilation)  RR (machine): 16  TV (machine): 450  FiO2: 40  PEEP: 8  ITime: 1  MAP: 13  PIP: 24      Vital Signs Last 24 Hrs  T(C): 38.1 (11 Oct 2017 15:00), Max: 38.1 (11 Oct 2017 15:00)  T(F): 100.5 (11 Oct 2017 15:00), Max: 100.5 (11 Oct 2017 15:00)  HR: 78 (11 Oct 2017 15:00) (75 - 98)  BP: 99/59 (11 Oct 2017 13:15) (90/61 - 180/144)  BP(mean): 74 (11 Oct 2017 13:15) (69 - 160)  RR: 16 (11 Oct 2017 15:00) (12 - 32)  SpO2: 100% (11 Oct 2017 15:00) (92% - 100%)    ABG - ( 11 Oct 2017 13:13 )  pH: 7.30  /  pCO2: 42    /  pO2: 209   / HCO3: 20    / Base Excess: -5.1  /  SaO2: 100       10-10 @ 07:01  -  10-11 @ 07:00  --------------------------------------------------------  IN: 2810 mL / OUT: 1250 mL / NET: 1560 mL    LABS:                        9.0    16.4  )-----------( 306      ( 11 Oct 2017 04:40 )             27.4     10-11    131<L>  |  98  |  48<H>  ----------------------------<  110<H>  5.4<H>   |  19<L>  |  3.05<H>    Ca    8.0<L>      11 Oct 2017 04:40  Phos  5.4     10-11  Mg     2.4     10-11    TPro  5.7<L>  /  Alb  2.5<L>  /  TBili  3.0<H>  /  DBili  2.1<H>  /  AST  43<H>  /  ALT  18  /  AlkPhos  117  10-11    CAPILLARY BLOOD GLUCOSE    PT/INR - ( 11 Oct 2017 04:59 )   PT: 14.8 sec;   INR: 1.35 ratio         PTT - ( 11 Oct 2017 04:59 )  PTT:31.3 sec    Procalcitonin, Serum: 1.93 ng/mL (10-10-17 @ 03:27)  Procalcitonin, Serum: 1.61 ng/mL (10-09-17 @ 02:51)    Serum Pro-Brain Natriuretic Peptide: 9049 pg/mL (10-11-17 @ 04:40)  Serum Pro-Brain Natriuretic Peptide: 3305 pg/mL (10-10-17 @ 03:27)  Serum Pro-Brain Natriuretic Peptide: 2025 pg/mL (10-09-17 @ 02:51)    Fluid characteristics  -- 10-06 @ 20:17  pH 7.87  LDH 1648  tprot 2.3    Cell count  Appearance Sl Bloody  Fluid type --  BF lymph 37  color Red  eosinophil --  PMN 27  Mesothelial 4  Monocyte 2  Other body cells 30      CULTURES: (if applicable)  Culture Results:   No growth (10-07 @ 00:46)    Most recent blood culture -- 10-07 @ 00:46   -- -- .Body Fluid Pleural Fluid 10-07 @ 00:46    Blood culture 10-07 @ 00:46  --    No polymorphonuclear cells seen per low power field  No organisms seen per oil power field  by cytocentrifuge  --  --  --    Urine culture    -->      Physical Examination:  PULM: no BS on left  CVS: S1, S2 heard    RADIOLOGY REVIEWED  CXR: complete left lung collapse 2 to effusion/atelactasis    CT chest:    TTE:

## 2017-10-11 NOTE — PROGRESS NOTE ADULT - ASSESSMENT
A/P: 62M admitted to neurosurgery for bilateral leg weakness found to have newly diagnosed metastatic disease s/p T8-T11 laminectomy presented today with deterioration in respiratory status and acute onset abdominal pain who was subsequently intubated. W/u revealed b/l DVTs, right upper lobe PE, and a bowel perforation.  Pt is now s/p Exploratory laparotomy, SBR x1 (left in discontinuity), abdominal washout, abthera VAC application (9/22); 1cm perforation in the small bowel 110 cm distal to the ligament of treitz. RTOR 9/24 for intestinal reconstruction and abdominal closure. Awaiting ROBF. 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 Most likely noninfected per vascular surgery. Family meeting - Pt is DNR, not DNI.    - Thoracic surgery: Plan to have Pleurx catheter placed today  - c/w reg diet  - Pain control  - T. Lovenox held for OR  - Primary Care Per SICU    [id number]

## 2017-10-11 NOTE — CHART NOTE - NSCHARTNOTEFT_GEN_A_CORE
Thoracic Surgery Note    Indication: Respiratory Failure requiring re-intubation    Pre-op Dx: Metastatic Lung Cancer    History:    Preop medication: Versed    Xray Findings:  EXAM:  CHEST-PORTABLE URGENT                            PROCEDURE DATE:  10/11/2017            INTERPRETATION:  A single chest x-ray was obtained on October 11, 2017.    Indication: Intubated.    Impression:    Complete opacification of left hemithorax. Right lung appears to be   clear. Changes compatible with collapsed left lung. Left pleural   effusion. A pigtail catheter is seen on the left. No pneumothorax.   Endotracheal tube and NG tube are in good position. Status post total   right shoulder replacement. No change in appearance the chest when   compared to previous study done on the same date at 2:50 AM.      Findings:  Bronchoscope inserted through Trachostomy tube.  Tracheostomy tube noted to be in good position.  . Airway evaluation revealed Sharp Mariluz.  Left lower Complete obliteration secondary to malignant lesion.  Left upper lobe lesion appreciated with small airway space.      Patient tolerated the procedure well.  Please obtain a post bronchoscopy x-ray

## 2017-10-11 NOTE — PROGRESS NOTE ADULT - SUBJECTIVE AND OBJECTIVE BOX
ATP Progress Note    S: No acute events overnight. Patient resting comfortably in bed. Pain well controlled. Pleurx catheter with thoracic today.    O:  T(C): 37.1 (10-11-17 @ 04:00), Max: 37.1 (10-10-17 @ 08:00)  HR: 86 (10-11-17 @ 07:00) (81 - 103)  BP: 116/65 (10-11-17 @ 07:00) (100/64 - 180/144)  RR: 12 (10-11-17 @ 07:00) (12 - 32)  SpO2: 100% (10-11-17 @ 07:00) (89% - 100%)  Wt(kg): --    10-10 @ 07:01  -  10-11 @ 07:00  --------------------------------------------------------  IN:    dextrose 5% + sodium chloride 0.45%.: 2400 mL    Oral Fluid: 160 mL    Solution: 250 mL  Total IN: 2810 mL    OUT:    Chest Tube: 600 mL    Indwelling Catheter - Urethral: 650 mL  Total OUT: 1250 mL    Total NET: 1560 mL        CBC Full  -  ( 11 Oct 2017 04:40 )  WBC Count : 16.4 K/uL  Hemoglobin : 9.0 g/dL  Hematocrit : 27.4 %  Platelet Count - Automated : 306 K/uL  Mean Cell Volume : 96.1 fl  Mean Cell Hemoglobin : 31.6 pg  Mean Cell Hemoglobin Concentration : 32.9 gm/dL        PHYSICAL EXAM:  Gen: NAD  Resp: No acute resp distress  Abd: Soft, NT, ND  Lines: Pigtail to suction

## 2017-10-11 NOTE — PROGRESS NOTE ADULT - ASSESSMENT
ThisHPI: 62y male admitted to neurosurgery for bilateral leg weakness found to have newly diagnosed metastatic disease s/p T8-T11 laminectomy presented with deterioration in respiratory status and acute onset abdominal pain who was subsequently intubated and w/u revealed b/l DVTs, right upper lobe PE, and a bowel perforation.  Pt is now s/p Exploratory laparotomy #1, SBR x1 (left in discontinuity), abdominal washout, abthera VAC application (9/22) with intraop findings of a 1cm perforation in the small bowel 110 cm distal to the ligament of treitz. )(/24/17 Exploratory OR #2 with abdominal irrigation and enterostomy. Right leg motor function being monitored by neurosurgery after emergency admission spinal cord decompression of metastatic tumor T8. Lung cancer pathology is non small cell.  DVT/ PE  needs to be anticoagulated with clot going up into the right renal vein thrombosis. no return of leg movement or sensation to light touch over the past 5 days.    Left pleural effusion is the result of the primary lung cancer with distal obstruction. Chest tube intervention is lessening the SOB.    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 The patient is unlikely to have neurologic improvement in the future.

## 2017-10-11 NOTE — PROGRESS NOTE ADULT - ATTENDING COMMENTS
Pt very lethargic responded to Narcan for short duration, ABG acute resp acidosis s/p intubated placed on mech vent  CXR lt lung opacification combination of atelectasis large effusion for lt pleurax catheter placement. lt CT is draining without improvement in aeration suspect trap lung in endobronchial lesion precluding expansion of lung  CXR/ABG  Worsening of ALEC hyperkalemia, dod not respond to lasix on gental hydration  Crirical care US pt appears to be euvolumic  To Or for possible pleurax catheter placement  Full smith AC for DVT PE on hold for OR

## 2017-10-12 NOTE — PROGRESS NOTE ADULT - ATTENDING COMMENTS
patient is becoming progressively distended    performed focused point of care abdominal ultrasound (images saved on ultrasound)  -there is a small amount of ascites in the left upper quadrant  -small bowel is peristalsing    no bowel movement since 10/6 despite senna  abd X-ray reveals stool filled colon  -start Miralax  -needs rectal exam and possible stool disimpaction

## 2017-10-12 NOTE — PROGRESS NOTE ADULT - ATTENDING COMMENTS
Prn Ativan Dilaudid for comfort  Improving gas exchange hypercapnia, wean down FiO2 PEEP  Hypotensive on Enoch-Synephrine, intravascular depleted on critical care US, will give bolus and start maintenance fluid, optimization of volume startus  Panculture to assess for infection, not on abx  Large lt effusion atelectasis, insert another US guided PIG tail at higher position  Start tube feed  Goal of care discussion with him/family

## 2017-10-12 NOTE — PROGRESS NOTE ADULT - SUBJECTIVE AND OBJECTIVE BOX
ATP Progress Note    S: Patient reintubated yesterday for worsening mental status and respiratory acidosis. Bronchoscopy was performed and tumor was noted to be occluding the left bronchus. After intubation he became hypotensive and a femoral arterial line was placed and he was started on E[place]ch.       O:  T(C): 37.6 (10-12-17 @ 07:00), Max: 38.1 (10-11-17 @ 15:00)  HR: 101 (10-12-17 @ 09:30) (75 - 106)  BP: 116/70 (10-12-17 @ 07:00) (90/61 - 122/60)  RR: 22 (10-12-17 @ 09:30) (7 - 29)  SpO2: 97% (10-12-17 @ 09:30) (96% - 100%)  Wt(kg): --    10-11 @ 07:01  -  10-12 @ 07:00  --------------------------------------------------------  IN:    dextrose 5% + sodium chloride 0.45%.: 100 mL    dextrose 5% + sodium chloride 0.45%.: 60 mL    dextrose 5% + sodium chloride 0.9%: 90 mL    dextrose 5% + sodium chloride 0.9%.: 1075 mL    Nepro: 150 mL    phenylephrine   Infusion: 373.4 mL    Sodium Chloride 0.9% IV Bolus: 500 mL  Total IN: 2348.4 mL    OUT:    Chest Tube: 160 mL    Indwelling Catheter - Urethral: 570 mL    Nasoenteral Tube: 250 mL  Total OUT: 980 mL    Total NET: 1368.4 mL      10-12 @ 07:01  -  10-12 @ 10:06  --------------------------------------------------------  IN:    dextrose 5% + sodium chloride 0.9%.: 150 mL    phenylephrine   Infusion: 41.2 mL  Total IN: 191.2 mL    OUT:    Indwelling Catheter - Urethral: 30 mL  Total OUT: 30 mL    Total NET: 161.2 mL        CBC Full  -  ( 12 Oct 2017 08:03 )  WBC Count : 22.5 K/uL  Hemoglobin : 7.6 g/dL  Hematocrit : 22.2 %  Platelet Count - Automated : 342 K/uL  Mean Cell Volume : 94.7 fl  Mean Cell Hemoglobin : 32.7 pg  Mean Cell Hemoglobin Concentration : 34.5 gm/dL          PHYSICAL EXAM:  Gen: Intubated  Abd: Soft, NT, ND  Lines: Pigtail to suction

## 2017-10-12 NOTE — PROCEDURE NOTE - NSINFORMCONSENT_GEN_A_CORE
This was an emergent procedure.
Benefits, risks, and possible complications of procedure explained to patient/caregiver who verbalized understanding and gave verbal consent.
This was an emergent procedure.
This was an emergent procedure.

## 2017-10-12 NOTE — PROCEDURE NOTE - NSPROCDETAILS_GEN_ALL_CORE
positive blood return obtained via catheter/sutured in place/all materials/supplies accounted for at end of procedure/ultrasound guidance/hemostasis with direct pressure, dressing applied/Seldinger technique/location identified, draped/prepped, sterile technique used, needle inserted/introduced/connected to a pressurized flush line
positive blood return obtained via catheter/connected to a pressurized flush line/hemostasis with direct pressure, dressing applied/Seldinger technique/ultrasound guidance/location identified, draped/prepped, sterile technique used, needle inserted/introduced/sutured in place/all materials/supplies accounted for at end of procedure
sterile technique, catheter placed/guidewire recovered/lumen(s) aspirated and flushed/sterile dressing applied/ultrasound guidance
ultrasound assessment of fluid (location)/Seldinger technique/dressing applied/thoracostomy tube placed percutaneously

## 2017-10-12 NOTE — PROGRESS NOTE ADULT - SUBJECTIVE AND OBJECTIVE BOX
VITAL SIGNS    Vital Signs Last 24 Hrs  T(C): 37.1 (10-12-17 @ 11:00), Max: 38.1 (10-11-17 @ 15:00)  T(F): 98.8 (10-12-17 @ 11:00), Max: 100.5 (10-11-17 @ 15:00)  HR: 108 (10-12-17 @ 13:00) (76 - 112)  BP: 116/70 (10-12-17 @ 07:00) (96/53 - 122/60)  RR: 21 (10-12-17 @ 13:00) (7 - 29)  SpO2: 98% (10-12-17 @ 13:00) (97% - 100%)            10-11 @ 07:01  -  10-12 @ 07:00  --------------------------------------------------------  IN: 2348.4 mL / OUT: 980 mL / NET: 1368.4 mL    10-12 @ 07:01  -  10-12 @ 13:25  --------------------------------------------------------  IN: 425.8 mL / OUT: 80 mL / NET: 345.8 mL    MEDICATIONS  ALBUTerol/ipratropium for Nebulization 3 milliLiter(s) Nebulizer every 6 hours  dextrose 5% + sodium chloride 0.9%. 1000 milliLiter(s) IV Continuous <Continuous>  enoxaparin Injectable 90 milliGRAM(s) SubCutaneous daily  HYDROmorphone  Injectable 0.5 milliGRAM(s) IV Push every 3 hours PRN  LORazepam   Injectable 0.5 milliGRAM(s) IV Push every 4 hours PRN  metoprolol 12.5 milliGRAM(s) Oral every 12 hours  pantoprazole  Injectable 40 milliGRAM(s) IV Push daily  polyethylene glycol 3350 17 Gram(s) Oral daily  senna 2 Tablet(s) Oral at bedtime      PHYSICAL EXAM      Neurology: intubated  CV :S1S2  Lungs: grossly diminished left breath sounds  Abdomen: soft, NT,ND, (+ )BM  : michelle  Extremities: -c/c/e        LABS  10-12    134<L>  |  101  |  52<H>  ----------------------------<  100<H>  5.1   |  19<L>  |  3.48<H>    Ca    7.6<L>      12 Oct 2017 02:33  Phos  4.3     10-12  Mg     2.1     10-12    TPro  4.9<L>  /  Alb  2.1<L>  /  TBili  1.9<H>  /  DBili  1.4<H>  /  AST  34  /  ALT  11  /  AlkPhos  107  10-12                                 7.6    22.5  )-----------( 342      ( 12 Oct 2017 08:03 )             22.2          PT/INR - ( 12 Oct 2017 02:33 )   PT: 15.9 sec;   INR: 1.46 ratio         PTT - ( 12 Oct 2017 02:33 )  PTT:36.2 sec       PAST MEDICAL & SURGICAL HISTORY: VITAL SIGNS    Vital Signs Last 24 Hrs  T(C): 37.1 (10-12-17 @ 11:00), Max: 38.1 (10-11-17 @ 15:00)  T(F): 98.8 (10-12-17 @ 11:00), Max: 100.5 (10-11-17 @ 15:00)  HR: 108 (10-12-17 @ 13:00) (76 - 112)  BP: 116/70 (10-12-17 @ 07:00) (96/53 - 122/60)  RR: 21 (10-12-17 @ 13:00) (7 - 29)  SpO2: 98% (10-12-17 @ 13:00) (97% - 100%)            10-11 @ 07:01  -  10-12 @ 07:00  --------------------------------------------------------  IN: 2348.4 mL / OUT: 980 mL / NET: 1368.4 mL    10-12 @ 07:01  -  10-12 @ 13:25  --------------------------------------------------------  IN: 425.8 mL / OUT: 80 mL / NET: 345.8 mL    MEDICATIONS  ALBUTerol/ipratropium for Nebulization 3 milliLiter(s) Nebulizer every 6 hours  dextrose 5% + sodium chloride 0.9%. 1000 milliLiter(s) IV Continuous <Continuous>  enoxaparin Injectable 90 milliGRAM(s) SubCutaneous daily  HYDROmorphone  Injectable 0.5 milliGRAM(s) IV Push every 3 hours PRN  LORazepam   Injectable 0.5 milliGRAM(s) IV Push every 4 hours PRN  metoprolol 12.5 milliGRAM(s) Oral every 12 hours  pantoprazole  Injectable 40 milliGRAM(s) IV Push daily  polyethylene glycol 3350 17 Gram(s) Oral daily  senna 2 Tablet(s) Oral at bedtime      PHYSICAL EXAM      Neurology: intubated  CV :S1S2  Lungs: grossly diminished left breath sounds left pigtails to LWS #1 -850                                                                                                #2 -160     Abdomen: soft, NT,ND, (+ )BM  : michelle  Extremities: -c/c/e        LABS  10-12    134<L>  |  101  |  52<H>  ----------------------------<  100<H>  5.1   |  19<L>  |  3.48<H>    Ca    7.6<L>      12 Oct 2017 02:33  Phos  4.3     10-12  Mg     2.1     10-12    TPro  4.9<L>  /  Alb  2.1<L>  /  TBili  1.9<H>  /  DBili  1.4<H>  /  AST  34  /  ALT  11  /  AlkPhos  107  10-12                                 7.6    22.5  )-----------( 342      ( 12 Oct 2017 08:03 )             22.2          PT/INR - ( 12 Oct 2017 02:33 )   PT: 15.9 sec;   INR: 1.46 ratio         PTT - ( 12 Oct 2017 02:33 )  PTT:36.2 sec       PAST MEDICAL & SURGICAL HISTORY:

## 2017-10-12 NOTE — PROGRESS NOTE ADULT - SUBJECTIVE AND OBJECTIVE BOX
63yo M with no significant PMH. Consulted on  for worsening LBP associated with lower extremity weakness for past 3 weeks. Over the weekend, he progressed to the point where he was unable to ambulate. He was seen at OSH and transferred to [facility] for spine evaluation. Was found to have epidural compression T8-T11 from newly diagnosed metastatic disease to liver, bone and bowel mets. S/p laminectomy on . Hospitalization complicated by acute hypoxic respiratory failure 2/2 acute pulmonary vascular congestion, L lung collapse, b/l PE, RUL PNA. Was found to have bowel perf with free air, was taken for ex-lap on , closure on . Peritonitis subsequently - E. faecium, Klebsiella and Candida.       MEDICATIONS  (STANDING):  ALBUTerol/ipratropium for Nebulization 3 milliLiter(s) Nebulizer every 6 hours  dextrose 5% + sodium chloride 0.9%. 1000 milliLiter(s) (75 mL/Hr) IV Continuous <Continuous>  enoxaparin Injectable 90 milliGRAM(s) SubCutaneous daily  metoprolol 12.5 milliGRAM(s) Oral every 12 hours  pantoprazole  Injectable 40 milliGRAM(s) IV Push daily  phenylephrine    Infusion 0.1 MICROgram(s)/kG/Min (3.176 mL/Hr) IV Continuous <Continuous>  polyethylene glycol 3350 17 Gram(s) Oral daily  senna 2 Tablet(s) Oral at bedtime    MEDICATIONS  (PRN):  HYDROmorphone  Injectable 0.5 milliGRAM(s) IV Push every 3 hours PRN pain  LORazepam   Injectable 0.5 milliGRAM(s) IV Push every 4 hours PRN Agitation    ROS  Patient intubated and sedated      VITALS:   T(C): 37.1 (10-12-17 @ 11:00), Max: 37.8 (10-11-17 @ 23:00)  HR: 96 (10-12-17 @ 17:30) (84 - 112)  BP: 116/70 (10-12-17 @ 07:00) (96/53 - 122/60)  RR: 20 (10-12-17 @ 17:30) (7 - 29)  SpO2: 100% (10-12-17 @ 17:30) (97% - 100%)  Wt(kg): --    PHYSICAL EXAM:  GENERAL: NAD, well nourished and conversant  HEAD:  Atraumatic  EYES: EOM, PERRLA, conjunctiva pink and sclera white  ENT: No tonsillar erythema, exudates, or enlargement, moist mucous membranes, good dentition, no lesions  NECK: Supple, No JVD, normal thyroid, carotids with normal upstrokes and no bruits  CHEST/LUNG: Clear to auscultation bilaterally, No rales, rhonchi, wheezing, or rubs  HEART: Regular rate and rhythm, No murmurs, rubs, or gallops  ABDOMEN: decreased BS + distention  EXTREMITIES:, No clubbing, cyanosis, + edema.  LYMPH: No lymphadenopathy noted  SKIN: No rashes or lesions  NEURO: + LE paralysis        LABS:  ABG - ( 12 Oct 2017 17:08 )  pH: 7.36  /  pCO2: 33    /  pO2: 149   / HCO3: 18    / Base Excess: -6.5  /  SaO2: 100                   CBC Full  -  ( 12 Oct 2017 08:03 )  WBC Count : 22.5 K/uL  Hemoglobin : 7.6 g/dL  Hematocrit : 22.2 %  Platelet Count - Automated : 342 K/uL  Mean Cell Volume : 94.7 fl  Mean Cell Hemoglobin : 32.7 pg  Mean Cell Hemoglobin Concentration : 34.5 gm/dL  Auto Neutrophil # : x  Auto Lymphocyte # : x  Auto Monocyte # : x  Auto Eosinophil # : x  Auto Basophil # : x  Auto Neutrophil % : x  Auto Lymphocyte % : x  Auto Monocyte % : x  Auto Eosinophil % : x  Auto Basophil % : x    10-12    134<L>  |  101  |  52<H>  ----------------------------<  100<H>  5.1   |  19<L>  |  3.48<H>    Ca    7.6<L>      12 Oct 2017 02:33  Phos  4.3     10-12  Mg     2.1     10-12    TPro  4.9<L>  /  Alb  2.1<L>  /  TBili  1.9<H>  /  DBili  1.4<H>  /  AST  34  /  ALT  11  /  AlkPhos  107  10-12    LIVER FUNCTIONS - ( 12 Oct 2017 02:33 )  Alb: 2.1 g/dL / Pro: 4.9 g/dL / ALK PHOS: 107 U/L / ALT: 11 U/L RC / AST: 34 U/L / GGT: x           PT/INR - ( 12 Oct 2017 02:33 )   PT: 15.9 sec;   INR: 1.46 ratio         PTT - ( 12 Oct 2017 02:33 )  PTT:36.2 sec  Urinalysis Basic - ( 12 Oct 2017 11:47 )    Color: Yellow / Appearance: SL Turbid / S.014 / pH: x  Gluc: x / Ketone: Negative  / Bili: Small / Urobili: 2   Blood: x / Protein: 30 mg/dL / Nitrite: Negative   Leuk Esterase: Small / RBC: 3-5 /HPF / WBC 6-10 /HPF   Sq Epi: x / Non Sq Epi: x / Bacteria: Few /HPF      CAPILLARY BLOOD GLUCOSE          RADIOLOGY & ADDITIONAL TESTS:

## 2017-10-12 NOTE — PROGRESS NOTE ADULT - PROBLEM SELECTOR PLAN 1
-Left lung cpllapse d/t- airway compression 2nd tumor, atelectasis. L pleural effusion  -s/p L pigtail cath, exudative effusion  10/12 additional Left ct placed  -c/w sendy -Left lung collapse d/t- airway compression 2nd tumor, atelectasis. L pleural effusion  -s/p L pigtail cath, exudative effusion  10/12 additional Left ct placed  -c/w sendy

## 2017-10-12 NOTE — PROGRESS NOTE ADULT - SUBJECTIVE AND OBJECTIVE BOX
24 hr events: Pt was lethargic with altered mental status and pinpoint pupils. He was given Narcan and fentanyl patch was D/Carlos A. He was started on high flow and given 20mg Lasix with no response. Then was switched to BiPAP for respiratory acidosis. His ABG improved but he was still very lethargic and so was intubated for airway protection. Upon intubation a thick piece of mucous was noted by the larynx. A bronchoscopy was performed by Dr. Holman and tumor was found to be occluding the left bronchus. After intubation he became hypotensive and a femoral arterial line was placed and he was started on Enoch. Nepro tube feeds were started via OGT. Overnight, his Enoch requirements slightly increased. He was given 500 ml NS and fluids increased. Bedside ultrasound demonstrated IVC 1.5-2cm. Renal ultrasound ordered for ALEC. Therapeutic Lovenox renally dosed. Pt's belly noted to be distended and pt has no recorded BMs for a few days, Miralax started. HISTORY  62 year old male initially admitted to neurosurgery for bialteral leg weakness found to have newly diagnosed metastatic disease with spinal met causing spinal cord compression s/p T8-T11 laminectomy on 9/20, post-op course complicated by deterioration in respiratory status and acute onset abdominal pain requiring intubation and transfer to NSCU. CT chest/abdomen/pelvis demonstrated b/l DVTs, right upper lobe PE, and a bowel perforation with free air. Pt was taken emergently to the OR and is now s/p exploratory laparotomy, SBR x1 (left in discontinuity), abdominal washout, abthera VAC application on 9/22, after which he was transferred to SICU under ACS. He was taken back to the OR on 9/24 for a washout, primary anastomosis, and abdominal closure. Post-operative course complicated by Acute hypoxemic respiratory failure secondary to Acute Pulmonary Vascular congestion / Left Lung Collapse.    24 HOUR EVENTS: Pt was lethargic with altered mental status and pinpoint pupils. He was given Narcan and fentanyl patch was D/Carlos A. He was started on high flow and given 20mg Lasix with no response. Then was switched to BiPAP for respiratory acidosis. His ABG improved but he was still very lethargic and so was intubated for airway protection. Upon intubation a thick piece of mucous was noted by the larynx. A bronchoscopy was performed by Dr. Holman and tumor was found to be occluding the left bronchus. After intubation he became hypotensive and a femoral arterial line was placed and he was started on Enoch. Nepro tube feeds were started via OGT. Overnight, his Enoch requirements slightly increased. He was given 500 ml NS and fluids increased. Bedside ultrasound demonstrated IVC 1.5-2cm. Renal ultrasound ordered for ALEC. Therapeutic Lovenox renally dosed. Pt's belly noted to be distended and pt has no recorded BMs for a few days, Miralax started.    SUBJECTIVE/ROS:  [ ] A ten-point review of systems was otherwise negative except as noted.  [ x] Due to altered mental status/intubation, subjective information were not able to be obtained from the patient. History was obtained, to the extent possible, from review of the chart and collateral sources of information.      NEURO  RASS:  0, -1     Exam: lethargic, arousable, intermittently follows commands  Meds: x  [x] Adequacy of sedation and pain control has been assessed and adjusted      RESPIRATORY  RR: 19 (10-12-17 @ 05:00) (12 - 27)  SpO2: 98% (10-12-17 @ 05:24) (96% - 100%)    Exam: unlabored, clear to auscultation bilaterally  Mechanical Ventilation: Mode: AC/ CMV (Assist Control/ Continuous Mandatory Ventilation), RR (machine): 16, RR (patient): 19, TV (machine): 450, FiO2: 40, PEEP: 8, ITime: 1, MAP: 14, PIP: 25  ABG - ( 11 Oct 2017 13:13 )  pH: 7.30  /  pCO2: 42    /  pO2: 209   / HCO3: 20    / Base Excess: -5.1  /  SaO2: 100     Blood Gas Arterial, Lactate: 1.9 (10.11.17 @ 13:13)    [ x] Extubation Readiness Assessed  Meds: ALBUTerol/ipratropium for Nebulization 3 milliLiter(s) Nebulizer every 6 hours      CARDIOVASCULAR  HR: 91 (10-12-17 @ 05:24) (75 - 97)  BP: 96/53 (10-11-17 @ 22:15) (90/61 - 122/60)  BP(mean): 67 (10-11-17 @ 22:15) (67 - 88)  ABP: 104/45 (10-12-17 @ 05:00) (89/35 - 130/58)  ABP(mean): 62 (10-12-17 @ 05:00) (52 - 79)    Exam: regular rate and rhythm  Cardiac Rhythm: sinus rhythm  Perfusion     [x ]Adequate   [ ]Inadequate  Mentation   [ ]Normal       [x ]Reduced  Extremities  [ x]Warm         [ ]Cool  Volume Status [ ]Hypervolemic [x ]Euvolemic [ ]Hypovolemic  Meds: phenylephrine    Infusion 0.4 MICROgram(s)/kG/Min IV Continuous <Continuous>      GI/NUTRITION  Exam: distended, nontender, incision c/d/i  Diet: NPO with Nepro  Meds: pantoprazole  Injectable 40 milliGRAM(s) IV Push daily  polyethylene glycol 3350 17 Gram(s) Oral daily  senna 2 Tablet(s) Oral at bedtime      GENITOURINARY  I&O's Detail    10-10 @ 07:01  -  10-11 @ 07:00  --------------------------------------------------------  IN:    dextrose 5% + sodium chloride 0.45%.: 2400 mL    Oral Fluid: 160 mL    Solution: 250 mL  Total IN: 2810 mL    OUT:    Chest Tube: 600 mL    Indwelling Catheter - Urethral: 650 mL  Total OUT: 1250 mL    Total NET: 1560 mL      10-11 @ 07:01  -  10-12 @ 05:30  --------------------------------------------------------  IN:    dextrose 5% + sodium chloride 0.45%.: 100 mL    dextrose 5% + sodium chloride 0.45%.: 60 mL    dextrose 5% + sodium chloride 0.9%: 90 mL    dextrose 5% + sodium chloride 0.9%.: 925 mL    Nepro: 120 mL    phenylephrine   Infusion: 322.6 mL    Sodium Chloride 0.9% IV Bolus: 500 mL  Total IN: 2117.6 mL    OUT:    Chest Tube: 100 mL    Indwelling Catheter - Urethral: 540 mL    Nasoenteral Tube: 250 mL  Total OUT: 890 mL    Total NET: 1227.6 mL      10-12    134<L>  |  101  |  52<H>  ----------------------------<  100<H>  5.1   |  19<L>  |  3.48<H>    Ca    7.6<L>      12 Oct 2017 02:33  Phos  4.3     10-12  Mg     2.1     10-12    TPro  4.9<L>  /  Alb  2.1<L>  /  TBili  1.9<H>  /  DBili  1.4<H>  /  AST  34  /  ALT  11  /  AlkPhos  107  10-12    [x ] Torres catheter, indication: ALEC  Meds: dextrose 5% + sodium chloride 0.9%. 1000 milliLiter(s) IV Continuous <Continuous>  sodium chloride 0.9% Bolus 500 milliLiter(s) IV Bolus once      HEMATOLOGIC  Meds: enoxaparin Injectable 90 milliGRAM(s) SubCutaneous daily    [x] VTE Prophylaxis                        7.4    19.7  )-----------( 319      ( 12 Oct 2017 02:33 )             22.6     PT/INR - ( 12 Oct 2017 02:33 )   PT: 15.9 sec;   INR: 1.46 ratio         PTT - ( 12 Oct 2017 02:33 )  PTT:36.2 sec  Transfusion     [ ] PRBC   [ ] Platelets   [ ] FFP   [ ] Cryoprecipitate      INFECTIOUS DISEASES  T(C): 37.6 (10-12-17 @ 03:00), Max: 38.1 (10-11-17 @ 15:00)    WBC Count: 19.7 K/uL (10-12 @ 02:33)  WBC Count: 18.3 K/uL (10-11 @ 21:07)    Recent Cultures:  Specimen Source: .Body Fluid Pleural Fluid, 10-07 @ 00:46; Results   No growth at 5 days; Gram Stain:   No polymorphonuclear cells seen per low power field  No organisms seen per oil power field  by cytocentrifuge; Organism: --    Meds: x      ENDOCRINE  Capillary Blood Glucose: WNL    Meds: x      ACCESS DEVICES:  [x ] Peripheral IV  [ ] Central Venous Line	[ ] R	[ ] L	[ ] IJ	[ ] Fem	[ ] SC	Placed:   [ x] Arterial Line		[x] R	[ ] L	[ x] Fem	[ ] Rad	[ ] Ax	Placed:   [ ] PICC:					[ ] Mediport  [ x] Urinary Catheter, Date Placed:   [ ] Necessity of urinary, arterial, and venous catheters discussed    OTHER MEDICATIONS:x      CODE STATUS: DNR      IMAGING: < from: Xray Chest 1 View AP- PORTABLE-Urgent (10.11.17 @ 13:38) >  Impression:    Complete opacification of left hemithorax. Right lung appears to be   clear. Changes compatible with collapsed left lung. Left pleural   effusion. A pigtail catheter is seen on the left. No pneumothorax.   Endotracheal tube and NG tube are in good position. Status post total   right shoulder replacement. No change in appearance the chest when   compared to previous study done on the same date at 2:50 AM.

## 2017-10-12 NOTE — PROGRESS NOTE ADULT - ASSESSMENT
A/P: 62M a/w b/l leg weakness found to have metastatic disease, b/l DVTs, RU lobe PE, and a bowel perforation (1cm perforation in the small bowel 110 cm distal to the ligament of treitz) now s/p ex-lap, SBR x1 (left in discontinuity), abdominal washout, abthera VAC application (9/22). RTOR 9/24 for intestinal reconstruction and abdominal closure. Patient remained in SICU. Was later found to have right renal vein thrombosis on CT chest (noninfected). At subsequent family meetings it was decided the patient would be DNR, but not DNI. Now the patient required re-intubation for worsening respiratory distress and respiratory acidosis resulting in worsening mental status.    - Will f/u w/ Palliative, heme, and thoracic, who have been following  - Intubated, NPO  - Pain control  - T. Lovenox, renally dosed  - Primary Care Per SICU    [id number]

## 2017-10-12 NOTE — PROGRESS NOTE ADULT - SUBJECTIVE AND OBJECTIVE BOX
Follow-up Pulm Progress Note    10/11 events noted, intubated for hypercarbic failure,  s/p bronch= LLower lobe with complete obliteration d/t malignant lesion, additiona Left chest tube placed today, on pressors    Medications:  MEDICATIONS  (STANDING):  ALBUTerol/ipratropium for Nebulization 3 milliLiter(s) Nebulizer every 6 hours  dextrose 5% + sodium chloride 0.9%. 1000 milliLiter(s) (75 mL/Hr) IV Continuous <Continuous>  enoxaparin Injectable 90 milliGRAM(s) SubCutaneous daily  metoprolol 12.5 milliGRAM(s) Oral every 12 hours  ondansetron Injectable 4 milliGRAM(s) IV Push once  pantoprazole  Injectable 40 milliGRAM(s) IV Push daily  phenylephrine    Infusion 0.1 MICROgram(s)/kG/Min (3.176 mL/Hr) IV Continuous <Continuous>  polyethylene glycol 3350 17 Gram(s) Oral daily  senna 2 Tablet(s) Oral at bedtime    MEDICATIONS  (PRN):  HYDROmorphone  Injectable 0.5 milliGRAM(s) IV Push every 3 hours PRN pain  LORazepam   Injectable 0.5 milliGRAM(s) IV Push every 4 hours PRN Agitation      Mode: AC/ CMV (Assist Control/ Continuous Mandatory Ventilation)  RR (machine): 16  TV (machine): 450  FiO2: 40  PEEP: 5  ITime: 1  MAP: 12  PIP: 24      Vital Signs Last 24 Hrs  T(C): 37.1 (12 Oct 2017 11:00), Max: 37.8 (11 Oct 2017 23:00)  T(F): 98.8 (12 Oct 2017 11:00), Max: 100 (11 Oct 2017 23:00)  HR: 101 (12 Oct 2017 16:18) (81 - 112)  BP: 116/70 (12 Oct 2017 07:00) (96/53 - 122/60)  BP(mean): 84 (12 Oct 2017 07:00) (67 - 86)  RR: 21 (12 Oct 2017 15:45) (7 - 29)  SpO2: 98% (12 Oct 2017 16:18) (97% - 100%)    ABG - ( 12 Oct 2017 06:30 )  pH: 7.36  /  pCO2: 34    /  pO2: 127   / HCO3: 19    / Base Excess: -5.5  /  SaO2: 100                   10-11 @ 07:01  -  10-12 @ 07:00  --------------------------------------------------------  IN: 2348.4 mL / OUT: 980 mL / NET: 1368.4 mL          LABS:                        7.6    22.5  )-----------( 342      ( 12 Oct 2017 08:03 )             22.2     10    134<L>  |  101  |  52<H>  ----------------------------<  100<H>  5.1   |  19<L>  |  3.48<H>    Ca    7.6<L>      12 Oct 2017 02:33  Phos  4.3     10-12  Mg     2.1     10-12    TPro  4.9<L>  /  Alb  2.1<L>  /  TBili  1.9<H>  /  DBili  1.4<H>  /  AST  34  /  ALT  11  /  AlkPhos  107  10-12          CAPILLARY BLOOD GLUCOSE        PT/INR - ( 12 Oct 2017 02:33 )   PT: 15.9 sec;   INR: 1.46 ratio         PTT - ( 12 Oct 2017 02:33 )  PTT:36.2 sec  Urinalysis Basic - ( 12 Oct 2017 11:47 )    Color: Yellow / Appearance: SL Turbid / S.014 / pH: x  Gluc: x / Ketone: Negative  / Bili: Small / Urobili: 2   Blood: x / Protein: 30 mg/dL / Nitrite: Negative   Leuk Esterase: Small / RBC: 3-5 /HPF / WBC 6-10 /HPF   Sq Epi: x / Non Sq Epi: x / Bacteria: Few /HPF      Procalcitonin, Serum: 1.93 ng/mL (10-10-17 @ 03:27)    Serum Pro-Brain Natriuretic Peptide: 9049 pg/mL (10-11-17 @ 04:40)  Serum Pro-Brain Natriuretic Peptide: 3305 pg/mL (10-10-17 @ 03:27)                  CULTURES: Culture - Body Fluid with Gram Stain (10.07.17 @ 00:46)    Gram Stain:   No polymorphonuclear cells seen per low power field  No organisms seen per oil power field  by cytocentrifuge    Specimen Source: .Body Fluid Pleural Fluid    Culture Results:   No growth at 5 days      Culture Results:   No growth at 5 days (10-07 @ 00:46)          Physical Examination:  PULM: decreased bs bilaterally, no significant sputum production  CVS: S1, S2 heard    RADIOLOGY REVIEWED  CXR: < from: Xray Chest 1 View AP- PORTABLE-Urgent (10.12.17 @ 06:06) >  Impression:    Complete opacification of the left hemithorax which remain unchanged when   compared to previous study done 10/11/2017. The right lung is clear. All   life supporting devices are in good position unchanged    < end of copied text >

## 2017-10-12 NOTE — PROGRESS NOTE ADULT - ASSESSMENT
61 y/o M with no significant PMH presents 9/19 with bilateral LE weakness/paraplegia x several weeks. 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 CT evidence of L bronchial obstruction 2nd lesion with LLL collapse, necrotic mediastinal/hilar LN, liver mets, ?adrenal mets. Hospital course c/b RUL segmental PE, bilateral soleal DVT. Further c/b SB perforation s/p SBR, primary anastomosis on 9/22, RTOR 9/24 for ex-lap, washout, re-anastomosis and abdominal closure. Abdominal cultures with E. Faecium. Extubated 9/24. Now with L lung atelectasis. 10/6 s/p L CT 10/11 intubated for hypercarbic respiratory failure, s/p bronch with LLower lobe complete obliteration s/t malignant  lesion, pressors, 10/12 additional Left chest tube placed

## 2017-10-12 NOTE — PROGRESS NOTE ADULT - ASSESSMENT
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    PLAN:  Neurologic: post-operative pain and neoplastic pain  - Continue Tylenol, gabapentin for pain  - Avoid narcotics    Respiratory: acute hypercarbic respiratory failure  - PRVC mechanical ventilation.   - Attempt SBT if pt mental status is adequate.  - Left pigtail catheter to water seal and monitor output. F/u with thoracic surgery for Pleurex catheter plan.    Cardiovascular: HTN  - Monitor vital signs.  - Continue metoprolol 25 mg q12hrs for HTN.    Gastrointestinal/Nutrition: small bowel perforation s/p SBR w/ primary anastomosis   - Protonix 40mg daily for GERD.  - Nepro tube feeds  - Bowel regimen with senna and Miralax.  - Monitor LFTs.    Renal/Genitourinary: ALEC stage 3, likely pre-renal  - Monitor I&Os and electrolytes. Replete as needed.  - D5NS @ 75mL/hr     Hematologic: b/l DVT, b/l PE  - Therapeutic Lovenox, renally dosed, for RUL PE and bilateral DVTs   - Monitor CBCs.    Infectious Disease: no active issues  - Monitor for clinical evidence of active infection.    Endocrine: no active issues  - Monitor blood glucose on BMP.    Disposition:  - DNR.  - Will remain in SICU.  - Reconsult Palliative care, family meeting regarding pt's poor prognosis.    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    PLAN:  Neurologic: post-operative pain and neoplastic pain  - Continue Tylenol, gabapentin for pain  - Avoid narcotics  - Precedex if needed for sedation    Respiratory: acute hypercarbic respiratory failure  - PRVC mechanical ventilation.   - Attempt SBT if pt mental status is adequate.  - Left pigtail catheter to water seal and monitor output. F/u with thoracic surgery for Pleurex catheter plan.    Cardiovascular: HTN  - Monitor vital signs.  - Continue metoprolol 25 mg q12hrs for HTN.    Gastrointestinal/Nutrition: small bowel perforation s/p SBR w/ primary anastomosis   - Protonix 40mg daily for GERD.  - Nepro tube feeds  - Bowel regimen with senna and Miralax, may need enema as no BM in several days  - Monitor LFTs.    Renal/Genitourinary: ALEC stage 3, likely pre-renal  - Monitor I&Os and electrolytes. Replete as needed.  - D5NS @ 75mL/hr     Hematologic: b/l DVT, b/l PE  - Therapeutic Lovenox, renally dosed, for RUL PE and bilateral DVTs   - Monitor CBCs.    Infectious Disease: no active issues  - Monitor for clinical evidence of active infection.    Endocrine: no active issues  - Monitor blood glucose on BMP.    Disposition:  - DNR.  - Will remain in SICU.  - Reconsult Palliative care, family meeting regarding pt's poor prognosis.    -JOHN Pate-C  [id number]

## 2017-10-12 NOTE — PROCEDURE NOTE - NSPOSTCAREGUIDE_GEN_A_CORE
Care for catheter as per unit/ICU protocols

## 2017-10-12 NOTE — PROGRESS NOTE ADULT - ASSESSMENT
62y Male initially admitted to neurosurgery for b/l leg weakness found to have newly diagnosed metastatic disease with spinal met causing spinal cord compression s/p T8-T11 laminectomy on 9/20, post-op course complicated by deterioration in respiratory status and acute onset abdominal pain requiring intubation and transfer to NSCU. CT chest/abdomen/pelvis demonstrated b/l DVTs, right upper lobe PE, and a bowel perforation with free air. Pt was taken emergently to the OR and is now s/p exploratory laparotomy, SBR x1 (left in discontinuity), abdominal washout, abthera VAC application on 9/22, after which he was transferred to SICU under ACS. He was taken back to the OR on 9/24 for a washout, primary anastomosis, and abdominal closure. Post-operative course complicated by Acute hypoxemic respiratory failure secondary to Acute Pulmonary Vascular congestion/ Left Lung Collapse. Pathology from both spine and bowel revealed non small cell lung cancer. CT chest revealed tumor compressing left lower lobe bronchus. He was also found to have a left sided pleural effusion which has since been resistant to diuretics and medical management. Thoracic surgery was consulted for a palliative procedure, such as a drainage catheter, following a family meeting today in which the patient was made DNR, but not DNI. Pt s/p 10/6 left pigtail drain in IR with symptomatic relief.  10/11 Bronchoscopy demonstrates left lower complete obliteration secondary to malignant lesion.  Left upper lobe lesion appreciated with upper lobe collapse.

## 2017-10-12 NOTE — PROCEDURE NOTE - NSPOSTPRCRAD_GEN_A_CORE
no pneumothorax/central line located in the superior vena cava/post-procedure radiography performed
chest tube in correct position

## 2017-10-12 NOTE — CHART NOTE - NSCHARTNOTEFT_GEN_A_CORE
Patient seen for SICU follow-up assessment. 63 yo male with prolonged, complicated medical course including stage IV non-small cell lung carcinoma with spinal, small bowel, and likely liver metastasis, small bowel perforation S/P resection, S/P ex-lap with closure, acute hypoxemic respiratory failure requiring intubation on 10/11 and Enoch drip for blood pressure maintenance. Tube feedings of Nepro initiated 10/11. Abdominal distention noted with no BM since 10/6, plan to start miralax today.     Source: Patient [ ]    Family [ ]     other [x- medical record]    Diet : NPO with tube feeds  Enteral Nutrition: Nepro @ 40cc/hr x 24 hrs to provide 960cc fluid, 1728 oni/d, and 78 gm prot/d    Abdominal distention noted, no BM since 10/6. NGT output on 10/11 of 250cc.     Current Weight: 197.7 pounds on 10/12  Weight Change: Variable weight history noted since admission ranging from 176.5 to 201.7 pounds. Weight fluctuations likely secondary to fluid shifts as patient presents with 3+ generalized edema     Pertinent Medications: MEDICATIONS  (STANDING):  ALBUTerol/ipratropium for Nebulization 3 milliLiter(s) Nebulizer every 6 hours  dextrose 5% + sodium chloride 0.9%. 1000 milliLiter(s) (75 mL/Hr) IV Continuous <Continuous>  enoxaparin Injectable 90 milliGRAM(s) SubCutaneous daily  pantoprazole  Injectable 40 milliGRAM(s) IV Push daily  phenylephrine    Infusion 0.4 MICROgram(s)/kG/Min (12.705 mL/Hr) IV Continuous <Continuous>  polyethylene glycol 3350 17 Gram(s) Oral daily  senna 2 Tablet(s) Oral at bedtime    MEDICATIONS  (PRN):    Pertinent Labs:  (10/12) Na 134L, CO2 19L, BUN 52H, Cr 3.48H, BG 100H, Ca 7.6L; (10/11) K 5.4H, Phos 5.4H      Skin: no pressure ulcers, 3+ generalized edema     Estimated Needs:   [x] no change since previous assessment  [ ] recalculated:       Previous Nutrition Diagnosis:     [x] Inadequate Protein-Energy Intake          Nutrition Diagnosis is [x] ongoing, addressed with enteral nutrition         New Nutrition Diagnosis: [x] not applicable         Interventions:     Recommend    [ ] Change Diet To:    [ ] Nutrition Supplement    [x] Nutrition Support: Recommend increase enteral nutrition to Nepro @ 50cc/hr x 24 hrs to provide 1200cc fluid, 2160 oni/d (27 oni/Kg dosing wt), and 97 gm prot/d (1.2 gm prot/Kg IBW). Calculations based upon dosing weight of 80.1 Kg and IBW 78.2 Kg.     [ ] Other:        Monitoring and Evaluation:     [ ] PO intake [x] Tolerance to diet prescription [x] weights [x] follow up per protocol    Leena Ellis RD Pager #[phone]

## 2017-10-12 NOTE — PROGRESS NOTE ADULT - PROBLEM SELECTOR PLAN 2
chemo as per oncology  LLL collapse due to tumor with large left pleural effusion still seen on CXR  follow output from chest tubes

## 2017-10-12 NOTE — PROGRESS NOTE ADULT - PROBLEM SELECTOR PLAN 1
Bronchoscopy demonstrating obstructive tumor to left bronchus with left upper lobe collapse.  Acute hypercarbic respiratory failure requiring re-intubation .  Plan for left  VATS pleurx placement suspended. Please reconsult [phone] if status changes

## 2017-10-12 NOTE — PROCEDURE NOTE - NSINDICATIONS_GEN_A_CORE
critical patient/monitoring purposes
malignant/pleural effusion
arterial puncture to obtain ABG's/critical patient/monitoring purposes
critical illness/emergency venous access/volume resuscitation

## 2017-10-13 NOTE — PROGRESS NOTE ADULT - SUBJECTIVE AND OBJECTIVE BOX
Patient is a 62y old  Male who presents with a chief complaint of cord compression (19 Sep 2017 09:44)      HPI:    MEDICATIONS  (STANDING):  ALBUTerol/ipratropium for Nebulization 3 milliLiter(s) Nebulizer every 6 hours  dextrose 5% + sodium chloride 0.9%. 1000 milliLiter(s) (75 mL/Hr) IV Continuous <Continuous>  enoxaparin Injectable 90 milliGRAM(s) SubCutaneous daily  metoclopramide Injectable 5 milliGRAM(s) IV Push every 6 hours  pantoprazole  Injectable 40 milliGRAM(s) IV Push daily  polyethylene glycol 3350 17 Gram(s) Oral daily  senna 2 Tablet(s) Oral at bedtime    MEDICATIONS  (PRN):      Allergies    No Known Allergies    Intolerances        REVIEW OF SYSTEM:  CONSTITUTIONAL: No fever, No change in weight, No fatigue  HEAD: No headache, No dizziness, No recent trauma  EYES: No eye pain, No visual disturbances, No discharge  ENT:  No difficulty hearing, No tinnitus, No vertigo, No sinus pain, No throat pain  NECK: No pain, No stiffness  BREASTS: No pain, No masses, No nipple discharge  RESPIRATORY: No cough, No wheezing, No chills, No hemoptysis, No shortness of breath at rest or exertional shortness of breath  CARDIOVASCULAR: No chest pain, No palpitations, No dizziness, No CHF, No arrhythmia, No cardiomegaly, No leg swelling  GASTROINTESTINAL: No abdominal, No epigastric pain. No nausea, No vomiting, No hematemesis, No diarrhea, No constipation. No melena, No hematochezia. No GERD  GENITOURINARY: No dysuria, No frequency, No hematuria, No incontinence, No nocturia, No hesitancy,  SKIN: No itching, No burning, No rashes, No lesions   LYMPH NODES: No history of enlarged glands  ENDOCRINE: No heat or cold intolerance, No hair loss. No osteoporosis, No thyroid disease  MUSCULOSKELETAL: No joint pain or swelling, No muscle, back, or extremity pain  PSYCHIATRIC: No depression, No anxiety, No mood swings, No difficulty sleeping  HEME/LYMPH: No easy bruising, No anticoagulants, No bleeding disorder, No bleeding gums  ALLERGY AND IMMUNOLOGIC: No hives, No eczema  NEUROLOGICAL: No memory loss, No loss of strength, No numbness, No tremors        VITALS:   T(C): 37.6 (10-13-17 @ 19:00), Max: 37.6 (10-13-17 @ 19:00)  HR: 99 (10-13-17 @ 23:17) (75 - 101)  BP: 102/56 (10-13-17 @ 20:00) (102/56 - 112/63)  RR: 24 (10-13-17 @ 23:00) (17 - 27)  SpO2: 97% (10-13-17 @ 23:17) (96% - 100%)  Wt(kg): --    10-12 @ 07:01  -  10-13 @ 07:00  --------------------------------------------------------  IN: 2285.5 mL / OUT: 2550 mL / NET: -264.5 mL    10-13 @ 07:01  -  10-13 @ 23:31  --------------------------------------------------------  IN: 1544.7 mL / OUT: 1085 mL / NET: 459.7 mL        PHYSICAL EXAM:  GENERAL: NAD, well nourished and conversant  HEAD:  Atraumatic  EYES: EOM, PERRLA, conjunctiva pink and sclera white  ENT: No tonsillar erythema, exudates, or enlargement, moist mucous membranes, good dentition, no lesions  NECK: Supple, No JVD, normal thyroid, carotids with normal upstrokes and no bruits  CHEST/LUNG: Clear to auscultation bilaterally, No rales, rhonchi, wheezing, or rubs  HEART: Regular rate and rhythm, No murmurs, rubs, or gallops  ABDOMEN: Soft, nondistended, no masses, guarding, tenderness or rebound, bowel sounds present  EXTREMITIES:  2+ Peripheral Pulses, No clubbing, cyanosis, or edema. No arthritis of shoulders, elbows, hands, hips, knees, ankles, or feet. No DJD C spine, T spine, or L/S spine  LYMPH: No lymphadenopathy noted  SKIN: No rashes or lesions  NERVOUS SYSTEM:  Alert & Oriented X3, normal cognitive function. Motor Strength 5/5 right upper and right lower.  5/5 left upper and left lower extremities, DTRs 2+ intact and symmetric    LABS:                          8.3    21.9  )-----------( 303      ( 13 Oct 2017 12:42 )             25.7     10-13    137  |  104  |  57<H>  ----------------------------<  114<H>  4.9   |  17<L>  |  4.03<H>  10-12    134<L>  |  101  |  52<H>  ----------------------------<  100<H>  5.1   |  19<L>  |  3.48<H>  10-11    134<L>  |  99  |  52<H>  ----------------------------<  88  5.2   |  19<L>  |  3.40<H>    Ca    7.4<L>      13 Oct 2017 04:04  Ca    7.6<L>      12 Oct 2017 02:33  Ca    7.9<L>      11 Oct 2017 21:07  Phos  4.3     10-13  Phos  4.3     10-12  Phos  5.4     10-11  Mg     2.1     10-13  Mg     2.1     10-12  Mg     2.4     10-11    TPro  4.5<L>  /  Alb  2.0<L>  /  TBili  1.7<H>  /  DBili  x   /  AST  33  /  ALT  11  /  AlkPhos  118  10-13  TPro  4.9<L>  /  Alb  2.1<L>  /  TBili  1.9<H>  /  DBili  1.4<H>  /  AST  34  /  ALT  11  /  AlkPhos  107  10-12  TPro  5.7<L>  /  Alb  2.5<L>  /  TBili  3.0<H>  /  DBili  2.1<H>  /  AST  43<H>  /  ALT  18  /  AlkPhos  117  10-11    CAPILLARY BLOOD GLUCOSE          RADIOLOGY & ADDITIONAL TESTS:      Consultant(s):    Care Discussed with Consultants/Other Providers [ ] YES  [ ] NO Patient is a 62y old  Male who presents with a chief complaint of cord compression (19 Sep 2017 09:44)      HPI:62 year old male initially admitted to neurosurgery for bialteral leg weakness found to have newly diagnosed metastatic disease with spinal met causing spinal cord compression s/p T8-T11 laminectomy on 9/20, post-op course complicated by deterioration in respiratory status and acute onset abdominal pain requiring intubation and transfer to NSCU. CT chest/abdomen/pelvis demonstrated b/l DVTs, right upper lobe PE, and a bowel perforation with free air. Pt was taken emergently to the OR and is now s/p exploratory laparotomy, SBR x1 (left in discontinuity), abdominal washout, abthera VAC application on 9/22, after which he was transferred to SICU under ACS. He was taken back to the OR on 9/24 for a washout, primary anastomosis, and abdominal closure. Post-operative course complicated by Acute hypoxemic respiratory failure secondary to Acute Pulmonary Vascular congestion / Left Lung Collapse requiring high flow, and acute hypercarbic respiratory failure requiring re-intubation on 10/11. Attempt at thoracentesis with CT first line did not help but placement of second line removed large amount of pleural effusion..    MEDICATIONS  (STANDING):  ALBUTerol/ipratropium for Nebulization 3 milliLiter(s) Nebulizer every 6 hours  dextrose 5% + sodium chloride 0.9%. 1000 milliLiter(s) (75 mL/Hr) IV Continuous <Continuous>  enoxaparin Injectable 90 milliGRAM(s) SubCutaneous daily  metoclopramide Injectable 5 milliGRAM(s) IV Push every 6 hours  pantoprazole  Injectable 40 milliGRAM(s) IV Push daily  polyethylene glycol 3350 17 Gram(s) Oral daily  senna 2 Tablet(s) Oral at bedtime    MEDICATIONS  (PRN):      Allergies    No Known Allergies    Intolerances        REVIEW OF SYSTEM:  intubated        VITALS:   T(C): 37.6 (10-13-17 @ 19:00), Max: 37.6 (10-13-17 @ 19:00)  HR: 99 (10-13-17 @ 23:17) (75 - 101)  BP: 102/56 (10-13-17 @ 20:00) (102/56 - 112/63)  RR: 24 (10-13-17 @ 23:00) (17 - 27)  SpO2: 97% (10-13-17 @ 23:17) (96% - 100%)  Wt(kg): --    10-12 @ 07:01  -  10-13 @ 07:00  --------------------------------------------------------  IN: 2285.5 mL / OUT: 2550 mL / NET: -264.5 mL    10-13 @ 07:01  -  10-13 @ 23:31  --------------------------------------------------------  IN: 1544.7 mL / OUT: 1085 mL / NET: 459.7 mL        PHYSICAL EXAM:  GENERAL: NAD, chronically ill  HEAD:  Atraumatic  EYES: EOM, PERRLA, conjunctiva pink and sclera white  ENT: No tonsillar erythema, exudates, or enlargement, moist mucous membranes, good dentition, no lesions  NECK: Supple, No JVD, normal thyroid, carotids with normal upstrokes and no bruits  CHEST/LUNG: Clear to auscultation bilaterally, No rales, rhonchi, wheezing, or rubs - 2 chest tubes in chest to drain pleural effusion  HEART: Regular rate and rhythm, No murmurs, rubs, or gallops  ABDOMEN: Soft, nondistended, no masses, guarding, tenderness or rebound, bowel sounds present  EXTREMITIES:  2+ Peripheral Pulses, No clubbing, cyanosis, or edema. No arthritis of shoulders, elbows, hands, hips, knees, ankles, or feet. No DJD C spine, T spine, or L/S spine  LYMPH: No lymphadenopathy noted  SKIN: No rashes or lesions  NERVOUS SYSTEM:  Alert     LABS:                          8.3    21.9  )-----------( 303      ( 13 Oct 2017 12:42 )             25.7     10-13    137  |  104  |  57<H>  ----------------------------<  114<H>  4.9   |  17<L>  |  4.03<H>  10-12    134<L>  |  101  |  52<H>  ----------------------------<  100<H>  5.1   |  19<L>  |  3.48<H>  10-11    134<L>  |  99  |  52<H>  ----------------------------<  88  5.2   |  19<L>  |  3.40<H>    Ca    7.4<L>      13 Oct 2017 04:04  Ca    7.6<L>      12 Oct 2017 02:33  Ca    7.9<L>      11 Oct 2017 21:07  Phos  4.3     10-13  Phos  4.3     10-12  Phos  5.4     10-11  Mg     2.1     10-13  Mg     2.1     10-12  Mg     2.4     10-11    TPro  4.5<L>  /  Alb  2.0<L>  /  TBili  1.7<H>  /  DBili  x   /  AST  33  /  ALT  11  /  AlkPhos  118  10-13  TPro  4.9<L>  /  Alb  2.1<L>  /  TBili  1.9<H>  /  DBili  1.4<H>  /  AST  34  /  ALT  11  /  AlkPhos  107  10-12  TPro  5.7<L>  /  Alb  2.5<L>  /  TBili  3.0<H>  /  DBili  2.1<H>  /  AST  43<H>  /  ALT  18  /  AlkPhos  117  10-11    CAPILLARY BLOOD GLUCOSE          RADIOLOGY & ADDITIONAL TESTS:      Consultant(s):    Care Discussed with Consultants/Other Providers [ ] YES  [ ] NO

## 2017-10-13 NOTE — PROGRESS NOTE ADULT - ASSESSMENT
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    PLAN:  Neurologic: post-operative pain and neoplastic pain  - Continue Tylenol, gabapentin for pain  - Avoid narcotics  - Precedex if needed for sedation    Respiratory: acute hypercarbic respiratory failure  - PRVC mechanical ventilation.   - Attempt SBT if pt mental status is adequate.  - Left inferior pigtail catheter to water seal, left superior pigtail catheter to suction and monitor output.   - F/u with thoracic surgery for possible Pleurex catheter plan.    Cardiovascular: HTN  - Monitor vital signs.  - Continue metoprolol 25 mg q12hrs for HTN.    Gastrointestinal/Nutrition: small bowel perforation s/p SBR w/ primary anastomosis, stool burden with no BM since 10/6  - Protonix 40mg daily for GERD.  - Resume Nepro tube feeds  - Bowel regimen with senna and Miralax, enema daily until BM   - Monitor LFTs.    Renal/Genitourinary: ALEC stage 3, likely pre-renal  - Monitor I&Os and electrolytes. Replete as needed.  - Torres, monitor urine output.  - D5NS @ 75mL/hr     Hematologic: b/l DVT, b/l PE  - Therapeutic Lovenox, renally dosed, for RUL PE and bilateral DVTs   - Monitor CBCs.    Infectious Disease: no active issues  - Monitor for clinical evidence of active infection.    Endocrine: no active issues  - Monitor blood glucose on BMP.    Disposition:  - DNR.  - Will remain in SICU.  - Reconsult Palliative care, family meeting tentatively on Sunday regarding pt's poor prognosis.    -JOHN Pate-C  [id number] ASSESSMENT: 62 year old male with Stage IV non-small cell lung carcinoma with spinal, small bowel, and likely liver metastasis presenting with RUL PE with IVC and renal thrombosis, small bowel perforation s/p resection & left in discontinuity with Abthera (9/22) with RTOR for washout, primary anastomosis, & abdominal closure (9/24) complicated by acute hypoxemic respiratory failure requiring CPAP & high flow NC likely secondary to his NSCLC and RUL PE, then further complicated by acute hypercarbic respiratory failure requiring re-intubation on 10/11/17.    PLAN:  Neurologic: post-operative pain and neoplastic pain  - Continue Tylenol, gabapentin for pain  - Avoid narcotics  - Precedex if needed for sedation    Respiratory: acute hypercarbic respiratory failure  - PRVC mechanical ventilation.   - Attempt SBT if pt mental status is adequate.  - Left inferior pigtail catheter to water seal, left superior pigtail catheter to suction and monitor output.   - F/u with thoracic surgery for possible Pleurex catheter plan.    Cardiovascular: HTN  - Monitor vital signs.  - Continue metoprolol 25 mg q12hrs for HTN.    Gastrointestinal/Nutrition: small bowel perforation s/p SBR w/ primary anastomosis, stool burden with no BM since 10/6  - Protonix 40mg daily for GERD.  - Monitor OGT output. Continue to hold tube feeds while output is high.  - Bowel regimen with senna and Miralax, enema daily until BM   - Monitor LFTs.    Renal/Genitourinary: ALEC stage 3, likely pre-renal  - Monitor I&Os and electrolytes. Replete as needed.  - Torres, monitor urine output.  - D5NS @ 75mL/hr     Hematologic: b/l DVT, b/l PE  - Therapeutic Lovenox, renally dosed, for RUL PE and bilateral DVTs   - Monitor CBCs.    Infectious Disease: no active issues  - Monitor for clinical evidence of active infection.    Endocrine: no active issues  - Monitor blood glucose on BMP.    Disposition:  - DNR.  - Will remain in SICU.  - Reconsult Palliative care, family meeting tentatively on Sunday regarding pt's poor prognosis.    -FRANCES PateC  [id number] ASSESSMENT: 62 year old male with Stage IV non-small cell lung carcinoma with spinal, small bowel, and likely liver metastasis presenting with RUL PE with IVC and renal thrombosis, small bowel perforation s/p resection & left in discontinuity with Abthera (9/22) with RTOR for washout, primary anastomosis, & abdominal closure (9/24) complicated by acute hypoxemic respiratory failure requiring CPAP & high flow NC likely secondary to his NSCLC and RUL PE, then further complicated by acute hypercarbic respiratory failure requiring re-intubation on 10/11/17.    PLAN:  Neurologic: post-operative pain and neoplastic pain  - Continue Tylenol, gabapentin for pain  - Avoid narcotics  - Precedex if needed for sedation    Respiratory: acute hypercarbic respiratory failure  - PRVC mechanical ventilation.   - Attempt SBT if pt mental status is adequate.  - Left inferior pigtail catheter to water seal, left superior pigtail catheter to suction and monitor output.   - F/u with thoracic surgery for possible Pleurex catheter plan.    Cardiovascular: hypotension, h/o HTN  - Monitor vital signs.  - D/C Metoprolol    Gastrointestinal/Nutrition: small bowel perforation s/p SBR w/ primary anastomosis, stool burden with no BM since 10/6  - Protonix 40mg daily for GERD.  - Monitor OGT output. Continue to hold tube feeds while output is high.  - Bowel regimen with senna and Miralax, enema daily until BM   - Monitor LFTs.    Renal/Genitourinary: ALEC stage 3, likely pre-renal  - Monitor I&Os and electrolytes. Replete as needed.  - Torres, monitor urine output.  - D5NS @ 75mL/hr     Hematologic: b/l DVT, b/l PE  - Therapeutic Lovenox, renally dosed, for RUL PE and bilateral DVTs   - f/u post-transfusion CBC    Infectious Disease: no active issues  - f/u recent blood culture results      Endocrine: no active issues  - Monitor blood glucose on BMP.    Disposition:  - DNR.  - Will remain in SICU.  - Reconsult Palliative care, family meeting tentatively on Sunday regarding pt's poor prognosis.    -FRANCES PateC  [id number]

## 2017-10-13 NOTE — PROGRESS NOTE ADULT - ASSESSMENT
A/P: 62M a/w b/l leg weakness found to have metastatic disease, b/l DVTs, RU lobe PE, and a bowel perforation (1cm perforation in the small bowel 110 cm distal to the ligament of treitz) now s/p ex-lap, SBR x1 (left in discontinuity), abdominal washout, abthera VAC application (9/22). RTOR 9/24 for intestinal reconstruction and abdominal closure. Patient remained in SICU. Was later found to have right renal vein thrombosis on CT chest (noninfected). At subsequent family meetings it was decided the patient would be DNR, but not DNI. Now the patient required re-intubation for worsening respiratory distress and respiratory acidosis resulting in worsening mental status.    - F/u pressor requirements  - Will f/u w/ Palliative and heme, who have been following  - Thoracic: Plan for left VATS pleurx placement suspended  - Intubated, NPO  - Pain control  - T. Lovenox, renally dosed  - Primary Care Per SICU    [id number]

## 2017-10-13 NOTE — PROGRESS NOTE ADULT - SUBJECTIVE AND OBJECTIVE BOX
24 hr: Pt had second pigtail placed in left superior chest which drained about 1L. He remained on a small dose of Enoch. He was disimpacted in the morning. In the afternoon he had one episode of emesis and his tube feeds were held. His mental status waxed and waned. 24 hr: Pt had second pigtail placed in left superior chest which drained about 1L. He remained on a small dose of Enoch. He was disimpacted in the morning. In the afternoon he had one episode of emesis and his tube feeds were held. Overnight his OGT was pulled back twice as it appeared too far. OGT output was 1L overnight. His mental status waxed and waned. HISTORY  62 year old male initially admitted to neurosurgery for bialteral leg weakness found to have newly diagnosed metastatic disease with spinal met causing spinal cord compression s/p T8-T11 laminectomy on 9/20, post-op course complicated by deterioration in respiratory status and acute onset abdominal pain requiring intubation and transfer to NSCU. CT chest/abdomen/pelvis demonstrated b/l DVTs, right upper lobe PE, and a bowel perforation with free air. Pt was taken emergently to the OR and is now s/p exploratory laparotomy, SBR x1 (left in discontinuity), abdominal washout, abthera VAC application on 9/22, after which he was transferred to SICU under ACS. He was taken back to the OR on 9/24 for a washout, primary anastomosis, and abdominal closure. Post-operative course complicated by Acute hypoxemic respiratory failure secondary to Acute Pulmonary Vascular congestion / Left Lung Collapse requiring high flow, and acute hypercarbic respiratory failure requiring re-intubation on 10/11.    24 HOUR EVENTS: Pt had second pigtail placed in left superior chest which drained about 1L. He remained on a small dose of Enoch. He was disimpacted in the morning. In the afternoon he had one episode of emesis and his tube feeds were held. Overnight his OGT was pulled back twice as it appeared too far. OGT output was 1L overnight. His mental status waxed and waned.     SUBJECTIVE/ROS:  [ ] A ten-point review of systems was otherwise negative except as noted.  [x ] Due to altered mental status/intubation, subjective information were not able to be obtained from the patient. History was obtained, to the extent possible, from review of the chart and collateral sources of information.      NEURO  RASS:  0     Exam: easily arousable, follows commands  Meds:  x  [x] Adequacy of sedation and pain control has been assessed and adjusted      RESPIRATORY  RR: 19 (10-13-17 @ 05:30) (7 - 29)  SpO2: 99% (10-13-17 @ 05:30) (97% - 100%)    Exam: unlabored, clear to auscultation bilaterally  Mechanical Ventilation: Mode: AC/ CMV (Assist Control/ Continuous Mandatory Ventilation), RR (machine): 16, RR (patient): 17, TV (machine): 450, FiO2: 40, PEEP: 5, ITime: 1, MAP: 8, PIP: 15  ABG - ( 13 Oct 2017 03:56 )  pH: 7.36  /  pCO2: 32    /  pO2: 154   / HCO3: 17    / Base Excess: -7.1  /  SaO2: 100     Blood Gas Arterial, Lactate: 2.8 (10.13.17 @ 03:56)    [x ] Extubation Readiness Assessed  Meds: ALBUTerol/ipratropium for Nebulization 3 milliLiter(s) Nebulizer every 6 hours      CARDIOVASCULAR  HR: 77 (10-13-17 @ 05:30) (77 - 112)  BP: 99/57 (10-12-17 @ 19:45) (99/57 - 116/70)  BP(mean): 73 (10-12-17 @ 19:45) (73 - 84)  ABP: 98/45 (10-13-17 @ 05:30) (94/45 - 138/62)  ABP(mean): 63 (10-13-17 @ 05:30) (61 - 85)    Exam: regular rate and rhythm  Cardiac Rhythm: sinus rhythm  Perfusion     [ ]Adequate   [x ]Inadequate  Mentation   [ ]Normal       [x ]Reduced (waxes and wanes)  Extremities  [x ]Warm         [ ]Cool  Volume Status [ ]Hypervolemic [ x]Euvolemic [ ]Hypovolemic  Meds: metoprolol 12.5 milliGRAM(s) Oral every 12 hours  phenylephrine    Infusion 0.1 MICROgram(s)/kG/Min IV Continuous <Continuous>      GI/NUTRITION  Exam: distended, nontender  Diet: NPO  Meds: pantoprazole  Injectable 40 milliGRAM(s) IV Push daily  polyethylene glycol 3350 17 Gram(s) Oral daily  senna 2 Tablet(s) Oral at bedtime      GENITOURINARY  I&O's Detail    10-11 @ 07:01  -  10-12 @ 07:00  --------------------------------------------------------  IN:    dextrose 5% + sodium chloride 0.45%.: 100 mL    dextrose 5% + sodium chloride 0.45%.: 60 mL    dextrose 5% + sodium chloride 0.9%: 90 mL    dextrose 5% + sodium chloride 0.9%.: 1075 mL    Nepro: 180 mL    phenylephrine   Infusion: 373.4 mL    Sodium Chloride 0.9% IV Bolus: 500 mL  Total IN: 2378.4 mL    OUT:    Chest Tube: 160 mL    Indwelling Catheter - Urethral: 570 mL    Nasoenteral Tube: 250 mL  Total OUT: 980 mL    Total NET: 1398.4 mL      10-12 @ 07:01  -  10-13 @ 05:47  --------------------------------------------------------  IN:    dextrose 5% + sodium chloride 0.9%.: 1575 mL    Nepro: 230 mL    phenylephrine   Infusion: 50.8 mL    phenylephrine   Infusion: 241.5 mL  Total IN: 2097.3 mL    OUT:    Chest Tube: 1025 mL    Chest Tube: 70 mL    Indwelling Catheter - Urethral: 310 mL    Nasoenteral Tube: 1110 mL  Total OUT: 2515 mL    Total NET: -417.7 mL      10-13    137  |  104  |  57<H>  ----------------------------<  114<H>  4.9   |  17<L>  |  4.03<H>    Ca    7.4<L>      13 Oct 2017 04:04  Phos  4.3     10-13  Mg     2.1     10-13    TPro  4.5<L>  /  Alb  2.0<L>  /  TBili  1.7<H>  /  DBili  x   /  AST  33  /  ALT  11  /  AlkPhos  118  10-13    [x ] Torres catheter, indication: ALEC  Meds: dextrose 5% + sodium chloride 0.9%. 1000 milliLiter(s) IV Continuous <Continuous>        HEMATOLOGIC  Meds: enoxaparin Injectable 90 milliGRAM(s) SubCutaneous daily    [x] VTE Prophylaxis                        7.1    21.8  )-----------( 315      ( 13 Oct 2017 04:04 )             21.2     PT/INR - ( 13 Oct 2017 04:04 )   PT: 17.5 sec;   INR: 1.59 ratio         PTT - ( 13 Oct 2017 04:04 )  PTT:34.4 sec  Transfusion     [ ] PRBC   [ ] Platelets   [ ] FFP   [ ] Cryoprecipitate      INFECTIOUS DISEASES  T(C): 37.1 (10-13-17 @ 03:30), Max: 37.7 (10-12-17 @ 19:00)    WBC Count: 21.8 K/uL (10-13 @ 04:04)  WBC Count: 22.5 K/uL (10-12 @ 08:03)    Recent Cultures:  Specimen Source: .Body Fluid Pleural Fluid, 10-07 @ 00:46; Results   No growth at 5 days; Gram Stain:   No polymorphonuclear cells seen per low power field  No organisms seen per oil power field  by cytocentrifuge; Organism: --    Meds: x      ENDOCRINE  Capillary Blood Glucose: WNL    Meds: x      ACCESS DEVICES:  [ x] Peripheral IV  [ ] Central Venous Line	[ ] R	[ ] L	[ ] IJ	[ ] Fem	[ ] SC	Placed:   [x ] Arterial Line		[ x] R	[ ] L	[x ] Fem	[ ] Rad	[ ] Ax	Placed:   [ ] PICC:					[ ] Mediport  [x ] Urinary Catheter, Date Placed:   [ x] Necessity of urinary, arterial, and venous catheters discussed  2 left chest pigtails    OTHER MEDICATIONS: x      CODE STATUS: DNR    IMAGING: < from: Xray Abdomen 1 View PORTABLE -Urgent (10.12.17 @ 06:18) >    Impression:    Metallic clips are seen in the abdomen from previous surgery. No   radiographic evidence for obstruction. Conscious material seen in the   colon. NG tube is present and the tip is in the second portion of the   duodenum. No free air is seen in the peritoneal cavity. HISTORY  62 year old male initially admitted to neurosurgery for bialteral leg weakness found to have newly diagnosed metastatic disease with spinal met causing spinal cord compression s/p T8-T11 laminectomy on 9/20, post-op course complicated by deterioration in respiratory status and acute onset abdominal pain requiring intubation and transfer to NSCU. CT chest/abdomen/pelvis demonstrated b/l DVTs, right upper lobe PE, and a bowel perforation with free air. Pt was taken emergently to the OR and is now s/p exploratory laparotomy, SBR x1 (left in discontinuity), abdominal washout, abthera VAC application on 9/22, after which he was transferred to SICU under ACS. He was taken back to the OR on 9/24 for a washout, primary anastomosis, and abdominal closure. Post-operative course complicated by Acute hypoxemic respiratory failure secondary to Acute Pulmonary Vascular congestion / Left Lung Collapse requiring high flow, and acute hypercarbic respiratory failure requiring re-intubation on 10/11.    24 HOUR EVENTS: Pt had second pigtail placed in left superior chest which drained about 1L. He remained on a small dose of Enoch. He was disimpacted in the morning. In the afternoon he had one episode of emesis and his tube feeds were held. Overnight his OGT was pulled back twice as it appeared too far. OGT output was 1L overnight. His mental status waxed and waned.     SUBJECTIVE/ROS:  [ ] A ten-point review of systems was otherwise negative except as noted.  [x ] Due to altered mental status/intubation, subjective information were not able to be obtained from the patient. History was obtained, to the extent possible, from review of the chart and collateral sources of information.      NEURO  RASS:  0     Exam: easily arousable, follows commands  Meds:  x  [x] Adequacy of sedation and pain control has been assessed and adjusted      RESPIRATORY  RR: 19 (10-13-17 @ 05:30) (7 - 29)  SpO2: 99% (10-13-17 @ 05:30) (97% - 100%)    Exam: unlabored, clear to auscultation bilaterally  Mechanical Ventilation: Mode: AC/ CMV (Assist Control/ Continuous Mandatory Ventilation), RR (machine): 16, RR (patient): 17, TV (machine): 450, FiO2: 40, PEEP: 5, ITime: 1, MAP: 8, PIP: 15  ABG - ( 13 Oct 2017 03:56 )  pH: 7.36  /  pCO2: 32    /  pO2: 154   / HCO3: 17    / Base Excess: -7.1  /  SaO2: 100     Blood Gas Arterial, Lactate: 2.8 (10.13.17 @ 03:56)    [x ] Extubation Readiness Assessed  Meds: ALBUTerol/ipratropium for Nebulization 3 milliLiter(s) Nebulizer every 6 hours      CARDIOVASCULAR  HR: 77 (10-13-17 @ 05:30) (77 - 112)  BP: 99/57 (10-12-17 @ 19:45) (99/57 - 116/70)  BP(mean): 73 (10-12-17 @ 19:45) (73 - 84)  ABP: 98/45 (10-13-17 @ 05:30) (94/45 - 138/62)  ABP(mean): 63 (10-13-17 @ 05:30) (61 - 85)    Exam: regular rate and rhythm  Cardiac Rhythm: sinus rhythm  Perfusion     [ ]Adequate   [x ]Inadequate  Mentation   [ ]Normal       [x ]Reduced (waxes and wanes)  Extremities  [x ]Warm         [ ]Cool  Volume Status [ ]Hypervolemic [ x]Euvolemic [ ]Hypovolemic  Meds: metoprolol 12.5 milliGRAM(s) Oral every 12 hours  phenylephrine    Infusion 0.1 MICROgram(s)/kG/Min IV Continuous <Continuous>      GI/NUTRITION  Exam: distended, nontender  Diet: NPO  Meds: pantoprazole  Injectable 40 milliGRAM(s) IV Push daily  polyethylene glycol 3350 17 Gram(s) Oral daily  senna 2 Tablet(s) Oral at bedtime      GENITOURINARY  I&O's Detail    10-11 @ 07:01  -  10-12 @ 07:00  --------------------------------------------------------  IN:    dextrose 5% + sodium chloride 0.45%.: 100 mL    dextrose 5% + sodium chloride 0.45%.: 60 mL    dextrose 5% + sodium chloride 0.9%: 90 mL    dextrose 5% + sodium chloride 0.9%.: 1075 mL    Nepro: 180 mL    phenylephrine   Infusion: 373.4 mL    Sodium Chloride 0.9% IV Bolus: 500 mL  Total IN: 2378.4 mL    OUT:    Chest Tube: 160 mL    Indwelling Catheter - Urethral: 570 mL    Nasoenteral Tube: 250 mL  Total OUT: 980 mL    Total NET: 1398.4 mL      10-12 @ 07:01  -  10-13 @ 05:47  --------------------------------------------------------  IN:    dextrose 5% + sodium chloride 0.9%.: 1575 mL    Nepro: 230 mL    phenylephrine   Infusion: 50.8 mL    phenylephrine   Infusion: 241.5 mL  Total IN: 2097.3 mL    OUT:    Chest Tube: 1025 mL    Chest Tube: 70 mL    Indwelling Catheter - Urethral: 310 mL    Nasoenteral Tube: 1110 mL  Total OUT: 2515 mL    Total NET: -417.7 mL      10-13    137  |  104  |  57<H>  ----------------------------<  114<H>  4.9   |  17<L>  |  4.03<H>    Ca    7.4<L>      13 Oct 2017 04:04  Phos  4.3     10-13  Mg     2.1     10-13    TPro  4.5<L>  /  Alb  2.0<L>  /  TBili  1.7<H>  /  DBili  x   /  AST  33  /  ALT  11  /  AlkPhos  118  10-13    [x ] Torres catheter, indication: ALEC  Meds: dextrose 5% + sodium chloride 0.9%. 1000 milliLiter(s) IV Continuous <Continuous>        HEMATOLOGIC  Meds: enoxaparin Injectable 90 milliGRAM(s) SubCutaneous daily    [x] VTE Prophylaxis                        7.1    21.8  )-----------( 315      ( 13 Oct 2017 04:04 )             21.2     PT/INR - ( 13 Oct 2017 04:04 )   PT: 17.5 sec;   INR: 1.59 ratio         PTT - ( 13 Oct 2017 04:04 )  PTT:34.4 sec  Transfusion     [x ] PRBC   [ ] Platelets   [ ] FFP   [ ] Cryoprecipitate      INFECTIOUS DISEASES  T(C): 37.1 (10-13-17 @ 03:30), Max: 37.7 (10-12-17 @ 19:00)    WBC Count: 21.8 K/uL (10-13 @ 04:04)  WBC Count: 22.5 K/uL (10-12 @ 08:03)    Recent Cultures:  Specimen Source: .Body Fluid Pleural Fluid, 10-07 @ 00:46; Results   No growth at 5 days; Gram Stain:   No polymorphonuclear cells seen per low power field  No organisms seen per oil power field  by cytocentrifuge; Organism: --    Meds: x      ENDOCRINE  Capillary Blood Glucose: WNL    Meds: x      ACCESS DEVICES:  [ x] Peripheral IV  [ ] Central Venous Line	[ ] R	[ ] L	[ ] IJ	[ ] Fem	[ ] SC	Placed:   [x ] Arterial Line		[ x] R	[ ] L	[x ] Fem	[ ] Rad	[ ] Ax	Placed:   [ ] PICC:					[ ] Mediport  [x ] Urinary Catheter, Date Placed:   [ x] Necessity of urinary, arterial, and venous catheters discussed  2 left chest pigtails    OTHER MEDICATIONS: x      CODE STATUS: DNR    IMAGING: < from: Xray Abdomen 1 View PORTABLE -Urgent (10.12.17 @ 06:18) >    Impression:    Metallic clips are seen in the abdomen from previous surgery. No   radiographic evidence for obstruction. Conscious material seen in the   colon. NG tube is present and the tip is in the second portion of the   duodenum. No free air is seen in the peritoneal cavity.

## 2017-10-13 NOTE — PROGRESS NOTE ADULT - SUBJECTIVE AND OBJECTIVE BOX
ATP Progress Note    S: Yesterday patient required Enoch. 2nd chest tube placed w/ significant output. No acute events overnight. Patient resting seemingly comfortable intubated.    O:  T(C): 37.2 (10-13-17 @ 07:00), Max: 37.7 (10-12-17 @ 19:00)  HR: 77 (10-13-17 @ 08:45) (77 - 112)  BP: 112/63 (10-13-17 @ 07:00) (99/57 - 112/63)  RR: 19 (10-13-17 @ 08:45) (17 - 28)  SpO2: 100% (10-13-17 @ 08:45) (97% - 100%)  Wt(kg): --    10-12 @ 07:01  -  10-13 @ 07:00  --------------------------------------------------------  IN:    dextrose 5% + sodium chloride 0.9%.: 1725 mL    Nepro: 230 mL    phenylephrine   Infusion: 279.7 mL    phenylephrine   Infusion: 50.8 mL  Total IN: 2285.5 mL    OUT:    Chest Tube: 1025 mL    Chest Tube: 70 mL    Indwelling Catheter - Urethral: 345 mL    Nasoenteral Tube: 1110 mL  Total OUT: 2550 mL    Total NET: -264.5 mL      10-13 @ 07:01  -  10-13 @ 09:25  --------------------------------------------------------  IN:    dextrose 5% + sodium chloride 0.9%.: 150 mL    Packed Red Blood Cells: 250 mL    phenylephrine   Infusion: 38.2 mL  Total IN: 438.2 mL    OUT:    Indwelling Catheter - Urethral: 70 mL  Total OUT: 70 mL    Total NET: 368.2 mL        CBC Full  -  ( 13 Oct 2017 04:04 )  WBC Count : 21.8 K/uL  Hemoglobin : 7.1 g/dL  Hematocrit : 21.2 %  Platelet Count - Automated : 315 K/uL  Mean Cell Volume : 94.2 fl  Mean Cell Hemoglobin : 31.7 pg  Mean Cell Hemoglobin Concentration : 33.6 gm/dL  Auto Neutrophil # : 19.0 K/uL  Auto Lymphocyte # : 0.7 K/uL  Auto Monocyte # : 1.3 K/uL  Auto Eosinophil # : 0.9 K/uL  Auto Basophil # : 0.0 K/uL  Auto Neutrophil % : 78.0 %  Auto Lymphocyte % : 3.0 %  Auto Monocyte % : 6.0 %  Auto Eosinophil % : 4.0 %  Auto Basophil % : x    CAPILLARY BLOOD GLUCOSE          PHYSICAL EXAM:  Gen: Intubated  Abd: Soft, NT, ND  Lines: Pigtail x2 to suction

## 2017-10-13 NOTE — PROGRESS NOTE ADULT - ATTENDING COMMENTS
normocytic anemia, likely from chronic disease  -hgb 7.1 -> 8.3 (with 1u RBC transfusion)    acute hypercarbic respiratory failure  -stable on mechanical ventilator    acute renal failure from acute tubular necrosis  -non-anion gap metabolic acidosis stable  -D5NS @ 75 to maintain euvolemia and avoid additional renal injury    severe protein calorie malnutrition  -gastroparesis - start erythromycin and metoclopramide    mild hypotension multifactorial  -phenylephrine infusion as needed    metastatic lung cancer  -I discussed the patient's poor prognosis with his wife at bedside. I explained that while temporary improvement in his condition might be possible with aggressive medical care, long-term survival is unlikely, but we are still awaiting molecular studies from tumor.      Critical Care Time: 40 minutes

## 2017-10-13 NOTE — PROGRESS NOTE ADULT - ASSESSMENT
ThisHPI: 62y male admitted to neurosurgery for bilateral leg weakness found to have newly diagnosed metastatic disease s/p T8-T11 laminectomy presented with deterioration in respiratory status and acute onset abdominal pain who was subsequently intubated and w/u revealed b/l DVTs, right upper lobe PE, and a bowel perforation.  Pt is now s/p Exploratory laparotomy #1, SBR x1 (left in discontinuity), abdominal washout, abthera VAC application (9/22) with intraop findings of a 1cm perforation in the small bowel 110 cm distal to the ligament of treitz. )(/24/17 Exploratory OR #2 with abdominal irrigation and enterostomy. Right leg motor function being monitored by neurosurgery after emergency admission spinal cord decompression of metastatic tumor T8. Lung cancer pathology is non small cell.  DVT/ PE  needs to be anticoagulated with clot going up into the right renal vein thrombosis. no return of leg movement or sensation to light touch over the past 5 days.    Left pleural effusion is the result of the primary lung cancer with distal obstruction. Patient with worsening pulmonary status requiring reintubation. second chest tube placed with successful removal of large amount of  pleural fluid.

## 2017-10-13 NOTE — PROGRESS NOTE ADULT - SUBJECTIVE AND OBJECTIVE BOX
Follow-up Pulm Progress Note    intubated, on pressor, opens eyes to voice, Left CT x 2 CT/inferior-310, CT-1025    Medications:  MEDICATIONS  (STANDING):  ALBUTerol/ipratropium for Nebulization 3 milliLiter(s) Nebulizer every 6 hours  dextrose 5% + sodium chloride 0.9%. 1000 milliLiter(s) (75 mL/Hr) IV Continuous <Continuous>  enoxaparin Injectable 90 milliGRAM(s) SubCutaneous daily  metoprolol 12.5 milliGRAM(s) Oral every 12 hours  pantoprazole  Injectable 40 milliGRAM(s) IV Push daily  phenylephrine    Infusion 0.1 MICROgram(s)/kG/Min (3.176 mL/Hr) IV Continuous <Continuous>  polyethylene glycol 3350 17 Gram(s) Oral daily  senna 2 Tablet(s) Oral at bedtime    MEDICATIONS  (PRN):      Mode: AC/ CMV (Assist Control/ Continuous Mandatory Ventilation)  RR (machine): 16  TV (machine): 450  FiO2: 40  PEEP: 5  ITime: 1  MAP: 12  PIP: 25      Vital Signs Last 24 Hrs  T(C): 37.2 (13 Oct 2017 07:00), Max: 37.7 (12 Oct 2017 19:00)  T(F): 99 (13 Oct 2017 07:00), Max: 99.9 (12 Oct 2017 19:00)  HR: 77 (13 Oct 2017 08:45) (77 - 112)  BP: 112/63 (13 Oct 2017 07:00) (99/57 - 112/63)  BP(mean): 83 (13 Oct 2017 07:00) (73 - 83)  RR: 19 (13 Oct 2017 08:45) (17 - 28)  SpO2: 100% (13 Oct 2017 08:45) (97% - 100%)    ABG - ( 13 Oct 2017 03:56 )  pH: 7.36  /  pCO2: 32    /  pO2: 154   / HCO3: 17    / Base Excess: -7.1  /  SaO2: 100                   10-12 @ 07:01  -  10-13 @ 07:00  --------------------------------------------------------  IN: 2285.5 mL / OUT: 2550 mL / NET: -264.5 mL          LABS:                        7.1    21.8  )-----------( 315      ( 13 Oct 2017 04:04 )             21.2     10-13    137  |  104  |  57<H>  ----------------------------<  114<H>  4.9   |  17<L>  |  4.03<H>    Ca    7.4<L>      13 Oct 2017 04:04  Phos  4.3     10-13  Mg     2.1     10-13    TPro  4.5<L>  /  Alb  2.0<L>  /  TBili  1.7<H>  /  DBili  x   /  AST  33  /  ALT  11  /  AlkPhos  118  10-13          CAPILLARY BLOOD GLUCOSE        PT/INR - ( 13 Oct 2017 04:04 )   PT: 17.5 sec;   INR: 1.59 ratio         PTT - ( 13 Oct 2017 04:04 )  PTT:34.4 sec  Urinalysis Basic - ( 12 Oct 2017 11:47 )    Color: Yellow / Appearance: SL Turbid / S.014 / pH: x  Gluc: x / Ketone: Negative  / Bili: Small / Urobili: 2   Blood: x / Protein: 30 mg/dL / Nitrite: Negative   Leuk Esterase: Small / RBC: 3-5 /HPF / WBC 6-10 /HPF   Sq Epi: x / Non Sq Epi: x / Bacteria: Few /HPF        Serum Pro-Brain Natriuretic Peptide: 9049 pg/mL (10-11-17 @ 04:40)            Fluid characteristics  -- 10-06 @ 20:17  pH 7.87  LDH 1648  tprot 2.3    Cell count  Appearance Sl Bloody  Fluid type --  BF lymph 37  color Red  eosinophil --  PMN 27  Mesothelial 4  Monocyte 2  Other body cells 30      CULTURES: (if applicable)  Culture Results:   No growth at 5 days (10-07 @ 00:46)          Physical Examination:  PULM: decreased bs bilaterally, no significant sputum production  CVS: S1, S2 heard    RADIOLOGY REVIEWED  CXR: < from: Xray Chest 1 View AP -PORTABLE-Routine (10.13.17 @ 07:13) >  Impression:    The heart is slightly enlarged. Small left pleural effusion. A pigtail   catheter is seen in left hemithorax. No pneumothorax. The right lung is   clear. Endotracheal tube is just above the judson. OG tube is in the   stomach.    < end of copied text > Follow-up Pulm Progress Note    intubated, on pressor, opens eyes to voice, Left CT x 2 CT/inferior-70, CT-1025    Medications:  MEDICATIONS  (STANDING):  ALBUTerol/ipratropium for Nebulization 3 milliLiter(s) Nebulizer every 6 hours  dextrose 5% + sodium chloride 0.9%. 1000 milliLiter(s) (75 mL/Hr) IV Continuous <Continuous>  enoxaparin Injectable 90 milliGRAM(s) SubCutaneous daily  metoprolol 12.5 milliGRAM(s) Oral every 12 hours  pantoprazole  Injectable 40 milliGRAM(s) IV Push daily  phenylephrine    Infusion 0.1 MICROgram(s)/kG/Min (3.176 mL/Hr) IV Continuous <Continuous>  polyethylene glycol 3350 17 Gram(s) Oral daily  senna 2 Tablet(s) Oral at bedtime    MEDICATIONS  (PRN):      Mode: AC/ CMV (Assist Control/ Continuous Mandatory Ventilation)  RR (machine): 16  TV (machine): 450  FiO2: 40  PEEP: 5  ITime: 1  MAP: 12  PIP: 25      Vital Signs Last 24 Hrs  T(C): 37.2 (13 Oct 2017 07:00), Max: 37.7 (12 Oct 2017 19:00)  T(F): 99 (13 Oct 2017 07:00), Max: 99.9 (12 Oct 2017 19:00)  HR: 77 (13 Oct 2017 08:45) (77 - 112)  BP: 112/63 (13 Oct 2017 07:00) (99/57 - 112/63)  BP(mean): 83 (13 Oct 2017 07:00) (73 - 83)  RR: 19 (13 Oct 2017 08:45) (17 - 28)  SpO2: 100% (13 Oct 2017 08:45) (97% - 100%)    ABG - ( 13 Oct 2017 03:56 )  pH: 7.36  /  pCO2: 32    /  pO2: 154   / HCO3: 17    / Base Excess: -7.1  /  SaO2: 100                   10-12 @ 07:01  -  10-13 @ 07:00  --------------------------------------------------------  IN: 2285.5 mL / OUT: 2550 mL / NET: -264.5 mL          LABS:                        7.1    21.8  )-----------( 315      ( 13 Oct 2017 04:04 )             21.2     10-13    137  |  104  |  57<H>  ----------------------------<  114<H>  4.9   |  17<L>  |  4.03<H>    Ca    7.4<L>      13 Oct 2017 04:04  Phos  4.3     10-13  Mg     2.1     10-13    TPro  4.5<L>  /  Alb  2.0<L>  /  TBili  1.7<H>  /  DBili  x   /  AST  33  /  ALT  11  /  AlkPhos  118  10-13          CAPILLARY BLOOD GLUCOSE        PT/INR - ( 13 Oct 2017 04:04 )   PT: 17.5 sec;   INR: 1.59 ratio         PTT - ( 13 Oct 2017 04:04 )  PTT:34.4 sec  Urinalysis Basic - ( 12 Oct 2017 11:47 )    Color: Yellow / Appearance: SL Turbid / S.014 / pH: x  Gluc: x / Ketone: Negative  / Bili: Small / Urobili: 2   Blood: x / Protein: 30 mg/dL / Nitrite: Negative   Leuk Esterase: Small / RBC: 3-5 /HPF / WBC 6-10 /HPF   Sq Epi: x / Non Sq Epi: x / Bacteria: Few /HPF        Serum Pro-Brain Natriuretic Peptide: 9049 pg/mL (10-11-17 @ 04:40)            Fluid characteristics  -- 10-06 @ 20:17  pH 7.87  LDH 1648  tprot 2.3    Cell count  Appearance Sl Bloody  Fluid type --  BF lymph 37  color Red  eosinophil --  PMN 27  Mesothelial 4  Monocyte 2  Other body cells 30      CULTURES: (if applicable)  Culture Results:   No growth at 5 days (10-07 @ 00:46)          Physical Examination:  PULM: decreased bs bilaterally, no significant sputum production  CVS: S1, S2 heard    RADIOLOGY REVIEWED  CXR: < from: Xray Chest 1 View AP -PORTABLE-Routine (10.13.17 @ 07:13) >  Impression:    The heart is slightly enlarged. Small left pleural effusion. A pigtail   catheter is seen in left hemithorax. No pneumothorax. The right lung is   clear. Endotracheal tube is just above the judson. OG tube is in the   stomach.    < end of copied text > Follow-up Pulm Progress Note    intubated, on pressor, opens eyes to voice, Left CT x 2 CT/inferior-70, CT-1025    Medications:  MEDICATIONS  (STANDING):  ALBUTerol/ipratropium for Nebulization 3 milliLiter(s) Nebulizer every 6 hours  dextrose 5% + sodium chloride 0.9%. 1000 milliLiter(s) (75 mL/Hr) IV Continuous <Continuous>  enoxaparin Injectable 90 milliGRAM(s) SubCutaneous daily  metoprolol 12.5 milliGRAM(s) Oral every 12 hours  pantoprazole  Injectable 40 milliGRAM(s) IV Push daily  phenylephrine    Infusion 0.1 MICROgram(s)/kG/Min (3.176 mL/Hr) IV Continuous <Continuous>  polyethylene glycol 3350 17 Gram(s) Oral daily  senna 2 Tablet(s) Oral at bedtime    MEDICATIONS  (PRN):      Mode: AC/ CMV (Assist Control/ Continuous Mandatory Ventilation)  RR (machine): 16  TV (machine): 450  FiO2: 40  PEEP: 5  ITime: 1  MAP: 12  PIP: 25      Vital Signs Last 24 Hrs  T(C): 37.2 (13 Oct 2017 07:00), Max: 37.7 (12 Oct 2017 19:00)  T(F): 99 (13 Oct 2017 07:00), Max: 99.9 (12 Oct 2017 19:00)  HR: 77 (13 Oct 2017 08:45) (77 - 112)  BP: 112/63 (13 Oct 2017 07:00) (99/57 - 112/63)  BP(mean): 83 (13 Oct 2017 07:00) (73 - 83)  RR: 19 (13 Oct 2017 08:45) (17 - 28)  SpO2: 100% (13 Oct 2017 08:45) (97% - 100%)    ABG - ( 13 Oct 2017 03:56 )  pH: 7.36  /  pCO2: 32    /  pO2: 154   / HCO3: 17    / Base Excess: -7.1  /  SaO2: 100       10-12 @ 07:01  -  10-13 @ 07:00  --------------------------------------------------------  IN: 2285.5 mL / OUT: 2550 mL / NET: -264.5 mL          LABS:                        7.1    21.8  )-----------( 315      ( 13 Oct 2017 04:04 )             21.2     10-13    137  |  104  |  57<H>  ----------------------------<  114<H>  4.9   |  17<L>  |  4.03<H>    Ca    7.4<L>      13 Oct 2017 04:04  Phos  4.3     10-13  Mg     2.1     10-13    TPro  4.5<L>  /  Alb  2.0<L>  /  TBili  1.7<H>  /  DBili  x   /  AST  33  /  ALT  11  /  AlkPhos  118  10-13      CAPILLARY BLOOD GLUCOSE        PT/INR - ( 13 Oct 2017 04:04 )   PT: 17.5 sec;   INR: 1.59 ratio         PTT - ( 13 Oct 2017 04:04 )  PTT:34.4 sec  Urinalysis Basic - ( 12 Oct 2017 11:47 )    Color: Yellow / Appearance: SL Turbid / S.014 / pH: x  Gluc: x / Ketone: Negative  / Bili: Small / Urobili: 2   Blood: x / Protein: 30 mg/dL / Nitrite: Negative   Leuk Esterase: Small / RBC: 3-5 /HPF / WBC 6-10 /HPF   Sq Epi: x / Non Sq Epi: x / Bacteria: Few /HPF        Serum Pro-Brain Natriuretic Peptide: 9049 pg/mL (10-11-17 @ 04:40)    Fluid characteristics  -- 10-06 @ 20:17  pH 7.87  LDH 1648  tprot 2.3    Cell count  Appearance Sl Bloody  Fluid type --  BF lymph 37  color Red  eosinophil --  PMN 27  Mesothelial 4  Monocyte 2  Other body cells 30      CULTURES: (if applicable)  Culture Results:   No growth at 5 days (10-07 @ 00:46)          Physical Examination:  PULM: decreased bs bilaterally, no significant sputum production  CVS: S1, S2 heard    RADIOLOGY REVIEWED  CXR: < from: Xray Chest 1 View AP -PORTABLE-Routine (10.13.17 @ 07:13) >  Impression:    The heart is slightly enlarged. Small left pleural effusion. A pigtail   catheter is seen in left hemithorax. No pneumothorax. The right lung is   clear. Endotracheal tube is just above the judson. OG tube is in the   stomach.    < end of copied text >

## 2017-10-13 NOTE — PROGRESS NOTE ADULT - PROBLEM SELECTOR PLAN 1
-Left lung collapse d/t- airway compression 2nd tumor, atelectasis. L pleural effusion  10/11 s/t bronch with lll complete obliteration d/t malignant lesion  -s/p L pigtail cath, exudative effusion  10/12 additional Left ct placed  -c/w sendy

## 2017-10-13 NOTE — PROGRESS NOTE ADULT - ASSESSMENT
63 y/o M with no significant PMH presents 9/19 with bilateral LE weakness/paraplegia x several weeks. 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 CT evidence of L bronchial obstruction 2nd lesion with LLL collapse, necrotic mediastinal/hilar LN, liver mets, ?adrenal mets. Hospital course c/b RUL segmental PE, bilateral soleal DVT. Further c/b SB perforation s/p SBR, primary anastomosis on 9/22, RTOR 9/24 for ex-lap, washout, re-anastomosis and abdominal closure. Abdominal cultures with E. Faecium. Extubated 9/24. Now with L lung atelectasis. 10/6 s/p L CT 10/11 intubated for hypercarbic respiratory failure, s/p bronch with LLower lobe complete obliteration s/t malignant  lesion, pressors, 10/12 additional Left chest tube placed

## 2017-10-13 NOTE — PROGRESS NOTE ADULT - PROBLEM SELECTOR PLAN 3
Stage IV NSC Lung CA  -pt DNR  pallcare on board-family meeting for friday Stage IV NSC Lung CA  -pt DNR  pallcare on board-family meeting for sunday

## 2017-10-14 NOTE — PROGRESS NOTE ADULT - SUBJECTIVE AND OBJECTIVE BOX
HISTORY  62y Male    24 HOUR EVENTS:    SUBJECTIVE/ROS:  [ ] A ten-point review of systems was otherwise negative except as noted.  [ ] Due to altered mental status/intubation, subjective information were not able to be obtained from the patient. History was obtained, to the extent possible, from review of the chart and collateral sources of information.      NEURO  RASS:     GCS:     [facility] ICU:  Exam:   Meds: metoclopramide Injectable 5 milliGRAM(s) IV Push every 6 hours    [x] Adequacy of sedation and pain control has been assessed and adjusted      RESPIRATORY  RR: 21 (10-14-17 @ 03:00) (17 - 27)  SpO2: 98% (10-14-17 @ 03:00) (96% - 100%)  Wt(kg): --  Exam: unlabored, clear to auscultation bilaterally  Mechanical Ventilation: Mode: AC/ CMV (Assist Control/ Continuous Mandatory Ventilation), RR (machine): 16, RR (patient): 21, TV (machine): 450, FiO2: 40, PEEP: 5, ITime: 1, MAP: 12, PIP: 24  ABG - ( 14 Oct 2017 02:39 )  pH: 7.38  /  pCO2: 29    /  pO2: 167   / HCO3: 17    / Base Excess: -7.2  /  SaO2: 100     Lactate: x                [ ] Extubation Readiness Assessed  Meds: ALBUTerol/ipratropium for Nebulization 3 milliLiter(s) Nebulizer every 6 hours        CARDIOVASCULAR  HR: 96 (10-14-17 @ 03:00) (75 - 101)  BP: 102/56 (10-13-17 @ 20:00) (102/56 - 112/63)  BP(mean): 76 (10-13-17 @ 20:00) (76 - 83)  ABP: 100/54 (10-14-17 @ 03:00) (95/46 - 123/62)  ABP(mean): 71 (10-14-17 @ 03:00) (61 - 86)  Wt(kg): --  CVP(cm H2O): --      Exam:  Cardiac Rhythm:  Perfusion     [ ]Adequate   [ ]Inadequate  Mentation   [ ]Normal       [ ]Reduced  Extremities  [ ]Warm         [ ]Cool  Volume Status [ ]Hypervolemic [ ]Euvolemic [ ]Hypovolemic  Meds:       GI/NUTRITION  Exam:  Diet:  Meds: bisacodyl Suppository 10 milliGRAM(s) Rectal daily PRN Constipation  pantoprazole  Injectable 40 milliGRAM(s) IV Push daily  polyethylene glycol 3350 17 Gram(s) Oral two times a day  senna 2 Tablet(s) Oral at bedtime      GENITOURINARY  I&O's Detail    10-12 @ 07:01  -  10-13 @ 07:00  --------------------------------------------------------  IN:    dextrose 5% + sodium chloride 0.9%.: 1725 mL    Nepro: 230 mL    phenylephrine   Infusion: 279.7 mL    phenylephrine   Infusion: 50.8 mL  Total IN: 2285.5 mL    OUT:    Chest Tube: 1025 mL    Chest Tube: 70 mL    Indwelling Catheter - Urethral: 345 mL    Nasoenteral Tube: 1110 mL  Total OUT: 2550 mL    Total NET: -264.5 mL      10-13 @ 07:01  -  10-14 @ 03:07  --------------------------------------------------------  IN:    dextrose 5% + sodium chloride 0.9%.: 1500 mL    Packed Red Blood Cells: 250 mL    phenylephrine   Infusion: 94.7 mL  Total IN: 1844.7 mL    OUT:    Chest Tube: 150 mL    Chest Tube: 50 mL    Indwelling Catheter - Urethral: 630 mL    Nasoenteral Tube: 400 mL  Total OUT: 1230 mL    Total NET: 614.7 mL          10-13    137  |  104  |  57<H>  ----------------------------<  114<H>  4.9   |  17<L>  |  4.03<H>    Ca    7.4<L>      13 Oct 2017 04:04  Phos  4.3     10-13  Mg     2.1     10-13    TPro  4.5<L>  /  Alb  2.0<L>  /  TBili  1.7<H>  /  DBili  x   /  AST  33  /  ALT  11  /  AlkPhos  118  10-13    [ ] Torres catheter, indication: N/A  Meds: dextrose 5% + sodium chloride 0.9%. 1000 milliLiter(s) IV Continuous <Continuous>        HEMATOLOGIC  Meds: enoxaparin Injectable 90 milliGRAM(s) SubCutaneous daily    [x] VTE Prophylaxis                        8.1    23.7  )-----------( 262      ( 14 Oct 2017 02:43 )             24.3     PT/INR - ( 14 Oct 2017 02:43 )   PT: 16.3 sec;   INR: 1.50 ratio         PTT - ( 14 Oct 2017 02:43 )  PTT:31.7 sec  Transfusion     [ ] PRBC   [ ] Platelets   [ ] FFP   [ ] Cryoprecipitate      INFECTIOUS DISEASES  T(C): 37 (10-14-17 @ 03:00), Max: 37.6 (10-13-17 @ 19:00)  Wt(kg): --  WBC Count: 23.7 K/uL (10-14 @ 02:43)  WBC Count: 21.9 K/uL (10-13 @ 12:42)  WBC Count: 21.8 K/uL (10-13 @ 04:04)    Recent Cultures:  Specimen Source: .Sputum Sputum, 10-13 @ 17:06; Results --; Gram Stain:   Rare polymorphonuclear leukocytes per low power field  No Squamous epithelial cells per low power field  Numerous Gram Negative Rods per oil power field  Rare Gram positive cocci in pairs per oil power field  Rare Gram Positive Rods per oil power field; Organism: --  Specimen Source: .Blood Blood-Venous, 10-12 @ 14:58; Results   No growth to date.; Gram Stain: --; Organism: --    Meds:       ENDOCRINE  Capillary Blood Glucose    Meds:       ACCESS DEVICES:  [ ] Peripheral IV  [ ] Central Venous Line	[ ] R	[ ] L	[ ] IJ	[ ] Fem	[ ] SC	Placed:   [ ] Arterial Line		[ ] R	[ ] L	[ ] Fem	[ ] Rad	[ ] Ax	Placed:   [ ] PICC:					[ ] Mediport  [ ] Urinary Catheter, Date Placed:   [ ] Necessity of urinary, arterial, and venous catheters discussed    OTHER MEDICATIONS:      CODE STATUS: Yes      IMAGING: HISTORY  62 year old male initially admitted to neurosurgery for bialteral leg weakness found to have newly diagnosed metastatic disease with spinal met causing spinal cord compression s/p T8-T11 laminectomy on 9/20, post-op course complicated by deterioration in respiratory status and acute onset abdominal pain requiring intubation and transfer to NSCU. CT chest/abdomen/pelvis demonstrated b/l DVTs, right upper lobe PE, and a bowel perforation with free air. Pt was taken emergently to the OR and is now s/p exploratory laparotomy, SBR x1 (left in discontinuity), abdominal washout, abthera VAC application on 9/22, after which he was transferred to SICU under ACS. He was taken back to the OR on 9/24 for a washout, primary anastomosis, and abdominal closure. Post-operative course complicated by Acute hypoxemic respiratory failure secondary to Acute Pulmonary Vascular congestion / Left Lung Collapse requiring high flow, and acute hypercarbic respiratory failure requiring re-intubation on 10/11.  Pt had second pigtail placed in left superior chest which drained about 1L. He remained on a small dose of Enoch.    24 HOUR EVENTS: Reglan added for gastric motility. Dulcolax     SUBJECTIVE/ROS:  [ ] A ten-point review of systems was otherwise negative except as noted.  [ ] Due to altered mental status/intubation, subjective information were not able to be obtained from the patient. History was obtained, to the extent possible, from review of the chart and collateral sources of information.      NEURO  RASS:     GCS:     [facility] ICU:  Exam:   Meds: metoclopramide Injectable 5 milliGRAM(s) IV Push every 6 hours    [x] Adequacy of sedation and pain control has been assessed and adjusted      RESPIRATORY  RR: 21 (10-14-17 @ 03:00) (17 - 27)  SpO2: 98% (10-14-17 @ 03:00) (96% - 100%)  Wt(kg): --  Exam: unlabored, clear to auscultation bilaterally  Mechanical Ventilation: Mode: AC/ CMV (Assist Control/ Continuous Mandatory Ventilation), RR (machine): 16, RR (patient): 21, TV (machine): 450, FiO2: 40, PEEP: 5, ITime: 1, MAP: 12, PIP: 24  ABG - ( 14 Oct 2017 02:39 )  pH: 7.38  /  pCO2: 29    /  pO2: 167   / HCO3: 17    / Base Excess: -7.2  /  SaO2: 100     Lactate: x                [ ] Extubation Readiness Assessed  Meds: ALBUTerol/ipratropium for Nebulization 3 milliLiter(s) Nebulizer every 6 hours        CARDIOVASCULAR  HR: 96 (10-14-17 @ 03:00) (75 - 101)  BP: 102/56 (10-13-17 @ 20:00) (102/56 - 112/63)  BP(mean): 76 (10-13-17 @ 20:00) (76 - 83)  ABP: 100/54 (10-14-17 @ 03:00) (95/46 - 123/62)  ABP(mean): 71 (10-14-17 @ 03:00) (61 - 86)  Wt(kg): --  CVP(cm H2O): --      Exam:  Cardiac Rhythm:  Perfusion     [ ]Adequate   [ ]Inadequate  Mentation   [ ]Normal       [ ]Reduced  Extremities  [ ]Warm         [ ]Cool  Volume Status [ ]Hypervolemic [ ]Euvolemic [ ]Hypovolemic  Meds:       GI/NUTRITION  Exam:  Diet:  Meds: bisacodyl Suppository 10 milliGRAM(s) Rectal daily PRN Constipation  pantoprazole  Injectable 40 milliGRAM(s) IV Push daily  polyethylene glycol 3350 17 Gram(s) Oral two times a day  senna 2 Tablet(s) Oral at bedtime      GENITOURINARY  I&O's Detail    10-12 @ 07:01  -  10-13 @ 07:00  --------------------------------------------------------  IN:    dextrose 5% + sodium chloride 0.9%.: 1725 mL    Nepro: 230 mL    phenylephrine   Infusion: 279.7 mL    phenylephrine   Infusion: 50.8 mL  Total IN: 2285.5 mL    OUT:    Chest Tube: 1025 mL    Chest Tube: 70 mL    Indwelling Catheter - Urethral: 345 mL    Nasoenteral Tube: 1110 mL  Total OUT: 2550 mL    Total NET: -264.5 mL      10-13 @ 07:01  -  10-14 @ 03:07  --------------------------------------------------------  IN:    dextrose 5% + sodium chloride 0.9%.: 1500 mL    Packed Red Blood Cells: 250 mL    phenylephrine   Infusion: 94.7 mL  Total IN: 1844.7 mL    OUT:    Chest Tube: 150 mL    Chest Tube: 50 mL    Indwelling Catheter - Urethral: 630 mL    Nasoenteral Tube: 400 mL  Total OUT: 1230 mL    Total NET: 614.7 mL          10-13    137  |  104  |  57<H>  ----------------------------<  114<H>  4.9   |  17<L>  |  4.03<H>    Ca    7.4<L>      13 Oct 2017 04:04  Phos  4.3     10-13  Mg     2.1     10-13    TPro  4.5<L>  /  Alb  2.0<L>  /  TBili  1.7<H>  /  DBili  x   /  AST  33  /  ALT  11  /  AlkPhos  118  10-13    [ ] Torres catheter, indication: N/A  Meds: dextrose 5% + sodium chloride 0.9%. 1000 milliLiter(s) IV Continuous <Continuous>        HEMATOLOGIC  Meds: enoxaparin Injectable 90 milliGRAM(s) SubCutaneous daily    [x] VTE Prophylaxis                        8.1    23.7  )-----------( 262      ( 14 Oct 2017 02:43 )             24.3     PT/INR - ( 14 Oct 2017 02:43 )   PT: 16.3 sec;   INR: 1.50 ratio         PTT - ( 14 Oct 2017 02:43 )  PTT:31.7 sec  Transfusion     [ ] PRBC   [ ] Platelets   [ ] FFP   [ ] Cryoprecipitate      INFECTIOUS DISEASES  T(C): 37 (10-14-17 @ 03:00), Max: 37.6 (10-13-17 @ 19:00)  Wt(kg): --  WBC Count: 23.7 K/uL (10-14 @ 02:43)  WBC Count: 21.9 K/uL (10-13 @ 12:42)  WBC Count: 21.8 K/uL (10-13 @ 04:04)    Recent Cultures:  Specimen Source: .Sputum Sputum, 10-13 @ 17:06; Results --; Gram Stain:   Rare polymorphonuclear leukocytes per low power field  No Squamous epithelial cells per low power field  Numerous Gram Negative Rods per oil power field  Rare Gram positive cocci in pairs per oil power field  Rare Gram Positive Rods per oil power field; Organism: --  Specimen Source: .Blood Blood-Venous, 10-12 @ 14:58; Results   No growth to date.; Gram Stain: --; Organism: --    Meds:       ENDOCRINE  Capillary Blood Glucose    Meds:       ACCESS DEVICES:  [ ] Peripheral IV  [ ] Central Venous Line	[ ] R	[ ] L	[ ] IJ	[ ] Fem	[ ] SC	Placed:   [ ] Arterial Line		[ ] R	[ ] L	[ ] Fem	[ ] Rad	[ ] Ax	Placed:   [ ] PICC:					[ ] Mediport  [ ] Urinary Catheter, Date Placed:   [ ] Necessity of urinary, arterial, and venous catheters discussed    OTHER MEDICATIONS:      CODE STATUS: Yes      IMAGING: HISTORY  62 year old male initially admitted to neurosurgery for bialteral leg weakness found to have newly diagnosed metastatic disease with spinal met causing spinal cord compression s/p T8-T11 laminectomy on 9/20, post-op course complicated by deterioration in respiratory status and acute onset abdominal pain requiring intubation and transfer to NSCU. CT chest/abdomen/pelvis demonstrated b/l DVTs, right upper lobe PE, and a bowel perforation with free air. Pt was taken emergently to the OR and is now s/p exploratory laparotomy, SBR x1 (left in discontinuity), abdominal washout, abthera VAC application on 9/22, after which he was transferred to SICU under ACS. He was taken back to the OR on 9/24 for a washout, primary anastomosis, and abdominal closure. Post-operative course complicated by Acute hypoxemic respiratory failure secondary to Acute Pulmonary Vascular congestion / Left Lung Collapse requiring high flow, and acute hypercarbic respiratory failure requiring re-intubation on 10/11.  Pt had second pigtail placed in left superior chest which drained about 1L. He remained on a small dose of Enoch.    24 HOUR EVENTS: Reglan added for gastric motility. Dulcolax     SUBJECTIVE/ROS:  [ x] A ten-point review of systems was otherwise negative except as noted.  [ ] Due to altered mental status/intubation, subjective information were not able to be obtained from the patient. History was obtained, to the extent possible, from review of the chart and collateral sources of information.      NEURO  RASS:     GCS:     [facility] ICU:  Exam:   Meds: metoclopramide Injectable 5 milliGRAM(s) IV Push every 6 hours    [x] Adequacy of sedation and pain control has been assessed and adjusted      RESPIRATORY  RR: 21 (10-14-17 @ 03:00) (17 - 27)  SpO2: 98% (10-14-17 @ 03:00) (96% - 100%)  Wt(kg): --  Exam: unlabored, clear to auscultation bilaterally  Mechanical Ventilation: Mode: AC/ CMV (Assist Control/ Continuous Mandatory Ventilation), RR (machine): 16, RR (patient): 21, TV (machine): 450, FiO2: 40, PEEP: 5, ITime: 1, MAP: 12, PIP: 24  ABG - ( 14 Oct 2017 02:39 )  pH: 7.38  /  pCO2: 29    /  pO2: 167   / HCO3: 17    / Base Excess: -7.2  /  SaO2: 100     Lactate: x                [ ] Extubation Readiness Assessed  Meds: ALBUTerol/ipratropium for Nebulization 3 milliLiter(s) Nebulizer every 6 hours        CARDIOVASCULAR  HR: 96 (10-14-17 @ 03:00) (75 - 101)  BP: 102/56 (10-13-17 @ 20:00) (102/56 - 112/63)  BP(mean): 76 (10-13-17 @ 20:00) (76 - 83)  ABP: 100/54 (10-14-17 @ 03:00) (95/46 - 123/62)  ABP(mean): 71 (10-14-17 @ 03:00) (61 - 86)  Wt(kg): --  CVP(cm H2O): --      Exam:  Cardiac Rhythm:  Perfusion     [ ]Adequate   [ ]Inadequate  Mentation   [ ]Normal       [ ]Reduced  Extremities  [ ]Warm         [ ]Cool  Volume Status [ ]Hypervolemic [ ]Euvolemic [ ]Hypovolemic  Meds:       GI/NUTRITION  Exam:  Diet:  Meds: bisacodyl Suppository 10 milliGRAM(s) Rectal daily PRN Constipation  pantoprazole  Injectable 40 milliGRAM(s) IV Push daily  polyethylene glycol 3350 17 Gram(s) Oral two times a day  senna 2 Tablet(s) Oral at bedtime      GENITOURINARY  I&O's Detail    10-12 @ 07:01  -  10-13 @ 07:00  --------------------------------------------------------  IN:    dextrose 5% + sodium chloride 0.9%.: 1725 mL    Nepro: 230 mL    phenylephrine   Infusion: 279.7 mL    phenylephrine   Infusion: 50.8 mL  Total IN: 2285.5 mL    OUT:    Chest Tube: 1025 mL    Chest Tube: 70 mL    Indwelling Catheter - Urethral: 345 mL    Nasoenteral Tube: 1110 mL  Total OUT: 2550 mL    Total NET: -264.5 mL      10-13 @ 07:01  -  10-14 @ 03:07  --------------------------------------------------------  IN:    dextrose 5% + sodium chloride 0.9%.: 1500 mL    Packed Red Blood Cells: 250 mL    phenylephrine   Infusion: 94.7 mL  Total IN: 1844.7 mL    OUT:    Chest Tube: 150 mL    Chest Tube: 50 mL    Indwelling Catheter - Urethral: 630 mL    Nasoenteral Tube: 400 mL  Total OUT: 1230 mL    Total NET: 614.7 mL          10-13    137  |  104  |  57<H>  ----------------------------<  114<H>  4.9   |  17<L>  |  4.03<H>    Ca    7.4<L>      13 Oct 2017 04:04  Phos  4.3     10-13  Mg     2.1     10-13    TPro  4.5<L>  /  Alb  2.0<L>  /  TBili  1.7<H>  /  DBili  x   /  AST  33  /  ALT  11  /  AlkPhos  118  10-13    [ ] Torres catheter, indication: N/A  Meds: dextrose 5% + sodium chloride 0.9%. 1000 milliLiter(s) IV Continuous <Continuous>        HEMATOLOGIC  Meds: enoxaparin Injectable 90 milliGRAM(s) SubCutaneous daily    [x] VTE Prophylaxis                        8.1    23.7  )-----------( 262      ( 14 Oct 2017 02:43 )             24.3     PT/INR - ( 14 Oct 2017 02:43 )   PT: 16.3 sec;   INR: 1.50 ratio         PTT - ( 14 Oct 2017 02:43 )  PTT:31.7 sec  Transfusion     [ ] PRBC   [ ] Platelets   [ ] FFP   [ ] Cryoprecipitate      INFECTIOUS DISEASES  T(C): 37 (10-14-17 @ 03:00), Max: 37.6 (10-13-17 @ 19:00)  Wt(kg): --  WBC Count: 23.7 K/uL (10-14 @ 02:43)  WBC Count: 21.9 K/uL (10-13 @ 12:42)  WBC Count: 21.8 K/uL (10-13 @ 04:04)    Recent Cultures:  Specimen Source: .Sputum Sputum, 10-13 @ 17:06; Results --; Gram Stain:   Rare polymorphonuclear leukocytes per low power field  No Squamous epithelial cells per low power field  Numerous Gram Negative Rods per oil power field  Rare Gram positive cocci in pairs per oil power field  Rare Gram Positive Rods per oil power field; Organism: --  Specimen Source: .Blood Blood-Venous, 10-12 @ 14:58; Results   No growth to date.; Gram Stain: --; Organism: --    Meds:       ENDOCRINE  Capillary Blood Glucose    Meds:       ACCESS DEVICES:  [ ] Peripheral IV  [ ] Central Venous Line	[ ] R	[ ] L	[ ] IJ	[ ] Fem	[ ] SC	Placed:   [ ] Arterial Line		[ ] R	[ ] L	[ ] Fem	[ ] Rad	[ ] Ax	Placed:   [ ] PICC:					[ ] Mediport  [ ] Urinary Catheter, Date Placed:   [ ] Necessity of urinary, arterial, and venous catheters discussed    OTHER MEDICATIONS:      CODE STATUS: Yes      IMAGING: HISTORY  62 year old male initially admitted to neurosurgery for bialteral leg weakness found to have newly diagnosed metastatic disease with spinal met causing spinal cord compression s/p T8-T11 laminectomy on 9/20, post-op course complicated by deterioration in respiratory status and acute onset abdominal pain requiring intubation and transfer to NSCU. CT chest/abdomen/pelvis demonstrated b/l DVTs, right upper lobe PE, and a bowel perforation with free air. Pt was taken emergently to the OR and is now s/p exploratory laparotomy, SBR x1 (left in discontinuity), abdominal washout, abthera VAC application on 9/22, after which he was transferred to SICU under ACS. He was taken back to the OR on 9/24 for a washout, primary anastomosis, and abdominal closure. Post-operative course complicated by Acute hypoxemic respiratory failure secondary to Acute Pulmonary Vascular congestion / Left Lung Collapse requiring high flow, and acute hypercarbic respiratory failure requiring re-intubation on 10/11.  Pt had second pigtail placed in left superior chest which drained about 1L. He remained on a small dose of Enoch.    24 HOUR EVENTS: Reglan added for gastric motility. Dulcolax added for constipation.    SUBJECTIVE/ROS:  [ x] A ten-point review of systems was otherwise negative except as noted.  [ ] Due to altered mental status/intubation, subjective information were not able to be obtained from the patient. History was obtained, to the extent possible, from review of the chart and collateral sources of information.      NEURO  RASS:   -1    Exam: arousable, follows commands  Meds: metoclopramide Injectable 5 milliGRAM(s) IV Push every 6 hours  [x] Adequacy of sedation and pain control has been assessed and adjusted      RESPIRATORY  RR: 21 (10-14-17 @ 03:00) (17 - 27)  SpO2: 98% (10-14-17 @ 03:00) (96% - 100%)  Wt(kg): --  Exam: unlabored, clear to auscultation bilaterally  Mechanical Ventilation: Mode: AC/ CMV (Assist Control/ Continuous Mandatory Ventilation), RR (machine): 16, RR (patient): 21, TV (machine): 450, FiO2: 40, PEEP: 5, ITime: 1, MAP: 12, PIP: 24  ABG - ( 14 Oct 2017 02:39 )  pH: 7.38  /  pCO2: 29    /  pO2: 167   / HCO3: 17    / Base Excess: -7.2  /  SaO2: 100     Lactate: x      [x ] Extubation Readiness Assessed  Meds: ALBUTerol/ipratropium for Nebulization 3 milliLiter(s) Nebulizer every 6 hours        CARDIOVASCULAR  HR: 96 (10-14-17 @ 03:00) (75 - 101)  BP: 102/56 (10-13-17 @ 20:00) (102/56 - 112/63)  BP(mean): 76 (10-13-17 @ 20:00) (76 - 83)  ABP: 100/54 (10-14-17 @ 03:00) (95/46 - 123/62)  ABP(mean): 71 (10-14-17 @ 03:00) (61 - 86)  Wt(kg): --  CVP(cm H2O): --      Exam:  Cardiac Rhythm:  Perfusion     [x ]Adequate   [ ]Inadequate  Mentation   [ ]Normal       [x ]Reduced  Extremities  [x ]Warm         [ ]Cool  Volume Status [ ]Hypervolemic [x ]Euvolemic [ ]Hypovolemic  Meds:       GI/NUTRITION  Exam: soft, NT  Diet: NPO w/ TF  Meds: bisacodyl Suppository 10 milliGRAM(s) Rectal daily PRN Constipation  pantoprazole  Injectable 40 milliGRAM(s) IV Push daily  polyethylene glycol 3350 17 Gram(s) Oral two times a day  senna 2 Tablet(s) Oral at bedtime      GENITOURINARY  I&O's Detail    10-12 @ 07:01  -  10-13 @ 07:00  --------------------------------------------------------  IN:    dextrose 5% + sodium chloride 0.9%.: 1725 mL    Nepro: 230 mL    phenylephrine   Infusion: 279.7 mL    phenylephrine   Infusion: 50.8 mL  Total IN: 2285.5 mL    OUT:    Chest Tube: 1025 mL    Chest Tube: 70 mL    Indwelling Catheter - Urethral: 345 mL    Nasoenteral Tube: 1110 mL  Total OUT: 2550 mL    Total NET: -264.5 mL      10-13 @ 07:01  -  10-14 @ 03:07  --------------------------------------------------------  IN:    dextrose 5% + sodium chloride 0.9%.: 1500 mL    Packed Red Blood Cells: 250 mL    phenylephrine   Infusion: 94.7 mL  Total IN: 1844.7 mL    OUT:    Chest Tube: 150 mL    Chest Tube: 50 mL    Indwelling Catheter - Urethral: 630 mL    Nasoenteral Tube: 400 mL  Total OUT: 1230 mL    Total NET: 614.7 mL          10-13    137  |  104  |  57<H>  ----------------------------<  114<H>  4.9   |  17<L>  |  4.03<H>    Ca    7.4<L>      13 Oct 2017 04:04  Phos  4.3     10-13  Mg     2.1     10-13    TPro  4.5<L>  /  Alb  2.0<L>  /  TBili  1.7<H>  /  DBili  x   /  AST  33  /  ALT  11  /  AlkPhos  118  10-13    [ x] Torres catheter, indication: UOP in critically ill  Meds: dextrose 5% + sodium chloride 0.9%. 1000 milliLiter(s) IV Continuous <Continuous>        HEMATOLOGIC  Meds: enoxaparin Injectable 90 milliGRAM(s) SubCutaneous daily  [x] VTE Prophylaxis treat PE                        8.1    23.7  )-----------( 262      ( 14 Oct 2017 02:43 )             24.3     PT/INR - ( 14 Oct 2017 02:43 )   PT: 16.3 sec;   INR: 1.50 ratio         PTT - ( 14 Oct 2017 02:43 )  PTT:31.7 sec  Transfusion     [ ] PRBC   [ ] Platelets   [ ] FFP   [ ] Cryoprecipitate      INFECTIOUS DISEASES  T(C): 37 (10-14-17 @ 03:00), Max: 37.6 (10-13-17 @ 19:00)  Wt(kg): --  WBC Count: 23.7 K/uL (10-14 @ 02:43)  WBC Count: 21.9 K/uL (10-13 @ 12:42)  WBC Count: 21.8 K/uL (10-13 @ 04:04)    Recent Cultures:  Specimen Source: .Sputum Sputum, 10-13 @ 17:06; Results --; Gram Stain:   Rare polymorphonuclear leukocytes per low power field  No Squamous epithelial cells per low power field  Numerous Gram Negative Rods per oil power field  Rare Gram positive cocci in pairs per oil power field  Rare Gram Positive Rods per oil power field; Organism: --  Specimen Source: .Blood Blood-Venous, 10-12 @ 14:58; Results   No growth to date.; Gram Stain: --; Organism: --    Meds:       ENDOCRINE  Capillary Blood Glucose  Meds:       ACCESS DEVICES:  [ x] Peripheral IV  [ ] Central Venous Line	[ ] R	[ ] L	[ ] IJ	[ ] Fem	[ ] SC	Placed:   [x ] Arterial Line		[ ] R	[ ] L	[ ] Fem	[ ] Rad	[ ] Ax	Placed:   [ ] PICC:					[ ] Mediport  [x ] Urinary Catheter, Date Placed:   [ ] Necessity of urinary, arterial, and venous catheters discussed    OTHER MEDICATIONS:      CODE STATUS: DNR      IMAGING:

## 2017-10-14 NOTE — PROGRESS NOTE ADULT - ATTENDING COMMENTS
Awake and alert, on prn pain agitation medicine  Improving gas exchange, CPAP for exercise, wean down fio2  SBT in AM< CXR improving lt effusion, CT to water seal  Output from midline catheter, concern for EC fistula, osteomy placement as per surgery  On broad spectrum abx, sputum culture mixed rainer  Family meeting tomorrow with palliative care for goal of care discussion, likely multiple endobronchial lesion  Poor prognosis

## 2017-10-14 NOTE — PROGRESS NOTE ADULT - ASSESSMENT
61 yo male with recently Dx Metastatic CA (Bone, Bowel, Liver) with compression Fx s/p laminectomy c/b Recurrent Acute Respiratory Failure (ARF), Left Lung Collapse, Pleural effusions s/p pig tail, PE, PNA, Bowel perf s/p Ex Lap, and Sepsis. Now with multi organ failure (ARF, Renal Failure, Liver) and lactic acidosis. We were re-consulted for GOC.

## 2017-10-14 NOTE — PROGRESS NOTE ADULT - ASSESSMENT
A/P: 62M a/w b/l leg weakness found to have metastatic disease, b/l DVTs, RU lobe PE, and a bowel perforation (1cm perforation in the small bowel 110 cm distal to the ligament of treitz) now s/p ex-lap, SBR x1 (left in discontinuity), abdominal washout, abthera VAC application (9/22). RTOR 9/24 for intestinal reconstruction and abdominal closure. Patient remained in SICU. Was later found to have right renal vein thrombosis on CT chest (noninfected). At subsequent family meetings it was decided the patient would be DNR, but not DNI. Now the patient required re-intubation for worsening respiratory distress and respiratory acidosis resulting in worsening mental status.    - F/u pressor requirements  - Will f/u w/ Palliative and heme, who have been following  - Family meeting tomorrow  - Intubated, NPO  - Pain control  - T. Lovenox, renally dosed  - Primary Care Per SICU    [id number]

## 2017-10-14 NOTE — PROGRESS NOTE ADULT - PROBLEM SELECTOR PLAN 4
s/p small bowel resection   follow wound  Antibiotics as per ID. s/p small bowel resection   follow wound  Antibiotics as per ID. Now with a developing enterocutaneous fistula.

## 2017-10-14 NOTE — PROGRESS NOTE ADULT - ATTENDING COMMENTS
The patient continues to require around the clock cardiopulmonary and neurologic monitoring for critical illness, as cited above. The patient continues to require around the clock cardiopulmonary and neurologic monitoring for critical illness, as cited above. Palliative care consult and assistance is now appropriate, as his metastatic lung cancer and end stage bowel, neurologic and lung disease are unlikely to be reversible.

## 2017-10-14 NOTE — PROGRESS NOTE ADULT - SUBJECTIVE AND OBJECTIVE BOX
ATP progress Note    S:  No acute events overnight. Patient resting seemingly comfortable intubated.    T(C): 36.9 (10-14-17 @ 08:00), Max: 37.6 (10-13-17 @ 19:00)  HR: 97 (10-14-17 @ 09:00) (79 - 101)  BP: 102/56 (10-13-17 @ 20:00) (102/56 - 102/56)  RR: 23 (10-14-17 @ 09:00) (18 - 24)  SpO2: 100% (10-14-17 @ 09:00) (96% - 100%)  Wt(kg): --    10-13 @ 07:01  -  10-14 @ 07:00  --------------------------------------------------------  IN:    dextrose 5% + sodium chloride 0.9%.: 1800 mL    Packed Red Blood Cells: 250 mL    phenylephrine   Infusion: 94.7 mL  Total IN: 2144.7 mL    OUT:    Chest Tube: 250 mL    Chest Tube: 70 mL    Indwelling Catheter - Urethral: 710 mL    Nasoenteral Tube: 600 mL  Total OUT: 1630 mL    Total NET: 514.7 mL      10-14 @ 07:01  -  10-14 @ 10:40  --------------------------------------------------------  IN:    dextrose 5% + sodium chloride 0.9%.: 150 mL  Total IN: 150 mL    OUT:    Indwelling Catheter - Urethral: 45 mL  Total OUT: 45 mL    Total NET: 105 mL      PHYSICAL EXAM:  Gen: Intubated  Abd: Soft, NT, ND  Lines: Pigtail x2 to suction                          8.1    23.7  )-----------( 262      ( 14 Oct 2017 02:43 )             24.3

## 2017-10-14 NOTE — PROGRESS NOTE ADULT - SUBJECTIVE AND OBJECTIVE BOX
INTERVAL HPI/OVERNIGHT EVENTS: Patient with worsening multiorgan failure. As per primary team with possible new enterocutaneous fistula. This re-evaluation is done for GOC.       Allergies    No Known Allergies    Intolerances      ADVANCE DIRECTIVES:    DNR Yes    MOLST [ ] YES [x ] NO     MEDICATIONS  (STANDING):  ALBUTerol/ipratropium for Nebulization 3 milliLiter(s) Nebulizer every 6 hours  dextrose 5% + sodium chloride 0.45%. 1000 milliLiter(s) (75 mL/Hr) IV Continuous <Continuous>  enoxaparin Injectable 90 milliGRAM(s) SubCutaneous daily  pantoprazole  Injectable 40 milliGRAM(s) IV Push daily  piperacillin/tazobactam IVPB. 3.375 Gram(s) IV Intermittent every 12 hours  senna 2 Tablet(s) Oral at bedtime    MEDICATIONS  (PRN):  HYDROmorphone  Injectable 0.5 milliGRAM(s) IV Push every 3 hours PRN moderate pain and comfort      PRESENT SYMPTOMS:  Source: [x ] Patient   [ ] Family   [x ] Team     Pain:                        [x ] No [ ] Yes             [ ] Mild [ ] Moderate [ ] Severe    Onset -  Location -  Duration -  Character -  Alleviating/Aggravating -  Radiation -  Timing -    Dyspnea:                [x ] No [ ] Yes             [ ] Mild [ ] Moderate [ ] Severe    Anxiety:                  [ ] No [ ] Yes             [ ] Mild [ ] Moderate [ ] Severe    Fatigue:                  [ ] No [ ] Yes             [ ] Mild [ ] Moderate [ ] Severe    Nausea:                  [ ] No [ ] Yes             [ ] Mild [ ] Moderate [ ] Severe    Loss of appetite:   [ ] No [ ] Yes             [ ] Mild [ ] Moderate [ ] Severe    Constipation:        [ ] No [ ] Yes             [ ] Mild [ ] Moderate [ ] Severe    Other Symptoms:  [ ] All other review of systems negative   [x ] Unable to obtain due to poor mentation     Karnofsky Performance Score/Palliative Performance Status Version 2:  20       %    PHYSICAL EXAM:  Vital Signs Last 24 Hrs  T(C): 37.2 (14 Oct 2017 15:00), Max: 37.3 (13 Oct 2017 23:00)  T(F): 99 (14 Oct 2017 15:00), Max: 99.1 (13 Oct 2017 23:00)  HR: 98 (14 Oct 2017 18:00) (89 - 108)  BP: 102/56 (13 Oct 2017 20:00) (102/56 - 102/56)  BP(mean): 76 (13 Oct 2017 20:00) (76 - 76)  RR: 21 (14 Oct 2017 18:00) (17 - 24)  SpO2: 98% (14 Oct 2017 18:00) (96% - 100%) I&O's Summary    13 Oct 2017 07:01  -  14 Oct 2017 07:00  --------------------------------------------------------  IN: 2144.7 mL / OUT: 1630 mL / NET: 514.7 mL    14 Oct 2017 07:01  -  14 Oct 2017 19:14  --------------------------------------------------------  IN: 925 mL / OUT: 695 mL / NET: 230 mL        General: Terminally ill appearing male.   [ ] Alert  [ ] Oriented x      [x ] Lethargic  [ ] Agitated   [ ] Cachexia   [ ] Unarousable  [ ] Verbal  [ ] Non-Verbal    HEENT:  [ ] Normal   [ ] Dry mouth   [x ] ET Tube    [ ] Trach  [ ] Oral lesions    Lungs:   [ ] Clear [ ] Tachypnea  [ ] Audible excessive secretions   [ ] Rhonchi        [ ] Right [ ] Left [ ] Bilateral  [x ] Crackles BL        [ ] Right [ ] Left [ ] Bilateral  [ ] Wheezing     [ ] Right [ ] Left [ ] Bilateral    Cardiovascular:  [x ] Regular [ ] Irregular [ ] Tachycardia   [ ] Bradycardia  [ ] Murmur [ ] Other    Abdomen: [ ] Soft  [x ] Distended   [x ]  No BS  [ ] Non tender [ x] Tender  [ ]PEG   [ ]OGT/ NGT   Last BM:   10/6    Genitourinary: [ ] Normal [ ] Incontinent   [ ] Oliguria/Anuria   [x ] Torres    Musculoskeletal:  [ ] Normal   [x ] Weakness  [ ] Bedbound/Wheelchair bound [x ] Edema    Neurological: [ ] No focal deficits  [ ] Cognitive impairment  [ ] Dysphagia [ ] Dysarthria [ ] Paresis [x ] Other: Lethargic and not able to participate in GOC discussion.     Skin: Icteric    LABS:                        8.1    23.7  )-----------( 262      ( 14 Oct 2017 02:43 )             24.3     10-14    138  |  106  |  60<H>  ----------------------------<  95  5.1   |  16<L>  |  4.07<H>    Ca    7.9<L>      14 Oct 2017 02:43  Phos  4.7     10-14  Mg     2.3     10-14    TPro  4.9<L>  /  Alb  1.8<L>  /  TBili  1.6<H>  /  DBili  1.1<H>  /  AST  30  /  ALT  10  /  AlkPhos  122<H>  10-14    PT/INR - ( 14 Oct 2017 02:43 )   PT: 16.3 sec;   INR: 1.50 ratio         PTT - ( 14 Oct 2017 02:43 )  PTT:31.7 sec      Shock: [ ] Septic [ ] Cardiogenic [ ] Neurologic [ ] Hypovolemic  Vasopressors x   Inotrophs x     Oral Intake: [ ] Unable/mouth care only [ ] Minimal [ ] Moderate [ ] Full Capability    Diet: [ ] NPO [ ] Tube feeds [ ] TPN [ ] Other     RADIOLOGY & ADDITIONAL STUDIES:    REFERRALS:   [ ] Chaplaincy  [ ] Hospice  [ ] Child Life  [ ] Social Work  [ ] Case management [ ] Holistic Therapy   Blaise Carrasco MD [phone]

## 2017-10-14 NOTE — PROGRESS NOTE ADULT - PROBLEM SELECTOR PLAN 1
On MV  GOC: I called the patient's son who appeared to be the point person with whom providers have been discussing GOC. He gave verbalized understanding about the patient's terminal illness process. He indicated he believes his father has gone through a lot and that probably he may not have wanted to prolong his life under the actual situation (this based on conversations he had with him several years ago when his ex-wife was going through a similar illness process). He indicated, tomorrow,  he is to follow up with  the SICU team, the patient's surrogate (Girlfriend for 19 years that was living with him for all that time), and the patient's sister in order to better define GOC. I explained him that the main options are to continue current approach to care (ICU level of care where the main goal is to prolong life expectancy while attempting to control symptoms) vs a more conservative approach where the illness process is to be allowed to take its natural course and where the main goal is to prevent and treat distressful symptoms. Furthermore, considering a palliative extubation.   I explained Elan that although he has been the point person, under the [facility] the patient's girlfriend is to be his surrogate and therefore she is to be included into any discussion once her consent is need for any determinations in regards to ACP or GOC. Elan indicated that all the time he has worked together with the patient's girlfriend and that she will be actively participating in the family meeting tomorrow.     Primary team was informed.  35' spent on ACP

## 2017-10-14 NOTE — PROGRESS NOTE ADULT - ASSESSMENT
ThisHPI: 62y male admitted to neurosurgery for bilateral leg weakness found to have newly diagnosed metastatic disease s/p T8-T11 laminectomy presented with deterioration in respiratory status and acute onset abdominal pain who was subsequently intubated and w/u revealed b/l DVTs, right upper lobe PE, and a bowel perforation.  Pt is now s/p Exploratory laparotomy #1, SBR x1 (left in discontinuity), abdominal washout, abthera VAC application (9/22) with intraop findings of a 1cm perforation in the small bowel 110 cm distal to the ligament of treitz. )(/24/17 Exploratory OR #2 with abdominal irrigation and enterostomy. Right leg motor function being monitored by neurosurgery after emergency admission spinal cord decompression of metastatic tumor T8. Lung cancer pathology is non small cell.  DVT/ PE  needs to be anticoagulated with clot going up into the right renal vein thrombosis. no return of leg movement or sensation to light touch over the past 5 days.    Left pleural effusion is the result of the primary lung cancer with distal obstruction. Patient with worsening pulmonary status requiring reintubation. second chest tube placed with successful removal of large amount of  pleural fluid. ThisHPI: 62y male admitted to neurosurgery for bilateral leg weakness found to have newly diagnosed metastatic disease s/p T8-T11 laminectomy presented with deterioration in respiratory status and acute onset abdominal pain who was subsequently intubated and w/u revealed b/l DVTs, right upper lobe PE, and a bowel perforation.  Pt is now s/p Exploratory laparotomy #1, SBR x1 (left in discontinuity), abdominal washout, abthera VAC application (9/22) with intraop findings of a 1cm perforation in the small bowel 110 cm distal to the ligament of treitz. )(/24/17 Exploratory OR #2 with abdominal irrigation and enterostomy. Right leg motor function being monitored by neurosurgery after emergency admission spinal cord decompression of metastatic tumor T8. Lung cancer pathology is non small cell.  DVT/ PE  needs to be anticoagulated with clot going up into the right renal vein thrombosis. no return of leg movement or sensation to light touch over the past 5 days.    Left pleural effusion is the result of the primary lung cancer with distal obstruction. Patient with worsening pulmonary status requiring reintubation. second chest tube placed with successful removal of large amount of  pleural fluid. 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

## 2017-10-14 NOTE — PROGRESS NOTE ADULT - SUBJECTIVE AND OBJECTIVE BOX
Patient is a 62y old  Male who presents with a chief complaint of cord compression (19 Sep 2017 09:44)      HPI:62 year old male initially admitted to neurosurgery for bialteral leg weakness found to have newly diagnosed metastatic disease with spinal met causing spinal cord compression s/p T8-T11 laminectomy on 9/20, post-op course complicated by deterioration in respiratory status and acute onset abdominal pain requiring intubation and transfer to NSCU. CT chest/abdomen/pelvis demonstrated b/l DVTs, right upper lobe PE, and a bowel perforation with free air. Pt was taken emergently to the OR and is now s/p exploratory laparotomy, SBR x1 (left in discontinuity), abdominal washout, abthera VAC application on 9/22, after which he was transferred to SICU under ACS. He was taken back to the OR on 9/24 for a washout, primary anastomosis, and abdominal closure. Post-operative course complicated by Acute hypoxemic respiratory failure secondary to Acute Pulmonary Vascular congestion / Left Lung Collapse requiring high flow, and acute hypercarbic respiratory failure requiring re-intubation on 10/11. Attempt at thoracentesis with CT first line did not help but placement of second line removed large amount of pleural effusion..    MEDICATIONS  (STANDING):  ALBUTerol/ipratropium for Nebulization 3 milliLiter(s) Nebulizer every 6 hours  dextrose 5% + sodium chloride 0.9%. 1000 milliLiter(s) (75 mL/Hr) IV Continuous <Continuous>  enoxaparin Injectable 90 milliGRAM(s) SubCutaneous daily  metoclopramide Injectable 5 milliGRAM(s) IV Push every 6 hours  pantoprazole  Injectable 40 milliGRAM(s) IV Push daily  polyethylene glycol 3350 17 Gram(s) Oral daily  senna 2 Tablet(s) Oral at bedtime    MEDICATIONS  (PRN):      Allergies    No Known Allergies    Intolerances        REVIEW OF SYSTEM:  intubated        VITALS:   T(C): 37.6 (10-13-17 @ 19:00), Max: 37.6 (10-13-17 @ 19:00)  HR: 99 (10-13-17 @ 23:17) (75 - 101)  BP: 102/56 (10-13-17 @ 20:00) (102/56 - 112/63)  RR: 24 (10-13-17 @ 23:00) (17 - 27)  SpO2: 97% (10-13-17 @ 23:17) (96% - 100%)  Wt(kg): --    10-12 @ 07:01  -  10-13 @ 07:00  --------------------------------------------------------  IN: 2285.5 mL / OUT: 2550 mL / NET: -264.5 mL    10-13 @ 07:01  -  10-13 @ 23:31  --------------------------------------------------------  IN: 1544.7 mL / OUT: 1085 mL / NET: 459.7 mL        PHYSICAL EXAM:  GENERAL: NAD, chronically ill  HEAD:  Atraumatic  EYES: EOM, PERRLA, conjunctiva pink and sclera white  ENT: No tonsillar erythema, exudates, or enlargement, moist mucous membranes, good dentition, no lesions  NECK: Supple, No JVD, normal thyroid, carotids with normal upstrokes and no bruits  CHEST/LUNG: Clear to auscultation bilaterally, No rales, rhonchi, wheezing, or rubs - 2 chest tubes in chest to drain pleural effusion  HEART: Regular rate and rhythm, No murmurs, rubs, or gallops  ABDOMEN: Soft, nondistended, no masses, guarding, tenderness or rebound, bowel sounds present  EXTREMITIES:  2+ Peripheral Pulses, No clubbing, cyanosis, or edema. No arthritis of shoulders, elbows, hands, hips, knees, ankles, or feet. No DJD C spine, T spine, or L/S spine  LYMPH: No lymphadenopathy noted  SKIN: No rashes or lesions  NERVOUS SYSTEM:  Alert     LABS:                          8.3    21.9  )-----------( 303      ( 13 Oct 2017 12:42 )             25.7     10-13    137  |  104  |  57<H>  ----------------------------<  114<H>  4.9   |  17<L>  |  4.03<H>  10-12    134<L>  |  101  |  52<H>  ----------------------------<  100<H>  5.1   |  19<L>  |  3.48<H>  10-11    134<L>  |  99  |  52<H>  ----------------------------<  88  5.2   |  19<L>  |  3.40<H>    Ca    7.4<L>      13 Oct 2017 04:04  Ca    7.6<L>      12 Oct 2017 02:33  Ca    7.9<L>      11 Oct 2017 21:07  Phos  4.3     10-13  Phos  4.3     10-12  Phos  5.4     10-11  Mg     2.1     10-13  Mg     2.1     10-12  Mg     2.4     10-11    TPro  4.5<L>  /  Alb  2.0<L>  /  TBili  1.7<H>  /  DBili  x   /  AST  33  /  ALT  11  /  AlkPhos  118  10-13  TPro  4.9<L>  /  Alb  2.1<L>  /  TBili  1.9<H>  /  DBili  1.4<H>  /  AST  34  /  ALT  11  /  AlkPhos  107  10-12  TPro  5.7<L>  /  Alb  2.5<L>  /  TBili  3.0<H>  /  DBili  2.1<H>  /  AST  43<H>  /  ALT  18  /  AlkPhos  117  10-11    CAPILLARY BLOOD GLUCOSE          RADIOLOGY & ADDITIONAL TESTS:      Consultant(s):    Care Discussed with Consultants/Other Providers [ ] YES  [ ] NO Patient is a 62y old  Male who presents with a chief complaint of cord compression (19 Sep 2017 09:44)      HPI:62 year old male initially admitted to neurosurgery for bialteral leg weakness found to have newly diagnosed metastatic disease with spinal met causing spinal cord compression s/p T8-T11 laminectomy on 9/20, post-op course complicated by deterioration in respiratory status and acute onset abdominal pain requiring intubation and transfer to NSCU. CT chest/abdomen/pelvis demonstrated b/l DVTs, right upper lobe PE, and a bowel perforation with free air. Pt was taken emergently to the OR and is now s/p exploratory laparotomy, SBR x1 (left in discontinuity), abdominal washout, abthera VAC application on 9/22, after which he was transferred to SICU under ACS. He was taken back to the OR on 9/24 for a washout, primary anastomosis, and abdominal closure. Post-operative course complicated by Acute hypoxemic respiratory failure secondary to Acute Pulmonary Vascular congestion / Left Lung Collapse requiring high flow, and acute hypercarbic respiratory failure requiring re-intubation on 10/11. Attempt at thoracentesis with CT first line did not help but placement of second line removed large amount of pleural effusion..    MEDICATIONS  (STANDING):  ALBUTerol/ipratropium for Nebulization 3 milliLiter(s) Nebulizer every 6 hours  dextrose 5% + sodium chloride 0.45%. 1000 milliLiter(s) (75 mL/Hr) IV Continuous <Continuous>  enoxaparin Injectable 90 milliGRAM(s) SubCutaneous daily  pantoprazole  Injectable 40 milliGRAM(s) IV Push daily  piperacillin/tazobactam IVPB. 3.375 Gram(s) IV Intermittent every 12 hours  senna 2 Tablet(s) Oral at bedtime    MEDICATIONS  (PRN):  HYDROmorphone  Injectable 0.5 milliGRAM(s) IV Push every 3 hours PRN moderate pain and comfort      Allergies    No Known Allergies    Intolerances        REVIEW OF SYSTEM:  intubated        ICU Vital Signs Last 24 Hrs  T(C): 37.2 (14 Oct 2017 15:00), Max: 37.6 (13 Oct 2017 19:00)  T(F): 99 (14 Oct 2017 15:00), Max: 99.7 (13 Oct 2017 19:00)  HR: 95 (14 Oct 2017 17:41) (89 - 108)  BP: 102/56 (13 Oct 2017 20:00) (102/56 - 102/56)  BP(mean): 76 (13 Oct 2017 20:00) (76 - 76)  ABP: 121/59 (14 Oct 2017 16:00) (96/49 - 121/59)  ABP(mean): 83 (14 Oct 2017 16:00) (65 - 83)  RR: 23 (14 Oct 2017 16:00) (17 - 24)  SpO2: 98% (14 Oct 2017 17:41) (96% - 100%)        PHYSICAL EXAM:  GENERAL: NAD, chronically ill  HEAD:  Atraumatic  EYES: EOM, PERRLA, conjunctiva pink and sclera white  ENT: No tonsillar erythema, exudates, or enlargement, moist mucous membranes, good dentition, no lesions  NECK: Supple, No JVD, normal thyroid, carotids with normal upstrokes and no bruits  CHEST/LUNG: Clear to auscultation bilaterally, No rales, rhonchi, wheezing, or rubs - 2 chest tubes in chest to drain pleural effusion  HEART: Regular rate and rhythm, No murmurs, rubs, or gallops  ABDOMEN: Soft, nondistended, no masses, guarding, tenderness or rebound, bowel sounds present  EXTREMITIES:  2+ Peripheral Pulses, No clubbing, cyanosis, or edema. No arthritis of shoulders, elbows, hands, hips, knees, ankles, or feet. No DJD C spine, T spine, or L/S spine  LYMPH: No lymphadenopathy noted  SKIN: No rashes or lesions  NERVOUS SYSTEM:  Alert     LABS:  ABG - ( 14 Oct 2017 13:31 )  pH: 7.34  /  pCO2: 31    /  pO2: 110   / HCO3: 16    / Base Excess: -8.4  /  SaO2: 99                    CBC Full  -  ( 14 Oct 2017 02:43 )  WBC Count : 23.7 K/uL  Hemoglobin : 8.1 g/dL  Hematocrit : 24.3 %  Platelet Count - Automated : 262 K/uL  Mean Cell Volume : 95.2 fl  Mean Cell Hemoglobin : 31.7 pg  Mean Cell Hemoglobin Concentration : 33.3 gm/dL  Auto Neutrophil # : x  Auto Lymphocyte # : x  Auto Monocyte # : x  Auto Eosinophil # : x  Auto Basophil # : x  Auto Neutrophil % : x  Auto Lymphocyte % : x  Auto Monocyte % : x  Auto Eosinophil % : x  Auto Basophil % : x    10-14    138  |  106  |  60<H>  ----------------------------<  95  5.1   |  16<L>  |  4.07<H>    Ca    7.9<L>      14 Oct 2017 02:43  Phos  4.7     10-14  Mg     2.3     10-14    TPro  4.9<L>  /  Alb  1.8<L>  /  TBili  1.6<H>  /  DBili  1.1<H>  /  AST  30  /  ALT  10  /  AlkPhos  122<H>  10-14    LIVER FUNCTIONS - ( 14 Oct 2017 02:43 )  Alb: 1.8 g/dL / Pro: 4.9 g/dL / ALK PHOS: 122 U/L / ALT: 10 U/L RC / AST: 30 U/L / GGT: x           PT/INR - ( 14 Oct 2017 02:43 )   PT: 16.3 sec;   INR: 1.50 ratio         PTT - ( 14 Oct 2017 02:43 )  PTT:31.7 sec          RADIOLOGY & ADDITIONAL TESTS:      Consultant(s):    Care Discussed with Consultants/Other Providers [ ] YES  [ ] NO Patient is a 62y old  Male who presents with a chief complaint of cord compression (19 Sep 2017 09:44)      HPI:62 year old male initially admitted to neurosurgery for bialteral leg weakness found to have newly diagnosed metastatic disease with spinal met causing spinal cord compression s/p T8-T11 laminectomy on 9/20, post-op course complicated by deterioration in respiratory status and acute onset abdominal pain requiring intubation and transfer to NSCU. CT chest/abdomen/pelvis demonstrated b/l DVTs, right upper lobe PE, and a bowel perforation with free air. Pt was taken emergently to the OR and is now s/p exploratory laparotomy, SBR x1 (left in discontinuity), abdominal washout, abthera VAC application on 9/22, after which he was transferred to SICU under ACS. He was taken back to the OR on 9/24 for a washout, primary anastomosis, and abdominal closure. Post-operative course complicated by Acute hypoxemic respiratory failure secondary to Acute Pulmonary Vascular congestion / Left Lung Collapse requiring high flow, and acute hypercarbic respiratory failure requiring re-intubation on 10/11. Attempt at thoracentesis with CT first line did not help but placement of second line removed large amount of pleural effusion. Stool now draining from the anterior abdominal incision, consistent with an enterocutanous fistula. Remains ventilator dependent because of lung cancer compressing the left mainstem bronchus. remains paraplegic waist down secondary to metastatic lung cancer and thoracic spinal cord compression.  There appears to be no likelihood of disease reversibility and palliative care should become more involved at this time.     MEDICATIONS  (STANDING):  ALBUTerol/ipratropium for Nebulization 3 milliLiter(s) Nebulizer every 6 hours  dextrose 5% + sodium chloride 0.45%. 1000 milliLiter(s) (75 mL/Hr) IV Continuous <Continuous>  enoxaparin Injectable 90 milliGRAM(s) SubCutaneous daily  pantoprazole  Injectable 40 milliGRAM(s) IV Push daily  piperacillin/tazobactam IVPB. 3.375 Gram(s) IV Intermittent every 12 hours  senna 2 Tablet(s) Oral at bedtime    MEDICATIONS  (PRN):  HYDROmorphone  Injectable 0.5 milliGRAM(s) IV Push every 3 hours PRN moderate pain and comfort      Allergies    No Known Allergies    Intolerances        REVIEW OF SYSTEM:  intubated        ICU Vital Signs Last 24 Hrs  T(C): 37.2 (14 Oct 2017 15:00), Max: 37.6 (13 Oct 2017 19:00)  T(F): 99 (14 Oct 2017 15:00), Max: 99.7 (13 Oct 2017 19:00)  HR: 95 (14 Oct 2017 17:41) (89 - 108)  BP: 102/56 (13 Oct 2017 20:00) (102/56 - 102/56)  BP(mean): 76 (13 Oct 2017 20:00) (76 - 76)  ABP: 121/59 (14 Oct 2017 16:00) (96/49 - 121/59)  ABP(mean): 83 (14 Oct 2017 16:00) (65 - 83)  RR: 23 (14 Oct 2017 16:00) (17 - 24)  SpO2: 98% (14 Oct 2017 17:41) (96% - 100%)        PHYSICAL EXAM:  GENERAL: NAD, chronically ill  HEAD:  Atraumatic  EYES: EOM, PERRLA, conjunctiva pink and sclera white  ENT: No tonsillar erythema, exudates, or enlargement, moist mucous membranes, good dentition, no lesions  NECK: Supple, No JVD, normal thyroid, carotids with normal upstrokes and no bruits  CHEST/LUNG: Clear to auscultation bilaterally, No rales, rhonchi, wheezing, or rubs - 2 chest tubes in chest to drain pleural effusion  HEART: Regular rate and rhythm, No murmurs, rubs, or gallops  ABDOMEN: Soft, nondistended, no masses, guarding, tenderness or rebound, bowel sounds present  EXTREMITIES:  2+ Peripheral Pulses, No clubbing, cyanosis, or edema. No arthritis of shoulders, elbows, hands, hips, knees, ankles, or feet. No DJD C spine, T spine, or L/S spine  LYMPH: No lymphadenopathy noted  SKIN: No rashes or lesions  NERVOUS SYSTEM:  Alert     LABS:  ABG - ( 14 Oct 2017 13:31 )  pH: 7.34  /  pCO2: 31    /  pO2: 110   / HCO3: 16    / Base Excess: -8.4  /  SaO2: 99                    CBC Full  -  ( 14 Oct 2017 02:43 )  WBC Count : 23.7 K/uL  Hemoglobin : 8.1 g/dL  Hematocrit : 24.3 %  Platelet Count - Automated : 262 K/uL  Mean Cell Volume : 95.2 fl  Mean Cell Hemoglobin : 31.7 pg  Mean Cell Hemoglobin Concentration : 33.3 gm/dL  Auto Neutrophil # : x  Auto Lymphocyte # : x  Auto Monocyte # : x  Auto Eosinophil # : x  Auto Basophil # : x  Auto Neutrophil % : x  Auto Lymphocyte % : x  Auto Monocyte % : x  Auto Eosinophil % : x  Auto Basophil % : x    10-14    138  |  106  |  60<H>  ----------------------------<  95  5.1   |  16<L>  |  4.07<H>    Ca    7.9<L>      14 Oct 2017 02:43  Phos  4.7     10-14  Mg     2.3     10-14    TPro  4.9<L>  /  Alb  1.8<L>  /  TBili  1.6<H>  /  DBili  1.1<H>  /  AST  30  /  ALT  10  /  AlkPhos  122<H>  10-14    LIVER FUNCTIONS - ( 14 Oct 2017 02:43 )  Alb: 1.8 g/dL / Pro: 4.9 g/dL / ALK PHOS: 122 U/L / ALT: 10 U/L RC / AST: 30 U/L / GGT: x           PT/INR - ( 14 Oct 2017 02:43 )   PT: 16.3 sec;   INR: 1.50 ratio         PTT - ( 14 Oct 2017 02:43 )  PTT:31.7 sec          RADIOLOGY & ADDITIONAL TESTS:      Consultant(s):    Care Discussed with Consultants/Other Providers [ ] YES  [ ] NO

## 2017-10-14 NOTE — PROGRESS NOTE ADULT - ASSESSMENT
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    PLAN:  Neurologic: post-operative pain and neoplastic pain  - Continue Tylenol, gabapentin for pain  - Avoid narcotics  - Precedex if needed for sedation    Respiratory: acute hypercarbic respiratory failure  - PRVC mechanical ventilation.   - Attempt SBT if pt mental status is adequate.  - Left inferior pigtail catheter to water seal, left superior pigtail catheter to suction and monitor output.   - F/u with thoracic surgery for possible Pleurex catheter plan.    Cardiovascular: hypotension, h/o HTN  - Monitor vital signs.  - D/C Metoprolol    Gastrointestinal/Nutrition: small bowel perforation s/p SBR w/ primary anastomosis, stool burden with no BM since 10/6  - Protonix 40mg daily for GERD.  - Monitor OGT output. Continue to hold tube feeds while output is high.  - Bowel regimen with senna and Miralax, enema daily until BM   - Monitor LFTs.    Renal/Genitourinary: ALEC stage 3, likely pre-renal  - Monitor I&Os and electrolytes. Replete as needed.  - Torres, monitor urine output.  - D5NS @ 75mL/hr     Hematologic: b/l DVT, b/l PE  - Therapeutic Lovenox, renally dosed, for RUL PE and bilateral DVTs   - f/u post-transfusion CBC    Infectious Disease: no active issues  - f/u recent blood culture results      Endocrine: no active issues  - Monitor blood glucose on BMP.    Disposition:  - DNR.  - Will remain in SICU.  - Reconsult Palliative care, family meeting tentatively on Sunday regarding pt's poor prognosis.    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    PLAN:  Neurologic: post-operative pain and neoplastic pain  - Continue Tylenol, gabapentin for pain  - Avoid narcotics    Respiratory: acute hypercarbic respiratory failure  - PRVC mechanical ventilation.   - Attempt SBT if pt mental status is adequate.  - Left inferior pigtail catheter to water seal, left superior pigtail catheter to suction and monitor output.   - F/u with thoracic surgery for possible Pleurex catheter plan.    Cardiovascular: hypotension, h/o HTN  - Monitor vital signs.    Gastrointestinal/Nutrition: small bowel perforation s/p SBR w/ primary anastomosis, stool burden with no BM since 10/6  - Protonix 40mg daily for GI ppx  - Bowel regimen with dulcolax, senna and Miralax, enema daily until BM   - Monitor LFTs.    Renal/Genitourinary: ALEC   - Monitor I&Os and electrolytes. Replete as needed.  - Torres, monitor urine output.  - D5NS @ 75mL/hr     Hematologic: b/l DVT, b/l PE  - Therapeutic Lovenox, renally dosed, for RUL PE and bilateral DVTs   - f/u post-transfusion CBC    Infectious Disease: no active issues  - f/u recent blood and sputum culture results      Endocrine: no active issues  - Monitor blood glucose on BMP.    Disposition:  - DNR.  - Will remain in SICU.  - Reconsult Palliative care, family meeting tentatively on Sunday regarding pt's poor prognosis.    ROXANE Garland MD [id number]

## 2017-10-15 NOTE — PROGRESS NOTE ADULT - ASSESSMENT
A/P: 62M a/w b/l leg weakness found to have metastatic disease, b/l DVTs, RU lobe PE, and a bowel perforation (1cm perforation in the small bowel 110 cm distal to the ligament of treitz) now s/p ex-lap, SBR x1 (left in discontinuity), abdominal washout, abthera VAC application (9/22). RTOR 9/24 for intestinal reconstruction and abdominal closure. Patient remained in SICU. Was later found to have right renal vein thrombosis on CT chest (noninfected). At subsequent family meetings it was decided the patient would be DNR, but not DNI. Now the patient required re-intubation for worsening respiratory distress and respiratory acidosis resulting in worsening mental status.    - Will f/u family meeting at 1pm.  - Intubated, NPO  - Pain control  - T. Lovenox, renally dosed  - Primary Care Per SICU    [id number]

## 2017-10-15 NOTE — PROGRESS NOTE ADULT - ATTENDING COMMENTS
The patient continues to require around the clock cardiopulmonary and neurologic monitoring for critical illness, as cited above. Palliative care consult and assistance is now appropriate, as his metastatic lung cancer and end stage bowel, neurologic and lung disease are unlikely to be reversible. The patient continues to require around the clock cardiopulmonary and neurologic monitoring for critical illness, as cited above. Palliative care consult and assistance is now appropriate, as his metastatic lung cancer and end stage bowel, neurologic and lung disease are unlikely to be reversible. There has been no interval change in the past 24 hours and a family meeting is scheduled for later today.

## 2017-10-15 NOTE — PROGRESS NOTE ADULT - SUBJECTIVE AND OBJECTIVE BOX
ATP Progress Note    S: Concerns of EC fistula because of enteric content from wound. No other acute events. Appears comfortable.    O:  T(C): 37.2 (10-15-17 @ 08:00), Max: 37.3 (10-14-17 @ 23:00)  HR: 90 (10-15-17 @ 09:02) (87 - 108)  BP: 106/58 (10-15-17 @ 08:00) (106/58 - 110/60)  RR: 18 (10-15-17 @ 09:02) (17 - 24)  SpO2: 100% (10-15-17 @ 09:02) (96% - 100%)  Wt(kg): --    10-14 @ 07:01  -  10-15 @ 07:00  --------------------------------------------------------  IN:    dextrose 5% + sodium chloride 0.45%.: 1275 mL    dextrose 5% + sodium chloride 0.9%: 525 mL    Solution: 175 mL    Solution: 600 mL  Total IN: 2575 mL    OUT:    Chest Tube: 85 mL    Chest Tube: 155 mL    Drain: 400 mL    Indwelling Catheter - Urethral: 800 mL    Nasoenteral Tube: 525 mL  Total OUT: 1965 mL    Total NET: 610 mL      10-15 @ 07:01  -  10-15 @ 10:01  --------------------------------------------------------  IN:    dextrose 5% + sodium chloride 0.45%.: 75 mL    Solution: 25 mL  Total IN: 100 mL    OUT:  Total OUT: 0 mL    Total NET: 100 mL        CBC Full  -  ( 15 Oct 2017 04:21 )  WBC Count : 23.0 K/uL  Hemoglobin : 7.9 g/dL  Hematocrit : 23.6 %  Platelet Count - Automated : 228 K/uL  Mean Cell Volume : 94.7 fl  Mean Cell Hemoglobin : 31.8 pg  Mean Cell Hemoglobin Concentration : 33.5 gm/dL  Auto Neutrophil # : x  Auto Lymphocyte # : x  Auto Monocyte # : x  Auto Eosinophil # : x  Auto Basophil # : x  Auto Neutrophil % : x  Auto Lymphocyte % : x  Auto Monocyte % : x  Auto Eosinophil % : x  Auto Basophil % : x    CAPILLARY BLOOD GLUCOSE            PHYSICAL EXAM:  Gen: Intubated  Abd: Soft, NT, ND  Lines: Pigtail x2 to suction  Wound: Ostomy bag applied to inferior portion of wound.

## 2017-10-15 NOTE — PROGRESS NOTE ADULT - ATTENDING COMMENTS
Comfortable on PRN Dilaudid  Vent adj  IVF, NPO for possible EC fistula  Abx for intraabd sepsis  DC lower PIG tail, continue upper drain, f/u CXR    Extensive discussion with him, his son and other family members, wants the endotracheal tube out, be comfortable, aware of consequences including impending death.   Pt is DNR, wants to be DNI, comfort care pathway

## 2017-10-15 NOTE — AIRWAY REMOVAL NOTE  ADULT & PEDS - ARTIFICAL AIRWAY REMOVAL COMMENTS
Written order for extubation verified. The patient was identified by full name and birth date compared to the identification band. Present during the procedure was KINDRA Mathew
Written order for extubation verified. The patient was identified by full name and birth date compared to the identification band. Present during the procedure was KINDRA Mills

## 2017-10-15 NOTE — PROGRESS NOTE ADULT - SUBJECTIVE AND OBJECTIVE BOX
24 hr: Pt started leaking enteric contents out from inferior portion of midline incision. Staples removed from wound and ostomy bag applied to inferior portion of wound. 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 Palliative care came and saw pt in evening, called pt's son and spoke to him. Plan for family meeting today at 1pm. HISTORY  62 year old male initially admitted to neurosurgery for bialteral leg weakness found to have newly diagnosed metastatic disease with spinal met causing spinal cord compression s/p T8-T11 laminectomy on 9/20, post-op course complicated by deterioration in respiratory status and acute onset abdominal pain requiring intubation and transfer to NSCU. CT chest/abdomen/pelvis demonstrated b/l DVTs, right upper lobe PE, and a bowel perforation with free air. Pt was taken emergently to the OR and is now s/p exploratory laparotomy, SBR x1 (left in discontinuity), abdominal washout, abthera VAC application on 9/22, after which he was transferred to SICU under ACS. He was taken back to the OR on 9/24 for a washout, primary anastomosis, and abdominal closure. Post-operative course complicated by Acute hypoxemic respiratory failure secondary to Acute Pulmonary Vascular congestion / Left Lung Collapse requiring high flow, and acute hypercarbic respiratory failure requiring re-intubation on 10/11.  Pt had second pigtail placed in left superior chest which drained about 1L initially.     24 HOUR EVENTS: Pt started leaking enteric contents out from inferior portion of midline incision. Staples removed from wound and ostomy bag applied to inferior portion of wound. 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 Palliative care came and saw pt in evening, called pt's son and spoke to him. Plan for family meeting today at 1pm.    SUBJECTIVE/ROS:  [ ] A ten-point review of systems was otherwise negative except as noted.  [x ] Due to altered mental status/intubation, subjective information were not able to be obtained from the patient. History was obtained, to the extent possible, from review of the chart and collateral sources of information.      NEURO  RASS: 0  Exam: arousable, follows commands, calm  Meds: HYDROmorphone  Injectable 0.5 milliGRAM(s) IV Push every 3 hours PRN moderate pain and comfort    [x] Adequacy of sedation and pain control has been assessed and adjusted      RESPIRATORY  RR: 20 (10-15-17 @ 08:00) (17 - 24)  SpO2: 99% (10-15-17 @ 08:00) (96% - 100%)  Wt(kg): --  Exam: unlabored, clear to auscultation bilaterally  Mechanical Ventilation: Mode: AC/ CMV (Assist Control/ Continuous Mandatory Ventilation), RR (machine): 16, RR (patient): 21, TV (machine): 450, FiO2: 30, PEEP: 5, ITime: 1, MAP: 10, PIP: 20  ABG - ( 15 Oct 2017 04:20 )  pH: 7.34  /  pCO2: 32    /  pO2: 109   / HCO3: 17    / Base Excess: -7.9  /  SaO2: 98      Blood Gas Arterial, Lactate: 2.4 (10.15.17 @ 04:20)    [ x] Extubation Readiness Assessed  Meds: ALBUTerol/ipratropium for Nebulization 3 milliLiter(s) Nebulizer every 6 hours        CARDIOVASCULAR  HR: 91 (10-15-17 @ 08:00) (87 - 108)  BP: 106/58 (10-15-17 @ 08:00) (106/58 - 110/60)  BP(mean): 76 (10-15-17 @ 08:00) (75 - 76)  ABP: 100/53 (10-15-17 @ 08:00) (91/53 - 123/67)  ABP(mean): 69 (10-15-17 @ 08:00) (65 - 89)    Exam: regular rate and rhythm  Cardiac Rhythm: sinus  Perfusion     [x ]Adequate   [ ]Inadequate  Mentation   [x ]Normal       [ ]Reduced  Extremities  [x ]Warm         [ ]Cool  Volume Status [ ]Hypervolemic [x ]Euvolemic [ ]Hypovolemic  Meds: x      GI/NUTRITION  Exam: softly distended, inferior portion of midline incision opened with drainage of enteric contents into ostomy bag  Diet: NPO  Meds: pantoprazole  Injectable 40 milliGRAM(s) IV Push daily      GENITOURINARY  I&O's Detail    10-14 @ 07:01  -  10-15 @ 07:00  --------------------------------------------------------  IN:    dextrose 5% + sodium chloride 0.45%.: 1275 mL    dextrose 5% + sodium chloride 0.9%: 525 mL    Solution: 175 mL    Solution: 600 mL  Total IN: 2575 mL    OUT:    Chest Tube: 85 mL    Chest Tube: 155 mL    Drain: 400 mL    Indwelling Catheter - Urethral: 800 mL    Nasoenteral Tube: 525 mL  Total OUT: 1965 mL    Total NET: 610 mL      10-15 @ 07:01  -  10-15 @ 08:38  --------------------------------------------------------  IN:    dextrose 5% + sodium chloride 0.45%.: 75 mL    Solution: 25 mL  Total IN: 100 mL    OUT:  Total OUT: 0 mL    Total NET: 100 mL          10-15    136  |  106  |  62<H>  ----------------------------<  96  4.9   |  15<L>  |  4.02<H>    Ca    7.6<L>      15 Oct 2017 04:20  Phos  5.2     10-15  Mg     2.2     10-15    TPro  4.8<L>  /  Alb  1.7<L>  /  TBili  1.5<H>  /  DBili  x   /  AST  31  /  ALT  8<L>  /  AlkPhos  122<H>  10-15    [x ] Torres catheter, indication: ALEC  Meds: dextrose 5% + sodium chloride 0.45%. 1000 milliLiter(s) IV Continuous <Continuous>      HEMATOLOGIC  Meds: enoxaparin Injectable 90 milliGRAM(s) SubCutaneous daily    [x] VTE Prophylaxis                        7.9    23.0  )-----------( 228      ( 15 Oct 2017 04:21 )             23.6     PT/INR - ( 15 Oct 2017 04:20 )   PT: 16.6 sec;   INR: 1.51 ratio         PTT - ( 15 Oct 2017 04:20 )  PTT:31.3 sec  Transfusion     [ ] PRBC   [ ] Platelets   [ ] FFP   [ ] Cryoprecipitate      INFECTIOUS DISEASES  T(C): 37.2 (10-15-17 @ 08:00), Max: 37.3 (10-14-17 @ 23:00)  WBC Count: 23.0 K/uL (10-15 @ 04:21)    Recent Cultures:  Specimen Source: .Sputum Sputum, 10-13 @ 17:06; Results   Numerous Pluralibacter gergoviae  Normal Respiratory Galina present; Gram Stain:   Rare polymorphonuclear leukocytes per low power field  No Squamous epithelial cells per low power field  Numerous Gram Negative Rods per oil power field  Rare Gram positive cocci in pairs per oil power field  Rare Gram Positive Rods per oil power field; Organism: --  Specimen Source: .Blood Blood-Venous, 10-12 @ 14:58; Results   No growth to date.; Gram Stain: --; Organism: --    Meds: piperacillin/tazobactam IVPB. 3.375 Gram(s) IV Intermittent every 12 hours        ENDOCRINE  Capillary Blood Glucose: WNL    Meds: x      ACCESS DEVICES:  [x ] Peripheral IV  [ ] Central Venous Line	[ ] R	[ ] L	[ ] IJ	[ ] Fem	[ ] SC	Placed:   [x ] Arterial Line		[x ] R	[ ] L	[ x] Fem	[ ] Rad	[ ] Ax	Placed:   [ ] PICC:					[ ] Mediport  [x ] Urinary Catheter, Date Placed:   [x] left pigtail catheter x2  [x ] Necessity of urinary, arterial, and venous catheters discussed    OTHER MEDICATIONS:x      CODE STATUS: DNR      IMAGING: < from: Xray Chest 1 View AP -PORTABLE-Routine (10.14.17 @ 04:24) >  IMPRESSION:   Moderate left pleural effusion with associated atelectasis. 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 CT chest/abdomen/pelvis demonstrated b/l DVTs, right upper lobe PE, and a bowel perforation with free air. Pt was taken emergently to the OR and is now s/p exploratory laparotomy, SBR x1 (left in discontinuity), abdominal washout, abthera VAC application on 9/22, after which he was transferred to SICU under ACS. He was taken back to the OR on 9/24 for a washout, primary anastomosis, and abdominal closure. Post-operative course complicated by Acute hypoxemic respiratory failure secondary to Acute Pulmonary Vascular congestion / Left Lung Collapse requiring high flow, and acute hypercarbic respiratory failure requiring re-intubation on 10/11.  Pt had second pigtail placed in left superior chest which drained about 1L initially.     24 HOUR EVENTS: Pt started leaking enteric contents out from inferior portion of midline incision. Staples removed from wound and ostomy bag applied to inferior portion of wound. 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 Palliative care came and saw pt in evening, called pt's son and spoke to him. Plan for family meeting today at 1pm.    SUBJECTIVE/ROS:  [ ] A ten-point review of systems was otherwise negative except as noted.  [x ] Due to altered mental status/intubation, subjective information were not able to be obtained from the patient. History was obtained, to the extent possible, from review of the chart and collateral sources of information.      NEURO  RASS: 0  Exam: arousable, follows commands, calm  Meds: HYDROmorphone  Injectable 0.5 milliGRAM(s) IV Push every 3 hours PRN moderate pain and comfort    [x] Adequacy of sedation and pain control has been assessed and adjusted      RESPIRATORY  RR: 20 (10-15-17 @ 08:00) (17 - 24)  SpO2: 99% (10-15-17 @ 08:00) (96% - 100%)  Wt(kg): --  Exam: unlabored, clear to auscultation bilaterally  Mechanical Ventilation: Mode: AC/ CMV (Assist Control/ Continuous Mandatory Ventilation), RR (machine): 16, RR (patient): 21, TV (machine): 450, FiO2: 30, PEEP: 5, ITime: 1, MAP: 10, PIP: 20  ABG - ( 15 Oct 2017 04:20 )  pH: 7.34  /  pCO2: 32    /  pO2: 109   / HCO3: 17    / Base Excess: -7.9  /  SaO2: 98      Blood Gas Arterial, Lactate: 2.4 (10.15.17 @ 04:20)    [ x] Extubation Readiness Assessed  Meds: ALBUTerol/ipratropium for Nebulization 3 milliLiter(s) Nebulizer every 6 hours        CARDIOVASCULAR  HR: 91 (10-15-17 @ 08:00) (87 - 108)  BP: 106/58 (10-15-17 @ 08:00) (106/58 - 110/60)  BP(mean): 76 (10-15-17 @ 08:00) (75 - 76)  ABP: 100/53 (10-15-17 @ 08:00) (91/53 - 123/67)  ABP(mean): 69 (10-15-17 @ 08:00) (65 - 89)    Exam: regular rate and rhythm  Cardiac Rhythm: sinus  Perfusion     [x ]Adequate   [ ]Inadequate  Mentation   [x ]Normal       [ ]Reduced  Extremities  [x ]Warm         [ ]Cool  Volume Status [ ]Hypervolemic [x ]Euvolemic [ ]Hypovolemic  Meds: x      GI/NUTRITION  Exam: softly distended, inferior portion of midline incision opened with drainage of enteric contents into ostomy bag  Diet: NPO  Meds: pantoprazole  Injectable 40 milliGRAM(s) IV Push daily      GENITOURINARY  I&O's Detail    10-14 @ 07:01  -  10-15 @ 07:00  --------------------------------------------------------  IN:    dextrose 5% + sodium chloride 0.45%.: 1275 mL    dextrose 5% + sodium chloride 0.9%: 525 mL    Solution: 175 mL    Solution: 600 mL  Total IN: 2575 mL    OUT:    Chest Tube: 85 mL    Chest Tube: 155 mL    Drain: 400 mL    Indwelling Catheter - Urethral: 800 mL    Nasoenteral Tube: 525 mL  Total OUT: 1965 mL    Total NET: 610 mL      10-15 @ 07:01  -  10-15 @ 08:38  --------------------------------------------------------  IN:    dextrose 5% + sodium chloride 0.45%.: 75 mL    Solution: 25 mL  Total IN: 100 mL    OUT:  Total OUT: 0 mL    Total NET: 100 mL          10-15    136  |  106  |  62<H>  ----------------------------<  96  4.9   |  15<L>  |  4.02<H>    Ca    7.6<L>      15 Oct 2017 04:20  Phos  5.2     10-15  Mg     2.2     10-15    TPro  4.8<L>  /  Alb  1.7<L>  /  TBili  1.5<H>  /  DBili  x   /  AST  31  /  ALT  8<L>  /  AlkPhos  122<H>  10-15    [x ] Torres catheter, indication: ALEC  Meds: dextrose 5% + sodium chloride 0.45%. 1000 milliLiter(s) IV Continuous <Continuous>      HEMATOLOGIC  Meds: enoxaparin Injectable 90 milliGRAM(s) SubCutaneous daily    [x] VTE Prophylaxis                        7.9    23.0  )-----------( 228      ( 15 Oct 2017 04:21 )             23.6     PT/INR - ( 15 Oct 2017 04:20 )   PT: 16.6 sec;   INR: 1.51 ratio         PTT - ( 15 Oct 2017 04:20 )  PTT:31.3 sec  Transfusion     [ ] PRBC   [ ] Platelets   [ ] FFP   [ ] Cryoprecipitate      INFECTIOUS DISEASES  T(C): 37.2 (10-15-17 @ 08:00), Max: 37.3 (10-14-17 @ 23:00)  WBC Count: 23.0 K/uL (10-15 @ 04:21)    Recent Cultures:  Specimen Source: .Sputum Sputum, 10-13 @ 17:06; Results   Numerous Pluralibacter gergoviae  Normal Respiratory Galina present; Gram Stain:   Rare polymorphonuclear leukocytes per low power field  No Squamous epithelial cells per low power field  Numerous Gram Negative Rods per oil power field  Rare Gram positive cocci in pairs per oil power field  Rare Gram Positive Rods per oil power field; Organism: --  Specimen Source: .Blood Blood-Venous, 10-12 @ 14:58; Results   No growth to date.; Gram Stain: --; Organism: --    Meds: piperacillin/tazobactam IVPB. 3.375 Gram(s) IV Intermittent every 12 hours        ENDOCRINE  Capillary Blood Glucose: WNL    Meds: x      ACCESS DEVICES:  [x ] Peripheral IV  [ ] Central Venous Line	[ ] R	[ ] L	[ ] IJ	[ ] Fem	[ ] SC	Placed:   [x ] Arterial Line		[x ] R	[ ] L	[ x] Fem	[ ] Rad	[ ] Ax	Placed:   [ ] PICC:					[ ] Mediport  [x ] Urinary Catheter, Date Placed:   [x] left pigtail catheter x2  [x ] Necessity of urinary, arterial, and venous catheters discussed    OTHER MEDICATIONS:x      CODE STATUS: DNR      IMAGING: < from: Xray Chest 1 View AP -PORTABLE-Routine (10.14.17 @ 04:24) >  IMPRESSION:   Moderate left pleural effusion with associated atelectasis.

## 2017-10-15 NOTE — PROGRESS NOTE ADULT - PROBLEM SELECTOR PLAN 4
s/p small bowel resection   follow wound  Antibiotics as per ID. Now with a developing enterocutaneous fistula.

## 2017-10-15 NOTE — PROGRESS NOTE ADULT - ASSESSMENT
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    PLAN:  Neurologic: post-operative pain and neoplastic pain  - Continue Tylenol, gabapentin for pain  - Dilaudid prn for pain    Respiratory: acute hypercarbic respiratory failure; bronchoscopy demonstrating obstructive tumor to left bronchus with left upper lobe collapse; left pleural effusion  - PRVC mechanical ventilation.   - Attempt SBT if pt mental status is adequate.  - Left inferior pigtail catheter to water seal, left superior pigtail catheter to suction and monitor output.   - Consider placing superior pigtail to water seal as well.  - Duonebs    Cardiovascular: hypotension requiring Enoch, resolved  - Monitor vital signs.    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    Renal/Genitourinary: ALEC   - Monitor I&Os and electrolytes. Replete as needed.  - Torres, monitor urine output.  - D5 1/2NS @ 75mL/hr     Hematologic: b/l DVT, b/l PE  - Therapeutic Lovenox, renally dosed, for RUL PE and bilateral DVTs     Infectious Disease: Pluralibacter gergoviae in sputum culture  - Zosyn empiric antibiotics    Endocrine: no active issues  - Monitor blood glucose on BMP.    Disposition: DNR. Will remain in SICU. Family meeting today regarding pt's poor prognosis.    -FRANCES PateC  [id number]

## 2017-10-15 NOTE — PROGRESS NOTE ADULT - SUBJECTIVE AND OBJECTIVE BOX
Patient is a 62y old  Male who presents with a chief complaint of cord compression (19 Sep 2017 09:44)      HPI:62 year old male initially admitted to neurosurgery for bialteral leg weakness found to have newly diagnosed metastatic disease with spinal met causing spinal cord compression s/p T8-T11 laminectomy on 9/20, post-op course complicated by deterioration in respiratory status and acute onset abdominal pain requiring intubation and transfer to NSCU. CT chest/abdomen/pelvis demonstrated b/l DVTs, right upper lobe PE, and a bowel perforation with free air. Pt was taken emergently to the OR and is now s/p exploratory laparotomy, SBR x1 (left in discontinuity), abdominal washout, abthera VAC application on 9/22, after which he was transferred to SICU under ACS. He was taken back to the OR on 9/24 for a washout, primary anastomosis, and abdominal closure. Post-operative course complicated by Acute hypoxemic respiratory failure secondary to Acute Pulmonary Vascular congestion / Left Lung Collapse requiring high flow, and acute hypercarbic respiratory failure requiring re-intubation on 10/11. Attempt at thoracentesis with CT first line did not help but placement of second line removed large amount of pleural effusion. Stool now draining from the anterior abdominal incision, consistent with an enterocutanous fistula. Remains ventilator dependent because of lung cancer compressing the left mainstem bronchus. remains paraplegic waist down secondary to metastatic lung cancer and thoracic spinal cord compression.  There appears to be no likelihood of disease reversibility and palliative care should become more involved at this time.     MEDICATIONS  (STANDING):  ALBUTerol/ipratropium for Nebulization 3 milliLiter(s) Nebulizer every 6 hours  dextrose 5% + sodium chloride 0.45%. 1000 milliLiter(s) (75 mL/Hr) IV Continuous <Continuous>  enoxaparin Injectable 90 milliGRAM(s) SubCutaneous daily  pantoprazole  Injectable 40 milliGRAM(s) IV Push daily  piperacillin/tazobactam IVPB. 3.375 Gram(s) IV Intermittent every 12 hours  senna 2 Tablet(s) Oral at bedtime    MEDICATIONS  (PRN):  HYDROmorphone  Injectable 0.5 milliGRAM(s) IV Push every 3 hours PRN moderate pain and comfort      Allergies    No Known Allergies    Intolerances        REVIEW OF SYSTEM:  intubated        ICU Vital Signs Last 24 Hrs  T(C): 37.2 (14 Oct 2017 15:00), Max: 37.6 (13 Oct 2017 19:00)  T(F): 99 (14 Oct 2017 15:00), Max: 99.7 (13 Oct 2017 19:00)  HR: 95 (14 Oct 2017 17:41) (89 - 108)  BP: 102/56 (13 Oct 2017 20:00) (102/56 - 102/56)  BP(mean): 76 (13 Oct 2017 20:00) (76 - 76)  ABP: 121/59 (14 Oct 2017 16:00) (96/49 - 121/59)  ABP(mean): 83 (14 Oct 2017 16:00) (65 - 83)  RR: 23 (14 Oct 2017 16:00) (17 - 24)  SpO2: 98% (14 Oct 2017 17:41) (96% - 100%)        PHYSICAL EXAM:  GENERAL: NAD, chronically ill  HEAD:  Atraumatic  EYES: EOM, PERRLA, conjunctiva pink and sclera white  ENT: No tonsillar erythema, exudates, or enlargement, moist mucous membranes, good dentition, no lesions  NECK: Supple, No JVD, normal thyroid, carotids with normal upstrokes and no bruits  CHEST/LUNG: Clear to auscultation bilaterally, No rales, rhonchi, wheezing, or rubs - 2 chest tubes in chest to drain pleural effusion  HEART: Regular rate and rhythm, No murmurs, rubs, or gallops  ABDOMEN: Soft, nondistended, no masses, guarding, tenderness or rebound, bowel sounds present  EXTREMITIES:  2+ Peripheral Pulses, No clubbing, cyanosis, or edema. No arthritis of shoulders, elbows, hands, hips, knees, ankles, or feet. No DJD C spine, T spine, or L/S spine  LYMPH: No lymphadenopathy noted  SKIN: No rashes or lesions  NERVOUS SYSTEM:  Alert     LABS:  ABG - ( 14 Oct 2017 13:31 )  pH: 7.34  /  pCO2: 31    /  pO2: 110   / HCO3: 16    / Base Excess: -8.4  /  SaO2: 99                    CBC Full  -  ( 14 Oct 2017 02:43 )  WBC Count : 23.7 K/uL  Hemoglobin : 8.1 g/dL  Hematocrit : 24.3 %  Platelet Count - Automated : 262 K/uL  Mean Cell Volume : 95.2 fl  Mean Cell Hemoglobin : 31.7 pg  Mean Cell Hemoglobin Concentration : 33.3 gm/dL  Auto Neutrophil # : x  Auto Lymphocyte # : x  Auto Monocyte # : x  Auto Eosinophil # : x  Auto Basophil # : x  Auto Neutrophil % : x  Auto Lymphocyte % : x  Auto Monocyte % : x  Auto Eosinophil % : x  Auto Basophil % : x    10-14    138  |  106  |  60<H>  ----------------------------<  95  5.1   |  16<L>  |  4.07<H>    Ca    7.9<L>      14 Oct 2017 02:43  Phos  4.7     10-14  Mg     2.3     10-14    TPro  4.9<L>  /  Alb  1.8<L>  /  TBili  1.6<H>  /  DBili  1.1<H>  /  AST  30  /  ALT  10  /  AlkPhos  122<H>  10-14    LIVER FUNCTIONS - ( 14 Oct 2017 02:43 )  Alb: 1.8 g/dL / Pro: 4.9 g/dL / ALK PHOS: 122 U/L / ALT: 10 U/L RC / AST: 30 U/L / GGT: x           PT/INR - ( 14 Oct 2017 02:43 )   PT: 16.3 sec;   INR: 1.50 ratio         PTT - ( 14 Oct 2017 02:43 )  PTT:31.7 sec          RADIOLOGY & ADDITIONAL TESTS:      Consultant(s):    Care Discussed with Consultants/Other Providers [ ] YES  [ ] NO Patient is a 62y old  Male who presents with a chief complaint of cord compression (19 Sep 2017 09:44)      HPI:62 year old male initially admitted to neurosurgery for bialteral leg weakness found to have newly diagnosed metastatic disease with spinal met causing spinal cord compression s/p T8-T11 laminectomy on 9/20, post-op course complicated by deterioration in respiratory status and acute onset abdominal pain requiring intubation and transfer to NSCU. CT chest/abdomen/pelvis demonstrated b/l DVTs, right upper lobe PE, and a bowel perforation with free air. Pt was taken emergently to the OR and is now s/p exploratory laparotomy, SBR x1 (left in discontinuity), abdominal washout, abthera VAC application on 9/22, after which he was transferred to SICU under ACS. He was taken back to the OR on 9/24 for a washout, primary anastomosis, and abdominal closure. Post-operative course complicated by Acute hypoxemic respiratory failure secondary to Acute Pulmonary Vascular congestion / Left Lung Collapse requiring high flow, and acute hypercarbic respiratory failure requiring re-intubation on 10/11. Attempt at thoracentesis with CT first line did not help but placement of second line removed large amount of pleural effusion. Stool now draining from the anterior abdominal incision, consistent with an enterocutanous fistula. Remains ventilator dependent because of lung cancer compressing the left mainstem bronchus. remains paraplegic waist down secondary to metastatic lung cancer and thoracic spinal cord compression.  There appears to be no likelihood of disease reversibility and palliative care should become more involved at this time.     MEDICATIONS  (STANDING):  ALBUTerol/ipratropium for Nebulization 3 milliLiter(s) Nebulizer every 6 hours  dextrose 5% + lactated ringers. 1000 milliLiter(s) (100 mL/Hr) IV Continuous <Continuous>  enoxaparin Injectable 90 milliGRAM(s) SubCutaneous daily  pantoprazole  Injectable 40 milliGRAM(s) IV Push daily  piperacillin/tazobactam IVPB. 3.375 Gram(s) IV Intermittent every 12 hours    MEDICATIONS  (PRN):  glycopyrrolate Injectable 0.2 milliGRAM(s) IV Push every 6 hours PRN Secretions  LORazepam   Injectable 2 milliGRAM(s) IV Push every 2 hours PRN Anxiety/ Dyspnea  morphine  - Injectable 2 milliGRAM(s) IV Push every 2 hours PRN Severe Pain (7 - 10)/ Dyspnea    Allergies    No Known Allergies    Intolerances        REVIEW OF SYSTEM:  intubated    ICU Vital Signs Last 24 Hrs  T(C): 37 (15 Oct 2017 11:00), Max: 37.3 (14 Oct 2017 23:00)  T(F): 98.6 (15 Oct 2017 11:00), Max: 99.1 (14 Oct 2017 23:00)  HR: 100 (15 Oct 2017 13:00) (84 - 108)  BP: 106/58 (15 Oct 2017 08:00) (106/58 - 110/60)  BP(mean): 76 (15 Oct 2017 08:00) (75 - 76)  ABP: 124/67 (15 Oct 2017 13:00) (91/53 - 124/67)  ABP(mean): 88 (15 Oct 2017 13:00) (66 - 89)  RR: 27 (15 Oct 2017 13:00) (18 - 27)  SpO2: 95% (15 Oct 2017 13:00) (94% - 100%)      PHYSICAL EXAM:  GENERAL: NAD, chronically ill  HEAD:  Atraumatic  EYES: EOM, PERRLA, conjunctiva pink and sclera white  ENT: No tonsillar erythema, exudates, or enlargement, moist mucous membranes, good dentition, no lesions  NECK: Supple, No JVD, normal thyroid, carotids with normal upstrokes and no bruits  CHEST/LUNG: Clear to auscultation bilaterally, No rales, rhonchi, wheezing, or rubs - 2 chest tubes in chest to drain pleural effusion  HEART: Regular rate and rhythm, No murmurs, rubs, or gallops  ABDOMEN: Soft, nondistended, no masses, guarding, tenderness or rebound, bowel sounds present  EXTREMITIES:  2+ Peripheral Pulses, No clubbing, cyanosis, or edema. No arthritis of shoulders, elbows, hands, hips, knees, ankles, or feet. No DJD C spine, T spine, or L/S spine  LYMPH: No lymphadenopathy noted  SKIN: No rashes or lesions  NERVOUS SYSTEM:  Alert     LABS:  ABG - ( 15 Oct 2017 04:20 )  pH: 7.34  /  pCO2: 32    /  pO2: 109   / HCO3: 17    / Base Excess: -7.9  /  SaO2: 98                    CBC Full  -  ( 15 Oct 2017 04:21 )  WBC Count : 23.0 K/uL  Hemoglobin : 7.9 g/dL  Hematocrit : 23.6 %  Platelet Count - Automated : 228 K/uL  Mean Cell Volume : 94.7 fl  Mean Cell Hemoglobin : 31.8 pg  Mean Cell Hemoglobin Concentration : 33.5 gm/dL  Auto Neutrophil # : x  Auto Lymphocyte # : x  Auto Monocyte # : x  Auto Eosinophil # : x  Auto Basophil # : x  Auto Neutrophil % : x  Auto Lymphocyte % : x  Auto Monocyte % : x  Auto Eosinophil % : x  Auto Basophil % : x    10-15    136  |  106  |  62<H>  ----------------------------<  96  4.9   |  15<L>  |  4.02<H>    Ca    7.6<L>      15 Oct 2017 04:20  Phos  5.2     10-15  Mg     2.2     10-15    TPro  4.8<L>  /  Alb  1.7<L>  /  TBili  1.5<H>  /  DBili  x   /  AST  31  /  ALT  8<L>  /  AlkPhos  122<H>  10-15    LIVER FUNCTIONS - ( 15 Oct 2017 04:20 )  Alb: 1.7 g/dL / Pro: 4.8 g/dL / ALK PHOS: 122 U/L / ALT: 8 U/L RC / AST: 31 U/L / GGT: x           PT/INR - ( 15 Oct 2017 04:20 )   PT: 16.6 sec;   INR: 1.51 ratio         PTT - ( 15 Oct 2017 04:20 )  PTT:31.3 sec                    RADIOLOGY & ADDITIONAL TESTS:      Consultant(s):    Care Discussed with Consultants/Other Providers [ ] YES  [ ] NO

## 2017-10-15 NOTE — PROGRESS NOTE ADULT - ASSESSMENT
ThisHPI: 62y male admitted to neurosurgery for bilateral leg weakness found to have newly diagnosed metastatic disease s/p T8-T11 laminectomy presented with deterioration in respiratory status and acute onset abdominal pain who was subsequently intubated and w/u revealed b/l DVTs, right upper lobe PE, and a bowel perforation.  Pt is now s/p Exploratory laparotomy #1, SBR x1 (left in discontinuity), abdominal washout, abthera VAC application (9/22) with intraop findings of a 1cm perforation in the small bowel 110 cm distal to the ligament of treitz. )(/24/17 Exploratory OR #2 with abdominal irrigation and enterostomy. Right leg motor function being monitored by neurosurgery after emergency admission spinal cord decompression of metastatic tumor T8. Lung cancer pathology is non small cell.  DVT/ PE  needs to be anticoagulated with clot going up into the right renal vein thrombosis. no return of leg movement or sensation to light touch over the past 5 days.    Left pleural effusion is the result of the primary lung cancer with distal obstruction. Patient with worsening pulmonary status requiring reintubation. second chest tube placed with successful removal of large amount of  pleural fluid. 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

## 2017-10-16 NOTE — PROGRESS NOTE ADULT - SUBJECTIVE AND OBJECTIVE BOX
INTERVAL HPI/OVERNIGHT EVENTS: Patient with worsening multiorgan failure. Electively extubated overnight, now on comfort measures. Patient appears dyspneic.      Allergies    No Known Allergies    Intolerances      ADVANCE DIRECTIVES:    DNR Yes    MOLST [ ] YES [x ] NO     MEDICATIONS  (STANDING):  ALBUTerol/ipratropium for Nebulization 3 milliLiter(s) Nebulizer every 6 hours  dextrose 5% + sodium chloride 0.45%. 1000 milliLiter(s) (75 mL/Hr) IV Continuous <Continuous>  enoxaparin Injectable 90 milliGRAM(s) SubCutaneous daily  pantoprazole  Injectable 40 milliGRAM(s) IV Push daily  piperacillin/tazobactam IVPB. 3.375 Gram(s) IV Intermittent every 12 hours  senna 2 Tablet(s) Oral at bedtime    MEDICATIONS  (PRN):  HYDROmorphone  Injectable 0.5 milliGRAM(s) IV Push every 3 hours PRN moderate pain and comfort      PRESENT SYMPTOMS:  Source: [ ] Patient   [ ] Family   [x ] Team     Pain:                        [x ] No [ ] Yes             [ ] Mild [ ] Moderate [ ] Severe    Dyspnea:                [] No [x ] Yes             [ ] Mild [x ] Moderate [ ] Severe    Anxiety:                  [ x] No [ ] Yes             [ ] Mild [ ] Moderate [ ] Severe    Fatigue:                  [x ] No [ ] Yes             [ ] Mild [ ] Moderate [ ] Severe    Nausea:                  [x ] No [ ] Yes             [ ] Mild [ ] Moderate [ ] Severe    Loss of appetite:   [x ] No [ ] Yes             [ ] Mild [ ] Moderate [ ] Severe    Constipation:        [ x] No [ ] Yes             [ ] Mild [ ] Moderate [ ] Severe    Other Symptoms:  [ ] All other review of systems negative   [x ] Unable to obtain due to poor mentation     Karnofsky Performance Score/Palliative Performance Status Version 2:  10       %    PHYSICAL EXAM:  Vital Signs Last 24 Hrs  T(C): 37.2 (14 Oct 2017 15:00), Max: 37.3 (13 Oct 2017 23:00)  T(F): 99 (14 Oct 2017 15:00), Max: 99.1 (13 Oct 2017 23:00)  HR: 98 (14 Oct 2017 18:00) (89 - 108)  BP: 102/56 (13 Oct 2017 20:00) (102/56 - 102/56)  BP(mean): 76 (13 Oct 2017 20:00) (76 - 76)  RR: 21 (14 Oct 2017 18:00) (17 - 24)  SpO2: 98% (14 Oct 2017 18:00) (96% - 100%) I&O's Summary    13 Oct 2017 07:01  -  14 Oct 2017 07:00  --------------------------------------------------------  IN: 2144.7 mL / OUT: 1630 mL / NET: 514.7 mL    14 Oct 2017 07:01  -  14 Oct 2017 19:14  --------------------------------------------------------  IN: 925 mL / OUT: 695 mL / NET: 230 mL        General: Terminally ill appearing male.   [ ] Alert  [ ] Oriented x      [x ] Lethargic  [ ] Agitated   [ ] Cachexia   [ ] Unarousable  [ ] Verbal  [ ] Non-Verbal    HEENT:  [x ] Normal   [ ] Dry mouth   [ ] ET Tube    [ ] Trach  [ ] Oral lesions    Lungs:   [ ] Clear [x ] Tachypnea  [x ] Audible excessive secretions   [ ] Rhonchi        [ ] Right [ ] Left [ ] Bilateral  [x ] Crackles BL        [ ] Right [ ] Left [ ] Bilateral  [ ] Wheezing     [ ] Right [ ] Left [ ] Bilateral    Cardiovascular:  [x ] Regular [ ] Irregular [ ] Tachycardia   [ ] Bradycardia  [ ] Murmur [ ] Other    Abdomen: [ ] Soft  [x ] Distended   [x ]  No BS  [ ] Non tender [ x] Tender  [ ]PEG   [ ]OGT/ NGT   Last BM:   10/6    Genitourinary: [ ] Normal [ ] Incontinent   [ ] Oliguria/Anuria   [x ] Torres    Musculoskeletal:  [ ] Normal   [x ] Weakness  [ ] Bedbound/Wheelchair bound [x ] Edema    Neurological: [ ] No focal deficits  [ ] Cognitive impairment  [ ] Dysphagia [ ] Dysarthria [ ] Paresis [x ] Other: Lethargic     Skin: Icteric    LABS:                        8.1    23.7  )-----------( 262      ( 14 Oct 2017 02:43 )             24.3     10-14    138  |  106  |  60<H>  ----------------------------<  95  5.1   |  16<L>  |  4.07<H>    Ca    7.9<L>      14 Oct 2017 02:43  Phos  4.7     10-14  Mg     2.3     10-14    TPro  4.9<L>  /  Alb  1.8<L>  /  TBili  1.6<H>  /  DBili  1.1<H>  /  AST  30  /  ALT  10  /  AlkPhos  122<H>  10-14    PT/INR - ( 14 Oct 2017 02:43 )   PT: 16.3 sec;   INR: 1.50 ratio         PTT - ( 14 Oct 2017 02:43 )  PTT:31.7 sec    Oral Intake: [x ] Unable/mouth care only [ ] Minimal [ ] Moderate [ ] Full Capability    Diet: [ x] NPO [ ] Tube feeds [ ] TPN [ ] Other     RADIOLOGY & ADDITIONAL STUDIES: reviewed    REFERRALS:   [ ] Chaplaincy  [ ] Hospice  [ ] Child Life  [ ] Social Work  [ ] Case management [ ] Holistic Therapy

## 2017-10-16 NOTE — PROGRESS NOTE ADULT - ATTENDING COMMENTS
The patient continues to require around the clock cardiopulmonary and neurologic monitoring for critical illness, as cited above. Palliative care consult and assistance is now appropriate, as his metastatic lung cancer and end stage bowel, neurologic and lung disease are unlikely to be reversible. There has been no interval change in the past 24 hours and a family meeting is scheduled for later today.

## 2017-10-16 NOTE — PROGRESS NOTE ADULT - ASSESSMENT
ASSESSMENT: 62 year old male with Stage IV non-small cell lung carcinoma with spinal, small bowel, and likely liver metastasis presenting with RUL PE with IVC and renal thrombosis, small bowel perforation s/p resection & left in discontinuity with Abthera (9/22) with RTOR for washout, primary anastomosis, & abdominal closure (9/24) complicated by acute hypoxemic respiratory failure requiring CPAP & high flow NC likely secondary to his NSCLC and RUL PE, then further complicated by acute hypercarbic respiratory failure requiring re-intubation on 10/11/17, now with plans for SBT with terminal extubation.    PLAN:  Neurologic: post-operative pain and neoplastic pain  - Fentanyl (duragesic) patch 12mcg/hr   - Morphine 2mg IV push PRN q2h  - Ativan 2mg IV push PRN q2h    Respiratory: acute hypercarbic respiratory failure; bronchoscopy demonstrating obstructive tumor to left bronchus with left upper lobe collapse; left pleural effusion  - PRVC mechanical ventilation.   Attempt SBT 10/16AM with plans for terminal extubation  - Left superior pigtail catheter to water seal  - Glycopyrrolate 0.2mg IV push q6h for secretions  - Duoneb q6h    Cardiovascular: hypotension resolved, has required mami this hospitalization  - Monitor    Gastrointestinal/Nutrition: small bowel perforation s/p SBR w/ primary anastomosis, stool burden with no BM since 10/6; new EC fistula   - Protonix 40mg daily for GI ppx  - NPO  - Monitor LFTs    Renal/Genitourinary: ALEC  - Monitor I&Os and electrolytes. Replete as needed.  - Torres, monitor urine output.  - D5 1/2NS @ 100mL/hr     Hematologic: b/l DVT, b/l PE  - 90mg subq qd (renal dosing) for RUL PE and bilateral DVT     Infectious Disease: Pluralibacter gergoviae in sputum culture  - Zosyn empiric antibiotics    Endocrine: no active issues  - Monitor blood glucose on BMP.    Disposition: DNR    [facility] [id number]

## 2017-10-16 NOTE — PROGRESS NOTE ADULT - SUBJECTIVE AND OBJECTIVE BOX
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 CT chest/abdomen/pelvis demonstrated b/l DVTs, right upper lobe PE, and a bowel perforation with free air. Pt was taken emergently to the OR and is now s/p exploratory laparotomy, SBR x1 (left in discontinuity), abdominal washout, abthera VAC application on 9/22, after which he was transferred to SICU under ACS. He was taken back to the OR on 9/24 for a washout, primary anastomosis, and abdominal closure. Post-operative course complicated by Acute hypoxemic respiratory failure secondary to Acute Pulmonary Vascular congestion / Left Lung Collapse requiring high flow, and acute hypercarbic respiratory failure requiring re-intubation on 10/11.  Pt had second pigtail placed in left superior chest which drained about 1L initially.     24 HOUR EVENTS:   -Family meeting 10/15 at 1pm  Family decided patient should be terminally extubated  Pt started on terminal feeds    -Pt continues to have prerenal azotemia despite renal dosing of medications.   -Inferior chest tube removed d/t minimal output.    SUBJECTIVE/ROS:  [ ] A ten-point review of systems was otherwise negative except as noted.  [ ] Due to altered mental status/intubation, subjective information were not able to be obtained from the patient. History was obtained, to the extent possible, from review of the chart and collateral sources of information.    NEURO  RASS:     GCS:     CAM ICU:  Exam: awake, alert, oriented  Meds: fentaNYL   Patch  12 MICROgram(s)/Hr. 1 Patch Transdermal every 48 hours  LORazepam   Injectable 2 milliGRAM(s) IV Push every 2 hours PRN Anxiety/ Dyspnea  morphine  - Injectable 2 milliGRAM(s) IV Push every 2 hours PRN Severe Pain (7 - 10)/ Dyspnea    [x] Adequacy of sedation and pain control has been assessed and adjusted    RESPIRATORY  RR: 24 (10-16-17 @ 02:00) (18 - 34)  SpO2: 100% (10-16-17 @ 02:00) (84% - 100%)  Wt(kg): --  Exam: unlabored, clear to auscultation bilaterally  Mechanical Ventilation: Mode: AC/ CMV (Assist Control/ Continuous Mandatory Ventilation), RR (machine): 16, RR (patient): 24, TV (machine): 450, FiO2: 30, PEEP: 5, ITime: 1, MAP: 15, PIP: 28  ABG - ( 15 Oct 2017 04:20 )  pH: 7.34  /  pCO2: 32    /  pO2: 109   / HCO3: 17    / Base Excess: -7.9  /  SaO2: 98      Lactate: x        [N/A] Extubation Readiness Assessed  Meds: ALBUTerol/ipratropium for Nebulization 3 milliLiter(s) Nebulizer every 6 hours    CARDIOVASCULAR  HR: 82 (10-16-17 @ 02:00) (81 - 106)  BP: 106/58 (10-15-17 @ 08:00) (106/58 - 110/60)  BP(mean): 76 (10-15-17 @ 08:00) (75 - 76)  ABP: 103/56 (10-16-17 @ 02:00) (98/51 - 129/60)  ABP(mean): 74 (10-16-17 @ 02:00) (68 - 89)  Wt(kg): --  CVP(cm H2O): --    Exam: regular rate and rhythm  Cardiac Rhythm: sinus  Perfusion     [x]Adequate   [ ]Inadequate  Mentation   [x]Normal       [ ]Reduced  Extremities  [x]Warm         [ ]Cool  Volume Status [ ]Hypervolemic [x]Euvolemic [ ]Hypovolemic  Meds:     GI/NUTRITION  Exam: soft, nontender, nondistended, incision C/D/I  Diet:  Meds: glycopyrrolate Injectable 0.2 milliGRAM(s) IV Push every 6 hours PRN Secretions  pantoprazole    Tablet 40 milliGRAM(s) Oral before breakfast    GENITOURINARY  I&O's Detail    10-14 @ 07:01  -  10-15 @ 07:00  --------------------------------------------------------  IN:    dextrose 5% + sodium chloride 0.45%.: 1275 mL    dextrose 5% + sodium chloride 0.9%: 525 mL    Solution: 600 mL    Solution: 175 mL  Total IN: 2575 mL    OUT:    Chest Tube: 85 mL    Chest Tube: 155 mL    Drain: 400 mL    Indwelling Catheter - Urethral: 800 mL    Nasoenteral Tube: 525 mL  Total OUT: 1965 mL    Total NET: 610 mL    10-15 @ 07:01  -  10-16 @ 03:07  --------------------------------------------------------  IN:    dextrose 5% + lactated ringers.: 1125 mL    dextrose 5% + sodium chloride 0.45%.: 225 mL    Solution: 250 mL    Solution: 125 mL  Total IN: 1725 mL    OUT:    Chest Tube: 100 mL    Drain: 100 mL    Indwelling Catheter - Urethral: 670 mL  Total OUT: 870 mL    Total NET: 855 mL    10-15    136  |  106  |  62<H>  ----------------------------<  96  4.9   |  15<L>  |  4.02<H>    Ca    7.6<L>      15 Oct 2017 04:20  Phos  5.2     10-15  Mg     2.2     10-15    TPro  4.8<L>  /  Alb  1.7<L>  /  TBili  1.5<H>  /  DBili  x   /  AST  31  /  ALT  8<L>  /  AlkPhos  122<H>  10-15    [ ] Torres catheter, indication: N/A  Meds: dextrose 5% + lactated ringers. 1000 milliLiter(s) IV Continuous <Continuous>    HEMATOLOGIC  Meds: enoxaparin Injectable 90 milliGRAM(s) SubCutaneous daily    [x] VTE Prophylaxis                        7.9    23.0  )-----------( 228      ( 15 Oct 2017 04:21 )             23.6     PT/INR - ( 15 Oct 2017 04:20 )   PT: 16.6 sec;   INR: 1.51 ratio      PTT - ( 15 Oct 2017 04:20 )  PTT:31.3 sec  Transfusion     [ ] PRBC   [ ] Platelets   [ ] FFP   [ ] Cryoprecipitate      INFECTIOUS DISEASES  WBC Count: 23.0 K/uL (10-15 @ 04:21)    RECENT CULTURES:  Specimen Source: .Sputum Sputum  Date/Time: 10-13 @ 17:06  Culture Results:   Numerous Pluralibacter gergoviae  Normal Respiratory Galina present  Gram Stain:   Rare polymorphonuclear leukocytes per low power field  No Squamous epithelial cells per low power field  Numerous Gram Negative Rods per oil power field  Rare Gram positive cocci in pairs per oil power field  Rare Gram Positive Rods per oil power field  Organism: Enterobacter gergoviae  Specimen Source: .Blood Blood-Venous  Date/Time: 10-12 @ 14:58  Culture Results:   No growth to date.  Gram Stain: --  Organism: --    Meds: piperacillin/tazobactam IVPB. 3.375 Gram(s) IV Intermittent every 12 hours    ENDOCRINE  CAPILLARY BLOOD GLUCOSE    Meds: None    ACCESS DEVICES:  [ ] Peripheral IV  [ ] Central Venous Line	[ ] R	[ ] L	[ ] IJ	[ ] Fem	[ ] SC	Placed:   [ ] Arterial Line		[ ] R	[ ] L	[ ] Fem	[ ] Rad	[ ] Ax	Placed:   [ ] PICC:					[ ] Mediport  [ ] Urinary Catheter, Date Placed:   [x] Necessity of urinary, arterial, and venous catheters discussed    ASSESSMENT: 62 year old male with Stage IV non-small cell lung carcinoma with spinal, small bowel, and likely liver metastasis presenting with RUL PE with IVC and renal thrombosis, small bowel perforation s/p resection & left in discontinuity with Abthera (9/22) with RTOR for washout, primary anastomosis, & abdominal closure (9/24) complicated by acute hypoxemic respiratory failure requiring CPAP & high flow NC likely secondary to his NSCLC and RUL PE, then further complicated by acute hypercarbic respiratory failure requiring re-intubation on 10/11/17, now with plans for SBT with terminal extubation.    PLAN:  Neurologic: post-operative pain and neoplastic pain  - Fentanyl (duragesic) patch 12mcg/hr   - Morphine 2mg IV push PRN q2h  - Ativan 2mg IV push PRN q2h    Respiratory: acute hypercarbic respiratory failure; bronchoscopy demonstrating obstructive tumor to left bronchus with left upper lobe collapse; left pleural effusion  - PRVC mechanical ventilation.   Attempt SBT 10/16AM with plans for terminal extubation  - Left superior pigtail catheter to water seal  - Glycopyrrolate 0.2mg IV push q6h for secretions  - Duoneb q6h    Cardiovascular: hypotension resolved, has required mami this hospitalization  - Monitor    Gastrointestinal/Nutrition: small bowel perforation s/p SBR w/ primary anastomosis, stool burden with no BM since 10/6; new EC fistula   - Protonix 40mg daily for GI ppx  - NPO  - Monitor LFTs    Renal/Genitourinary: ALEC  - Monitor I&Os and electrolytes. Replete as needed.  - Torres, monitor urine output.  - D5 1/2NS @ 100mL/hr     Hematologic: b/l DVT, b/l PE  - 90mg subq qd (renal dosing) for RUL PE and bilateral DVT     Infectious Disease: Pluralibacter gergoviae in sputum culture  - Zosyn empiric antibiotics    Endocrine: no active issues  - Monitor blood glucose on BMP.    Disposition: DNR    [facility] [id number]      CODE STATUS:  Yes  Yes      IMAGING: HISTORY  62 year old male initially admitted to neurosurgery for bilateral leg weakness found to have newly diagnosed stage IV metastatic non-small cell lung carcinoma with spinal met causing spinal cord compression s/p T8-T11 laminectomy on 9/20, post-op course complicated by deterioration in respiratory status and acute onset abdominal pain requiring intubation and transfer to NSCU. CT chest/abdomen/pelvis demonstrated b/l DVTs, right upper lobe PE, and a bowel perforation with free air. Pt was taken emergently to the OR and is now s/p exploratory laparotomy, SBR x1 (left in discontinuity), abdominal washout, abthera VAC application on 9/22, after which he was transferred to SICU under ACS. He was taken back to the OR on 9/24 for a washout, primary anastomosis, and abdominal closure. Post-operative course complicated by Acute hypoxemic respiratory failure secondary to Acute Pulmonary Vascular congestion / Left Lung Collapse requiring high flow, and acute hypercarbic respiratory failure requiring re-intubation on 10/11.  Pt had second pigtail placed in left superior chest which drained about 1L initially.     24 HOUR EVENTS:   -Family meeting 10/15 at 1pm  Family decided patient should be terminally extubated  Pt started on terminal feeds    -Pt continues to have prerenal azotemia despite renal dosing of medications.   -Inferior chest tube removed d/t minimal output.    SUBJECTIVE/ROS:  [ ] A ten-point review of systems was otherwise negative except as noted.  [ ] Due to altered mental status/intubation, subjective information were not able to be obtained from the patient. History was obtained, to the extent possible, from review of the chart and collateral sources of information.    NEURO  RASS:     GCS:     CAM ICU:  Exam: awake, alert, oriented  Meds: fentaNYL   Patch  12 MICROgram(s)/Hr. 1 Patch Transdermal every 48 hours  LORazepam   Injectable 2 milliGRAM(s) IV Push every 2 hours PRN Anxiety/ Dyspnea  morphine  - Injectable 2 milliGRAM(s) IV Push every 2 hours PRN Severe Pain (7 - 10)/ Dyspnea    [x] Adequacy of sedation and pain control has been assessed and adjusted    RESPIRATORY  RR: 24 (10-16-17 @ 02:00) (18 - 34)  SpO2: 100% (10-16-17 @ 02:00) (84% - 100%)  Wt(kg): --  Exam: unlabored, clear to auscultation bilaterally  Mechanical Ventilation: Mode: AC/ CMV (Assist Control/ Continuous Mandatory Ventilation), RR (machine): 16, RR (patient): 24, TV (machine): 450, FiO2: 30, PEEP: 5, ITime: 1, MAP: 15, PIP: 28  ABG - ( 15 Oct 2017 04:20 )  pH: 7.34  /  pCO2: 32    /  pO2: 109   / HCO3: 17    / Base Excess: -7.9  /  SaO2: 98      Lactate: x        [N/A] Extubation Readiness Assessed  Meds: ALBUTerol/ipratropium for Nebulization 3 milliLiter(s) Nebulizer every 6 hours    CARDIOVASCULAR  HR: 82 (10-16-17 @ 02:00) (81 - 106)  BP: 106/58 (10-15-17 @ 08:00) (106/58 - 110/60)  BP(mean): 76 (10-15-17 @ 08:00) (75 - 76)  ABP: 103/56 (10-16-17 @ 02:00) (98/51 - 129/60)  ABP(mean): 74 (10-16-17 @ 02:00) (68 - 89)  Wt(kg): --  CVP(cm H2O): --    Exam: regular rate and rhythm  Cardiac Rhythm: sinus  Perfusion     [x]Adequate   [ ]Inadequate  Mentation   [x]Normal       [ ]Reduced  Extremities  [x]Warm         [ ]Cool  Volume Status [ ]Hypervolemic [x]Euvolemic [ ]Hypovolemic  Meds:     GI/NUTRITION  Exam: soft, nontender, nondistended, incision C/D/I  Diet:  Meds: glycopyrrolate Injectable 0.2 milliGRAM(s) IV Push every 6 hours PRN Secretions  pantoprazole    Tablet 40 milliGRAM(s) Oral before breakfast    GENITOURINARY  I&O's Detail    10-14 @ 07:01  -  10-15 @ 07:00  --------------------------------------------------------  IN:    dextrose 5% + sodium chloride 0.45%.: 1275 mL    dextrose 5% + sodium chloride 0.9%: 525 mL    Solution: 600 mL    Solution: 175 mL  Total IN: 2575 mL    OUT:    Chest Tube: 85 mL    Chest Tube: 155 mL    Drain: 400 mL    Indwelling Catheter - Urethral: 800 mL    Nasoenteral Tube: 525 mL  Total OUT: 1965 mL    Total NET: 610 mL    10-15 @ 07:01  -  10-16 @ 03:07  --------------------------------------------------------  IN:    dextrose 5% + lactated ringers.: 1125 mL    dextrose 5% + sodium chloride 0.45%.: 225 mL    Solution: 250 mL    Solution: 125 mL  Total IN: 1725 mL    OUT:    Chest Tube: 100 mL    Drain: 100 mL    Indwelling Catheter - Urethral: 670 mL  Total OUT: 870 mL    Total NET: 855 mL    10-15    136  |  106  |  62<H>  ----------------------------<  96  4.9   |  15<L>  |  4.02<H>    Ca    7.6<L>      15 Oct 2017 04:20  Phos  5.2     10-15  Mg     2.2     10-15    TPro  4.8<L>  /  Alb  1.7<L>  /  TBili  1.5<H>  /  DBili  x   /  AST  31  /  ALT  8<L>  /  AlkPhos  122<H>  10-15    [ ] Torres catheter, indication: N/A  Meds: dextrose 5% + lactated ringers. 1000 milliLiter(s) IV Continuous <Continuous>    HEMATOLOGIC  Meds: enoxaparin Injectable 90 milliGRAM(s) SubCutaneous daily    [x] VTE Prophylaxis                        7.9    23.0  )-----------( 228      ( 15 Oct 2017 04:21 )             23.6     PT/INR - ( 15 Oct 2017 04:20 )   PT: 16.6 sec;   INR: 1.51 ratio      PTT - ( 15 Oct 2017 04:20 )  PTT:31.3 sec  Transfusion     [ ] PRBC   [ ] Platelets   [ ] FFP   [ ] Cryoprecipitate      INFECTIOUS DISEASES  WBC Count: 23.0 K/uL (10-15 @ 04:21)    RECENT CULTURES:  Specimen Source: .Sputum Sputum  Date/Time: 10-13 @ 17:06  Culture Results:   Numerous Pluralibacter gergoviae  Normal Respiratory Galina present  Gram Stain:   Rare polymorphonuclear leukocytes per low power field  No Squamous epithelial cells per low power field  Numerous Gram Negative Rods per oil power field  Rare Gram positive cocci in pairs per oil power field  Rare Gram Positive Rods per oil power field  Organism: Enterobacter gergoviae  Specimen Source: .Blood Blood-Venous  Date/Time: 10-12 @ 14:58  Culture Results:   No growth to date.  Gram Stain: --  Organism: --    Meds: piperacillin/tazobactam IVPB. 3.375 Gram(s) IV Intermittent every 12 hours    ENDOCRINE  CAPILLARY BLOOD GLUCOSE    Meds: None    ACCESS DEVICES:  [ ] Peripheral IV  [ ] Central Venous Line	[ ] R	[ ] L	[ ] IJ	[ ] Fem	[ ] SC	Placed:   [ ] Arterial Line		[ ] R	[ ] L	[ ] Fem	[ ] Rad	[ ] Ax	Placed:   [ ] PICC:					[ ] Mediport  [ ] Urinary Catheter, Date Placed:   [x] Necessity of urinary, arterial, and venous catheters discussed    CODE STATUS: Full code HISTORY  62 year old male initially admitted to neurosurgery for bilateral leg weakness found to have newly diagnosed stage IV metastatic non-small cell lung carcinoma with spinal met causing spinal cord compression s/p T8-T11 laminectomy on 9/20, post-op course complicated by deterioration in respiratory status and acute onset abdominal pain requiring intubation and transfer to NSCU. CT chest/abdomen/pelvis demonstrated b/l DVTs, right upper lobe PE, and a bowel perforation with free air. Pt was taken emergently to the OR and is now s/p exploratory laparotomy, SBR x1 (left in discontinuity), abdominal washout, abthera VAC application on 9/22, after which he was transferred to SICU under ACS. He was taken back to the OR on 9/24 for a washout, primary anastomosis, and abdominal closure. Post-operative course complicated by Acute hypoxemic respiratory failure secondary to Acute Pulmonary Vascular congestion / Left Lung Collapse requiring high flow, and acute hypercarbic respiratory failure requiring re-intubation on 10/11.  Pt had second pigtail placed in left superior chest which drained about 1L initially.     24 HOUR EVENTS:   -Family meeting 10/15 at 1pm  -Family decided patient should be terminally extubated  Pt started on terminal feeds    -Pt continues to have prerenal azotemia despite renal dosing of medications.   -Inferior chest tube removed d/t minimal output.    SUBJECTIVE/ROS:  [ ] A ten-point review of systems was otherwise negative except as noted.  [ ] Due to altered mental status/intubation, subjective information were not able to be obtained from the patient. History was obtained, to the extent possible, from review of the chart and collateral sources of information.    NEURO  RASS:     GCS:     CAM ICU:  Exam: awake, alert, oriented  Meds: fentaNYL   Patch  12 MICROgram(s)/Hr. 1 Patch Transdermal every 48 hours  LORazepam   Injectable 2 milliGRAM(s) IV Push every 2 hours PRN Anxiety/ Dyspnea  morphine  - Injectable 2 milliGRAM(s) IV Push every 2 hours PRN Severe Pain (7 - 10)/ Dyspnea    [x] Adequacy of sedation and pain control has been assessed and adjusted    RESPIRATORY  RR: 24 (10-16-17 @ 02:00) (18 - 34)  SpO2: 100% (10-16-17 @ 02:00) (84% - 100%)  Wt(kg): --  Exam: unlabored, clear to auscultation bilaterally  Mechanical Ventilation: Mode: AC/ CMV (Assist Control/ Continuous Mandatory Ventilation), RR (machine): 16, RR (patient): 24, TV (machine): 450, FiO2: 30, PEEP: 5, ITime: 1, MAP: 15, PIP: 28  ABG - ( 15 Oct 2017 04:20 )  pH: 7.34  /  pCO2: 32    /  pO2: 109   / HCO3: 17    / Base Excess: -7.9  /  SaO2: 98      Lactate: x        [N/A] Extubation Readiness Assessed  Meds: ALBUTerol/ipratropium for Nebulization 3 milliLiter(s) Nebulizer every 6 hours    CARDIOVASCULAR  HR: 82 (10-16-17 @ 02:00) (81 - 106)  BP: 106/58 (10-15-17 @ 08:00) (106/58 - 110/60)  BP(mean): 76 (10-15-17 @ 08:00) (75 - 76)  ABP: 103/56 (10-16-17 @ 02:00) (98/51 - 129/60)  ABP(mean): 74 (10-16-17 @ 02:00) (68 - 89)  Wt(kg): --  CVP(cm H2O): --    Exam: regular rate and rhythm  Cardiac Rhythm: sinus  Perfusion     [x]Adequate   [ ]Inadequate  Mentation   [x]Normal       [ ]Reduced  Extremities  [x]Warm         [ ]Cool  Volume Status [ ]Hypervolemic [x]Euvolemic [ ]Hypovolemic  Meds:     GI/NUTRITION  Exam: soft, nontender, nondistended, incision C/D/I  Diet:  Meds: glycopyrrolate Injectable 0.2 milliGRAM(s) IV Push every 6 hours PRN Secretions  pantoprazole    Tablet 40 milliGRAM(s) Oral before breakfast    GENITOURINARY  I&O's Detail    10-14 @ 07:01  -  10-15 @ 07:00  --------------------------------------------------------  IN:    dextrose 5% + sodium chloride 0.45%.: 1275 mL    dextrose 5% + sodium chloride 0.9%: 525 mL    Solution: 600 mL    Solution: 175 mL  Total IN: 2575 mL    OUT:    Chest Tube: 85 mL    Chest Tube: 155 mL    Drain: 400 mL    Indwelling Catheter - Urethral: 800 mL    Nasoenteral Tube: 525 mL  Total OUT: 1965 mL    Total NET: 610 mL    10-15 @ 07:01  -  10-16 @ 03:07  --------------------------------------------------------  IN:    dextrose 5% + lactated ringers.: 1125 mL    dextrose 5% + sodium chloride 0.45%.: 225 mL    Solution: 250 mL    Solution: 125 mL  Total IN: 1725 mL    OUT:    Chest Tube: 100 mL    Drain: 100 mL    Indwelling Catheter - Urethral: 670 mL  Total OUT: 870 mL    Total NET: 855 mL    10-15    136  |  106  |  62<H>  ----------------------------<  96  4.9   |  15<L>  |  4.02<H>    Ca    7.6<L>      15 Oct 2017 04:20  Phos  5.2     10-15  Mg     2.2     10-15    TPro  4.8<L>  /  Alb  1.7<L>  /  TBili  1.5<H>  /  DBili  x   /  AST  31  /  ALT  8<L>  /  AlkPhos  122<H>  10-15    [ ] Torres catheter, indication: N/A  Meds: dextrose 5% + lactated ringers. 1000 milliLiter(s) IV Continuous <Continuous>    HEMATOLOGIC  Meds: enoxaparin Injectable 90 milliGRAM(s) SubCutaneous daily    [x] VTE Prophylaxis                        7.9    23.0  )-----------( 228      ( 15 Oct 2017 04:21 )             23.6     PT/INR - ( 15 Oct 2017 04:20 )   PT: 16.6 sec;   INR: 1.51 ratio      PTT - ( 15 Oct 2017 04:20 )  PTT:31.3 sec  Transfusion     [ ] PRBC   [ ] Platelets   [ ] FFP   [ ] Cryoprecipitate      INFECTIOUS DISEASES  WBC Count: 23.0 K/uL (10-15 @ 04:21)    RECENT CULTURES:  Specimen Source: .Sputum Sputum  Date/Time: 10-13 @ 17:06  Culture Results:   Numerous Pluralibacter gergoviae  Normal Respiratory Galina present  Gram Stain:   Rare polymorphonuclear leukocytes per low power field  No Squamous epithelial cells per low power field  Numerous Gram Negative Rods per oil power field  Rare Gram positive cocci in pairs per oil power field  Rare Gram Positive Rods per oil power field  Organism: Enterobacter gergoviae  Specimen Source: .Blood Blood-Venous  Date/Time: 10-12 @ 14:58  Culture Results:   No growth to date.  Gram Stain: --  Organism: --    Meds: piperacillin/tazobactam IVPB. 3.375 Gram(s) IV Intermittent every 12 hours    ENDOCRINE  CAPILLARY BLOOD GLUCOSE    Meds: None    ACCESS DEVICES:  [ ] Peripheral IV  [ ] Central Venous Line	[ ] R	[ ] L	[ ] IJ	[ ] Fem	[ ] SC	Placed:   [ ] Arterial Line		[ ] R	[ ] L	[ ] Fem	[ ] Rad	[ ] Ax	Placed:   [ ] PICC:					[ ] Mediport  [ ] Urinary Catheter, Date Placed:   [x] Necessity of urinary, arterial, and venous catheters discussed    CODE STATUS: Full code

## 2017-10-16 NOTE — PROGRESS NOTE ADULT - PROBLEM SELECTOR PLAN 2
LLL collapse due to tumor with large left pleural effusion still seen on CXR  follow output from chest tubes

## 2017-10-16 NOTE — PROGRESS NOTE ADULT - ATTENDING COMMENTS
Comfortable on PRN Dilaudid  Patient after family discussion is DNR and DNI.  Patient have advanced diet even with EC fistula    Leukocytosis secondary to intraabdominal sepsis- continue zosyn    Respiratory abnormality- patient a bit tachypneic today  -CXR demonstrates left sided atelectasis and lung collapse    Right upper lobe Pulmonary embolus- continue lovenox    Will call PCU.

## 2017-10-16 NOTE — PROGRESS NOTE ADULT - SUBJECTIVE AND OBJECTIVE BOX
Follow-up Pulm Progress Note    extubated on mso4 gtt    Medications:  MEDICATIONS  (STANDING):  ALBUTerol/ipratropium for Nebulization 3 milliLiter(s) Nebulizer every 6 hours  dextrose 5% + lactated ringers. 1000 milliLiter(s) (10 mL/Hr) IV Continuous <Continuous>  enoxaparin Injectable 90 milliGRAM(s) SubCutaneous daily  glycopyrrolate Injectable 0.4 milliGRAM(s) IV Push every 6 hours  HYDROmorphone Infusion 1 mG/Hr (1 mL/Hr) IV Continuous <Continuous>  piperacillin/tazobactam IVPB. 3.375 Gram(s) IV Intermittent every 12 hours    MEDICATIONS  (PRN):  HYDROmorphone  Injectable 2 milliGRAM(s) IV Push every 1 hour PRN dyspnea  HYDROmorphone  Injectable 2 milliGRAM(s) IV Push every 1 hour PRN breakthrough pain  LORazepam   Injectable 1 milliGRAM(s) IV Push every 2 hours PRN Anxiety/ Dyspnea          Vital Signs Last 24 Hrs  T(C): 36.3 (16 Oct 2017 15:00), Max: 36.5 (15 Oct 2017 19:00)  T(F): 97.4 (16 Oct 2017 15:00), Max: 97.7 (15 Oct 2017 19:00)  HR: 84 (16 Oct 2017 15:00) (79 - 101)  BP: 115/64 (16 Oct 2017 08:00) (115/64 - 115/64)  BP(mean): 84 (16 Oct 2017 08:00) (84 - 84)  RR: 19 (16 Oct 2017 15:00) (19 - 35)  SpO2: 100% (16 Oct 2017 15:00) (84% - 100%)    ABG - ( 16 Oct 2017 03:51 )  pH: 7.30  /  pCO2: 35    /  pO2: 76    / HCO3: 17    / Base Excess: -8.3  /  SaO2: 95                    10-15 @ 07:01  -  10-16 @ 07:00  --------------------------------------------------------  IN: 2225 mL / OUT: 1310 mL / NET: 915 mL          LABS:                        7.9    21.2  )-----------( 206      ( 16 Oct 2017 03:56 )             25.9     10-16    138  |  106  |  63<H>  ----------------------------<  127<H>  4.9   |  17<L>  |  3.94<H>    Ca    8.1<L>      16 Oct 2017 03:56  Phos  6.1     10-16  Mg     2.3     10-16    TPro  4.9<L>  /  Alb  1.8<L>  /  TBili  1.5<H>  /  DBili  1.1<H>  /  AST  30  /  ALT  7<L>  /  AlkPhos  122<H>  10-16          CAPILLARY BLOOD GLUCOSE        PT/INR - ( 16 Oct 2017 03:56 )   PT: 17.7 sec;   INR: 1.62 ratio         PTT - ( 16 Oct 2017 03:56 )  PTT:31.8 sec                Fluid characteristics  -- 10-06 @ 20:17  pH 7.87  LDH 1648  tprot 2.3    Cell count  Appearance Sl Bloody  Fluid type --  BF lymph 37  color Red  eosinophil --  PMN 27  Mesothelial 4  Monocyte 2  Other body cells 30      CULTURES: (if applicable)  Culture Results:   Numerous Pluralibacter gergoviae  Normal Respiratory Galina present (10-13 @ 17:06)  Culture Results:   No growth to date. (10-12 @ 14:58)  Culture Results:   No growth to date. (10-12 @ 14:58)    Most recent blood culture -- 10-13 @ 17:06   Enterobacter gergoviae Enterobacter gergoviae .Sputum Sputum 10-13 @ 17:06  Most recent blood culture -- 10-12 @ 14:58   -- -- .Blood Blood-Venous 10-12 @ 14:58    Blood culture 10-13 @ 17:06  --    Rare polymorphonuclear leukocytes per low power field  No Squamous epithelial cells per low power field  Numerous Gram Negative Rods per oil power field  Rare Gram positive cocci in pairs per oil power field  Rare Gram Positive Rods per oil power field  CARLY  Enterobacter gergoviae  Enterobacter gergoviae    Urine culture    -->      Physical Examination:  PULM: decreased bs bilaterally, no significant sputum production  CVS: S1, S2 heard    RADIOLOGY REVIEWED  CXR: < from: Xray Chest 1 View AP -PORTABLE-Routine (10.16.17 @ 07:22) >  Impression:     Complete opacification left hemithorax this appears to be worsened when   compared to previous study done October 15, 2017. A pigtail catheter is   seen in the left hemithorax. Changes compatible with collapsed lung and   left pleural effusion. The right lung appearsto be clear. Endotracheal   tube and NG tube were removed.    < end of copied text >      CT chest:    TTE:

## 2017-10-16 NOTE — PROGRESS NOTE ADULT - SUBJECTIVE AND OBJECTIVE BOX
ATP Progress Note    S: Palliative extubation yesterday. No acute events.    O:  T(C): 36.5 (10-16-17 @ 03:00), Max: 37.2 (10-15-17 @ 08:00)  HR: 83 (10-16-17 @ 07:00) (79 - 106)  BP: 106/58 (10-15-17 @ 08:00) (106/58 - 106/58)  RR: 25 (10-16-17 @ 07:00) (18 - 34)  SpO2: 100% (10-16-17 @ 07:00) (84% - 100%)  Wt(kg): --    10-15 @ 07:01  -  10-16 @ 07:00  --------------------------------------------------------  IN:    dextrose 5% + lactated ringers.: 1575 mL    dextrose 5% + sodium chloride 0.45%.: 225 mL    Solution: 175 mL    Solution: 250 mL  Total IN: 2225 mL    OUT:    Chest Tube: 175 mL    Drain: 250 mL    Indwelling Catheter - Urethral: 885 mL  Total OUT: 1310 mL    Total NET: 915 mL        CBC Full  -  ( 16 Oct 2017 03:56 )  WBC Count : 21.2 K/uL  Hemoglobin : 7.9 g/dL  Hematocrit : 25.9 %  Platelet Count - Automated : 206 K/uL  Mean Cell Volume : 95.6 fl  Mean Cell Hemoglobin : 29.2 pg  Mean Cell Hemoglobin Concentration : 30.5 gm/dL  Auto Neutrophil # : x  Auto Lymphocyte # : x  Auto Monocyte # : x  Auto Eosinophil # : x  Auto Basophil # : x  Auto Neutrophil % : x  Auto Lymphocyte % : x  Auto Monocyte % : x  Auto Eosinophil % : x  Auto Basophil % : x    CAPILLARY BLOOD GLUCOSE          PHYSICAL EXAM:  Gen: Extubated, appears moderately uncomfortable  Abd: Soft, NT, ND  Lines: Pigtail x1 to suction  Wound: Ostomy bag applied to inferior portion of wound.

## 2017-10-16 NOTE — PROGRESS NOTE ADULT - SUBJECTIVE AND OBJECTIVE BOX
62 year old male initially admitted to neurosurgery for bialteral leg weakness found to have newly diagnosed metastatic disease with spinal met causing spinal cord compression s/p T8-T11 laminectomy on 9/20, post-op course complicated by deterioration in respiratory status and acute onset abdominal pain requiring intubation and transfer to NSCU. CT chest/abdomen/pelvis demonstrated b/l DVTs, right upper lobe PE, and a bowel perforation with free air. Pt was taken emergently to the OR and is now s/p exploratory laparotomy, SBR x1 (left in discontinuity), abdominal washout, abthera VAC application on 9/22, after which he was transferred to SICU under ACS. He was taken back to the OR on 9/24 for a washout, primary anastomosis, and abdominal closure. Post-operative course complicated by Acute hypoxemic respiratory failure secondary to Acute Pulmonary Vascular congestion / Left Lung Collapse requiring high flow, and acute hypercarbic respiratory failure requiring re-intubation on 10/11. Attempt at thoracentesis with CT first line did not help but placement of second line removed large amount of pleural effusion. Stool now draining from the anterior abdominal incision, consistent with an enterocutanous fistula. Remains ventilator dependent because of lung cancer compressing the left mainstem bronchus. remains paraplegic waist down secondary to metastatic lung cancer and thoracic spinal cord compression.  There appears to be no likelihood of disease reversibility and palliative care is involved        MEDICATIONS  (STANDING):  ALBUTerol/ipratropium for Nebulization 3 milliLiter(s) Nebulizer every 6 hours  dextrose 5% + lactated ringers. 1000 milliLiter(s) (10 mL/Hr) IV Continuous <Continuous>  enoxaparin Injectable 90 milliGRAM(s) SubCutaneous daily  glycopyrrolate Injectable 0.4 milliGRAM(s) IV Push every 6 hours  HYDROmorphone Infusion 1 mG/Hr (1 mL/Hr) IV Continuous <Continuous>  piperacillin/tazobactam IVPB. 3.375 Gram(s) IV Intermittent every 12 hours    MEDICATIONS  (PRN):  HYDROmorphone  Injectable 2 milliGRAM(s) IV Push every 1 hour PRN dyspnea  HYDROmorphone  Injectable 2 milliGRAM(s) IV Push every 1 hour PRN breakthrough pain  LORazepam   Injectable 1 milliGRAM(s) IV Push every 2 hours PRN Anxiety/ Dyspnea    REVIEW OF SYSTEM:  intubated      VITALS:   T(C): 36.3 (10-16-17 @ 15:00), Max: 36.5 (10-15-17 @ 19:00)  HR: 84 (10-16-17 @ 17:00) (79 - 99)  BP: 106/64 (10-16-17 @ 17:00) (106/64 - 115/64)  RR: 15 (10-16-17 @ 17:00) (15 - 35)  SpO2: 100% (10-16-17 @ 17:00) (84% - 100%)  Wt(kg): --        LABS:  ABG - ( 16 Oct 2017 03:51 )  pH: 7.30  /  pCO2: 35    /  pO2: 76    / HCO3: 17    / Base Excess: -8.3  /  SaO2: 95          PHYSICAL EXAM:  GENERAL: NAD, chronically ill  HEAD:  Atraumatic  EYES: EOM, PERRLA, conjunctiva pink and sclera white  ENT: No tonsillar erythema, exudates, or enlargement, moist mucous membranes, good dentition, no lesions  NECK: Supple, No JVD, normal thyroid, carotids with normal upstrokes and no bruits  CHEST/LUNG: Clear to auscultation bilaterally, No rales, rhonchi, wheezing, or rubs - 2 chest tubes in chest to drain pleural effusion  HEART: Regular rate and rhythm, No murmurs, rubs, or gallops  ABDOMEN: Soft, nondistended, no masses, guarding, tenderness or rebound, bowel sounds present  EXTREMITIES:  2+ Peripheral Pulses, No clubbing, cyanosis, or edema. No arthritis of shoulders, elbows, hands, hips, knees, ankles, or feet. No DJD C spine, T spine, or L/S spine  LYMPH: No lymphadenopathy noted  SKIN: No rashes or lesions  NERVOUS SYSTEM: sedated          CBC Full  -  ( 16 Oct 2017 03:56 )  WBC Count : 21.2 K/uL  Hemoglobin : 7.9 g/dL  Hematocrit : 25.9 %  Platelet Count - Automated : 206 K/uL  Mean Cell Volume : 95.6 fl  Mean Cell Hemoglobin : 29.2 pg  Mean Cell Hemoglobin Concentration : 30.5 gm/dL  Auto Neutrophil # : x  Auto Lymphocyte # : x  Auto Monocyte # : x  Auto Eosinophil # : x  Auto Basophil # : x  Auto Neutrophil % : x  Auto Lymphocyte % : x  Auto Monocyte % : x  Auto Eosinophil % : x  Auto Basophil % : x    10-16    138  |  106  |  63<H>  ----------------------------<  127<H>  4.9   |  17<L>  |  3.94<H>    Ca    8.1<L>      16 Oct 2017 03:56  Phos  6.1     10-16  Mg     2.3     10-16    TPro  4.9<L>  /  Alb  1.8<L>  /  TBili  1.5<H>  /  DBili  1.1<H>  /  AST  30  /  ALT  7<L>  /  AlkPhos  122<H>  10-16    LIVER FUNCTIONS - ( 16 Oct 2017 03:56 )  Alb: 1.8 g/dL / Pro: 4.9 g/dL / ALK PHOS: 122 U/L / ALT: 7 U/L RC / AST: 30 U/L / GGT: x           PT/INR - ( 16 Oct 2017 03:56 )   PT: 17.7 sec;   INR: 1.62 ratio         PTT - ( 16 Oct 2017 03:56 )  PTT:31.8 sec    CAPILLARY BLOOD GLUCOSE          RADIOLOGY & ADDITIONAL TESTS:

## 2017-10-16 NOTE — PROGRESS NOTE ADULT - ASSESSMENT
63 yo male with recently Dx Metastatic CA (Bone, Bowel, Liver) with compression Fx s/p laminectomy c/b Recurrent Acute Respiratory Failure (ARF), Left Lung Collapse, Pleural effusions s/p pig tail, PE, PNA, Bowel perf s/p Ex Lap, and Sepsis. Now with multi organ failure (ARF, Renal Failure, Liver) and lactic acidosis. Palliative care re-consulted for GOC.

## 2017-10-16 NOTE — PROGRESS NOTE ADULT - ASSESSMENT
A/P: 62M a/w b/l leg weakness found to have metastatic disease, b/l DVTs, RU lobe PE, and a bowel perforation (1cm perforation in the small bowel 110 cm distal to the ligament of treitz) now s/p ex-lap, SBR x1 (left in discontinuity), abdominal washout, abthera VAC application (9/22). RTOR 9/24 for intestinal reconstruction and abdominal closure. Patient remained in SICU. Was later found to have right renal vein thrombosis on CT chest (noninfected). At subsequent family meetings it was decided the patient would be DNR, but not DNI. Now the patient required re-intubation for worsening respiratory distress and respiratory acidosis resulting in worsening mental status. Following a family meeting, it was decided to terminally extubate the patient.    - Consider Palliative Care Unit  - Pain control  - T. Lovenox, renally dosed  - Primary Care Per SICU    [id number]

## 2017-10-16 NOTE — PROGRESS NOTE ADULT - PROBLEM SELECTOR PLAN 1
S/p palliative extubation overnight, started on a morphine gtt. In setting of ALEC, would recommend changing to dilaudid drip.  Would recommend adding a dilaudid PRN dose and d/c fentanyl patch  Can change robinul to ATC for audible secretions  Lower rate of fluids as patient with tachypnea and appears overloaded

## 2017-10-17 NOTE — PROGRESS NOTE ADULT - PROBLEM SELECTOR PLAN 1
S/p palliative extubation 10/15, c/w dilaudid drip.  C/w robinul ATC for audible secretions  No further IV fluids as patient appears overloaded

## 2017-10-17 NOTE — PROGRESS NOTE ADULT - ASSESSMENT
A/P: 62M a/w b/l leg weakness found to have metastatic disease, b/l DVTs, RU lobe PE, and a bowel perforation (1cm perforation in the small bowel 110 cm distal to the ligament of treitz) now s/p ex-lap, SBR x1 (left in discontinuity), abdominal washout, abthera VAC application (9/22). RTOR 9/24 for intestinal reconstruction and abdominal closure. Patient remained in SICU. Was later found to have right renal vein thrombosis on CT chest (noninfected). At subsequent family meetings it was decided the patient would be DNR, but not DNI. Now the patient required re-intubation for worsening respiratory distress and respiratory acidosis resulting in worsening mental status. Following a family meeting, it was decided to terminally extubate the patient.    - Consider Palliative Care Unit  - Pain control: Cont Dilaudid infusion  - Primary Care Per SICU    [id number]

## 2017-10-17 NOTE — PROGRESS NOTE ADULT - ASSESSMENT
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     PLAN: Palliation, comfort care measures    Neurologic: post-operative pain / neoplastic pain / anxiety  - Hydromorphone 100mg/100ml ns @ 1ml/hr  - Hydromorphone 2mg ivp q1h prn dyspnea  - Hydromorphone 2mg ivp q1h prn breakthrough  - Morphine 2mg IV push PRN q2h  - Ativan 2mg IV push PRN q2h    Respiratory: acute hypercarbic respiratory failure; bronchoscopy demonstrating obstructive tumor to left bronchus with left upper lobe collapse; left pleural effusion  Respiratory secretions, palliation  - Terminal extubation  - O2NC, titrate to SpO2 >90%  - Glycopyrrolate 0.2mg IV push q6h   - Albuterol/ipratropium nebulizer q6h    Cardiovascular: hypotension resolved, has required mami this hospitalization  Monitor    Gastrointestinal/Nutrition: small bowel perforation s/p SBR w/ primary anastomosis, stool burden with no BM since 10/6; new EC fistula   Diet  	-Regular diet    Renal/Genitourinary: ALEC  - Monitor I&Os q4h  - Torres, monitor urine output.  - D5 LR @ 10mL/hr     Hematologic: b/l DVT, b/l PE  	-Hold lovenox in setting of cessation of blood draws    Infectious Disease: Pluralibacter gergoviae in sputum culture  -Hold antibiotics    Endocrine: no active issues  -Hold blood draws    Disposition: DNR    ARPITA Olguin PGY1, [id number]

## 2017-10-17 NOTE — PROGRESS NOTE ADULT - SUBJECTIVE AND OBJECTIVE BOX
Follow-up Pulm Progress Note    actively dying, on dilaudid gtt     Medications:  MEDICATIONS  (STANDING):  ALBUTerol/ipratropium for Nebulization 3 milliLiter(s) Nebulizer every 6 hours  dextrose 5% + lactated ringers. 1000 milliLiter(s) (10 mL/Hr) IV Continuous <Continuous>  glycopyrrolate Injectable 0.4 milliGRAM(s) IV Push every 6 hours  HYDROmorphone Infusion 1 mG/Hr (1 mL/Hr) IV Continuous <Continuous>    MEDICATIONS  (PRN):  HYDROmorphone  Injectable 2 milliGRAM(s) IV Push every 1 hour PRN dyspnea  HYDROmorphone  Injectable 2 milliGRAM(s) IV Push every 1 hour PRN breakthrough pain  LORazepam   Injectable 1 milliGRAM(s) IV Push every 2 hours PRN Anxiety/ Dyspnea          Vital Signs Last 24 Hrs  T(C): 36.4 (17 Oct 2017 07:00), Max: 36.6 (16 Oct 2017 19:00)  T(F): 97.6 (17 Oct 2017 07:00), Max: 97.8 (16 Oct 2017 19:00)  HR: 89 (17 Oct 2017 08:00) (83 - 91)  BP: 80/53 (17 Oct 2017 08:00) (80/52 - 112/67)  BP(mean): 62 (17 Oct 2017 08:00) (61 - 83)  RR: 10 (17 Oct 2017 08:00) (8 - 35)  SpO2: 93% (17 Oct 2017 08:00) (93% - 100%)    ABG - ( 16 Oct 2017 03:51 )  pH: 7.30  /  pCO2: 35    /  pO2: 76    / HCO3: 17    / Base Excess: -8.3  /  SaO2: 95                    10-16 @ 07:01  -  10-17 @ 07:00  --------------------------------------------------------  IN: 835 mL / OUT: 1040 mL / NET: -205 mL          LABS:                        7.9    21.2  )-----------( 206      ( 16 Oct 2017 03:56 )             25.9     10-16    138  |  106  |  63<H>  ----------------------------<  127<H>  4.9   |  17<L>  |  3.94<H>    Ca    8.1<L>      16 Oct 2017 03:56  Phos  6.1     10-16  Mg     2.3     10-16    TPro  4.9<L>  /  Alb  1.8<L>  /  TBili  1.5<H>  /  DBili  1.1<H>  /  AST  30  /  ALT  7<L>  /  AlkPhos  122<H>  10-16          CAPILLARY BLOOD GLUCOSE        PT/INR - ( 16 Oct 2017 03:56 )   PT: 17.7 sec;   INR: 1.62 ratio         PTT - ( 16 Oct 2017 03:56 )  PTT:31.8 sec                    CULTURES: (if applicable)  Culture Results:   Moderate Klebsiella pneumoniae  Numerous Pluralibacter gergoviae  Normal Respiratory Galina present (10-13 @ 17:06)  Culture Results:   No growth to date. (10-12 @ 14:58)  Culture Results:   No growth to date. (10-12 @ 14:58)    Most recent blood culture -- 10-13 @ 17:06   Enterobacter gergoviae Enterobacter gergoviae  Klebsiella pneumoniae .Sputum Sputum 10-13 @ 17:06  Most recent blood culture -- 10-12 @ 14:58   -- -- .Blood Blood-Venous 10-12 @ 14:58    Blood culture 10-13 @ 17:06  --    Rare polymorphonuclear leukocytes per low power field  No Squamous epithelial cells per low power field  Numerous Gram Negative Rods per oil power field  Rare Gram positive cocci in pairs per oil power field  Rare Gram Positive Rods per oil power field  CARLY  Enterobacter gergoviae  Enterobacter gergoviae  Klebsiella pneumoniae    Urine culture    -->      Physical Examination:  PULM: ronchi bilaterally, no significant sputum production  CVS: S1, S2 heard    RADIOLOGY REVIEWED  CXR: < from: Xray Chest 1 View AP -PORTABLE-Routine (10.16.17 @ 07:22) >  Impression:     Complete opacification left hemithorax this appears to be worsened when   compared to previous study done October 15, 2017. A pigtail catheter is   seen in the left hemithorax. Changes compatible with collapsed lung and   left pleural effusion. The right lung appearsto be clear. Endotracheal   tube and NG tube were removed.    < end of copied text >

## 2017-10-17 NOTE — PROGRESS NOTE ADULT - SUBJECTIVE AND OBJECTIVE BOX
62 year old male initially admitted to neurosurgery for bialteral leg weakness found to have newly diagnosed metastatic disease with spinal met causing spinal cord compression s/p T8-T11 laminectomy on 9/20, post-op course complicated by deterioration in respiratory status and acute onset abdominal pain requiring intubation and transfer to NSCU. CT chest/abdomen/pelvis demonstrated b/l DVTs, right upper lobe PE, and a bowel perforation with free air. Pt was taken emergently to the OR and is now s/p exploratory laparotomy, SBR x1 (left in discontinuity), abdominal washout, abthera VAC application on 9/22, after which he was transferred to SICU under ACS. He was taken back to the OR on 9/24 for a washout, primary anastomosis, and abdominal closure. Post-operative course complicated by Acute hypoxemic respiratory failure secondary to Acute Pulmonary Vascular congestion / Left Lung Collapse requiring high flow, and acute hypercarbic respiratory failure requiring re-intubation on 10/11. Attempt at thoracentesis with CT first line did not help but placement of second line removed large amount of pleural effusion. Stool now draining from the anterior abdominal incision, consistent with an enterocutanous fistula. Remains ventilator dependent because of lung cancer compressing the left mainstem bronchus. remains paraplegic waist down secondary to metastatic lung cancer and thoracic spinal cord compression.  There appears to be no likelihood of disease reversibility and palliative care is involved    MEDICATIONS  (STANDING):  ALBUTerol/ipratropium for Nebulization 3 milliLiter(s) Nebulizer every 6 hours  glycopyrrolate Injectable 0.4 milliGRAM(s) IV Push every 6 hours  HYDROmorphone Infusion 1 mG/Hr (1 mL/Hr) IV Continuous <Continuous>  sodium chloride 0.9%. 1000 milliLiter(s) (10 mL/Hr) IV Continuous <Continuous>    MEDICATIONS  (PRN):  acetaminophen  Suppository 650 milliGRAM(s) Rectal every 6 hours PRN For Temp greater than 38 C (100.4 F)  bisacodyl Suppository 10 milliGRAM(s) Rectal daily PRN Constipation  HYDROmorphone  Injectable 2 milliGRAM(s) IV Push every 1 hour PRN dyspnea  HYDROmorphone  Injectable 2 milliGRAM(s) IV Push every 1 hour PRN breakthrough pain  LORazepam   Injectable 1 milliGRAM(s) IV Push every 1 hour PRN Anxiety/ Dyspnea      REVIEW OF SYSTEM:  intubated      ICU Vital Signs Last 24 Hrs  T(C): 36.8 (17 Oct 2017 21:19), Max: 37 (17 Oct 2017 19:30)  T(F): 98.2 (17 Oct 2017 21:19), Max: 98.6 (17 Oct 2017 19:30)  HR: 88 (17 Oct 2017 21:19) (84 - 91)  BP: 76/46 (17 Oct 2017 21:19) (70/44 - 88/54)  BP(mean): 57 (17 Oct 2017 19:30) (52 - 65)  ABP: --  ABP(mean): --  RR: 20 (17 Oct 2017 21:19) (8 - 23)  SpO2: 92% (17 Oct 2017 21:19) (91% - 100%)          PHYSICAL EXAM:  GENERAL: NAD, chronically ill  HEAD:  Atraumatic  EYES: EOM, PERRLA, conjunctiva pink and sclera white  ENT: No tonsillar erythema, exudates, or enlargement, moist mucous membranes, good dentition, no lesions  NECK: Supple, No JVD, normal thyroid, carotids with normal upstrokes and no bruits  CHEST/LUNG: Clear to auscultation bilaterally, No rales, rhonchi, wheezing, or rubs - 2 chest tubes in chest to drain pleural effusion  HEART: Regular rate and rhythm, No murmurs, rubs, or gallops  ABDOMEN: Soft, nondistended, no masses, guarding, tenderness or rebound, bowel sounds present  EXTREMITIES:  2+ Peripheral Pulses, No clubbing, cyanosis, or edema. No arthritis of shoulders, elbows, hands, hips, knees, ankles, or feet. No DJD C spine, T spine, or L/S spine  LYMPH: No lymphadenopathy noted  SKIN: No rashes or lesions  NERVOUS SYSTEM: sedated    ABG - ( 16 Oct 2017 03:51 )  pH: 7.30  /  pCO2: 35    /  pO2: 76    / HCO3: 17    / Base Excess: -8.3  /  SaO2: 95                    CBC Full  -  ( 16 Oct 2017 03:56 )  WBC Count : 21.2 K/uL  Hemoglobin : 7.9 g/dL  Hematocrit : 25.9 %  Platelet Count - Automated : 206 K/uL  Mean Cell Volume : 95.6 fl  Mean Cell Hemoglobin : 29.2 pg  Mean Cell Hemoglobin Concentration : 30.5 gm/dL  Auto Neutrophil # : x  Auto Lymphocyte # : x  Auto Monocyte # : x  Auto Eosinophil # : x  Auto Basophil # : x  Auto Neutrophil % : x  Auto Lymphocyte % : x  Auto Monocyte % : x  Auto Eosinophil % : x  Auto Basophil % : x    10-16    138  |  106  |  63<H>  ----------------------------<  127<H>  4.9   |  17<L>  |  3.94<H>    Ca    8.1<L>      16 Oct 2017 03:56  Phos  6.1     10-16  Mg     2.3     10-16    TPro  4.9<L>  /  Alb  1.8<L>  /  TBili  1.5<H>  /  DBili  1.1<H>  /  AST  30  /  ALT  7<L>  /  AlkPhos  122<H>  10-16    LIVER FUNCTIONS - ( 16 Oct 2017 03:56 )  Alb: 1.8 g/dL / Pro: 4.9 g/dL / ALK PHOS: 122 U/L / ALT: 7 U/L RC / AST: 30 U/L / GGT: x           PT/INR - ( 16 Oct 2017 03:56 )   PT: 17.7 sec;   INR: 1.62 ratio         PTT - ( 16 Oct 2017 03:56 )  PTT:31.8 sec

## 2017-10-17 NOTE — PROGRESS NOTE ADULT - PROBLEM SELECTOR PROBLEM 2
Lung cancer, main bronchus, left
Lung cancer, main bronchus, left
Metastatic disease
Lung cancer, main bronchus, left
Lung cancer, main bronchus, left
Other pulmonary embolism without acute cor pulmonale, unspecified chronicity
Pulmonary embolism
Lung cancer, main bronchus, left
Lung cancer, main bronchus, left
Metastatic disease
Pulmonary embolism
Lung cancer, main bronchus, left
Metastatic disease
Neoplasm related pain
Other pulmonary embolism without acute cor pulmonale, unspecified chronicity
Pleural effusion on left
Pulmonary embolism
Lung cancer, main bronchus, left
Pulmonary embolism
Metastatic disease
Pulmonary embolism
Lung cancer, main bronchus, left
Neoplasm related pain
Lung cancer, main bronchus, left

## 2017-10-17 NOTE — PROGRESS NOTE ADULT - PROBLEM SELECTOR PROBLEM 1
Metastatic disease
Atelectasis of left lung
Cord compression
Renal vein thrombosis
Acute respiratory failure with hypoxemia
Acute respiratory failure with hypoxemia
Atelectasis of left lung
Cord compression
Lung cancer, main bronchus, left
Metastatic disease
Pleural effusion on left
Renal vein thrombosis
Renal vein thrombosis
Cord compression
Atelectasis of left lung
Cord compression
Atelectasis of left lung
Cord compression
Lung cancer, main bronchus, left
Acute respiratory failure with hypoxemia
Cord compression

## 2017-10-17 NOTE — PROGRESS NOTE ADULT - SUBJECTIVE AND OBJECTIVE BOX
INTERVAL HPI/OVERNIGHT EVENTS: Patient appears comfortable. No PRNs required overnight.       Allergies    No Known Allergies    Intolerances      ADVANCE DIRECTIVES:    DNR Yes    MOLST [ ] YES [x ] NO     MEDICATIONS  (STANDING):  ALBUTerol/ipratropium for Nebulization 3 milliLiter(s) Nebulizer every 6 hours  dextrose 5% + sodium chloride 0.45%. 1000 milliLiter(s) (75 mL/Hr) IV Continuous <Continuous>  enoxaparin Injectable 90 milliGRAM(s) SubCutaneous daily  pantoprazole  Injectable 40 milliGRAM(s) IV Push daily  piperacillin/tazobactam IVPB. 3.375 Gram(s) IV Intermittent every 12 hours  senna 2 Tablet(s) Oral at bedtime    MEDICATIONS  (PRN):  HYDROmorphone  Injectable 0.5 milliGRAM(s) IV Push every 3 hours PRN moderate pain and comfort      PRESENT SYMPTOMS:  Source: [ ] Patient   [ ] Family   [x ] Team     Pain:                        [x ] No [ ] Yes             [ ] Mild [ ] Moderate [ ] Severe    Dyspnea:                [] No [x ] Yes             [ ] Mild [x ] Moderate [ ] Severe    Anxiety:                  [ x] No [ ] Yes             [ ] Mild [ ] Moderate [ ] Severe    Fatigue:                  [x ] No [ ] Yes             [ ] Mild [ ] Moderate [ ] Severe    Nausea:                  [x ] No [ ] Yes             [ ] Mild [ ] Moderate [ ] Severe    Loss of appetite:   [x ] No [ ] Yes             [ ] Mild [ ] Moderate [ ] Severe    Constipation:        [ x] No [ ] Yes             [ ] Mild [ ] Moderate [ ] Severe    Other Symptoms:  [ ] All other review of systems negative   [x ] Unable to obtain due to poor mentation     Karnofsky Performance Score/Palliative Performance Status Version 2:  10       %    PHYSICAL EXAM:  Vital Signs Last 24 Hrs  T(C): 37.2 (14 Oct 2017 15:00), Max: 37.3 (13 Oct 2017 23:00)  T(F): 99 (14 Oct 2017 15:00), Max: 99.1 (13 Oct 2017 23:00)  HR: 98 (14 Oct 2017 18:00) (89 - 108)  BP: 102/56 (13 Oct 2017 20:00) (102/56 - 102/56)  BP(mean): 76 (13 Oct 2017 20:00) (76 - 76)  RR: 21 (14 Oct 2017 18:00) (17 - 24)  SpO2: 98% (14 Oct 2017 18:00) (96% - 100%) I&O's Summary    13 Oct 2017 07:01  -  14 Oct 2017 07:00  --------------------------------------------------------  IN: 2144.7 mL / OUT: 1630 mL / NET: 514.7 mL    14 Oct 2017 07:01  -  14 Oct 2017 19:14  --------------------------------------------------------  IN: 925 mL / OUT: 695 mL / NET: 230 mL        General: Terminally ill appearing male.   [ ] Alert  [ ] Oriented x      [x ] Lethargic  [ ] Agitated   [ ] Cachexia   [x ] Unarousable  [ ] Verbal  [x ] Non-Verbal    HEENT:  [x ] Normal   [ ] Dry mouth   [ ] ET Tube    [ ] Trach  [ ] Oral lesions    Lungs:   [ ] Clear [x] Tachypnea  [x ] Audible excessive secretions   [ ] Rhonchi        [ ] Right [ ] Left [ ] Bilateral  [x ] Crackles BL        [ ] Right [ ] Left [ ] Bilateral  [ ] Wheezing     [ ] Right [ ] Left [ ] Bilateral    Cardiovascular:  [x ] Regular [ ] Irregular [ ] Tachycardia   [ ] Bradycardia  [ ] Murmur [ ] Other    Abdomen: [ ] Soft  [x ] Distended   [x ]  No BS  [ ] Non tender [ x] Tender  [ ]PEG   [ ]OGT/ NGT   Last BM:   10/6    Genitourinary: [ ] Normal [ ] Incontinent   [ ] Oliguria/Anuria   [x ] Torres    Musculoskeletal:  [ ] Normal   [x ] Weakness  [ ] Bedbound/Wheelchair bound [x ] Edema    Neurological: [ ] No focal deficits  [ ] Cognitive impairment  [ ] Dysphagia [ ] Dysarthria [ ] Paresis [x ] Other: Lethargic     Skin: Icteric    LABS:                        8.1    23.7  )-----------( 262      ( 14 Oct 2017 02:43 )             24.3     10-14    138  |  106  |  60<H>  ----------------------------<  95  5.1   |  16<L>  |  4.07<H>    Ca    7.9<L>      14 Oct 2017 02:43  Phos  4.7     10-14  Mg     2.3     10-14    TPro  4.9<L>  /  Alb  1.8<L>  /  TBili  1.6<H>  /  DBili  1.1<H>  /  AST  30  /  ALT  10  /  AlkPhos  122<H>  10-14    PT/INR - ( 14 Oct 2017 02:43 )   PT: 16.3 sec;   INR: 1.50 ratio         PTT - ( 14 Oct 2017 02:43 )  PTT:31.7 sec    Oral Intake: [x ] Unable/mouth care only [ ] Minimal [ ] Moderate [ ] Full Capability    Diet: [ x] NPO [ ] Tube feeds [ ] TPN [ ] Other     RADIOLOGY & ADDITIONAL STUDIES: reviewed    REFERRALS:   [ ] Chaplaincy  [ ] Hospice  [ ] Child Life  [ ] Social Work  [ ] Case management [ ] Holistic Therapy

## 2017-10-17 NOTE — PROGRESS NOTE ADULT - ATTENDING COMMENTS
Comfortable on Dilaudid drip  Patient after family discussion is DNR and DNI.  Patient have advanced diet even with EC fistula    Patient on comfort care  Awaiting PCU bed

## 2017-10-17 NOTE — PROGRESS NOTE ADULT - PROBLEM SELECTOR PROBLEM 5
Respiratory failure
Other acute pulmonary embolism
Respiratory failure
Other acute pulmonary embolism
Palliative care encounter
Palliative care encounter
Respiratory failure
Other acute pulmonary embolism
Other acute pulmonary embolism
Advance care planning

## 2017-10-17 NOTE — PROGRESS NOTE ADULT - SUBJECTIVE AND OBJECTIVE BOX
HISTORY OF PRESENT ILLNESS:  62 year old male initially admitted to neurosurgery for bilateral leg weakness found to have newly diagnosed stage IV metastatic non-small cell lung carcinoma with spinal met causing spinal cord compression s/p T8-T11 laminectomy on 9/20, post-op course complicated by deterioration in respiratory status and acute onset abdominal pain requiring intubation and transfer to NSCU. CT chest/abdomen/pelvis demonstrated b/l DVTs, right upper lobe PE, and a bowel perforation with free air. Pt was taken emergently to the OR and is now s/p exploratory laparotomy, SBR x1 (left in discontinuity), abdominal washout, abthera VAC application on 9/22, after which he was transferred to SICU under ACS. He was taken back to the OR on 9/24 for a washout, primary anastomosis, and abdominal closure. Post-operative course complicated by Acute hypoxemic respiratory failure secondary to Acute Pulmonary Vascular congestion / Left Lung Collapse requiring high flow, and acute hypercarbic respiratory failure requiring re-intubation on 10/11.  Pt had second pigtail placed in left superior chest which drained about 1L initially.     24 HOUR EVENTS:   -Palliative care  -Femoral line removed  -Discontinuation of Zosyn and Lovenox    SUBJECTIVE/ROS:  [ ] A ten-point review of systems was otherwise negative except as noted.  [ ] Due to altered mental status/intubation, subjective information were not able to be obtained from the patient. History was obtained, to the extent possible, from review of the chart and collateral sources of information.    NEURO  RASS:     GCS:     CAM ICU:  Exam: awake, alert, oriented  Meds: HYDROmorphone  Injectable 2 milliGRAM(s) IV Push every 1 hour PRN dyspnea  HYDROmorphone  Injectable 2 milliGRAM(s) IV Push every 1 hour PRN breakthrough pain  HYDROmorphone Infusion 1 mG/Hr IV Continuous <Continuous>  LORazepam   Injectable 1 milliGRAM(s) IV Push every 2 hours PRN Anxiety/ Dyspnea    [x] Adequacy of sedation and pain control has been assessed and adjusted    RESPIRATORY  RR: 8 (10-17-17 @ 01:00) (8 - 35)  SpO2: 100% (10-17-17 @ 01:00) (96% - 100%)  Wt(kg): --  Exam: unlabored, clear to auscultation bilaterally  Mechanical Ventilation:   ABG - ( 16 Oct 2017 03:51 )  pH: 7.30  /  pCO2: 35    /  pO2: 76    / HCO3: 17    / Base Excess: -8.3  /  SaO2: 95      Lactate: x        Meds: ALBUTerol/ipratropium for Nebulization 3 milliLiter(s) Nebulizer every 6 hours    CARDIOVASCULAR  HR: 91 (10-17-17 @ 01:00) (79 - 91)  BP: 88/51 (10-17-17 @ 01:00) (86/54 - 115/64)  BP(mean): 64 (10-17-17 @ 01:00) (64 - 84)  ABP: 137/79 (10-16-17 @ 16:00) (103/56 - 137/79)  ABP(mean): 102 (10-16-17 @ 16:00) (74 - 102)  Wt(kg): --  CVP(cm H2O): --    Exam: regular rate and rhythm  Cardiac Rhythm: sinus  Perfusion     [x]Adequate   [ ]Inadequate  Mentation   [x]Normal       [ ]Reduced  Extremities  [x]Warm         [ ]Cool  Volume Status [ ]Hypervolemic [x]Euvolemic [ ]Hypovolemic  Meds:     GI/NUTRITION  Exam: soft, nontender, nondistended, incision C/D/I  Diet:  Meds: glycopyrrolate Injectable 0.4 milliGRAM(s) IV Push every 6 hours    GENITOURINARY  I&O's Detail    10-15 @ 07:01  -  10-16 @ 07:00  --------------------------------------------------------  IN:    dextrose 5% + lactated ringers.: 1575 mL    dextrose 5% + sodium chloride 0.45%.: 225 mL    Solution: 250 mL    Solution: 175 mL  Total IN: 2225 mL    OUT:    Chest Tube: 175 mL    Drain: 250 mL    Indwelling Catheter - Urethral: 885 mL  Total OUT: 1310 mL    Total NET: 915 mL      10-16 @ 07:01  -  10-17 @ 01:33  --------------------------------------------------------  IN:    dextrose 5% + lactated ringers.: 500 mL    dextrose 5% + lactated ringers.: 80 mL    HYDROmorphone  Infusion: 6 mL    morphine  Infusion: 5 mL    morphine  Infusion: 6 mL    Solution: 150 mL  Total IN: 747 mL    OUT:    Chest Tube: 35 mL    Drain: 50 mL    Indwelling Catheter - Urethral: 520 mL  Total OUT: 605 mL    Total NET: 142 mL    10-16    138  |  106  |  63<H>  ----------------------------<  127<H>  4.9   |  17<L>  |  3.94<H>    Ca    8.1<L>      16 Oct 2017 03:56  Phos  6.1     10-16  Mg     2.3     10-16    TPro  4.9<L>  /  Alb  1.8<L>  /  TBili  1.5<H>  /  DBili  1.1<H>  /  AST  30  /  ALT  7<L>  /  AlkPhos  122<H>  10-16    [x] Torres catheter, indication: N/A  Meds: dextrose 5% + lactated ringers. 1000 milliLiter(s) IV Continuous <Continuous>    HEMATOLOGIC  Meds:   [x] VTE Prophylaxis                        7.9    21.2  )-----------( 206      ( 16 Oct 2017 03:56 )             25.9     PT/INR - ( 16 Oct 2017 03:56 )   PT: 17.7 sec;   INR: 1.62 ratio        PTT - ( 16 Oct 2017 03:56 )  PTT:31.8 sec  Transfusion     [ ] PRBC   [ ] Platelets   [ ] FFP   [ ] Cryoprecipitate    INFECTIOUS DISEASES  WBC Count: 21.2 K/uL (10-16 @ 03:56)    RECENT CULTURES:  Specimen Source: .Sputum Sputum  Date/Time: 10-13 @ 17:06  Culture Results:   Moderate Klebsiella pneumoniae  Numerous Pluralibacter gergoviae  Normal Respiratory Galina present  Gram Stain:   Rare polymorphonuclear leukocytes per low power field  No Squamous epithelial cells per low power field  Numerous Gram Negative Rods per oil power field  Rare Gram positive cocci in pairs per oil power field  Rare Gram Positive Rods per oil power field  Organism: Enterobacter gergoviae  Klebsiella pneumoniae  Specimen Source: .Blood Blood-Venous  Date/Time: 10-12 @ 14:58  Culture Results:   No growth to date.  Gram Stain: --  Organism: --    ENDOCRINE  CAPILLARY BLOOD GLUCOSE    ACCESS DEVICES:  [ ] Peripheral IV  [ ] Central Venous Line	[ ] R	[ ] L	[ ] IJ	[ ] Fem	[ ] SC	Placed:   [ ] Arterial Line		[ ] R	[ ] L	[ ] Fem	[ ] Rad	[ ] Ax	Placed:   [ ] PICC:					[ ] Mediport  [ ] Urinary Catheter, Date Placed:   [x] Necessity of urinary, arterial, and venous catheters discussed    OTHER MEDICATIONS: None    CODE STATUS: DNR

## 2017-10-17 NOTE — PROGRESS NOTE ADULT - PROVIDER SPECIALTY LIST ADULT
Anesthesia
Critical Care
Critical Care
Gastroenterology
Heme/Onc
Infectious Disease
Internal Medicine
Intervent Radiology
Intervent Radiology
NSICU
Nephrology
Neurosurgery
Palliative Care
Pulmonology
SICU
Thoracic Surgery
Thoracic Surgery
Trauma Surgery
Vascular Surgery
Vascular Surgery
Internal Medicine
Internal Medicine
Neurosurgery
Neurosurgery
SICU
Gastroenterology
Gastroenterology
Neurosurgery
SICU
Trauma Surgery
Neurosurgery
Pulmonology
Internal Medicine
Pulmonology
Internal Medicine
Palliative Care
Vascular Surgery
Heme/Onc
Palliative Care
Pulmonology
Internal Medicine
Pulmonology
Internal Medicine

## 2017-10-17 NOTE — PROGRESS NOTE ADULT - PROBLEM SELECTOR PROBLEM 4
ALEC (acute kidney injury)
Bowel perforation
Palliative care encounter
Bowel perforation
ALEC (acute kidney injury)
Bowel perforation
Bowel perforation
ALEC (acute kidney injury)
Bowel perforation
Ileus, postoperative
Palliative care encounter
Bowel perforation
ALEC (acute kidney injury)

## 2017-10-17 NOTE — PROGRESS NOTE ADULT - PROBLEM SELECTOR PLAN 5
Patient most likely hypercoagulable from his malignancy   Needs to be anticoagulated if OK with surgery.  He has PE DVT and renal vein thrombosis
Patient most likely hypercoagulable from his malignancy   Needs to be anticoagulated if OK with surgery.  He has PE DVT and renal vein thrombosis
Patient most likely hypercoagulable from his malignancy   Needs to ve anticoagulated if OK with surgery.  He has PE DVT and renal vein thrombosis
Patient most likely hypercoagulable from his malignancy   Needs to be anticoagulated if OK with surgery.  He has PE DVT and renal vein thrombosis
Patient is DNR/ DNI.  Full comfort measures. Awaiting transfer to PCU.
Patient is DNR/ DNI. Met with gaby Ansari and partner Francisca Simeon. Both are in agreement with full comfort measures and transfer to PCU once bed is available. No further pressors should BP lower again, and are in agreement with titration of medications for symptom optimization.
Patient most likely hypercoagulable from his malignancy   Needs to be anticoagulated if OK with surgery.  He has PE DVT and renal vein thrombosis
Patient most likely hypercoagulable from his malignancy   Needs to ve anticoagulated if OK with surgery.  He has PE DVT and renal vein thrombosis
daily ps trials as able, keep rr,35 o2 sat=92%  prvc to rest
Patient most likely hypercoagulable from his malignancy   Needs to be anticoagulated if OK with surgery.  He has PE DVT and renal vein thrombosis
Patient most likely hypercoagulable from his malignancy   Needs to be anticoagulated if OK with surgery.  He has PE DVT and renal vein thrombosis
Patient is DNR/ DNI. Time spent 60 minutes as per above.

## 2017-10-17 NOTE — PROGRESS NOTE ADULT - I WAS PHYSICALLY PRESENT FOR THE KEY PORTIONS OF THE EVALUATION AND MANAGEMENT (E/M) SERVICE PROVIDED.  I AGREE WITH THE ABOVE HISTORY, PHYSICAL, AND PLAN WHICH I HAVE REVIEWED AND EDITED WHERE APPROPRIATE

## 2017-10-17 NOTE — PROGRESS NOTE ADULT - SUBJECTIVE AND OBJECTIVE BOX
ATP Progress Note    S: Zosyn stopped yesterday. No acute events overnight.     O:  T(C): 36.5 (10-17-17 @ 03:00), Max: 36.6 (10-16-17 @ 19:00)  HR: 90 (10-17-17 @ 06:00) (83 - 91)  BP: 83/51 (10-17-17 @ 06:00) (83/51 - 115/64)  RR: 9 (10-17-17 @ 06:00) (8 - 35)  SpO2: 100% (10-17-17 @ 06:00) (96% - 100%)  Wt(kg): --    10-15 @ 07:01  -  10-16 @ 07:00  --------------------------------------------------------  IN:    dextrose 5% + lactated ringers.: 1575 mL    dextrose 5% + sodium chloride 0.45%.: 225 mL    Solution: 175 mL    Solution: 250 mL  Total IN: 2225 mL    OUT:    Chest Tube: 175 mL    Drain: 250 mL    Indwelling Catheter - Urethral: 885 mL  Total OUT: 1310 mL    Total NET: 915 mL      10-16 @ 07:01  -  10-17 @ 06:52  --------------------------------------------------------  IN:    dextrose 5% + lactated ringers.: 500 mL    dextrose 5% + lactated ringers.: 160 mL    HYDROmorphone  Infusion: 14 mL    morphine  Infusion: 5 mL    morphine  Infusion: 6 mL    Solution: 150 mL  Total IN: 835 mL    OUT:    Chest Tube: 55 mL    Drain: 150 mL    Indwelling Catheter - Urethral: 800 mL  Total OUT: 1005 mL    Total NET: -170 mL        CBC Full  -  ( 16 Oct 2017 03:56 )  WBC Count : 21.2 K/uL  Hemoglobin : 7.9 g/dL  Hematocrit : 25.9 %  Platelet Count - Automated : 206 K/uL  Mean Cell Volume : 95.6 fl  Mean Cell Hemoglobin : 29.2 pg  Mean Cell Hemoglobin Concentration : 30.5 gm/dL  Auto Neutrophil # : x  Auto Lymphocyte # : x  Auto Monocyte # : x  Auto Eosinophil # : x  Auto Basophil # : x  Auto Neutrophil % : x  Auto Lymphocyte % : x  Auto Monocyte % : x  Auto Eosinophil % : x  Auto Basophil % : x    CAPILLARY BLOOD GLUCOSE        PHYSICAL EXAM:  Gen: Extubated, appears moderately uncomfortable  Abd: Soft, NT, ND  Lines: Pigtail x1 to suction

## 2017-10-17 NOTE — PROGRESS NOTE ADULT - PROBLEM SELECTOR PROBLEM 3
Dyspnea
Lung cancer, main bronchus, left
Ileus, postoperative
Lung cancer, main bronchus, left
ALEC (acute kidney injury)
Ileus, postoperative
Bowel perforation
Ileus, postoperative
Lung cancer, main bronchus, left
Ileus, postoperative
Lung cancer, main bronchus, left
ALEC (acute kidney injury)
ALEC (acute kidney injury)
Bowel perforation
Dyspnea
Ileus, postoperative
Lung cancer, main bronchus, left
Mediastinal mass
Mediastinal mass
Ileus, postoperative
Lung cancer, main bronchus, left
Lung cancer, main bronchus, left
Ileus, postoperative
Ileus, postoperative

## 2017-10-18 NOTE — PROVIDER CONTACT NOTE (OTHER) - BACKGROUND
Pt a/w progressive weakness diag with lung CA with multiple mets. S/p T8-T11 laminectomy c/b bowel perf and resp failure requiring SBR, Hiflo/CPAP
Patient has B/L PE's. He is s/p ex-lap, washout, closure. Post op day #1. Extubated 9/24. Requiring HFNC overnight.
Patient has DNR order
Pt admitted with progressively worsening b/l lower extremity weakness.
Pt s/p newly diagnosed metastatic disease with spinal metastasis causing spinal cord compression with tumor decompression on 09/20/17.
Pt s/p spinal cord decompression secondary to spinal tumor. Pt received narcan 0.4 mg IV push this morning at 0815 for increasing lethargy with fentanyl patch 25 mcg/kg removed from ACW.
Pt s/p spinal cord decompression secondary to tumor growth. Pt has a 25 mcg fentanyl patch to left ACW. Pt pending OR today for pleurex catheter for ascites drainage.
back surgery
s/p L.L. and + ileus
s/p ex-lap

## 2017-10-18 NOTE — PROVIDER CONTACT NOTE (OTHER) - RECOMMENDATIONS
none received at this time
JOHN Nunez aware. Cathflo to be instilled by SICU team.
ABG level and additional dose of 0.4 mg narcan IV push.
Narcan IV and discontinue fentanyl patch.
Notify Neuro sx
Patient pronounced dead at 0055, family made aware, refused autopsy at this time.
Reassess
albumin 250 cc fluid bolus
provider come to bedside to see patient
will come to assess patient.

## 2017-10-18 NOTE — DISCHARGE NOTE FOR THE EXPIRED PATIENT - HOSPITAL COURSE
62M with no significant PMH p/w worsening LBP associated with lower extremity  weakness for past 3 weeks. Over the weekend, he progressed to the point where he was unable to ambulate. complaining of significant weakness in legs, stating they "feel like jelly." He was seen at OSH and transferred to [facility] for spine evaluation. CT revealed metastatic cancer to bone liver and bowel with compression fractures.  s/p laminectomy c/b recurrent acute respiratory failure, left lung collapse, pleural affusions, s/p pigtail, PE, PNA, bowel perf s/p exp lap, sepsis, MSOF.  Pt brought to PCU for continued care and symptom management.  Pt  on 10/18/17

## 2017-10-18 NOTE — PROVIDER CONTACT NOTE (OTHER) - ACTION/TREATMENT ORDERED:
Place an Ostomy appliance. d/c enema and miralax. No other interventions ordered
none at this time
JOHN Nunez aware. Cathflo to be instilled by SICU team.
No orders given at this time. Will continue to monitor.
ABG stat and will discuss with family about possible mechanical ventilation intervention if needed.
CPAP, 20mg lasix, 5mg metolazone.
Discontinue fentanyl patch and will come to bedside and assess patient.
SICU team to confirm accuracy of bladder scan with ultrasound.
See patient expiration note
Spoke with Dr. Langford and made him aware. he came and evaluated pt and then ordered a CXR. Waiting for CXR to be done to see results.
Will come to bedside and assess patient before intervention is made.
encouraged pt not to eat until he is seing by the provider.   Abdominal ex ray ordered.

## 2017-10-18 NOTE — DISCHARGE NOTE FOR THE EXPIRED PATIENT - SECONDARY DIAGNOSIS.
Neoplasm related pain Cord compression Acute respiratory failure with hypoxemia Dyspnea, unspecified type Palliative care encounter

## 2017-10-18 NOTE — PROVIDER CONTACT NOTE (OTHER) - ASSESSMENT
Brown, white lumens WDL.
MD Valentine at bedside.
No response to external stimuli  No spontaneous respirations  No apical heart rate  Negative papillary response to light
Pt appears very anxious , Pt states he feels like he can't breath. Lungs diminshed . Change is VS abd Output
Pt increasingly lethargic and is only arousable to repeated vigorous stimulation via sternal rub. Pt is currently on 50% FIO2 Bipap.
Pt is obtunded and arouses to repeated vigorous stimulation via sternal rub. Pt able to follow commands briefly and then falls back asleep. Pt disoriented to time, place and situation at this time. /65, HR 84, RR 12 on 4 L NC, oral temp. 98.4 degrees Farenheit. Urine output between 20-30 cc per hour and is concentrated.
Pt reports no urge to urinate.
Urine output 10 cc for 1400. Urine output has been between 25-30 cc/hr since 0700.
as above.
marked diminished breath sounds on left side. Confirmed by chest x-ray. Patient has increased work of breathing. Stating "help me"
pt on HFNC

## 2017-10-18 NOTE — PROVIDER CONTACT NOTE (OTHER) - SITUATION
Blue lumen of Left IJ x3 unable to flush, no blood return
PT IVF  CC/ HR  urine output has been diminished  last 2 hours 10cc / hr
Patient without spontaneous respirations or pulse
Pt 's B/P 101/69 , , and O2 sat 91% on @L and pt's UO dropped. Pt c/o difficulty breathing.
Pt in bigeminy heart rhythm, w/ decreased blood pressure
Pt increasingly lethargic and is only arousable to repeated vigorous stimulation via sternal rub.
Pt is obtunded and arouses to repeated vigorous stimulation via sternal rub. Pt able to follow commands briefly and then falls back asleep. Pt disoriented to time, place and situation at this time.
Pt was DTV s/p michelle removal at 2100. Pt has not yet voided, bladder scan done with 35 cc noted to be in the bladder
Urine output 10 cc for 1400.
large amount of Fecal matter noted form midline abd surgical incision
patient desat to 83% on high flow nasal cannula 70% FiO2
pt abdomen is distended, complained of pain in the abdomen.  no bowel sound noted on auscultation.

## 2019-08-02 NOTE — PROGRESS NOTE ADULT - PROBLEM SELECTOR PLAN 1
Detail Level: Simple -Left lung atelectasis- mucous plugging with airway compression 2nd tumor, atelectasis. L pleural effusion  -s/p L pigtail cath, exudative effusion  -Plan for L pleurex tomorrow with thoracic surgery   -Duoneb q6  -Chest PT, OOB, Acapella valve, IS

## 2020-03-26 NOTE — PROGRESS NOTE ADULT - PROBLEM SELECTOR PLAN 2
Attempted contact with patient to triage her cold and cough symptoms. Left message requesting call back. Ronald Cherry RN, BSN  ....................  3/26/2020   9:18 AM       with bilateral soleal DVT  -Provoked 2nd malignancy   -Full dose Lovenox 1mg/kg BID  -Underlying coagulopathy 2nd liver disease   -Heme following

## 2020-06-11 NOTE — H&P ADULT - ATTENDING COMMENTS
145 I saw and evaluated the patient. I reviewed the resident's note and agree. The patient is a 62-year-old male who presents with right (1/5-2/5) greater than left (2/5-3/5) lower extremity weakness, beginning this past Friday, and worsening to the point where the patient could not walk on Sunday. The patient was unable to tolerate a full MRI of the thoracic and lumbar spine with and without contrast due to claustrophobia, but partial sagittal images show probable spinal cord compression at the T9-T12 levels from epidural extension of osseous metastatic disease. Recommend completing the MRI of his thoracic and lumbar spine with and without contrast with sedation, and obtaining a CT of the chest, abdomen, and pelvis with contrast with thoracic and lumbar spine reconstructions for metastatic work-up and surgical planning. Recommend steroids and NPO status for now. The patient will likely require surgical decompression with possible instrumentation and fusion. Plan discussed with the patient, who expressed understanding, and is in agreement. I saw and evaluated the patient. I reviewed the resident's note and agree. The patient is a 62-year-old male who presents with right (1/5-2/5) greater than left (2/5-3/5) lower extremity weakness, beginning this past Friday, and worsening to the point where the patient could not walk on Sunday. The patient was unable to tolerate a full MRI of the thoracic and lumbar spine with and without contrast due to claustrophobia, but partial sagittal images show probable spinal cord compression at the T9-T12 levels due to likely epidural extension of osseous metastatic disease. Recommend completing the MRI of his thoracic and lumbar spine with and without contrast with sedation, and obtaining a CT of the chest, abdomen, and pelvis with contrast with thoracic and lumbar spine reconstructions for metastatic work-up and surgical planning. Recommend steroids and NPO status for now. The patient will likely require surgical decompression with possible instrumentation and fusion, but due to the patient eating despite his NPO status and the imaging not being complete, surgery will be delayed for now. The plan was discussed with the patient, who expressed understanding, and is in agreement. I saw and evaluated the patient. I reviewed the resident's note and agree. The patient is a 62-year-old male who presents with right (1/5-2/5) greater than left (2/5-3/5) lower extremity weakness, beginning this past Friday, and worsening to the point where the patient could not walk on Sunday. The patient was unable to tolerate a full MRI of the thoracic and lumbar spine with and without contrast due to claustrophobia, but partial sagittal images show spinal cord compression at the T8-T11 levels due to epidural extension of osseous metastatic disease. Recommend completing the MRI of his thoracic and lumbar spine with and without contrast and/or obtaining a CT of the chest, abdomen, and pelvis with contrast with thoracic and lumbar spine reconstructions for metastatic work-up and surgical planning. Recommend steroids and NPO status. The patient will require surgical decompression, but due to the patient eating despite his NPO status and the imaging not being complete, surgery is delayed for now. The plan was discussed with the patient, who expressed understanding, and is in agreement. The risks, benefits, and alternatives to surgery were discussed with the patient, who expressed understanding, and wishes to proceed with surgery.

## 2021-07-28 NOTE — CHART NOTE - NSCHARTNOTEFT_GEN_A_CORE
Brief Interim Note; see full Nutrition Follow up on 10/3.    Patient had a bowel movement today; diet was advanced to clear liquids after the Palliative/GOC meeting.     Pt observed eating >75% of meal tray with good appetite; reports no GI distress.    Hospital Course: Pt with newly diagnosed metastatic disease S/P T8-T11 laminectomy 9/18; S/P OR for small bowel perforation, resection, left in discontinuity with abthera (9/22), S/P ex-lap, washout, re-anatomosis, abdominal closure (9/24). Peritonitis subsequently - E. faecium, Klebsiella and Candida.     Pt previously NPO x 2 weeks awaiting return of bowel function. Pt now DNR, Palliative care.    RD will remain available to honor pt/ family wishes regarding plan of care.     Falguni Denise, MS RD N [facility], #[phone] Continue Eliquis

## 2022-04-15 NOTE — ED PROVIDER NOTE - CPE EDP PSYCH NORM
Mason Perez, I biopsied this spot today on the left lower eyelid, suspect BCC, would like to plan for mohs closure by you if possible in ASC, I will let you know when path comes back, thanks! normal...

## 2022-06-03 NOTE — PROGRESS NOTE ADULT - SUBJECTIVE AND OBJECTIVE BOX
Stable. Oncology Follow-up    INTERVAL HPI/OVERNIGHT EVENTS:  Patient S&E at bedside.    VITAL SIGNS:  T(F): 97.6 (09-25-17 @ 07:00)  HR: 92 (09-25-17 @ 10:00)  BP: 160/84 (09-25-17 @ 07:00)  RR: 22 (09-25-17 @ 10:00)  SpO2: 98% (09-25-17 @ 10:00)  Wt(kg): --    PHYSICAL EXAM:    Constitutional: AAOx3, NAD,   Eyes: PERRL, EOMI, sclera non-icteric  Neck: supple, no masses, no JVD  Respiratory: CTA b/l, good air entry b/l, no wheezing, rhonchi, rales, with normal respiratory effort and no intercostal retractions  Cardiovascular: RRR, normal S1S2, no M/R/G  Gastrointestinal: soft, NTND, no masses palpable, BS normal in all four quadrants, no HSM  Extremities:  no c/c/e  Neurological: Grossly intact  Skin: Normal temperature    MEDICATIONS  (STANDING):  pantoprazole  Injectable 40 milliGRAM(s) IV Push daily  fluconAZOLE IVPB 200 milliGRAM(s) IV Intermittent every 24 hours  lactated ringers. 1000 milliLiter(s) (30 mL/Hr) IV Continuous <Continuous>  piperacillin/tazobactam IVPB. 3.375 Gram(s) IV Intermittent every 8 hours  vancomycin  IVPB 1000 milliGRAM(s) IV Intermittent every 12 hours  vancomycin  IVPB      ALBUTerol/ipratropium for Nebulization 3 milliLiter(s) Nebulizer every 6 hours  tiotropium 18 MICROgram(s) Capsule 1 Capsule(s) Inhalation daily  metoprolol Injectable 5 milliGRAM(s) IV Push every 4 hours  furosemide   Injectable 20 milliGRAM(s) IV Push every 6 hours  acetylcysteine 20% Inhalation 3 milliLiter(s) Inhalation every 6 hours  heparin  Infusion 1200 Unit(s)/Hr (12 mL/Hr) IV Continuous <Continuous>    MEDICATIONS  (PRN):  HYDROmorphone  Injectable 0.5 milliGRAM(s) IV Push every 3 hours PRN Moderate Pain (4 - 6)  HYDROmorphone  Injectable 1 milliGRAM(s) IV Push every 3 hours PRN Severe Pain (7 - 10)      No Known Allergies      LABS:                        10.4   22.6  )-----------( 85       ( 25 Sep 2017 02:28 )             31.1     09-25    143  |  108  |  60<H>  ----------------------------<  124<H>  5.2   |  21<L>  |  1.28    Ca    7.3<L>      25 Sep 2017 02:28  Phos  4.4     09-25  Mg     3.5     09-25    TPro  4.7<L>  /  Alb  2.4<L>  /  TBili  1.8<H>  /  DBili  1.4<H>  /  AST  69<H>  /  ALT  32  /  AlkPhos  77  09-25    PT/INR - ( 25 Sep 2017 02:28 )   PT: 15.8 sec;   INR: 1.44 ratio         PTT - ( 25 Sep 2017 02:28 )  PTT:24.4 sec       RADIOLOGY & ADDITIONAL TESTS:  Studies reviewed. Oncology Follow-up    INTERVAL HPI/OVERNIGHT EVENTS:  Patient S&E at bedside. Patient is on BIPAP machine but nod yes or no during interview.     VITAL SIGNS:  T(F): 97.6 (09-25-17 @ 07:00)  HR: 92 (09-25-17 @ 10:00)  BP: 160/84 (09-25-17 @ 07:00)  RR: 22 (09-25-17 @ 10:00)  SpO2: 98% (09-25-17 @ 10:00)  Wt(kg): --    PHYSICAL EXAM:    Constitutional: AAOx3, NAD,   Eyes: PERRL, EOMI, sclera non-icteric  Neck: supple, no masses, no JVD  Respiratory: CTA b/l, good air entry b/l, no wheezing, rhonchi, rales, with normal respiratory effort and no intercostal retractions  Cardiovascular: RRR, normal S1S2, no M/R/G  Gastrointestinal: soft, NTND, no masses palpable, BS normal in all four quadrants, no HSM  Extremities:  no c/c/e  Neurological: Grossly intact  Skin: Normal temperature    MEDICATIONS  (STANDING):  pantoprazole  Injectable 40 milliGRAM(s) IV Push daily  fluconAZOLE IVPB 200 milliGRAM(s) IV Intermittent every 24 hours  lactated ringers. 1000 milliLiter(s) (30 mL/Hr) IV Continuous <Continuous>  piperacillin/tazobactam IVPB. 3.375 Gram(s) IV Intermittent every 8 hours  vancomycin  IVPB 1000 milliGRAM(s) IV Intermittent every 12 hours  vancomycin  IVPB      ALBUTerol/ipratropium for Nebulization 3 milliLiter(s) Nebulizer every 6 hours  tiotropium 18 MICROgram(s) Capsule 1 Capsule(s) Inhalation daily  metoprolol Injectable 5 milliGRAM(s) IV Push every 4 hours  furosemide   Injectable 20 milliGRAM(s) IV Push every 6 hours  acetylcysteine 20% Inhalation 3 milliLiter(s) Inhalation every 6 hours  heparin  Infusion 1200 Unit(s)/Hr (12 mL/Hr) IV Continuous <Continuous>    MEDICATIONS  (PRN):  HYDROmorphone  Injectable 0.5 milliGRAM(s) IV Push every 3 hours PRN Moderate Pain (4 - 6)  HYDROmorphone  Injectable 1 milliGRAM(s) IV Push every 3 hours PRN Severe Pain (7 - 10)      No Known Allergies      LABS:                        10.4   22.6  )-----------( 85       ( 25 Sep 2017 02:28 )             31.1     09-25    143  |  108  |  60<H>  ----------------------------<  124<H>  5.2   |  21<L>  |  1.28    Ca    7.3<L>      25 Sep 2017 02:28  Phos  4.4     09-25  Mg     3.5     09-25    TPro  4.7<L>  /  Alb  2.4<L>  /  TBili  1.8<H>  /  DBili  1.4<H>  /  AST  69<H>  /  ALT  32  /  AlkPhos  77  09-25    PT/INR - ( 25 Sep 2017 02:28 )   PT: 15.8 sec;   INR: 1.44 ratio         PTT - ( 25 Sep 2017 02:28 )  PTT:24.4 sec       RADIOLOGY & ADDITIONAL TESTS:  Studies reviewed.        Final Diagnosis    1. 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 Spine, designated "thoracic lamina", resection:  - Metastatic poorly differentiated non-small cell carcinoma  involving bone and soft tissue, consistent with lung origin  (See  comment) Oncology Follow-up    INTERVAL HPI/OVERNIGHT EVENTS:  Patient S&E at bedside. Patient is on BIPAP machine but nod yes or no during interview.     VITAL SIGNS:  T(F): 97.6 (09-25-17 @ 07:00)  HR: 92 (09-25-17 @ 10:00)  BP: 160/84 (09-25-17 @ 07:00)  RR: 22 (09-25-17 @ 10:00)  SpO2: 98% (09-25-17 @ 10:00)  Wt(kg): --    PHYSICAL EXAM:    Constitutional: mild respiratory distress on BIPAP mask.   Eyes: PERRL, EOMI, sclera non-icteric  Neck: supple, no masses, no JVD  Respiratory: clear on anterior chest  Cardiovascular: RRR, normal S1S2, no M/R/G  Gastrointestinal: soft, NTND, no masses palpable, BS normal in all four quadrants, no HSM  Extremities:  no c/c/e  Neurological: Grossly intact  Skin: Normal temperature    MEDICATIONS  (STANDING):  pantoprazole  Injectable 40 milliGRAM(s) IV Push daily  fluconAZOLE IVPB 200 milliGRAM(s) IV Intermittent every 24 hours  lactated ringers. 1000 milliLiter(s) (30 mL/Hr) IV Continuous <Continuous>  piperacillin/tazobactam IVPB. 3.375 Gram(s) IV Intermittent every 8 hours  vancomycin  IVPB 1000 milliGRAM(s) IV Intermittent every 12 hours  vancomycin  IVPB      ALBUTerol/ipratropium for Nebulization 3 milliLiter(s) Nebulizer every 6 hours  tiotropium 18 MICROgram(s) Capsule 1 Capsule(s) Inhalation daily  metoprolol Injectable 5 milliGRAM(s) IV Push every 4 hours  furosemide   Injectable 20 milliGRAM(s) IV Push every 6 hours  acetylcysteine 20% Inhalation 3 milliLiter(s) Inhalation every 6 hours  heparin  Infusion 1200 Unit(s)/Hr (12 mL/Hr) IV Continuous <Continuous>    MEDICATIONS  (PRN):  HYDROmorphone  Injectable 0.5 milliGRAM(s) IV Push every 3 hours PRN Moderate Pain (4 - 6)  HYDROmorphone  Injectable 1 milliGRAM(s) IV Push every 3 hours PRN Severe Pain (7 - 10)      No Known Allergies      LABS:                        10.4   22.6  )-----------( 85       ( 25 Sep 2017 02:28 )             31.1     09-25    143  |  108  |  60<H>  ----------------------------<  124<H>  5.2   |  21<L>  |  1.28    Ca    7.3<L>      25 Sep 2017 02:28  Phos  4.4     09-25  Mg     3.5     09-25    TPro  4.7<L>  /  Alb  2.4<L>  /  TBili  1.8<H>  /  DBili  1.4<H>  /  AST  69<H>  /  ALT  32  /  AlkPhos  77  09-25    PT/INR - ( 25 Sep 2017 02:28 )   PT: 15.8 sec;   INR: 1.44 ratio         PTT - ( 25 Sep 2017 02:28 )  PTT:24.4 sec       RADIOLOGY & ADDITIONAL TESTS:  Studies reviewed.        Final Diagnosis    1. 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

## 2023-08-06 NOTE — PROGRESS NOTE ADULT - SUBJECTIVE AND OBJECTIVE BOX
Patient seen and examined on AM SICU rounds  Events of last night noted  Care discussed with Dr. Padilla postoperatively    Sedated on fentanyl and precedex  Hemodynamically unstable on levophed infusion to maintain MAP greater than 65.  Oxygenating well  Urine output = 30-60 cc/hr  WBC=18, on broad spectrum antibiotics (zosyn / diflucan) secondary to GI perforation  On heparin infusion for PE, PTT target 60-90.  Hematocrit=30.    - Will need to return to OR for second look laparotomy  - SBT contraindicated given hemodynamic instability  - Titrating levophed infusion to MAP greater eric 65, will resuscitate with fluids as needed  - Acute blood loss anemia without signs of ongoing blood loss  - Continue antibiotics for intraabdominal sepsis  - Prognosis guarded    - 35 minutes direct critical care Patient seen and examined on AM SICU rounds  Events of last night noted  Care discussed with Dr. Padilla postoperatively    Sedated on fentanyl and precedex  Hemodynamically unstable on levophed infusion to maintain MAP greater than 65.  Oxygenating well  Urine output = 30-60 cc/hr  WBC=18, on broad spectrum antibiotics (zosyn / diflucan) secondary to GI perforation  On heparin infusion for PE, PTT target 60-90.  Hematocrit=30.    - Severe sepsis / septic shock  - Acute respiratory failure  - Will need to return to OR for second look laparotomy  - SBT contraindicated given hemodynamic instability  - Titrating levophed infusion to MAP greater eric 65, will resuscitate with fluids as needed  - Acute blood loss anemia without signs of ongoing blood loss  - Continue antibiotics for intraabdominal sepsis  - Prognosis guarded    - 35 minutes direct critical care Negative

## 2023-10-16 NOTE — PROGRESS NOTE ADULT - ATTENDING COMMENTS
I saw and evaluated the patient. The patient is a 62-year-old male with newly diagnosed metastatic disease s/p T8-T11 laminectomies for resection of dorsal epidural tumor for spinal cord compression on 9/19/17. His hospital course was complicated by a right upper lobe pulmonary embolus, B/L lower extremity DVTs, and a bowel perforation, requiring two surgeries for repair with General Surgery. The patient's exam is notable for 2/5 left lower extremity weakness and 0/5 right lower extremity weakness. Continue mobilization with PT, OT and PMR, and disposition planning to spinal cord injury rehab if and when medically ready. Ok to begin chemotherapy and radiation from Neurosurgery perspective as early as tomorrow. Will plan on removing staples tomorrow. Appreciate SICU support. PAST MEDICAL HISTORY:  Asthma last attack 2/22    Obesity, morbid     MATIAS (obstructive sleep apnea)

## 2025-04-10 NOTE — PHYSICAL THERAPY INITIAL EVALUATION ADULT - LEVEL OF INDEPENDENCE: BED TO CHAIR, REHAB EVAL
PT called requesting a refill of omeprazole (PRILOSEC) 40 MG delayed release sent to pharmacy on file.    maximum assist (25% patients effort)

## 2025-05-28 NOTE — PROGRESS NOTE ADULT - ATTENDING COMMENTS
Lab Results   Component Value Date    EGFR 63 02/04/2025    EGFR 59 10/14/2024    EGFR 56 09/24/2024    CREATININE 1.12 02/04/2025    CREATININE 1.19 10/14/2024    CREATININE 1.24 09/24/2024             d/w Manda Lanza on Wednesday